# Patient Record
Sex: FEMALE | Race: WHITE | NOT HISPANIC OR LATINO | Employment: OTHER | URBAN - METROPOLITAN AREA
[De-identification: names, ages, dates, MRNs, and addresses within clinical notes are randomized per-mention and may not be internally consistent; named-entity substitution may affect disease eponyms.]

---

## 2017-01-13 ENCOUNTER — TELEPHONE (OUTPATIENT)
Dept: FAMILY MEDICINE | Facility: CLINIC | Age: 82
End: 2017-01-13

## 2017-01-13 NOTE — TELEPHONE ENCOUNTER
----- Message from Cristina Ambriz sent at 1/13/2017  8:36 AM CST -----  Contact: Trang  Daughter called regarding the patient to schedule an appt for today. Stating sugar is high and she can barely walk. Please contact Trang [phone] (home)

## 2017-01-13 NOTE — TELEPHONE ENCOUNTER
"Dr Baird,   Pt called and stated that pts CBG has running 150-180's the past 3 days.  Also starting yesterday pt has had increased confusion and dark urine.  Denies fever, chest pain, SOB, abdominal pain however pt is unsteady on her feet  "walking like a drunk person" the past 2 days per daughter.  Pt daughter advised to have pt seen at urgent care. Pt daughter verbalized understanding.   "

## 2017-01-18 ENCOUNTER — TELEPHONE (OUTPATIENT)
Dept: FAMILY MEDICINE | Facility: CLINIC | Age: 82
End: 2017-01-18

## 2017-01-18 NOTE — TELEPHONE ENCOUNTER
----- Message from Nargis Dubon sent at 1/18/2017 12:54 PM CST -----  Contact: daughter  Daughter - Parisa Sheth - [phone] is calling to schedule a one week followup appt from [facility] on 01 13 17 for a urinary tract infection/please call daughter to schedule as I do not show any available appts until 02 22 17

## 2017-01-20 ENCOUNTER — OFFICE VISIT (OUTPATIENT)
Dept: FAMILY MEDICINE | Facility: CLINIC | Age: 82
End: 2017-01-20
Payer: MEDICARE

## 2017-01-20 VITALS
SYSTOLIC BLOOD PRESSURE: 144 MMHG | TEMPERATURE: 98 F | BODY MASS INDEX: 32.14 KG/M2 | DIASTOLIC BLOOD PRESSURE: 70 MMHG | HEIGHT: 62 IN | HEART RATE: 50 BPM | WEIGHT: 174.63 LBS | OXYGEN SATURATION: 96 %

## 2017-01-20 DIAGNOSIS — E11.22 CONTROLLED TYPE 2 DIABETES MELLITUS WITH STAGE 4 CHRONIC KIDNEY DISEASE, WITH LONG-TERM CURRENT USE OF INSULIN: ICD-10-CM

## 2017-01-20 DIAGNOSIS — N18.4 CONTROLLED TYPE 2 DIABETES MELLITUS WITH STAGE 4 CHRONIC KIDNEY DISEASE, WITH LONG-TERM CURRENT USE OF INSULIN: ICD-10-CM

## 2017-01-20 DIAGNOSIS — I48.0 AF (PAROXYSMAL ATRIAL FIBRILLATION): ICD-10-CM

## 2017-01-20 DIAGNOSIS — E11.69 TYPE II OR UNSPECIFIED TYPE DIABETES MELLITUS WITH OTHER SPECIFIED MANIFESTATIONS, NOT STATED AS UNCONTROLLED: ICD-10-CM

## 2017-01-20 DIAGNOSIS — N18.4 CKD (CHRONIC KIDNEY DISEASE), STAGE IV: ICD-10-CM

## 2017-01-20 DIAGNOSIS — N30.00 ACUTE CYSTITIS WITHOUT HEMATURIA: Primary | ICD-10-CM

## 2017-01-20 DIAGNOSIS — Z23 IMMUNIZATION DUE: ICD-10-CM

## 2017-01-20 DIAGNOSIS — I10 ESSENTIAL HYPERTENSION: ICD-10-CM

## 2017-01-20 DIAGNOSIS — Z79.4 CONTROLLED TYPE 2 DIABETES MELLITUS WITH STAGE 4 CHRONIC KIDNEY DISEASE, WITH LONG-TERM CURRENT USE OF INSULIN: ICD-10-CM

## 2017-01-20 LAB
CREAT UR-MCNC: 55 MG/DL
MICROALBUMIN UR DL<=1MG/L-MCNC: 565 UG/ML
MICROALBUMIN/CREATININE RATIO: 1027.3 UG/MG

## 2017-01-20 PROCEDURE — 1157F ADVNC CARE PLAN IN RCRD: CPT | Mod: S$GLB,,, | Performed by: NURSE PRACTITIONER

## 2017-01-20 PROCEDURE — 1126F AMNT PAIN NOTED NONE PRSNT: CPT | Mod: S$GLB,,, | Performed by: NURSE PRACTITIONER

## 2017-01-20 PROCEDURE — 1160F RVW MEDS BY RX/DR IN RCRD: CPT | Mod: S$GLB,,, | Performed by: NURSE PRACTITIONER

## 2017-01-20 PROCEDURE — 3077F SYST BP >= 140 MM HG: CPT | Mod: S$GLB,,, | Performed by: NURSE PRACTITIONER

## 2017-01-20 PROCEDURE — 82570 ASSAY OF URINE CREATININE: CPT

## 2017-01-20 PROCEDURE — 3078F DIAST BP <80 MM HG: CPT | Mod: S$GLB,,, | Performed by: NURSE PRACTITIONER

## 2017-01-20 PROCEDURE — 1159F MED LIST DOCD IN RCRD: CPT | Mod: S$GLB,,, | Performed by: NURSE PRACTITIONER

## 2017-01-20 PROCEDURE — 90662 IIV NO PRSV INCREASED AG IM: CPT | Mod: S$GLB,,, | Performed by: NURSE PRACTITIONER

## 2017-01-20 PROCEDURE — 99999 PR PBB SHADOW E&M-EST. PATIENT-LVL V: CPT | Mod: PBBFAC,,, | Performed by: NURSE PRACTITIONER

## 2017-01-20 PROCEDURE — 99499 UNLISTED E&M SERVICE: CPT | Mod: S$PBB,,, | Performed by: NURSE PRACTITIONER

## 2017-01-20 PROCEDURE — G0008 ADMIN INFLUENZA VIRUS VAC: HCPCS | Mod: S$GLB,,, | Performed by: NURSE PRACTITIONER

## 2017-01-20 PROCEDURE — 99214 OFFICE O/P EST MOD 30 MIN: CPT | Mod: 25,S$GLB,, | Performed by: NURSE PRACTITIONER

## 2017-01-20 NOTE — MR AVS SNAPSHOT
[facility]  [address]  [place] [address]  Phone: [phone]  Fax: [phone]                  Denice Sheth   2017 3:00 PM   Office Visit    Description:  Female : 1934   Provider:  Mae Ramírez NP   Department:  [facility]           Reason for Visit     Hospital Follow Up           Diagnoses this Visit        Comments    Acute cystitis without hematuria    -  Primary     Type II or unspecified type diabetes mellitus with other specified manifestations, not stated as uncontrolled         Controlled type 2 diabetes mellitus with stage 4 chronic kidney disease, with long-term current use of insulin         CKD (chronic kidney disease), stage IV         Essential hypertension                To Do List           Future Appointments        Provider Department Dept Phone    2017 12:00 PM LAB, [place] [facility] [phone]    2017 1:00 PM Amadou Serrano NP [facility] Hematology [phone]      Goals (5 Years of Data)     None      [facility]     [facility] Care [facility] -  Assistance  Registered nurses in the [facility] provide clinical advisement, health education, appointment booking, and other advisory services.  Call for this free service at [phone].             Medications           Message regarding Medications     Verify the changes and/or additions to your medication regime listed below are the same as discussed with your clinician today.  If any of these changes or additions are incorrect, please notify your healthcare provider.             Verify that the below list of medications is an accurate representation of the medications you are currently taking.  If none reported, the list may be blank. If incorrect, please contact your healthcare provider. Carry this list with you in case of emergency.           Current Medications     ascorbic acid (VITAMIN C) 500  "MG tablet Take 500 mg by mouth once daily.    aspirin 81 mg Tab Take by mouth once daily.     b complex vitamins capsule Take 1 capsule by mouth once daily.    BD ULTRA-FINE PHILIPP PEN NEEDLES 32 gauge x 5/32" Ndle Inject 1 application as directed 3 (three) times daily.    blood-glucose meter kit by Other route. Use as instructed    calcium-vitamin D3 500 mg(1,250mg) -200 unit per tablet Take 1 tablet by mouth 2 (two) times daily with meals.    carvedilol (COREG) 25 MG tablet Take 1 tablet (25 mg total) by mouth 2 (two) times daily with meals.    cefUROXime (CEFTIN) 500 MG tablet Take 1 tablet (500 mg total) by mouth 2 (two) times daily.    clopidogrel (PLAVIX) 75 mg tablet TAKE ONE TABLET BY MOUTH ONCE DAILY    digoxin (LANOXIN) 125 mcg tablet TAKE ONE TABLET BY MOUTH ONCE DAILY    escitalopram oxalate (LEXAPRO) 10 MG tablet TAKE ONE TABLET BY MOUTH ONCE DAILY IN THE EVENING    ferrous sulfate 325 (65 FE) MG EC tablet Take 325 mg by mouth 3 (three) times daily with meals.    fish oil-omega-3 fatty acids 300-1,000 mg capsule Take 2 g by mouth once daily.    insulin aspart (NOVOLOG) 100 unit/mL injection Inject 6 Units into the skin 3 (three) times daily before meals.    INSULIN GLARGINE,HUM.REC.ANLOG (LANTUS SOLOSTAR SUBQ) Inject 35 Units into the skin every evening.     insulin syringe-needle U-100 (INSULIN SYRINGE) 1 mL 30 gauge x 5/16 Syrg As directed.    levothyroxine (SYNTHROID) 75 MCG tablet TAKE ONE TABLET BY MOUTH ONCE DAILY BEFORE BREAKFAST    linagliptin (TRADJENTA) 5 mg Tab tablet Take 1 tablet (5 mg total) by mouth once daily.    lovastatin (MEVACOR) 20 MG tablet Take 1 tablet (20 mg total) by mouth every evening.    multivitamin (ONE DAILY MULTIVITAMIN) per tablet Take 1 tablet by mouth once daily.    nifedipine (NIFEDICAL XL) 60 MG (OSM) 24 hr tablet TAKE 1 TABLET(60 MG) BY MOUTH EVERY DAY    nitroGLYCERIN (NITROSTAT) 0.4 MG SL tablet Place 1 tablet (0.4 mg total) under the tongue every 5 (five) minutes " "as needed for Chest pain.    POLYETHYLENE GLYCOL 3350 (DULCOLAX BALANCE ORAL) Take 1 packet by mouth daily as needed (constipation).     rivastigmine (EXELON) 4.6 mg/24 hr PT24 APPLY ONE PATCH TOPICALLY ONCE DAILY    spironolactone (ALDACTONE) 25 MG tablet Take 1 tablet (25 mg total) by mouth once daily.    valsartan (DIOVAN) 160 MG tablet Take 1 tablet (160 mg total) by mouth once daily.           Clinical Reference Information           Vital Signs - Last Recorded  Most recent update: 1/20/2017  3:25 PM by Kandy Rich LPN    BP Pulse Temp Ht Wt LMP    (!) 144/80 (BP Location: Left arm, Patient Position: Sitting, BP Method: Manual) (!) 50 97.5 °F (36.4 °C) (Oral) 5' 2" (1.575 m) 79.2 kg (174 lb 9.7 oz) (LMP Unknown)    SpO2 BMI             96% 31.94 kg/m2         Blood Pressure          Most Recent Value    BP  (!)  144/80      Allergies as of 1/20/2017     No Known Allergies      Immunizations Administered on Date of Encounter - 1/20/2017     None      [facility] Sign-Up     Activating your [facility] account is as easy as 1-2-3!     1) Visit [email or web address], select Sign Up Now, enter this activation code and your date of birth, then select Next.  Activation code not generated  Current Patient Portal Status: Account disabled      2) Create a username and password to use when you visit [facility] in the future and select a security question in case you lose your password and select Next.    3) Enter your e-mail address and click Sign Up!    Additional Information  If you have questions, please e-mail [email or web address] or call [phone] to talk to our [facility] staff. Remember, [facility] is NOT to be used for urgent needs. For medical emergencies, dial 911.         "

## 2017-01-20 NOTE — PROGRESS NOTES
Subjective:       Patient ID: Denice Sheth is a 82 y.o. female.    Chief Complaint: Hospital Follow Up    HPI     Patient presents to clinic, along with her daughter, for ER follow-up. She was evaluated ST ER on 01/13/2017 for hyperglycemia, confusion and weakness. She was diagnosed with a UTI and discharged home on oral ceftin.   Reports improvement in sx currently. She has been monitoring her bs daily, has noted it to range 90s-110s since being on the abx. She is maintained on lantus 35 units nightly and novolog 6 units with meals. Denies any episodes of hypoglycemia. Last eye exam 12/2016.     HTN - b/p is elevated in clinic with occasional elevations at home. Reports that Dr. Haji has been managing her antihypertensive regimen.     Review of Systems   Constitutional: Negative for chills and fever.   Respiratory: Negative for cough, shortness of breath and wheezing.    Cardiovascular: Negative for chest pain and palpitations.   Gastrointestinal: Negative for diarrhea, nausea and vomiting.   Genitourinary: Negative for difficulty urinating, dysuria, hematuria and urgency.   Neurological: Negative for dizziness, weakness, light-headedness and headaches.   Psychiatric/Behavioral: Negative for confusion.       Objective:      Physical Exam   Constitutional: She is oriented to person, place, and time. She appears well-developed and well-nourished.   HENT:   Head: Normocephalic and atraumatic.   Cardiovascular: Normal rate, regular rhythm, normal heart sounds and intact distal pulses.    No murmur heard.  Pulmonary/Chest: Effort normal and breath sounds normal. No respiratory distress. She has no wheezes. She has no rales.   Musculoskeletal: Normal range of motion. She exhibits no edema, tenderness or deformity.   Neurological: She is alert and oriented to person, place, and time. No cranial nerve deficit.   Skin: Skin is warm and dry.   Psychiatric: She has a normal mood and affect. Her behavior is normal.    Nursing note and vitals reviewed.      Assessment:       1. Acute cystitis without hematuria    2. Type II or unspecified type diabetes mellitus with other specified manifestations, not stated as uncontrolled    3. Controlled type 2 diabetes mellitus with stage 4 chronic kidney disease, with long-term current use of insulin    4. CKD (chronic kidney disease), stage IV    5. Essential hypertension    6. AF (paroxysmal atrial fibrillation)    7. Immunization due        Plan:   Denice was seen today for hospital follow up.    Diagnoses and all orders for this visit:    Acute cystitis without hematuria  Resolving. Continue current and return to clinic if sx worsen.      Type II or unspecified type diabetes mellitus with other specified manifestations, not stated as uncontrolled  Controlled continue current regimen.     Controlled type 2 diabetes mellitus with stage 4 chronic kidney disease, with long-term current use of insulin  Controlled continue current regimen.     CKD (chronic kidney disease), stage IV  Stable. Continue current regimen.     Essential hypertension  Not controlled. Patient will f/u with cardiology regarding current regimen.  Home b/p monitoring; RTC in 1 month for re-evaluation.     AF (paroxysmal atrial fibrillation)  Stable. Continue current regimen. Routine cardiology f/u.    Immunization due  -     Influenza - High Dose (65+) (PF) (IM)

## 2017-01-23 RX ORDER — LOVASTATIN 20 MG/1
TABLET ORAL
Qty: 90 TABLET | Refills: 0 | Status: SHIPPED | OUTPATIENT
Start: 2017-01-23 | End: 2017-01-27 | Stop reason: SDUPTHER

## 2017-01-24 ENCOUNTER — TELEPHONE (OUTPATIENT)
Dept: FAMILY MEDICINE | Facility: CLINIC | Age: 82
End: 2017-01-24

## 2017-01-24 DIAGNOSIS — N18.4 CKD (CHRONIC KIDNEY DISEASE), STAGE IV: Primary | ICD-10-CM

## 2017-01-27 ENCOUNTER — OFFICE VISIT (OUTPATIENT)
Dept: NEPHROLOGY | Facility: CLINIC | Age: 82
End: 2017-01-27
Payer: MEDICARE

## 2017-01-27 VITALS
DIASTOLIC BLOOD PRESSURE: 58 MMHG | BODY MASS INDEX: 31.17 KG/M2 | TEMPERATURE: 98 F | SYSTOLIC BLOOD PRESSURE: 118 MMHG | WEIGHT: 170.44 LBS | HEART RATE: 42 BPM | OXYGEN SATURATION: 98 %

## 2017-01-27 DIAGNOSIS — E11.29 PROTEINURIA DUE TO TYPE 2 DIABETES MELLITUS: ICD-10-CM

## 2017-01-27 DIAGNOSIS — N18.30 CKD (CHRONIC KIDNEY DISEASE) STAGE 3, GFR 30-59 ML/MIN: Primary | ICD-10-CM

## 2017-01-27 DIAGNOSIS — E11.21 DIABETIC NEPHROPATHY ASSOCIATED WITH TYPE 2 DIABETES MELLITUS: ICD-10-CM

## 2017-01-27 DIAGNOSIS — I12.9 BENIGN HYPERTENSIVE RENAL DISEASE WITHOUT RENAL FAILURE: ICD-10-CM

## 2017-01-27 DIAGNOSIS — R80.9 PROTEINURIA DUE TO TYPE 2 DIABETES MELLITUS: ICD-10-CM

## 2017-01-27 PROCEDURE — 3074F SYST BP LT 130 MM HG: CPT | Mod: S$GLB,,, | Performed by: INTERNAL MEDICINE

## 2017-01-27 PROCEDURE — 99999 PR PBB SHADOW E&M-EST. PATIENT-LVL II: CPT | Mod: PBBFAC,,, | Performed by: INTERNAL MEDICINE

## 2017-01-27 PROCEDURE — 1157F ADVNC CARE PLAN IN RCRD: CPT | Mod: S$GLB,,, | Performed by: INTERNAL MEDICINE

## 2017-01-27 PROCEDURE — 1159F MED LIST DOCD IN RCRD: CPT | Mod: S$GLB,,, | Performed by: INTERNAL MEDICINE

## 2017-01-27 PROCEDURE — 3078F DIAST BP <80 MM HG: CPT | Mod: S$GLB,,, | Performed by: INTERNAL MEDICINE

## 2017-01-27 PROCEDURE — 1126F AMNT PAIN NOTED NONE PRSNT: CPT | Mod: S$GLB,,, | Performed by: INTERNAL MEDICINE

## 2017-01-27 PROCEDURE — 99499 UNLISTED E&M SERVICE: CPT | Mod: S$PBB,,, | Performed by: INTERNAL MEDICINE

## 2017-01-27 PROCEDURE — 1160F RVW MEDS BY RX/DR IN RCRD: CPT | Mod: S$GLB,,, | Performed by: INTERNAL MEDICINE

## 2017-01-27 PROCEDURE — 99204 OFFICE O/P NEW MOD 45 MIN: CPT | Mod: S$GLB,,, | Performed by: INTERNAL MEDICINE

## 2017-01-27 NOTE — LETTER
January 27, 2017      Mae Ramírez, NP  [address]  [place] [address]           [facility] [address] [facility] [address] [place] [address]  Phone: [phone]          Patient: Denice Sheth   MR Number: [id number]   YOB: 1934   Date of Visit: 1/27/2017       Dear Mae Ramírez:    Thank you for referring Denice Sheth to me for evaluation. Attached you will find relevant portions of my assessment and plan of care.    If you have questions, please do not hesitate to call me. I look forward to following Denice Sheth along with you.    Sincerely,    Jonathan Pace MD    Enclosure  CC:  No Recipients    If you would like to receive this communication electronically, please contact [email or web address] or [phone] to request more information on [employer] Link access.    For providers and/or their staff who would like to refer a patient to [facility], please contact us through our one-stop-shop provider referral line, [facility], at [phone].    If you feel you have received this communication in error or would no longer like to receive these types of communications, please e-mail [email or web address]

## 2017-01-27 NOTE — PROGRESS NOTES
Subjective:       Patient ID: Denice Sheth is a 82 y.o. White female who presents for new patient evaluation for chronic renal failure.    Denice Sheth is referred by Meredith Baird MD to be evaluated for chronic renal failure.  She was found to have an elevated creatinine and was thus referred.  She had a UTI 2 weeks ago with one episode of nausea and weakness which as since resolved.  She reports that she is feeling well and really has no chronic symptoms.  She denies taking NSAIDs and has not been placed on any new medications lately.      Review of Systems   Constitutional: Negative for appetite change, chills and fever.   Eyes: Negative for visual disturbance.   Respiratory: Negative for cough and shortness of breath.    Cardiovascular: Negative for chest pain and leg swelling.   Gastrointestinal: Negative for diarrhea, nausea and vomiting.   Genitourinary: Negative for difficulty urinating, dysuria and hematuria.   Skin: Negative for rash.   Neurological: Negative for headaches.         Past Medical History   Diagnosis Date    A-fib     Anticoagulant long-term use     CHF (congestive heart failure)     Diabetes mellitus     Encounter for blood transfusion     Hypertension     Insomnia      Past Surgical History   Procedure Laterality Date    Hysterectomy       partial, benign causes by reported hx     Social History     Social History    Marital status:      Spouse name: N/A    Number of children: N/A    Years of education: N/A     Occupational History    Not on file.     Social History Main Topics    Smoking status: Never Smoker    Smokeless tobacco: Never Used    Alcohol use No    Drug use: No    Sexual activity: No     Other Topics Concern    Not on file     Social History Narrative     The patient has a family history of  Current Outpatient Prescriptions   Medication Sig    ascorbic acid (VITAMIN C) 500 MG tablet Take 500 mg by mouth once daily.    aspirin 81 mg Tab Take  "by mouth once daily.     b complex vitamins capsule Take 1 capsule by mouth once daily.    BD ULTRA-FINE PHILIPP PEN NEEDLES 32 gauge x 5/32" Ndle Inject 1 application as directed 3 (three) times daily.    blood-glucose meter kit by Other route. Use as instructed    calcium-vitamin D3 500 mg(1,250mg) -200 unit per tablet Take 1 tablet by mouth 2 (two) times daily with meals.    carvedilol (COREG) 25 MG tablet Take 1 tablet (25 mg total) by mouth 2 (two) times daily with meals.    clopidogrel (PLAVIX) 75 mg tablet TAKE ONE TABLET BY MOUTH ONCE DAILY    digoxin (LANOXIN) 125 mcg tablet TAKE ONE TABLET BY MOUTH ONCE DAILY    escitalopram oxalate (LEXAPRO) 10 MG tablet TAKE ONE TABLET BY MOUTH ONCE DAILY IN THE EVENING    ferrous sulfate 325 (65 FE) MG EC tablet Take 325 mg by mouth 3 (three) times daily with meals.    fish oil-omega-3 fatty acids 300-1,000 mg capsule Take 2 g by mouth once daily.    insulin aspart (NOVOLOG) 100 unit/mL injection Inject 6 Units into the skin 3 (three) times daily before meals.    INSULIN GLARGINE,HUM.REC.ANLOG (LANTUS SOLOSTAR SUBQ) Inject 35 Units into the skin every evening.     insulin syringe-needle U-100 (INSULIN SYRINGE) 1 mL 30 gauge x 5/16 Syrg As directed.    levothyroxine (SYNTHROID) 75 MCG tablet TAKE ONE TABLET BY MOUTH ONCE DAILY BEFORE BREAKFAST    linagliptin (TRADJENTA) 5 mg Tab tablet Take 1 tablet (5 mg total) by mouth once daily.    lovastatin (MEVACOR) 20 MG tablet Take 1 tablet (20 mg total) by mouth every evening.    lovastatin (MEVACOR) 20 MG tablet TAKE ONE TABLET BY MOUTH ONCE DAILY IN THE EVENING    multivitamin (ONE DAILY MULTIVITAMIN) per tablet Take 1 tablet by mouth once daily.    nifedipine (NIFEDICAL XL) 60 MG (OSM) 24 hr tablet TAKE 1 TABLET(60 MG) BY MOUTH EVERY DAY    nitroGLYCERIN (NITROSTAT) 0.4 MG SL tablet Place 1 tablet (0.4 mg total) under the tongue every 5 (five) minutes as needed for Chest pain.    POLYETHYLENE GLYCOL 3350 " (DULCOLAX BALANCE ORAL) Take 1 packet by mouth daily as needed (constipation).     rivastigmine (EXELON) 4.6 mg/24 hr PT24 APPLY ONE PATCH TOPICALLY ONCE DAILY    spironolactone (ALDACTONE) 25 MG tablet Take 1 tablet (25 mg total) by mouth once daily.    valsartan (DIOVAN) 160 MG tablet Take 1 tablet (160 mg total) by mouth once daily.     No current facility-administered medications for this visit.        Visit Vitals    BP (!) 118/58    Pulse (!) 42    Temp 97.5 °F (36.4 °C) (Oral)    Wt 77.3 kg (170 lb 6.7 oz)    LMP  (LMP Unknown)    SpO2 98%    BMI 31.17 kg/m2       Objective:      Physical Exam   Constitutional: She is oriented to person, place, and time. She appears well-developed and well-nourished. No distress.   HENT:   Head: Normocephalic and atraumatic.   Eyes: Conjunctivae are normal.   Neck: Neck supple. No JVD present.   Cardiovascular: Normal rate and regular rhythm.  Exam reveals no gallop and no friction rub.    Murmur (2/6 nabila) heard.  Pulmonary/Chest: Effort normal and breath sounds normal. No respiratory distress. She has no wheezes. She has no rales.   Abdominal: Soft. Bowel sounds are normal. She exhibits no distension. There is no tenderness.   Musculoskeletal: She exhibits no edema.   Neurological: She is alert and oriented to person, place, and time.   Skin: Skin is warm and dry. No rash noted.   Psychiatric: She has a normal mood and affect.   Vitals reviewed.      Assessment:       1. CKD (chronic kidney disease) stage 3, GFR 30-59 ml/min    2. 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 Diabetic nephropathy associated with type 2 diabetes mellitus    4. Proteinuria due to type 2 diabetes mellitus        Plan:   Return to clinic in 4 months.  Labs for next visit include rp, pth, ua, upc.  Baseline creatinine is 1.4-1.6 since 2015.    I will send a note to Dr. Davis regarding her pulse.

## 2017-01-30 RX ORDER — RIVASTIGMINE 4.6 MG/24H
PATCH, EXTENDED RELEASE TRANSDERMAL
Qty: 30 PATCH | Refills: 5 | Status: SHIPPED | OUTPATIENT
Start: 2017-01-30 | End: 2017-07-31 | Stop reason: SDUPTHER

## 2017-02-10 ENCOUNTER — TELEPHONE (OUTPATIENT)
Dept: FAMILY MEDICINE | Facility: CLINIC | Age: 82
End: 2017-02-10

## 2017-02-10 RX ORDER — INSULIN GLARGINE 100 [IU]/ML
35 INJECTION, SOLUTION SUBCUTANEOUS NIGHTLY
Qty: 10.5 ML | Refills: 5 | Status: SHIPPED | OUTPATIENT
Start: 2017-02-10 | End: 2017-06-08 | Stop reason: SDUPTHER

## 2017-02-10 NOTE — TELEPHONE ENCOUNTER
----- Message from Cristina Faustin sent at 2/10/2017  8:55 AM CST -----  INSULIN GLARGINE,HUM.REC.ANLOG (LANTUS SOLOSTAR SUBQ) [place] [address] - [facility] - [address]  [address]  [place] [address]  Phone: [phone] Fax: [phone]

## 2017-02-10 NOTE — TELEPHONE ENCOUNTER
Pt is requesting a refill on her lantus. She is taking 35 units each evening.  It was originally prescribed by Dr Reynaga.  States she uses the vial and not the pen. Could not pend order as it is historical med.  Please advise.

## 2017-02-17 RX ORDER — LOVASTATIN 20 MG/1
TABLET ORAL
Qty: 90 TABLET | Refills: 0 | Status: SHIPPED | OUTPATIENT
Start: 2017-02-17 | End: 2017-03-30

## 2017-02-21 RX ORDER — DIGOXIN 125 MCG
TABLET ORAL
Qty: 90 TABLET | Refills: 0 | Status: SHIPPED | OUTPATIENT
Start: 2017-02-21 | End: 2017-05-11 | Stop reason: ALTCHOICE

## 2017-02-21 RX ORDER — SPIRONOLACTONE 25 MG/1
TABLET ORAL
Qty: 90 TABLET | Refills: 3 | Status: SHIPPED | OUTPATIENT
Start: 2017-02-21 | End: 2017-05-11 | Stop reason: ALTCHOICE

## 2017-03-01 RX ORDER — LEVOTHYROXINE SODIUM 75 UG/1
TABLET ORAL
Qty: 90 TABLET | Refills: 0 | Status: SHIPPED | OUTPATIENT
Start: 2017-03-01 | End: 2017-06-04 | Stop reason: SDUPTHER

## 2017-03-01 RX ORDER — ESCITALOPRAM OXALATE 10 MG/1
TABLET ORAL
Qty: 90 TABLET | Refills: 0 | Status: SHIPPED | OUTPATIENT
Start: 2017-03-01 | End: 2017-06-04 | Stop reason: SDUPTHER

## 2017-03-22 ENCOUNTER — PATIENT OUTREACH (OUTPATIENT)
Dept: ADMINISTRATIVE | Facility: HOSPITAL | Age: 82
End: 2017-03-22

## 2017-03-22 NOTE — LETTER
March 22, 2017    Denice ABARHAN Meryl  [address] [place] [address]             [facility]  [address]  [facility] [address]  Phone: [phone] Dear Mrs. Sheth:    [facility] is committed to your overall health.  To help you get the most out of each of your visits, we will review your information to make sure you are up to date on all of your recommended tests and/or procedures.      Dr. Meredith Baird has found that you may be due for your annual diabetic foot exam and your fasting cholesterol labs.     If you have had any of the above done at another facility, please bring the records or information with you so that your record at [facility] will be complete.    If you are currently taking medication, please bring it with you to your appointment for review.    Also, if you have any type of Advanced Directives, please bring them with you to your office visit so we may scan them into your chart.    If you have any questions or concerns, please don't hesitate to call.    Thank you for letting us care for you,  Lashell Gallegos LPN Clinical Care Coordinator  [facility]  [phone]

## 2017-03-30 ENCOUNTER — OFFICE VISIT (OUTPATIENT)
Dept: FAMILY MEDICINE | Facility: CLINIC | Age: 82
End: 2017-03-30
Payer: MEDICARE

## 2017-03-30 VITALS
SYSTOLIC BLOOD PRESSURE: 144 MMHG | DIASTOLIC BLOOD PRESSURE: 54 MMHG | HEIGHT: 62 IN | WEIGHT: 167.69 LBS | BODY MASS INDEX: 30.86 KG/M2 | RESPIRATION RATE: 16 BRPM | TEMPERATURE: 98 F | HEART RATE: 44 BPM

## 2017-03-30 DIAGNOSIS — R29.6 FREQUENT FALLS: Primary | ICD-10-CM

## 2017-03-30 DIAGNOSIS — E11.9 TYPE 2 DIABETES MELLITUS WITHOUT COMPLICATION, WITH LONG-TERM CURRENT USE OF INSULIN: ICD-10-CM

## 2017-03-30 DIAGNOSIS — Z79.4 TYPE 2 DIABETES MELLITUS WITHOUT COMPLICATION, WITH LONG-TERM CURRENT USE OF INSULIN: ICD-10-CM

## 2017-03-30 LAB — GLUCOSE SERPL-MCNC: 198 MG/DL (ref 70–110)

## 2017-03-30 PROCEDURE — 1160F RVW MEDS BY RX/DR IN RCRD: CPT | Mod: S$GLB,,, | Performed by: FAMILY MEDICINE

## 2017-03-30 PROCEDURE — 1159F MED LIST DOCD IN RCRD: CPT | Mod: S$GLB,,, | Performed by: FAMILY MEDICINE

## 2017-03-30 PROCEDURE — 1126F AMNT PAIN NOTED NONE PRSNT: CPT | Mod: S$GLB,,, | Performed by: FAMILY MEDICINE

## 2017-03-30 PROCEDURE — 3078F DIAST BP <80 MM HG: CPT | Mod: S$GLB,,, | Performed by: FAMILY MEDICINE

## 2017-03-30 PROCEDURE — 3077F SYST BP >= 140 MM HG: CPT | Mod: S$GLB,,, | Performed by: FAMILY MEDICINE

## 2017-03-30 PROCEDURE — 99999 PR PBB SHADOW E&M-EST. PATIENT-LVL III: CPT | Mod: PBBFAC,,, | Performed by: FAMILY MEDICINE

## 2017-03-30 PROCEDURE — 99499 UNLISTED E&M SERVICE: CPT | Mod: S$PBB,,, | Performed by: FAMILY MEDICINE

## 2017-03-30 PROCEDURE — 82948 REAGENT STRIP/BLOOD GLUCOSE: CPT | Mod: S$GLB,,, | Performed by: FAMILY MEDICINE

## 2017-03-30 PROCEDURE — 99214 OFFICE O/P EST MOD 30 MIN: CPT | Mod: S$GLB,,, | Performed by: FAMILY MEDICINE

## 2017-03-30 PROCEDURE — 1157F ADVNC CARE PLAN IN RCRD: CPT | Mod: S$GLB,,, | Performed by: FAMILY MEDICINE

## 2017-03-30 NOTE — PATIENT INSTRUCTIONS
Trial off lovastatin for two months and see if you get a little stronger.   Measure some blood sugars before meals.

## 2017-03-30 NOTE — MR AVS SNAPSHOT
[facility]  [address]  [place] [address]  Phone: [phone]  Fax: [phone]                  Denice Sheth   3/30/2017 2:30 PM   Office Visit    Description:  Female : 1934   Provider:  Blayne Mendez MD   Department:  [facility]           Reason for Visit     Fall           Diagnoses this Visit        Comments    Frequent falls    -  Primary     Type 2 diabetes mellitus without complication, with long-term current use of insulin                To Do List           Future Appointments        Provider Department Dept Phone    2017 10:00 AM Meredith Baird MD [facility] [phone]    2017 10:00 AM LAB, [place] [facility] [phone]    2017 10:30 AM URINE [facility] [phone]    2017 10:20 AM Jonathan Pace MD [facility]ph[facility] [phone]    2017 12:00 PM LAB, [place] [facility] [phone]      Goals (5 Years of Data)     None      [facility]     [facility] Nurse Care Line -  Assistance  Unless otherwise directed by your provider, please contact [facility], our nurse care line that is available for  assistance.     Registered nurses in the [facility] provide: appointment scheduling, clinical advisement, health education, and other advisory services.  Call: [phone] (toll free)               Medications           Message regarding Medications     Verify the changes and/or additions to your medication regime listed below are the same as discussed with your clinician today.  If any of these changes or additions are incorrect, please notify your healthcare provider.             Verify that the below list of medications is an accurate representation of the medications you are currently taking.  If none reported, the list may be blank. If incorrect, please contact your healthcare  "provider. Carry this list with you in case of emergency.           Current Medications     ascorbic acid (VITAMIN C) 500 MG tablet Take 500 mg by mouth once daily.    aspirin 81 mg Tab Take by mouth once daily.     b complex vitamins capsule Take 1 capsule by mouth once daily.    BD ULTRA-FINE PHILIPP PEN NEEDLES 32 gauge x 5/32" Ndle Inject 1 application as directed 3 (three) times daily.    blood-glucose meter kit by Other route. Use as instructed    calcium-vitamin D3 500 mg(1,250mg) -200 unit per tablet Take 1 tablet by mouth 2 (two) times daily with meals.    carvedilol (COREG) 25 MG tablet Take 1 tablet (25 mg total) by mouth 2 (two) times daily with meals.    clopidogrel (PLAVIX) 75 mg tablet TAKE ONE TABLET BY MOUTH ONCE DAILY    digoxin (LANOXIN) 125 mcg tablet TAKE ONE TABLET BY MOUTH ONCE DAILY    escitalopram oxalate (LEXAPRO) 10 MG tablet TAKE ONE TABLET BY MOUTH ONCE DAILY IN THE EVENING    ferrous sulfate 325 (65 FE) MG EC tablet Take 325 mg by mouth 3 (three) times daily with meals.    fish oil-omega-3 fatty acids 300-1,000 mg capsule Take 2 g by mouth once daily.    insulin aspart (NOVOLOG) 100 unit/mL injection Inject 6 Units into the skin 3 (three) times daily before meals.    insulin glargine (LANTUS) 100 unit/mL injection Inject 35 Units into the skin every evening.    insulin syringe-needle U-100 (INSULIN SYRINGE) 1 mL 30 gauge x 5/16 Syrg As directed.    levothyroxine (SYNTHROID) 75 MCG tablet TAKE ONE TABLET BY MOUTH ONCE DAILY BEFORE BREAKFAST    linagliptin (TRADJENTA) 5 mg Tab tablet Take 1 tablet (5 mg total) by mouth once daily.    multivitamin (ONE DAILY MULTIVITAMIN) per tablet Take 1 tablet by mouth once daily.    nifedipine (NIFEDICAL XL) 60 MG (OSM) 24 hr tablet TAKE 1 TABLET(60 MG) BY MOUTH EVERY DAY    nitroGLYCERIN (NITROSTAT) 0.4 MG SL tablet Place 1 tablet (0.4 mg total) under the tongue every 5 (five) minutes as needed for Chest pain.    POLYETHYLENE GLYCOL 3350 (DULCOLAX BALANCE " "ORAL) Take 1 packet by mouth daily as needed (constipation).     rivastigmine (EXELON) 4.6 mg/24 hr PT24 APPLY ONE PATCH TOPICALLY ONCE DAILY    spironolactone (ALDACTONE) 25 MG tablet TAKE ONE TABLET BY MOUTH ONCE DAILY    valsartan (DIOVAN) 160 MG tablet Take 1 tablet (160 mg total) by mouth once daily.    lovastatin (MEVACOR) 20 MG tablet Take 1 tablet (20 mg total) by mouth every evening.           Clinical Reference Information           Your Vitals Were     BP Pulse Temp Resp Height Weight    144/54 (BP Location: Left arm, Patient Position: Sitting) 44 98 °F (36.7 °C) (Oral) 16 5' 2" (1.575 m) 76.1 kg (167 lb 10.6 oz)    Last Period BMI             (LMP Unknown) 30.67 kg/m2         Blood Pressure          Most Recent Value    BP  (!)  144/54      Allergies as of 3/30/2017     No Known Allergies      Immunizations Administered on Date of Encounter - 3/30/2017     None      Orders Placed During Today's Visit      Normal Orders This Visit    POCT glucose          3/30/2017  3:16 PM - Pratima Maier      Component Results     Component Value Flag Ref Range Units Status    POC Glucose 198 (A) 70 - 110 mg/dL Final    Comment:    Pt NPO since 10am            [facility] Sign-Up     Activating your [facility] account is as easy as 1-2-3!     1) Visit [email or web address], select Sign Up Now, enter this activation code and your date of birth, then select Next.  [email or web address]  Expires: 5/14/2017  2:18 PM      2) Create a username and password to use when you visit [facility] in the future and select a security question in case you lose your password and select Next.    3) Enter your e-mail address and click Sign Up!    Additional Information  If you have questions, please e-mail [email or web address] or call [phone] to talk to our [facility] staff. Remember, [facility] is NOT to be used for urgent needs. For medical emergencies, dial 911.         Instructions    Trial off lovastatin for two months and see if you get a " little stronger.   Measure some blood sugars before meals.        Language Assistance Services     ATTENTION: Language assistance services are available, free of charge. Please call [phone].      ATENCIÓN: Si habarnoldo leung, tiene a calloway disposición servicios gratuitos de asistencia lingüística. Llame al [phone].     CHÚ Ý: N?u b?n nói Ti?ng Vi?t, có các d?ch v? h? tr? ngôn ng? mi?n phí dành cho b?n. G?i s? [phone].         [facility] complies with applicable Federal civil rights laws and does not discriminate on the basis of race, color, national origin, age, disability, or sex.

## 2017-03-30 NOTE — PROGRESS NOTES
"Subjective:       Patient ID: Denice Sheth is a 82 y.o. female.    Chief Complaint: Fall (pt states had fall and could not get up, pt landed on "bottom" )    HPI Comments: She is here with her daughter.  She had an episode this morning where she went to sit down in the chair and missed and ended up on the floor.  She is unable to get off the floor unassisted and EMTs were called.  She felt a little less cheerful this morning.  She is an insulin-requiring diabetic and takes Lantus and mealtime insulin.  She does not think she is had any low blood sugar episodes.  Her blood sugar 2 hours prior to this episode was 180.  That was tested about 30 minutes after breakfast.  She is on several medications for blood pressure and she takes Lanoxin for atrial fibrillation.  She chronically has a bradycardia.  No chest pain or palpitations.  No confusion.  She has been weaker over the past several months.  She is had several episodes where she ended up on the ground or floor and needed EMT to come pick her up.  She has been on lovastatin for many years.  She does not complain of muscle pain.    Fall   Pertinent negatives include no fever.     Review of Systems   Constitutional: Negative for fever and unexpected weight change.   Respiratory: Negative for cough and shortness of breath.    Cardiovascular: Negative for chest pain and palpitations.   Neurological: Positive for weakness.       Objective:     Blood pressure (!) 144/54, pulse (!) 44, temperature 98 °F (36.7 °C), temperature source Oral, resp. rate 16, height 5' 2" (1.575 m), weight 76.1 kg (167 lb 10.6 oz).      Physical Exam   Constitutional:   She is overweight, alert and in no distress.   Cardiovascular: Normal rate, regular rhythm and normal heart sounds.    Her blood pressure by EMT was 159/52 at the time of her incident.  I checked sitting and standing blood pressures here.  160/50 sitting and 144/60 standing.   Pulmonary/Chest: Effort normal and breath sounds " normal. No respiratory distress.   Abdominal: She exhibits no distension. There is no tenderness.   Musculoskeletal: She exhibits no edema.   Neurological: She is alert.       Assessment:       1. Frequent falls    2. Type 2 diabetes mellitus without complication, with long-term current use of insulin        Plan:       Random blood sugar was 198.  She did not have anything to eat since 9:30 this morning.  I have asked her to record some preprandial glucoses to look for hypoglycemia.  We discussed whether or not lovastatin might contribute to muscle weakness.  Trial off of it for 2 months.  She has a follow-up with Dr. Baird next week.

## 2017-04-05 ENCOUNTER — TELEPHONE (OUTPATIENT)
Dept: FAMILY MEDICINE | Facility: CLINIC | Age: 82
End: 2017-04-05

## 2017-04-05 ENCOUNTER — OFFICE VISIT (OUTPATIENT)
Dept: FAMILY MEDICINE | Facility: CLINIC | Age: 82
End: 2017-04-05
Payer: MEDICARE

## 2017-04-05 VITALS
WEIGHT: 168.56 LBS | HEIGHT: 62 IN | DIASTOLIC BLOOD PRESSURE: 66 MMHG | BODY MASS INDEX: 31.02 KG/M2 | SYSTOLIC BLOOD PRESSURE: 120 MMHG

## 2017-04-05 DIAGNOSIS — R26.81 GAIT INSTABILITY: Primary | ICD-10-CM

## 2017-04-05 DIAGNOSIS — R29.6 FALLS FREQUENTLY: Primary | ICD-10-CM

## 2017-04-05 DIAGNOSIS — E11.9 TYPE 2 DIABETES MELLITUS WITHOUT COMPLICATION, WITH LONG-TERM CURRENT USE OF INSULIN: ICD-10-CM

## 2017-04-05 DIAGNOSIS — Z79.4 TYPE 2 DIABETES MELLITUS WITHOUT COMPLICATION, WITH LONG-TERM CURRENT USE OF INSULIN: ICD-10-CM

## 2017-04-05 DIAGNOSIS — R26.9 GAIT DISTURBANCE: ICD-10-CM

## 2017-04-05 PROCEDURE — 1160F RVW MEDS BY RX/DR IN RCRD: CPT | Mod: S$GLB,,, | Performed by: FAMILY MEDICINE

## 2017-04-05 PROCEDURE — 3074F SYST BP LT 130 MM HG: CPT | Mod: S$GLB,,, | Performed by: FAMILY MEDICINE

## 2017-04-05 PROCEDURE — 1159F MED LIST DOCD IN RCRD: CPT | Mod: S$GLB,,, | Performed by: FAMILY MEDICINE

## 2017-04-05 PROCEDURE — 99499 UNLISTED E&M SERVICE: CPT | Mod: S$PBB,,, | Performed by: FAMILY MEDICINE

## 2017-04-05 PROCEDURE — 1126F AMNT PAIN NOTED NONE PRSNT: CPT | Mod: S$GLB,,, | Performed by: FAMILY MEDICINE

## 2017-04-05 PROCEDURE — 99214 OFFICE O/P EST MOD 30 MIN: CPT | Mod: S$GLB,,, | Performed by: FAMILY MEDICINE

## 2017-04-05 PROCEDURE — 99999 PR PBB SHADOW E&M-EST. PATIENT-LVL III: CPT | Mod: PBBFAC,,, | Performed by: FAMILY MEDICINE

## 2017-04-05 PROCEDURE — 1157F ADVNC CARE PLAN IN RCRD: CPT | Mod: S$GLB,,, | Performed by: FAMILY MEDICINE

## 2017-04-05 PROCEDURE — 3078F DIAST BP <80 MM HG: CPT | Mod: S$GLB,,, | Performed by: FAMILY MEDICINE

## 2017-04-05 RX ORDER — INSULIN ASPART 100 [IU]/ML
6 INJECTION, SOLUTION INTRAVENOUS; SUBCUTANEOUS
Qty: 10 ML | Refills: 11 | Status: SHIPPED | OUTPATIENT
Start: 2017-04-05 | End: 2019-07-30

## 2017-04-05 RX ORDER — SYRINGE-NEEDLE,INSULIN,0.5 ML 30 GX5/16"
SYRINGE, EMPTY DISPOSABLE MISCELLANEOUS
Qty: 100 EACH | Refills: 3 | Status: SHIPPED | OUTPATIENT
Start: 2017-04-05 | End: 2019-02-19 | Stop reason: SDUPTHER

## 2017-04-05 NOTE — TELEPHONE ENCOUNTER
----- Message from Irma Devine sent at 4/5/2017  2:28 PM CDT -----  Contact: Patient's daughter Parisa  Patient's daughter Parisa called to advise that [facility] physical therapy services doesn't accept peoples health insurance. Need another physical therapy center that does accept [employer] sand[employer] insurance. Please call back to advise at [phone]. Thanks,

## 2017-04-05 NOTE — TELEPHONE ENCOUNTER
Spoke to pt daughter and advised her to contact N and find a PT in network.  She will contact us with name and address for referral.

## 2017-04-05 NOTE — MR AVS SNAPSHOT
[facility]  [address]  [place] [address]  Phone: [phone]  Fax: [phone]                  Denice Sheth   2017 10:00 AM   Office Visit    Description:  Female : 1934   Provider:  Meredith Baird MD   Department:  [facility]           Reason for Visit     Follow-up           Diagnoses this Visit        Comments    Falls frequently    -  Primary     Type 2 diabetes mellitus without complication, with long-term current use of insulin         Gait disturbance                To Do List           Future Appointments        Provider Department Dept Phone    2017 10:00 AM LAB, [place] [facility] [phone]    2017 10:30 AM URINE [facility] [phone]    2017 10:20 AM Jonathan Pace MD [facility] Nephrology [phone]    2017 9:40 AM Meredith Baird MD [facility] [phone]    2017 12:00 PM LAB, [place] [facility] [phone]      Goals (5 Years of Data)     None       These Medications        Disp Refills Start End    insulin aspart (NOVOLOG) 100 unit/mL injection 10 mL 11 2017    Inject 6 Units into the skin 3 (three) times daily before meals. - Subcutaneous    Pharmacy: [facility] [place], [place] - [address] Ph #: [phone]       insulin syringe-needle U-100 (INSULIN SYRINGE) 1 mL 30 gauge x 5/16 Syrg 100 each 3 2017     As directed.    Pharmacy: [facility] [address]  [place] - [address] Ph #: [phone]         [facility]     [facility] Care [facility] -  Assistance  Unless otherwise directed by your provider, please contact [facility], our nurse care line that is available for  assistance.     Registered nurses in the [facility] provide: appointment scheduling, clinical advisement,  "health education, and other advisory services.  Call: [phone] (toll free)               Medications           Message regarding Medications     Verify the changes and/or additions to your medication regime listed below are the same as discussed with your clinician today.  If any of these changes or additions are incorrect, please notify your healthcare provider.             Verify that the below list of medications is an accurate representation of the medications you are currently taking.  If none reported, the list may be blank. If incorrect, please contact your healthcare provider. Carry this list with you in case of emergency.           Current Medications     ascorbic acid (VITAMIN C) 500 MG tablet Take 500 mg by mouth once daily.    aspirin 81 mg Tab Take by mouth once daily.     b complex vitamins capsule Take 1 capsule by mouth once daily.    BD ULTRA-FINE PHILIPP PEN NEEDLES 32 gauge x 5/32" Ndle Inject 1 application as directed 3 (three) times daily.    blood-glucose meter kit by Other route. Use as instructed    calcium-vitamin D3 500 mg(1,250mg) -200 unit per tablet Take 1 tablet by mouth 2 (two) times daily with meals.    carvedilol (COREG) 25 MG tablet Take 1 tablet (25 mg total) by mouth 2 (two) times daily with meals.    clopidogrel (PLAVIX) 75 mg tablet TAKE ONE TABLET BY MOUTH ONCE DAILY    digoxin (LANOXIN) 125 mcg tablet TAKE ONE TABLET BY MOUTH ONCE DAILY    escitalopram oxalate (LEXAPRO) 10 MG tablet TAKE ONE TABLET BY MOUTH ONCE DAILY IN THE EVENING    ferrous sulfate 325 (65 FE) MG EC tablet Take 325 mg by mouth 3 (three) times daily with meals.    fish oil-omega-3 fatty acids 300-1,000 mg capsule Take 2 g by mouth once daily.    insulin aspart (NOVOLOG) 100 unit/mL injection Inject 6 Units into the skin 3 (three) times daily before meals.    insulin glargine (LANTUS) 100 unit/mL injection Inject 35 Units into the skin every evening.    insulin syringe-needle U-100 (INSULIN SYRINGE) 1 mL " "30 gauge x 5/16 Syrg As directed.    levothyroxine (SYNTHROID) 75 MCG tablet TAKE ONE TABLET BY MOUTH ONCE DAILY BEFORE BREAKFAST    linagliptin (TRADJENTA) 5 mg Tab tablet Take 1 tablet (5 mg total) by mouth once daily.    multivitamin (ONE DAILY MULTIVITAMIN) per tablet Take 1 tablet by mouth once daily.    nifedipine (NIFEDICAL XL) 60 MG (OSM) 24 hr tablet TAKE 1 TABLET(60 MG) BY MOUTH EVERY DAY    rivastigmine (EXELON) 4.6 mg/24 hr PT24 APPLY ONE PATCH TOPICALLY ONCE DAILY    spironolactone (ALDACTONE) 25 MG tablet TAKE ONE TABLET BY MOUTH ONCE DAILY    valsartan (DIOVAN) 160 MG tablet Take 1 tablet (160 mg total) by mouth once daily.    lovastatin (MEVACOR) 20 MG tablet Take 1 tablet (20 mg total) by mouth every evening.    nitroGLYCERIN (NITROSTAT) 0.4 MG SL tablet Place 1 tablet (0.4 mg total) under the tongue every 5 (five) minutes as needed for Chest pain.    POLYETHYLENE GLYCOL 3350 (DULCOLAX BALANCE ORAL) Take 1 packet by mouth daily as needed (constipation).            Clinical Reference Information           Your Vitals Were     BP Height Weight Last Period BMI    120/66 5' 2" (1.575 m) 76.5 kg (168 lb 8.7 oz) (LMP Unknown) 30.83 kg/m2      Blood Pressure          Most Recent Value    BP  120/66      Allergies as of 4/5/2017     No Known Allergies      Immunizations Administered on Date of Encounter - 4/5/2017     None      Orders Placed During Today's Visit      Normal Orders This Visit    Ambulatory Referral to Physical/Occupational Therapy       [facility] Sign-Up     Activating your [facility] account is as easy as 1-2-3!     1) Visit [email or web address], select Sign Up Now, enter this activation code and your date of birth, then select Next.  [email or web address]  Expires: 5/14/2017  2:18 PM      2) Create a username and password to use when you visit [facility] in the future and select a security question in case you lose your password and select Next.    3) Enter your e-mail address and click Sign " Up!    Additional Information  If you have questions, please e-mail [email or web address] or call [phone] to talk to our [facility] staff. Remember, [facility] is NOT to be used for urgent needs. For medical emergencies, dial 911.         Language Assistance Services     ATTENTION: Language assistance services are available, free of charge. Please call [phone].      ATENCIÓN: Si habla español, tiene a calloway disposición servicios gratuitos de asistencia lingüística. Llame al [phone].     [facility] Ý: N?u b?n nói Ti?ng Vi?t, có các d?ch v? h? tr? ngôn ng? mi?n phí dành cho b?n. G?i s? [phone].         [facility] complies with applicable Federal civil rights laws and does not discriminate on the basis of race, color, national origin, age, disability, or sex.

## 2017-04-05 NOTE — PROGRESS NOTES
Subjective:       Patient ID: Denice Sheth is a 82 y.o. female.    Chief Complaint: Follow-up (2 month follow-up. Pt states she is doing mostly well, today is a good day. )    HPI   The patient is a 82 year old female who presents for a 2 month follow up, accompanied by daughter with whom she lives and is primary caregiver.   The patient has a past medical history significant for atrial fibrillation, type 2 diabetes, hypertension, congestive heart failure, stage 3 chronic kidney disease, and is anticoagulated.    The patient reports some recent fatigue that has led to falls. She visited family medicine last week who took her off of her statin. Since then the patient has been feeling better and there has not been any more falls. The patient's daughter has indicated that the patient may have been dehydrated prior and is doing better keeping herself hydrated now. Today the patient reports a poor memory and constipation though she states this is her baseline. The patient also reports feeling weak with prolonged standing which leads to her falls.     Review of Systems   Constitutional: Positive for fatigue. Negative for chills and fever.   HENT: Negative for postnasal drip, rhinorrhea, sinus pressure, sneezing and sore throat.    Respiratory: Negative for cough, shortness of breath, wheezing and stridor.    Cardiovascular: Negative for chest pain, palpitations and leg swelling.   Gastrointestinal: Positive for constipation (Takes miralax). Negative for abdominal pain, diarrhea, nausea and vomiting.   Musculoskeletal: Positive for gait problem. Negative for arthralgias and myalgias.   Neurological: Positive for weakness. Negative for dizziness, syncope, light-headedness and headaches.   Psychiatric/Behavioral: Negative for dysphoric mood and sleep disturbance. The patient is not nervous/anxious.        Objective:      Physical Exam   Constitutional: She is oriented to person, place, and time. She appears well-developed  and well-nourished.   HENT:   Head: Normocephalic and atraumatic.   Right Ear: External ear normal.   Left Ear: External ear normal.   Nose: Nose normal.   Mouth/Throat: Oropharynx is clear and moist.   Eyes: Conjunctivae and EOM are normal. Pupils are equal, round, and reactive to light.   Neck: Normal range of motion. Neck supple.   Cardiovascular: Regular rhythm, normal heart sounds and intact distal pulses.  Bradycardia present.    Pulmonary/Chest: Effort normal and breath sounds normal.   Abdominal: Soft. Bowel sounds are normal.   Neurological: She is alert and oriented to person, place, and time. She has normal reflexes.   Skin: Skin is warm and dry. Bruising noted.        Psychiatric: She has a normal mood and affect. Her behavior is normal. Judgment and thought content normal.         Falls frequently  -     Ambulatory Referral to Physical/Occupational Therapy  Leg and core strengthening.  Type 2 diabetes mellitus without complication, with long-term current use of insulin  -     insulin syringe-needle U-100 (INSULIN SYRINGE) 1 mL 30 gauge x 5/16 Syrg; As directed.  Dispense: 100 each; Refill: 3  -     insulin aspart (NOVOLOG) 100 unit/mL injection; Inject 6 Units into the skin 3 (three) times daily before meals.  Dispense: 10 mL; Refill: 11  - Continue linagliptin 5 mg tab once daily and daily fasting blood sugar tests.  Gait disturbance  -     Ambulatory Referral to Physical/Occupational Therapy  Hypertension  - Well controlled on current regimen, cont.  Hyperlipidemia  - The patient is showing improvement following discontinuation of atorvastatin. Follow up in 6 months.

## 2017-04-06 NOTE — TELEPHONE ENCOUNTER
----- Message from Nargis Dill sent at 4/6/2017 11:38 AM CDT -----  Contact: Parisa  Patient's daughter calling to state has found therapist for patient: [facility], [phone]. Please call [phone]. Thanks!

## 2017-04-06 NOTE — TELEPHONE ENCOUNTER
Previous PT not covered under pt insurance. Yokasta review pended orderr for [facility] and advise. Thanks.

## 2017-04-25 ENCOUNTER — TELEPHONE (OUTPATIENT)
Dept: CARDIOLOGY | Facility: CLINIC | Age: 82
End: 2017-04-25

## 2017-04-25 ENCOUNTER — CLINICAL SUPPORT (OUTPATIENT)
Dept: FAMILY MEDICINE | Facility: CLINIC | Age: 82
End: 2017-04-25
Payer: MEDICARE

## 2017-04-25 VITALS
HEIGHT: 62 IN | WEIGHT: 167.56 LBS | TEMPERATURE: 98 F | BODY MASS INDEX: 30.83 KG/M2 | SYSTOLIC BLOOD PRESSURE: 109 MMHG | HEART RATE: 42 BPM | DIASTOLIC BLOOD PRESSURE: 44 MMHG | RESPIRATION RATE: 18 BRPM

## 2017-04-25 PROCEDURE — 99999 PR PBB SHADOW E&M-EST. PATIENT-LVL V: ICD-10-PCS | Mod: PBBFAC,,,

## 2017-04-25 PROCEDURE — 99999 PR PBB SHADOW E&M-EST. PATIENT-LVL V: CPT | Mod: PBBFAC,,,

## 2017-04-25 NOTE — TELEPHONE ENCOUNTER
----- Message from Geetha ARELY Mckenzie sent at 4/25/2017  1:19 PM CDT -----  Contact: Patient's Daughter  Patient's daughter called advising that she is currently with her mother.  She advised her mother's blood pressure is elevating.  I am attempting to transfer the daughter to [facility] right now.  Patient's daughter is concerned and would like her to have an appointment with Dr. Davis sooner than available of 6/21/17 due to symptoms.  Please call her back at [phone].  Thank you!

## 2017-04-25 NOTE — TELEPHONE ENCOUNTER
S/W pt's daughter, Parisa: states BP on Sun AM was 166/45 P: 40; did not take BP on Mon; BP by [facility] Nurse was 109/44 P: 42 today; daughter just checked BP & states it is 150/40 P: 50 by pt's home monitor; advised daughter to 1st make sure monitor is accurate (can take pt to [facility] or [facility] & get BP checked & bring her monitor w/ them & check BP w/ her monitor right after & see if gets same or very similar reading; if doesn't, her machine is likely off; if does, then machine is likely accurate); if machine is likely accurate, cont to monitor BP twice daily for 1-2 weeks & if tending to be elevated, sched an appt w/ Cardiology to F/U on BP; daughter aware Dr Davis will be out; daughter agrees & verbalizes understanding.

## 2017-05-01 ENCOUNTER — TELEPHONE (OUTPATIENT)
Dept: CARDIOLOGY | Facility: CLINIC | Age: 82
End: 2017-05-01

## 2017-05-01 NOTE — TELEPHONE ENCOUNTER
----- Message from Christin Smith sent at 5/1/2017  8:08 AM CDT -----  Contact: Parisa Meryl (Daughter)  Requesting ER F/U Blood Pressure Appt in two days,pt seen in ER on April 30  Call daughter back on # [phone]  thanks

## 2017-05-02 ENCOUNTER — OFFICE VISIT (OUTPATIENT)
Dept: CARDIOLOGY | Facility: CLINIC | Age: 82
End: 2017-05-02
Payer: MEDICARE

## 2017-05-02 VITALS
HEIGHT: 62 IN | DIASTOLIC BLOOD PRESSURE: 58 MMHG | WEIGHT: 164 LBS | HEART RATE: 68 BPM | BODY MASS INDEX: 30.18 KG/M2 | SYSTOLIC BLOOD PRESSURE: 107 MMHG

## 2017-05-02 DIAGNOSIS — I10 ESSENTIAL HYPERTENSION: ICD-10-CM

## 2017-05-02 DIAGNOSIS — I48.20 CHRONIC ATRIAL FIBRILLATION: ICD-10-CM

## 2017-05-02 DIAGNOSIS — I73.9 PVD (PERIPHERAL VASCULAR DISEASE): ICD-10-CM

## 2017-05-02 DIAGNOSIS — I48.0 AF (PAROXYSMAL ATRIAL FIBRILLATION): Primary | ICD-10-CM

## 2017-05-02 PROCEDURE — 99214 OFFICE O/P EST MOD 30 MIN: CPT | Mod: S$GLB,,, | Performed by: INTERNAL MEDICINE

## 2017-05-02 PROCEDURE — 1159F MED LIST DOCD IN RCRD: CPT | Mod: S$GLB,,, | Performed by: INTERNAL MEDICINE

## 2017-05-02 PROCEDURE — 1126F AMNT PAIN NOTED NONE PRSNT: CPT | Mod: S$GLB,,, | Performed by: INTERNAL MEDICINE

## 2017-05-02 PROCEDURE — 3074F SYST BP LT 130 MM HG: CPT | Mod: S$GLB,,, | Performed by: INTERNAL MEDICINE

## 2017-05-02 PROCEDURE — 1160F RVW MEDS BY RX/DR IN RCRD: CPT | Mod: S$GLB,,, | Performed by: INTERNAL MEDICINE

## 2017-05-02 PROCEDURE — 99999 PR PBB SHADOW E&M-EST. PATIENT-LVL IV: CPT | Mod: PBBFAC,,, | Performed by: INTERNAL MEDICINE

## 2017-05-02 PROCEDURE — 99499 UNLISTED E&M SERVICE: CPT | Mod: S$PBB,,, | Performed by: INTERNAL MEDICINE

## 2017-05-02 PROCEDURE — 3078F DIAST BP <80 MM HG: CPT | Mod: S$GLB,,, | Performed by: INTERNAL MEDICINE

## 2017-05-02 NOTE — LETTER
May 2, 2017      Pardeep Davis MD  [address] [facility] [address] [place] [address]           [facility]  [address] [facility] [address] [place] [address]  Phone: [phone]          Patient: Denice Sheth   MR Number: [id number]   YOB: 1934   Date of Visit: 5/2/2017       Dear Dr. Pardeep Davis:    Thank you for referring Denice Sheth to me for evaluation. Attached you will find relevant portions of my assessment and plan of care.    If you have questions, please do not hesitate to call me. I look forward to following Denice Sheth along with you.    Sincerely,    Hardeep Zambrano Jr., MD    Enclosure  CC:  No Recipients    If you would like to receive this communication electronically, please contact [email or web address] or [phone] to request more information on [facility] access.    For providers and/or their staff who would like to refer a patient to [facility], please contact us through our one-stop-shop provider referral line, [facility], at [phone].    If you feel you have received this communication in error or would no longer like to receive these types of communications, please e-mail [email or web address]

## 2017-05-02 NOTE — PROGRESS NOTES
Subjective:    Patient ID:  Denice Sheth is a 82 y.o. female who presents for evaluation of Hospital Follow Up (hospital follow up); Hypertension; and Loss of Consciousness      HPI82 yo WF with normal coronaries by cath, normal EF on Echo, moderate carotid disease and chronic atrial fib. Not on anticoagulation but taking ASA and plavix. Went to ER for N&V but discharged with no acute cardiac issue. Today feels fine.Daughter who is care giver upset because she states nobody can find anything wrong and she gets multiple opinions.    CONCLUSIONS   There is 50 - 59% right Internal Carotid stenosis.  There is 50 - 59% left Internal Carotid stenosis.          This document has been electronically    SIGNED BY: Kuldeep Dillon MD On: 12/21/2016 16:03    Review of Systems   Constitution: Negative for decreased appetite, fever, weakness, malaise/fatigue, weight gain and weight loss.   HENT: Negative for headaches, hearing loss and nosebleeds.    Eyes: Negative for visual disturbance.   Cardiovascular: Positive for dyspnea on exertion and irregular heartbeat. Negative for chest pain, claudication, cyanosis, leg swelling, near-syncope, orthopnea, palpitations, paroxysmal nocturnal dyspnea and syncope.   Respiratory: Positive for shortness of breath. Negative for cough, hemoptysis, sleep disturbances due to breathing, snoring and wheezing.    Endocrine: Negative for cold intolerance, heat intolerance, polydipsia and polyuria.   Hematologic/Lymphatic: Negative for adenopathy and bleeding problem. Does not bruise/bleed easily.   Skin: Negative for color change, itching, poor wound healing, rash and suspicious lesions.   Musculoskeletal: Positive for arthritis. Negative for back pain, falls, joint pain, joint swelling, muscle cramps, muscle weakness and myalgias.   Gastrointestinal: Negative for bloating, abdominal pain, change in bowel habit, constipation, flatus, heartburn, hematemesis, hematochezia, hemorrhoids, jaundice,  "melena, nausea and vomiting.   Genitourinary: Negative for bladder incontinence, decreased libido, frequency, hematuria, hesitancy and urgency.   Neurological: Negative for brief paralysis, difficulty with concentration, excessive daytime sleepiness, dizziness, focal weakness, light-headedness, loss of balance, numbness and vertigo.   Psychiatric/Behavioral: Negative for altered mental status, depression and memory loss. The patient does not have insomnia and is not nervous/anxious.    Allergic/Immunologic: Negative for environmental allergies, hives and persistent infections.        Objective:    Physical Exam   Constitutional: She is oriented to person, place, and time. She appears well-developed and well-nourished.   BP (!) 107/58  Pulse 68  Ht 5' 2" (1.575 m)  Wt 74.4 kg (164 lb)  LMP  (LMP Unknown)  BMI 30 kg/m2     HENT:   Head: Normocephalic and atraumatic.   Right Ear: External ear normal.   Left Ear: External ear normal.   Nose: Nose normal.   Mouth/Throat: Oropharynx is clear and moist.   Eyes: Conjunctivae, EOM and lids are normal. Pupils are equal, round, and reactive to light. Right eye exhibits no discharge. Left eye exhibits no discharge. Right conjunctiva has no hemorrhage. No scleral icterus.   Neck: Normal range of motion. Neck supple. No JVD present. No tracheal deviation present. No thyromegaly present.   Cardiovascular: Normal rate, normal heart sounds and intact distal pulses.  An irregularly irregular rhythm present. Exam reveals no gallop and no friction rub.    No murmur heard.  Pulmonary/Chest: Effort normal and breath sounds normal. No respiratory distress. She has no wheezes. She has no rales. She exhibits no tenderness. Breasts are symmetrical.   Abdominal: Soft. Bowel sounds are normal. She exhibits no distension and no mass. There is no hepatosplenomegaly or hepatomegaly. There is no tenderness. There is no rebound and no guarding.   Musculoskeletal: Normal range of motion. She " exhibits no edema or tenderness.   Lymphadenopathy:     She has no cervical adenopathy.   Neurological: She is alert and oriented to person, place, and time. She displays normal reflexes. No cranial nerve deficit. Coordination normal.   Skin: Skin is warm and dry. No rash noted. No erythema. No pallor.   Psychiatric: She has a normal mood and affect. Her behavior is normal. Judgment and thought content normal.   Nursing note and vitals reviewed.        Assessment:       1. AF (paroxysmal atrial fibrillation)    2. Chronic atrial fibrillation    3. Essential hypertension    4. PVD (peripheral vascular disease)         Plan:     Cannot find any acute problems and have not made medicine changes     Daughter states they will make follow up if needed  No orders of the defined types were placed in this encounter.    Return if symptoms worsen or fail to improve.

## 2017-05-03 ENCOUNTER — OFFICE VISIT (OUTPATIENT)
Dept: FAMILY MEDICINE | Facility: CLINIC | Age: 82
End: 2017-05-03
Payer: MEDICARE

## 2017-05-03 VITALS
SYSTOLIC BLOOD PRESSURE: 120 MMHG | HEART RATE: 85 BPM | WEIGHT: 169 LBS | HEIGHT: 68 IN | DIASTOLIC BLOOD PRESSURE: 50 MMHG | OXYGEN SATURATION: 97 % | TEMPERATURE: 98 F | BODY MASS INDEX: 25.61 KG/M2

## 2017-05-03 DIAGNOSIS — I50.30 DIASTOLIC CONGESTIVE HEART FAILURE, NYHA CLASS 1: ICD-10-CM

## 2017-05-03 DIAGNOSIS — I10 ESSENTIAL HYPERTENSION: ICD-10-CM

## 2017-05-03 DIAGNOSIS — I48.20 CHRONIC ATRIAL FIBRILLATION: ICD-10-CM

## 2017-05-03 DIAGNOSIS — R11.2 MODERATE NAUSEA AND VOMITING: Primary | ICD-10-CM

## 2017-05-03 PROCEDURE — 99214 OFFICE O/P EST MOD 30 MIN: CPT | Mod: S$GLB,,, | Performed by: NURSE PRACTITIONER

## 2017-05-03 PROCEDURE — 3078F DIAST BP <80 MM HG: CPT | Mod: S$GLB,,, | Performed by: NURSE PRACTITIONER

## 2017-05-03 PROCEDURE — 1159F MED LIST DOCD IN RCRD: CPT | Mod: S$GLB,,, | Performed by: NURSE PRACTITIONER

## 2017-05-03 PROCEDURE — 1160F RVW MEDS BY RX/DR IN RCRD: CPT | Mod: S$GLB,,, | Performed by: NURSE PRACTITIONER

## 2017-05-03 PROCEDURE — 99499 UNLISTED E&M SERVICE: CPT | Mod: S$PBB,,, | Performed by: NURSE PRACTITIONER

## 2017-05-03 PROCEDURE — 1126F AMNT PAIN NOTED NONE PRSNT: CPT | Mod: S$GLB,,, | Performed by: NURSE PRACTITIONER

## 2017-05-03 PROCEDURE — 3074F SYST BP LT 130 MM HG: CPT | Mod: S$GLB,,, | Performed by: NURSE PRACTITIONER

## 2017-05-03 PROCEDURE — 99999 PR PBB SHADOW E&M-EST. PATIENT-LVL V: CPT | Mod: PBBFAC,,, | Performed by: NURSE PRACTITIONER

## 2017-05-03 NOTE — MR AVS SNAPSHOT
[facility]  [address]  [place] [address]  Phone: [phone]  Fax: [phone]                  Denice Sheth   5/3/2017 2:00 PM   Office Visit    Description:  Female : 1934   Provider:  Mae Ramírez NP   Department:  [facility]           Reason for Visit     Follow-up           Diagnoses this Visit        Comments    Diastolic congestive heart failure, NYHA class 1    -  Primary     Essential hypertension                To Do List           Future Appointments        Provider Department Dept Phone    2017 10:00 AM LAB, [place] [facility] [phone]    2017 10:30 AM URINE [facility] [phone]    2017 10:20 AM Jonathan Pace MD [place] - Nephrology [phone]    2017 9:40 AM Meredith Baird MD [facility] [phone]    2017 12:00 PM LAB, [place] [facility] [phone]      Goals (5 Years of Data)     None      [facility] On [facility]     [facility] Nurse Care Line -  Assistance  Unless otherwise directed by your provider, please contact [facility], our nurse care line that is available for  assistance.     Registered nurses in the [facility] provide: appointment scheduling, clinical advisement, health education, and other advisory services.  Call: [phone] (toll free)               Medications           Message regarding Medications     Verify the changes and/or additions to your medication regime listed below are the same as discussed with your clinician today.  If any of these changes or additions are incorrect, please notify your healthcare provider.        STOP taking these medications     lovastatin (MEVACOR) 20 MG tablet Take 1 tablet (20 mg total) by mouth every evening.           Verify that the below list of medications is an accurate representation of the medications you are currently  "taking.  If none reported, the list may be blank. If incorrect, please contact your healthcare provider. Carry this list with you in case of emergency.           Current Medications     ascorbic acid (VITAMIN C) 500 MG tablet Take 500 mg by mouth once daily.    aspirin 81 mg Tab Take by mouth once daily.     b complex vitamins capsule Take 1 capsule by mouth once daily.    BD ULTRA-FINE PHILIPP PEN NEEDLES 32 gauge x 5/32" Ndle Inject 1 application as directed 3 (three) times daily.    blood-glucose meter kit by Other route. Use as instructed    calcium-vitamin D3 500 mg(1,250mg) -200 unit per tablet Take 1 tablet by mouth 2 (two) times daily with meals.    carvedilol (COREG) 25 MG tablet Take 1 tablet (25 mg total) by mouth 2 (two) times daily with meals.    clopidogrel (PLAVIX) 75 mg tablet TAKE ONE TABLET BY MOUTH ONCE DAILY    digoxin (LANOXIN) 125 mcg tablet TAKE ONE TABLET BY MOUTH ONCE DAILY    escitalopram oxalate (LEXAPRO) 10 MG tablet TAKE ONE TABLET BY MOUTH ONCE DAILY IN THE EVENING    ferrous sulfate 325 (65 FE) MG EC tablet Take 325 mg by mouth 3 (three) times daily with meals.    fish oil-omega-3 fatty acids 300-1,000 mg capsule Take 2 g by mouth once daily.    insulin aspart (NOVOLOG) 100 unit/mL injection Inject 6 Units into the skin 3 (three) times daily before meals.    insulin glargine (LANTUS) 100 unit/mL injection Inject 35 Units into the skin every evening.    insulin syringe-needle U-100 (INSULIN SYRINGE) 1 mL 30 gauge x 5/16 Syrg As directed.    levothyroxine (SYNTHROID) 75 MCG tablet TAKE ONE TABLET BY MOUTH ONCE DAILY BEFORE BREAKFAST    linagliptin (TRADJENTA) 5 mg Tab tablet Take 1 tablet (5 mg total) by mouth once daily.    multivitamin (ONE DAILY MULTIVITAMIN) per tablet Take 1 tablet by mouth once daily.    nifedipine (NIFEDICAL XL) 60 MG (OSM) 24 hr tablet TAKE 1 TABLET(60 MG) BY MOUTH EVERY DAY    nitroGLYCERIN (NITROSTAT) 0.4 MG SL tablet Place 1 tablet (0.4 mg total) under the tongue " "every 5 (five) minutes as needed for Chest pain.    ondansetron (ZOFRAN) 4 MG tablet Take 1 tablet (4 mg total) by mouth every 6 (six) hours.    POLYETHYLENE GLYCOL 3350 (DULCOLAX BALANCE ORAL) Take 1 packet by mouth daily as needed (constipation).     rivastigmine (EXELON) 4.6 mg/24 hr PT24 APPLY ONE PATCH TOPICALLY ONCE DAILY    spironolactone (ALDACTONE) 25 MG tablet TAKE ONE TABLET BY MOUTH ONCE DAILY    valsartan (DIOVAN) 160 MG tablet Take 1 tablet (160 mg total) by mouth once daily.           Clinical Reference Information           Your Vitals Were     BP Pulse Temp Height Weight Last Period    120/50 (BP Location: Left arm, Patient Position: Standing, BP Method: Manual) 85 97.6 °F (36.4 °C) (Oral) 5' 8" (1.727 m) 76.6 kg (168 lb 15.7 oz) (LMP Unknown)    SpO2 BMI             97% 25.69 kg/m2         Blood Pressure          Most Recent Value    BP  (!)  120/50      Allergies as of 5/3/2017     No Known Allergies      Immunizations Administered on Date of Encounter - 5/3/2017     None      [facility] Sign-Up     Activating your [facility] account is as easy as 1-2-3!     1) Visit [email or web address], select Sign Up Now, enter this activation code and your date of birth, then select Next.  [email or web address]  Expires: 5/14/2017  2:18 PM      2) Create a username and password to use when you visit [facility] in the future and select a security question in case you lose your password and select Next.    3) Enter your e-mail address and click Sign Up!    Additional Information  If you have questions, please e-mail [email or web address] or call [phone] to talk to our [facility] staff. Remember, [facility] is NOT to be used for urgent needs. For medical emergencies, dial 911.         Language Assistance Services     ATTENTION: Language assistance services are available, free of charge. Please call [phone].      ATENCIÓN: Si habla español, tiene a calloway disposición servicios gratuitos de asistencia lingüística. Llame " al [phone].     LILIA Ý: N?u b?n nói Ti?ng Vi?t, có các d?ch v? h? tr? ngôn ng? mi?n phí dành cho b?n. G?i s? [phone].         [facility] complies with applicable Federal civil rights laws and does not discriminate on the basis of race, color, national origin, age, disability, or sex.

## 2017-05-03 NOTE — PROGRESS NOTES
Subjective:       Patient ID: Denice Sheth is a 82 y.o. female.    Chief Complaint: Follow-up (ED- [facility])    HPI     Patient presents to clinic, along with her daughter, for ER follow-up.   She reports that she feels as though her sx have improved today. She was able to participate in PT at [facility] today.   Was evaluated at [facility] emergency department on 04/30/2017 with complaints of sudden onset of nausea, x2 episodes of vomiting, hypotension, generalized weakness and dizziness PTA. She was given IVF - sx improved, and she discharged home to /u with cardiology. She has since seen Dr. Zambrano - no medication changes were made. Patient's daughter would like referral for second opinion.     Review of Systems   Constitutional: Negative for chills and fever.   Respiratory: Negative for cough, shortness of breath and wheezing.    Cardiovascular: Negative for chest pain and palpitations.   Gastrointestinal: Negative for diarrhea, nausea and vomiting.   Neurological: Negative for dizziness, weakness, light-headedness and headaches.   Psychiatric/Behavioral: Negative for confusion.       Objective:      Physical Exam   Constitutional: She is oriented to person, place, and time. She appears well-developed and well-nourished.   HENT:   Head: Normocephalic and atraumatic.   Cardiovascular: Normal rate, regular rhythm and normal heart sounds.    No murmur heard.  Pulmonary/Chest: Effort normal and breath sounds normal. No respiratory distress. She has no wheezes. She has no rales.   Musculoskeletal: Normal range of motion. She exhibits no edema, tenderness or deformity.   Neurological: She is alert and oriented to person, place, and time. No cranial nerve deficit.   Skin: Skin is warm and dry.   Psychiatric: She has a normal mood and affect. Her behavior is normal.   Nursing note and vitals reviewed.      Assessment:       1. Moderate nausea and vomiting    2. Diastolic congestive heart failure, NYHA class 1    3.  Essential hypertension    4. Chronic atrial fibrillation        Plan:   Denice was seen today for follow-up.    Diagnoses and all orders for this visit:    Moderate nausea and vomiting  Resolved. Patient tolerating oral foods and fluids.   VS WNL. Patient able to participate in PT today without complaints.   RTC if sx worsen or fail to improve.     Diastolic congestive heart failure, NYHA class 1  Chronic atrial fibrillation  -     Ambulatory Referral to Cardiology  Referral to Dr. Hanson - patient's daughter to schedule appt.   Continue current regimen.     Essential HTN  -     Ambulatory Referral to Cardiology  Controlled. Continue current regimen.

## 2017-05-11 ENCOUNTER — LAB VISIT (OUTPATIENT)
Dept: LAB | Facility: HOSPITAL | Age: 82
End: 2017-05-11
Attending: NURSE PRACTITIONER
Payer: MEDICARE

## 2017-05-11 ENCOUNTER — OFFICE VISIT (OUTPATIENT)
Dept: FAMILY MEDICINE | Facility: CLINIC | Age: 82
End: 2017-05-11
Payer: MEDICARE

## 2017-05-11 ENCOUNTER — TELEPHONE (OUTPATIENT)
Dept: FAMILY MEDICINE | Facility: CLINIC | Age: 82
End: 2017-05-11

## 2017-05-11 VITALS
WEIGHT: 166.69 LBS | DIASTOLIC BLOOD PRESSURE: 50 MMHG | SYSTOLIC BLOOD PRESSURE: 112 MMHG | BODY MASS INDEX: 25.26 KG/M2 | HEART RATE: 47 BPM | HEIGHT: 68 IN | TEMPERATURE: 98 F

## 2017-05-11 DIAGNOSIS — Z79.4 TYPE 2 DIABETES MELLITUS WITHOUT COMPLICATION, WITH LONG-TERM CURRENT USE OF INSULIN: ICD-10-CM

## 2017-05-11 DIAGNOSIS — Z98.890 HISTORY OF LOOP RECORDER: ICD-10-CM

## 2017-05-11 DIAGNOSIS — I65.23 CAROTID STENOSIS, BILATERAL: ICD-10-CM

## 2017-05-11 DIAGNOSIS — I73.9 PVD (PERIPHERAL VASCULAR DISEASE): ICD-10-CM

## 2017-05-11 DIAGNOSIS — E11.59 TYPE 2 DIABETES MELLITUS WITH OTHER CIRCULATORY COMPLICATION: ICD-10-CM

## 2017-05-11 DIAGNOSIS — E11.9 TYPE 2 DIABETES MELLITUS WITHOUT COMPLICATION, WITH LONG-TERM CURRENT USE OF INSULIN: ICD-10-CM

## 2017-05-11 DIAGNOSIS — E87.1 HYPONATREMIA: ICD-10-CM

## 2017-05-11 DIAGNOSIS — R41.0 CONFUSION: ICD-10-CM

## 2017-05-11 DIAGNOSIS — I10 ESSENTIAL HYPERTENSION: ICD-10-CM

## 2017-05-11 DIAGNOSIS — N18.30 CKD (CHRONIC KIDNEY DISEASE) STAGE 3, GFR 30-59 ML/MIN: ICD-10-CM

## 2017-05-11 DIAGNOSIS — R26.9 GAIT DISORDER: ICD-10-CM

## 2017-05-11 DIAGNOSIS — G45.9 TRANSIENT CEREBRAL ISCHEMIA, UNSPECIFIED TYPE: Primary | ICD-10-CM

## 2017-05-11 DIAGNOSIS — R29.6 FREQUENT FALLS: ICD-10-CM

## 2017-05-11 DIAGNOSIS — I48.20 CHRONIC ATRIAL FIBRILLATION: ICD-10-CM

## 2017-05-11 DIAGNOSIS — R00.1 BRADYCARDIA: ICD-10-CM

## 2017-05-11 LAB
ALBUMIN SERPL BCP-MCNC: 3.6 G/DL
ALP SERPL-CCNC: 66 U/L
ALT SERPL W/O P-5'-P-CCNC: 17 U/L
ANION GAP SERPL CALC-SCNC: 10 MMOL/L
AST SERPL-CCNC: 19 U/L
BACTERIA #/AREA URNS HPF: NORMAL /HPF
BASOPHILS # BLD AUTO: 0.02 K/UL
BASOPHILS NFR BLD: 0.2 %
BILIRUB SERPL-MCNC: 0.2 MG/DL
BILIRUB UR QL STRIP: NEGATIVE
BUN SERPL-MCNC: 35 MG/DL
CALCIUM SERPL-MCNC: 10.8 MG/DL
CHLORIDE SERPL-SCNC: 99 MMOL/L
CLARITY UR: CLEAR
CO2 SERPL-SCNC: 28 MMOL/L
COLOR UR: YELLOW
CREAT SERPL-MCNC: 1.7 MG/DL
DIFFERENTIAL METHOD: ABNORMAL
EOSINOPHIL # BLD AUTO: 0.1 K/UL
EOSINOPHIL NFR BLD: 1.3 %
ERYTHROCYTE [DISTWIDTH] IN BLOOD BY AUTOMATED COUNT: 12.1 %
EST. GFR  (AFRICAN AMERICAN): 31.9 ML/MIN/1.73 M^2
EST. GFR  (NON AFRICAN AMERICAN): 27.7 ML/MIN/1.73 M^2
GLUCOSE SERPL-MCNC: 192 MG/DL
GLUCOSE UR QL STRIP: NEGATIVE
HCT VFR BLD AUTO: 36.9 %
HGB BLD-MCNC: 12 G/DL
HGB UR QL STRIP: NEGATIVE
HYALINE CASTS #/AREA URNS LPF: 0 /LPF
KETONES UR QL STRIP: NEGATIVE
LEUKOCYTE ESTERASE UR QL STRIP: NEGATIVE
LYMPHOCYTES # BLD AUTO: 3 K/UL
LYMPHOCYTES NFR BLD: 27.4 %
MCH RBC QN AUTO: 30.2 PG
MCHC RBC AUTO-ENTMCNC: 32.5 %
MCV RBC AUTO: 93 FL
MICROSCOPIC COMMENT: NORMAL
MONOCYTES # BLD AUTO: 1 K/UL
MONOCYTES NFR BLD: 8.8 %
NEUTROPHILS # BLD AUTO: 6.7 K/UL
NEUTROPHILS NFR BLD: 62 %
NITRITE UR QL STRIP: NEGATIVE
PH UR STRIP: 6 [PH] (ref 5–8)
PLATELET # BLD AUTO: 281 K/UL
PMV BLD AUTO: 11.2 FL
POTASSIUM SERPL-SCNC: 4.8 MMOL/L
PROT SERPL-MCNC: 7 G/DL
PROT UR QL STRIP: ABNORMAL
RBC # BLD AUTO: 3.97 M/UL
RBC #/AREA URNS HPF: 1 /HPF (ref 0–4)
SODIUM SERPL-SCNC: 137 MMOL/L
SP GR UR STRIP: 1.02 (ref 1–1.03)
SQUAMOUS #/AREA URNS HPF: 1 /HPF
TSH SERPL DL<=0.005 MIU/L-ACNC: 3.16 UIU/ML
URN SPEC COLLECT METH UR: ABNORMAL
WBC # BLD AUTO: 10.76 K/UL
WBC #/AREA URNS HPF: 2 /HPF (ref 0–5)

## 2017-05-11 PROCEDURE — 99499 UNLISTED E&M SERVICE: CPT | Mod: S$PBB,,, | Performed by: NURSE PRACTITIONER

## 2017-05-11 PROCEDURE — 3078F DIAST BP <80 MM HG: CPT | Mod: S$GLB,,, | Performed by: NURSE PRACTITIONER

## 2017-05-11 PROCEDURE — 80053 COMPREHEN METABOLIC PANEL: CPT

## 2017-05-11 PROCEDURE — 84443 ASSAY THYROID STIM HORMONE: CPT

## 2017-05-11 PROCEDURE — 1159F MED LIST DOCD IN RCRD: CPT | Mod: S$GLB,,, | Performed by: NURSE PRACTITIONER

## 2017-05-11 PROCEDURE — 1126F AMNT PAIN NOTED NONE PRSNT: CPT | Mod: S$GLB,,, | Performed by: NURSE PRACTITIONER

## 2017-05-11 PROCEDURE — 3074F SYST BP LT 130 MM HG: CPT | Mod: S$GLB,,, | Performed by: NURSE PRACTITIONER

## 2017-05-11 PROCEDURE — 99999 PR PBB SHADOW E&M-EST. PATIENT-LVL V: CPT | Mod: PBBFAC,,, | Performed by: NURSE PRACTITIONER

## 2017-05-11 PROCEDURE — 85025 COMPLETE CBC W/AUTO DIFF WBC: CPT

## 2017-05-11 PROCEDURE — 99214 OFFICE O/P EST MOD 30 MIN: CPT | Mod: S$GLB,,, | Performed by: NURSE PRACTITIONER

## 2017-05-11 PROCEDURE — 1160F RVW MEDS BY RX/DR IN RCRD: CPT | Mod: S$GLB,,, | Performed by: NURSE PRACTITIONER

## 2017-05-11 PROCEDURE — 36415 COLL VENOUS BLD VENIPUNCTURE: CPT | Mod: PO

## 2017-05-11 RX ORDER — RIVAROXABAN 15 MG/1
TABLET, FILM COATED ORAL
COMMUNITY
Start: 2017-05-10 | End: 2017-05-11 | Stop reason: ALTCHOICE

## 2017-05-11 RX ORDER — AMLODIPINE BESYLATE 10 MG/1
10 TABLET ORAL DAILY
COMMUNITY
End: 2017-06-01 | Stop reason: SDUPTHER

## 2017-05-11 RX ORDER — SIMVASTATIN 10 MG/1
10 TABLET, FILM COATED ORAL NIGHTLY
COMMUNITY

## 2017-05-11 RX ORDER — MEMANTINE HYDROCHLORIDE 10 MG/1
5 TABLET ORAL NIGHTLY
COMMUNITY
End: 2017-06-01 | Stop reason: SDUPTHER

## 2017-05-11 NOTE — MR AVS SNAPSHOT
[facility]  [address] [facility] [address]  [facility] [address]  Phone: [phone]  Fax: [phone]                  Denice Sheth   2017 2:40 PM   Office Visit    Description:  Female : 1934   Provider:  Eli Shah NP   Department:  [facility]           Reason for Visit     sluggish behavior           Diagnoses this Visit        Comments    Confusion    -  Primary     Hyponatremia         Type 2 diabetes mellitus with other circulatory complication         CKD (chronic kidney disease) stage 3, GFR 30-59 ml/min         Chronic atrial fibrillation         Essential hypertension         Gait disorder                To Do List           Future Appointments        Provider Department Dept Phone    2017 10:00 AM LAB, [place] [facility] [phone]    2017 10:30 AM URINE [facility] [phone]    2017 2:00 PM Meredith Baird MD [facility] [phone]    2017 10:20 AM Jonathan Pace MD [facility] [phone]    2017 9:40 AM Meredith Baird MD [facility] [phone]      Goals (5 Years of Data)     None      [facility]     [facility] Care [facility] -  Assistance  Unless otherwise directed by your provider, please contact [facility], our nurse care line that is available for  assistance.     Registered nurses in the [facility] provide: appointment scheduling, clinical advisement, health education, and other advisory services.  Call: [phone] (toll free)               Medications           Message regarding Medications     Verify the changes and/or additions to your medication regime listed below are the same as discussed with your clinician today.  If any of these changes or additions are incorrect, please notify your healthcare provider.        STOP taking these medications     nifedipine (NIFEDICAL XL)  "60 MG (OSM) 24 hr tablet TAKE 1 TABLET(60 MG) BY MOUTH EVERY DAY    spironolactone (ALDACTONE) 25 MG tablet TAKE ONE TABLET BY MOUTH ONCE DAILY    carvedilol (COREG) 25 MG tablet Take 1 tablet (25 mg total) by mouth 2 (two) times daily with meals.    digoxin (LANOXIN) 125 mcg tablet TAKE ONE TABLET BY MOUTH ONCE DAILY           Verify that the below list of medications is an accurate representation of the medications you are currently taking.  If none reported, the list may be blank. If incorrect, please contact your healthcare provider. Carry this list with you in case of emergency.           Current Medications     amlodipine (NORVASC) 10 MG tablet Take 10 mg by mouth once daily.    ascorbic acid (VITAMIN C) 500 MG tablet Take 500 mg by mouth once daily.    aspirin 81 mg Tab Take by mouth once daily.     b complex vitamins capsule Take 1 capsule by mouth once daily.    BD ULTRA-FINE PHILIPP PEN NEEDLES 32 gauge x 5/32" Ndle Inject 1 application as directed 3 (three) times daily.    blood-glucose meter kit by Other route. Use as instructed    calcium-vitamin D3 500 mg(1,250mg) -200 unit per tablet Take 1 tablet by mouth 2 (two) times daily with meals.    clopidogrel (PLAVIX) 75 mg tablet TAKE ONE TABLET BY MOUTH ONCE DAILY    escitalopram oxalate (LEXAPRO) 10 MG tablet TAKE ONE TABLET BY MOUTH ONCE DAILY IN THE EVENING    ferrous sulfate 325 (65 FE) MG EC tablet Take 325 mg by mouth 3 (three) times daily with meals.    fish oil-omega-3 fatty acids 300-1,000 mg capsule Take 2 g by mouth once daily.    insulin aspart (NOVOLOG) 100 unit/mL injection Inject 6 Units into the skin 3 (three) times daily before meals.    insulin glargine (LANTUS) 100 unit/mL injection Inject 35 Units into the skin every evening.    insulin syringe-needle U-100 (INSULIN SYRINGE) 1 mL 30 gauge x 5/16 Syrg As directed.    levothyroxine (SYNTHROID) 75 MCG tablet TAKE ONE TABLET BY MOUTH ONCE DAILY BEFORE BREAKFAST    linagliptin (TRADJENTA) 5 mg " "Tab tablet Take 1 tablet (5 mg total) by mouth once daily.    memantine (NAMENDA) 10 MG Tab Take 5 mg by mouth every evening.    multivitamin (ONE DAILY MULTIVITAMIN) per tablet Take 1 tablet by mouth once daily.    ondansetron (ZOFRAN) 4 MG tablet Take 1 tablet (4 mg total) by mouth every 6 (six) hours.    POLYETHYLENE GLYCOL 3350 (DULCOLAX BALANCE ORAL) Take 1 packet by mouth daily as needed (constipation).     rivaroxaban (XARELTO) 15 mg Tab Take 15 mg by mouth daily with dinner or evening meal.    rivastigmine (EXELON) 4.6 mg/24 hr PT24 APPLY ONE PATCH TOPICALLY ONCE DAILY    simvastatin (ZOCOR) 10 MG tablet Take 10 mg by mouth every evening.    valsartan (DIOVAN) 160 MG tablet Take 1 tablet (160 mg total) by mouth once daily.    nitroGLYCERIN (NITROSTAT) 0.4 MG SL tablet Place 1 tablet (0.4 mg total) under the tongue every 5 (five) minutes as needed for Chest pain.           Clinical Reference Information           Your Vitals Were     BP Pulse Temp Height Weight Last Period    112/50 47 98.3 °F (36.8 °C) (Oral) 5' 8" (1.727 m) 75.6 kg (166 lb 10.7 oz) (LMP Unknown)    BMI                25.34 kg/m2          Blood Pressure          Most Recent Value    BP  (!)  112/50      Allergies as of 5/11/2017     No Known Allergies      Immunizations Administered on Date of Encounter - 5/11/2017     None      Orders Placed During Today's Visit      Normal Orders This Visit    Urinalysis Microscopic     Urinalysis     Urine culture     Future Labs/Procedures Expected by Expires    CBC auto differential  5/11/2017 7/10/2018    Comprehensive metabolic panel  5/11/2017 7/10/2018    TSH  5/11/2017 7/10/2018      [facility] Sign-Up     Activating your [facility] account is as easy as 1-2-3!     1) Visit [email or web address], select Sign Up Now, enter this activation code and your date of birth, then select Next.  [email or web address]  Expires: 5/14/2017  2:18 PM      2) Create a username and password to use when you visit [facility] in the " future and select a security question in case you lose your password and select Next.    3) Enter your e-mail address and click Sign Up!    Additional Information  If you have questions, please e-mail [email or web address] or call [phone] to talk to our [facility] staff. Remember, [facility] is NOT to be used for urgent needs. For medical emergencies, dial 911.         Language Assistance Services     ATTENTION: Language assistance services are available, free of charge. Please call [phone].      ATENCIÓN: Si habla español, tiene a calloway disposición servicios gratuitos de asistencia lingüística. Llame al [phone].     CHÚ Ý: N?u b?n nói Ti?ng Vi?t, có các d?ch v? h? tr? ngôn ng? mi?n phí dành cho b?n. G?i s? [phone].         [facility] complies with applicable Federal civil rights laws and does not discriminate on the basis of race, color, national origin, age, disability, or sex.

## 2017-05-11 NOTE — PROGRESS NOTES
Subjective:       Patient ID: Denice Sheth is a 82 y.o. female.    Chief Complaint: sluggish behavior (this AM she fell off,went to [place](last friday) and found out she had a mini stoke.Has a heart monitor in.)    HPI Comments: On Friday 5-5-17 -  Called EMT due to slurred speech - BP was low when they got there - and they took her to [facility]   Told she had UTI - kept for Observation for low pulse   US carotid  And echo - saw Dr Anglin -- Did Ct scan of brain and carotids   Dr Anglin said that she had a stroke 2 days prior -    They were told heart rate too slow and too low - Put loop recorder in her Monday  Patient felt good enough to go home Tuesday and family member said that the nurse gave her something in her IV before she left   Slept all day Wednesday and didn't want to get up. - couldn't get up   Early this am - had incontinence this am when she got up to go to the bathroom and she was confused with her daughter -   Gave her some water   This am - didn't want to get out of the bed - didn't want to get up and tried to get up=- collapsed on floor at home today - called EMT -   They told her to be taken to the doctor office to be checked out.      Having trouble remembering short term -thinks that she ate for breakfast -  No swallowing difficulties     She went to [facility] and was admited 5-5-17 through 5-9-17   Throughout her stay - she was evaluated by cardiology and neurology - Dr Anglin, Dr Quiroga   She was sent home to follow up with PT at [facility]   She had a loop recorder placed by Dr Segovia - that she is following up with their office next week for evaluation and stitch removal   She has still had confusion, increased falls, incontinence.      Vitals:    05/11/17 1445   BP: (!) 112/50   Pulse: (!) 47   Temp: 98.3 °F (36.8 °C)     Review of Systems   Constitutional: Positive for activity change, appetite change and fatigue. Negative for diaphoresis and fever.   HENT: Negative.    Eyes:  Negative.    Respiratory: Positive for shortness of breath. Negative for cough and wheezing.    Cardiovascular: Negative.  Negative for chest pain.   Gastrointestinal: Negative.  Negative for abdominal pain, diarrhea and nausea.   Endocrine: Negative.    Genitourinary: Positive for difficulty urinating and enuresis. Negative for dysuria and hematuria.   Musculoskeletal: Positive for gait problem and myalgias.   Skin: Negative.  Negative for color change and rash.   Allergic/Immunologic: Negative.    Neurological: Positive for weakness. Negative for dizziness, tremors, syncope, facial asymmetry, speech difficulty, numbness and headaches.   Hematological: Negative.    Psychiatric/Behavioral: Positive for confusion and sleep disturbance.        Lethargic       Past Medical History:   Diagnosis Date    A-fib     Anticoagulant long-term use     CHF (congestive heart failure)     Diabetes mellitus     Encounter for blood transfusion     Hypertension     Insomnia      Objective:      Physical Exam   Constitutional: She appears well-developed and well-nourished. She appears lethargic.  Non-toxic appearance. She does not have a sickly appearance. She does not appear ill. No distress.   HENT:   Head: Normocephalic and atraumatic.   Right Ear: Hearing, tympanic membrane and ear canal normal.   Left Ear: Hearing, tympanic membrane and ear canal normal.   Nose: Nose normal.   Mouth/Throat: Uvula is midline and oropharynx is clear and moist.   Eyes: Conjunctivae and EOM are normal. Pupils are equal, round, and reactive to light.   Neck: Neck supple.   Cardiovascular: Regular rhythm, normal heart sounds and intact distal pulses.  Bradycardia present.  Exam reveals no friction rub.    No murmur heard.  Pulses:       Radial pulses are 2+ on the right side, and 2+ on the left side.        Dorsalis pedis pulses are 2+ on the right side, and 2+ on the left side.        Posterior tibial pulses are 2+ on the right side, and 2+ on  the left side.   Pulmonary/Chest: Effort normal and breath sounds normal. No apnea. No respiratory distress. She has no decreased breath sounds. She has no wheezes. She has no rhonchi. She has no rales.   Abdominal: Soft. Bowel sounds are normal.   Musculoskeletal:   Weakness to left side 3/5 upper extremities. 3/5 lower extremities on left side   5/5 right   Considerable weakness with walking   Lymphadenopathy:        Head (right side): No submental, no submandibular, no tonsillar, no preauricular and no posterior auricular adenopathy present.        Head (left side): No submental, no submandibular, no tonsillar, no preauricular and no posterior auricular adenopathy present.   Neurological: She appears lethargic. She is disoriented. No cranial nerve deficit or sensory deficit. Gait abnormal.   Skin: Skin is warm, dry and intact.        Psychiatric: She has a normal mood and affect. Her behavior is normal. Judgment and thought content normal. Her affect is not inappropriate. Her speech is not delayed and not slurred. Cognition and memory are impaired. She exhibits abnormal recent memory.   Nursing note and vitals reviewed.      Assessment:       1. Transient cerebral ischemia, unspecified type    2. Confusion    3. Hyponatremia    4. Type 2 diabetes mellitus with other circulatory complication    5. CKD (chronic kidney disease) stage 3, GFR 30-59 ml/min    6. Chronic atrial fibrillation    7. Essential hypertension    8. Gait disorder    9. Carotid stenosis, bilateral    10. Frequent falls    11. PVD (peripheral vascular disease)    12. Type 2 diabetes mellitus without complication, with long-term current use of insulin    13. Bradycardia    14. History of loop recorder        Plan:       Transient cerebral ischemia, unspecified type  Reviewed all notes, labs, CTA head and CTA carotids-   Seems to be a TIA type event related to Afib - now back on Xarelto, plavix and ASA   Needs stroke therapy follow up   Has seen   [facility] in the past - may need to FU with Neuro - will let PCP determine     Confusion  -     Urine culture  -     Urinalysis  -     Urinalysis Microscopic  -     CBC auto differential; Future; Expected date: 5/11/17  -     Comprehensive metabolic panel; Future; Expected date: 5/11/17  -     TSH; Future; Expected date: 5/11/17    Hyponatremia  -     CBC auto differential; Future; Expected date: 5/11/17  -     Comprehensive metabolic panel; Future; Expected date: 5/11/17  -     TSH; Future; Expected date: 5/11/17    Type 2 diabetes mellitus with other circulatory complication  -     CBC auto differential; Future; Expected date: 5/11/17  -     Comprehensive metabolic panel; Future; Expected date: 5/11/17  -     TSH; Future; Expected date: 5/11/17    CKD (chronic kidney disease) stage 3, GFR 30-59 ml/min  -     CBC auto differential; Future; Expected date: 5/11/17  -     Comprehensive metabolic panel; Future; Expected date: 5/11/17  -     TSH; Future; Expected date: 5/11/17    Chronic atrial fibrillation  -     CBC auto differential; Future; Expected date: 5/11/17  -     Comprehensive metabolic panel; Future; Expected date: 5/11/17  -     TSH; Future; Expected date: 5/11/17    Essential hypertension  -     CBC auto differential; Future; Expected date: 5/11/17  -     Comprehensive metabolic panel; Future; Expected date: 5/11/17  -     TSH; Future; Expected date: 5/11/17    Gait disorder  -     CBC auto differential; Future; Expected date: 5/11/17  -     Comprehensive metabolic panel; Future; Expected date: 5/11/17  -     TSH; Future; Expected date: 5/11/17    Carotid stenosis, bilateral  Being followed by cardiology     Frequent falls  Needs PT and OT at home     PVD (peripheral vascular disease)  On statin, xarelto, asa, plavix     Type 2 diabetes mellitus without complication, with long-term current use of insulin  Monitoring at home     Bradycardia  Working with cardiology - following up with Dr Hanson and Dr Segovia      History of loop recorder      -     Ambulatory referral to Home Health- patient's family wants to go with [facility] - as they have had them in the past             Reviewed case with Dr Lazar - patient in subacute stroke phase - Needs HHC for PT/OT/ST - discussed with patient family that she is on all meds that she needs to be on preventative   She will FU with Cardiology for loop recorder and bradycardia   Will get HHC out to assess status   Needs to Follow up with PCP within 1-2 weeks - Dr Baird

## 2017-05-11 NOTE — TELEPHONE ENCOUNTER
Seen in clinic today by NP Pablo, hospital follow up s/p stroke.  Per Dr Lazar's request, please schedule 1 week follow up with Dr Baird.  Attempted to schedule, next available is 6/2, has existing appt on 6/1, per Dr Lazar, pt needs to be seen sooner.

## 2017-05-12 LAB — BACTERIA UR CULT: NORMAL

## 2017-05-12 NOTE — TELEPHONE ENCOUNTER
----- Message from Helen Dill sent at 5/12/2017  9:06 AM CDT -----  [facility] - Priscila, states that the referral must be sent to the insurance company and PHN they can't admit until they hear from [facility].     Please call Priscila back at [phone].     Thank you

## 2017-05-18 ENCOUNTER — PATIENT OUTREACH (OUTPATIENT)
Dept: ADMINISTRATIVE | Facility: HOSPITAL | Age: 82
End: 2017-05-18

## 2017-05-18 DIAGNOSIS — I10 ESSENTIAL HYPERTENSION: ICD-10-CM

## 2017-05-18 DIAGNOSIS — E11.00 TYPE 2 DIABETES MELLITUS WITH HYPEROSMOLARITY WITHOUT COMA, UNSPECIFIED LONG TERM INSULIN USE STATUS: Primary | ICD-10-CM

## 2017-05-18 NOTE — LETTER
May 18, 2017    Denice Sheth  [address] [place] [address]             [facility]  [address]  [facility] [address]  Phone: [phone] Dear Mrs. Sheth:    [facility] is committed to your overall health.  To help you get the most out of each of your visits, we will review your information to make sure you are up to date on all of your recommended tests and/or procedures.      Dr. Meredith Baird has found that you may be due for your fasting cholesterol and diabetes labs.  These can be drawn at your upcoming lab appointment on May 26, 2017.  Please remember to fast 10 hours prior to the blood draw.  You may have all the water you want that day and may go to the lab anytime after 7:30 am.  You may also be due for your annual diabetic foot exam.     If you have had any of the above done at another facility, please bring the records or information with you so that your record at [facility] will be complete.  If you would like to schedule any of these, please contact me.    If you are currently taking medication, please bring it with you to your appointment for review.    Also, if you have any type of Advanced Directives, please bring them with you to your office visit so we may scan them into your chart.    If you have any questions or concerns, please don't hesitate to call.    Thank you for letting us care for you,  Lashell Gallegos LPN Clinical Care Coordinator  [facility] [place]  [phone]

## 2017-05-26 ENCOUNTER — LAB VISIT (OUTPATIENT)
Dept: LAB | Facility: HOSPITAL | Age: 82
End: 2017-05-26
Attending: INTERNAL MEDICINE
Payer: MEDICARE

## 2017-05-26 ENCOUNTER — TELEPHONE (OUTPATIENT)
Dept: NEPHROLOGY | Facility: CLINIC | Age: 82
End: 2017-05-26

## 2017-05-26 DIAGNOSIS — N18.30 CKD (CHRONIC KIDNEY DISEASE) STAGE 3, GFR 30-59 ML/MIN: ICD-10-CM

## 2017-05-26 DIAGNOSIS — E11.00 TYPE 2 DIABETES MELLITUS WITH HYPEROSMOLARITY WITHOUT COMA, UNSPECIFIED LONG TERM INSULIN USE STATUS: ICD-10-CM

## 2017-05-26 DIAGNOSIS — I10 ESSENTIAL HYPERTENSION: ICD-10-CM

## 2017-05-26 LAB
ALBUMIN SERPL BCP-MCNC: 3.7 G/DL
ANION GAP SERPL CALC-SCNC: 8 MMOL/L
BUN SERPL-MCNC: 35 MG/DL
CALCIUM SERPL-MCNC: 9.6 MG/DL
CHLORIDE SERPL-SCNC: 105 MMOL/L
CHOLEST/HDLC SERPL: 3.2 {RATIO}
CO2 SERPL-SCNC: 27 MMOL/L
CREAT SERPL-MCNC: 1.5 MG/DL
EST. GFR  (AFRICAN AMERICAN): 37.1 ML/MIN/1.73 M^2
EST. GFR  (NON AFRICAN AMERICAN): 32.2 ML/MIN/1.73 M^2
GLUCOSE SERPL-MCNC: 63 MG/DL
HDL/CHOLESTEROL RATIO: 31.3 %
HDLC SERPL-MCNC: 160 MG/DL
HDLC SERPL-MCNC: 50 MG/DL
LDLC SERPL CALC-MCNC: 92.6 MG/DL
NONHDLC SERPL-MCNC: 110 MG/DL
PHOSPHATE SERPL-MCNC: 3.8 MG/DL
POTASSIUM SERPL-SCNC: 5.4 MMOL/L
PTH-INTACT SERPL-MCNC: 55 PG/ML
SODIUM SERPL-SCNC: 140 MMOL/L
TRIGL SERPL-MCNC: 87 MG/DL

## 2017-05-26 PROCEDURE — 83036 HEMOGLOBIN GLYCOSYLATED A1C: CPT

## 2017-05-26 PROCEDURE — 80069 RENAL FUNCTION PANEL: CPT

## 2017-05-26 PROCEDURE — 80061 LIPID PANEL: CPT

## 2017-05-26 PROCEDURE — 36415 COLL VENOUS BLD VENIPUNCTURE: CPT | Mod: PO

## 2017-05-26 PROCEDURE — 83970 ASSAY OF PARATHORMONE: CPT

## 2017-05-27 LAB
ESTIMATED AVG GLUCOSE: 151 MG/DL
HBA1C MFR BLD HPLC: 6.9 %

## 2017-06-01 ENCOUNTER — OFFICE VISIT (OUTPATIENT)
Dept: FAMILY MEDICINE | Facility: CLINIC | Age: 82
End: 2017-06-01
Payer: MEDICARE

## 2017-06-01 VITALS
HEIGHT: 68 IN | TEMPERATURE: 98 F | SYSTOLIC BLOOD PRESSURE: 104 MMHG | BODY MASS INDEX: 25.26 KG/M2 | WEIGHT: 166.69 LBS | HEART RATE: 52 BPM | DIASTOLIC BLOOD PRESSURE: 50 MMHG

## 2017-06-01 DIAGNOSIS — I69.354 HEMIPARESIS AFFECTING LEFT SIDE AS LATE EFFECT OF CEREBROVASCULAR ACCIDENT (CVA): ICD-10-CM

## 2017-06-01 DIAGNOSIS — I48.20 CHRONIC ATRIAL FIBRILLATION: ICD-10-CM

## 2017-06-01 DIAGNOSIS — E11.9 TYPE 2 DIABETES MELLITUS WITHOUT COMPLICATION, WITH LONG-TERM CURRENT USE OF INSULIN: Primary | ICD-10-CM

## 2017-06-01 DIAGNOSIS — I10 ESSENTIAL HYPERTENSION: ICD-10-CM

## 2017-06-01 DIAGNOSIS — Z79.4 TYPE 2 DIABETES MELLITUS WITHOUT COMPLICATION, WITH LONG-TERM CURRENT USE OF INSULIN: Primary | ICD-10-CM

## 2017-06-01 PROCEDURE — 99999 PR PBB SHADOW E&M-EST. PATIENT-LVL III: CPT | Mod: PBBFAC,,, | Performed by: FAMILY MEDICINE

## 2017-06-01 PROCEDURE — 1159F MED LIST DOCD IN RCRD: CPT | Mod: S$GLB,,, | Performed by: FAMILY MEDICINE

## 2017-06-01 PROCEDURE — 99214 OFFICE O/P EST MOD 30 MIN: CPT | Mod: S$GLB,,, | Performed by: FAMILY MEDICINE

## 2017-06-01 PROCEDURE — 99499 UNLISTED E&M SERVICE: CPT | Mod: S$PBB,,, | Performed by: FAMILY MEDICINE

## 2017-06-01 PROCEDURE — 1126F AMNT PAIN NOTED NONE PRSNT: CPT | Mod: S$GLB,,, | Performed by: FAMILY MEDICINE

## 2017-06-01 RX ORDER — MEMANTINE HYDROCHLORIDE 10 MG/1
10 TABLET ORAL NIGHTLY
Qty: 90 TABLET | Refills: 1 | Status: SHIPPED | OUTPATIENT
Start: 2017-06-01 | End: 2017-12-03 | Stop reason: SDUPTHER

## 2017-06-01 RX ORDER — AMLODIPINE BESYLATE 10 MG/1
10 TABLET ORAL DAILY
Qty: 90 TABLET | Refills: 1 | Status: SHIPPED | OUTPATIENT
Start: 2017-06-01 | End: 2017-09-05

## 2017-06-01 RX ORDER — NITROGLYCERIN 0.4 MG/1
0.4 TABLET SUBLINGUAL EVERY 5 MIN PRN
Qty: 30 TABLET | Refills: 0 | Status: SHIPPED | OUTPATIENT
Start: 2017-06-01 | End: 2020-02-13

## 2017-06-01 NOTE — PROGRESS NOTES
"Subjective:       Patient ID: Denice Sheth is a 82 y.o. female.    Chief Complaint: Follow-up    HPI     Mrs. Sheth is an 82 y.o. F with a PMH significant for Afib ( on Plavix and Xarelto) and diabetes (A1C 6.9), who presents for a 1 month f/u for an admission on (5/5/17 -5/9/17). During her admission she was diagnosed with a "mini-stroke" and over her admission her speech and left sided symptoms resolved. She is currently followed by cardiology and is S/P loop recorder. Since her d/c she has been keeping a blood pressure log which ranges from low( 100s - 180s / 50-90s)  and has been taking medication as prescribed. She also had an abnormal UA, but reports no symptoms. Aside from some residual left sided weakness that doesn't interfere with daily living, she complains of fatigue. We had a discussion about improving her sleep hygiene (3-5hrs/night). She is having difficulty getting to sleep and sleeps with the TV on. At this point we agreed that we should try adjusting habits before adding melatonin or benadryl, due to her complex history.   She was also counseled on increasing her water intake since previous admission to hospital all required 1/2-1L NS to improve her low blood pressure.      Review of Systems   Constitutional: Positive for fatigue (worse after physical therapy). Negative for activity change, appetite change, fever and unexpected weight change.   HENT: Negative for congestion, ear pain, sinus pressure, sore throat and voice change.    Eyes: Negative for photophobia and visual disturbance.   Respiratory: Negative for cough, chest tightness, shortness of breath and wheezing.    Cardiovascular: Negative for chest pain, palpitations and leg swelling.   Gastrointestinal: Positive for constipation (takes miralax every other day to remain regular). Negative for abdominal distention, blood in stool, diarrhea and vomiting.   Genitourinary: Negative for dysuria, flank pain, hematuria, pelvic pain and " urgency.   Musculoskeletal: Negative for arthralgias, gait problem and myalgias.   Skin: Positive for color change (Old hematoma from previous blood pressure cuff). Negative for rash and wound.   Neurological: Positive for weakness (very mild left sided weakness not affecting ADLs). Negative for dizziness, seizures, syncope, facial asymmetry, numbness and headaches.   Hematological: Negative for adenopathy. Bruises/bleeds easily.   Psychiatric/Behavioral: Positive for sleep disturbance (poor sleep hygiene). Negative for behavioral problems, confusion, dysphoric mood and suicidal ideas. The patient is not nervous/anxious.        Objective:      Physical Exam   Constitutional: She is oriented to person, place, and time. She appears well-developed and well-nourished. No distress.   HENT:   Head: Normocephalic and atraumatic.   Right Ear: External ear normal.   Mouth/Throat: No oropharyngeal exudate.   Eyes: Conjunctivae are normal. Pupils are equal, round, and reactive to light. No scleral icterus.   Neck: Neck supple. No thyromegaly present.   Cardiovascular: Normal rate, normal heart sounds and intact distal pulses.  An irregularly irregular rhythm present. Exam reveals no friction rub.    No murmur heard.  Pulses:       Dorsalis pedis pulses are 1+ on the right side, and 1+ on the left side.        Posterior tibial pulses are 1+ on the right side, and 1+ on the left side.   Pulmonary/Chest: Effort normal and breath sounds normal. She has no wheezes.   Abdominal: Soft. Bowel sounds are normal. She exhibits no distension and no mass.   Musculoskeletal:        Right foot: There is normal range of motion and no deformity.        Left foot: There is normal range of motion and no deformity.   Feet:   Right Foot:   Protective Sensation: 5 sites tested. 5 sites sensed.   Skin Integrity: Positive for dry skin. Negative for ulcer, blister, skin breakdown, erythema, warmth or callus.   Left Foot:   Protective Sensation: 5  sites tested. 5 sites sensed.   Skin Integrity: Positive for dry skin. Negative for ulcer, blister, skin breakdown, erythema, warmth or callus.   Lymphadenopathy:     She has no cervical adenopathy.   Neurological: She is alert and oriented to person, place, and time. She has normal reflexes. No cranial nerve deficit. Coordination normal.   Mild L hemiparesis   Skin: Skin is warm and dry.   Psychiatric: She has a normal mood and affect. Her behavior is normal. Judgment and thought content normal.   Nursing note and vitals reviewed.        Type 2 diabetes mellitus without complication, with long-term current use of insulin  Careful BS monitoring to avoid hypoglycemia. A1c is currently well-controlled.  Chronic atrial fibrillation  On xarelto.   Essential hypertension  Controlled, monitored by cards.  Hemiparesis affecting left side as late effect of cerebrovascular accident (CVA)  Stable, hemiparesis without sig effect to ADLs.  Other orders  -     nitroGLYCERIN (NITROSTAT) 0.4 MG SL tablet; Place 1 tablet (0.4 mg total) under the tongue every 5 (five) minutes as needed for Chest pain.  Dispense: 30 tablet; Refill: 0  -     memantine (NAMENDA) 10 MG Tab; Take 1 tablet (10 mg total) by mouth every evening.  Dispense: 90 tablet; Refill: 1  -     amlodipine (NORVASC) 10 MG tablet; Take 1 tablet (10 mg total) by mouth once daily.  Dispense: 90 tablet; Refill: 1

## 2017-06-02 ENCOUNTER — OFFICE VISIT (OUTPATIENT)
Dept: NEPHROLOGY | Facility: CLINIC | Age: 82
End: 2017-06-02
Payer: MEDICARE

## 2017-06-02 VITALS
HEART RATE: 58 BPM | BODY MASS INDEX: 30.55 KG/M2 | HEIGHT: 62 IN | WEIGHT: 166 LBS | SYSTOLIC BLOOD PRESSURE: 138 MMHG | DIASTOLIC BLOOD PRESSURE: 30 MMHG

## 2017-06-02 DIAGNOSIS — E11.21 DIABETIC NEPHROPATHY ASSOCIATED WITH TYPE 2 DIABETES MELLITUS: ICD-10-CM

## 2017-06-02 DIAGNOSIS — R80.9 PROTEINURIA DUE TO TYPE 2 DIABETES MELLITUS: ICD-10-CM

## 2017-06-02 DIAGNOSIS — I12.9 BENIGN HYPERTENSIVE RENAL DISEASE WITHOUT RENAL FAILURE: ICD-10-CM

## 2017-06-02 DIAGNOSIS — E11.29 PROTEINURIA DUE TO TYPE 2 DIABETES MELLITUS: ICD-10-CM

## 2017-06-02 DIAGNOSIS — N18.30 CKD (CHRONIC KIDNEY DISEASE) STAGE 3, GFR 30-59 ML/MIN: Primary | ICD-10-CM

## 2017-06-02 PROCEDURE — 99999 PR PBB SHADOW E&M-EST. PATIENT-LVL II: CPT | Mod: PBBFAC,,, | Performed by: INTERNAL MEDICINE

## 2017-06-02 PROCEDURE — 99214 OFFICE O/P EST MOD 30 MIN: CPT | Mod: S$GLB,,, | Performed by: INTERNAL MEDICINE

## 2017-06-02 PROCEDURE — 1126F AMNT PAIN NOTED NONE PRSNT: CPT | Mod: S$GLB,,, | Performed by: INTERNAL MEDICINE

## 2017-06-02 PROCEDURE — 99499 UNLISTED E&M SERVICE: CPT | Mod: S$PBB,,, | Performed by: INTERNAL MEDICINE

## 2017-06-02 PROCEDURE — 1159F MED LIST DOCD IN RCRD: CPT | Mod: S$GLB,,, | Performed by: INTERNAL MEDICINE

## 2017-06-02 NOTE — PROGRESS NOTES
"Subjective:       Patient ID: Denice Sheth is a 82 y.o. White female who presents for new patient evaluation for chronic renal failure.    She has a recent hospitalization for a TIA.  She has had some left-sided weakness since then.  She is now wearing a loop recorder for Dr. Hanson.  She is currently doing home PT/OT.      Review of Systems   Constitutional: Negative for appetite change, chills and fever.   Eyes: Negative for visual disturbance.   Respiratory: Negative for cough and shortness of breath.    Cardiovascular: Negative for chest pain and leg swelling.   Gastrointestinal: Negative for diarrhea, nausea and vomiting.   Genitourinary: Negative for difficulty urinating, dysuria and hematuria.   Musculoskeletal: Positive for gait problem.   Skin: Negative for rash.   Neurological: Positive for weakness. Negative for headaches.     BP (!) 138/30 (BP Location: Left arm, Patient Position: Sitting, BP Method: Manual)   Pulse (!) 58   Ht 5' 2" (1.575 m)   Wt 75.3 kg (166 lb)   LMP  (LMP Unknown)   BMI 30.36 kg/m²     Objective:      Physical Exam   Constitutional: She is oriented to person, place, and time. She appears well-developed and well-nourished. No distress.   HENT:   Head: Normocephalic and atraumatic.   Eyes: Conjunctivae are normal.   Neck: Neck supple. No JVD present.   Cardiovascular: Normal rate and regular rhythm.  Exam reveals no gallop and no friction rub.    Murmur (2/6 nabila) heard.  Pulmonary/Chest: Effort normal and breath sounds normal. No respiratory distress. She has no wheezes. She has no rales.   Abdominal: Soft. Bowel sounds are normal. She exhibits no distension. There is no tenderness.   Musculoskeletal: She exhibits no edema.   Neurological: She is alert and oriented to person, place, and time.   Skin: Skin is warm and dry. No rash noted.   Psychiatric: She has a normal mood and affect.   Vitals reviewed.      Assessment:       1. CKD (chronic kidney disease) stage 3, GFR 30-59 " ml/min    2. 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 Diabetic nephropathy associated with type 2 diabetes mellitus    4. Proteinuria due to type 2 diabetes mellitus        Plan:   Return to clinic in 4 months.  Labs for next visit include rp, pth, ua, upc.  Baseline creatinine is 1.4-1.6 since 2015.  PTH is 55 with a calcium of 9.6.  UPC is 0.75.  We discussed her potassium as well.

## 2017-06-05 PROBLEM — I69.354 HEMIPARESIS AFFECTING LEFT SIDE AS LATE EFFECT OF CEREBROVASCULAR ACCIDENT (CVA): Status: ACTIVE | Noted: 2017-06-05

## 2017-06-05 RX ORDER — LEVOTHYROXINE SODIUM 75 UG/1
TABLET ORAL
Qty: 90 TABLET | Refills: 0 | Status: SHIPPED | OUTPATIENT
Start: 2017-06-05 | End: 2017-09-04 | Stop reason: SDUPTHER

## 2017-06-05 RX ORDER — ESCITALOPRAM OXALATE 10 MG/1
TABLET ORAL
Qty: 90 TABLET | Refills: 0 | Status: SHIPPED | OUTPATIENT
Start: 2017-06-05 | End: 2017-09-04 | Stop reason: SDUPTHER

## 2017-06-08 ENCOUNTER — TELEPHONE (OUTPATIENT)
Dept: FAMILY MEDICINE | Facility: CLINIC | Age: 82
End: 2017-06-08

## 2017-06-08 RX ORDER — INSULIN GLARGINE 100 [IU]/ML
30 INJECTION, SOLUTION SUBCUTANEOUS NIGHTLY
Qty: 10.5 ML | Refills: 5 | Status: SHIPPED | OUTPATIENT
Start: 2017-06-08 | End: 2018-01-08

## 2017-06-08 NOTE — TELEPHONE ENCOUNTER
Called patient's daughter to review BS. Hypoglycemia is only noted in the mornings.  Current dose of Lantus is 35 units nightly, will decrease to 30 units nightly.   Patient's daughter to f/u with clinic early next week regarding BS.    Patient's daughter is going to f/u with patient's cardiologist regarding bradycardia.

## 2017-06-08 NOTE — TELEPHONE ENCOUNTER
Spoke with Pa, he states caregiver called this morning and pt woke up in the middle of the night after sliding out of bed, BS was 60. Every morning BS is between 50-70 past few weeks. HR has been been  mid-low 50s, asymptomatic bradycardia, cardiologist is aware.   He believes lantus needs to be adjusted, please advise. Call pt with recommendations.

## 2017-06-08 NOTE — TELEPHONE ENCOUNTER
----- Message from Phyllis More sent at 6/8/2017 10:11 AM CDT -----  Contact: [facility]  Pa with [facility] called to report the patient's blood levels have been running low for the last week  Any where from 54-75  She has been clammy and slipped out of bed last night    Please all  to advise.      Thanks

## 2017-06-14 ENCOUNTER — TELEPHONE (OUTPATIENT)
Dept: ADMINISTRATIVE | Facility: HOSPITAL | Age: 82
End: 2017-06-14

## 2017-07-06 NOTE — TELEPHONE ENCOUNTER
----- Message from Pratima Ng sent at 7/6/2017  8:46 AM CDT -----  Contact: Pt  Pt is requesting to have a refill on Tradjenta 5mg. Pt uses.    [facility]  [facility] [address]  [address]  [place] [address]  Phone: [phone] Fax: [phone]    Pt can be reached at [phone].

## 2017-07-11 NOTE — TELEPHONE ENCOUNTER
----- Message from Alejandra Smith sent at 7/10/2017  4:45 PM CDT -----  Contact: Lou with phn ph#[phone]   Lou with ph[facility] ph#[phone] requesting a call from nurse in regards to getting assistance with the trajenta, print out prescription and sent it to her via fax   Fax#[phone]

## 2017-07-14 ENCOUNTER — TELEPHONE (OUTPATIENT)
Dept: FAMILY MEDICINE | Facility: CLINIC | Age: 82
End: 2017-07-14

## 2017-07-14 NOTE — TELEPHONE ENCOUNTER
----- Message from Cesia Bass sent at 7/14/2017  9:23 AM CDT -----  Request was made for medicine assistance / but will need the prescription sent / [employer] / [place]   [phone] fax [phone]

## 2017-07-18 ENCOUNTER — TELEPHONE (OUTPATIENT)
Dept: ADMINISTRATIVE | Facility: HOSPITAL | Age: 82
End: 2017-07-18

## 2017-07-18 NOTE — TELEPHONE ENCOUNTER
Lou with [employer] is helping patient get med assistance through [employer].    Patient needs rx for Tradjenta printed and faxed to [phone].  Needs to have cover sheet with patient name, , and address on it.

## 2017-08-01 RX ORDER — RIVASTIGMINE 4.6 MG/24H
PATCH, EXTENDED RELEASE TRANSDERMAL
Qty: 30 PATCH | Refills: 3 | Status: SHIPPED | OUTPATIENT
Start: 2017-08-01 | End: 2017-12-01 | Stop reason: SDUPTHER

## 2017-08-30 LAB — A1C: 6.3

## 2017-09-05 ENCOUNTER — LAB VISIT (OUTPATIENT)
Dept: LAB | Facility: HOSPITAL | Age: 82
End: 2017-09-05
Attending: INTERNAL MEDICINE
Payer: MEDICARE

## 2017-09-05 ENCOUNTER — OFFICE VISIT (OUTPATIENT)
Dept: HEMATOLOGY/ONCOLOGY | Facility: CLINIC | Age: 82
End: 2017-09-05
Payer: MEDICARE

## 2017-09-05 VITALS
TEMPERATURE: 98 F | BODY MASS INDEX: 32.54 KG/M2 | DIASTOLIC BLOOD PRESSURE: 58 MMHG | WEIGHT: 176.81 LBS | RESPIRATION RATE: 18 BRPM | HEIGHT: 62 IN | HEART RATE: 60 BPM | SYSTOLIC BLOOD PRESSURE: 130 MMHG

## 2017-09-05 DIAGNOSIS — Z79.4 TYPE 2 DIABETES MELLITUS WITHOUT COMPLICATION, WITH LONG-TERM CURRENT USE OF INSULIN: ICD-10-CM

## 2017-09-05 DIAGNOSIS — D64.9 ANEMIA, UNSPECIFIED TYPE: ICD-10-CM

## 2017-09-05 DIAGNOSIS — E87.1 HYPONATREMIA: Primary | ICD-10-CM

## 2017-09-05 DIAGNOSIS — N18.30 CKD (CHRONIC KIDNEY DISEASE) STAGE 3, GFR 30-59 ML/MIN: ICD-10-CM

## 2017-09-05 DIAGNOSIS — D64.9 ANEMIA, UNSPECIFIED TYPE: Primary | ICD-10-CM

## 2017-09-05 DIAGNOSIS — E11.9 TYPE 2 DIABETES MELLITUS WITHOUT COMPLICATION, WITH LONG-TERM CURRENT USE OF INSULIN: ICD-10-CM

## 2017-09-05 LAB
ANION GAP SERPL CALC-SCNC: 11 MMOL/L
BASOPHILS # BLD AUTO: 0.02 K/UL
BASOPHILS NFR BLD: 0.2 %
BUN SERPL-MCNC: 35 MG/DL
CALCIUM SERPL-MCNC: 9.4 MG/DL
CHLORIDE SERPL-SCNC: 89 MMOL/L
CO2 SERPL-SCNC: 28 MMOL/L
CREAT SERPL-MCNC: 1.6 MG/DL
DIFFERENTIAL METHOD: ABNORMAL
EOSINOPHIL # BLD AUTO: 0.1 K/UL
EOSINOPHIL NFR BLD: 1.2 %
ERYTHROCYTE [DISTWIDTH] IN BLOOD BY AUTOMATED COUNT: 11.7 %
EST. GFR  (AFRICAN AMERICAN): 34 ML/MIN/1.73 M^2
EST. GFR  (NON AFRICAN AMERICAN): 30 ML/MIN/1.73 M^2
GLUCOSE SERPL-MCNC: 149 MG/DL
HCT VFR BLD AUTO: 33.6 %
HGB BLD-MCNC: 11.4 G/DL
LYMPHOCYTES # BLD AUTO: 2.7 K/UL
LYMPHOCYTES NFR BLD: 26.5 %
MCH RBC QN AUTO: 29.4 PG
MCHC RBC AUTO-ENTMCNC: 33.9 G/DL
MCV RBC AUTO: 87 FL
MONOCYTES # BLD AUTO: 0.9 K/UL
MONOCYTES NFR BLD: 8.7 %
NEUTROPHILS # BLD AUTO: 6.4 K/UL
NEUTROPHILS NFR BLD: 63.4 %
PLATELET # BLD AUTO: 352 K/UL
PMV BLD AUTO: 10.6 FL
POTASSIUM SERPL-SCNC: 5.6 MMOL/L
RBC # BLD AUTO: 3.88 M/UL
SODIUM SERPL-SCNC: 128 MMOL/L
WBC # BLD AUTO: 10.06 K/UL

## 2017-09-05 PROCEDURE — 99499 UNLISTED E&M SERVICE: CPT | Mod: S$PBB,,, | Performed by: NURSE PRACTITIONER

## 2017-09-05 PROCEDURE — 85025 COMPLETE CBC W/AUTO DIFF WBC: CPT | Mod: PO

## 2017-09-05 PROCEDURE — 99213 OFFICE O/P EST LOW 20 MIN: CPT | Mod: S$GLB,,, | Performed by: NURSE PRACTITIONER

## 2017-09-05 PROCEDURE — 99999 PR PBB SHADOW E&M-EST. PATIENT-LVL V: CPT | Mod: PBBFAC,,, | Performed by: NURSE PRACTITIONER

## 2017-09-05 PROCEDURE — 1126F AMNT PAIN NOTED NONE PRSNT: CPT | Mod: S$GLB,,, | Performed by: NURSE PRACTITIONER

## 2017-09-05 PROCEDURE — 80048 BASIC METABOLIC PNL TOTAL CA: CPT | Mod: PO

## 2017-09-05 PROCEDURE — 1159F MED LIST DOCD IN RCRD: CPT | Mod: S$GLB,,, | Performed by: NURSE PRACTITIONER

## 2017-09-05 PROCEDURE — 36415 COLL VENOUS BLD VENIPUNCTURE: CPT | Mod: PO

## 2017-09-05 PROCEDURE — 3008F BODY MASS INDEX DOCD: CPT | Mod: S$GLB,,, | Performed by: NURSE PRACTITIONER

## 2017-09-05 PROCEDURE — 3075F SYST BP GE 130 - 139MM HG: CPT | Mod: S$GLB,,, | Performed by: NURSE PRACTITIONER

## 2017-09-05 PROCEDURE — 3078F DIAST BP <80 MM HG: CPT | Mod: S$GLB,,, | Performed by: NURSE PRACTITIONER

## 2017-09-05 RX ORDER — ESCITALOPRAM OXALATE 10 MG/1
TABLET ORAL
Qty: 90 TABLET | Refills: 0 | Status: SHIPPED | OUTPATIENT
Start: 2017-09-05 | End: 2017-12-03 | Stop reason: SDUPTHER

## 2017-09-05 RX ORDER — LEVOTHYROXINE SODIUM 75 UG/1
TABLET ORAL
Qty: 90 TABLET | Refills: 0 | Status: SHIPPED | OUTPATIENT
Start: 2017-09-05 | End: 2017-12-03 | Stop reason: SDUPTHER

## 2017-09-05 RX ORDER — VALSARTAN AND HYDROCHLOROTHIAZIDE 320; 25 MG/1; MG/1
1 TABLET, FILM COATED ORAL DAILY
COMMUNITY
End: 2017-10-06

## 2017-09-05 NOTE — PROGRESS NOTES
HISTORY OF PRESENT ILLNESS:  The patient is an 82 year old white female known to   Dr. Chin for anemia in the setting of CKD, which has been observed.  She returns for interval   evaluation with her daughter with no complaints.  She denies any abnormal mental status, convulsions,   fatigue, headache,irritability, loss of appetite, muscle spasms or weakness, cramps, nausea, malaise,  palpitations, numbness/tingling, irregular heartbeats, etc.  No new complaint or pertinent   finding on a 10-point review of systems.    PHYSICAL EXAMINATION:  GENERAL:  Well-developed, well-nourished, white female confined to a wheelchair.  Alert & oriented x 3  VITAL SIGNS:  Weight 176.8 pounds (gain of 6 1/2 pounds in 1 year) Documented in EMR and reviewed.               HEENT:  Normocephalic, atraumatic.  Oral mucosa pink and moist.  Lips without   lesions.  Tongue midline.  Oropharynx clear.  Nonicteric sclerae.   NECK:  Supple.  No adenopathy.                                               HEART:  Regular rate and rhythm with murmur, no gallop or rub.  Pacemaker to LCW.           LUNGS:  Clear to auscultation bilaterally.                                   ABDOMEN:  Soft, nontender and nondistended with positive normoactive bowel   sounds.  No hepatosplenomegaly.                                              EXTREMITIES:  No cyanosis, clubbing or edema.  Distal pulses are intact.     LABORATORY:    Lab Results   Component Value Date    WBC 10.06 09/05/2017    HGB 11.4 (L) 09/05/2017    HCT 33.6 (L) 09/05/2017    MCV 87 09/05/2017     (H) 09/05/2017     Unremarkable differential    CMP  Sodium   Date Value Ref Range Status   09/05/2017 128 (L) 136 - 145 mmol/L Final     Potassium   Date Value Ref Range Status   09/05/2017 5.6 (H) 3.5 - 5.1 mmol/L Final     Chloride   Date Value Ref Range Status   09/05/2017 89 (L) 95 - 110 mmol/L Final     CO2   Date Value Ref Range Status   09/05/2017 28 23 - 29 mmol/L Final     Glucose    Date Value Ref Range Status   09/05/2017 149 (H) 70 - 110 mg/dL Final     BUN, Bld   Date Value Ref Range Status   09/05/2017 35 (H) 8 - 23 mg/dL Final     Creatinine   Date Value Ref Range Status   09/05/2017 1.6 (H) 0.5 - 1.4 mg/dL Final     Calcium   Date Value Ref Range Status   09/05/2017 9.4 8.7 - 10.5 mg/dL Final     Total Protein   Date Value Ref Range Status   05/11/2017 7.0 6.0 - 8.4 g/dL Final     Albumin   Date Value Ref Range Status   05/26/2017 3.7 3.5 - 5.2 g/dL Final     Total Bilirubin   Date Value Ref Range Status   05/11/2017 0.2 0.1 - 1.0 mg/dL Final     Comment:     For infants and newborns, interpretation of results should be based  on gestational age, weight and in agreement with clinical  observations.  Premature Infant recommended reference ranges:  Up to 24 hours.............<8.0 mg/dL  Up to 48 hours............<12.0 mg/dL  3-5 days..................<15.0 mg/dL  6-29 days.................<15.0 mg/dL       Alkaline Phosphatase   Date Value Ref Range Status   05/11/2017 66 55 - 135 U/L Final     AST ([place])   Date Value Ref Range Status   01/19/2016 31 14 - 36 U/L Final     AST   Date Value Ref Range Status   05/11/2017 19 10 - 40 U/L Final     ALT   Date Value Ref Range Status   05/11/2017 17 10 - 44 U/L Final     Anion Gap   Date Value Ref Range Status   09/05/2017 11 8 - 16 mmol/L Final     eGFR if    Date Value Ref Range Status   09/05/2017 34 (A) >60 mL/min/1.73 m^2 Final     eGFR if non    Date Value Ref Range Status   09/05/2017 30 (A) >60 mL/min/1.73 m^2 Final     Comment:     Calculation used to obtain the estimated glomerular filtration  rate (eGFR) is the CKD-EPI equation. Since race is unknown   in our information system, the eGFR values for   -American and Non--American patients are given   for each creatinine result.       IMPRESSION:    1.  Anemia in the setting of stage III chronic kidney disease - clinically  stable.  2.  Hyponatremia.  3.  Hyperkalemia  4.  CKD III    PLAN:    1.  Reevaluate in one year with interval CBC, BMP.  2.  After discussion with Dr. Chin in regards to #2-3, we will contact Dr. Pace office for treatment.    Assessment/plan reviewed and approved by Dr. Chin.

## 2017-09-05 NOTE — PROGRESS NOTES
I spoke to [facility] nurse by phone    She is reported to be feeling ok.    She started the HCTZ in late May by Dr. Hanson.    I have asked her to change back to valsartan with no HCTZ and we will need to recheck a renal panel on Thursday.    We will need to send her information regarding low potassium diet as well.

## 2017-09-06 ENCOUNTER — TELEPHONE (OUTPATIENT)
Dept: NEPHROLOGY | Facility: CLINIC | Age: 82
End: 2017-09-06

## 2017-09-06 NOTE — TELEPHONE ENCOUNTER
----- Message from Amalia Hahn sent at 9/6/2017  9:12 AM CDT -----  Daughter/Parisa is calling concerning office giving her a call back on changes in patient's medication and lab work. Please call back at [phone].

## 2017-09-06 NOTE — TELEPHONE ENCOUNTER
Spoke with daughter.     Has 160mg valsartan on hand.  Should take two PO once daily per Pace for a total of 320mg.      Will leave diet information downstairs them to  when they get labs tomorrow.

## 2017-09-07 ENCOUNTER — PATIENT MESSAGE (OUTPATIENT)
Dept: NEPHROLOGY | Facility: CLINIC | Age: 82
End: 2017-09-07

## 2017-09-07 ENCOUNTER — LAB VISIT (OUTPATIENT)
Dept: LAB | Facility: HOSPITAL | Age: 82
End: 2017-09-07
Attending: INTERNAL MEDICINE
Payer: MEDICARE

## 2017-09-07 DIAGNOSIS — E87.1 HYPONATREMIA: ICD-10-CM

## 2017-09-07 LAB
ALBUMIN SERPL BCP-MCNC: 3.4 G/DL
ANION GAP SERPL CALC-SCNC: 13 MMOL/L
BUN SERPL-MCNC: 36 MG/DL
CALCIUM SERPL-MCNC: 9.5 MG/DL
CHLORIDE SERPL-SCNC: 91 MMOL/L
CO2 SERPL-SCNC: 25 MMOL/L
CREAT SERPL-MCNC: 1.4 MG/DL
EST. GFR  (AFRICAN AMERICAN): 40.4 ML/MIN/1.73 M^2
EST. GFR  (NON AFRICAN AMERICAN): 35 ML/MIN/1.73 M^2
GLUCOSE SERPL-MCNC: 113 MG/DL
PHOSPHATE SERPL-MCNC: 3.4 MG/DL
POTASSIUM SERPL-SCNC: 4.9 MMOL/L
SODIUM SERPL-SCNC: 129 MMOL/L

## 2017-09-07 PROCEDURE — 80069 RENAL FUNCTION PANEL: CPT

## 2017-09-07 PROCEDURE — 36415 COLL VENOUS BLD VENIPUNCTURE: CPT | Mod: PO

## 2017-09-21 ENCOUNTER — LAB VISIT (OUTPATIENT)
Dept: LAB | Facility: HOSPITAL | Age: 82
End: 2017-09-21
Attending: INTERNAL MEDICINE
Payer: MEDICARE

## 2017-09-21 DIAGNOSIS — E11.21 DIABETIC NEPHROPATHY ASSOCIATED WITH TYPE 2 DIABETES MELLITUS: ICD-10-CM

## 2017-09-21 DIAGNOSIS — I12.9 BENIGN HYPERTENSIVE RENAL DISEASE WITHOUT RENAL FAILURE: ICD-10-CM

## 2017-09-21 DIAGNOSIS — R80.9 PROTEINURIA DUE TO TYPE 2 DIABETES MELLITUS: ICD-10-CM

## 2017-09-21 DIAGNOSIS — N18.30 CKD (CHRONIC KIDNEY DISEASE) STAGE 3, GFR 30-59 ML/MIN: ICD-10-CM

## 2017-09-21 DIAGNOSIS — E11.29 PROTEINURIA DUE TO TYPE 2 DIABETES MELLITUS: ICD-10-CM

## 2017-09-21 LAB
ALBUMIN SERPL BCP-MCNC: 3.2 G/DL
ANION GAP SERPL CALC-SCNC: 8 MMOL/L
BUN SERPL-MCNC: 28 MG/DL
CALCIUM SERPL-MCNC: 9 MG/DL
CHLORIDE SERPL-SCNC: 101 MMOL/L
CO2 SERPL-SCNC: 29 MMOL/L
CREAT SERPL-MCNC: 1.4 MG/DL
EST. GFR  (AFRICAN AMERICAN): 40.4 ML/MIN/1.73 M^2
EST. GFR  (NON AFRICAN AMERICAN): 35 ML/MIN/1.73 M^2
GLUCOSE SERPL-MCNC: 120 MG/DL
PHOSPHATE SERPL-MCNC: 3.4 MG/DL
POTASSIUM SERPL-SCNC: 5.3 MMOL/L
PTH-INTACT SERPL-MCNC: 44 PG/ML
SODIUM SERPL-SCNC: 138 MMOL/L

## 2017-09-21 PROCEDURE — 83970 ASSAY OF PARATHORMONE: CPT

## 2017-09-21 PROCEDURE — 80069 RENAL FUNCTION PANEL: CPT

## 2017-09-21 PROCEDURE — 36415 COLL VENOUS BLD VENIPUNCTURE: CPT | Mod: PO

## 2017-09-22 ENCOUNTER — TELEPHONE (OUTPATIENT)
Dept: NEPHROLOGY | Facility: CLINIC | Age: 82
End: 2017-09-22

## 2017-09-22 NOTE — TELEPHONE ENCOUNTER
----- Message from Jonathan Pace MD sent at 9/11/2017  4:29 PM CDT -----  Labs look better    ----- Message -----  From: Katya Davidson LPN  Sent: 9/8/2017  11:14 AM  To: Jonathan Pace MD    Please let me know if I can help further.     Katya Davidson LPN  Nephrology  [facility]   [phone] x [phone]  PH   [phone]  [phone]

## 2017-10-05 ENCOUNTER — TELEPHONE (OUTPATIENT)
Dept: FAMILY MEDICINE | Facility: CLINIC | Age: 82
End: 2017-10-05

## 2017-10-05 NOTE — TELEPHONE ENCOUNTER
ED follow-up scheduled. Pt was seen at [facility] for fall, fractured right foot.

## 2017-10-05 NOTE — TELEPHONE ENCOUNTER
----- Message from Pedro Tucker sent at 10/5/2017 10:41 AM CDT -----  Contact: Daughter, Yojana Dial want to speak with a nurse regarding scheduling a ER follow up this week please call back at [phone]

## 2017-10-06 ENCOUNTER — OFFICE VISIT (OUTPATIENT)
Dept: FAMILY MEDICINE | Facility: CLINIC | Age: 82
End: 2017-10-06
Payer: MEDICARE

## 2017-10-06 VITALS
WEIGHT: 155.69 LBS | BODY MASS INDEX: 28.65 KG/M2 | TEMPERATURE: 98 F | OXYGEN SATURATION: 96 % | SYSTOLIC BLOOD PRESSURE: 130 MMHG | HEART RATE: 62 BPM | DIASTOLIC BLOOD PRESSURE: 80 MMHG | HEIGHT: 62 IN

## 2017-10-06 DIAGNOSIS — I10 ESSENTIAL HYPERTENSION: ICD-10-CM

## 2017-10-06 DIAGNOSIS — S92.351A CLOSED DISPLACED FRACTURE OF FIFTH METATARSAL BONE OF RIGHT FOOT, INITIAL ENCOUNTER: ICD-10-CM

## 2017-10-06 DIAGNOSIS — M17.0 PRIMARY OSTEOARTHRITIS OF BOTH KNEES: ICD-10-CM

## 2017-10-06 DIAGNOSIS — N18.30 TYPE 2 DIABETES MELLITUS WITH STAGE 3 CHRONIC KIDNEY DISEASE, WITH LONG-TERM CURRENT USE OF INSULIN: ICD-10-CM

## 2017-10-06 DIAGNOSIS — R29.6 RECURRENT FALLS: ICD-10-CM

## 2017-10-06 DIAGNOSIS — E66.3 OVERWEIGHT (BMI 25.0-29.9): ICD-10-CM

## 2017-10-06 DIAGNOSIS — Z79.4 TYPE 2 DIABETES MELLITUS WITH STAGE 3 CHRONIC KIDNEY DISEASE, WITH LONG-TERM CURRENT USE OF INSULIN: ICD-10-CM

## 2017-10-06 DIAGNOSIS — I48.20 CHRONIC ATRIAL FIBRILLATION: ICD-10-CM

## 2017-10-06 DIAGNOSIS — E11.22 TYPE 2 DIABETES MELLITUS WITH STAGE 3 CHRONIC KIDNEY DISEASE, WITH LONG-TERM CURRENT USE OF INSULIN: ICD-10-CM

## 2017-10-06 DIAGNOSIS — R55 DROP ATTACK: Primary | ICD-10-CM

## 2017-10-06 PROCEDURE — 99215 OFFICE O/P EST HI 40 MIN: CPT | Mod: S$GLB,,, | Performed by: INTERNAL MEDICINE

## 2017-10-06 PROCEDURE — 99499 UNLISTED E&M SERVICE: CPT | Mod: S$PBB,,, | Performed by: INTERNAL MEDICINE

## 2017-10-06 PROCEDURE — 99999 PR PBB SHADOW E&M-EST. PATIENT-LVL V: CPT | Mod: PBBFAC,,, | Performed by: INTERNAL MEDICINE

## 2017-10-06 RX ORDER — VALSARTAN 320 MG/1
320 TABLET ORAL DAILY
COMMUNITY
Start: 2017-09-08 | End: 2018-04-05

## 2017-10-06 NOTE — PATIENT INSTRUCTIONS
Drop attack; using wheelchair; to have PPM check w cardiology.    Closed displaced fracture of fifth metatarsal bone of right foot, initial encounter; to see Dr Barba ortho next week; cont 1/2 foot/ankle splint; Nonweight bearing to RLE.     Overweight (BMI 25.0-29.9). Caloric restriction w regular exercise and weight reduction.    Chronic atrial fibrillation; being tx by cardiology Dr Hanson.    Essential hypertension; same meeds but advance her diovan a few hrs during the day from dinner time.      See Dr Baird in 4 weeks for follow up. Desires [facility] as well. Orders placed.  Non-weight bearing R foot; continue 1/2 foot/ankle splint. Elevate RLE at home.

## 2017-10-06 NOTE — PROGRESS NOTES
"Subjective:       Patient ID: Denice Sheth is a 82 y.o. female.    Chief Complaint: Follow up from [facility] ER due to fall    HPI  Seeing pt today for her PCP, Dr Baird. 82 yrs young who recently fell w legs giving way while standing at the bed. Had eaten during the day; drinking during the day. No head trauma or LOC. No dizziness or light headedness prior. No change in speech or vision. No warning signs at all. Pt uses walker at times; has noted R knee to give way before. This time both knees gave way. Has apt w ortho Dr Wiggins this Tuesday. Xrays showed displaced fx 5th MT of foot; closed. Tries to keep well hydrated CKD4. HTN: avr 120's-130's/50's in the morning w P 60's; has PPM. Checks regularly w cardiology; has apt w them this Wednesday to check. Hx AF on xarelto/plavix/ASA. BP: she's on amlodipine 5 mg ea am; diovan 320 mg before dinner. Move diovan to 3pm to help her dinner readings. Before diovan at 5-6 pm given, her -201/ w p 59-66. No pain c/o's. [facility] ER records reviewed at length; DM2 on insulin: avr CAG's . GFR has improved from 30 1 mo ago to 46 most recent. CT head and Csp w no acute abnormalities. Cxr same. Lekocytosis tx w doxycycline for bronchitis. Has improved; seen ER on early Thursday morning; records reviewed.. Pt's daughter would also like [facility] orders placed to help her w her Mom"s care.  Time: 5062-9046;. >50% time spent on discussion, and counseling, as well as review.    Review of Systems   Constitutional: Negative for appetite change, fever and unexpected weight change.   HENT: Negative for congestion, postnasal drip, rhinorrhea, sinus pressure and sore throat.         Eyes history of seasonal ALLERGIES or perennial ALLERGIES   Eyes: Negative for discharge and itching.   Respiratory: Negative for cough, chest tightness, shortness of breath and wheezing.    Cardiovascular: Negative for chest pain, palpitations and leg swelling.   Gastrointestinal: Negative for " "abdominal distention, abdominal pain, blood in stool, constipation, diarrhea, nausea and vomiting.        Denies melena as well   Endocrine: Negative for polydipsia, polyphagia and polyuria.   Genitourinary: Negative for dysuria and hematuria.   Musculoskeletal: Negative for arthralgias and myalgias.   Skin: Negative for rash.   Allergic/Immunologic: Negative for environmental allergies and food allergies.        Denies seasonal or perennial ALLERGIES.   Neurological: Negative for tremors, seizures and syncope.   Hematological: Negative for adenopathy. Bruises/bleeds easily.   Psychiatric/Behavioral:        Denies anxiety or depression       Objective:      Vitals:    10/06/17 1041   BP: 130/80   BP Location: Left arm   Patient Position: Sitting   BP Method: Medium (Manual)   Pulse: 62   Temp: 97.8 °F (36.6 °C)   TempSrc: Oral   SpO2: 96%   Weight: 70.6 kg (155 lb 11 oz)   Height: 5' 2" (1.575 m)     Body mass index is 28.48 kg/m².    Physical Exam   Constitutional: She is oriented to person, place, and time. She appears well-developed and well-nourished.   HENT:   Head: Normocephalic and atraumatic.   Throat pink; moist.   Eyes: EOM are normal.   Neck: Normal range of motion. Neck supple. No thyromegaly present.   Cardiovascular: Normal rate, regular rhythm and normal heart sounds.  Exam reveals no gallop.    No murmur heard.  Pulmonary/Chest: Effort normal and breath sounds normal. No respiratory distress. She has no wheezes. She has no rales.   Abdominal: Soft. Bowel sounds are normal. She exhibits no distension. There is no tenderness. There is no rebound and no guarding.   Musculoskeletal: Normal range of motion. She exhibits edema.   Tr RLE edema; no palp calf tenderness. RLE foot/ankle in 1/2 splint w ace wrap. Toes warm ; good capillary refill. R>L knees w bony changes and edema; fairly good ROM; crepitance and joan blaze. R knee w increased warmth.   Lymphadenopathy:     She has no cervical adenopathy. "   Neurological: She is alert and oriented to person, place, and time.   Moves all 4 extremities fine. CN's II-XII grossly intact; smell not checked;  MS 5/5; DTR's 2+ UE's; patellar not checked;    Skin: No rash noted.   Psychiatric: She has a normal mood and affect. Her behavior is normal. Thought content normal.   Vitals reviewed.      Assessment:       1. Drop attack    2. Recurrent falls    3. Closed displaced fracture of fifth metatarsal bone of right foot, initial encounter    4. Primary osteoarthritis of both knees    5. Overweight (BMI 25.0-29.9)    6. Essential hypertension    7. Type 2 diabetes mellitus with stage 3 chronic kidney disease, with long-term current use of insulin    8. Chronic atrial fibrillation        Plan:       Drop attack; using wheelchair; to have PPM check w cardiology.    Hx of recurrent falls. Severe OA knees suspected clinically in overweight pt.    Closed displaced fracture of fifth metatarsal bone of right foot, initial encounter; to see Dr Freda groves next week; cont 1/2 foot/ankle splint; Nonweight bearing to RLE.      Tramadol or tylenol for pain. Elevate her RLE at home.    Overweight (BMI 25.0-29.9). Caloric restriction w regular exercise and weight reduction.    Chronic atrial fibrillation; being tx by cardiology Dr Hanson. To see him next week for PPM check    Essential hypertension; same meeds but advance her diovan a few hrs during the day from dinner time to help her dinner time readings.    DM2. Maintain a low carb diet; monitor CBG's at home; keep a log for review. Therapeutic on present meds. CKD3; tries to stay well hydrated.

## 2017-10-10 ENCOUNTER — TELEPHONE (OUTPATIENT)
Dept: FAMILY MEDICINE | Facility: CLINIC | Age: 82
End: 2017-10-10

## 2017-10-10 NOTE — TELEPHONE ENCOUNTER
Written order for home health must be sent to [facility], they will assign home health agency to pt.     Order signed and faxed to N

## 2017-10-10 NOTE — TELEPHONE ENCOUNTER
----- Message from Amalia Hahn sent at 10/10/2017  9:36 AM CDT -----  [facility]/Priscila [phone] is calling concerning patient. Her insurance is [employer]. Office needs to send the referral for home health care to patient's insurance company first. Please call Priscila back to verify that office is sending paperwork to insurance.

## 2017-10-10 NOTE — TELEPHONE ENCOUNTER
----- Message from Idalmis Traylor sent at 10/10/2017  3:36 PM CDT -----  Contact: [facility] is calling Bertha about the request she sent in for Home Health for attached patient. Please call [phone] ext [phone].

## 2017-10-12 ENCOUNTER — OFFICE VISIT (OUTPATIENT)
Dept: NEPHROLOGY | Facility: CLINIC | Age: 82
End: 2017-10-12
Payer: MEDICARE

## 2017-10-12 VITALS — HEART RATE: 60 BPM | SYSTOLIC BLOOD PRESSURE: 118 MMHG | OXYGEN SATURATION: 99 % | DIASTOLIC BLOOD PRESSURE: 44 MMHG

## 2017-10-12 DIAGNOSIS — E87.1 HYPONATREMIA: ICD-10-CM

## 2017-10-12 DIAGNOSIS — R80.9 PROTEINURIA DUE TO TYPE 2 DIABETES MELLITUS: ICD-10-CM

## 2017-10-12 DIAGNOSIS — I12.9 BENIGN HYPERTENSIVE RENAL DISEASE WITHOUT RENAL FAILURE: ICD-10-CM

## 2017-10-12 DIAGNOSIS — E11.21 DIABETIC NEPHROPATHY ASSOCIATED WITH TYPE 2 DIABETES MELLITUS: ICD-10-CM

## 2017-10-12 DIAGNOSIS — N18.30 CKD (CHRONIC KIDNEY DISEASE) STAGE 3, GFR 30-59 ML/MIN: Primary | ICD-10-CM

## 2017-10-12 DIAGNOSIS — E11.29 PROTEINURIA DUE TO TYPE 2 DIABETES MELLITUS: ICD-10-CM

## 2017-10-12 PROCEDURE — 99214 OFFICE O/P EST MOD 30 MIN: CPT | Mod: S$GLB,,, | Performed by: INTERNAL MEDICINE

## 2017-10-12 PROCEDURE — 99999 PR PBB SHADOW E&M-EST. PATIENT-LVL III: CPT | Mod: PBBFAC,,, | Performed by: INTERNAL MEDICINE

## 2017-10-12 PROCEDURE — 99499 UNLISTED E&M SERVICE: CPT | Mod: S$PBB,,, | Performed by: INTERNAL MEDICINE

## 2017-10-12 NOTE — PROGRESS NOTES
Subjective:       Patient ID: Denice Sheth is a 82 y.o. White female who presents for return patient evaluation for chronic renal failure.    She has no uremic or urinary symptoms and is in her usual state of health.  She did suffer an injury to her right foot since last visit and is in a walking boot.    Review of Systems   Constitutional: Negative for appetite change, chills and fever.   Eyes: Negative for visual disturbance.   Respiratory: Negative for cough and shortness of breath.    Cardiovascular: Negative for chest pain and leg swelling.   Gastrointestinal: Negative for abdominal pain, diarrhea, nausea and vomiting.   Genitourinary: Negative for difficulty urinating, dysuria and hematuria.   Musculoskeletal: Positive for gait problem. Negative for myalgias.   Skin: Negative for rash.   Neurological: Positive for weakness. Negative for headaches.   Psychiatric/Behavioral: Negative for sleep disturbance.       The past medical, family and social histories were reviewed for this encounter.     BP (!) 118/44   Pulse 60   LMP  (LMP Unknown) Comment: 50 years ago  SpO2 99%     Objective:      Physical Exam   Constitutional: She is oriented to person, place, and time. She appears well-developed and well-nourished. No distress.   HENT:   Head: Normocephalic and atraumatic.   Eyes: Conjunctivae are normal.   Neck: Neck supple. No JVD present.   Cardiovascular: Normal rate and regular rhythm.  Exam reveals no gallop and no friction rub.    Murmur (2/6 nabila) heard.  Pulmonary/Chest: Effort normal and breath sounds normal. No respiratory distress. She has no wheezes. She has no rales.   Abdominal: Soft. Bowel sounds are normal. She exhibits no distension. There is no tenderness.   Musculoskeletal: She exhibits no edema.   Neurological: She is alert and oriented to person, place, and time.   Skin: Skin is warm and dry. No rash noted.   Psychiatric: She has a normal mood and affect.   Vitals reviewed.       Assessment:       1. CKD (chronic kidney disease) stage 3, GFR 30-59 ml/min    2. Hyponatremia    3. 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 Diabetic nephropathy associated with type 2 diabetes mellitus    5. Proteinuria due to type 2 diabetes mellitus        Plan:   Return to clinic in 6 months.  Labs for next visit include rp, pth, upc.  Baseline creatinine is 1.4-1.6 since 2015.  PTH is 44 with a calcium of 9.0.  UPC is 0.75.  She has fully recovered from her ARF and is now actually better than what her baseline has been recently.  Blood pressure is controlled on the current regimen.  Continue current medications as prescribed and reviewed.

## 2017-10-13 ENCOUNTER — TELEPHONE (OUTPATIENT)
Dept: FAMILY MEDICINE | Facility: CLINIC | Age: 82
End: 2017-10-13

## 2017-10-13 NOTE — TELEPHONE ENCOUNTER
----- Message from China Emery sent at 10/13/2017  4:20 PM CDT -----  Contact: joseoccupational therapist  pt has had frequent falls, would like bedside commode ordered..[phone]

## 2017-10-13 NOTE — TELEPHONE ENCOUNTER
Pt is needing a bedside commode due to pt's daughter is having to wake up due multiple falls at night.     Once order is placed then we can fax to her insurance ([employer]).    (f) [phone].

## 2017-10-17 NOTE — TELEPHONE ENCOUNTER
Prescription written for the above bedside commode to be faxed to insurance company referred back to medical assistant Bertha Meier for her assistance

## 2017-10-30 RX ORDER — SYRINGE,SAFETY WITH NEEDLE,1ML 25GX1"
SYRINGE (EA) MISCELLANEOUS
Qty: 100 EACH | Refills: 2 | Status: SHIPPED | OUTPATIENT
Start: 2017-10-30 | End: 2018-03-14 | Stop reason: SDUPTHER

## 2017-11-29 NOTE — TELEPHONE ENCOUNTER
Spoke w/ dtr and was advised that the lancets used were ultilet lancets classic 28 g tri-beveled tip.

## 2017-11-29 NOTE — TELEPHONE ENCOUNTER
----- Message from Shameka Martínez sent at 11/29/2017  8:47 AM CST -----  Contact: Parisa Sheth - daughter  Contact patient daughter to advise if a request for refill was received form [employer], for [facility] contact at [phone].    Thank you

## 2017-11-29 NOTE — TELEPHONE ENCOUNTER
----- Message from Donnell Wood sent at 11/29/2017 12:39 PM CST -----  Contact: Dtr/Parisa Mccauley called in and stated she was returning call from earlier today from office regarding the attached patient (mom).  Parisa's call back number is [phone]

## 2017-11-30 RX ORDER — LANCETS
1 EACH MISCELLANEOUS 3 TIMES DAILY
Qty: 200 EACH | Refills: 0 | Status: SHIPPED | OUTPATIENT
Start: 2017-11-30 | End: 2018-09-06

## 2017-12-01 RX ORDER — RIVASTIGMINE 4.6 MG/24H
PATCH, EXTENDED RELEASE TRANSDERMAL
Qty: 30 PATCH | Refills: 3 | Status: SHIPPED | OUTPATIENT
Start: 2017-12-01 | End: 2018-04-03 | Stop reason: SDUPTHER

## 2017-12-04 RX ORDER — MEMANTINE HYDROCHLORIDE 10 MG/1
TABLET ORAL
Qty: 90 TABLET | Refills: 1 | Status: SHIPPED | OUTPATIENT
Start: 2017-12-04 | End: 2018-06-10 | Stop reason: SDUPTHER

## 2017-12-05 RX ORDER — ESCITALOPRAM OXALATE 10 MG/1
TABLET ORAL
Qty: 90 TABLET | Refills: 1 | Status: SHIPPED | OUTPATIENT
Start: 2017-12-05 | End: 2018-07-01 | Stop reason: SDUPTHER

## 2017-12-05 RX ORDER — LEVOTHYROXINE SODIUM 75 UG/1
TABLET ORAL
Qty: 90 TABLET | Refills: 1 | Status: SHIPPED | OUTPATIENT
Start: 2017-12-05 | End: 2018-06-10 | Stop reason: SDUPTHER

## 2017-12-05 NOTE — TELEPHONE ENCOUNTER
Spoke to daughter Parisa, we have not recd fax from  re: Coret[facility].  I advised daughter I will contact Lou VELEZ who has been assisting her with this matter and see how I can help. Sent IM to Lou. She is sending me number to contact [employer] ( company who provides lantus to pt ).

## 2017-12-05 NOTE — TELEPHONE ENCOUNTER
Spoke to rep at [employer], form was being faxed to incorrect fax number. verified correct number and should be rec shortly. Please review attached refill request for lexapro and synthroid and advise.

## 2017-12-15 ENCOUNTER — PATIENT OUTREACH (OUTPATIENT)
Dept: ADMINISTRATIVE | Facility: HOSPITAL | Age: 82
End: 2017-12-15

## 2017-12-29 ENCOUNTER — OFFICE VISIT (OUTPATIENT)
Dept: FAMILY MEDICINE | Facility: CLINIC | Age: 82
End: 2017-12-29
Payer: MEDICARE

## 2017-12-29 ENCOUNTER — HOSPITAL ENCOUNTER (OUTPATIENT)
Dept: RADIOLOGY | Facility: HOSPITAL | Age: 82
Discharge: HOME | End: 2017-12-29
Attending: FAMILY MEDICINE
Payer: MEDICARE

## 2017-12-29 ENCOUNTER — TELEPHONE (OUTPATIENT)
Dept: FAMILY MEDICINE | Facility: CLINIC | Age: 82
End: 2017-12-29

## 2017-12-29 VITALS
SYSTOLIC BLOOD PRESSURE: 136 MMHG | RESPIRATION RATE: 16 BRPM | OXYGEN SATURATION: 98 % | DIASTOLIC BLOOD PRESSURE: 62 MMHG | TEMPERATURE: 98 F | HEART RATE: 61 BPM

## 2017-12-29 DIAGNOSIS — Z23 IMMUNIZATION DUE: ICD-10-CM
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
## 2017-12-29 DIAGNOSIS — R29.6 FALLS FREQUENTLY: Primary | ICD-10-CM

## 2017-12-29 DIAGNOSIS — H81.90 VESTIBULAR DIZZINESS: ICD-10-CM
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
## 2017-12-29 DIAGNOSIS — D64.9 NORMOCYTIC ANEMIA: ICD-10-CM

## 2017-12-29 DIAGNOSIS — D75.839 THROMBOCYTOSIS: ICD-10-CM

## 2017-12-29 PROCEDURE — 99999 PR PBB SHADOW E&M-EST. PATIENT-LVL III: CPT | Mod: PBBFAC,,, | Performed by: FAMILY MEDICINE

## 2017-12-29 PROCEDURE — 99214 OFFICE O/P EST MOD 30 MIN: CPT | Mod: S$GLB,,, | Performed by: FAMILY MEDICINE

## 2017-12-29 PROCEDURE — 70450 CT HEAD/BRAIN W/O DYE: CPT | Mod: 26,,, | Performed by: RADIOLOGY

## 2017-12-29 PROCEDURE — 70450 CT HEAD/BRAIN W/O DYE: CPT | Mod: TC,PO

## 2017-12-29 NOTE — TELEPHONE ENCOUNTER
----- Message from Sara Santiago sent at 12/29/2017  8:09 AM CST -----  Contact: Patient's daughter, Parisa  Patient's daughter is calling to try to get an appointment for patient today because she thinks that she needs to be seen because patient had a fall this morning while she was in the bathroom and patient's daughter would like the doctor to look at her.  Patient's sugars have been high they ranged from 203 to 122.  Call Back#[phone]  Thanks

## 2017-12-29 NOTE — PROGRESS NOTES
"Subjective:       Patient ID: Denice Sheth is a 83 y.o. female.    Chief Complaint: Fall    HPI   Patient here with c/o fall. Was standing up, leaned forward into the shower, landed on her bottom facing the other way. No head trauma or LOC.   Daughter reports that she was "out of it" following. BS was reportedly high and might have made her drunk. BS this morning 122. High on Monday was 203.    Patient states other than her back being sore, she feels ok. Normal BMs, possible increase in urinary frequency starting yesterday. No odor, normal color.   Back does hurt with (prolonged) standing. But does not limit activity.   Seeing ortho/[facility] for R foot fx. 6 more weeks in the boot. Will get bone stimulator.     Review of Systems   Constitutional: Negative for fatigue and unexpected weight change.   HENT: Negative for congestion, postnasal drip and rhinorrhea.    Respiratory: Negative for cough and shortness of breath.    Gastrointestinal: Negative for constipation, diarrhea and nausea.   Genitourinary: Negative for difficulty urinating and dysuria.   Musculoskeletal: Positive for gait problem. Negative for arthralgias and back pain.   Neurological: Positive for dizziness. Negative for headaches.       Objective:      Physical Exam   Constitutional: She is oriented to person, place, and time. She appears well-developed and well-nourished. No distress.   HENT:   Head: Normocephalic and atraumatic.   Eyes: Conjunctivae are normal. Right eye exhibits no discharge. Left eye exhibits no discharge. No scleral icterus.   Neck: Normal range of motion. Neck supple.   Cardiovascular: Normal rate and regular rhythm.    Pulmonary/Chest: Effort normal and breath sounds normal. No respiratory distress.   Abdominal: Soft. She exhibits no distension.   Musculoskeletal: Normal range of motion. She exhibits no edema.        Lumbar back: She exhibits tenderness (R lumbosacral). She exhibits no bony tenderness.   Neurological: She " is alert and oriented to person, place, and time. No cranial nerve deficit.   Skin: Skin is warm and dry. No rash noted.   Psychiatric: She has a normal mood and affect. Her behavior is normal.   Nursing note and vitals reviewed.        Falls frequently  -     Ambulatory referral to ENT  Fall precautions.  Vestibular dizziness  -     Ambulatory referral to ENT   Needs review.  Thrombocytosis  Improved from previous. Update lab.  Normocytic anemia  -     CBC auto differential; Future; Expected date: 12/29/2017  -     Ferritin; Future; Expected date: 12/29/2017  -     Iron and TIBC; Future; Expected date: 12/29/2017  -     Reticulocytes; Future; Expected date: 12/29/2017  -     Lactate dehydrogenase; Future; Expected date: 12/29/2017  -     Occult blood x 1, stool; Future; Expected date: 12/29/2017  -     Urinalysis; Future  For review.  Immunization due  -     (In Office Administered) Pneumococcal Polysaccharide Vaccine (23 Valent) (SQ/IM)

## 2017-12-29 NOTE — TELEPHONE ENCOUNTER
"Spoke to pt daughter, pt had fall this morning trying to use the restroom.  Pt denies being injured but states she is just " not feeling good." Pt daughter also states mother sugar have been spiking from 122-203.  Same day appt made.   "

## 2018-01-02 ENCOUNTER — CLINICAL SUPPORT (OUTPATIENT)
Dept: FAMILY MEDICINE | Facility: CLINIC | Age: 83
End: 2018-01-02
Payer: MEDICARE

## 2018-01-02 ENCOUNTER — LAB VISIT (OUTPATIENT)
Dept: LAB | Facility: HOSPITAL | Age: 83
End: 2018-01-02
Attending: FAMILY MEDICINE
Payer: MEDICARE

## 2018-01-02 DIAGNOSIS — Z91.89 STREPTOCOCCUS PNEUMONIAE VACCINATION INDICATED: Primary | ICD-10-CM

## 2018-01-02 DIAGNOSIS — D64.9 NORMOCYTIC ANEMIA: ICD-10-CM

## 2018-01-02 LAB
BACTERIA #/AREA URNS AUTO: ABNORMAL /HPF
BILIRUB UR QL STRIP: NEGATIVE
CLARITY UR REFRACT.AUTO: ABNORMAL
COLOR UR AUTO: ABNORMAL
GLUCOSE UR QL STRIP: NEGATIVE
HGB UR QL STRIP: NEGATIVE
HYALINE CASTS UR QL AUTO: 7 /LPF
KETONES UR QL STRIP: NEGATIVE
LEUKOCYTE ESTERASE UR QL STRIP: ABNORMAL
MICROSCOPIC COMMENT: ABNORMAL
NITRITE UR QL STRIP: NEGATIVE
PH UR STRIP: 5 [PH] (ref 5–8)
PROT UR QL STRIP: ABNORMAL
RBC #/AREA URNS AUTO: 2 /HPF (ref 0–4)
SP GR UR STRIP: 1.01 (ref 1–1.03)
SQUAMOUS #/AREA URNS AUTO: 3 /HPF
URN SPEC COLLECT METH UR: ABNORMAL
UROBILINOGEN UR STRIP-ACNC: NEGATIVE EU/DL
WBC #/AREA URNS AUTO: 31 /HPF (ref 0–5)

## 2018-01-02 PROCEDURE — 81001 URINALYSIS AUTO W/SCOPE: CPT

## 2018-01-02 PROCEDURE — G0009 ADMIN PNEUMOCOCCAL VACCINE: HCPCS | Mod: S$GLB,,, | Performed by: FAMILY MEDICINE

## 2018-01-02 PROCEDURE — 90732 PPSV23 VACC 2 YRS+ SUBQ/IM: CPT | Mod: S$GLB,,, | Performed by: FAMILY MEDICINE

## 2018-01-02 NOTE — PROGRESS NOTES
Two Pt identifiers used. VIS given. Administered pneumonia 23 to left deltoid. Pt tolerated well.

## 2018-01-03 ENCOUNTER — TELEPHONE (OUTPATIENT)
Dept: FAMILY MEDICINE | Facility: CLINIC | Age: 83
End: 2018-01-03

## 2018-01-03 DIAGNOSIS — D50.9 IRON DEFICIENCY ANEMIA, UNSPECIFIED IRON DEFICIENCY ANEMIA TYPE: Primary | ICD-10-CM

## 2018-01-03 DIAGNOSIS — D75.839 THROMBOCYTOSIS: ICD-10-CM

## 2018-01-03 NOTE — TELEPHONE ENCOUNTER
----- Message from Cristina Faustin sent at 1/3/2018  2:46 PM CST -----  Please call patients daughter, Parisa, in regards to a missed call regarding results, [phone]

## 2018-01-03 NOTE — TELEPHONE ENCOUNTER
----- Message from Idalmis Traylor sent at 1/3/2018  9:13 AM CST -----  Contact: Daughter Parisa  Requesting the results of the labs for her mother, please call [phone].  Thank you!

## 2018-01-03 NOTE — TELEPHONE ENCOUNTER
Advised daughter of results that have been reviewed by pcp and advised her that as soon as the other results are ready we will give her a call. Daughter was looking into the lantus situation, will consult w/ dr. Wang nurse to see what was going on w/ it.

## 2018-01-04 ENCOUNTER — TELEPHONE (OUTPATIENT)
Dept: HEMATOLOGY/ONCOLOGY | Facility: CLINIC | Age: 83
End: 2018-01-04

## 2018-01-04 NOTE — TELEPHONE ENCOUNTER
Called and spoke with patient's daughter appointment scheduled patient being referred back for abnormal labs per dr. Baird

## 2018-01-04 NOTE — TELEPHONE ENCOUNTER
----- Message from Gary White sent at 1/4/2018  2:48 PM CST -----  Contact: Daughter- Parisa Sheth [phone]  Patient's daughter asking to schedule an appointment for the patient. Thanks!

## 2018-01-04 NOTE — TELEPHONE ENCOUNTER
Iron deficiency anemia confirmed. I would like for them to see hematology to review lab and correction/monitoring.

## 2018-01-04 NOTE — TELEPHONE ENCOUNTER
Spoke w/ daughter. Informed daughter about results and the need to f/u w/ hematology. daughter verbalized understanding.

## 2018-01-05 ENCOUNTER — TELEPHONE (OUTPATIENT)
Dept: FAMILY MEDICINE | Facility: CLINIC | Age: 83
End: 2018-01-05

## 2018-01-05 ENCOUNTER — CLINICAL SUPPORT (OUTPATIENT)
Dept: FAMILY MEDICINE | Facility: CLINIC | Age: 83
End: 2018-01-05
Payer: MEDICARE

## 2018-01-05 DIAGNOSIS — Z23 IMMUNIZATION DUE: Primary | ICD-10-CM

## 2018-01-05 PROCEDURE — 90662 IIV NO PRSV INCREASED AG IM: CPT | Mod: S$GLB,,, | Performed by: FAMILY MEDICINE

## 2018-01-05 PROCEDURE — G0008 ADMIN INFLUENZA VIRUS VAC: HCPCS | Mod: S$GLB,,, | Performed by: FAMILY MEDICINE

## 2018-01-05 NOTE — TELEPHONE ENCOUNTER
Spoke to Lou Del Rio who is assisting pt with Lantus through [employer].  Per Lou this will be the last refill through [employer] as the program ended on 12/31/2017.  Lantus Is not on the pts [employer] formulary this year. Please advise if pt can switch to levemir, basaglar, or tresiba.

## 2018-01-08 RX ORDER — INSULIN GLARGINE 100 [IU]/ML
30 INJECTION, SOLUTION SUBCUTANEOUS NIGHTLY
Qty: 15 ML | Refills: 3 | Status: ON HOLD | OUTPATIENT
Start: 2018-01-08 | End: 2018-12-18 | Stop reason: SDUPTHER

## 2018-01-08 NOTE — TELEPHONE ENCOUNTER
----- Message from Zane Soriano sent at 1/8/2018  3:16 PM CST -----  Contact: pt's daughter Helen Cervantes is returning call to nurse Kandy, call placed to pod no answer   Call Back#[phone]  Thanks

## 2018-01-08 NOTE — TELEPHONE ENCOUNTER
basaglar sent to pharmacy. Same directions as lantus. She indicated that she was doing 22 units at bedtime. rx has 30 units listed to leave room for adjustments.

## 2018-01-12 ENCOUNTER — OFFICE VISIT (OUTPATIENT)
Dept: HEMATOLOGY/ONCOLOGY | Facility: CLINIC | Age: 83
End: 2018-01-12
Payer: MEDICARE

## 2018-01-12 VITALS
TEMPERATURE: 98 F | RESPIRATION RATE: 18 BRPM | WEIGHT: 191.38 LBS | HEIGHT: 62 IN | BODY MASS INDEX: 35.22 KG/M2 | HEART RATE: 63 BPM | SYSTOLIC BLOOD PRESSURE: 187 MMHG | DIASTOLIC BLOOD PRESSURE: 76 MMHG

## 2018-01-12 DIAGNOSIS — N18.30 CKD (CHRONIC KIDNEY DISEASE) STAGE 3, GFR 30-59 ML/MIN: Primary | ICD-10-CM

## 2018-01-12 DIAGNOSIS — D50.8 OTHER IRON DEFICIENCY ANEMIA: ICD-10-CM

## 2018-01-12 PROBLEM — D50.9 IDA (IRON DEFICIENCY ANEMIA): Status: ACTIVE | Noted: 2018-01-12

## 2018-01-12 PROCEDURE — 99214 OFFICE O/P EST MOD 30 MIN: CPT | Mod: S$GLB,,, | Performed by: INTERNAL MEDICINE

## 2018-01-12 PROCEDURE — 99999 PR PBB SHADOW E&M-EST. PATIENT-LVL III: CPT | Mod: PBBFAC,,, | Performed by: INTERNAL MEDICINE

## 2018-01-12 RX ORDER — DIPHENHYDRAMINE HYDROCHLORIDE 50 MG/ML
25 INJECTION INTRAMUSCULAR; INTRAVENOUS ONCE
Status: CANCELLED
Start: 2018-01-29

## 2018-01-12 RX ORDER — SODIUM CHLORIDE 0.9 % (FLUSH) 0.9 %
10 SYRINGE (ML) INJECTION
Status: CANCELLED | OUTPATIENT
Start: 2018-01-29

## 2018-01-12 RX ORDER — HEPARIN 100 UNIT/ML
500 SYRINGE INTRAVENOUS
Status: CANCELLED | OUTPATIENT
Start: 2018-01-29

## 2018-01-12 RX ORDER — FAMOTIDINE 10 MG/ML
20 INJECTION INTRAVENOUS DAILY
Status: CANCELLED
Start: 2018-01-29

## 2018-01-13 NOTE — PROGRESS NOTES
Date of Service: 01/12/2018  HISTORY OF PRESENT ILLNESS:  The patient is an 83-year-old white female well   known to me for anemia in the setting of CKD, who never developed hemoglobin   persistently below 10 and never received DSA.  She was last seen and evaluated   in our clinic on 09/05/2017.  The patient has recently sustained a right   metatarsal fracture and is immobilized in a boot.  She has developed worsening   hemoglobin and hematocrit and has had interval iron studies obtained, which are   depressed.  She denies any signs or symptoms of GI blood loss.  No other   pertinent findings on a 14-point review of systems.    PHYSICAL EXAMINATION:  GENERAL:  Well-developed, well-nourished elderly white female in no acute   distress, who is confined to a wheelchair.  VITAL SIGNS:  She has a weight of 191-1/2 pounds.  Documented in EMR and   reviewed.  HEENT:  Normocephalic, atraumatic.  Oral mucosa pink and moist.  Lips without   lesions.  Tongue midline.  Oropharynx clear.  Nonicteric sclerae.   NECK:  Supple, no adenopathy.  No carotid bruits, thyromegaly or thyroid nodule.  HEART:  Regular rate and rhythm without murmur, gallop or rub.   LUNGS:  Clear to auscultation bilaterally.  Normal respiratory effort.   ABDOMEN:  Soft, nontender, nondistended with positive normoactive bowel sounds,   no hepatosplenomegaly.  EXTREMITIES:  No cyanosis, clubbing or edema.  Distal pulses are intact.  The   patient's right foot is immobilized in a walking boot.  AXILLAE AND GROIN:  No palpable pathologic lymphadenopathy is appreciated.   SKIN:  Intact/turgor normal.  NEUROLOGIC:  Cranial nerves II-XII grossly intact.  Motor:  Good muscle bulk and   tone.  Strength/sensory 5/5 throughout.    LABORATORY:  Most recent CBC is from 12/29/2017 and includes a white count of   13, H and H 10.5 and 32.7, MCV of 87, platelets 509.  Iron studies obtained the   same day revealed a serum iron 25, TIBC 306, saturated iron 8%, ferritin 162  and   a reticulocyte count of 1.5.  Most recent chemistry is from 10/05/2017, reveals   sodium 139, potassium 4.6, chloride 103, CO2 26, BUN 27, creatinine 1.1,   glucose 102, calcium 9.1.  Liver function tests are within normal limits.  GFR   was 46.    IMPRESSION:  1.  Iron deficiency anemia.  2.  Stage III CKD.  3.  Recent right fifth metatarsal fracture.    PLAN:  1.  Intravenous iron replacement with Feraheme 510 mg IV day one and day four,   with typical Benadryl/Pepcid/dexamethasone premeds.  2.  Reevaluate in three weeks following intravenous iron replacement with repeat   CBC.  3.  Obtain stool cards in order to rule out occult GI blood loss.      MICHAEL/HN  dd: 01/12/2018 10:51:05 (CST)  td: 01/13/2018 00:18:46 (CST)  Doc ID   #[id number]  Job ID #[id number]    CC:

## 2018-01-17 ENCOUNTER — TELEPHONE (OUTPATIENT)
Dept: FAMILY MEDICINE | Facility: CLINIC | Age: 83
End: 2018-01-17

## 2018-01-17 DIAGNOSIS — M85.80 OSTEOPENIA, UNSPECIFIED LOCATION: Primary | ICD-10-CM

## 2018-01-17 NOTE — TELEPHONE ENCOUNTER
----- Message from Idalmis Traylor sent at 1/16/2018  3:59 PM CST -----  Contact: [facility]  Requesting orders for the patient to have a bone density test. If any questions call [place] [phone] or fax over the orders to [phone].  Thank you!

## 2018-01-17 NOTE — TELEPHONE ENCOUNTER
"Pt's last BMD was 11/5/14. Results indicate:    "dexa scan ordered by Dr. Helen Reynaga performed at [facility] sent for scanning.    Impression:  While there is no current DEXA evidence of  generalized boy demineralization, there is  Focal osteopenia in  both femoral necks as well as some associated increased risk for  fracture over the next 10 years as detailed above."    Please advise if you wish to order BMD at this time, order pended. Will fill out [facility] order and fax to them to schedule.  "

## 2018-01-22 ENCOUNTER — TELEPHONE (OUTPATIENT)
Dept: FAMILY MEDICINE | Facility: CLINIC | Age: 83
End: 2018-01-22

## 2018-01-22 NOTE — TELEPHONE ENCOUNTER
Spoke w/ pt daughter. Daughter was concerned about what medications can pt take that is OTC since she has a heart condition along with Dm. Pt is having a runny nose and daughter feels that it is related to allergies.  pt  Daughter was informed that provider is out of the office, and this message will not be addressed until 1-. daughter verbalized understanding.

## 2018-01-22 NOTE — TELEPHONE ENCOUNTER
----- Message from Pedro Tucker sent at 1/22/2018  8:28 AM CST -----  Contact: daughter, Parisa Dial want to speak with a nurse regarding patient nose running want to know if she can take something over the counter or do she need to come in please call back at [phone]

## 2018-01-22 NOTE — TELEPHONE ENCOUNTER
Spoke w/ pt daughter. Informed daughter about medications and recommendations from provider. daughter verbalized understanding.

## 2018-01-22 NOTE — TELEPHONE ENCOUNTER
Tried to reach pt. No answer, left msg to call back.    Contacting to inquire about other symptoms is any and to see if any OTC medications are being taken.

## 2018-01-22 NOTE — TELEPHONE ENCOUNTER
claritin 10mg daily.  flonase nasal spray.  Both otc.  Needs to be seen if febrile, worsening breathing, deep cough.

## 2018-01-23 NOTE — TELEPHONE ENCOUNTER
Spoke w/ pt's dtr, Parisa. BMD sched at [facility] for 1/30/18, 1040am, per her request.

## 2018-01-25 ENCOUNTER — LAB VISIT (OUTPATIENT)
Dept: LAB | Facility: HOSPITAL | Age: 83
End: 2018-01-25
Attending: FAMILY MEDICINE
Payer: MEDICARE

## 2018-01-25 DIAGNOSIS — N18.30 CHRONIC KIDNEY DISEASE, STAGE III (MODERATE): ICD-10-CM

## 2018-01-25 DIAGNOSIS — D50.8 OTHER IRON DEFICIENCY ANEMIAS: Primary | ICD-10-CM

## 2018-01-25 LAB
OB PNL STL: NEGATIVE

## 2018-01-25 PROCEDURE — 82272 OCCULT BLD FECES 1-3 TESTS: CPT

## 2018-01-29 ENCOUNTER — INFUSION (OUTPATIENT)
Dept: INFUSION THERAPY | Facility: HOSPITAL | Age: 83
End: 2018-01-29
Attending: INTERNAL MEDICINE
Payer: MEDICARE

## 2018-01-29 VITALS
WEIGHT: 191.38 LBS | DIASTOLIC BLOOD PRESSURE: 63 MMHG | HEART RATE: 62 BPM | RESPIRATION RATE: 16 BRPM | HEIGHT: 62 IN | BODY MASS INDEX: 35.22 KG/M2 | OXYGEN SATURATION: 99 % | SYSTOLIC BLOOD PRESSURE: 153 MMHG | TEMPERATURE: 98 F

## 2018-01-29 DIAGNOSIS — D50.8 OTHER IRON DEFICIENCY ANEMIA: Primary | ICD-10-CM

## 2018-01-29 PROCEDURE — 63600175 PHARM REV CODE 636 W HCPCS: Mod: PN | Performed by: INTERNAL MEDICINE

## 2018-01-29 PROCEDURE — 96365 THER/PROPH/DIAG IV INF INIT: CPT | Mod: PN

## 2018-01-29 PROCEDURE — 96375 TX/PRO/DX INJ NEW DRUG ADDON: CPT | Mod: PN

## 2018-01-29 PROCEDURE — A4216 STERILE WATER/SALINE, 10 ML: HCPCS | Mod: PN | Performed by: INTERNAL MEDICINE

## 2018-01-29 PROCEDURE — S0028 INJECTION, FAMOTIDINE, 20 MG: HCPCS | Mod: PN | Performed by: INTERNAL MEDICINE

## 2018-01-29 PROCEDURE — 25000003 PHARM REV CODE 250: Mod: PN | Performed by: INTERNAL MEDICINE

## 2018-01-29 RX ORDER — FAMOTIDINE 10 MG/ML
20 INJECTION INTRAVENOUS DAILY
Status: DISCONTINUED | OUTPATIENT
Start: 2018-01-29 | End: 2018-01-29 | Stop reason: HOSPADM

## 2018-01-29 RX ORDER — DIPHENHYDRAMINE HYDROCHLORIDE 50 MG/ML
25 INJECTION INTRAMUSCULAR; INTRAVENOUS ONCE
Status: CANCELLED
Start: 2018-01-29

## 2018-01-29 RX ORDER — DIPHENHYDRAMINE HYDROCHLORIDE 50 MG/ML
25 INJECTION INTRAMUSCULAR; INTRAVENOUS ONCE
Status: COMPLETED | OUTPATIENT
Start: 2018-01-29 | End: 2018-01-29

## 2018-01-29 RX ORDER — HEPARIN 100 UNIT/ML
500 SYRINGE INTRAVENOUS
Status: CANCELLED | OUTPATIENT
Start: 2018-01-29

## 2018-01-29 RX ORDER — DEXAMETHASONE SODIUM PHOSPHATE 4 MG/ML
4 INJECTION, SOLUTION INTRA-ARTICULAR; INTRALESIONAL; INTRAMUSCULAR; INTRAVENOUS; SOFT TISSUE
Status: CANCELLED
Start: 2018-01-29 | End: 2018-01-29

## 2018-01-29 RX ORDER — FUROSEMIDE 10 MG/ML
20 INJECTION INTRAMUSCULAR; INTRAVENOUS
Status: COMPLETED | OUTPATIENT
Start: 2018-01-29 | End: 2018-01-29

## 2018-01-29 RX ORDER — DEXAMETHASONE SODIUM PHOSPHATE 4 MG/ML
4 INJECTION, SOLUTION INTRA-ARTICULAR; INTRALESIONAL; INTRAMUSCULAR; INTRAVENOUS; SOFT TISSUE
Status: COMPLETED | OUTPATIENT
Start: 2018-01-29 | End: 2018-01-29

## 2018-01-29 RX ORDER — SODIUM CHLORIDE 0.9 % (FLUSH) 0.9 %
10 SYRINGE (ML) INJECTION
Status: CANCELLED | OUTPATIENT
Start: 2018-01-29

## 2018-01-29 RX ORDER — SODIUM CHLORIDE 0.9 % (FLUSH) 0.9 %
10 SYRINGE (ML) INJECTION
Status: DISCONTINUED | OUTPATIENT
Start: 2018-01-29 | End: 2018-01-29 | Stop reason: HOSPADM

## 2018-01-29 RX ORDER — FAMOTIDINE 10 MG/ML
20 INJECTION INTRAVENOUS DAILY
Status: CANCELLED
Start: 2018-01-29

## 2018-01-29 RX ADMIN — SODIUM CHLORIDE: 0.9 INJECTION, SOLUTION INTRAVENOUS at 02:01

## 2018-01-29 RX ADMIN — FUROSEMIDE 20 MG: 10 INJECTION, SOLUTION INTRAVENOUS at 04:01

## 2018-01-29 RX ADMIN — DIPHENHYDRAMINE HYDROCHLORIDE 25 MG: 50 INJECTION INTRAMUSCULAR; INTRAVENOUS at 02:01

## 2018-01-29 RX ADMIN — SODIUM CHLORIDE, PRESERVATIVE FREE 10 ML: 5 INJECTION INTRAVENOUS at 02:01

## 2018-01-29 RX ADMIN — FAMOTIDINE 20 MG: 10 INJECTION, SOLUTION INTRAVENOUS at 02:01

## 2018-01-29 RX ADMIN — DEXAMETHASONE SODIUM PHOSPHATE 4 MG: 4 INJECTION, SOLUTION INTRA-ARTICULAR; INTRALESIONAL; INTRAMUSCULAR; INTRAVENOUS; SOFT TISSUE at 02:01

## 2018-01-29 RX ADMIN — FERUMOXYTOL 510 MG: 510 INJECTION INTRAVENOUS at 02:01

## 2018-01-29 NOTE — PLAN OF CARE
Problem: Patient Care Overview  Goal: Individualization & Mutuality  Outcome: Ongoing (interventions implemented as appropriate)   01/29/18 0623   Individualization   Patient Specific Preferences lift chair; close to bathroom   Patient Specific Goals increased energy   Patient Specific Interventions admin meds as ordered

## 2018-01-29 NOTE — PLAN OF CARE
Problem: Patient Care Overview  Goal: Plan of Care Review  Outcome: Ongoing (interventions implemented as appropriate)  Tolerated Feraheme infusion without any difficulty; B/P back within norm parameters; RTC Thursday for next tx; d/c'd from unit via w/c escorted by daughter

## 2018-01-30 ENCOUNTER — HOSPITAL ENCOUNTER (OUTPATIENT)
Dept: RADIOLOGY | Facility: HOSPITAL | Age: 83
Discharge: HOME | End: 2018-01-30
Attending: FAMILY MEDICINE
Payer: MEDICARE

## 2018-01-30 DIAGNOSIS — M85.80 OSTEOPENIA, UNSPECIFIED LOCATION: ICD-10-CM

## 2018-01-30 PROCEDURE — 77080 DXA BONE DENSITY AXIAL: CPT | Mod: 26,,, | Performed by: RADIOLOGY

## 2018-01-30 PROCEDURE — 77080 DXA BONE DENSITY AXIAL: CPT | Mod: TC,PO

## 2018-02-01 ENCOUNTER — INFUSION (OUTPATIENT)
Dept: INFUSION THERAPY | Facility: HOSPITAL | Age: 83
End: 2018-02-01
Attending: INTERNAL MEDICINE
Payer: MEDICARE

## 2018-02-01 VITALS
RESPIRATION RATE: 16 BRPM | SYSTOLIC BLOOD PRESSURE: 146 MMHG | TEMPERATURE: 98 F | DIASTOLIC BLOOD PRESSURE: 58 MMHG | OXYGEN SATURATION: 99 % | HEART RATE: 62 BPM

## 2018-02-01 DIAGNOSIS — D50.8 OTHER IRON DEFICIENCY ANEMIA: Primary | ICD-10-CM

## 2018-02-01 PROCEDURE — 96375 TX/PRO/DX INJ NEW DRUG ADDON: CPT | Mod: PN

## 2018-02-01 PROCEDURE — S0028 INJECTION, FAMOTIDINE, 20 MG: HCPCS | Mod: PN | Performed by: INTERNAL MEDICINE

## 2018-02-01 PROCEDURE — 96365 THER/PROPH/DIAG IV INF INIT: CPT | Mod: PN

## 2018-02-01 PROCEDURE — A4216 STERILE WATER/SALINE, 10 ML: HCPCS | Mod: PN | Performed by: INTERNAL MEDICINE

## 2018-02-01 PROCEDURE — 25000003 PHARM REV CODE 250: Mod: PN | Performed by: INTERNAL MEDICINE

## 2018-02-01 PROCEDURE — 63600175 PHARM REV CODE 636 W HCPCS: Mod: PN | Performed by: INTERNAL MEDICINE

## 2018-02-01 RX ORDER — DIPHENHYDRAMINE HYDROCHLORIDE 50 MG/ML
25 INJECTION INTRAMUSCULAR; INTRAVENOUS ONCE
Status: COMPLETED | OUTPATIENT
Start: 2018-02-01 | End: 2018-02-01

## 2018-02-01 RX ORDER — DEXAMETHASONE SODIUM PHOSPHATE 4 MG/ML
4 INJECTION, SOLUTION INTRA-ARTICULAR; INTRALESIONAL; INTRAMUSCULAR; INTRAVENOUS; SOFT TISSUE
Status: COMPLETED | OUTPATIENT
Start: 2018-02-01 | End: 2018-02-01

## 2018-02-01 RX ORDER — SODIUM CHLORIDE 0.9 % (FLUSH) 0.9 %
10 SYRINGE (ML) INJECTION
Status: DISCONTINUED | OUTPATIENT
Start: 2018-02-01 | End: 2018-02-01 | Stop reason: HOSPADM

## 2018-02-01 RX ORDER — FAMOTIDINE 10 MG/ML
20 INJECTION INTRAVENOUS DAILY
Status: CANCELLED
Start: 2018-02-01

## 2018-02-01 RX ORDER — FAMOTIDINE 10 MG/ML
20 INJECTION INTRAVENOUS DAILY
Status: DISCONTINUED | OUTPATIENT
Start: 2018-02-01 | End: 2018-02-01 | Stop reason: HOSPADM

## 2018-02-01 RX ORDER — DIPHENHYDRAMINE HYDROCHLORIDE 50 MG/ML
25 INJECTION INTRAMUSCULAR; INTRAVENOUS ONCE
Status: CANCELLED
Start: 2018-02-01

## 2018-02-01 RX ORDER — DEXAMETHASONE SODIUM PHOSPHATE 4 MG/ML
4 INJECTION, SOLUTION INTRA-ARTICULAR; INTRALESIONAL; INTRAMUSCULAR; INTRAVENOUS; SOFT TISSUE
Status: CANCELLED
Start: 2018-02-01 | End: 2018-02-01

## 2018-02-01 RX ORDER — HEPARIN 100 UNIT/ML
500 SYRINGE INTRAVENOUS
Status: CANCELLED | OUTPATIENT
Start: 2018-02-01

## 2018-02-01 RX ORDER — SODIUM CHLORIDE 0.9 % (FLUSH) 0.9 %
10 SYRINGE (ML) INJECTION
Status: CANCELLED | OUTPATIENT
Start: 2018-02-01

## 2018-02-01 RX ADMIN — DIPHENHYDRAMINE HYDROCHLORIDE 25 MG: 50 INJECTION INTRAMUSCULAR; INTRAVENOUS at 02:02

## 2018-02-01 RX ADMIN — DEXAMETHASONE SODIUM PHOSPHATE 4 MG: 4 INJECTION, SOLUTION INTRA-ARTICULAR; INTRALESIONAL; INTRAMUSCULAR; INTRAVENOUS; SOFT TISSUE at 02:02

## 2018-02-01 RX ADMIN — SODIUM CHLORIDE, PRESERVATIVE FREE 10 ML: 5 INJECTION INTRAVENOUS at 01:02

## 2018-02-01 RX ADMIN — SODIUM CHLORIDE: 900 INJECTION, SOLUTION INTRAVENOUS at 01:02

## 2018-02-01 RX ADMIN — FAMOTIDINE 20 MG: 10 INJECTION, SOLUTION INTRAVENOUS at 02:02

## 2018-02-01 RX ADMIN — FERUMOXYTOL 510 MG: 510 INJECTION INTRAVENOUS at 02:02

## 2018-02-01 NOTE — PLAN OF CARE
Problem: Patient Care Overview  Goal: Plan of Care Review  Outcome: Ongoing (interventions implemented as appropriate)  Pt tolerated Feraheme infusion without any difficulty. Instructed pt to call for questions or concerns and to return to clinic as directed for next treatment. Pt verbalized understanding. AVS printed and given to pt with schedule

## 2018-02-23 ENCOUNTER — OFFICE VISIT (OUTPATIENT)
Dept: HEMATOLOGY/ONCOLOGY | Facility: CLINIC | Age: 83
End: 2018-02-23
Payer: MEDICARE

## 2018-02-23 VITALS
RESPIRATION RATE: 20 BRPM | HEIGHT: 62 IN | WEIGHT: 191.38 LBS | TEMPERATURE: 98 F | SYSTOLIC BLOOD PRESSURE: 173 MMHG | DIASTOLIC BLOOD PRESSURE: 74 MMHG | HEART RATE: 105 BPM | BODY MASS INDEX: 35.22 KG/M2

## 2018-02-23 DIAGNOSIS — D50.8 OTHER IRON DEFICIENCY ANEMIA: Primary | ICD-10-CM

## 2018-02-23 DIAGNOSIS — N18.30 CKD (CHRONIC KIDNEY DISEASE) STAGE 3, GFR 30-59 ML/MIN: ICD-10-CM

## 2018-02-23 PROCEDURE — 99213 OFFICE O/P EST LOW 20 MIN: CPT | Mod: S$GLB,,, | Performed by: INTERNAL MEDICINE

## 2018-02-23 PROCEDURE — 99999 PR PBB SHADOW E&M-EST. PATIENT-LVL IV: CPT | Mod: PBBFAC,,, | Performed by: INTERNAL MEDICINE

## 2018-02-23 PROCEDURE — 3008F BODY MASS INDEX DOCD: CPT | Mod: S$GLB,,, | Performed by: INTERNAL MEDICINE

## 2018-02-23 PROCEDURE — 1159F MED LIST DOCD IN RCRD: CPT | Mod: S$GLB,,, | Performed by: INTERNAL MEDICINE

## 2018-02-23 PROCEDURE — 1126F AMNT PAIN NOTED NONE PRSNT: CPT | Mod: S$GLB,,, | Performed by: INTERNAL MEDICINE

## 2018-02-24 NOTE — PROGRESS NOTES
Date of Service: 02/23/2018  The patient is an 83-year-old white female well known to me for iron deficiency   anemia in the setting of stage III CKD.  She has received intravenous iron   replacement and returns to review interval CBC to document response.  No other   complaints or pertinent findings on a 10-point review of systems.    PHYSICAL EXAMINATION:  GENERAL:  The patient is a well-developed, well-nourished elderly white female   confined to a wheelchair.  VITAL SIGNS:  Weight of 191-1/2 pounds.  HEENT:  Normocephalic, atraumatic.  Oral mucosa pink and moist.  Lips without   lesions.  Tongue midline.  Oropharynx clear.  Nonicteric sclerae.   NECK:  Supple.  No adenopathy.                                               HEART:  Regular rate and rhythm without murmur, gallop or rub.               LUNGS:  Clear to auscultation bilaterally.                                   ABDOMEN:  Soft, nontender and nondistended with positive normoactive bowel   sounds.  No hepatosplenomegaly.                                              EXTREMITIES:  No cyanosis, clubbing or edema.  Distal pulses are intact.       LABORATORY:  H and H has improved from 10.5 and 32.7 to 11.7 and 37.6, white   cell count is normal at 9.3, platelet count is normalized from 509 to 269.    Chemistry:  Sodium 135, potassium 4.8, chloride 97, CO2 26, BUN 29, creatinine   1.2, glucose 267, calcium 9.2.  GFR 41.    IMPRESSION:  1.  Iron deficiency anemia, improved following intravenous replacement.  2.  Stage III CKD.  3.  Recent fifth right metatarsal fracture.    PLAN:  Observation with the patient to return to clinic in three months with   interval CBC and BMP.      MICHAEL/TYRELL  dd: 02/23/2018 14:43:03 (CST)  td: 02/24/2018 04:47:50 (CST)  Doc ID   #[id number]  Job ID #[id number]    CC:

## 2018-03-14 RX ORDER — SYRINGE,SAFETY WITH NEEDLE,1ML 25GX1"
SYRINGE (EA) MISCELLANEOUS
Qty: 100 EACH | Refills: 2 | Status: SHIPPED | OUTPATIENT
Start: 2018-03-14 | End: 2018-12-24 | Stop reason: SDUPTHER

## 2018-03-27 ENCOUNTER — TELEPHONE (OUTPATIENT)
Dept: FAMILY MEDICINE | Facility: CLINIC | Age: 83
End: 2018-03-27

## 2018-03-27 NOTE — TELEPHONE ENCOUNTER
Spoke w/ Uzma. Pt was in hospital for afib and needs f/u appt. She does have f/u appt w/ Dr Hanson on 4/3/18. She is also complaining of RLE edema. Pt's dtr said that pt had US when in hospital and no DVT. Uzma reviewed US report and is neg. Uzma stated that pt asked that we [facility] this for pt. Tried to reach Parisa, no answer. Left msg. Will offer appt w/ first available when she calls back.

## 2018-03-27 NOTE — TELEPHONE ENCOUNTER
----- Message from Genie Epps sent at 3/27/2018 10:03 AM CDT -----  Contact: Uzma with [facility] at Ext 37493   Uzma with [facility] at Ext 29528 , call daughter with the appointment Parisa [phone]. Calling for a Hospital follow up appointment. [facility] and was discharged on  3/23/18, diagnosis AFIB w/ RBR, right leg swelling. Next available is in May 2018. Please advise. Thanks.

## 2018-03-27 NOTE — TELEPHONE ENCOUNTER
----- Message from Esdras Redman sent at 3/27/2018 12:51 PM CDT -----  Contact: Pt's daughter Parisa Mccauley is calling in regards to getting pt seen by . Pt was in hospital last week and needs appointment access. Parisa can be reached at [phone]

## 2018-04-03 RX ORDER — RIVASTIGMINE 4.6 MG/24H
PATCH, EXTENDED RELEASE TRANSDERMAL
Qty: 30 PATCH | Refills: 3 | Status: SHIPPED | OUTPATIENT
Start: 2018-04-03 | End: 2018-08-05 | Stop reason: SDUPTHER

## 2018-04-05 ENCOUNTER — LAB VISIT (OUTPATIENT)
Dept: LAB | Facility: HOSPITAL | Age: 83
End: 2018-04-05
Attending: INTERNAL MEDICINE
Payer: MEDICARE

## 2018-04-05 ENCOUNTER — OFFICE VISIT (OUTPATIENT)
Dept: FAMILY MEDICINE | Facility: CLINIC | Age: 83
End: 2018-04-05
Payer: MEDICARE

## 2018-04-05 VITALS
TEMPERATURE: 98 F | SYSTOLIC BLOOD PRESSURE: 122 MMHG | DIASTOLIC BLOOD PRESSURE: 62 MMHG | RESPIRATION RATE: 18 BRPM | HEART RATE: 61 BPM | BODY MASS INDEX: 35.06 KG/M2 | WEIGHT: 190.5 LBS | OXYGEN SATURATION: 98 % | HEIGHT: 62 IN

## 2018-04-05 DIAGNOSIS — I48.20 CHRONIC ATRIAL FIBRILLATION: Primary | ICD-10-CM

## 2018-04-05 DIAGNOSIS — N18.30 CKD (CHRONIC KIDNEY DISEASE) STAGE 3, GFR 30-59 ML/MIN: ICD-10-CM

## 2018-04-05 DIAGNOSIS — R29.6 FREQUENT FALLS: ICD-10-CM

## 2018-04-05 DIAGNOSIS — I50.32 CHRONIC DIASTOLIC CONGESTIVE HEART FAILURE, NYHA CLASS 1: ICD-10-CM

## 2018-04-05 DIAGNOSIS — I10 ESSENTIAL HYPERTENSION: ICD-10-CM

## 2018-04-05 LAB
ALBUMIN SERPL BCP-MCNC: 3.4 G/DL
ANION GAP SERPL CALC-SCNC: 9 MMOL/L
BUN SERPL-MCNC: 25 MG/DL
CALCIUM SERPL-MCNC: 9.2 MG/DL
CHLORIDE SERPL-SCNC: 101 MMOL/L
CO2 SERPL-SCNC: 26 MMOL/L
CREAT SERPL-MCNC: 1.5 MG/DL
EST. GFR  (AFRICAN AMERICAN): 36.9 ML/MIN/1.73 M^2
EST. GFR  (NON AFRICAN AMERICAN): 32 ML/MIN/1.73 M^2
GLUCOSE SERPL-MCNC: 154 MG/DL
PHOSPHATE SERPL-MCNC: 3.6 MG/DL
POTASSIUM SERPL-SCNC: 5.3 MMOL/L
PTH-INTACT SERPL-MCNC: 33 PG/ML
SODIUM SERPL-SCNC: 136 MMOL/L

## 2018-04-05 PROCEDURE — 99214 OFFICE O/P EST MOD 30 MIN: CPT | Mod: S$GLB,,, | Performed by: INTERNAL MEDICINE

## 2018-04-05 PROCEDURE — 80069 RENAL FUNCTION PANEL: CPT

## 2018-04-05 PROCEDURE — 83970 ASSAY OF PARATHORMONE: CPT

## 2018-04-05 PROCEDURE — 99499 UNLISTED E&M SERVICE: CPT | Mod: S$GLB,,, | Performed by: INTERNAL MEDICINE

## 2018-04-05 PROCEDURE — 36415 COLL VENOUS BLD VENIPUNCTURE: CPT | Mod: PO

## 2018-04-05 PROCEDURE — 99999 PR PBB SHADOW E&M-EST. PATIENT-LVL III: CPT | Mod: PBBFAC,,, | Performed by: INTERNAL MEDICINE

## 2018-04-05 PROCEDURE — 3074F SYST BP LT 130 MM HG: CPT | Mod: CPTII,S$GLB,, | Performed by: INTERNAL MEDICINE

## 2018-04-05 PROCEDURE — 3078F DIAST BP <80 MM HG: CPT | Mod: CPTII,S$GLB,, | Performed by: INTERNAL MEDICINE

## 2018-04-05 RX ORDER — AMIODARONE HYDROCHLORIDE 200 MG/1
200 TABLET ORAL DAILY
COMMUNITY
Start: 2018-02-28

## 2018-04-05 RX ORDER — METOPROLOL TARTRATE 25 MG/1
25 TABLET, FILM COATED ORAL 2 TIMES DAILY
Status: ON HOLD | COMMUNITY
Start: 2018-03-23 | End: 2022-01-07 | Stop reason: HOSPADM

## 2018-04-05 RX ORDER — AMLODIPINE BESYLATE 5 MG/1
5 TABLET ORAL DAILY
COMMUNITY
Start: 2018-03-23 | End: 2018-04-12 | Stop reason: ALTCHOICE

## 2018-04-05 NOTE — PROGRESS NOTES
Assessment and Plan:    1. Chronic atrial fibrillation  Symptoms improved significantly now that she is no longer in RVR since the addition of BB and amiodarone. Rate controlled today and at home. Has seen cardiologist since discharge from the hospital. Rhythm is normal today, suspect that ventricular rhythm is entirely paced at this point. Continue same medications.     2. Essential hypertension  Controlled today. BP meds decreased during hospitalization due to low BP after addition of amiodarone and metoprolol. No longer on ARB, which is not ideal from renal perspective, will defer to nephrology decision of whether this should be restarted at low dose in the future (not clear how much benefit this would be).     3. Chronic diastolic congestive heart failure, NYHA class 1  Some edema present on exam today in LEs, but no pulmonary edema and no elevated JVD. Patient has had difficulty in the past with diuretics causing hyponatremia, not on diuretic therapy. Discussed use of compression stockings and keeping LEs elevated when possible to decrease edema.    4. CKD (chronic kidney disease) stage 3, GFR 30-59 ml/min  Has follow up scheduled with nephrology and had recent labs which are not yet resulted.    5. Frequent falls  Currently working with home PT and OT since hospitalization. Patient had refused SNF.       ______________________________________________________________________  Subjective:    Chief Complaint:  Hospital follow up    HPI:  Denice is a 83 y.o. year old woman here for hospital follow up.     She had presented to the [place] ED for evaluation of weakness and fatigue after a fall requiring EMS lift assist. She had 3 mechanical falls in the previous weeks. In the ED she was noted to be in A-fib with RVR and pacemaker interrogation showed that she had been in A-fob for the last month. She was started on amiodarone (IV -> PO during hospitalization) in the hospital as well as metoprolol tartrate BID for  rate control but remained in A-fib throughout hospitalization. Norvasc was decreased and valsartan was discontinued during the hospitalization. She is anticoagulated on Xarelto from prior to this hospitalization.     Notably, PT during that admission had recommended skilled nursing but the patient refused and was discharged to home instead.     Since getting out of the hospital, she notes that she is overall feeling better, note feeling as weak or fatigued. She has PT and OT at home since getting out of the hospital, being seen twice a week and finds this helpful. HR at home has been 59-62. She has been seen by Dr. Hanson since discharge and notes that no changes were made to medications at that time. She has had some edema of bilateral LEs, right >left since being in the hospital, DVT US was obtained since this was noticed and it was negative, she has continued to take the xarelto.     Medications:  Current Outpatient Prescriptions on File Prior to Visit   Medication Sig Dispense Refill    ascorbic acid (VITAMIN C) 500 MG tablet Take 500 mg by mouth once daily.      b complex vitamins capsule Take 1 capsule by mouth once daily.      blood-glucose meter kit by Other route. Use as instructed      calcium-vitamin D3 500 mg(1,250mg) -200 unit per tablet Take 1 tablet by mouth 2 (two) times daily with meals.      clopidogrel (PLAVIX) 75 mg tablet TAKE ONE TABLET BY MOUTH ONCE DAILY 30 tablet 11    escitalopram oxalate (LEXAPRO) 10 MG tablet TAKE ONE TABLET BY MOUTH ONCE DAILY IN THE EVENING 90 tablet 1    fish oil-omega-3 fatty acids 300-1,000 mg capsule Take 2 g by mouth once daily.      insulin aspart (NOVOLOG) 100 unit/mL injection Inject 6 Units into the skin 3 (three) times daily before meals. (Patient taking differently: Inject 6 Units into the skin 3 (three) times daily before meals. Has been using only at lunch d/t lower BS) 10 mL 11    insulin glargine (BASAGLAR KWIKPEN) 100 unit/mL (3 mL) InPn pen  Inject 30 Units into the skin every evening. 15 mL 3    insulin syringe-needle U-100 (INSULIN SYRINGE) 1 mL 30 gauge x 5/16 Syrg As directed. 100 each 3    insulin syringe-needle U-100 1 mL 31 gauge x 5/16 Syrg USE AS DIRECTED 100 each 2    lancets (ACCU-CHEK SOFTCLIX LANCETS) Misc 1 lancet by Misc.(Non-Drug; Combo Route) route 3 (three) times daily. Utiliet, 28g 200 each 0    levothyroxine (SYNTHROID) 75 MCG tablet TAKE ONE TABLET BY MOUTH ONCE DAILY IN THE MORNING BEFORE BREAKFAST 90 tablet 1    linagliptin (TRADJENTA) 5 mg Tab tablet Take 1 tablet (5 mg total) by mouth once daily. 90 tablet 3    memantine (NAMENDA) 10 MG Tab TAKE ONE TABLET BY MOUTH ONCE DAILY IN THE EVENING 90 tablet 1    multivitamin (ONE DAILY MULTIVITAMIN) per tablet Take 1 tablet by mouth once daily.      nitroGLYCERIN (NITROSTAT) 0.4 MG SL tablet Place 1 tablet (0.4 mg total) under the tongue every 5 (five) minutes as needed for Chest pain. 30 tablet 0    ondansetron (ZOFRAN) 4 MG tablet Take 1 tablet (4 mg total) by mouth every 6 (six) hours. 12 tablet 0    POLYETHYLENE GLYCOL 3350 (DULCOLAX BALANCE ORAL) Take 1 packet by mouth daily as needed (constipation).       rivaroxaban (XARELTO) 15 mg Tab Take 15 mg by mouth daily with dinner or evening meal.      rivastigmine (EXELON) 4.6 mg/24 hr PT24 APPLY ONE PATCH TOPICALLY ONCE DAILY 30 patch 3    simvastatin (ZOCOR) 10 MG tablet Take 10 mg by mouth every evening.      [DISCONTINUED] valsartan (DIOVAN) 320 MG tablet Take 320 mg by mouth once daily.       aspirin 81 mg Tab Take by mouth once daily.        No current facility-administered medications on file prior to visit.        Review of Systems:  Review of Systems   Constitutional: Negative for chills, fatigue and fever.   HENT: Positive for postnasal drip and sore throat.    Respiratory: Negative for cough, shortness of breath and wheezing.    Cardiovascular: Positive for leg swelling. Negative for chest pain and  "palpitations.   Allergic/Immunologic: Positive for environmental allergies.   Neurological: Negative for dizziness, weakness and light-headedness.       Past Medical History:  Past Medical History:   Diagnosis Date    A-fib     Anticoagulant long-term use     CHF (congestive heart failure)     Diabetes mellitus     Disorder of kidney and ureter     Followed by Dr. Jonathan Pace    Encounter for blood transfusion     Hypertension     Insomnia        Objective:    Vitals:  Vitals:    04/05/18 1358   BP: 122/62   Pulse: 61   Resp: 18   Temp: 97.6 °F (36.4 °C)   TempSrc: Oral   SpO2: 98%   Weight: 86.4 kg (190 lb 7.6 oz)   Height: 5' 2" (1.575 m)   PainSc: 0-No pain       Physical Exam   Constitutional: She is oriented to person, place, and time. She appears well-developed and well-nourished. No distress.   HENT:   Mouth/Throat: Oropharynx is clear and moist.   Eyes: No scleral icterus.   Neck: No JVD present.   Cardiovascular: Normal rate and regular rhythm.    no irregular heart rhythm   Pulmonary/Chest: Effort normal and breath sounds normal. No respiratory distress. She has no wheezes. She has no rales.   Musculoskeletal: She exhibits edema (1+ RLE to mid shin, some posterior thigh edema also present, slightly less edema on left including posterior calf).   Neurological: She is alert and oriented to person, place, and time.   Skin: Skin is warm and dry.   Psychiatric: She has a normal mood and affect. Her behavior is normal.   Vitals reviewed.      Data:    Echo from 3/19/18  EF 55%, no regional WMAs  Atria severely dilated    Head CT: no acute intracranial process    DVT US negative    Scarlet Carcamo MD  Internal Medicine  "

## 2018-04-12 ENCOUNTER — OFFICE VISIT (OUTPATIENT)
Dept: NEPHROLOGY | Facility: CLINIC | Age: 83
End: 2018-04-12
Payer: MEDICARE

## 2018-04-12 VITALS
HEART RATE: 60 BPM | HEIGHT: 62 IN | DIASTOLIC BLOOD PRESSURE: 50 MMHG | SYSTOLIC BLOOD PRESSURE: 108 MMHG | BODY MASS INDEX: 34.2 KG/M2 | WEIGHT: 185.88 LBS | OXYGEN SATURATION: 98 %

## 2018-04-12 DIAGNOSIS — E11.29 PROTEINURIA DUE TO TYPE 2 DIABETES MELLITUS: ICD-10-CM

## 2018-04-12 DIAGNOSIS — I12.9 BENIGN HYPERTENSIVE RENAL DISEASE WITHOUT RENAL FAILURE: ICD-10-CM

## 2018-04-12 DIAGNOSIS — R80.9 PROTEINURIA DUE TO TYPE 2 DIABETES MELLITUS: ICD-10-CM

## 2018-04-12 DIAGNOSIS — E11.21 DIABETIC NEPHROPATHY ASSOCIATED WITH TYPE 2 DIABETES MELLITUS: ICD-10-CM

## 2018-04-12 DIAGNOSIS — E87.1 HYPONATREMIA: ICD-10-CM

## 2018-04-12 DIAGNOSIS — N18.30 CKD (CHRONIC KIDNEY DISEASE) STAGE 3, GFR 30-59 ML/MIN: Primary | ICD-10-CM

## 2018-04-12 PROCEDURE — 3078F DIAST BP <80 MM HG: CPT | Mod: CPTII,S$GLB,, | Performed by: INTERNAL MEDICINE

## 2018-04-12 PROCEDURE — 99214 OFFICE O/P EST MOD 30 MIN: CPT | Mod: S$GLB,,, | Performed by: INTERNAL MEDICINE

## 2018-04-12 PROCEDURE — 99499 UNLISTED E&M SERVICE: CPT | Mod: S$GLB,,, | Performed by: INTERNAL MEDICINE

## 2018-04-12 PROCEDURE — 99999 PR PBB SHADOW E&M-EST. PATIENT-LVL III: CPT | Mod: PBBFAC,,, | Performed by: INTERNAL MEDICINE

## 2018-04-12 PROCEDURE — 3074F SYST BP LT 130 MM HG: CPT | Mod: CPTII,S$GLB,, | Performed by: INTERNAL MEDICINE

## 2018-04-12 NOTE — PROGRESS NOTES
"Subjective:       Patient ID: Denice Sheth is a 83 y.o. White female who presents for return patient evaluation for chronic renal failure.    She had a hospitalization recently at [place] with atrial fibrillation with RVR.  She now is on metoprolol and amiodarone.  She has no uremic or urinary symptoms and is in her usual state of health.  She did have some increased right leg edema as well.  Her blood pressure has been low since the albertina medication changes.      Review of Systems   Constitutional: Negative for appetite change, chills and fever.   Eyes: Negative for visual disturbance.   Respiratory: Negative for cough and shortness of breath.    Cardiovascular: Negative for chest pain and leg swelling.   Gastrointestinal: Negative for abdominal pain, diarrhea, nausea and vomiting.   Genitourinary: Negative for difficulty urinating, dysuria and hematuria.   Musculoskeletal: Positive for gait problem. Negative for myalgias.   Skin: Negative for rash.   Neurological: Positive for weakness. Negative for headaches.   Psychiatric/Behavioral: Negative for sleep disturbance.       The past medical, family and social histories were reviewed for this encounter.     BP (!) 108/50   Pulse 60   Ht 5' 2" (1.575 m)   Wt 84.3 kg (185 lb 13.6 oz)   LMP  (LMP Unknown) Comment: 50 years ago  SpO2 98%   BMI 33.99 kg/m²     Objective:      Physical Exam   Constitutional: She is oriented to person, place, and time. She appears well-developed and well-nourished. No distress.   HENT:   Head: Normocephalic and atraumatic.   Eyes: Conjunctivae are normal.   Neck: Neck supple. No JVD present.   Cardiovascular: Normal rate and regular rhythm.  Exam reveals no gallop and no friction rub.    Murmur (2/6 nabila) heard.  Pulmonary/Chest: Effort normal and breath sounds normal. No respiratory distress. She has no wheezes. She has no rales.   Abdominal: Soft. Bowel sounds are normal. She exhibits no distension. There is no tenderness. "   Musculoskeletal: She exhibits no edema (1+ BLE).   Neurological: She is alert and oriented to person, place, and time.   Skin: Skin is warm and dry. No rash noted.   Psychiatric: She has a normal mood and affect.   Vitals reviewed.      Assessment:       1. CKD (chronic kidney disease) stage 3, GFR 30-59 ml/min    2. 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 Hyponatremia    4. Diabetic nephropathy associated with type 2 diabetes mellitus    5. Proteinuria due to type 2 diabetes mellitus        Plan:   Return to clinic in 6 months.  Labs for next visit include rp, pth, upc.  Baseline creatinine is 1.4-1.6 since 2015.  PTH is 33 with a calcium of 9.2.  UPC is 0.75.  She has fully recovered from her ARF and is now actually better than what her baseline has been recently.  Blood pressure is low on the current regimen thus stop the amlodipine.  Continue current medications as prescribed and reviewed.

## 2018-04-20 ENCOUNTER — PATIENT MESSAGE (OUTPATIENT)
Dept: NEPHROLOGY | Facility: CLINIC | Age: 83
End: 2018-04-20

## 2018-05-01 ENCOUNTER — TELEPHONE (OUTPATIENT)
Dept: FAMILY MEDICINE | Facility: CLINIC | Age: 83
End: 2018-05-01

## 2018-05-10 NOTE — TELEPHONE ENCOUNTER
----- Message from Ayah Altamirano sent at 5/10/2018  1:57 PM CDT -----  Contact: Pt Daughter Parisa   Pt daughter Parisa is calling to request a refill for this PT on linagliptin (TRADJENTA) 5 mg Tab tablet the last script . Please call back and advise.    Call back# [phone]  Thanks   [address] [place] - [address]  [address] [address]  Phone: [phone] Fax: [phone]      
Refill request  lov  4/5/18  
Normal rate, regular rhythm, normal S1, S2 heart sounds heard.

## 2018-05-15 ENCOUNTER — TELEPHONE (OUTPATIENT)
Dept: FAMILY MEDICINE | Facility: CLINIC | Age: 83
End: 2018-05-15

## 2018-05-16 ENCOUNTER — TELEPHONE (OUTPATIENT)
Dept: FAMILY MEDICINE | Facility: CLINIC | Age: 83
End: 2018-05-16

## 2018-05-16 DIAGNOSIS — H91.90 HEARING LOSS, UNSPECIFIED HEARING LOSS TYPE, UNSPECIFIED LATERALITY: Primary | ICD-10-CM

## 2018-05-16 NOTE — TELEPHONE ENCOUNTER
1.Pt daughter states pt has has been having a hard time hearing and would like to be referred to ENT  2. Pt is out of tradjenta and has applied for assistance program but is not sure when that will start  3. Will contact N rep to check status

## 2018-05-16 NOTE — TELEPHONE ENCOUNTER
----- Message from Yessy Lieberman sent at 5/16/2018  8:44 AM CDT -----  Contact: Parisa Sheth (Daughter)  Parisa Sheth (Daughter) calling states pt having problems with ears and wants to know if pt can get free hearing test. Also wants to discuss a medication the pt is on......[phone]

## 2018-05-17 NOTE — TELEPHONE ENCOUNTER
Per Uzma with [facility]:  tradjenta approved, shipped out 5/15, she should receive in 5-7 business days, per [employer]. Basaglar application was not submitted yet because she has not spent the OOP  amount ($1100)    Pt daughter informed  ENT appt scheduled.

## 2018-05-31 ENCOUNTER — OFFICE VISIT (OUTPATIENT)
Dept: HEMATOLOGY/ONCOLOGY | Facility: CLINIC | Age: 83
End: 2018-05-31
Payer: MEDICARE

## 2018-05-31 ENCOUNTER — LAB VISIT (OUTPATIENT)
Dept: LAB | Facility: HOSPITAL | Age: 83
End: 2018-05-31
Attending: INTERNAL MEDICINE
Payer: MEDICARE

## 2018-05-31 VITALS
RESPIRATION RATE: 20 BRPM | TEMPERATURE: 97 F | SYSTOLIC BLOOD PRESSURE: 150 MMHG | HEART RATE: 60 BPM | WEIGHT: 189.81 LBS | HEIGHT: 62 IN | DIASTOLIC BLOOD PRESSURE: 67 MMHG | BODY MASS INDEX: 34.93 KG/M2

## 2018-05-31 DIAGNOSIS — N18.30 CKD (CHRONIC KIDNEY DISEASE) STAGE 3, GFR 30-59 ML/MIN: ICD-10-CM

## 2018-05-31 DIAGNOSIS — D50.8 OTHER IRON DEFICIENCY ANEMIA: ICD-10-CM

## 2018-05-31 DIAGNOSIS — D50.9 IRON DEFICIENCY ANEMIA, UNSPECIFIED IRON DEFICIENCY ANEMIA TYPE: Primary | ICD-10-CM

## 2018-05-31 LAB
ANION GAP SERPL CALC-SCNC: 9 MMOL/L
BASOPHILS # BLD AUTO: 0.02 K/UL
BASOPHILS NFR BLD: 0.2 %
BUN SERPL-MCNC: 32 MG/DL
CALCIUM SERPL-MCNC: 9.5 MG/DL
CHLORIDE SERPL-SCNC: 99 MMOL/L
CO2 SERPL-SCNC: 29 MMOL/L
CREAT SERPL-MCNC: 1.57 MG/DL
DIFFERENTIAL METHOD: NORMAL
EOSINOPHIL # BLD AUTO: 0.1 K/UL
EOSINOPHIL NFR BLD: 1.2 %
ERYTHROCYTE [DISTWIDTH] IN BLOOD BY AUTOMATED COUNT: 13.2 %
EST. GFR  (AFRICAN AMERICAN): 35 ML/MIN/1.73 M^2
EST. GFR  (NON AFRICAN AMERICAN): 30 ML/MIN/1.73 M^2
GLUCOSE SERPL-MCNC: 212 MG/DL
HCT VFR BLD AUTO: 40.9 %
HGB BLD-MCNC: 13.1 G/DL
LYMPHOCYTES # BLD AUTO: 2 K/UL
LYMPHOCYTES NFR BLD: 24.5 %
MCH RBC QN AUTO: 29.4 PG
MCHC RBC AUTO-ENTMCNC: 32 G/DL
MCV RBC AUTO: 92 FL
MONOCYTES # BLD AUTO: 0.7 K/UL
MONOCYTES NFR BLD: 8.3 %
NEUTROPHILS # BLD AUTO: 5.5 K/UL
NEUTROPHILS NFR BLD: 65.8 %
NRBC BLD-RTO: 0 /100 WBC
PLATELET # BLD AUTO: 289 K/UL
PMV BLD AUTO: 11 FL
POTASSIUM SERPL-SCNC: 4.8 MMOL/L
RBC # BLD AUTO: 4.45 M/UL
SODIUM SERPL-SCNC: 137 MMOL/L
WBC # BLD AUTO: 8.32 K/UL

## 2018-05-31 PROCEDURE — 80048 BASIC METABOLIC PNL TOTAL CA: CPT | Mod: PN

## 2018-05-31 PROCEDURE — 85025 COMPLETE CBC W/AUTO DIFF WBC: CPT | Mod: PN

## 2018-05-31 PROCEDURE — 85025 COMPLETE CBC W/AUTO DIFF WBC: CPT

## 2018-05-31 PROCEDURE — 99213 OFFICE O/P EST LOW 20 MIN: CPT | Mod: S$GLB,,, | Performed by: NURSE PRACTITIONER

## 2018-05-31 PROCEDURE — 3078F DIAST BP <80 MM HG: CPT | Mod: CPTII,S$GLB,, | Performed by: NURSE PRACTITIONER

## 2018-05-31 PROCEDURE — 3077F SYST BP >= 140 MM HG: CPT | Mod: CPTII,S$GLB,, | Performed by: NURSE PRACTITIONER

## 2018-05-31 PROCEDURE — 80048 BASIC METABOLIC PNL TOTAL CA: CPT

## 2018-05-31 PROCEDURE — 99999 PR PBB SHADOW E&M-EST. PATIENT-LVL V: CPT | Mod: PBBFAC,,, | Performed by: NURSE PRACTITIONER

## 2018-05-31 PROCEDURE — 36415 COLL VENOUS BLD VENIPUNCTURE: CPT | Mod: PN

## 2018-05-31 NOTE — PROGRESS NOTES
HISTORY OF PRESENT ILLNESS:  The patient is an 83 year old white female known to   Dr. Chin for anemia in the setting of CKD.  She returns to the clinic for her 3 months post   Feraheme evaluation.  She denies any difficulties with fatigue, headache,irritability, loss of   appetite, muscle spasms or weakness, cramps, nausea, malaise, palpitations, numbness/tingling,   irregular heartbeats, etc.  No new complaint or pertinent finding on a 10-point review of systems.    PHYSICAL EXAMINATION:  GENERAL:  Well-developed, well-nourished, white female confined to a wheelchair.  Alert & oriented x 3  VITAL SIGNS:    Wt Readings from Last 3 Encounters:   05/31/18 86.1 kg (189 lb 13.1 oz)   04/12/18 84.3 kg (185 lb 13.6 oz)   04/05/18 86.4 kg (190 lb 7.6 oz)     Temp Readings from Last 3 Encounters:   05/31/18 97.4 °F (36.3 °C)   04/05/18 97.6 °F (36.4 °C) (Oral)   02/23/18 97.6 °F (36.4 °C)     BP Readings from Last 3 Encounters:   05/31/18 (!) 150/67   04/12/18 (!) 108/50   04/05/18 122/62     Pulse Readings from Last 3 Encounters:   05/31/18 60   04/12/18 60   04/05/18 61     HEENT:  Normocephalic, atraumatic.  Oral mucosa pink and moist.  Lips without   lesions.  Tongue midline.  Oropharynx clear.  Nonicteric sclerae.   NECK:  Supple.  No adenopathy.                                               HEART:  Regular rate and rhythm with murmur, no gallop or rub.  Pacemaker to W.           LUNGS:  Clear to auscultation bilaterally.                                   ABDOMEN:  Soft, nontender and nondistended with positive normoactive bowel   sounds.  No hepatosplenomegaly.                                              EXTREMITIES:  No cyanosis, clubbing or edema.  Distal pulses are intact.     LABORATORY:    Lab Results   Component Value Date    WBC 8.32 05/31/2018    HGB 13.1 05/31/2018    HCT 40.9 05/31/2018    MCV 92 05/31/2018     05/31/2018     Unremarkable differential    CMP  Sodium   Date Value Ref Range  Status   05/31/2018 137 136 - 145 mmol/L Final     Potassium   Date Value Ref Range Status   05/31/2018 4.8 3.5 - 5.1 mmol/L Final     Chloride   Date Value Ref Range Status   05/31/2018 99 95 - 110 mmol/L Final     CO2   Date Value Ref Range Status   05/31/2018 29 22 - 31 mmol/L Final     Glucose   Date Value Ref Range Status   05/31/2018 212 (H) 70 - 110 mg/dL Final     Comment:     The ADA recommends the following guidelines for fasting glucose:  Normal:       less than 100 mg/dL  Prediabetes:  100 mg/dL to 125 mg/dL  Diabetes:     126 mg/dL or higher       BUN, Bld   Date Value Ref Range Status   05/31/2018 32 (H) 7 - 18 mg/dL Final     Creatinine   Date Value Ref Range Status   05/31/2018 1.57 (H) 0.50 - 1.40 mg/dL Final     Calcium   Date Value Ref Range Status   05/31/2018 9.5 8.4 - 10.2 mg/dL Final     Total Protein   Date Value Ref Range Status   10/05/2017 6.9 6.0 - 8.4 g/dL Final     Albumin   Date Value Ref Range Status   04/05/2018 3.4 (L) 3.5 - 5.2 g/dL Final     Total Bilirubin   Date Value Ref Range Status   10/05/2017 0.4 0.2 - 1.3 mg/dL Final     Alkaline Phosphatase   Date Value Ref Range Status   10/05/2017 86 38 - 145 U/L Final     AST ([place])   Date Value Ref Range Status   01/19/2016 31 14 - 36 U/L Final     AST   Date Value Ref Range Status   10/05/2017 33 14 - 36 U/L Final     ALT   Date Value Ref Range Status   10/05/2017 34 10 - 44 U/L Final     Anion Gap   Date Value Ref Range Status   05/31/2018 9 8 - 16 mmol/L Final     eGFR if    Date Value Ref Range Status   05/31/2018 35 (A) >60 mL/min/1.73 m^2 Final     eGFR if non    Date Value Ref Range Status   05/31/2018 30 (A) >60 mL/min/1.73 m^2 Final     Comment:     Calculation used to obtain the estimated glomerular filtration  rate (eGFR) is the CKD-EPI equation.        IMPRESSION:    1.  Anemia in the setting of stage III chronic kidney disease - clinically stable.  2.  Hyponatremia - stable  3.   Hyperkalemia - stable  4.  CKD III    PLAN:    1.  Reevaluate in 6 months with interval CBC, BMP.      Assessment/plan reviewed and approved by Dr. Chin.

## 2018-06-11 RX ORDER — LEVOTHYROXINE SODIUM 75 UG/1
TABLET ORAL
Qty: 90 TABLET | Refills: 1 | Status: SHIPPED | OUTPATIENT
Start: 2018-06-11 | End: 2018-12-02 | Stop reason: SDUPTHER

## 2018-06-11 RX ORDER — MEMANTINE HYDROCHLORIDE 10 MG/1
TABLET ORAL
Qty: 90 TABLET | Refills: 1 | Status: SHIPPED | OUTPATIENT
Start: 2018-06-11 | End: 2018-12-02 | Stop reason: SDUPTHER

## 2018-06-12 ENCOUNTER — CLINICAL SUPPORT (OUTPATIENT)
Dept: AUDIOLOGY | Facility: CLINIC | Age: 83
End: 2018-06-12
Payer: MEDICARE

## 2018-06-12 ENCOUNTER — OFFICE VISIT (OUTPATIENT)
Dept: OTOLARYNGOLOGY | Facility: CLINIC | Age: 83
End: 2018-06-12
Payer: MEDICARE

## 2018-06-12 VITALS
HEIGHT: 62 IN | DIASTOLIC BLOOD PRESSURE: 66 MMHG | BODY MASS INDEX: 34.93 KG/M2 | HEART RATE: 60 BPM | SYSTOLIC BLOOD PRESSURE: 130 MMHG | WEIGHT: 189.81 LBS

## 2018-06-12 DIAGNOSIS — H90.3 BILATERAL SENSORINEURAL HEARING LOSS: ICD-10-CM

## 2018-06-12 DIAGNOSIS — H93.293 IMPAIRED AUDITORY DISCRIMINATION, BILATERAL: ICD-10-CM

## 2018-06-12 DIAGNOSIS — H61.23 BILATERAL IMPACTED CERUMEN: Primary | ICD-10-CM

## 2018-06-12 DIAGNOSIS — H90.3 BILATERAL SENSORINEURAL HEARING LOSS: Primary | ICD-10-CM

## 2018-06-12 PROCEDURE — 3078F DIAST BP <80 MM HG: CPT | Mod: CPTII,S$GLB,, | Performed by: NURSE PRACTITIONER

## 2018-06-12 PROCEDURE — 92557 COMPREHENSIVE HEARING TEST: CPT | Mod: S$GLB,,, | Performed by: AUDIOLOGIST-HEARING AID FITTER

## 2018-06-12 PROCEDURE — 3075F SYST BP GE 130 - 139MM HG: CPT | Mod: CPTII,S$GLB,, | Performed by: NURSE PRACTITIONER

## 2018-06-12 PROCEDURE — 69210 REMOVE IMPACTED EAR WAX UNI: CPT | Mod: S$GLB,,, | Performed by: NURSE PRACTITIONER

## 2018-06-12 PROCEDURE — 92567 TYMPANOMETRY: CPT | Mod: S$GLB,,, | Performed by: AUDIOLOGIST-HEARING AID FITTER

## 2018-06-12 PROCEDURE — 99999 PR PBB SHADOW E&M-EST. PATIENT-LVL I: CPT | Mod: PBBFAC,,,

## 2018-06-12 PROCEDURE — 99999 PR PBB SHADOW E&M-EST. PATIENT-LVL IV: CPT | Mod: PBBFAC,,, | Performed by: NURSE PRACTITIONER

## 2018-06-12 PROCEDURE — 99203 OFFICE O/P NEW LOW 30 MIN: CPT | Mod: 25,S$GLB,, | Performed by: NURSE PRACTITIONER

## 2018-06-12 NOTE — LETTER
June 12, 2018      Meredith Baird MD  [address]  [place] [address]           [facility]  [address] [facility] [address] [place] [address]  Phone: [phone]  Fax: [phone]          Patient: Denice Sheth   MR Number: [id number]   YOB: 1934   Date of Visit: 6/12/2018       Dear Dr. Meredith Baird:    Thank you for referring Denice Sheth to me for evaluation. Attached you will find relevant portions of my assessment and plan of care.    If you have questions, please do not hesitate to call me. I look forward to following Denice Sheth along with you.    Sincerely,    Annie Odonnell, PRUDENCIO    Enclosure  CC:  No Recipients    If you would like to receive this communication electronically, please contact [email or web address] or [phone] to request more information on [employer] Link access.    For providers and/or their staff who would like to refer a patient to [facility], please contact us through our one-stop-shop provider referral line, [facility] , at [phone].    If you feel you have received this communication in error or would no longer like to receive these types of communications, please e-mail [email or web address]

## 2018-06-12 NOTE — PROGRESS NOTES
Denice Sheth was seen 06/12/2018 for an audiological evaluation. Patient complains of hearing loss. 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 She feels she has more hearing loss on her left ear. She denies family Hx of hearing loss and tinnitus. Pt has a Hx of diabetes Type 2.     Results reveal a bilateral normal-to-profound sensorineural hearing loss    Speech Reception Thresholds were  35 dBHL for the right ear and 30 dBHL for the left ear.    Word recognition scores were good for the right ear and fair for the left ear.   Tympanograms were Type As for the right ear and Type A for the left ear.    Audiogram results were reviewed in detail with patient and all questions were answered. Results will be reviewed by ENT at the completion of this note. Recommend binaural amplification pending medical clearance, annual hearing tests to monitor hearing and hearing protection in loud noise. Gave pt hearing aid assistance list and Care Credit info. She will call the clinic if she wishes to set up a hearing aid consult.

## 2018-06-12 NOTE — PROGRESS NOTES
Subjective:       Patient ID: Denice Sheth is a 83 y.o. female.    Chief Complaint: Hearing Loss    HPI   Patient reports gradual worsening hearing loss over several years. She was , wore headset in her left ear. She suspects left hearing is worse. No family hx of HL. No other noise exposure. No other aural symptoms. No other ENT complaints.     Review of Systems   Constitutional: Negative.    HENT: Positive for hearing loss.    Eyes: Negative.    Respiratory: Negative.    Cardiovascular: Negative.    Gastrointestinal: Negative.    Musculoskeletal: Negative.    Skin: Negative.    Neurological: Negative.    Hematological: Negative.    Psychiatric/Behavioral: Negative.        Objective:      Physical Exam   Constitutional: She is oriented to person, place, and time. Vital signs are normal. She appears well-developed and well-nourished. She is cooperative. She does not appear ill. No distress.   HENT:   Head: Normocephalic and atraumatic.   Right Ear: Hearing, tympanic membrane, external ear and ear canal normal. Tympanic membrane is not erythematous. No middle ear effusion.   Left Ear: Hearing, tympanic membrane, external ear and ear canal normal. Tympanic membrane is not erythematous.  No middle ear effusion.   Nose: Nose normal.   Mouth/Throat: Oropharynx is clear and moist and mucous membranes are normal.     SEPARATE PROCEDURE IN OFFICE:   Procedure: Removal of impacted cerumen, bilateral   Pre Procedure Diagnosis: Cerumen Impaction   Post Procedure Diagnosis: Cerumen Impaction   Verbal informed consent in regards to risk of trauma to ear canal, ear drum or hearing, discomfort during procedure and/or inability to remove cerumen impaction in one session or unforeseen events or complications.   No anesthesia.     Procedure in detail:   Ear canal visualized bilateral with appropriate size ear speculum utilizing Operating Head Binocular Otomicroscope   Utilizing the following:  Ring curet,  alligator forceps, and/or suction cannula was used. The impacted cerumen of the ear canals was removed atraumatically. The TM and EAC were then inspected and found to be clear of wax. See description of TMs/EACs in PE above.   Complications: No   Condition: Improved/Good    Exostosis left inferior EAC close to meatus.     Eyes: EOM and lids are normal. Right eye exhibits no discharge. Left eye exhibits no discharge. No scleral icterus.   Neck: Trachea normal and normal range of motion. Neck supple. No tracheal deviation present.   Cardiovascular: Normal rate.    Pulmonary/Chest: Effort normal. No stridor. No respiratory distress. She has no wheezes.   Musculoskeletal: Normal range of motion.   Did not attempt to transfer pt from her wheelchair for duration of office visit   Neurological: She is alert and oriented to person, place, and time. She has normal strength. Coordination and gait normal.   Skin: Skin is warm, dry and intact. She is not diaphoretic. No cyanosis. No pallor.   Psychiatric: She has a normal mood and affect. Her speech is normal and behavior is normal. Judgment and thought content normal. Cognition and memory are normal.   Nursing note and vitals reviewed.      Assessment:     Symmetric normal sloping to profound SNHL    Impaired auditory discrimination AU  Cerumen removed AU  Plan:     PATIENT IS MEDICALLY CLEARED FOR HEARING AIDS.   Today's audiogram reveals the patient is a candidate for amplification. Audiogram is reviewed in detail with the patient. The audiologist's recommendation that the patient have amplification/hearing aids is discussed and questions answered. Patient has been given information by the audiologist on how to schedule a hearing aid consultation. Patient is encouraged to wear ear protection in loud noise and return annually for hearing test. Return to clinic as needed for further ENT concerns.

## 2018-07-02 RX ORDER — ESCITALOPRAM OXALATE 10 MG/1
TABLET ORAL
Qty: 90 TABLET | Refills: 1 | Status: SHIPPED | OUTPATIENT
Start: 2018-07-02 | End: 2019-01-06 | Stop reason: SDUPTHER

## 2018-08-06 ENCOUNTER — TELEPHONE (OUTPATIENT)
Dept: NEPHROLOGY | Facility: CLINIC | Age: 83
End: 2018-08-06

## 2018-08-06 ENCOUNTER — PATIENT MESSAGE (OUTPATIENT)
Dept: NEPHROLOGY | Facility: CLINIC | Age: 83
End: 2018-08-06

## 2018-08-06 DIAGNOSIS — R30.0 DYSURIA: Primary | ICD-10-CM

## 2018-08-06 RX ORDER — RIVASTIGMINE 4.6 MG/24H
PATCH, EXTENDED RELEASE TRANSDERMAL
Qty: 30 PATCH | Refills: 0 | Status: SHIPPED | OUTPATIENT
Start: 2018-08-06 | End: 2018-09-03 | Stop reason: SDUPTHER

## 2018-08-06 NOTE — TELEPHONE ENCOUNTER
----- Message from Echo Rosas RT sent at 8/6/2018  1:21 PM CDT -----  Contact: Parisa,daughter, [phone]  Parisa,daughter, [phone], requesting to inform [facility] [place], [place], but she in not injured, please call if you have any questions, thanks.

## 2018-08-06 NOTE — TELEPHONE ENCOUNTER
Called pt to get more information. Daughter states, the pt right knee buckled and gave out. She thinks the pt had some sort of weakness issue at that moment. Daughter just wanted doctor to know.

## 2018-08-06 NOTE — TELEPHONE ENCOUNTER
"Per daughter re: patient:    Saturday she "had to go real bad, and couldn't until later in the day and then it just flowed."   Yesterday "was a bad day" and she was just very tired.    She seems to be feeling somewhat better, but not back to herself.  Patient is urinating regularly.  Doesn't appear to be concentrated per daughter.    Daughter states urine" is yellow, but is clear."   She is drinking.  She is coherent.  Denies fever or any other s/s.      Her sugar was low this morning (59) so she got a good breakfast.  She's at  [facility] today.       She had a fall yesterday getting into the car.  Daughter said sometimes her R knee mariella and it could have been that, but she is concerned about UTI.        Pharmacy is [facility].        "

## 2018-08-07 ENCOUNTER — LAB VISIT (OUTPATIENT)
Dept: LAB | Facility: HOSPITAL | Age: 83
End: 2018-08-07
Attending: INTERNAL MEDICINE
Payer: MEDICARE

## 2018-08-07 DIAGNOSIS — R30.0 DYSURIA: ICD-10-CM

## 2018-08-08 LAB
BACTERIA #/AREA URNS AUTO: ABNORMAL /HPF
BILIRUB UR QL STRIP: NEGATIVE
CLARITY UR REFRACT.AUTO: ABNORMAL
COLOR UR AUTO: YELLOW
GLUCOSE UR QL STRIP: NEGATIVE
HGB UR QL STRIP: ABNORMAL
KETONES UR QL STRIP: NEGATIVE
LEUKOCYTE ESTERASE UR QL STRIP: ABNORMAL
MICROSCOPIC COMMENT: ABNORMAL
NITRITE UR QL STRIP: NEGATIVE
PH UR STRIP: 6 [PH] (ref 5–8)
PROT UR QL STRIP: NEGATIVE
RBC #/AREA URNS AUTO: 25 /HPF (ref 0–4)
SP GR UR STRIP: 1.01 (ref 1–1.03)
SQUAMOUS #/AREA URNS AUTO: 0 /HPF
URN SPEC COLLECT METH UR: ABNORMAL
UROBILINOGEN UR STRIP-ACNC: NEGATIVE EU/DL
WBC #/AREA URNS AUTO: 51 /HPF (ref 0–5)
WBC CLUMPS UR QL AUTO: ABNORMAL

## 2018-08-08 PROCEDURE — 87086 URINE CULTURE/COLONY COUNT: CPT

## 2018-08-08 PROCEDURE — 87186 SC STD MICRODIL/AGAR DIL: CPT | Mod: 59

## 2018-08-08 PROCEDURE — 81001 URINALYSIS AUTO W/SCOPE: CPT

## 2018-08-08 PROCEDURE — 87077 CULTURE AEROBIC IDENTIFY: CPT

## 2018-08-08 PROCEDURE — 87088 URINE BACTERIA CULTURE: CPT

## 2018-08-08 NOTE — TELEPHONE ENCOUNTER
Gabbie Day states patient's fall probably related to weakness/confusion caused by UTI.   Urinalysis order by Dr. Pace and pending results.   Pt scheduled to see Dr. aBird 9/6/18. Would you like sooner appt or OK for patient to see another provider or NP?

## 2018-08-08 NOTE — TELEPHONE ENCOUNTER
Left detailed message for daughter Parisa to keep scheduled appt and await UA results per Dr. Baird.

## 2018-08-09 ENCOUNTER — TELEPHONE (OUTPATIENT)
Dept: NEPHROLOGY | Facility: CLINIC | Age: 83
End: 2018-08-09

## 2018-08-09 ENCOUNTER — PATIENT MESSAGE (OUTPATIENT)
Dept: NEPHROLOGY | Facility: CLINIC | Age: 83
End: 2018-08-09

## 2018-08-09 NOTE — TELEPHONE ENCOUNTER
"Daughter reports patient is feeling some better today. " Sassier " than she was before.  Advised some little blood and some white cells in urine and culture is pending.  Will send to Dr. Pace for review.     [facility].   "

## 2018-08-09 NOTE — TELEPHONE ENCOUNTER
----- Message from Radha Patel sent at 8/9/2018  3:13 PM CDT -----  Test results.  Please call patients katherine/Parisa at [phone]

## 2018-08-10 RX ORDER — CEFUROXIME AXETIL 250 MG/1
250 TABLET ORAL 2 TIMES DAILY
Qty: 20 TABLET | Refills: 0 | Status: SHIPPED | OUTPATIENT
Start: 2018-08-10 | End: 2018-09-06

## 2018-08-11 ENCOUNTER — PATIENT MESSAGE (OUTPATIENT)
Dept: NEPHROLOGY | Facility: CLINIC | Age: 83
End: 2018-08-11

## 2018-08-11 LAB — BACTERIA UR CULT: NORMAL

## 2018-08-13 ENCOUNTER — PATIENT MESSAGE (OUTPATIENT)
Dept: NEPHROLOGY | Facility: CLINIC | Age: 83
End: 2018-08-13

## 2018-08-23 ENCOUNTER — PATIENT OUTREACH (OUTPATIENT)
Dept: ADMINISTRATIVE | Facility: HOSPITAL | Age: 83
End: 2018-08-23

## 2018-08-24 ENCOUNTER — TELEPHONE (OUTPATIENT)
Dept: FAMILY MEDICINE | Facility: CLINIC | Age: 83
End: 2018-08-24

## 2018-08-24 ENCOUNTER — PATIENT MESSAGE (OUTPATIENT)
Dept: NEPHROLOGY | Facility: CLINIC | Age: 83
End: 2018-08-24

## 2018-08-24 DIAGNOSIS — R30.0 DYSURIA: Primary | ICD-10-CM

## 2018-08-24 NOTE — TELEPHONE ENCOUNTER
----- Message from Esdras Redman sent at 8/24/2018  2:43 PM CDT -----  Contact: Parisa Sheth (Daughter)  .Type:  Patient Returning Call    Who Called:  Genie  Who Left Message for Patient:  Parisa  Does the patient know what this is regarding?: patient's high blood pressure  Best Call Back Number: [phone]  Additional Information:

## 2018-08-25 ENCOUNTER — LAB VISIT (OUTPATIENT)
Dept: LAB | Facility: HOSPITAL | Age: 83
End: 2018-08-25
Attending: INTERNAL MEDICINE
Payer: MEDICARE

## 2018-08-25 DIAGNOSIS — R30.0 DYSURIA: ICD-10-CM

## 2018-08-25 PROCEDURE — 87086 URINE CULTURE/COLONY COUNT: CPT

## 2018-08-26 LAB
BACTERIA UR CULT: NORMAL
BACTERIA UR CULT: NORMAL

## 2018-09-04 RX ORDER — RIVASTIGMINE 4.6 MG/24H
PATCH, EXTENDED RELEASE TRANSDERMAL
Qty: 30 PATCH | Refills: 0 | Status: SHIPPED | OUTPATIENT
Start: 2018-09-04 | End: 2018-10-02 | Stop reason: SDUPTHER

## 2018-09-06 ENCOUNTER — OFFICE VISIT (OUTPATIENT)
Dept: FAMILY MEDICINE | Facility: CLINIC | Age: 83
End: 2018-09-06
Payer: MEDICARE

## 2018-09-06 ENCOUNTER — HOSPITAL ENCOUNTER (OUTPATIENT)
Dept: RADIOLOGY | Facility: HOSPITAL | Age: 83
Discharge: HOME | End: 2018-09-06
Attending: FAMILY MEDICINE
Payer: MEDICARE

## 2018-09-06 VITALS
TEMPERATURE: 98 F | BODY MASS INDEX: 35.68 KG/M2 | WEIGHT: 193.88 LBS | OXYGEN SATURATION: 96 % | HEART RATE: 60 BPM | DIASTOLIC BLOOD PRESSURE: 72 MMHG | HEIGHT: 62 IN | SYSTOLIC BLOOD PRESSURE: 160 MMHG

## 2018-09-06 DIAGNOSIS — N18.4 CKD (CHRONIC KIDNEY DISEASE), STAGE IV: ICD-10-CM

## 2018-09-06 DIAGNOSIS — M25.569 KNEE PAIN, UNSPECIFIED CHRONICITY, UNSPECIFIED LATERALITY: ICD-10-CM

## 2018-09-06 DIAGNOSIS — Z79.4 TYPE 2 DIABETES MELLITUS WITHOUT COMPLICATION, WITH LONG-TERM CURRENT USE OF INSULIN: ICD-10-CM

## 2018-09-06 DIAGNOSIS — M25.461 EFFUSION OF RIGHT KNEE JOINT: ICD-10-CM

## 2018-09-06 DIAGNOSIS — Z00.00 ROUTINE GENERAL MEDICAL EXAMINATION AT A HEALTH CARE FACILITY: Primary | ICD-10-CM

## 2018-09-06 DIAGNOSIS — D48.7 NEOPLASM OF UNCERTAIN BEHAVIOR OF FACE: ICD-10-CM

## 2018-09-06 DIAGNOSIS — E11.9 TYPE 2 DIABETES MELLITUS WITHOUT COMPLICATION, WITH LONG-TERM CURRENT USE OF INSULIN: ICD-10-CM

## 2018-09-06 DIAGNOSIS — I48.20 CHRONIC ATRIAL FIBRILLATION: ICD-10-CM

## 2018-09-06 DIAGNOSIS — I69.354 HEMIPARESIS AFFECTING LEFT SIDE AS LATE EFFECT OF CEREBROVASCULAR ACCIDENT (CVA): ICD-10-CM

## 2018-09-06 DIAGNOSIS — D75.839 THROMBOCYTOSIS: ICD-10-CM

## 2018-09-06 PROCEDURE — 3077F SYST BP >= 140 MM HG: CPT | Mod: CPTII,,, | Performed by: FAMILY MEDICINE

## 2018-09-06 PROCEDURE — 3078F DIAST BP <80 MM HG: CPT | Mod: CPTII,,, | Performed by: FAMILY MEDICINE

## 2018-09-06 PROCEDURE — 99214 OFFICE O/P EST MOD 30 MIN: CPT | Mod: PBBFAC,25,PN | Performed by: FAMILY MEDICINE

## 2018-09-06 PROCEDURE — 73564 X-RAY EXAM KNEE 4 OR MORE: CPT | Mod: 26,RT,, | Performed by: RADIOLOGY

## 2018-09-06 PROCEDURE — 99999 PR PBB SHADOW E&M-EST. PATIENT-LVL IV: CPT | Mod: PBBFAC,,, | Performed by: FAMILY MEDICINE

## 2018-09-06 PROCEDURE — 90662 IIV NO PRSV INCREASED AG IM: CPT | Mod: PBBFAC,PN

## 2018-09-06 PROCEDURE — 73564 X-RAY EXAM KNEE 4 OR MORE: CPT | Mod: TC,PN,RT

## 2018-09-06 PROCEDURE — 99499 UNLISTED E&M SERVICE: CPT | Mod: S$PBB,,, | Performed by: FAMILY MEDICINE

## 2018-09-06 PROCEDURE — 73562 X-RAY EXAM OF KNEE 3: CPT | Mod: 26,XS,LT, | Performed by: RADIOLOGY

## 2018-09-06 PROCEDURE — 99397 PER PM REEVAL EST PAT 65+ YR: CPT | Mod: S$PBB,,, | Performed by: FAMILY MEDICINE

## 2018-09-06 NOTE — PROGRESS NOTES
Patient, Denice Sheth (MRN #[id number]), presented with a recorded BMI of 35.46 kg/m^2 and a documented comorbidity(s):  - Diabetes Mellitus Type 2  - Atrial Fibrillation  to which the severe obesity is a contributing factor. This is consistent with the definition of severe obesity (BMI 35.0-35.9) with comorbidity (ICD-10 E66.01, Z68.35). The patient's severe obesity was monitored, evaluated, addressed and/or treated. This addendum to the medical record is made on 09/06/2018.

## 2018-09-06 NOTE — PROGRESS NOTES
Subjective:       Patient ID: Denice Sheth is a 83 y.o. female.    Chief Complaint: Annual Exam      Denice Sheth is in the office for annual exam.    HPI  No updates to medical hx. Accompanied by daughter who assists in her care.  Past Medical History:   Diagnosis Date    A-fib     Anticoagulant long-term use     CHF (congestive heart failure)     Diabetes mellitus     Disorder of kidney and ureter     Followed by Dr. Jonathan Pace    Encounter for blood transfusion     Hypertension     Insomnia      Yesterday, had a fall onto her bottom when walking with walker and R knee gave out.    Recalls that the R foot was in a boot per ortho/[facility] last year for several months.  Otherwise doing well. Notes occ dry cough at night. Not noted during other parts of the day, not productive. No dyspnea.    Current Outpatient Medications:     amiodarone (PACERONE) 200 MG Tab, once daily. , Disp: , Rfl:     ascorbic acid (VITAMIN C) 500 MG tablet, Take 500 mg by mouth once daily., Disp: , Rfl:     b complex vitamins capsule, Take 1 capsule by mouth once daily., Disp: , Rfl:     blood-glucose meter kit, by Other route. Use as instructed, Disp: , Rfl:     calcium-vitamin D3 500 mg(1,250mg) -200 unit per tablet, Take 1 tablet by mouth 2 (two) times daily with meals., Disp: , Rfl:     clopidogrel (PLAVIX) 75 mg tablet, TAKE ONE TABLET BY MOUTH ONCE DAILY, Disp: 30 tablet, Rfl: 11    escitalopram oxalate (LEXAPRO) 10 MG tablet, TAKE ONE TABLET BY MOUTH ONCE DAILY IN THE EVENING, Disp: 90 tablet, Rfl: 1    fish oil-omega-3 fatty acids 300-1,000 mg capsule, Take 2 g by mouth once daily., Disp: , Rfl:     insulin aspart (NOVOLOG) 100 unit/mL injection, Inject 6 Units into the skin 3 (three) times daily before meals. (Patient taking differently: Inject 6 Units into the skin 3 (three) times daily before meals. Has been using only at lunch d/t lower BS), Disp: 10 mL, Rfl: 11    insulin glargine (BASAGLAR KWIKPEN)  100 unit/mL (3 mL) InPn pen, Inject 30 Units into the skin every evening., Disp: 15 mL, Rfl: 3    insulin syringe-needle U-100 (INSULIN SYRINGE) 1 mL 30 gauge x 5/16 Syrg, As directed., Disp: 100 each, Rfl: 3    insulin syringe-needle U-100 1 mL 31 gauge x 5/16 Syrg, USE AS DIRECTED, Disp: 100 each, Rfl: 2    levothyroxine (SYNTHROID) 75 MCG tablet, TAKE ONE TABLET BY MOUTH ONCE DAILY IN THE MORNING BEFORE BREAKFAST, Disp: 90 tablet, Rfl: 1    linagliptin (TRADJENTA) 5 mg Tab tablet, Take 1 tablet (5 mg total) by mouth once daily., Disp: 90 tablet, Rfl: 3    memantine (NAMENDA) 10 MG Tab, TAKE ONE TABLET BY MOUTH ONCE DAILY IN THE EVENING, Disp: 90 tablet, Rfl: 1    metoprolol tartrate (LOPRESSOR) 25 MG tablet, 25 mg 2 (two) times daily. , Disp: , Rfl:     multivitamin (ONE DAILY MULTIVITAMIN) per tablet, Take 1 tablet by mouth once daily., Disp: , Rfl:     rivaroxaban (XARELTO) 15 mg Tab, Take 15 mg by mouth daily with dinner or evening meal., Disp: , Rfl:     rivastigmine (EXELON) 4.6 mg/24 hr PT24, APPLY 1 PATCH TOPICALLY ONCE DAILY, Disp: 30 patch, Rfl: 0    simvastatin (ZOCOR) 10 MG tablet, Take 10 mg by mouth every evening., Disp: , Rfl:     nitroGLYCERIN (NITROSTAT) 0.4 MG SL tablet, Place 1 tablet (0.4 mg total) under the tongue every 5 (five) minutes as needed for Chest pain., Disp: 30 tablet, Rfl: 0    ondansetron (ZOFRAN) 4 MG tablet, Take 1 tablet (4 mg total) by mouth every 6 (six) hours., Disp: 12 tablet, Rfl: 0    POLYETHYLENE GLYCOL 3350 (DULCOLAX BALANCE ORAL), Take 1 packet by mouth daily as needed (constipation). , Disp: , Rfl:      Lab Results   Component Value Date    HGBA1C 6.9 (H) 05/26/2017    HGBA1C 7.6 (H) 11/04/2016    HGBA1C 6.5 (H) 05/16/2016     Lab Results   Component Value Date    LDLCALC 92.6 05/26/2017    CREATININE 1.57 (H) 05/31/2018   labs 2017 rev.    Review of Systems   Constitutional: Negative for fatigue, fever and unexpected weight change.   HENT: Negative for  congestion, postnasal drip and rhinorrhea.    Respiratory: Negative for cough (nighttime, not current) and shortness of breath.    Cardiovascular: Negative for chest pain, palpitations and leg swelling.   Gastrointestinal: Negative for constipation, diarrhea and nausea.   Genitourinary: Negative for difficulty urinating and dysuria.   Musculoskeletal: Positive for gait problem. Negative for arthralgias, back pain and joint swelling.   Skin: Negative for color change and rash.   Neurological: Negative for dizziness, weakness (mild L side weakness from prior cva), numbness and headaches.   Psychiatric/Behavioral: Positive for sleep disturbance (doesn't sleep well, chronic but stable). Negative for dysphoric mood.       Objective:      Physical Exam   Constitutional: She is oriented to person, place, and time. She appears well-developed and well-nourished. No distress.   HENT:   Head: Normocephalic and atraumatic.   Mouth/Throat: Oropharynx is clear and moist.   Eyes: Conjunctivae and EOM are normal. Pupils are equal, round, and reactive to light. Right eye exhibits no discharge. Left eye exhibits no discharge. No scleral icterus.   Neck: Normal range of motion. Neck supple. No thyromegaly present.   Cardiovascular: Normal rate and regular rhythm.   Pulmonary/Chest: Effort normal and breath sounds normal. No respiratory distress.   Abdominal: Bowel sounds are normal. There is no guarding.   Musculoskeletal: Normal range of motion. She exhibits no edema.   LUE/LLE strength generally 4/5, RUE/RLE 4+5/5   Lymphadenopathy:     She has no cervical adenopathy.   Neurological: She is alert and oriented to person, place, and time. No cranial nerve deficit.   Skin: Skin is warm and dry. No rash noted.        Psychiatric: She has a normal mood and affect. Her behavior is normal.   Nursing note and vitals reviewed.      Screening recommendations appropriate to age and health status were reviewed.    Knee pain, unspecified  chronicity, unspecified laterality  -     Ambulatory Referral to Physical/Occupational Therapy  -     X-ray Knee Ortho Right with Flexion; Future; Expected date: 09/06/2018  Update imaging, start PT. Pt open to ortho consult as well.  Type 2 diabetes mellitus without complication, with long-term current use of insulin  -     Hemoglobin A1c; Future; Expected date: 09/06/2018  -     Lipid panel; Future; Expected date: 09/06/2018  Prev controlled. Update lab - overdue. Home BS stable without hypoglycemia.  Neoplasm of uncertain behavior of face  -     Ambulatory referral to Dermatology  For review.  Hemiparesis affecting left side as late effect of cerebrovascular accident (CVA)  Stable.  Chronic atrial fibrillation  HR controlled, otherwise asymp.  CKD (chronic kidney disease), stage IV  Maintains nephrology f/u.  Thrombocytosis  Maintains hematology f/u.  Other orders  -     Influenza - High Dose (65+) (PF) (IM)  Routine health  Anticipatory guidance reviewed.

## 2018-09-07 ENCOUNTER — TELEPHONE (OUTPATIENT)
Dept: FAMILY MEDICINE | Facility: CLINIC | Age: 83
End: 2018-09-07

## 2018-09-20 ENCOUNTER — OFFICE VISIT (OUTPATIENT)
Dept: ORTHOPEDICS | Facility: CLINIC | Age: 83
End: 2018-09-20
Payer: MEDICARE

## 2018-09-20 VITALS — HEIGHT: 62 IN | WEIGHT: 193 LBS | BODY MASS INDEX: 35.51 KG/M2

## 2018-09-20 DIAGNOSIS — M17.11 OSTEOARTHROSIS, LOCALIZED, PRIMARY, KNEE, RIGHT: Primary | ICD-10-CM

## 2018-09-20 PROCEDURE — 1101F PT FALLS ASSESS-DOCD LE1/YR: CPT | Mod: CPTII,,, | Performed by: ORTHOPAEDIC SURGERY

## 2018-09-20 PROCEDURE — 99212 OFFICE O/P EST SF 10 MIN: CPT | Mod: PBBFAC,PN | Performed by: ORTHOPAEDIC SURGERY

## 2018-09-20 PROCEDURE — 99999 PR PBB SHADOW E&M-EST. PATIENT-LVL II: CPT | Mod: PBBFAC,,, | Performed by: ORTHOPAEDIC SURGERY

## 2018-09-20 PROCEDURE — 99203 OFFICE O/P NEW LOW 30 MIN: CPT | Mod: S$PBB,,, | Performed by: ORTHOPAEDIC SURGERY

## 2018-09-20 NOTE — PROGRESS NOTES
DATE: 9/20/2018  PATIENT: Denice Sheth  REFERRING MD:  CHIEF COMPLAINT:   Chief Complaint   Patient presents with    Right Knee - Pain       HISTORY:  Denice Sheth is a 83 y.o. female  who presents for initial evaluation of right knee pain.  Notes a 1 year history of discomfort without trauma.  Her biggest problem frequent falls.  She states no pain at rest.  She notes pain with weight-bearing and for long distances.  She takes no medication.  She has had no injections to her knee. She now presents for evaluation. She uses a walker for ambulation      PAST MEDICAL/SURGICAL HISTORY:  Past Medical History:   Diagnosis Date    A-fib     Anticoagulant long-term use     CHF (congestive heart failure)     Diabetes mellitus     Disorder of kidney and ureter     Followed by Dr. Jonathan Pace    Encounter for blood transfusion     Hypertension     Insomnia      Past Surgical History:   Procedure Laterality Date    HEART CATH-LEFT Left 1/22/2016    Performed by [facility]deep [facility] at [facility]ATH    HYSTERECTOMY      partial, benign causes by reported hx       Current Medications:   Current Outpatient Medications:     amiodarone (PACERONE) 200 MG Tab, once daily. , Disp: , Rfl:     ascorbic acid (VITAMIN C) 500 MG tablet, Take 500 mg by mouth once daily., Disp: , Rfl:     b complex vitamins capsule, Take 1 capsule by mouth once daily., Disp: , Rfl:     blood-glucose meter kit, by Other route. Use as instructed, Disp: , Rfl:     calcium-vitamin D3 500 mg(1,250mg) -200 unit per tablet, Take 1 tablet by mouth 2 (two) times daily with meals., Disp: , Rfl:     clopidogrel (PLAVIX) 75 mg tablet, TAKE ONE TABLET BY MOUTH ONCE DAILY, Disp: 30 tablet, Rfl: 11    escitalopram oxalate (LEXAPRO) 10 MG tablet, TAKE ONE TABLET BY MOUTH ONCE DAILY IN THE EVENING, Disp: 90 tablet, Rfl: 1    fish oil-omega-3 fatty acids 300-1,000 mg capsule, Take 2 g by mouth once daily., Disp: , Rfl:     insulin glargine (BASAGLAR  KWIKPEN) 100 unit/mL (3 mL) InPn pen, Inject 30 Units into the skin every evening., Disp: 15 mL, Rfl: 3    insulin syringe-needle U-100 (INSULIN SYRINGE) 1 mL 30 gauge x 5/16 Syrg, As directed., Disp: 100 each, Rfl: 3    insulin syringe-needle U-100 1 mL 31 gauge x 5/16 Syrg, USE AS DIRECTED, Disp: 100 each, Rfl: 2    levothyroxine (SYNTHROID) 75 MCG tablet, TAKE ONE TABLET BY MOUTH ONCE DAILY IN THE MORNING BEFORE BREAKFAST, Disp: 90 tablet, Rfl: 1    linagliptin (TRADJENTA) 5 mg Tab tablet, Take 1 tablet (5 mg total) by mouth once daily., Disp: 90 tablet, Rfl: 3    memantine (NAMENDA) 10 MG Tab, TAKE ONE TABLET BY MOUTH ONCE DAILY IN THE EVENING, Disp: 90 tablet, Rfl: 1    metoprolol tartrate (LOPRESSOR) 25 MG tablet, 25 mg 2 (two) times daily. , Disp: , Rfl:     multivitamin (ONE DAILY MULTIVITAMIN) per tablet, Take 1 tablet by mouth once daily., Disp: , Rfl:     ondansetron (ZOFRAN) 4 MG tablet, Take 1 tablet (4 mg total) by mouth every 6 (six) hours., Disp: 12 tablet, Rfl: 0    POLYETHYLENE GLYCOL 3350 (DULCOLAX BALANCE ORAL), Take 1 packet by mouth daily as needed (constipation). , Disp: , Rfl:     rivaroxaban (XARELTO) 15 mg Tab, Take 15 mg by mouth daily with dinner or evening meal., Disp: , Rfl:     rivastigmine (EXELON) 4.6 mg/24 hr PT24, APPLY 1 PATCH TOPICALLY ONCE DAILY, Disp: 30 patch, Rfl: 0    simvastatin (ZOCOR) 10 MG tablet, Take 10 mg by mouth every evening., Disp: , Rfl:     insulin aspart (NOVOLOG) 100 unit/mL injection, Inject 6 Units into the skin 3 (three) times daily before meals. (Patient taking differently: Inject 6 Units into the skin 3 (three) times daily before meals. Has been using only at lunch d/t lower BS), Disp: 10 mL, Rfl: 11    nitroGLYCERIN (NITROSTAT) 0.4 MG SL tablet, Place 1 tablet (0.4 mg total) under the tongue every 5 (five) minutes as needed for Chest pain., Disp: 30 tablet, Rfl: 0    Family History: family history was reviewed and is noncontributory  Social  "History:   Social History     Socioeconomic History    Marital status:      Spouse name: Not on file    Number of children: Not on file    Years of education: Not on file    Highest education level: Not on file   Social Needs    Financial resource strain: Not on file    Food insecurity - worry: Not on file    Food insecurity - inability: Not on file    Transportation needs - medical: Not on file    Transportation needs - non-medical: Not on file   Occupational History    Not on file   Tobacco Use    Smoking status: Never Smoker    Smokeless tobacco: Never Used   Substance and Sexual Activity    Alcohol use: No    Drug use: No    Sexual activity: No     Birth control/protection: Post-menopausal   Other Topics Concern    Not on file   Social History Narrative    Not on file       ROS:  Constitution: Negative for chills, fever, and sweats. Negative for unexplained weight loss.  HENT: Negative for headaches and blurry vision.   Cardiovascular: Negative for chest pain, irregular heartbeat, leg swelling and palpitations.   Respiratory: Negative for cough and shortness of breath.   Gastrointestinal: Negative for abdominal pain, heartburn, nausea and vomiting.   Genitourinary: Negative for bladder incontinence and dysuria.   Musculoskeletal: Negative for systemic arthritis, muscle weakness and myalgias.   Neurological: Negative for numbness.   Psychiatric/Behavioral: Negative for depression.  Endocrine: Negative for polyuria.   Hematologic/Lymphatic: Negative for bleeding disorders.   Skin: Negative for poor wound healing.        PHYSICAL EXAM:  Ht 5' 2" (1.575 m)   Wt 87.5 kg (193 lb)   LMP  (LMP Unknown) Comment: 50 years ago  BMI 35.30 kg/m²   Denice Sheth is a well developed, well nourished elderly female in no acute distress. Physical examination of the right knee evaluated the following:    Gait and Alignment  Inspection for ecchymosis, swelling, atrophy, or deformity  Inspection for " intra-articular and/or bursal effusions  Tenderness to palpation over the bony and soft tissue structures around the knee  Range of Motion and presence of extensor lag/contractures  Sensation and motor strength  Varus/valgus or anterior/posterior/rotatory instability  Flexion pinch and Diann's Tests  Patellar alignment/tracking/pain to palpation  Vascular exam to include skin temperature/color/capillary refill    Remarkable findings included:  Normal alignment. No effusion of the knee. Mild tenderness laterally. No tenderness medially. No instability to varus or valgus stress.  Lachman's and anterior drawer are negative. Flexion pinch equivocal.  Diann's test negative        IMAGING:   X-rays of the right knee are personally reviewed.  No acute fractures or dislocations are seen.  Moderate lateral compartment joint space narrowing and patellofemoral arthrosis identified.      ASSESSMENT:   Osteoarthrosis right knee    PLAN:  The nature of the diagnosis, using models and diagrams when appropriate, was explained to the patient and her daughter in detail.  Have explained that I believe her frequent falls are secondary to pain from the osteoarthrosis.  However, she states that she does not have much pain rest.  I have therefore recommended a hinged knee brace to stabilize the knee when ambulating.  She will continue use the walker.  Should she continue to remain symptomatic, she will return for consideration of cortisone injection.  Follow-up if not improving or worse      This note was dictated using voice recognition software. Please excuse any grammatical or typographical errors.

## 2018-09-20 NOTE — LETTER
September 20, 2018      Meredith Baird MD  [address]  [place] [address]           [facility]  [address] [facility] [address] [place] [address]  Phone: [phone]          Patient: Denice Sheth   MR Number: [id number]   YOB: 1934   Date of Visit: 9/20/2018       Dear Dr. Meredith Baird:    Thank you for referring Denice Sheth to me for evaluation. Attached you will find relevant portions of my assessment and plan of care.    If you have questions, please do not hesitate to call me. I look forward to following Denice Sheth along with you.    Sincerely,    Warren Manzano MD    Enclosure  CC:  No Recipients    If you would like to receive this communication electronically, please contact [email or web address] or [phone] to request more information on [facility] access.    For providers and/or their staff who would like to refer a patient to [facility], please contact us through our one-stop-shop provider referral line, [facility], at [phone].    If you feel you have received this communication in error or would no longer like to receive these types of communications, please e-mail [email or web address]

## 2018-09-25 ENCOUNTER — CLINICAL SUPPORT (OUTPATIENT)
Dept: REHABILITATION | Facility: HOSPITAL | Age: 83
End: 2018-09-25
Attending: FAMILY MEDICINE
Payer: MEDICARE

## 2018-09-25 DIAGNOSIS — M62.81 MUSCLE WEAKNESS: Primary | ICD-10-CM

## 2018-09-25 DIAGNOSIS — R26.2 DIFFICULTY WALKING: ICD-10-CM

## 2018-09-25 DIAGNOSIS — M25.662 STIFFNESS OF BOTH KNEES: ICD-10-CM

## 2018-09-25 DIAGNOSIS — M25.661 STIFFNESS OF BOTH KNEES: ICD-10-CM

## 2018-09-25 PROCEDURE — 97110 THERAPEUTIC EXERCISES: CPT | Mod: PN | Performed by: PHYSICAL THERAPIST

## 2018-09-25 PROCEDURE — G8978 MOBILITY CURRENT STATUS: HCPCS | Mod: CL,PN | Performed by: PHYSICAL THERAPIST

## 2018-09-25 PROCEDURE — G8979 MOBILITY GOAL STATUS: HCPCS | Mod: CJ,PN | Performed by: PHYSICAL THERAPIST

## 2018-09-25 PROCEDURE — 97162 PT EVAL MOD COMPLEX 30 MIN: CPT | Mod: PN | Performed by: PHYSICAL THERAPIST

## 2018-09-25 NOTE — PLAN OF CARE
"Name: Denice Sheth  Physician: Meredith Baird MD  Date of Eval: 9/25/2018  Orders: Physical therapy eval and treat  Clinic: [id number]  Diagnosis: Muscle weakness, stiffness in both knees, difficulty walking    Precautions: fall risk  Evaluation date: 9/25/2018  Visit # authorized: 1/12  Authorization period: 11-18-18  Plan of care expiration: 11-  MD follow up appt: none scheduled    SUBJECTIVE  Chief complaint: Pt reports R knee "doesn't do what she wants it to do" and often feels weak and mariella when she stands or walks.  Onset of pain: No pain; weakness and participation limitations began February 2018.  Mechanism of onset: Pt noticed R knee problems 5 months after breaking her R 5th metatarsal in October 2017. Pt wore a boot for 5 months and did home health PT.  Aggravating factors: standing with a walker, walking with a walker  Easing factors: rest, using a wheelchair  Sleep is not disturbed. Sleeping position: on either side  Previous functional limitations includes: none, independent, very active  Current functional limitations: difficulty walking and standing and performing ADLs that involve walking or standing    Patients's structured exercise routine: Sit and Fit exercise class at the [facility] 3x/week  Exercise routine prior to onset: recumbent biking    Occupation: Pt is retired.    Allergies:  Review of patient's allergies indicates  Allergen  Reactions  · Adhesive  Itching    Medical history:  Past medical history:  Diagnosis    Date  · A-fib  · Anticoagulant long term use  · CHF  · Diabetes mellitus  · Disorder of kidney and ureter  · Followed by Dr. Jonathan Pace  · Encounter for blood transfusion  · Hypertension  · Insomnia   · Patient reported R 5th metatarsal fx and was treated with CAM boot, no surgery    Medication:          Current Outpatient Medications on File Prior to Visit   Medication Sig Dispense Refill    amiodarone (PACERONE) 200 MG Tab once daily.         ascorbic acid " (VITAMIN C) 500 MG tablet Take 500 mg by mouth once daily.        b complex vitamins capsule Take 1 capsule by mouth once daily.        blood-glucose meter kit by Other route. Use as instructed        calcium-vitamin D3 500 mg(1,250mg) -200 unit per tablet Take 1 tablet by mouth 2 (two) times daily with meals.        clopidogrel (PLAVIX) 75 mg tablet TAKE ONE TABLET BY MOUTH ONCE DAILY 30 tablet 11    escitalopram oxalate (LEXAPRO) 10 MG tablet TAKE ONE TABLET BY MOUTH ONCE DAILY IN THE EVENING 90 tablet 1    fish oil-omega-3 fatty acids 300-1,000 mg capsule Take 2 g by mouth once daily.        insulin aspart (NOVOLOG) 100 unit/mL injection Inject 6 Units into the skin 3 (three) times daily before meals. (Patient taking differently: Inject 6 Units into the skin 3 (three) times daily before meals. Has been using only at lunch d/t lower BS) 10 mL 11    insulin glargine (BASAGLAR KWIKPEN) 100 unit/mL (3 mL) InPn pen Inject 30 Units into the skin every evening. 15 mL 3    insulin syringe-needle U-100 (INSULIN SYRINGE) 1 mL 30 gauge x 5/16 Syrg As directed. 100 each 3    insulin syringe-needle U-100 1 mL 31 gauge x 5/16 Syrg USE AS DIRECTED 100 each 2    levothyroxine (SYNTHROID) 75 MCG tablet TAKE ONE TABLET BY MOUTH ONCE DAILY IN THE MORNING BEFORE BREAKFAST 90 tablet 1    linagliptin (TRADJENTA) 5 mg Tab tablet Take 1 tablet (5 mg total) by mouth once daily. 90 tablet 3    memantine (NAMENDA) 10 MG Tab TAKE ONE TABLET BY MOUTH ONCE DAILY IN THE EVENING 90 tablet 1    metoprolol tartrate (LOPRESSOR) 25 MG tablet 25 mg 2 (two) times daily.         multivitamin (ONE DAILY MULTIVITAMIN) per tablet Take 1 tablet by         nitroGLYCERIN (NITROSTAT) 0.4 MG SL tablet Place 1 tablet (0.4 mg total) under the tongue every 5 (five) minutes as needed for Chest pain. 30 tablet 0    ondansetron (ZOFRAN) 4 MG tablet Take 1 tablet (4 mg total) by mouth every 6 (six) hours. 12 tablet 0    POLYETHYLENE GLYCOL 3350  (DULCOLAX BALANCE ORAL) Take 1 packet by mouth daily as needed (constipation).         rivaroxaban (XARELTO) 15 mg Tab Take 15 mg by mouth daily with dinner or evening meal.        rivastigmine (EXELON) 4.6 mg/24 hr PT24 APPLY 1 PATCH TOPICALLY ONCE DAILY 30 patch 0    simvastatin (ZOCOR) 10 MG tablet Take 10 mg by mouth every evening.       Xray: Findings--The bones are osteopenic.  There is tricompartmental osteophyte formation in both knees.  There is mild-moderate left and mild right lateral tibiofemoral joint space narrowing with AP and PA flexion positioning of the knees.  There is mild right knee medial tibiofemoral joint space narrowing with PA flexion positioning of the knees.  No erosions.  No chondrocalcinosis.  There is a loose body present within the posterior aspect of the right knee joint.  There is a moderate-large right knee joint effusion.  No patellofemoral joint space narrowing, patellar tilt, or patellar subluxation.  No radiographically evident acute fracture, dislocation, or osseous destructive process.      Pain level with 0 being the lowest and 10 being the highest presently: 0/10.  Pain level with 0 being the lowest and 10 being the highest at worst: 0/10.  Pain level with 0 being the lowest and 10 being the highest at best: 0/10.    Patient goals: to be able to stand and walk without knee buckling.    OBJECTIVE  Postural examination in standing: leaning forward heavily on walker, B knee in flexion  -forward head  -forward shoulders    Postural examination in sitting:  -forward shoulders    Functional assessment:  -walking/gait: leaning forward heavily on walker, B crouched knee gait, B decreased foot clearance  -sit to stand: reaches for and leans on walker, uses arms to pull herself up  -sit to supine: Pt leans backwards into long sit and swivels legs onto tx table before lowering her head, slowly and with effort  -supine to sit: Pt performs log roll technique slowly and with effort,  has difficulty extending arms  -supine to prone: did not perform    Knee Girth:  R knee was grossly swollen with moderate swelling of whole RLE.    Knee ROM: (measured in degrees)  Knee   R L  Flexion   120 121  Extension  -9 -15    Muscle Strength:  MMT   R L  Hip flexion  3+/5 4-/5  Hip abduction  3+/5 3/5 Tested in supine  Hip extension  NT/5 NT/5  Glut max  NT/5 NT/5  Bridge-patient performed 5% bridge  Hip adduction  4/5 4/5  Tested in supine  Knee extension 4-/5 4/5  Knee flexion  4+/5 4+/5  Ankle dorsiflexion 4/5 4/5  Ankle plantar flexion 4-/5 4-/5  Ankle inversion NT/5 NT/5  Ankle eversion  NT/5 NT/5    Endurance is poor.    Palpation: no TTP    Joint mobility: moderate decreased R patella mobility with lateral and inferior glides    Sensation: intact    Outcome measures:  Impairment function: mobility  FOTO knee disability index score: 10  PT reviewed FOTO scores for Denice Sheth on 9/25/2018.  FOTO scores were entered into Epic - see media section.     G code current: CL at least 60% but less than 80%   G code goal: CJ at least 20 but less than 40%   Severity modifier selections based on the FOTO scoring, objective findings, and co-morbidity assessment.    TREATMENT:  Therapeutic exercise: Pt received therapeutic exercises to develop strength, stabilization and endurance; flexibility and range of motion for 30 minutes including: see HEP sheet  Quad set 3 sec hold 10x each side  SLR 1 sec hold 10x each side  B Glut set supine 3 sec hold 10x  Hip abduction slides supine 1 sec hold 10x each side (cue for toes pointing to ceiling)  Heel slides supine 3 sec hold 10x each side   Knee extension stretch propped    Pt. Education: Instructed pt regarding proper technique with all exercises. Pt to demonstrate good understanding of the education provided. Denice demonstrated good return demonstration of activities. Pt received postural education regarding standing up straight and not leaning on walker for patient  safety. Pt received transfer training regarding using hands to push off of walker and tx table before reaching out for walker for patient safety. Pt received gait training regarding standing up straight, not leaning on walker, placing walker closer to body, and slowing gait for patient safety. No cultural, environmental, or spiritual barriers identified to treatment or learning.    ASSESSMENT  This is an 82 yo female referred to outpatient PT and presents with medical diagnosis of knee pain and PT diagnosis/findings of muscle weakness, loss of knee ROM, and difficulty walking demonstrating limitations as described in the problem list. Patient was in agreement with set goals and plan of care. Pt was given an HEP consisting of posture reeducation, instruction on body mechanics, activity modification/avoidance, and core/LE strengthening regimen. Pt verbally understood instructions and demonstrated proper form/technique. Pt was advised to perform these exercises free of pain, and discontinue use if symptoms persist/ worsen. Pt will benefit from PT services in order to maximize pain free functional independence. Rehab potential is good.    History   Comorbidities: A-fib, CHF, previous R 5th metatarsal fx   Personal factors: advanced age  Examination   Body Regions: LE, trunk   Body Systems: ROM, strength, gait, balance   Participation Restrictions: unable to stand for short durations with walker and walk short distances with walker   Activity limitations: unable to participate in standing exercise classes and recumbent bike   Learning and applying knowledge: no limitation   General tasks and commands: no limitation   Communication: no limitation   Mobility: walking   Self care: no limitation   Domestic life: no limitation; daughter provides care   Interactions/relationships: daughter lives in home   Life areas: no limitation   Community and social life: no limitation    Medical necessity is demonstrated by the following  IMPAIRMENTS/PROBLEM LIST  Decreased range of motion  Decreased strength  Gait impairments  ADL and household activities are limited by weakness  Functional impairment rating of CL for mobility  Limitations with prolonged standing    GOALS  Short term goals (4 weeks)  Pt will increase B knee extension to = or < 5 degrees for standing and gait.  Pt will increase B quadriceps and B hip abductors strength by 1/2 muscle grade for standing and gait.  Pt will be able to perform HEP with minimal cueing and supervision required for increased independence.  Pt will be able to ambulate while demonstrating safe use of walker with CGA and moderate verbal cues.  Pt will be able perform sit to stand transfer while demonstrating safe use of walker with CGA and moderate verbal cues.  Pt will improve functional impairment of mobility to CK at least 40% but less than 60%    Long term goals (8 weeks)  Pt will increase B knee extension to 0 degrees.  Pt will increase B quadriceps and B hip abductors strength by 1 muscle grade.  Pt will ambulate with walker without crouched gait.  Pt will be able to ambulate while demonstrating safe use of walker with SBA.  Pt will be able perform sit to stand transfer while demonstrating safe use of walker with SBA.  Pt will be able to stand with walker without knee buckling in the home and community.   Pt will improve functional impairment of mobility to CJ at least 20% but less than 40%.    PLAN  Pt will be treated by PT 1-3 times a week for 8 weeks to include: Therapeutic exercises to increase ROM, strength and stabilization; joint and soft tissue mobilization with manual therapy techniques to decreased muscle tightness, pain and improve joint mobility; neuromuscular re-education to improve balance, coordination, kinesthetic sense and proprioception, therapeutic activities to improve coordination, strength and function, therapeutic taping to decrease pain, provide support, and improve function of the  LE(s); modalities such as moist heat, ice, ultrasound and electrical stimulation to increase circulation, decrease pain and inflammation; temporary orthotics will be considered and utilized as needed to further decrease pain in WB. Pt may be seen by PTA to carry out plan of care as part of the rehab team.    I certify the need for these services furnished under this plan of treatment and while under my care.    ____________________________________ Physician/Referring Practitioner                          Date of Signature

## 2018-09-25 NOTE — PROGRESS NOTES
PHYSICAL THERAPY INITIAL EVALUATION    Name:Denice Sheth  Physician:Meredith Baird MD  Date of eval:9/25/2018  Orders:  Physical Therapy evaluate and treat***  Clinic: [id number]  Diagnosis:No diagnosis found.    Precautions: ***  Evaluation date: 9/25/2018  Visit # authorized: 1/12  Authorization period: 11-18-18  Plan of care expiration: ***  MD Follow up appt: none scheduled     Subjective     Chief complaint: knee buckling, tripping over feet, knee doesn't do what she wants to do  Onset of pain : No pain 0/10  Mechanism of onset :  Pt noticed knee problems February 2018. Pt has broken R 5th metatarsal last october, was in a boot for 5 months Broke foot last oct, 5 months boot, did therapy home health  Aggravating factors: ***  Easing factors: ***  Sleep is not disturbed. Sleeping position: on one of her sides  Previous functional limitations includes:***  Current functional limitations: ***    Patients structured exercise routine: sit and fit  Exercise routine prior to onset: ***    Occupation: Pt works as a *** and job related duties include ***.    Allergies:    Review of patient's allergies indicates:   Allergen Reactions    Adhesive Itching       Medical history:   Past Medical History:   Diagnosis Date    A-fib     Anticoagulant long-term use     CHF (congestive heart failure)     Diabetes mellitus     Disorder of kidney and ureter     Followed by Dr. Jonathan Pace    Encounter for blood transfusion     Hypertension     Insomnia        Medication:   Current Outpatient Medications on File Prior to Visit   Medication Sig Dispense Refill    amiodarone (PACERONE) 200 MG Tab once daily.       ascorbic acid (VITAMIN C) 500 MG tablet Take 500 mg by mouth once daily.      b complex vitamins capsule Take 1 capsule by mouth once daily.      blood-glucose meter kit by Other route. Use as instructed      calcium-vitamin D3 500 mg(1,250mg) -200 unit per tablet Take 1 tablet by mouth 2 (two) times daily  with meals.      clopidogrel (PLAVIX) 75 mg tablet TAKE ONE TABLET BY MOUTH ONCE DAILY 30 tablet 11    escitalopram oxalate (LEXAPRO) 10 MG tablet TAKE ONE TABLET BY MOUTH ONCE DAILY IN THE EVENING 90 tablet 1    fish oil-omega-3 fatty acids 300-1,000 mg capsule Take 2 g by mouth once daily.      insulin aspart (NOVOLOG) 100 unit/mL injection Inject 6 Units into the skin 3 (three) times daily before meals. (Patient taking differently: Inject 6 Units into the skin 3 (three) times daily before meals. Has been using only at lunch d/t lower BS) 10 mL 11    insulin glargine (BASAGLAR KWIKPEN) 100 unit/mL (3 mL) InPn pen Inject 30 Units into the skin every evening. 15 mL 3    insulin syringe-needle U-100 (INSULIN SYRINGE) 1 mL 30 gauge x 5/16 Syrg As directed. 100 each 3    insulin syringe-needle U-100 1 mL 31 gauge x 5/16 Syrg USE AS DIRECTED 100 each 2    levothyroxine (SYNTHROID) 75 MCG tablet TAKE ONE TABLET BY MOUTH ONCE DAILY IN THE MORNING BEFORE BREAKFAST 90 tablet 1    linagliptin (TRADJENTA) 5 mg Tab tablet Take 1 tablet (5 mg total) by mouth once daily. 90 tablet 3    memantine (NAMENDA) 10 MG Tab TAKE ONE TABLET BY MOUTH ONCE DAILY IN THE EVENING 90 tablet 1    metoprolol tartrate (LOPRESSOR) 25 MG tablet 25 mg 2 (two) times daily.       multivitamin (ONE DAILY MULTIVITAMIN) per tablet Take 1 tablet by mouth once daily.      nitroGLYCERIN (NITROSTAT) 0.4 MG SL tablet Place 1 tablet (0.4 mg total) under the tongue every 5 (five) minutes as needed for Chest pain. 30 tablet 0    ondansetron (ZOFRAN) 4 MG tablet Take 1 tablet (4 mg total) by mouth every 6 (six) hours. 12 tablet 0    POLYETHYLENE GLYCOL 3350 (DULCOLAX BALANCE ORAL) Take 1 packet by mouth daily as needed (constipation).       rivaroxaban (XARELTO) 15 mg Tab Take 15 mg by mouth daily with dinner or evening meal.      rivastigmine (EXELON) 4.6 mg/24 hr PT24 APPLY 1 PATCH TOPICALLY ONCE DAILY 30 patch 0    simvastatin (ZOCOR) 10 MG  "tablet Take 10 mg by mouth every evening.       No current facility-administered medications on file prior to visit.        Diagnostic tests: ***  MRI: ***           Xray: ***    Pain level with 0 being the lowest and 10 being the highest presently: ***  Pain level with 0 being the lowest and 10 being the highest at worst: ***  Pain level with 0 being the lowest and 10 being the highest at best: ***     Patient Goals: "***"    Objective     Postural examination in standing:  - forward head  - forward shoulders  - *** hip high  -*** shoulder high  - genu valgus/varus***    Postural examination in sitting:   - forward head  - forward shoulders  - knee positioned ***      Functional assessment:   - walking/gait:***  - sit to stand: ***  - sit to supine: ***       - supine to sit: ***  - supine to prone: ***     Knee Girth: (measured in centimeters)   Knee RLE LLE   Mid joint *** ***   Suprapatella *** ***   Infrapatella *** ***   Mid thigh *** ***   Mid calf *** ***       Knee  ROM: (measured in degrees)   Knee (R) (L)   Flexion 120 121   Extension -9 -15     Ankle ROM: (measured in degrees)   Ankle RLE LLE   Dorsiflexion with knees straight *** ***   Dorsiflexion with knees bent *** ***   Plantarflexion *** ***   Inversion *** ***   Eversion *** ***   Great toe flexion *** ***   Great toe extension *** ***     Flexibility testing:  - hamstrings:     90/90 test R *** L ***           - gastrocnemius:   DF ankle R *** degrees L *** degrees  - piriformis:                  - quadriceps:                 - hip flexors:  - hip adductors:  - IT Bands:       Muscle Strength  MMT R L   Hip flexion ***/5 ***/5   Hip abduction ***/5 ***/5   Hip extension ***/5 ***/5   Glut max ***/5 ***/5   Hip adduction ***/5 ***/5   Knee extension ***/5 ***/5   Knee flexion ***/5 ***/5   Ankle dorsiflexion ***/5 ***/5   Ankle plantar flexion ***/5 ***/5   Ankle inversion ***/5 ***/5   Ankle eversion ***/5 ***/5     Endurance is ***excellent / " good / fair / poor.    Special tests: ***    Palpation: ***    Joint mobility: ***    Sensation: {INTACT:45704}  Reflexes: ***  Other: ***    Outcome measures:    Impairment function:  Mobility  FOTO knee disability index score: ***  PT reviewed FOTO scores for Denice Sheth on 09/25/2018.   FOTO scores were entered into "[employer]" - see media section.    · G-code current: ***  · G-code goal: ***  Severity modifier selections based on the FOTO scoring, objective findings, and co-morbidity assessment    TREATMENT:  Therapeutic exercise: Denice received therapeutic exercises to develop strength, stabilization and endurance; flexibility and range of motion for *** minutes including:see HEP sheet. ***    Manual therapy: Denice  received the following manual therapy techniques to improve joint and soft tissue mobility, decrease muscle tightness and pain for *** min. to include soft tissue and joint mobilization were applied to the: *** to include: ***     Neuromuscular re-education: Patient participated in neuromuscular re-education activities to improve: {AMB PT PROGRESS NEURO RE-ED:27266} for *** minutes. The following activities were included: ***    Modalities: Pt. received cold pack x 10 min. to *** following treatment.    Pt. Education: Instructed pt. regarding:proper technique with all exercises. Pt. to demonstrate good understanding of the education provided. Denice demonstrated good return demonstration of activities. No cultural, environmental, or spiritual barriers identified to treatment or learning.  Assessment   This is a 83 y.o. female referred to outpatient physical therapy and presents with a medical diagnosis of *** and PT diagnosis/findings of *** demonstrating limitations as described in the problem list. Patient was in agreement with set goals and plan of care. Pt was given a HEP consisting of posture reeducation, instruction on body mechanics, activity modification/avoidance, and core/LE  strengthening regimen.*** Pt. verbally understood instructions and demonstrated proper form/technique. Pt was advised to perform these exercises free of pain, and discontinue use if symptoms persist/worsen. Pt will benefit from physical therapy services in order to maximize pain free functional independence. Rehab potential is good    History  Co-morbidities and personal factors that may impact the plan of care Examination  Body Structures and Functions, activity limitations and participation restrictions that may impact the plan of care    Clinical Presentation   Co-morbidities:   {Co-morbidities:86105}        Personal Factors:   {Personal Factors:36724} Body Regions:   {Body Regions:07652}    Body Systems:    {Body Systems:86523}            Participation Restrictions:   ***     Activity limitations:   Learning and applying knowledge  {Learning and applying knowledge:91364}    General Tasks and Commands  {Gen tasks and commands:38394}    Communication  {Communication:77855}    Mobility  {Mobility:56580}    Self care  {Self Care:16236}    Domestic Life  {Domestic Life:91500}    Interactions/Relationships  {Interactions/Relationships:05757}    Life Areas  {Life Areas:18559}    Community and Social Life  {Community/Social Life:80787}         {Clinical Presentation :68703}                      {Desc; low/moderate/high:594172}   {Desc; low/moderate/high:877003}  {Desc; low/moderate/high:455054} Decision Making/ Complexity Score:  {Desc; low/moderate/high:092824}     Medical necessity is demonstrated by the following IMPAIRMENTS/PROBLEM LIST:  Decreased range of motion  Decreased strength  Increased pain with walking  Antalgic gait  Increased pain with prolonged standing  Increased pain with sit to stand transfer  Increased pain and limited with climbing stairs  Disturbed sleep  ADL and household activities lead to increased pain and are limited  Functional impairment rating of: ***    GOALS:   Short Term Goals:  ***  weeks  Increase range of motion 25%  Increase strength 1/2 muscle grade  Be able to perform HEP with minimal cueing required  Improve functional impairment of mobility to ***    Long Term Goals: *** weeks  Increase range of motion to 75% to 100% full   Improve muscle strength 1 muscle grade  Improve muscle strength with MMT to 4+/5 to 5/5  Restore ability to ambulate with normal pain free gait  Walking for ADL and exercise will be restored without increased pain  Restore ability to stand for ADL without increased pain  Restore ability to perform sit to stand transfer without increased pain  Restore ability to climb stairs in a reciprocal manner with min to 0 increased pain and/or difficulty  Restore normal sleep habits without disturbances due to pain  Restore ability to perform ADL's and household activities independently and without increased pain  Improve functional impairment of mobility to ***    Plan     Pt will be treated by physical therapy 1-3 times a week for *** weeks to include: Therapeutic exercises to increase ROM, strength and stabilization; joint and soft tissue mobilization with manual therapy techniques to decrease muscle tightness, pain and improve joint mobility; neuromuscular re-education to improve balance, coordination, kinesthetic sense and proprioception, therapeutic activities to improve coordination, strength and function, therapeutic taping to decrease pain, provide support and improve function of the LE(s); modalities such as moist heat, ice, ultrasound and electrical stimulation to increase circulation, decrease pain and inflammation; dry needling with manual therapy techniques to decrease pain, inflammation and swelling, increase circulation and promote healing process will be considered and utilized as needed; temporary orthotics will be considered and utilized as needed to further decrease pain in WB. Pt may be seen by PTA to carry out plan of care as part of the Rehab team.    I  certify the need for these services furnished under this plan of treatment and while under my care.    ____________________________________ Physician/Referring Practitioner                                Date of Signature

## 2018-09-25 NOTE — PROGRESS NOTES
"Name: Denice Sheth  Physician: Meredith Baird MD  Date of Eval: 9/25/2018  Orders: Physical therapy eval and treat  Clinic: [id number]  Diagnosis: Muscle weakness, stiffness in both knees, difficulty walking    Precautions: fall risk  Evaluation date: 9/25/2018  Visit # authorized: 1/12  Authorization period: 11-18-18  Plan of care expiration: 11-  MD follow up appt: none scheduled    SUBJECTIVE  Chief complaint: Pt reports R knee "doesn't do what she wants it to do" and often feels weak and mariella when she stands or walks.  Onset of pain: No pain; weakness and participation limitations began February 2018.  Mechanism of onset: Pt noticed R knee problems 5 months after breaking her R 5th metatarsal in October 2017. Pt wore a boot for 5 months and did home health PT.  Aggravating factors: standing with a walker, walking with a walker  Easing factors: rest, using a wheelchair  Sleep is not disturbed. Sleeping position: on either side  Previous functional limitations includes: none, independent, very active  Current functional limitations: difficulty walking and standing and performing ADLs that involve walking or standing    Patients's structured exercise routine: Sit and Fit exercise class at the [facility] 3x/week  Exercise routine prior to onset: recumbent biking    Occupation: Pt is retired.    Allergies:  Review of patient's allergies indicates  Allergen  Reactions  · Adhesive  Itching    Medical history:  Past medical history:  Diagnosis    Date  · A-fib  · Anticoagulant long term use  · CHF  · Diabetes mellitus  · Disorder of kidney and ureter  · Followed by Dr. Jonathan Pace  · Encounter for blood transfusion  · Hypertension  · Insomnia   · Patient reported R 5th metatarsal fx and was treated with CAM boot, no surgery    Medication:          Current Outpatient Medications on File Prior to Visit   Medication Sig Dispense Refill    amiodarone (PACERONE) 200 MG Tab once daily.         ascorbic acid " (VITAMIN C) 500 MG tablet Take 500 mg by mouth once daily.        b complex vitamins capsule Take 1 capsule by mouth once daily.        blood-glucose meter kit by Other route. Use as instructed        calcium-vitamin D3 500 mg(1,250mg) -200 unit per tablet Take 1 tablet by mouth 2 (two) times daily with meals.        clopidogrel (PLAVIX) 75 mg tablet TAKE ONE TABLET BY MOUTH ONCE DAILY 30 tablet 11    escitalopram oxalate (LEXAPRO) 10 MG tablet TAKE ONE TABLET BY MOUTH ONCE DAILY IN THE EVENING 90 tablet 1    fish oil-omega-3 fatty acids 300-1,000 mg capsule Take 2 g by mouth once daily.        insulin aspart (NOVOLOG) 100 unit/mL injection Inject 6 Units into the skin 3 (three) times daily before meals. (Patient taking differently: Inject 6 Units into the skin 3 (three) times daily before meals. Has been using only at lunch d/t lower BS) 10 mL 11    insulin glargine (BASAGLAR KWIKPEN) 100 unit/mL (3 mL) InPn pen Inject 30 Units into the skin every evening. 15 mL 3    insulin syringe-needle U-100 (INSULIN SYRINGE) 1 mL 30 gauge x 5/16 Syrg As directed. 100 each 3    insulin syringe-needle U-100 1 mL 31 gauge x 5/16 Syrg USE AS DIRECTED 100 each 2    levothyroxine (SYNTHROID) 75 MCG tablet TAKE ONE TABLET BY MOUTH ONCE DAILY IN THE MORNING BEFORE BREAKFAST 90 tablet 1    linagliptin (TRADJENTA) 5 mg Tab tablet Take 1 tablet (5 mg total) by mouth once daily. 90 tablet 3    memantine (NAMENDA) 10 MG Tab TAKE ONE TABLET BY MOUTH ONCE DAILY IN THE EVENING 90 tablet 1    metoprolol tartrate (LOPRESSOR) 25 MG tablet 25 mg 2 (two) times daily.         multivitamin (ONE DAILY MULTIVITAMIN) per tablet Take 1 tablet by         nitroGLYCERIN (NITROSTAT) 0.4 MG SL tablet Place 1 tablet (0.4 mg total) under the tongue every 5 (five) minutes as needed for Chest pain. 30 tablet 0    ondansetron (ZOFRAN) 4 MG tablet Take 1 tablet (4 mg total) by mouth every 6 (six) hours. 12 tablet 0    POLYETHYLENE GLYCOL 3350  (DULCOLAX BALANCE ORAL) Take 1 packet by mouth daily as needed (constipation).         rivaroxaban (XARELTO) 15 mg Tab Take 15 mg by mouth daily with dinner or evening meal.        rivastigmine (EXELON) 4.6 mg/24 hr PT24 APPLY 1 PATCH TOPICALLY ONCE DAILY 30 patch 0    simvastatin (ZOCOR) 10 MG tablet Take 10 mg by mouth every evening.       Xray: Findings--The bones are osteopenic.  There is tricompartmental osteophyte formation in both knees.  There is mild-moderate left and mild right lateral tibiofemoral joint space narrowing with AP and PA flexion positioning of the knees.  There is mild right knee medial tibiofemoral joint space narrowing with PA flexion positioning of the knees.  No erosions.  No chondrocalcinosis.  There is a loose body present within the posterior aspect of the right knee joint.  There is a moderate-large right knee joint effusion.  No patellofemoral joint space narrowing, patellar tilt, or patellar subluxation.  No radiographically evident acute fracture, dislocation, or osseous destructive process.      Pain level with 0 being the lowest and 10 being the highest presently: 0/10.  Pain level with 0 being the lowest and 10 being the highest at worst: 0/10.  Pain level with 0 being the lowest and 10 being the highest at best: 0/10.    Patient goals: to be able to stand and walk without knee buckling.    OBJECTIVE  Postural examination in standing: leaning forward heavily on walker, B knee in flexion  -forward head  -forward shoulders    Postural examination in sitting:  -forward shoulders    Functional assessment:  -walking/gait: leaning forward heavily on walker, B crouched knee gait, B decreased foot clearance  -sit to stand: reaches for and leans on walker, uses arms to pull herself up  -sit to supine: Pt leans backwards into long sit and swivels legs onto tx table before lowering her head, slowly and with effort  -supine to sit: Pt performs log roll technique slowly and with effort,  has difficulty extending arms  -supine to prone: did not perform    Knee Girth:  R knee was grossly swollen with moderate swelling of whole RLE.    Knee ROM: (measured in degrees)  Knee   R L  Flexion   120 121  Extension  -9 -15    Muscle Strength:  MMT   R L  Hip flexion  3+/5 4-/5  Hip abduction  3+/5 3/5 Tested in supine  Hip extension  NT/5 NT/5  Glut max  NT/5 NT/5  Bridge-patient performed 5% bridge  Hip adduction  4/5 4/5  Tested in supine  Knee extension 4-/5 4/5  Knee flexion  4+/5 4+/5  Ankle dorsiflexion 4/5 4/5  Ankle plantar flexion 4-/5 4-/5  Ankle inversion NT/5 NT/5  Ankle eversion  NT/5 NT/5    Endurance is poor.    Palpation: no TTP    Joint mobility: moderate decreased R patella mobility with lateral and inferior glides    Sensation: intact    Outcome measures:  Impairment function: mobility  FOTO knee disability index score: 10  PT reviewed FOTO scores for Denice Sheth on 9/25/2018.  FOTO scores were entered into Epic - see media section.     G code current: CL at least 60% but less than 80%   G code goal: CJ at least 20 but less than 40%   Severity modifier selections based on the FOTO scoring, objective findings, and co-morbidity assessment.    TREATMENT:  Therapeutic exercise: Pt received therapeutic exercises to develop strength, stabilization and endurance; flexibility and range of motion for 20 minutes including: see HEP sheet  Quad set 3 sec hold 10x each side  SLR 1 sec hold 10x each side  B Glut set supine 3 sec hold 10x  Hip abduction slides supine 1 sec hold 10x each side (cue for toes pointing to ceiling)  Heel slides supine 3 sec hold 10x each side   Knee extension stretch propped    Pt. Education: Instructed pt regarding proper technique with all exercises. Pt to demonstrate good understanding of the education provided. Denice demonstrated good return demonstration of activities. Pt received postural education regarding standing up straight and not leaning on walker for patient  safety. Pt received transfer training regarding using hands to push off of walker and tx table before reaching out for walker for patient safety. Pt received gait training regarding standing up straight, not leaning on walker, placing walker closer to body, and slowing gait for patient safety. No cultural, environmental, or spiritual barriers identified to treatment or learning.    ASSESSMENT  This is an 82 yo female referred to outpatient PT and presents with medical diagnosis of knee pain and PT diagnosis/findings of muscle weakness, loss of knee ROM, and difficulty walking demonstrating limitations as described in the problem list. Patient was in agreement with set goals and plan of care. Pt was given an HEP consisting of posture reeducation, instruction on body mechanics, activity modification/avoidance, and core/LE strengthening regimen. Pt verbally understood instructions and demonstrated proper form/technique. Pt was advised to perform these exercises free of pain, and discontinue use if symptoms persist/ worsen. Pt will benefit from PT services in order to maximize pain free functional independence. Rehab potential is good.    History   Comorbidities: A-fib, CHF, previous R 5th metatarsal fx   Personal factors: advanced age  Examination   Body Regions: LE, trunk   Body Systems: ROM, strength, gait, balance   Participation Restrictions: unable to stand for short durations with walker and walk short distances with walker   Activity limitations: unable to participate in standing exercise classes and recumbent bike   Learning and applying knowledge: no limitation   General tasks and commands: no limitation   Communication: no limitation   Mobility: walking   Self care: no limitation   Domestic life: no limitation; daughter provides care   Interactions/relationships: daughter lives in home   Life areas: no limitation   Community and social life: no limitation  Clinical presentation   Evolving clinical  presentation with changing clinical characteristics  Decision making/complexity score: moderate    Medical necessity is demonstrated by the following IMPAIRMENTS/PROBLEM LIST  Decreased range of motion  Decreased strength  Gait impairments  ADL and household activities are limited by weakness  Functional impairment rating of CL for mobility  Limitations with prolonged standing    GOALS  Short term goals (4 weeks)  Pt will increase B knee extension to = or < 5 degrees for standing and gait.  Pt will increase B quadriceps and B hip abductors strength by 1/2 muscle grade for standing and gait.  Pt will be able to perform HEP with minimal cueing and supervision required for increased independence.  Pt will be able to ambulate while demonstrating safe use of walker with CGA and moderate verbal cues.  Pt will be able perform sit to stand transfer while demonstrating safe use of walker with CGA and moderate verbal cues.  Pt will improve functional impairment of mobility to CK at least 40% but less than 60%    Long term goals (8 weeks)  Pt will increase B knee extension to 0 degrees.  Pt will increase B quadriceps and B hip abductors strength by 1 muscle grade.  Pt will ambulate with walker without crouched gait.  Pt will be able to ambulate while demonstrating safe use of walker with SBA.  Pt will be able perform sit to stand transfer while demonstrating safe use of walker with SBA.  Pt will be able to stand with walker without knee buckling in the home and community.   Pt will improve functional impairment of mobility to CJ at least 20% but less than 40%.    PLAN  Pt will be treated by PT 1-3 times a week for 8 weeks to include: Therapeutic exercises to increase ROM, strength and stabilization; joint and soft tissue mobilization with manual therapy techniques to decreased muscle tightness, pain and improve joint mobility; neuromuscular re-education to improve balance, coordination, kinesthetic sense and proprioception,  therapeutic activities to improve coordination, strength and function, therapeutic taping to decrease pain, provide support, and improve function of the LE(s); modalities such as moist heat, ice, ultrasound and electrical stimulation to increase circulation, decrease pain and inflammation; temporary orthotics will be considered and utilized as needed to further decrease pain in WB. Pt may be seen by PTA to carry out plan of care as part of the rehab team.    I certify the need for these services furnished under this plan of treatment and while under my care.    Meredith Baird MD   Physician/Referring Practitioner       9/28/18   Date of Signature

## 2018-09-27 ENCOUNTER — CLINICAL SUPPORT (OUTPATIENT)
Dept: REHABILITATION | Facility: HOSPITAL | Age: 83
End: 2018-09-27
Attending: FAMILY MEDICINE
Payer: MEDICARE

## 2018-09-27 DIAGNOSIS — M25.661 STIFFNESS OF BOTH KNEES: ICD-10-CM

## 2018-09-27 DIAGNOSIS — M25.662 STIFFNESS OF BOTH KNEES: ICD-10-CM

## 2018-09-27 DIAGNOSIS — R26.2 DIFFICULTY WALKING: ICD-10-CM

## 2018-09-27 DIAGNOSIS — M62.81 MUSCLE WEAKNESS: Primary | ICD-10-CM

## 2018-09-27 PROCEDURE — 97110 THERAPEUTIC EXERCISES: CPT | Mod: PN | Performed by: PHYSICAL THERAPIST

## 2018-09-27 NOTE — PROGRESS NOTES
"  Physical Therapy Daily Note     Name: Denice Sheth  Clinic Number: [id number]  Diagnosis: Muscle weakness, stiffness in both knees, difficulty walking   Physician: Meredith Baird MD  Treatment Orders: PT Eval and Treat  Past Medical History:   Diagnosis Date    A-fib     Anticoagulant long-term use     CHF (congestive heart failure)     Diabetes mellitus     Disorder of kidney and ureter     Followed by Dr. Jonathan Pace    Encounter for blood transfusion     Hypertension     Insomnia        Precautions: fall risk  Visit # authorized: 2/12 on 9/27/2018  Authorization period: 11-18-18  Plan of care expiration: 11-20-18  MD Follow up appt: none scheduled    Subjective     Pt reports: Denice is HEP compliant and performed her exercises in her bed. Pt reports she did not have any adverse effects to her HEP. Daughter Parisa did not attend the tx session.    Pain Scale: before treatment: 0/10 currently; after treatment: 0/10    Objective   R knee edema    TREATMENT  Therapeutic exercise: Denice received therapeutic exercises to develop strength, endurance, ROM, flexibility, posture and core stabilization for 25 direct minutes including:     Quad set 3 sec hold 10x each side  SLR 1 sec hold 10x each side (cue to tighten quad, then lift)  B Glut set supine 3 sec hold 10x   Bridges 10x  Hip abduction slides supine 1 sec hold 10x each side (cue for toes pointing to ceiling)  Heel slides supine 3 sec hold 10x each side   Knee extension stretch propped 2x30" each side   Supine clams 10x no band, 5x YTB   SAQ with large foam roll 10x each side     At next visit: standing marches with walker    Written Home Exercises Provided: none today. Pt will continue previous HEP.     At next visit--assign bridges and supine clams  Pt demo good understanding of the education provided. Denice demonstrated good return demonstration of activities.    Pt. education:  · Posture reeducation, body mechanics, HEP  · Pt received gait " education regarding increased foot clearance, upright posture, decreased speed, and keeping the walker close to the body.  · No spiritual or educational barriers to learning provided  · Pt has no cultural, educational or language barriers to learning provided.    Assessment   Pt tolerated tx session well and had 0/10 pain at end of session. Pt was able to progress and begin new LE strength challenges successfully. Pt continues to need vc and tc during gait training. She is able to understand the cues but falls back into old habits within a few steps. Pt will continue to benefit from skilled outpatient physical therapy to address the remaining functional deficits, provide pt/family education, and to maximize pt's level of independence in the home and community environment. .     Goals:   Short term goals (4 weeks)  Pt will increase B knee extension to = or < 5 degrees for standing and gait.  Pt will increase B quadriceps and B hip abductors strength by 1/2 muscle grade for standing and gait.  Pt will be able to perform HEP with minimal cueing and supervision required for increased independence.  Pt will be able to ambulate while demonstrating safe use of walker with CGA and moderate verbal cues.  Pt will be able perform sit to stand transfer while demonstrating safe use of walker with CGA and moderate verbal cues.  Pt will improve functional impairment of mobility to CK at least 40% but less than 60%    Long term goals (8 weeks)  Pt will increase B knee extension to 0 degrees.  Pt will increase B quadriceps and B hip abductors strength by 1 muscle grade.  Pt will ambulate with walker without crouched gait.  Pt will be able to ambulate while demonstrating safe use of walker with SBA.  Pt will be able perform sit to stand transfer while demonstrating safe use of walker with SBA.  Pt will be able to stand with walker without knee buckling in the home and community.   Pt will improve functional impairment of mobility to  CJ at least 20% but less than 40%.    Pt's spiritual, cultural and educational needs considered and pt agreeable to plan of care and goals.     Plan   Continue with established Plan of Care towards PT goals.

## 2018-10-02 ENCOUNTER — OFFICE VISIT (OUTPATIENT)
Dept: NEPHROLOGY | Facility: CLINIC | Age: 83
End: 2018-10-02
Payer: MEDICARE

## 2018-10-02 VITALS
WEIGHT: 194 LBS | DIASTOLIC BLOOD PRESSURE: 50 MMHG | SYSTOLIC BLOOD PRESSURE: 124 MMHG | HEART RATE: 72 BPM | HEIGHT: 62 IN | OXYGEN SATURATION: 96 % | BODY MASS INDEX: 35.7 KG/M2

## 2018-10-02 DIAGNOSIS — E11.29 PROTEINURIA DUE TO TYPE 2 DIABETES MELLITUS: ICD-10-CM

## 2018-10-02 DIAGNOSIS — E87.1 HYPONATREMIA: ICD-10-CM

## 2018-10-02 DIAGNOSIS — I12.9 BENIGN HYPERTENSIVE RENAL DISEASE WITHOUT RENAL FAILURE: ICD-10-CM

## 2018-10-02 DIAGNOSIS — R80.9 PROTEINURIA DUE TO TYPE 2 DIABETES MELLITUS: ICD-10-CM

## 2018-10-02 DIAGNOSIS — N18.30 CKD (CHRONIC KIDNEY DISEASE) STAGE 3, GFR 30-59 ML/MIN: Primary | ICD-10-CM

## 2018-10-02 DIAGNOSIS — E11.21 DIABETIC NEPHROPATHY ASSOCIATED WITH TYPE 2 DIABETES MELLITUS: ICD-10-CM

## 2018-10-02 PROCEDURE — 99213 OFFICE O/P EST LOW 20 MIN: CPT | Mod: PBBFAC,PO | Performed by: INTERNAL MEDICINE

## 2018-10-02 PROCEDURE — 99999 PR PBB SHADOW E&M-EST. PATIENT-LVL III: CPT | Mod: PBBFAC,,, | Performed by: INTERNAL MEDICINE

## 2018-10-02 PROCEDURE — 3078F DIAST BP <80 MM HG: CPT | Mod: CPTII,,, | Performed by: INTERNAL MEDICINE

## 2018-10-02 PROCEDURE — 1101F PT FALLS ASSESS-DOCD LE1/YR: CPT | Mod: CPTII,,, | Performed by: INTERNAL MEDICINE

## 2018-10-02 PROCEDURE — 3074F SYST BP LT 130 MM HG: CPT | Mod: CPTII,,, | Performed by: INTERNAL MEDICINE

## 2018-10-02 PROCEDURE — 99214 OFFICE O/P EST MOD 30 MIN: CPT | Mod: S$PBB,,, | Performed by: INTERNAL MEDICINE

## 2018-10-02 RX ORDER — RIVASTIGMINE 4.6 MG/24H
PATCH, EXTENDED RELEASE TRANSDERMAL
Qty: 30 PATCH | Refills: 3 | Status: SHIPPED | OUTPATIENT
Start: 2018-10-02 | End: 2019-02-08 | Stop reason: SDUPTHER

## 2018-10-02 NOTE — PROGRESS NOTES
"Subjective:       Patient ID: Denice Sheth is a 83 y.o. White female who presents for return patient evaluation for chronic renal failure.    She has no uremic or urinary symptoms and is in her usual state of health.  She has chronic right knee pain.      Review of Systems   Constitutional: Negative for appetite change, chills and fever.   Eyes: Negative for visual disturbance.   Respiratory: Negative for cough and shortness of breath.    Cardiovascular: Negative for chest pain and leg swelling.   Gastrointestinal: Negative for abdominal pain, diarrhea, nausea and vomiting.   Genitourinary: Negative for difficulty urinating, dysuria and hematuria.   Musculoskeletal: Positive for arthralgias (right knee) and gait problem. Negative for myalgias.   Skin: Negative for rash.   Neurological: Positive for weakness. Negative for headaches.   Psychiatric/Behavioral: Negative for sleep disturbance.       The past medical, family and social histories were reviewed for this encounter.     BP (!) 124/50 (BP Location: Right arm, Patient Position: Sitting)   Pulse 72   Ht 5' 2" (1.575 m)   Wt 88 kg (194 lb)   LMP  (LMP Unknown) Comment: 50 years ago  SpO2 96%   BMI 35.48 kg/m²     Objective:      Physical Exam   Constitutional: She is oriented to person, place, and time. She appears well-developed and well-nourished. No distress.   HENT:   Head: Normocephalic and atraumatic.   Eyes: Conjunctivae are normal.   Neck: Neck supple. No JVD present.   Cardiovascular: Normal rate and regular rhythm. Exam reveals no gallop and no friction rub.   Murmur (2/6 nabila) heard.  Pulmonary/Chest: Effort normal and breath sounds normal. No respiratory distress. She has no wheezes. She has no rales.   Abdominal: Soft. Bowel sounds are normal. She exhibits no distension. There is no tenderness.   Musculoskeletal: She exhibits no edema (1+ BLE).   Neurological: She is alert and oriented to person, place, and time.   Skin: Skin is warm and dry. " No rash noted.   Psychiatric: She has a normal mood and affect.   Vitals reviewed.      Assessment:       1. CKD (chronic kidney disease) stage 3, GFR 30-59 ml/min    2. 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 Diabetic nephropathy associated with type 2 diabetes mellitus    4. 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 Hyponatremia        Plan:   Return to clinic in 6 months.  Labs for next visit include rp, pth, upc.  Baseline creatinine is 1.4-1.6 since 2015.  PTH is 34 with a calcium of 10.2.  UPC is 0.75.  Blood pressure is low on the current regimen thus stop the amlodipine.  Continue current medications as prescribed and reviewed.

## 2018-10-04 ENCOUNTER — CLINICAL SUPPORT (OUTPATIENT)
Dept: REHABILITATION | Facility: HOSPITAL | Age: 83
End: 2018-10-04
Attending: FAMILY MEDICINE
Payer: MEDICARE

## 2018-10-04 DIAGNOSIS — R26.2 DIFFICULTY WALKING: ICD-10-CM

## 2018-10-04 DIAGNOSIS — M25.662 STIFFNESS OF BOTH KNEES: ICD-10-CM

## 2018-10-04 DIAGNOSIS — M25.661 STIFFNESS OF BOTH KNEES: ICD-10-CM

## 2018-10-04 DIAGNOSIS — M62.81 MUSCLE WEAKNESS: Primary | ICD-10-CM

## 2018-10-04 PROCEDURE — 97110 THERAPEUTIC EXERCISES: CPT | Mod: PN

## 2018-10-04 NOTE — PROGRESS NOTES
"  Physical Therapy Daily Note     Name: Denice Sheth  Clinic Number: [id number]  Diagnosis: Muscle weakness, stiffness in both knees, difficulty walking   Physician: Meredith Baird MD  Treatment Orders: PT Eval and Treat  Past Medical History:   Diagnosis Date    A-fib     Anticoagulant long-term use     CHF (congestive heart failure)     Diabetes mellitus     Disorder of kidney and ureter     Followed by Dr. Jonathan Pace    Encounter for blood transfusion     Hypertension     Insomnia        Precautions: fall risk  Visit # authorized: 3/12 on 10/4/2018  Authorization period: 11-18-18  Plan of care expiration: 11-20-18  MD Follow up appt: none scheduled    Subjective     Pt reports: Denice had a near fall outside of the clinic today but daughter Parisa and a bystander helped her stand upright. She said she felt her knees give out. Pt states she wish she had started PT earlier.  Pain Scale: before treatment: 0/10 currently; after treatment: 0/10    Objective   R knee edema    TREATMENT  Therapeutic exercise: Denice received therapeutic exercises to develop strength, endurance, ROM, flexibility, posture and core stabilization for 50 minutes including:     Quad set 3 sec hold 10x each side  SLR 1 sec hold 10x each side (cue to tighten quad, then lift)  SAQ with large foam roll 10x each side  B Glut set supine 3 sec hold 10x  Bridges 10x  Hip abduction slides supine 1 sec hold 10x each side with slideboard (cue for toes pointing to ceiling)  Heel slides supine 15x each side with slide board  Knee extension stretch propped 2x30" each side  Supine clams 10x YTB   Adduction sparkly ball squeeze [id number] hold   Seated marches 20x alternating   Sit to stands using hands 2x5   Standing weight shifts in walker     Written Home Exercises Provided: none today. Pt will continue previous HEP.     At next visit--assign bridges and supine clams  Pt demo good understanding of the education provided. Denice demonstrated " good return demonstration of activities.    Pt. education:  · Posture reeducation, body mechanics, HEP  · Pt received gait education (10min) regarding increased foot clearance, upright posture, decreased speed, and keeping the walker close to the body.  · No spiritual or educational barriers to learning provided  · Pt has no cultural, educational or language barriers to learning provided.    Assessment   Pt tolerated tx session well and had 0/10 pain at end of session. Pt has made B LE strength gains and is more conscious of her walker position, but still requires moderate vc and tc during exercise and gait training. Pt will continue to benefit from skilled outpatient physical therapy to address the remaining functional deficits, provide pt/family education, and to maximize pt's level of independence in the home and community environment. .     Goals:   Short term goals (4 weeks)  Pt will increase B knee extension to = or < 5 degrees for standing and gait.  Pt will increase B quadriceps and B hip abductors strength by 1/2 muscle grade for standing and gait.  Pt will be able to perform HEP with minimal cueing and supervision required for increased independence.  Pt will be able to ambulate while demonstrating safe use of walker with CGA and moderate verbal cues.  Pt will be able perform sit to stand transfer while demonstrating safe use of walker with CGA and moderate verbal cues.  Pt will improve functional impairment of mobility to CK at least 40% but less than 60%    Long term goals (8 weeks)  Pt will increase B knee extension to 0 degrees.  Pt will increase B quadriceps and B hip abductors strength by 1 muscle grade.  Pt will ambulate with walker without crouched gait.  Pt will be able to ambulate while demonstrating safe use of walker with SBA.  Pt will be able perform sit to stand transfer while demonstrating safe use of walker with SBA.  Pt will be able to stand with walker without knee buckling in the home  and community.   Pt will improve functional impairment of mobility to CJ at least 20% but less than 40%.    Pt's spiritual, cultural and educational needs considered and pt agreeable to plan of care and goals.     Plan   Continue with established Plan of Care towards PT goals.

## 2018-10-09 ENCOUNTER — CLINICAL SUPPORT (OUTPATIENT)
Dept: REHABILITATION | Facility: HOSPITAL | Age: 83
End: 2018-10-09
Attending: FAMILY MEDICINE
Payer: MEDICARE

## 2018-10-09 DIAGNOSIS — M25.661 STIFFNESS OF BOTH KNEES: ICD-10-CM

## 2018-10-09 DIAGNOSIS — M62.81 MUSCLE WEAKNESS: Primary | ICD-10-CM

## 2018-10-09 DIAGNOSIS — R26.2 DIFFICULTY WALKING: ICD-10-CM

## 2018-10-09 DIAGNOSIS — M25.662 STIFFNESS OF BOTH KNEES: ICD-10-CM

## 2018-10-09 PROCEDURE — 97110 THERAPEUTIC EXERCISES: CPT | Mod: PN

## 2018-10-09 NOTE — PROGRESS NOTES
"  Physical Therapy Daily Note     Name: Denice Sheth  Clinic Number: [id number]  Diagnosis: Muscle weakness, stiffness in both knees, difficulty walking   Physician: Meredith Baird MD  Treatment Orders: PT Eval and Treat  Past Medical History:   Diagnosis Date    A-fib     Anticoagulant long-term use     CHF (congestive heart failure)     Diabetes mellitus     Disorder of kidney and ureter     Followed by Dr. Jonathan Pace    Encounter for blood transfusion     Hypertension     Insomnia        Precautions: fall risk  Visit # authorized: 4/12 on 10/9/2018  Authorization period: 11-18-18  Plan of care expiration: 11-20-18  MD Follow up appt: none scheduled  Time in: 10:00  Time out: 11:10  Treatment time: 60 min  1-1: 30 min  Subjective     Pt reports: she has not had any falls or knee buckling since last visit. Pt is fearful her R knee might buckle today. Pt has been performing HEP before she goes to sleep.  Pain Scale: before treatment: 0/10 currently; after treatment: 0/10    Objective     TREATMENT  Therapeutic exercise: Denice received therapeutic exercises to develop strength, endurance, ROM, flexibility, posture and core stabilization for 55 minutes including:     Quad set 3 sec hold 10x each side  SLR 1 sec hold 2x10 each side  SAQ with large foam roll 2x10 each side  B Glut set supine 3 sec hold 15x  Bridges 2x10  Hip abduction slides supine 1 sec hold 10x each side with slideboard (cue for toes pointing to ceiling)  Supine clams 2x10 YTB  Heel slides supine 15x each side with slide board  Adduction sparkly ball squeeze [id number] hold  Seated marches 20x alternating  Knee extension stretch propped 2x30" each side    Sit to stands using hands 2x5 (completed 1x5 today due to fatigue)  NP Standing weight shifts in walker (held due to fatigue)    Instructed pt and daughter Parisa to continue performing HEP every night, but to perform supine clams, SLRs, and bridges in the morning to "wake up" pt's B " LE  Written Home Exercises Provided: Heel slides, SLR, supine clams, bridges assigned. Pt and daughter demonstrated verbal understanding. Denice demonstrated good return demonstration of activities.    Pt. education:  · Posture reeducation, body mechanics, HEP  · Pt received gait training and sit to stand body mechanics instruction (5 min) regarding increased upright posture during standing, scooting to EOM, and foot placement  · No spiritual or educational barriers to learning provided  · Pt has no cultural, educational or language barriers to learning provided.    Assessment   Pt tolerated tx session well and had 0/10 pain at end of session. Pt is appearing progressively more alert and present during PT sessions.  Pt remembered to keep her walker close to her body during gait training today. Pt has made B LE strength gains and tolerated new endurance challenges fairly well. Pt became fatigued towards end of session. At next visit, pt will continue to work on endurance exercises to increase patient's confidence while standing and walking. Pt will continue to benefit from skilled outpatient physical therapy to address the remaining functional deficits, provide pt/family education, and to maximize pt's level of independence in the home and community environment. .     Goals:   Short term goals (4 weeks)  Pt will increase B knee extension to = or < 5 degrees for standing and gait.  Pt will increase B quadriceps and B hip abductors strength by 1/2 muscle grade for standing and gait.  Pt will be able to perform HEP with minimal cueing and supervision required for increased independence.  Pt will be able to ambulate while demonstrating safe use of walker with CGA and moderate verbal cues.  Pt will be able perform sit to stand transfer while demonstrating safe use of walker with CGA and moderate verbal cues.  Pt will improve functional impairment of mobility to CK at least 40% but less than 60%    Long term goals (8  weeks)  Pt will increase B knee extension to 0 degrees.  Pt will increase B quadriceps and B hip abductors strength by 1 muscle grade.  Pt will ambulate with walker without crouched gait.  Pt will be able to ambulate while demonstrating safe use of walker with SBA.  Pt will be able perform sit to stand transfer while demonstrating safe use of walker with SBA.  Pt will be able to stand with walker without knee buckling in the home and community.   Pt will improve functional impairment of mobility to CJ at least 20% but less than 40%.    Pt's spiritual, cultural and educational needs considered and pt agreeable to plan of care and goals.     Plan   Continue with established Plan of Care towards PT goals.

## 2018-10-09 NOTE — PROGRESS NOTES
"  Physical Therapy Daily Note     Name: Denice Sheth  Clinic Number: [id number]  Diagnosis: Muscle weakness, stiffness in both knees, difficulty walking   Physician: Meredith Baird MD  Treatment Orders: PT Eval and Treat  Past Medical History:   Diagnosis Date    A-fib     Anticoagulant long-term use     CHF (congestive heart failure)     Diabetes mellitus     Disorder of kidney and ureter     Followed by Dr. Jonathan Pace    Encounter for blood transfusion     Hypertension     Insomnia        Precautions: fall risk  Visit # authorized: 4/12 on 10/9/2018  Authorization period: 11-18-18  Plan of care expiration: 11-20-18  MD Follow up appt: none scheduled  Time in: 10:00  Time out: 11:10  Treatment time: 60 min  1-1: 30 min  Subjective     Pt reports: she has not had any falls or knee buckling since last visit. Pt is fearful her R knee might buckle today. Pt has been performing HEP before she goes to sleep.  Pain Scale: before treatment: 0/10 currently; after treatment: 0/10    Objective     TREATMENT  Therapeutic exercise: Denice received therapeutic exercises to develop strength, endurance, ROM, flexibility, posture and core stabilization for 55 minutes including:     Quad set 3 sec hold 10x each side  SLR 1 sec hold 2x10 each side  SAQ with large foam roll 2x10 each side  B Glut set supine 3 sec hold 15x  Bridges 2x10  Hip abduction slides supine 1 sec hold 10x each side with slideboard (cue for toes pointing to ceiling)  Supine clams 2x10 YTB  Heel slides supine 15x each side with slide board  Adduction sparkly ball squeeze [id number] hold  Seated marches 20x alternating  Knee extension stretch propped 2x30" each side    Sit to stands using hands 2x5 (completed 1x5 today due to fatigue)  NP Standing weight shifts in walker (held due to fatigue)    Instructed pt and daughter Parisa to continue performing HEP every night, but to perform supine clams, SLRs, and bridges in the morning to "wake up" pt's B " LE  Written Home Exercises Provided: Heel slides, SLR, supine clams, bridges assigned. Pt and daughter demonstrated verbal understanding. Denice demonstrated good return demonstration of activities.    Pt. education:  · Posture reeducation, body mechanics, HEP  · Pt received gait training and sit to stand body mechanics instruction (5 min) regarding increased upright posture during standing, scooting to EOM, and foot placement  · No spiritual or educational barriers to learning provided  · Pt has no cultural, educational or language barriers to learning provided.    Assessment   Pt tolerated tx session well and had 0/10 pain at end of session. Pt is appearing progressively more alert and present during PT sessions.  Pt remembered to keep her walker close to her body during gait training today. Pt has made B LE strength gains and tolerated new endurance challenges fairly well. Pt became fatigued towards end of session. At next visit, pt will continue to work on endurance exercises to increase patient's confidence while standing and walking. Pt will continue to benefit from skilled outpatient physical therapy to address the remaining functional deficits, provide pt/family education, and to maximize pt's level of independence in the home and community environment. .     Goals:   Short term goals (4 weeks)  Pt will increase B knee extension to = or < 5 degrees for standing and gait.  Pt will increase B quadriceps and B hip abductors strength by 1/2 muscle grade for standing and gait.  Pt will be able to perform HEP with minimal cueing and supervision required for increased independence.  Pt will be able to ambulate while demonstrating safe use of walker with CGA and moderate verbal cues.  Pt will be able perform sit to stand transfer while demonstrating safe use of walker with CGA and moderate verbal cues.  Pt will improve functional impairment of mobility to CK at least 40% but less than 60%    Long term goals (8  weeks)  Pt will increase B knee extension to 0 degrees.  Pt will increase B quadriceps and B hip abductors strength by 1 muscle grade.  Pt will ambulate with walker without crouched gait.  Pt will be able to ambulate while demonstrating safe use of walker with SBA.  Pt will be able perform sit to stand transfer while demonstrating safe use of walker with SBA.  Pt will be able to stand with walker without knee buckling in the home and community.   Pt will improve functional impairment of mobility to CJ at least 20% but less than 40%.    Pt's spiritual, cultural and educational needs considered and pt agreeable to plan of care and goals.     Plan   Continue with established Plan of Care towards PT goals.

## 2018-10-11 ENCOUNTER — CLINICAL SUPPORT (OUTPATIENT)
Dept: REHABILITATION | Facility: HOSPITAL | Age: 83
End: 2018-10-11
Attending: FAMILY MEDICINE
Payer: MEDICARE

## 2018-10-11 DIAGNOSIS — R26.2 DIFFICULTY WALKING: ICD-10-CM

## 2018-10-11 DIAGNOSIS — M25.662 STIFFNESS OF BOTH KNEES: ICD-10-CM

## 2018-10-11 DIAGNOSIS — M25.661 STIFFNESS OF BOTH KNEES: ICD-10-CM

## 2018-10-11 DIAGNOSIS — M62.81 MUSCLE WEAKNESS: Primary | ICD-10-CM

## 2018-10-11 PROCEDURE — 97110 THERAPEUTIC EXERCISES: CPT | Mod: PN

## 2018-10-11 PROCEDURE — G8978 MOBILITY CURRENT STATUS: HCPCS | Mod: CL,PN

## 2018-10-11 PROCEDURE — G8980 MOBILITY D/C STATUS: HCPCS | Mod: CK,PN

## 2018-10-11 NOTE — PROGRESS NOTES
Physical Therapy Daily Note     Name: Denice Sheth  Clinic Number: [id number]  Diagnosis: Muscle weakness, stiffness in both knees, difficulty walking   Physician: Meredith Baird MD  Treatment Orders: PT Eval and Treat  Past Medical History:   Diagnosis Date    A-fib     Anticoagulant long-term use     CHF (congestive heart failure)     Diabetes mellitus     Disorder of kidney and ureter     Followed by Dr. Jonathan Pace    Encounter for blood transfusion     Hypertension     Insomnia        Precautions: fall risk  Visit # authorized: 4/12 on 10/9/2018  Authorization period: 11-18-18  Plan of care expiration: 11-20-18  MD Follow up appt: none scheduled  Time in: 10:00  Time out: 11:10  Treatment time: 60 min  1-1: 30 min  Subjective     Pt reports: her legs feel tired since last visit and incorporating morning exercises and increased reps. She denies falls and pain.   Pain Scale: before treatment: 0/10 currently; after treatment: 0/10    Objective     TREATMENT  Therapeutic exercise: Denice received therapeutic exercises to develop strength, endurance, ROM, flexibility, posture and core stabilization for 55 minutes including:     Gait training with rolling walker x 1 lap on runway (approx 60 feet total)  Seated marches 20x alternating  Seated hip adduction with small yellow ball x 20   Seated forward reach for PTs hand x 5 to middle B  Seated forward reach for PTs hand x 5 laterally B  LAQ x 10 B  Seated hip abd with YTB x 10  Quad set 3 sec hold 10x each side  SLR 1 sec hold 2x10 each side  Bridges 2x10  B Glut set supine with LE extended 3 sec hold 15x  TA contraction with hip fall outs x 10 B  Supine clams 2x10 YTB  Heel slides supine 15x each side with slide board  Sit to stands using hands 1x5 (completed 1x5 today due to fatigue)    Written Home Exercises Provided: Continue with current exercises, split up morning and afternoon if too fatigued  Pt and daughter demonstrated verbal understanding.  Denice demonstrated good return demonstration of activities.    Pt. education:  · Posture reeducation, body mechanics, HEP  · Pt received gait training and sit to stand body mechanics instruction (5 min) regarding increased upright posture during standing, scooting to EOM, and foot placement  · No spiritual or educational barriers to learning provided  · Pt has no cultural, educational or language barriers to learning provided.    CMS Impairment/Limitation/Restriction for FOTO Knee Survey  Status Limitation G-Code CMS Severity Modifier  Intake 10% 90%  Predicted 36% 64% Goal Status+ CL - At least 60 percent but less than 80 percent  10/11/2018 38% 62% Current Status CL - At least 60 percent but less than 80 percent    Assessment   Performed sit to stand and gait training at start of treatment to prevent fatigue at end of treatment with activity. Pt required several verbal cues for upright posture during ambulation and to remain in walker and not let the walker get too far out in front of her. She required used of resting back of legs on bed for stability while performing sit to stand on last two repetitions. She required cues to prevent retro leaning with seated marching and performed forward reaching tasks for core activation. Pt will continue to benefit from skilled outpatient physical therapy to address the remaining functional deficits, provide pt/family education, and to maximize pt's level of independence in the home and community environment. .     Goals:   Short term goals (4 weeks)  Pt will increase B knee extension to = or < 5 degrees for standing and gait.  Pt will increase B quadriceps and B hip abductors strength by 1/2 muscle grade for standing and gait.  Pt will be able to perform HEP with minimal cueing and supervision required for increased independence.  Pt will be able to ambulate while demonstrating safe use of walker with CGA and moderate verbal cues.  Pt will be able perform sit to stand  transfer while demonstrating safe use of walker with CGA and moderate verbal cues.  Pt will improve functional impairment of mobility to CK at least 40% but less than 60%    Long term goals (8 weeks)  Pt will increase B knee extension to 0 degrees.  Pt will increase B quadriceps and B hip abductors strength by 1 muscle grade.  Pt will ambulate with walker without crouched gait.  Pt will be able to ambulate while demonstrating safe use of walker with SBA.  Pt will be able perform sit to stand transfer while demonstrating safe use of walker with SBA.  Pt will be able to stand with walker without knee buckling in the home and community.   Pt will improve functional impairment of mobility to CJ at least 20% but less than 40%.    Pt's spiritual, cultural and educational needs considered and pt agreeable to plan of care and goals.     Plan   Continue with established Plan of Care towards PT goals.

## 2018-10-16 ENCOUNTER — CLINICAL SUPPORT (OUTPATIENT)
Dept: REHABILITATION | Facility: HOSPITAL | Age: 83
End: 2018-10-16
Attending: FAMILY MEDICINE
Payer: MEDICARE

## 2018-10-16 DIAGNOSIS — M62.81 MUSCLE WEAKNESS: Primary | ICD-10-CM

## 2018-10-16 DIAGNOSIS — R26.2 DIFFICULTY WALKING: ICD-10-CM

## 2018-10-16 DIAGNOSIS — M25.661 STIFFNESS OF BOTH KNEES: ICD-10-CM

## 2018-10-16 DIAGNOSIS — M25.662 STIFFNESS OF BOTH KNEES: ICD-10-CM

## 2018-10-16 PROCEDURE — 97110 THERAPEUTIC EXERCISES: CPT | Mod: PN | Performed by: PHYSICAL THERAPIST

## 2018-10-16 NOTE — PROGRESS NOTES
"  Physical Therapy Daily Note     Name: Denice Sheth  Clinic Number: [id number]  Diagnosis: Muscle weakness, stiffness in both knees, difficulty walking   Physician: Meredith Baird MD  Treatment Orders: PT Eval and Treat  Past Medical History:   Diagnosis Date    A-fib     Anticoagulant long-term use     CHF (congestive heart failure)     Diabetes mellitus     Disorder of kidney and ureter     Followed by Dr. Jonathan Pace    Encounter for blood transfusion     Hypertension     Insomnia        Precautions: fall risk  Visit # authorized: 4/12 on 10/9/2018  Authorization period: 11-18-18  Plan of care expiration: 11-20-18  MD Follow up appt: none scheduled  Time in: 9:05  Time out: 10:15  Treatment time: 60  min  1-1: 15 min with supervising PT  Subjective     Pt reports:  She has not fallen and she still does not have pain. Pt has noticed her R leg "feels like it wants to give out less now because she thinks about it less" when she's walking. Pt feels that "waking up her legs" with part of her HEP is allowing her to trust her legs more.   Pain Scale:  before treatment: 0/10 currently; after treatment: 0/10    Objective   Pt appears tired and displays decreased proprioception. Her B LE roll to the R when hooklying unless verbally cued to bring them back to middle.    TREATMENT  Therapeutic exercise: Denice received therapeutic exercises to develop strength, endurance, ROM, flexibility, posture and core stabilization for 60 minutes including:     Gait training with rolling walker x 1 lap on runway (approx 60 feet total)  Seated marches 20x alternating  Seated hip adduction with small yellow ball x 20   Seated forward reach for PTs hand x 5 to middle B  Seated forward reach for PTs hand x 5 laterally B  SAQ with large foam roll 10x each side  LAQ x 10 B  Seated hip abd with YTB x 10    NP Knee extension stretch propped 2x30" each side  NP Quad set 3 sec hold 10x each side  NP SLR 1 sec hold 2x10 each " "side    Bridges 2x10  B Glut set supine with LE extended 3 sec hold 15x  TA set 10x  TA contraction with hip fall outs x 10 B  Supine clams 2x10 RTB  Heel slides supine 15x each side with slide board  Sit to stands using hands 8x  NP Standing weight shifts in walker  Seated upright posture 10x 5"(cue for decreased UT recruitment, shoulder retraction, and anterior pelvic tilt; pt required rests due to LB soreness)    Written Home Exercises Provided: Continue with current exercises, split up morning and afternoon if too fatigued  Pt and daughter demonstrated verbal understanding. Denice demonstrated good return demonstration of activities.    Pt. education:  · Posture reeducation, body mechanics, HEP  · Pt received gait training, posture education, and sit to stand body mechanics instruction regarding increased upright posture during seated and standing, scooting to EOM, and foot placement.  · No spiritual or educational barriers to learning provided  · Pt has no cultural, educational or language barriers to learning provided.    CMS Impairment/Limitation/Restriction for FOTO Knee Survey  Status Limitation G-Code CMS Severity Modifier  Intake 10% 90%  Predicted 36% 64% Goal Status+ CL - At least 60 percent but less than 80 percent  10/11/2018 38% 62% Current Status CL - At least 60 percent but less than 80 percent    Assessment   Pt tolerated tx well with 0/10 pain by end of session. Pt experienced some soreness in the B lower back when receiving instruction for upright posture while seated, which dissipated with rest. Pt requires moderate vc and tc for upright posture during sitting to prevent backward leaning, and during standing to prevent forward leaning. Pt demonstrated endurance gains during sit to stand exercise and required only minimal vc for scooting, leaning forward, and foot position during sit to stand. Pt requires moderate vc for leaning on and grabbing walker. Pt will continue to benefit from skilled " outpatient physical therapy to address the remaining functional deficits, provide pt/family education, and to maximize pt's level of independence in the home and community environment. .     Goals:   Short term goals (4 weeks)  Pt will increase B knee extension to = or < 5 degrees for standing and gait.  Pt will increase B quadriceps and B hip abductors strength by 1/2 muscle grade for standing and gait.  Pt will be able to perform HEP with minimal cueing and supervision required for increased independence.  Pt will be able to ambulate while demonstrating safe use of walker with CGA and moderate verbal cues.  Pt will be able perform sit to stand transfer while demonstrating safe use of walker with CGA and moderate verbal cues.  Pt will improve functional impairment of mobility to CK at least 40% but less than 60%    Long term goals (8 weeks)  Pt will increase B knee extension to 0 degrees.  Pt will increase B quadriceps and B hip abductors strength by 1 muscle grade.  Pt will ambulate with walker without crouched gait.  Pt will be able to ambulate while demonstrating safe use of walker with SBA.  Pt will be able perform sit to stand transfer while demonstrating safe use of walker with SBA.  Pt will be able to stand with walker without knee buckling in the home and community.   Pt will improve functional impairment of mobility to CJ at least 20% but less than 40%.    Pt's spiritual, cultural and educational needs considered and pt agreeable to plan of care and goals.     Plan   Continue with established Plan of Care towards PT goals.

## 2018-10-18 ENCOUNTER — CLINICAL SUPPORT (OUTPATIENT)
Dept: REHABILITATION | Facility: HOSPITAL | Age: 83
End: 2018-10-18
Attending: FAMILY MEDICINE
Payer: MEDICARE

## 2018-10-18 DIAGNOSIS — M25.661 STIFFNESS OF BOTH KNEES: ICD-10-CM

## 2018-10-18 DIAGNOSIS — R26.2 DIFFICULTY WALKING: ICD-10-CM

## 2018-10-18 DIAGNOSIS — M25.662 STIFFNESS OF BOTH KNEES: ICD-10-CM

## 2018-10-18 DIAGNOSIS — M62.81 MUSCLE WEAKNESS: Primary | ICD-10-CM

## 2018-10-18 PROCEDURE — 97112 NEUROMUSCULAR REEDUCATION: CPT | Mod: PN | Performed by: PHYSICAL THERAPIST

## 2018-10-18 PROCEDURE — 97110 THERAPEUTIC EXERCISES: CPT | Mod: PN | Performed by: PHYSICAL THERAPIST

## 2018-10-18 NOTE — PROGRESS NOTES
"  Physical Therapy Daily Note     Name: Denice Sheth  Clinic Number: [id number]  Diagnosis: Muscle weakness, stiffness in both knees, difficulty walking   Physician: Meredith Baird MD  Treatment Orders: PT Eval and Treat  Past Medical History:   Diagnosis Date    A-fib     Anticoagulant long-term use     CHF (congestive heart failure)     Diabetes mellitus     Disorder of kidney and ureter     Followed by Dr. Jonathan Pace    Encounter for blood transfusion     Hypertension     Insomnia        Precautions: fall risk  Visit # authorized:5/12 on 10/9/2018  Authorization period: 11-18-18  Plan of care expiration: 11-20-18  MD Follow up appt: none scheduled  Time in: 9:00  Time out: 10:00  Treatment time: 60  min  1-1: 60 min with supervising PT  Subjective     Pt reports:  Doing glute set and TA set this morning. Pt is happy there is nice weather today, "but it reminds her that she can't walk for long, not anymore."  Pain Scale:  before treatment: 0/10 currently; after treatment: 0/10    Objective     TREATMENT  Therapeutic exercise: Denice received therapeutic exercises to develop strength, endurance, ROM, flexibility, posture and core stabilization for 30 minutes including:   Cue pt for upright posture with all seated and standing activities.    Seated marches 20x alternating  Seated hip adduction with small yellow ball x 20   LAQ x 10 B  Seated hip abd with YTB x 10  Bridges 2x10  Supine clams 2x10 RTB  TA set 10x  Sit to stands using hands 8x    Pt rec'd neuromuscular re-edu for 30 min including:  Seated forward reach for PTs hand x 5 to middle B  Seated forward reach for PTs hand x 5 laterally B  Standing w/ walker 2x2min with 2 min seated rest in bewteen   Standing weight shifts w/ walker 10x  Seated upright posture 10x 5"(cue for decreased UT recruitment, shoulder retraction, and anterior pelvic tilt; pt required rests due to LB soreness)  Gait training with rolling walker x 1 lap on runway (approx 60 " "feet total)    NP  Knee extension stretch propped 2x30" each side  Quad set 3 sec hold 10x each side  SLR 1 sec hold 2x10 each side  B Glut set supine with LE extended 3 sec hold 15x  TA contraction with hip fall outs x 10 B  Heel slides supine 15x each side with slide board    Written Home Exercises Provided: Pt instructed to continue previous HEP.  Pt and daughter demonstrated verbal understanding. Denice demonstrated good return demonstration of activities.    Pt. education:  · Posture reeducation, body mechanics, HEP  · Pt received gait training, posture education, and sit to stand body mechanics instruction regarding increased upright posture during seated and standing, scooting to EOM, and foot placement.  · No spiritual or educational barriers to learning provided  · Pt has no cultural, educational or language barriers to learning provided.    CMS Impairment/Limitation/Restriction for FOTO Knee Survey  Status Limitation G-Code CMS Severity Modifier  Intake 10% 90%  Predicted 36% 64% Goal Status+ CL - At least 60 percent but less than 80 percent  10/11/2018 38% 62% Current Status CL - At least 60 percent but less than 80 percent    Assessment   Pt tolerated tx well with 0/10 pain by end of session. Pt was able to stand and walk for 3 bouts of 2  min with moderate vc and tc for upright posture and keeping her walker close to her body. Pt is physically able to stand for 2min with frequent rest, but is fearful of falling. Pt responded well to frequent conversation and encouragement during standing activities. Pt appeared not to be fatigued after today's session, but still preferred to take a rest in the waiting room before leaving clinic.  Pt will continue to benefit from skilled outpatient physical therapy to address the remaining functional deficits, provide pt/family education, and to maximize pt's level of independence in the home and community environment. .     Goals:   Short term goals (4 weeks)  Pt will " increase B knee extension to = or < 5 degrees for standing and gait.  Pt will increase B quadriceps and B hip abductors strength by 1/2 muscle grade for standing and gait.  Pt will be able to perform HEP with minimal cueing and supervision required for increased independence.  Pt will be able to ambulate while demonstrating safe use of walker with CGA and moderate verbal cues.  Pt will be able perform sit to stand transfer while demonstrating safe use of walker with CGA and moderate verbal cues.  Pt will improve functional impairment of mobility to CK at least 40% but less than 60%    Long term goals (8 weeks)  Pt will increase B knee extension to 0 degrees.  Pt will increase B quadriceps and B hip abductors strength by 1 muscle grade.  Pt will ambulate with walker without crouched gait.  Pt will be able to ambulate while demonstrating safe use of walker with SBA.  Pt will be able perform sit to stand transfer while demonstrating safe use of walker with SBA.  Pt will be able to stand with walker without knee buckling in the home and community.   Pt will improve functional impairment of mobility to CJ at least 20% but less than 40%.    Pt's spiritual, cultural and educational needs considered and pt agreeable to plan of care and goals.     Plan   Continue with established Plan of Care towards PT goals.

## 2018-10-23 ENCOUNTER — CLINICAL SUPPORT (OUTPATIENT)
Dept: REHABILITATION | Facility: HOSPITAL | Age: 83
End: 2018-10-23
Attending: FAMILY MEDICINE
Payer: MEDICARE

## 2018-10-23 DIAGNOSIS — R26.2 DIFFICULTY WALKING: ICD-10-CM

## 2018-10-23 DIAGNOSIS — M25.661 STIFFNESS OF BOTH KNEES: ICD-10-CM

## 2018-10-23 DIAGNOSIS — M25.662 STIFFNESS OF BOTH KNEES: ICD-10-CM

## 2018-10-23 DIAGNOSIS — M62.81 MUSCLE WEAKNESS: Primary | ICD-10-CM

## 2018-10-23 PROCEDURE — 97110 THERAPEUTIC EXERCISES: CPT | Mod: PN | Performed by: PHYSICAL THERAPIST

## 2018-10-23 PROCEDURE — 97112 NEUROMUSCULAR REEDUCATION: CPT | Mod: PN | Performed by: PHYSICAL THERAPIST

## 2018-10-23 NOTE — PROGRESS NOTES
Physical Therapy Daily Note     Name: Denice Sheth  Clinic Number: [id number]  Diagnosis: Muscle weakness, stiffness in both knees, difficulty walking   Physician: Meredith Baird MD  Treatment Orders: PT Eval and Treat  Past Medical History:   Diagnosis Date    A-fib     Anticoagulant long-term use     CHF (congestive heart failure)     Diabetes mellitus     Disorder of kidney and ureter     Followed by Dr. Jonathan Pace    Encounter for blood transfusion     Hypertension     Insomnia        Precautions: fall risk  Visit # authorized:8/12 on 10/9/2018  Authorization period: 11-18-18  Plan of care expiration: 11-20-18  MD Follow up appt: none scheduled    Subjective     Pt reports:  States knees don't want to work Makes noise when move knees  States no pain  Pain Scale:  before treatment: 0/10 currently; after treatment: 0/10    Objective     TREATMENT  Therapeutic exercise: Denice received therapeutic exercises to develop strength, endurance, ROM, flexibility, posture and core stabilization for 30 minutes including:   Cue pt for upright posture with all seated and standing activities.    Seated marches 20x alternating  LAQ x 10 B  Sit to stands using hands 10 x   Shoulder flexion sitting x 10   AR push/pull with UE in sitting x 10   AR trunk flex/ext in sitting x 10  Bridges 2x10   SLR x 10  Supine clams 2x10 RTB  TA set 10x      Pt rec'd neuromuscular re-edu for 25 min including:     March in place x 10    Side steps length of // bars x 2   Heel raises x 10   Backward walk then march length of // bars x 2, knee buckled slightly 1 time with backward walk    Foam walk 1 min with contact guard and allowing pt to hold onto // bar rail       Ambulation with rolling walker x 2 about 75'  verbal and tactile cueing for erect posture and walking into walker.  Instructed daughter further on working with pt with walking with walker and cues to assist pt during walks to help decrease back pain and increase  endurance      Written Home Exercises Provided: Pt instructed to continue previous HEP.  Pt and daughter demonstrated verbal understanding. Denice demonstrated good return demonstration of activities.    Pt. education:  · Posture reeducation, body mechanics, HEP  · Pt received gait training, posture education, and sit to stand body mechanics instruction regarding increased upright posture during seated and standing, scooting to EOM, and foot placement.  · No spiritual or educational barriers to learning provided  · Pt has no cultural, educational or language barriers to learning provided.    CMS Impairment/Limitation/Restriction for FOTO Knee Survey  Status Limitation G-Code CMS Severity Modifier  Intake 10% 90%  Predicted 36% 64% Goal Status+ CL - At least 60 percent but less than 80 percent  10/11/2018 38% 62% Current Status CL - At least 60 percent but less than 80 percent    Assessment   Pt walking into walker and less FB< but still needs further improvement.  Pt continues to require rest between activities, but worked to keep short breaks Pt with no pain with standing erect so severe slouched posture should not due to problems such as stenosis, I feel if we can get further strength she will be able to improve  Pt will continue to benefit from skilled outpatient physical therapy to address the remaining functional deficits, provide pt/family education, and to maximize pt's level of independence in the home and community environment. .     Goals:   Short term goals (4 weeks)  Pt will increase B knee extension to = or < 5 degrees for standing and gait.  Pt will increase B quadriceps and B hip abductors strength by 1/2 muscle grade for standing and gait.  Pt will be able to perform HEP with minimal cueing and supervision required for increased independence.  Pt will be able to ambulate while demonstrating safe use of walker with CGA and moderate verbal cues.  Pt will be able perform sit to stand transfer while  demonstrating safe use of walker with CGA and moderate verbal cues.  Pt will improve functional impairment of mobility to CK at least 40% but less than 60%    Long term goals (8 weeks)  Pt will increase B knee extension to 0 degrees.  Pt will increase B quadriceps and B hip abductors strength by 1 muscle grade.  Pt will ambulate with walker without crouched gait.  Pt will be able to ambulate while demonstrating safe use of walker with SBA.  Pt will be able perform sit to stand transfer while demonstrating safe use of walker with SBA.  Pt will be able to stand with walker without knee buckling in the home and community.   Pt will improve functional impairment of mobility to CJ at least 20% but less than 40%.    Pt's spiritual, cultural and educational needs considered and pt agreeable to plan of care and goals.     Plan   Continue with established Plan of Care towards PT goals.

## 2018-10-30 ENCOUNTER — CLINICAL SUPPORT (OUTPATIENT)
Dept: REHABILITATION | Facility: HOSPITAL | Age: 83
End: 2018-10-30
Attending: FAMILY MEDICINE
Payer: MEDICARE

## 2018-10-30 DIAGNOSIS — M25.662 STIFFNESS OF BOTH KNEES: ICD-10-CM

## 2018-10-30 DIAGNOSIS — R26.2 DIFFICULTY WALKING: ICD-10-CM

## 2018-10-30 DIAGNOSIS — M25.661 STIFFNESS OF BOTH KNEES: ICD-10-CM

## 2018-10-30 DIAGNOSIS — M62.81 MUSCLE WEAKNESS: Primary | ICD-10-CM

## 2018-10-30 PROCEDURE — 97112 NEUROMUSCULAR REEDUCATION: CPT | Mod: PN | Performed by: PHYSICAL THERAPIST

## 2018-10-30 PROCEDURE — 97110 THERAPEUTIC EXERCISES: CPT | Mod: PN | Performed by: PHYSICAL THERAPIST

## 2018-10-30 NOTE — PROGRESS NOTES
Physical Therapy Daily Note     Name: Denice Sheth  Clinic Number: [id number]  Diagnosis: Muscle weakness, stiffness in both knees, difficulty walking   Physician: Meredith Baird MD  Treatment Orders: PT Eval and Treat  Past Medical History:   Diagnosis Date    A-fib     Anticoagulant long-term use     CHF (congestive heart failure)     Diabetes mellitus     Disorder of kidney and ureter     Followed by Dr. Jonathan Pace    Encounter for blood transfusion     Hypertension     Insomnia        Precautions: fall risk  Visit # authorized:9/12 on 10/9/2018  Authorization period: 11-18-18  Plan of care expiration: 11-20-18  MD Follow up appt: none scheduled    Subjective     Pt reports:  Presently no pain, but last couple of days L knee started to bother her.  Pt states no falls recently  Pain Scale:  before treatment: 0/10 currently; after treatment: 0/10    Objective     TREATMENT  G codes next session    Therapeutic exercise: Denice received therapeutic exercises to develop strength, endurance, ROM, flexibility, posture and core stabilization for 15 minutes including:   Cue pt for upright posture with all seated and standing activities.    Seated marches 20x alternating  LAQ x 20 B with 1#  Sit to stands using hands 10 x  Shoulder flexion sitting x 10  AR push/pull with UE in sitting x 10  AR trunk flex/ext in sitting x 10    Bridges 2x10  SLR x 10  Supine clams 2x10 RTB  TA set 10x   Trunk rotation supine x 20       Pt rec'd neuromuscular re-edu for 15 min including:    March in place x 10   Side steps length of // bars x 2  Heel raises x 10  Backward walk then march length of // bars x 2, no knee buckling today at last visit knee buckled slightly 1 time with backward walk   Foam walk 1 min with contact guard and allowing pt to hold onto // bar rail       Ambulation with rolling walker x 2 about 75'  verbal and tactile cueing for erect posture and walking into walker.  Instructed daughter further on  working with pt with walking with walker and cues to assist pt during walks to help decrease back pain and increase endurance      Written Home Exercises Provided: Pt instructed to continue previous HEP.  Pt and daughter demonstrated verbal understanding. Denice demonstrated good return demonstration of activities.    Pt. education:  · Posture reeducation, body mechanics, HEP  · Pt received gait training, posture education, and sit to stand body mechanics instruction regarding increased upright posture during seated and standing, scooting to EOM, and foot placement.  · No spiritual or educational barriers to learning provided  · Pt has no cultural, educational or language barriers to learning provided.    CMS Impairment/Limitation/Restriction for FOTO Knee Survey  Status Limitation G-Code CMS Severity Modifier  Intake 10% 90%  Predicted 36% 64% Goal Status+ CL - At least 60 percent but less than 80 percent  10/11/2018 38% 62% Current Status CL - At least 60 percent but less than 80 percent    Assessment   Pt with some L knee c/o but no problems during treatment. Pt with slightly improved endurance still needs many verbal and tactile cues for good posture in standing and with gait.  Pt will continue to benefit from skilled outpatient physical therapy to address the remaining functional deficits, provide pt/family education, and to maximize pt's level of independence in the home and community environment. .     Goals:   Short term goals (4 weeks)  Pt will increase B knee extension to = or < 5 degrees for standing and gait.  Pt will increase B quadriceps and B hip abductors strength by 1/2 muscle grade for standing and gait.  Pt will be able to perform HEP with minimal cueing and supervision required for increased independence.  Pt will be able to ambulate while demonstrating safe use of walker with CGA and moderate verbal cues.  Pt will be able perform sit to stand transfer while demonstrating safe use of walker with  CGA and moderate verbal cues.  Pt will improve functional impairment of mobility to CK at least 40% but less than 60%    Long term goals (8 weeks)  Pt will increase B knee extension to 0 degrees.  Pt will increase B quadriceps and B hip abductors strength by 1 muscle grade.  Pt will ambulate with walker without crouched gait.  Pt will be able to ambulate while demonstrating safe use of walker with SBA.  Pt will be able perform sit to stand transfer while demonstrating safe use of walker with SBA.  Pt will be able to stand with walker without knee buckling in the home and community.   Pt will improve functional impairment of mobility to CJ at least 20% but less than 40%.    Pt's spiritual, cultural and educational needs considered and pt agreeable to plan of care and goals.     Plan   Continue with established Plan of Care towards PT goals.

## 2018-11-01 ENCOUNTER — CLINICAL SUPPORT (OUTPATIENT)
Dept: REHABILITATION | Facility: HOSPITAL | Age: 83
End: 2018-11-01
Attending: FAMILY MEDICINE
Payer: MEDICARE

## 2018-11-01 DIAGNOSIS — M25.661 STIFFNESS OF BOTH KNEES: ICD-10-CM

## 2018-11-01 DIAGNOSIS — R26.2 DIFFICULTY WALKING: ICD-10-CM

## 2018-11-01 DIAGNOSIS — M25.662 STIFFNESS OF BOTH KNEES: ICD-10-CM

## 2018-11-01 DIAGNOSIS — M62.81 MUSCLE WEAKNESS: Primary | ICD-10-CM

## 2018-11-01 PROCEDURE — G8978 MOBILITY CURRENT STATUS: HCPCS | Mod: CK,PN | Performed by: PHYSICAL THERAPIST

## 2018-11-01 PROCEDURE — G8979 MOBILITY GOAL STATUS: HCPCS | Mod: CJ,PN | Performed by: PHYSICAL THERAPIST

## 2018-11-01 PROCEDURE — 97110 THERAPEUTIC EXERCISES: CPT | Mod: PN | Performed by: PHYSICAL THERAPIST

## 2018-11-01 NOTE — PROGRESS NOTES
Physical Therapy Progress Note     Name: Denice Sheth  Clinic Number: [id number]  Diagnosis: Muscle weakness, stiffness in both knees, difficulty walking   Physician: Meredith Baird MD  Treatment Orders: PT Eval and Treat  Past Medical History:   Diagnosis Date    A-fib     Anticoagulant long-term use     CHF (congestive heart failure)     Diabetes mellitus     Disorder of kidney and ureter     Followed by Dr. Jonathan Pace    Encounter for blood transfusion     Hypertension     Insomnia        Precautions: fall risk  Visit # authorized:10/12 on 10/9/2018  Date of initial eval:  9-25-18  Authorization period: 11-18-18  Plan of care expiration: 11-20-18  MD Follow up appt: none scheduled    Subjective     Pt reports:  Her daughter reported low blood pressure 100/50 and high sugar 159 this AM Pt states her R  Knee gave out and buckled when she went to stand up to get into car Pt states she tried to stand on R to get in car instead of sitting first and then putting legs in car  Pt states she feels she is walking better since staring PT.  Pt states she has less episodes of buckling since starting PT    Pt states little pain in knee today, rainy weather, not constant pain and no pain presently    Pain Scale:  before treatment: 0/10 currently; after treatment: 0/10    Objective     TREATMENT  BP at start of session 132/61 pulse 62  BP after exercise 145/69 pulse 60    Muscle Strength 11-1-18  MMT R L   Hip flexion 4-/5 4/5   Hip abduction 4-/5 3+/5   Hip extension     Able to perform 25% bridge     Hip adduction  4/5 4/5   Knee extension 4/5 4+/5   Knee flexion 5/5 5/5   Ankle dorsiflexion 4+/5 4+/5   Ankle plantar flexion 4/5 4/5   Ankle inversion 4/5 4/5   Ankle eversion 4/5 4/5           Muscle Strength: initial eval  MMT                            R          L  Hip flexion                   3+/5     4-/5  Hip abduction              3+/5     3/5 Tested in supine  Hip extension              NT/5     NT/5  Glut max                      NT/5    NT/5  Bridge-patient performed 5% bridge  Hip adduction              4/5       4/5  Tested in supine  Knee extension           4-/5      4/5  Knee flexion                4+/5     4+/5  Ankle dorsiflexion        4/5       4/5  Ankle plantar flexion   4-/5      4-/5  Ankle inversion           NT/5    NT/5  Ankle eversion                        NT/5    NT/5    Functional assessment:  -walking/gait: still tends to lean forward, but not heavily on walker, but allows walker to get ahead of her encouraging further FB posturing of trunk better able to clear foot and improved extension at knee, but requires much verbal and tactile cueing:  At IE  leaning forward heavily on walker, B crouched knee gait, B decreased foot clearance  -sit to stand: still wants to use walker, but better at using chair to push herself up still needs significant arm use and min assistance at times at IE reaches for and leans on walker, uses arms to pull herself up  -sit to supine:same as IE Pt leans backwards into long sit and swivels legs onto tx table before lowering her head, slowly and with effort  -supine to sit: same as IE Pt performs log roll technique slowly and with effort, has difficulty extending arms  -supine to prone: did not perform     Knee Girth:  R knee was grossly swollen with moderate swelling of whole RLE.    Knee  ROM: (measured in degrees) 11-1-18  Knee (R) (L)   Flexion 120 122   Extension 5 -5          Knee ROM: (measured in degrees) initial eval  Knee                            R          L  Flexion                         120      121  Extension                    -9         -15        Outcome measures:  Impairment function: mobility  FOTO knee disability index score:43 at IE  10  PT reviewed FOTO scores for Denice Sheth   FOTO scores were entered into [employer] - see media section.                 G code current: CK at least 40% but < 60% impaired, limited or restricted               G code goal: CJ at least 20 but less than 40%              Severity modifier selections based on the FOTO scoring, objective findings, and co-morbidity assessment.        Therapeutic exercise: Denice received therapeutic exercises to develop strength, endurance, ROM, flexibility, posture and core stabilization for 55 minutes including:   Instructed pt in mechanics needed for that car transfer and she needs to sit and then put both legs in car    Cue pt for upright posture with all seated and standing activities.    Seated marches 20x alternating  LAQ x 20 B with 1#  Sit to stands using hands 10 x  Shoulder flexion sitting x 10  AR push/pull with UE in sitting x 10  AR trunk flex/ext in sitting x 10    Bridges 2x10  SLR x 10  Supine clams 2x10 RTB  TA set 10x  Trunk rotation supine x 20    Hip abd/add supine 2# with slider    Ambulation with rolling walker  about 75'  verbal and tactile cueing for erect posture and walking into walker Pt continues to be very fatigued at end of walk      (NP)Pt rec'd neuromuscular re-edu for 0 min including:    March in place x 10   Side steps length of // bars x 2  Heel raises x 10  Backward walk then march length of // bars x 2, no knee buckling today at last visit knee buckled slightly 1 time with backward walk   Foam walk 1 min with contact guard and allowing pt to hold onto // bar rail       r.  Instructed daughter further on working with pt with walking with walker and cues to assist pt during walks to help decrease back pain and increase endurance      Written Home Exercises Provided: Pt instructed to continue previous HEP.  Pt and daughter demonstrated verbal understanding. Denice demonstrated good return demonstration of activities.    Pt. education:  · Posture reeducation, body mechanics, HEP  · Pt received gait training, posture education, and sit to stand body mechanics instruction regarding increased upright posture during seated and standing, scooting to EOM, and  foot placement.  · No spiritual or educational barriers to learning provided  · Pt has no cultural, educational or language barriers to learning provided.        Assessment   Patient demonstrates improvement in range of motion, strength, stabilization and function.    Pt with improved FOTO score, but continues with functional deficits.    Pt will continue to benefit from skilled outpatient physical therapy to address the remaining functional deficits, provide pt/family education, and to maximize pt's level of independence in the home and community environment. .     Goals:   Short term goals (4 weeks) MET STG's  Pt will increase B knee extension to = or < 5 degrees for standing and gait.  Pt will increase B quadriceps and B hip abductors strength by 1/2 muscle grade for standing and gait.  Pt will be able to perform HEP with minimal cueing and supervision required for increased independence.  Pt will be able to ambulate while demonstrating safe use of walker with CGA and moderate verbal cues.  Pt will be able perform sit to stand transfer while demonstrating safe use of walker with CGA and moderate verbal cues.  Pt will improve functional impairment of mobility to CK at least 40% but less than 60%    Long term goals (8 weeks)  Pt will increase B knee extension to 0 degrees. 75% improved  Pt will increase B quadriceps and B hip abductors strength by 1 muscle grade.50% improved  Pt will ambulate with walker without crouched gait. 20% improved   Pt will be able to ambulate while demonstrating safe use of walker with SBA. 40% improved  Pt will be able perform sit to stand transfer while demonstrating safe use of walker with SBA. 40% improved   Pt will be able to stand with walker without knee buckling in the home and community. 20% improved   Pt will improve functional impairment of mobility to CJ at least 20% but less than 40%.50% improved    Pt's spiritual, cultural and educational needs considered and pt agreeable to  plan of care and goals.     Plan   Continue with established Plan of Care towards PT goals.

## 2018-11-02 PROBLEM — R07.2 PRECORDIAL PAIN: Status: ACTIVE | Noted: 2018-11-02

## 2018-11-02 PROBLEM — R07.9 CHEST PAIN: Status: ACTIVE | Noted: 2018-11-02

## 2018-11-02 PROBLEM — R07.2 PRECORDIAL PAIN: Status: RESOLVED | Noted: 2018-11-02 | Resolved: 2018-11-02

## 2018-11-03 PROBLEM — R07.89 ATYPICAL CHEST PAIN: Status: ACTIVE | Noted: 2018-11-02

## 2018-11-06 ENCOUNTER — CLINICAL SUPPORT (OUTPATIENT)
Dept: REHABILITATION | Facility: HOSPITAL | Age: 83
End: 2018-11-06
Attending: FAMILY MEDICINE
Payer: MEDICARE

## 2018-11-06 DIAGNOSIS — M25.661 STIFFNESS OF BOTH KNEES: ICD-10-CM

## 2018-11-06 DIAGNOSIS — M62.81 MUSCLE WEAKNESS: Primary | ICD-10-CM

## 2018-11-06 DIAGNOSIS — M25.662 STIFFNESS OF BOTH KNEES: ICD-10-CM

## 2018-11-06 DIAGNOSIS — R26.2 DIFFICULTY WALKING: ICD-10-CM

## 2018-11-06 PROCEDURE — 97110 THERAPEUTIC EXERCISES: CPT | Mod: PN

## 2018-11-06 NOTE — PROGRESS NOTES
"  Physical Therapy Progress Note     Name: Denice Sheth  Clinic Number: [id number]  Diagnosis: Muscle weakness, stiffness in both knees, difficulty walking   Physician: Meredith Baird MD  Treatment Orders: PT Eval and Treat  Past Medical History:   Diagnosis Date    A-fib     Anticoagulant long-term use     CHF (congestive heart failure)     Diabetes mellitus     Disorder of kidney and ureter     Followed by Dr. Jonathan Pace    Encounter for blood transfusion     Hypertension     Insomnia        Precautions: fall risk  Visit # authorized:10/12 on 10/9/2018  Date of initial eval:  9-25-18  Authorization period: 11-18-18  Plan of care expiration: 11-20-18  MD Follow up appt: none scheduled    Start Time: 1:00  End Time: 2:03  Total Billable Time: 60    Subjective     Pt reports: her knees are "kind of screaming at her" with some discomfort, but is not any worse than usual. Denies recent buckling of the knees but states she did to go the ER last week with chest pains. Was not admitted though. Feeling fine today.    Pain Scale:  before treatment: mild/10 currently; after treatment: 0/10    Objective     TREATMENT  BP at start of session 160/72 pulse 62  BP after exercise 150/68 pulse 63    Therapeutic exercise: Denice received therapeutic exercises to develop strength, endurance, ROM, flexibility, posture and core stabilization for 60 minutes including:   Instructed pt in mechanics needed for that car transfer and she needs to sit and then put both legs in car    Cue pt for upright posture with all seated and standing activities.    Seated marches 20x alternating  LAQ x 20 B with 1#  Sit to stands using hands 10 x  Shoulder flexion sitting x 10  AR push/pull with UE in sitting x 15  AR trunk flex/ext in sitting x 15    Bridges 2x10  SLR x 10  Supine clams 2x10 RTB  TA set 10x  Trunk rotation supine x 20   Hip abd/add supine 2# with slider x10    Ambulation with rolling walker about 75'  verbal and tactile " cueing for erect posture and walking into walker Pt continues to be very fatigued at end of walk      (NP) Pt rec'd neuromuscular re-edu for 0 min including:    March in place x 10   Side steps length of // bars x 2  Heel raises x 10  Backward walk then march length of // bars x 2, no knee buckling today at last visit knee buckled slightly 1 time with backward walk   Foam walk 1 min with contact guard and allowing pt to hold onto // bar rail       (NP) Instructed daughter further on working with pt with walking with walker and cues to assist pt during walks to help decrease back pain and increase endurance      Written Home Exercises Provided: Pt instructed to continue previous HEP.  Pt and daughter demonstrated verbal understanding. Denice demonstrated good return demonstration of activities.    Pt. education:  · Posture reeducation, body mechanics, HEP  · Pt received gait training, posture education, and sit to stand body mechanics instruction regarding increased upright posture during seated and standing, scooting to EOM, and foot placement.  · No spiritual or educational barriers to learning provided  · Pt has no cultural, educational or language barriers to learning provided.        Assessment   Denice tolerated treatment well overall this date without onset of pain or soreness, only B LE fatigue noted. Moderate fatigue reported following ambulation 75' with occasional cues throughout for upright posture and maintaining walker closer to reduce forward bend. Pt with modified independence transferring supine <-> sitting. Only 1-2 step commands given for exercises. Strength and endurance are slowly improving.   Pt with improved FOTO score, but continues with functional deficits.    Pt will continue to benefit from skilled outpatient physical therapy to address the remaining functional deficits, provide pt/family education, and to maximize pt's level of independence in the home and community environment. .      Goals:   Short term goals (4 weeks) MET STG's  Pt will increase B knee extension to = or < 5 degrees for standing and gait.  Pt will increase B quadriceps and B hip abductors strength by 1/2 muscle grade for standing and gait.  Pt will be able to perform HEP with minimal cueing and supervision required for increased independence.  Pt will be able to ambulate while demonstrating safe use of walker with CGA and moderate verbal cues.  Pt will be able perform sit to stand transfer while demonstrating safe use of walker with CGA and moderate verbal cues.  Pt will improve functional impairment of mobility to CK at least 40% but less than 60%    Long term goals (8 weeks)  Pt will increase B knee extension to 0 degrees. 75% improved  Pt will increase B quadriceps and B hip abductors strength by 1 muscle grade.50% improved  Pt will ambulate with walker without crouched gait. 20% improved   Pt will be able to ambulate while demonstrating safe use of walker with SBA. 40% improved  Pt will be able perform sit to stand transfer while demonstrating safe use of walker with SBA. 40% improved   Pt will be able to stand with walker without knee buckling in the home and community. 20% improved   Pt will improve functional impairment of mobility to CJ at least 20% but less than 40%.50% improved    Pt's spiritual, cultural and educational needs considered and pt agreeable to plan of care and goals.     Plan   Continue with established Plan of Care towards PT goals.

## 2018-11-08 ENCOUNTER — CLINICAL SUPPORT (OUTPATIENT)
Dept: REHABILITATION | Facility: HOSPITAL | Age: 83
End: 2018-11-08
Attending: FAMILY MEDICINE
Payer: MEDICARE

## 2018-11-08 DIAGNOSIS — M25.662 STIFFNESS OF BOTH KNEES: ICD-10-CM

## 2018-11-08 DIAGNOSIS — R26.2 DIFFICULTY WALKING: ICD-10-CM

## 2018-11-08 DIAGNOSIS — M62.81 MUSCLE WEAKNESS: Primary | ICD-10-CM

## 2018-11-08 DIAGNOSIS — M25.661 STIFFNESS OF BOTH KNEES: ICD-10-CM

## 2018-11-08 PROCEDURE — 97110 THERAPEUTIC EXERCISES: CPT | Mod: PN

## 2018-11-08 NOTE — PROGRESS NOTES
Physical Therapy Progress Note     Name: Denice Sheth  Clinic Number: [id number]  Diagnosis: Muscle weakness, stiffness in both knees, difficulty walking   Physician: Meredith Baird MD  Treatment Orders: PT Eval and Treat  Past Medical History:   Diagnosis Date    A-fib     Anticoagulant long-term use     CHF (congestive heart failure)     Diabetes mellitus     Disorder of kidney and ureter     Followed by Dr. Jonathna Pace    Encounter for blood transfusion     Hypertension     Insomnia        Precautions: fall risk  Visit # authorized: 11/12 on 10/9/2018  Date of initial eval:  9-25-18  Authorization period: 11-18-18  Plan of care expiration: 11-20-18  MD Follow up appt: none scheduled    Start Time: 8:04  End Time: 9:02  Total Billable Time: 55    Subjective     Pt reports: having soreness and pain into the R knee.  Per her daughter, Denice's right knee isn't working well this morning.     Pain Scale:  before treatment:5-6/10 currently on visual pain scale; after treatment: 6-7/10    Objective     TREATMENT  BP at start of session 148/66 pulse 61  BP after exercise 161/72 pulse 63    Therapeutic exercise: Denice received therapeutic exercises to develop strength, endurance, ROM, flexibility, posture and core stabilization for 55 minutes including:   Instructed pt in mechanics needed for that car transfer and she needs to sit and then put both legs in car  All exercises performed with Armando Cadena, Student PTA  Cue pt for upright posture with all seated and standing activities.    Seated marches 20x alternating   Standing march x10 alternating  LAQ x 20 B with 1#  Sit to stands using hands 3 x (held after 3 reps due to R knee pain)  Shoulder flexion sitting x 10  AR push/pull with UE in sitting x 20  AR trunk flex/ext in sitting x 20    Bridges 2x10  SLR x 10  Supine clams 2x10 RTB  TA set 10x  Trunk rotation supine x 20   Hip abd/add supine 2# with slider x10    Ambulation with rolling walker about  75' upon arrival and 65' post tx with verbal and tactile cueing for erect posture and walking into walker Pt continues to be very fatigued at end of walk. (performed with RACH Garcia PTA)      (NP) Pt rec'd neuromuscular re-edu for 0 min including:    March in place x 10   Side steps length of // bars x 2  Heel raises x 10  Backward walk then march length of // bars x 2, no knee buckling today at last visit knee buckled slightly 1 time with backward walk   Foam walk 1 min with contact guard and allowing pt to hold onto // bar rail       (NP) Instructed daughter further on working with pt with walking with walker and cues to assist pt during walks to help decrease back pain and increase endurance      Written Home Exercises Provided: Pt instructed to continue previous HEP.  Pt and daughter demonstrated verbal understanding. Denice demonstrated good return demonstration of activities.    Pt. education:  · Posture reeducation, body mechanics, HEP  · Pt received gait training, posture education, and sit to stand body mechanics instruction regarding increased upright posture during seated and standing, scooting to EOM, and foot placement.  · No spiritual or educational barriers to learning provided  · Pt has no cultural, educational or language barriers to learning provided.        Assessment   Denice tolerated treatment well overall this date without onset of pain or soreness, only B LE fatigue noted. Moderate fatigue reported following ambulation with occasional cues throughout for upright posture and maintaining walker closer to reduce forward bend. Pt stated fatigue in LLE with the feeling of knee on the verge of buckling during standing marches and sit<->stand. Pt with modified independence transferring supine <-> sitting. Only 1-2 step commands given for exercises. Strength and endurance are slowly improving. Pt required 1-2 min break in between switching exercises. Blood pressure remains within normal ranges. Pt  will continue to benefit from skilled outpatient physical therapy to address the remaining functional deficits, provide pt/family education, and to maximize pt's level of independence in the home and community environment. .     Goals:   Short term goals (4 weeks) MET STG's  Pt will increase B knee extension to = or < 5 degrees for standing and gait.  Pt will increase B quadriceps and B hip abductors strength by 1/2 muscle grade for standing and gait.  Pt will be able to perform HEP with minimal cueing and supervision required for increased independence.  Pt will be able to ambulate while demonstrating safe use of walker with CGA and moderate verbal cues.  Pt will be able perform sit to stand transfer while demonstrating safe use of walker with CGA and moderate verbal cues.  Pt will improve functional impairment of mobility to CK at least 40% but less than 60%    Long term goals (8 weeks)  Pt will increase B knee extension to 0 degrees. 75% improved  Pt will increase B quadriceps and B hip abductors strength by 1 muscle grade.50% improved  Pt will ambulate with walker without crouched gait. 20% improved   Pt will be able to ambulate while demonstrating safe use of walker with SBA. 40% improved  Pt will be able perform sit to stand transfer while demonstrating safe use of walker with SBA. 40% improved   Pt will be able to stand with walker without knee buckling in the home and community. 20% improved   Pt will improve functional impairment of mobility to CJ at least 20% but less than 40%.50% improved    Pt's spiritual, cultural and educational needs considered and pt agreeable to plan of care and goals.     Plan   Continue with established Plan of Care towards PT goals.    Signed Armando Cadena Student PTA    I certify that I was present in the room directing the student in service delivery and guiding them using my skilled judgment. As the co-signing therapist I have reviewed the students documentation and am  responsible for the treatment, assessment, and plan. Katina Garcia, ELO

## 2018-11-20 ENCOUNTER — TELEPHONE (OUTPATIENT)
Dept: FAMILY MEDICINE | Facility: CLINIC | Age: 83
End: 2018-11-20

## 2018-11-20 DIAGNOSIS — G89.29 CHRONIC KNEE PAIN, UNSPECIFIED LATERALITY: Primary | ICD-10-CM

## 2018-11-20 DIAGNOSIS — M25.569 CHRONIC KNEE PAIN, UNSPECIFIED LATERALITY: Primary | ICD-10-CM

## 2018-12-03 RX ORDER — LEVOTHYROXINE SODIUM 75 UG/1
TABLET ORAL
Qty: 90 TABLET | Refills: 3 | Status: SHIPPED | OUTPATIENT
Start: 2018-12-03 | End: 2019-12-15 | Stop reason: SDUPTHER

## 2018-12-03 RX ORDER — MEMANTINE HYDROCHLORIDE 10 MG/1
TABLET ORAL
Qty: 90 TABLET | Refills: 3 | Status: SHIPPED | OUTPATIENT
Start: 2018-12-03 | End: 2019-12-09 | Stop reason: SDUPTHER

## 2018-12-06 ENCOUNTER — CLINICAL SUPPORT (OUTPATIENT)
Dept: REHABILITATION | Facility: HOSPITAL | Age: 83
End: 2018-12-06
Attending: FAMILY MEDICINE
Payer: MEDICARE

## 2018-12-06 DIAGNOSIS — R26.2 DIFFICULTY WALKING: ICD-10-CM

## 2018-12-06 DIAGNOSIS — M62.81 MUSCLE WEAKNESS: Primary | ICD-10-CM

## 2018-12-06 DIAGNOSIS — M25.662 STIFFNESS OF BOTH KNEES: ICD-10-CM

## 2018-12-06 DIAGNOSIS — M25.661 STIFFNESS OF BOTH KNEES: ICD-10-CM

## 2018-12-06 PROCEDURE — G8978 MOBILITY CURRENT STATUS: HCPCS | Mod: CK,PN | Performed by: PHYSICAL THERAPIST

## 2018-12-06 PROCEDURE — G8979 MOBILITY GOAL STATUS: HCPCS | Mod: CJ,PN | Performed by: PHYSICAL THERAPIST

## 2018-12-06 PROCEDURE — 97110 THERAPEUTIC EXERCISES: CPT | Mod: PN | Performed by: PHYSICAL THERAPIST

## 2018-12-06 NOTE — PROGRESS NOTES
Physical Therapy Reassessment/Plan of Care     Name: Denice Sheth  Clinic Number: [id number]  Diagnosis: Muscle weakness, stiffness in both knees, difficulty walking   Physician: Meredith Baird MD  Treatment Orders: PT Eval and Treat  Past Medical History:   Diagnosis Date    A-fib     Anticoagulant long-term use     CHF (congestive heart failure)     Diabetes mellitus     Disorder of kidney and ureter     Followed by Dr. Jonathan Pace    Encounter for blood transfusion     Hypertension     Insomnia        Precautions: fall risk  Visit # authorized: 13/24 on 10/9/2018  Date of initial eval:  9-25-18  Authorization period: 11-18-18  Plan of care expiration: 1-3-19  MD Follow up appt: none scheduled    Start Time: 2:00  End Time: 3:02  Total Billable Time: 55    Subjective     Pt reports: have not been able to attend due to waiting for insurance authorization.  Pt states she has been working on her exercises some, maybe once every other day.  Pt states she fell this past Sunday while at a restaurant and is not sure how she fell.  Pt reports no falls other than this instance since last visit.  No knee pain today       Pain Scale:  before treatment:0/10 currently on visual pain scale; after treatment: 0/10    Objective       On 12-6-18, pt wearing long pants and unable to get pants above knee to visually observe knees today difficult to thoroughly assess swelling, but does not appear to be significantly swollen.     Knee Girth: initial eval  R knee was grossly swollen with moderate swelling of whole RLE.    Knee  ROM: (measured in degrees) 12-6-18  Knee (R) (L)   Flexion 120 120   Extension -10 -5        Knee ROM: (measured in degrees) initial eval  Knee                            R          L  Flexion                         120      121  Extension                    -9         -15      Muscle Strength 12-6-18  MMT R L   Hip flexion 4-/5 4/5   Hip abduction tested supine 4-/5 4-/5   Hip extension  Pt able  to bridge 25%           Hip adduction tested supine 4/5 4/5   Knee extension 4+/5 4+/5   Knee flexion 5-/5 5-/5   Ankle dorsiflexion 4+/5 4+/5   Ankle plantar flexion 4-/5 4-/5   Ankle inversion 4/5 4/5   Ankle eversion 4+/5 4+/5     Muscle Strength 11-1-18  MMT R L   Hip flexion 4-/5 4/5   Hip abduction 4-/5 3+/5   Hip extension       Able to perform 25% bridge       Hip adduction  4/5 4/5   Knee extension 4/5 4+/5   Knee flexion 5-/5 5-/5   Ankle dorsiflexion 4+/5 4+/5   Ankle plantar flexion 4-/5 4-/5   Ankle inversion 4/5 4/5   Ankle eversion 4+/5 4+/5             Muscle Strength: initial eval  MMT                            R          L  Hip flexion                   3+/5     4-/5  Hip abduction              3+/5     3/5 Tested in supine  Hip extension              NT/5    NT/5  Glut max                      NT/5    NT/5  Bridge-patient performed 5% bridge  Hip adduction              4/5       4/5  Tested in supine  Knee extension           4-/5      4/5  Knee flexion                4+/5     4+/5  Ankle dorsiflexion        4/5       4/5  Ankle plantar flexion   4-/5      4-/5  Ankle inversion           NT/5    NT/5  Ankle eversion            NT/5    NT/5         -walking/gait: leaning moderately on walker, pushes walker too far ahead of her, less crouched knee as compared to IE , at IE leaning forward heavily on walker, B crouched knee gait, B decreased foot clearance  -sit to stand: uses some legs and pushes from chair, at IE reaches for and leans on walker, uses arms to pull herself up  -sit to supine: moderate difficulty, at IEPt leans backwards into long sit and swivels legs onto tx table before lowering her head, slowly and with effort  -supine to sit: moderate-severe difficulty provided slight assistance, at IE Pt performs log roll technique slowly and with effort, has difficulty extending arms  -supine to prone: did not perform      Outcome measures:  Impairment function: mobility  FOTO knee disability  index score:43 at IE  10  PT reviewed FOTO scores for Denice Sheth   FOTO scores were entered into Epic - see media section.                 G code current: CK at least 40% but < 60% impaired, limited or restricted              G code goal: CJ at least 20 but less than 40%              Severity modifier selections based on the FOTO scoring, objective findings, and co-morbidity assessment.    TREATMENT  BP at start of session 132/63 pulse 60    Therapeutic exercise: Denice received therapeutic exercises to develop strength, endurance, ROM, flexibility, posture and core stabilization for 55 minutes including:   Instructed pt in mechanics needed for that car transfer and she needs to sit and then put both legs in car    Cue pt for upright posture with all seated and standing activities.    Bridges 10  SLR x 10   Heel slides  Hip abd/add supine 0# with pillow casex10  LAQ x 20 B with 1#  Seated marches 20x alternating    Ambulated in clinic 50' with rolling walker with cues to stand upright and keep walker close to pt  Reinforced to pt need to increase compliance with HEP in order to further improve and to be able to work on upon discharge to not lose her gains    Did not perform below    TA set 10x  Trunk rotation supine x 20   Supine clams 2x10 RTB  Standing march x10 alternating    Sit to stands using hands 3 x (held after 3 reps due to R knee pain)  Shoulder flexion sitting x 10  AR push/pull with UE in sitting x 20  AR trunk flex/ext in sitting x 20    (NP) Pt rec'd neuromuscular re-edu for 0 min including:    March in place x 10   Side steps length of // bars x 2  Heel raises x 10  Backward walk then march length of // bars x 2, no knee buckling today at last visit knee buckled slightly 1 time with backward walk   Foam walk 1 min with contact guard and allowing pt to hold onto // bar rail       (NP) Instructed daughter further on working with pt with walking with walker and cues to assist pt during walks to  help decrease back pain and increase endurance      Written Home Exercises Provided: Provided copy of exercises performed this date  Pt  demonstrated verbal understanding. Denice demonstrated good return demonstration of activities.     Pt. education:  · Posture reeducation, body mechanics, HEP   · Pt received gait training, posture education, and sit to stand body mechanics instruction regarding increased upright posture during seated and standing, scooting to EOM, and foot placement.  · No spiritual or educational barriers to learning provided  · Pt has no cultural, educational or language barriers to learning provided.        Assessment   Patient demonstrates improvement in range of motion, strength, stabilization and function  Pt did not make significant gain since last note, but did improve since IE.  Pt appears to understand that lack of improvement may be related to the fact that she was not working on HEP as she should have while not attending PT.  She did not appear to lose strength, but still needs improvement in gait and transfers and did have an episode of a fall     Pt will continue to benefit from skilled outpatient physical therapy to address the remaining functional deficits, provide pt/family education, and to maximize pt's level of independence in the home and community environment. .     PT/PTA met face to face to discuss pt's treatment plan and progress towards established goals. Pt will be seen by physical therapy every 6th visit and minimally once per month.         Goals:   Short term goals (4 weeks) MET STG's  Pt will increase B knee extension to = or < 5 degrees for standing and gait.  Pt will increase B quadriceps and B hip abductors strength by 1/2 muscle grade for standing and gait.  Pt will be able to perform HEP with minimal cueing and supervision required for increased independence.  Pt will be able to ambulate while demonstrating safe use of walker with CGA and moderate verbal  cues.  Pt will be able perform sit to stand transfer while demonstrating safe use of walker with CGA and moderate verbal cues.  Pt will improve functional impairment of mobility to CK at least 40% but less than 60%    Long term goals (8 weeks)  Pt will increase B knee extension to 0 degrees. 75% improved  Pt will increase B quadriceps and B hip abductors strength by 1 muscle grade.50% improved  Pt will ambulate with walker without crouched gait. 20% improved   Pt will be able to ambulate while demonstrating safe use of walker with SBA. 40% improved  Pt will be able perform sit to stand transfer while demonstrating safe use of walker with SBA. 40% improved   Pt will be able to stand with walker without knee buckling in the home and community. 20% improved   Pt will improve functional impairment of mobility to CJ at least 20% but less than 40%.50% improved    Pt's spiritual, cultural and educational needs considered and pt agreeable to plan of care and goals.     Plan   If you concur, I recommend patient continue with physical therapy 2 times a week  for 4 weeks.  Please advise us of your  recommendations. Thank you for allowing us to assist in the care of your patient.      Nargis Santana, MS, PT          I certify the need for these services furnished under this plan of treatment and while under my care.    ____________________________________ Physician/Referring Practitioner                                Date of Signature

## 2018-12-06 NOTE — PLAN OF CARE
Physical Therapy Reassessment/Plan of Care     Name: Denice Sheth  Clinic Number: [id number]  Diagnosis: Muscle weakness, stiffness in both knees, difficulty walking   Physician: Meredith Baird MD  Treatment Orders: PT Eval and Treat  Past Medical History:   Diagnosis Date    A-fib     Anticoagulant long-term use     CHF (congestive heart failure)     Diabetes mellitus     Disorder of kidney and ureter     Followed by Dr. Jonathan Pace    Encounter for blood transfusion     Hypertension     Insomnia        Precautions: fall risk  Visit # authorized: 13/24 on 10/9/2018  Date of initial eval:  9-25-18  Authorization period: 11-18-18  Plan of care expiration: 1-3-19  MD Follow up appt: none scheduled    Start Time: 2:00  End Time: 3:02  Total Billable Time: 55    Subjective     Pt reports: have not been able to attend due to waiting for insurance authorization.  Pt states she has been working on her exercises some, maybe once every other day.  Pt states she fell this past Sunday while at a restaurant and is not sure how she fell.  Pt reports no falls other than this instance since last visit.  No knee pain today       Pain Scale:  before treatment:0/10 currently on visual pain scale; after treatment: 0/10    Objective       On 12-6-18, pt wearing long pants and unable to get pants above knee to visually observe knees today difficult to thoroughly assess swelling, but does not appear to be significantly swollen.     Knee Girth: initial eval  R knee was grossly swollen with moderate swelling of whole RLE.    Knee  ROM: (measured in degrees) 12-6-18  Knee (R) (L)   Flexion 120 120   Extension -10 -5        Knee ROM: (measured in degrees) initial eval  Knee                            R          L  Flexion                         120      121  Extension                    -9         -15      Muscle Strength 12-6-18  MMT R L   Hip flexion 4-/5 4/5   Hip abduction tested supine 4-/5 4-/5   Hip extension  Pt able  to bridge 25%           Hip adduction tested supine 4/5 4/5   Knee extension 4+/5 4+/5   Knee flexion 5-/5 5-/5   Ankle dorsiflexion 4+/5 4+/5   Ankle plantar flexion 4-/5 4-/5   Ankle inversion 4/5 4/5   Ankle eversion 4+/5 4+/5     Muscle Strength 11-1-18  MMT R L   Hip flexion 4-/5 4/5   Hip abduction 4-/5 3+/5   Hip extension       Able to perform 25% bridge       Hip adduction  4/5 4/5   Knee extension 4/5 4+/5   Knee flexion 5-/5 5-/5   Ankle dorsiflexion 4+/5 4+/5   Ankle plantar flexion 4-/5 4-/5   Ankle inversion 4/5 4/5   Ankle eversion 4+/5 4+/5             Muscle Strength: initial eval  MMT                            R          L  Hip flexion                   3+/5     4-/5  Hip abduction              3+/5     3/5 Tested in supine  Hip extension              NT/5    NT/5  Glut max                      NT/5    NT/5  Bridge-patient performed 5% bridge  Hip adduction              4/5       4/5  Tested in supine  Knee extension           4-/5      4/5  Knee flexion                4+/5     4+/5  Ankle dorsiflexion        4/5       4/5  Ankle plantar flexion   4-/5      4-/5  Ankle inversion           NT/5    NT/5  Ankle eversion            NT/5    NT/5         -walking/gait: leaning moderately on walker, pushes walker too far ahead of her, less crouched knee as compared to IE , at IE leaning forward heavily on walker, B crouched knee gait, B decreased foot clearance  -sit to stand: uses some legs and pushes from chair, at IE reaches for and leans on walker, uses arms to pull herself up  -sit to supine: moderate difficulty, at IEPt leans backwards into long sit and swivels legs onto tx table before lowering her head, slowly and with effort  -supine to sit: moderate-severe difficulty provided slight assistance, at IE Pt performs log roll technique slowly and with effort, has difficulty extending arms  -supine to prone: did not perform      Outcome measures:  Impairment function: mobility  FOTO knee disability  index score:43 at IE  10  PT reviewed FOTO scores for Denice Sheth   FOTO scores were entered into Epic - see media section.                 G code current: CK at least 40% but < 60% impaired, limited or restricted              G code goal: CJ at least 20 but less than 40%              Severity modifier selections based on the FOTO scoring, objective findings, and co-morbidity assessment.    TREATMENT  BP at start of session 132/63 pulse 60    Therapeutic exercise: Denice received therapeutic exercises to develop strength, endurance, ROM, flexibility, posture and core stabilization for 55 minutes including:   Instructed pt in mechanics needed for that car transfer and she needs to sit and then put both legs in car    Cue pt for upright posture with all seated and standing activities.    Bridges 10  SLR x 10   Heel slides  Hip abd/add supine 0# with pillow casex10  LAQ x 20 B with 1#  Seated marches 20x alternating    Ambulated in clinic 50' with rolling walker with cues to stand upright and keep walker close to pt  Reinforced to pt need to increase compliance with HEP in order to further improve and to be able to work on upon discharge to not lose her gains    Did not perform below    TA set 10x  Trunk rotation supine x 20   Supine clams 2x10 RTB  Standing march x10 alternating    Sit to stands using hands 3 x (held after 3 reps due to R knee pain)  Shoulder flexion sitting x 10  AR push/pull with UE in sitting x 20  AR trunk flex/ext in sitting x 20    (NP) Pt rec'd neuromuscular re-edu for 0 min including:    March in place x 10   Side steps length of // bars x 2  Heel raises x 10  Backward walk then march length of // bars x 2, no knee buckling today at last visit knee buckled slightly 1 time with backward walk   Foam walk 1 min with contact guard and allowing pt to hold onto // bar rail       (NP) Instructed daughter further on working with pt with walking with walker and cues to assist pt during walks to  help decrease back pain and increase endurance      Written Home Exercises Provided: Provided copy of exercises performed this date  Pt  demonstrated verbal understanding. Denice demonstrated good return demonstration of activities.     Pt. education:  · Posture reeducation, body mechanics, HEP   · Pt received gait training, posture education, and sit to stand body mechanics instruction regarding increased upright posture during seated and standing, scooting to EOM, and foot placement.  · No spiritual or educational barriers to learning provided  · Pt has no cultural, educational or language barriers to learning provided.        Assessment   Patient demonstrates improvement in range of motion, strength, stabilization and function  Pt did not make significant gain since last note, but did improve since IE.  Pt appears to understand that lack of improvement may be related to the fact that she was not working on HEP as she should have while not attending PT.  She did not appear to lose strength, but still needs improvement in gait and transfers and did have an episode of a fall     Pt will continue to benefit from skilled outpatient physical therapy to address the remaining functional deficits, provide pt/family education, and to maximize pt's level of independence in the home and community environment. .     PT/PTA met face to face to discuss pt's treatment plan and progress towards established goals. Pt will be seen by physical therapy every 6th visit and minimally once per month.         Goals:   Short term goals (4 weeks) MET STG's  Pt will increase B knee extension to = or < 5 degrees for standing and gait.  Pt will increase B quadriceps and B hip abductors strength by 1/2 muscle grade for standing and gait.  Pt will be able to perform HEP with minimal cueing and supervision required for increased independence.  Pt will be able to ambulate while demonstrating safe use of walker with CGA and moderate verbal  cues.  Pt will be able perform sit to stand transfer while demonstrating safe use of walker with CGA and moderate verbal cues.  Pt will improve functional impairment of mobility to CK at least 40% but less than 60%    Long term goals (8 weeks)  Pt will increase B knee extension to 0 degrees. 75% improved  Pt will increase B quadriceps and B hip abductors strength by 1 muscle grade.50% improved  Pt will ambulate with walker without crouched gait. 20% improved   Pt will be able to ambulate while demonstrating safe use of walker with SBA. 40% improved  Pt will be able perform sit to stand transfer while demonstrating safe use of walker with SBA. 40% improved   Pt will be able to stand with walker without knee buckling in the home and community. 20% improved   Pt will improve functional impairment of mobility to CJ at least 20% but less than 40%.50% improved    Pt's spiritual, cultural and educational needs considered and pt agreeable to plan of care and goals.     Plan   If you concur, I recommend patient continue with physical therapy 2 times a week  for 4 weeks.  Please advise us of your  recommendations. Thank you for allowing us to assist in the care of your patient.      Nargis Santana, MS, PT          I certify the need for these services furnished under this plan of treatment and while under my care.    ____________________________________ Physician/Referring Practitioner                                Date of Signature

## 2018-12-11 ENCOUNTER — LAB VISIT (OUTPATIENT)
Dept: LAB | Facility: HOSPITAL | Age: 83
End: 2018-12-11
Attending: NURSE PRACTITIONER
Payer: MEDICARE

## 2018-12-11 ENCOUNTER — OFFICE VISIT (OUTPATIENT)
Dept: HEMATOLOGY/ONCOLOGY | Facility: CLINIC | Age: 83
End: 2018-12-11
Payer: MEDICARE

## 2018-12-11 VITALS
TEMPERATURE: 98 F | HEIGHT: 62 IN | DIASTOLIC BLOOD PRESSURE: 48 MMHG | RESPIRATION RATE: 18 BRPM | HEART RATE: 58 BPM | SYSTOLIC BLOOD PRESSURE: 125 MMHG | WEIGHT: 198.44 LBS | BODY MASS INDEX: 36.52 KG/M2

## 2018-12-11 DIAGNOSIS — D50.9 IRON DEFICIENCY ANEMIA, UNSPECIFIED IRON DEFICIENCY ANEMIA TYPE: ICD-10-CM

## 2018-12-11 DIAGNOSIS — N18.4 CKD (CHRONIC KIDNEY DISEASE), STAGE IV: ICD-10-CM

## 2018-12-11 DIAGNOSIS — D50.9 IRON DEFICIENCY ANEMIA, UNSPECIFIED IRON DEFICIENCY ANEMIA TYPE: Primary | ICD-10-CM

## 2018-12-11 DIAGNOSIS — N18.30 CKD (CHRONIC KIDNEY DISEASE) STAGE 3, GFR 30-59 ML/MIN: ICD-10-CM

## 2018-12-11 LAB
ANION GAP SERPL CALC-SCNC: 8 MMOL/L
BASOPHILS # BLD AUTO: 0.02 K/UL
BASOPHILS NFR BLD: 0.3 %
BUN SERPL-MCNC: 39 MG/DL
CALCIUM SERPL-MCNC: 8.9 MG/DL
CHLORIDE SERPL-SCNC: 99 MMOL/L
CO2 SERPL-SCNC: 28 MMOL/L
CREAT SERPL-MCNC: 1.73 MG/DL
DIFFERENTIAL METHOD: ABNORMAL
EOSINOPHIL # BLD AUTO: 0.1 K/UL
EOSINOPHIL NFR BLD: 0.9 %
ERYTHROCYTE [DISTWIDTH] IN BLOOD BY AUTOMATED COUNT: 13.5 %
EST. GFR  (AFRICAN AMERICAN): 31 ML/MIN/1.73 M^2
EST. GFR  (NON AFRICAN AMERICAN): 27 ML/MIN/1.73 M^2
GLUCOSE SERPL-MCNC: 247 MG/DL
HCT VFR BLD AUTO: 37.8 %
HGB BLD-MCNC: 12.2 G/DL
LYMPHOCYTES # BLD AUTO: 1.7 K/UL
LYMPHOCYTES NFR BLD: 22.8 %
MCH RBC QN AUTO: 29.8 PG
MCHC RBC AUTO-ENTMCNC: 32.3 G/DL
MCV RBC AUTO: 92 FL
MONOCYTES # BLD AUTO: 1.1 K/UL
MONOCYTES NFR BLD: 13.7 %
NEUTROPHILS # BLD AUTO: 4.8 K/UL
NEUTROPHILS NFR BLD: 62.3 %
NRBC BLD-RTO: 0 /100 WBC
PLATELET # BLD AUTO: 244 K/UL
PMV BLD AUTO: 11.1 FL
POTASSIUM SERPL-SCNC: 5 MMOL/L
RBC # BLD AUTO: 4.1 M/UL
SODIUM SERPL-SCNC: 135 MMOL/L
WBC # BLD AUTO: 7.64 K/UL

## 2018-12-11 PROCEDURE — 3074F SYST BP LT 130 MM HG: CPT | Mod: CPTII,S$GLB,, | Performed by: NURSE PRACTITIONER

## 2018-12-11 PROCEDURE — 85025 COMPLETE CBC W/AUTO DIFF WBC: CPT

## 2018-12-11 PROCEDURE — 36415 COLL VENOUS BLD VENIPUNCTURE: CPT | Mod: PN

## 2018-12-11 PROCEDURE — 85025 COMPLETE CBC W/AUTO DIFF WBC: CPT | Mod: PN

## 2018-12-11 PROCEDURE — 1101F PT FALLS ASSESS-DOCD LE1/YR: CPT | Mod: CPTII,S$GLB,, | Performed by: NURSE PRACTITIONER

## 2018-12-11 PROCEDURE — 99213 OFFICE O/P EST LOW 20 MIN: CPT | Mod: S$GLB,,, | Performed by: NURSE PRACTITIONER

## 2018-12-11 PROCEDURE — 99999 PR PBB SHADOW E&M-EST. PATIENT-LVL V: CPT | Mod: PBBFAC,,, | Performed by: NURSE PRACTITIONER

## 2018-12-11 PROCEDURE — 80048 BASIC METABOLIC PNL TOTAL CA: CPT | Mod: PN

## 2018-12-11 PROCEDURE — 80048 BASIC METABOLIC PNL TOTAL CA: CPT

## 2018-12-11 PROCEDURE — 3078F DIAST BP <80 MM HG: CPT | Mod: CPTII,S$GLB,, | Performed by: NURSE PRACTITIONER

## 2018-12-11 NOTE — PROGRESS NOTES
HISTORY OF PRESENT ILLNESS:  The patient is an 84 year old white female   known to Dr. Chin for anemia in the setting of CKD.  She was infused with   Feraheme on 01/29/18 & 02/01/18.      She presents to the clinic with her daughter for her 6 months post evaluation.    She reports having a cold with non-productive cough for the last 2 weeks.  She denies any difficulties with fever, chills, fatigue, headache,irritability,   muscle spasms or weakness, cramps, nausea, malaise, palpitations,   numbness/tingling, irregular heartbeats, etc.  No new complaint or pertinent   finding on a 10-point review of systems.    PHYSICAL EXAMINATION:  GENERAL:  Well-developed, well-nourished, white female confined to a wheelchair.  Alert & oriented x 3  VITAL SIGNS:  Weight:  Gain of 8 1/2 pounds in 6 months.  Wt Readings from Last 3 Encounters:   12/11/18 90 kg (198 lb 6.6 oz)   11/03/18 89 kg (196 lb 3.4 oz)   11/02/18 82.6 kg (182 lb)     Temp Readings from Last 3 Encounters:   12/11/18 98.3 °F (36.8 °C)   11/03/18 97.8 °F (36.6 °C)   11/02/18 97.8 °F (36.6 °C) (Oral)     BP Readings from Last 3 Encounters:   12/11/18 (!) 125/48   11/03/18 (!) 147/35   11/02/18 (!) 170/70     Pulse Readings from Last 3 Encounters:   12/11/18 (!) 58   11/03/18 (!) 59   11/02/18 60     HEENT:  Normocephalic, atraumatic.  Oral mucosa pink and moist.  Lips without   lesions.  Tongue midline.  Oropharynx clear.  Nonicteric sclerae.   NECK:  Supple.  No adenopathy.                                               HEART:  Bradycardic with murmur, no gallop or rub.  Pacemaker to LCW.           LUNGS:  Diminished BS to RLL, NL respiratory effort.                                   ABDOMEN:  Soft, nontender and nondistended with positive normoactive bowel   sounds.  No hepatosplenomegaly.                                              EXTREMITIES:  No cyanosis, clubbing or edema.  Distal pulses are intact.     LABORATORY:    Lab Results   Component Value Date     WBC 7.64 12/11/2018    HGB 12.2 12/11/2018    HCT 37.8 12/11/2018    MCV 92 12/11/2018     12/11/2018     Hgb 12.2 (13.1, 11.7, 10.5)  Unremarkable differential    BMP  Lab Results   Component Value Date     (L) 12/11/2018    K 5.0 12/11/2018    CL 99 12/11/2018    CO2 28 12/11/2018    BUN 39 (H) 12/11/2018    CREATININE 1.73 (H) 12/11/2018    CALCIUM 8.9 12/11/2018    ANIONGAP 8 12/11/2018    ESTGFRAFRICA 31 (A) 12/11/2018    EGFRNONAA 27 (A) 12/11/2018     IMPRESSION:    1.  Anemia in the setting of stage IV chronic kidney disease - clinically stable.  2.  Hyponatremia - stable  3.  Hyperkalemia - stable  4.  CKD IV - followed by Dr. Pace.    PLAN:    1.  Reevaluate in 6 months with interval CBC, BMP.  To Note:  Last iron studies done on 12/29/17.      Assessment/plan reviewed and approved by Dr. Chin.

## 2018-12-12 PROBLEM — R09.02 HYPOXIA: Status: ACTIVE | Noted: 2018-12-12

## 2018-12-13 PROBLEM — R82.71 ASYMPTOMATIC BACTERIURIA: Status: ACTIVE | Noted: 2018-12-13

## 2018-12-17 PROBLEM — Z02.9 DISCHARGE PLANNING ISSUES: Status: ACTIVE | Noted: 2018-12-17

## 2018-12-17 PROBLEM — Z75.8 DISCHARGE PLANNING ISSUES: Status: ACTIVE | Noted: 2018-12-17

## 2018-12-26 ENCOUNTER — TELEPHONE (OUTPATIENT)
Dept: FAMILY MEDICINE | Facility: CLINIC | Age: 83
End: 2018-12-26

## 2018-12-26 NOTE — TELEPHONE ENCOUNTER
Home Health nurse Pa states that saw patient today and states patients cough was productive but had no color to the sputum. Wanted to make sure we were aware that she was in the hospital. Advised that Dr Baird is out of clinic and if patient is not improving then she will need to got to , States that she does have a hospital transitional care appt tomorrow.

## 2018-12-26 NOTE — TELEPHONE ENCOUNTER
----- Message from Idalmis Traylor sent at 12/26/2018  9:07 AM CST -----  Contact: Pa Plummertere  Patient has a productive cough with lung congestion, possibly needs antibiotic.   Please call Try back to advise [phone].  Thanks

## 2018-12-27 RX ORDER — SYRINGE,SAFETY WITH NEEDLE,1ML 25GX1"
SYRINGE (EA) MISCELLANEOUS
Qty: 100 EACH | Refills: 2 | Status: SHIPPED | OUTPATIENT
Start: 2018-12-27 | End: 2019-12-02 | Stop reason: SDUPTHER

## 2019-01-03 PROBLEM — J18.9 PNEUMONIA DUE TO INFECTIOUS ORGANISM: Status: ACTIVE | Noted: 2019-01-03

## 2019-01-04 ENCOUNTER — TELEPHONE (OUTPATIENT)
Dept: ADMINISTRATIVE | Facility: CLINIC | Age: 84
End: 2019-01-04

## 2019-01-04 NOTE — TELEPHONE ENCOUNTER
Home Health SOC with [facility]-Dr. Meredith Baird. Pt received SN, PT, and OT services.

## 2019-01-07 RX ORDER — ESCITALOPRAM OXALATE 10 MG/1
TABLET ORAL
Qty: 90 TABLET | Refills: 1 | Status: SHIPPED | OUTPATIENT
Start: 2019-01-07 | End: 2019-03-12 | Stop reason: SDUPTHER

## 2019-02-08 RX ORDER — RIVASTIGMINE 4.6 MG/24H
PATCH, EXTENDED RELEASE TRANSDERMAL
Qty: 30 PATCH | Refills: 3 | Status: SHIPPED | OUTPATIENT
Start: 2019-02-08 | End: 2019-06-09 | Stop reason: SDUPTHER

## 2019-02-18 NOTE — TELEPHONE ENCOUNTER
Pt Of Meredith Baird MD    Last seen on: 9/6/2018    Next appt: 3/12/19    Last refill: 5/14/2018    Allergies:   Review of patient's allergies indicates:   Allergen Reactions    Adhesive Itching       Pharmacy:   [id number]  [place] - [address]  [address]  [place] [address]  Phone: [phone] Fax: [phone]        Please review! Thank you!

## 2019-02-18 NOTE — TELEPHONE ENCOUNTER
----- Message from Irma Devine sent at 2/18/2019  8:19 AM CST -----  Contact: Parisa  Type:  RX Refill Request    Who Called:  Patient's daughter Parisa  Refill or New Rx:  refills  RX Name and Strength:  linagliptin (TRADJENTA) 5 mg Tab tablet and escitalopram oxalate (LEXAPRO) 10 MG tablet  How is the patient currently taking it? (ex. 1XDay):    Is this a 30 day or 90 day RX:    Preferred Pharmacy with phone number:  [facility]  Local or Mail Order:  local  Ordering Provider:  Dr.Elder Pierre Call Back Number:  [phone]  Additional Information:  Requesting a call back when script has been sent   ----- Message from Qian Billingsley sent at 2/22/2017  8:09 AM CST -----  Contact: self - [phone]  tricia - has blood in urine - prostatitis - wants to be seen today - nothing available for me to book  - please call patient at

## 2019-02-19 RX ORDER — ESCITALOPRAM OXALATE 10 MG/1
10 TABLET ORAL NIGHTLY
Qty: 90 TABLET | Refills: 1 | OUTPATIENT
Start: 2019-02-19

## 2019-02-19 NOTE — PROGRESS NOTES
Refill Authorization Note     is requesting a refill authorization.    Brief assessment and rationale for refill: Quick DC (RTS 5/19 linagliptin and 7/19 escitalopram)  Name and strength of medication: Linagliptin 5mg / Escitalopram 10mg                                      Comments:

## 2019-03-12 ENCOUNTER — HOSPITAL ENCOUNTER (OUTPATIENT)
Dept: RADIOLOGY | Facility: HOSPITAL | Age: 84
Discharge: HOME | End: 2019-03-12
Attending: INTERNAL MEDICINE
Payer: MEDICARE

## 2019-03-12 ENCOUNTER — LAB VISIT (OUTPATIENT)
Dept: LAB | Facility: HOSPITAL | Age: 84
End: 2019-03-12
Attending: FAMILY MEDICINE
Payer: MEDICARE

## 2019-03-12 ENCOUNTER — OFFICE VISIT (OUTPATIENT)
Dept: FAMILY MEDICINE | Facility: CLINIC | Age: 84
End: 2019-03-12
Payer: MEDICARE

## 2019-03-12 VITALS
DIASTOLIC BLOOD PRESSURE: 62 MMHG | TEMPERATURE: 98 F | SYSTOLIC BLOOD PRESSURE: 124 MMHG | RESPIRATION RATE: 18 BRPM | BODY MASS INDEX: 36.29 KG/M2 | HEIGHT: 62 IN | OXYGEN SATURATION: 96 % | HEART RATE: 60 BPM | WEIGHT: 197.19 LBS

## 2019-03-12 DIAGNOSIS — N18.4 CKD (CHRONIC KIDNEY DISEASE), STAGE IV: Primary | ICD-10-CM

## 2019-03-12 DIAGNOSIS — D64.9 ANEMIA, UNSPECIFIED TYPE: ICD-10-CM

## 2019-03-12 DIAGNOSIS — R93.89 ABNORMAL X-RAY: ICD-10-CM

## 2019-03-12 DIAGNOSIS — N18.4 CKD (CHRONIC KIDNEY DISEASE), STAGE IV: ICD-10-CM

## 2019-03-12 DIAGNOSIS — I50.32 CHRONIC DIASTOLIC CONGESTIVE HEART FAILURE, NYHA CLASS 1: ICD-10-CM

## 2019-03-12 DIAGNOSIS — E66.01 MORBID (SEVERE) OBESITY DUE TO EXCESS CALORIES: ICD-10-CM

## 2019-03-12 DIAGNOSIS — E11.9 TYPE 2 DIABETES MELLITUS WITHOUT COMPLICATION, WITH LONG-TERM CURRENT USE OF INSULIN: ICD-10-CM

## 2019-03-12 DIAGNOSIS — N18.30 CKD (CHRONIC KIDNEY DISEASE) STAGE 3, GFR 30-59 ML/MIN: ICD-10-CM

## 2019-03-12 DIAGNOSIS — Z12.11 SPECIAL SCREENING FOR MALIGNANT NEOPLASMS, COLON: ICD-10-CM

## 2019-03-12 DIAGNOSIS — Z79.4 TYPE 2 DIABETES MELLITUS WITHOUT COMPLICATION, WITH LONG-TERM CURRENT USE OF INSULIN: ICD-10-CM

## 2019-03-12 DIAGNOSIS — I48.20 CHRONIC ATRIAL FIBRILLATION: ICD-10-CM

## 2019-03-12 DIAGNOSIS — I73.9 PVD (PERIPHERAL VASCULAR DISEASE): ICD-10-CM

## 2019-03-12 DIAGNOSIS — I69.354 HEMIPARESIS AFFECTING LEFT SIDE AS LATE EFFECT OF CEREBROVASCULAR ACCIDENT (CVA): ICD-10-CM

## 2019-03-12 DIAGNOSIS — E11.21 DIABETIC NEPHROPATHY ASSOCIATED WITH TYPE 2 DIABETES MELLITUS: ICD-10-CM

## 2019-03-12 DIAGNOSIS — J15.9 BACTERIAL PNEUMONIA: ICD-10-CM

## 2019-03-12 DIAGNOSIS — R30.0 DYSURIA: ICD-10-CM

## 2019-03-12 PROBLEM — R82.71 ASYMPTOMATIC BACTERIURIA: Status: RESOLVED | Noted: 2018-12-13 | Resolved: 2019-03-12

## 2019-03-12 PROBLEM — J18.9 PNEUMONIA DUE TO INFECTIOUS ORGANISM: Status: RESOLVED | Noted: 2019-01-03 | Resolved: 2019-03-12

## 2019-03-12 PROBLEM — Z75.8 DISCHARGE PLANNING ISSUES: Status: RESOLVED | Noted: 2018-12-17 | Resolved: 2019-03-12

## 2019-03-12 PROBLEM — Z02.9 DISCHARGE PLANNING ISSUES: Status: RESOLVED | Noted: 2018-12-17 | Resolved: 2019-03-12

## 2019-03-12 PROBLEM — R09.02 HYPOXIA: Status: RESOLVED | Noted: 2018-12-12 | Resolved: 2019-03-12

## 2019-03-12 LAB
ALBUMIN SERPL BCP-MCNC: 3.8 G/DL
ANION GAP SERPL CALC-SCNC: 9 MMOL/L
ANION GAP SERPL CALC-SCNC: 9 MMOL/L
BASOPHILS # BLD AUTO: 0.02 K/UL
BASOPHILS NFR BLD: 0.3 %
BILIRUB SERPL-MCNC: NORMAL MG/DL
BLOOD URINE, POC: NORMAL
BUN SERPL-MCNC: 38 MG/DL
BUN SERPL-MCNC: 38 MG/DL
CALCIUM SERPL-MCNC: 10.3 MG/DL
CALCIUM SERPL-MCNC: 10.3 MG/DL
CHLORIDE SERPL-SCNC: 101 MMOL/L
CHLORIDE SERPL-SCNC: 101 MMOL/L
CO2 SERPL-SCNC: 28 MMOL/L
CO2 SERPL-SCNC: 28 MMOL/L
COLOR, POC UA: NORMAL
CREAT SERPL-MCNC: 1.8 MG/DL
CREAT SERPL-MCNC: 1.8 MG/DL
DIFFERENTIAL METHOD: ABNORMAL
EOSINOPHIL # BLD AUTO: 0.1 K/UL
EOSINOPHIL NFR BLD: 1.6 %
ERYTHROCYTE [DISTWIDTH] IN BLOOD BY AUTOMATED COUNT: 13.6 %
EST. GFR  (AFRICAN AMERICAN): 29.4 ML/MIN/1.73 M^2
EST. GFR  (AFRICAN AMERICAN): 29.4 ML/MIN/1.73 M^2
EST. GFR  (NON AFRICAN AMERICAN): 25.5 ML/MIN/1.73 M^2
EST. GFR  (NON AFRICAN AMERICAN): 25.5 ML/MIN/1.73 M^2
GLUCOSE SERPL-MCNC: 135 MG/DL
GLUCOSE SERPL-MCNC: 135 MG/DL
GLUCOSE UR QL STRIP: NORMAL
HCT VFR BLD AUTO: 44.4 %
HGB BLD-MCNC: 13.6 G/DL
IMM GRANULOCYTES # BLD AUTO: 0.03 K/UL
IMM GRANULOCYTES NFR BLD AUTO: 0.4 %
KETONES UR QL STRIP: NORMAL
LEUKOCYTE ESTERASE URINE, POC: NORMAL
LYMPHOCYTES # BLD AUTO: 2.3 K/UL
LYMPHOCYTES NFR BLD: 31.4 %
MCH RBC QN AUTO: 29.1 PG
MCHC RBC AUTO-ENTMCNC: 30.6 G/DL
MCV RBC AUTO: 95 FL
MONOCYTES # BLD AUTO: 0.7 K/UL
MONOCYTES NFR BLD: 10 %
NEUTROPHILS # BLD AUTO: 4.1 K/UL
NEUTROPHILS NFR BLD: 56.3 %
NITRITE, POC UA: NORMAL
NRBC BLD-RTO: 0 /100 WBC
PH, POC UA: 5
PHOSPHATE SERPL-MCNC: 4 MG/DL
PLATELET # BLD AUTO: 265 K/UL
PMV BLD AUTO: 12 FL
POTASSIUM SERPL-SCNC: 4.8 MMOL/L
POTASSIUM SERPL-SCNC: 4.8 MMOL/L
PROTEIN, POC: NORMAL
PTH-INTACT SERPL-MCNC: 39 PG/ML
RBC # BLD AUTO: 4.67 M/UL
SODIUM SERPL-SCNC: 138 MMOL/L
SODIUM SERPL-SCNC: 138 MMOL/L
SPECIFIC GRAVITY, POC UA: 1.01
UROBILINOGEN, POC UA: NORMAL
WBC # BLD AUTO: 7.33 K/UL

## 2019-03-12 PROCEDURE — 71046 XR CHEST PA AND LATERAL: ICD-10-PCS | Mod: 26,,, | Performed by: RADIOLOGY

## 2019-03-12 PROCEDURE — 71046 X-RAY EXAM CHEST 2 VIEWS: CPT | Mod: TC,PN

## 2019-03-12 PROCEDURE — 83970 ASSAY OF PARATHORMONE: CPT

## 2019-03-12 PROCEDURE — 99214 OFFICE O/P EST MOD 30 MIN: CPT | Mod: 25,S$GLB,, | Performed by: FAMILY MEDICINE

## 2019-03-12 PROCEDURE — 1101F PT FALLS ASSESS-DOCD LE1/YR: CPT | Mod: CPTII,S$GLB,, | Performed by: FAMILY MEDICINE

## 2019-03-12 PROCEDURE — 3074F PR MOST RECENT SYSTOLIC BLOOD PRESSURE < 130 MM HG: ICD-10-PCS | Mod: CPTII,S$GLB,, | Performed by: FAMILY MEDICINE

## 2019-03-12 PROCEDURE — 3078F DIAST BP <80 MM HG: CPT | Mod: CPTII,S$GLB,, | Performed by: FAMILY MEDICINE

## 2019-03-12 PROCEDURE — 71046 X-RAY EXAM CHEST 2 VIEWS: CPT | Mod: 26,,, | Performed by: RADIOLOGY

## 2019-03-12 PROCEDURE — 80069 RENAL FUNCTION PANEL: CPT

## 2019-03-12 PROCEDURE — 99214 PR OFFICE/OUTPT VISIT, EST, LEVL IV, 30-39 MIN: ICD-10-PCS | Mod: 25,S$GLB,, | Performed by: FAMILY MEDICINE

## 2019-03-12 PROCEDURE — 87086 URINE CULTURE/COLONY COUNT: CPT

## 2019-03-12 PROCEDURE — 3078F PR MOST RECENT DIASTOLIC BLOOD PRESSURE < 80 MM HG: ICD-10-PCS | Mod: CPTII,S$GLB,, | Performed by: FAMILY MEDICINE

## 2019-03-12 PROCEDURE — 99999 PR PBB SHADOW E&M-EST. PATIENT-LVL IV: ICD-10-PCS | Mod: PBBFAC,,, | Performed by: FAMILY MEDICINE

## 2019-03-12 PROCEDURE — 81002 POCT URINE DIPSTICK WITHOUT MICROSCOPE: ICD-10-PCS | Mod: S$GLB,,, | Performed by: FAMILY MEDICINE

## 2019-03-12 PROCEDURE — 99499 UNLISTED E&M SERVICE: CPT | Mod: S$GLB,,, | Performed by: FAMILY MEDICINE

## 2019-03-12 PROCEDURE — 99999 PR PBB SHADOW E&M-EST. PATIENT-LVL IV: CPT | Mod: PBBFAC,,, | Performed by: FAMILY MEDICINE

## 2019-03-12 PROCEDURE — 85025 COMPLETE CBC W/AUTO DIFF WBC: CPT

## 2019-03-12 PROCEDURE — 36415 COLL VENOUS BLD VENIPUNCTURE: CPT | Mod: PN

## 2019-03-12 PROCEDURE — 81002 URINALYSIS NONAUTO W/O SCOPE: CPT | Mod: S$GLB,,, | Performed by: FAMILY MEDICINE

## 2019-03-12 PROCEDURE — 1101F PR PT FALLS ASSESS DOC 0-1 FALLS W/OUT INJ PAST YR: ICD-10-PCS | Mod: CPTII,S$GLB,, | Performed by: FAMILY MEDICINE

## 2019-03-12 PROCEDURE — 3074F SYST BP LT 130 MM HG: CPT | Mod: CPTII,S$GLB,, | Performed by: FAMILY MEDICINE

## 2019-03-12 PROCEDURE — 99499 RISK ADDL DX/OHS AUDIT: ICD-10-PCS | Mod: S$GLB,,, | Performed by: FAMILY MEDICINE

## 2019-03-12 RX ORDER — IPRATROPIUM BROMIDE AND ALBUTEROL SULFATE 2.5; .5 MG/3ML; MG/3ML
3 SOLUTION RESPIRATORY (INHALATION) EVERY 6 HOURS PRN
Qty: 1 BOX | Refills: 0 | Status: SHIPPED | OUTPATIENT
Start: 2019-03-12 | End: 2022-01-02 | Stop reason: CLARIF

## 2019-03-12 RX ORDER — INSULIN GLARGINE 100 [IU]/ML
25 INJECTION, SOLUTION SUBCUTANEOUS NIGHTLY
Qty: 7.5 ML | Refills: 11
Start: 2019-03-12 | End: 2019-07-30

## 2019-03-12 RX ORDER — ESCITALOPRAM OXALATE 10 MG/1
10 TABLET ORAL NIGHTLY
Qty: 90 TABLET | Refills: 1 | Status: SHIPPED | OUTPATIENT
Start: 2019-03-12 | End: 2020-02-10

## 2019-03-12 NOTE — PROGRESS NOTES
Subjective:       Patient ID: Denice Sheth is a 84 y.o. female.    Chief Complaint: No chief complaint on file.      Denice Sheth is in the office for f/u.    HPI  Hosp admission in Dec for hypoxia. Dx of hypoxic resp failure, uti. 6d admission. Had pulm f/u with [facility] last week. Has CXR to do today (ct was too expensive).   No falls since LOV.  Past Medical History:   Diagnosis Date    A-fib     Anticoagulant long-term use     CHF (congestive heart failure)     Diabetes mellitus     Disorder of kidney and ureter     Followed by Dr. Jonathan Pace    Encounter for blood transfusion     Hypertension     Insomnia     Stage 4 chronic kidney disease     Thyroid disease     hypothyroidism     BS slightly above her normal FBS the last 3 days. 130-160. No new sx or issues, but they do note an episode of incontinence (bladder) this morning. No hypoglycemia.  L hemiparesis stable from prev cva.  Maintains cards f/u with pressor re: afib, cad, carotid stenosis, chf. On xarelto daily.     Current Outpatient Medications:     acetaminophen (TYLENOL) 325 MG tablet, Take 2 tablets (650 mg total) by mouth every 8 (eight) hours as needed., Disp: , Rfl: 0    albuterol (PROVENTIL) 2.5 mg /3 mL (0.083 %) nebulizer solution, Take 3 mLs (2.5 mg total) by nebulization 4 (four) times daily as needed for Wheezing. Rescue, Disp: 30 mL, Rfl: 1    amiodarone (PACERONE) 200 MG Tab, Take 200 mg by mouth once daily. , Disp: , Rfl:     ascorbic acid (VITAMIN C) 500 MG tablet, Take 500 mg by mouth once daily., Disp: , Rfl:     b complex vitamins capsule, Take 1 capsule by mouth once daily., Disp: , Rfl:     bisacodyl (DULCOLAX) 10 mg Supp, Place 1 suppository (10 mg total) rectally daily as needed (Until bowel movement if patient has no bowel movement for 2 days)., Disp: , Rfl: 0    blood-glucose meter kit, by Other route. Use as instructed, Disp: , Rfl:     calcium-vitamin D3 500 mg(1,250mg) -200 unit per tablet, Take 1  tablet by mouth 2 (two) times daily with meals., Disp: , Rfl:     clopidogrel (PLAVIX) 75 mg tablet, TAKE ONE TABLET BY MOUTH ONCE DAILY, Disp: 30 tablet, Rfl: 11    escitalopram oxalate (LEXAPRO) 10 MG tablet, TAKE 1 TABLET BY MOUTH ONCE DAILY IN THE EVENING, Disp: 90 tablet, Rfl: 1    fish oil-omega-3 fatty acids 300-1,000 mg capsule, Take 2 g by mouth once daily., Disp: , Rfl:     insulin (BASAGLAR KWIKPEN U-100 INSULIN) glargine 100 units/mL (3mL) SubQ pen, Inject 25 Units into the skin every evening., Disp: , Rfl:     insulin aspart (NOVOLOG) 100 unit/mL injection, Inject 6 Units into the skin 3 (three) times daily before meals. (Patient taking differently: Inject 6 Units into the skin 3 (three) times daily before meals. Has been using only at lunch d/t lower BS), Disp: 10 mL, Rfl: 11    insulin syringe-needle U-100 1 mL 31 gauge x 5/16 Syrg, USE AS DIRECTED, Disp: 100 each, Rfl: 2    levothyroxine (SYNTHROID) 75 MCG tablet, TAKE 1 TABLET BY MOUTH ONCE DAILY IN THE MORNING BEFORE BREAKFAST, Disp: 90 tablet, Rfl: 3    linagliptin (TRADJENTA) 5 mg Tab tablet, Take 1 tablet (5 mg total) by mouth once daily., Disp: 90 tablet, Rfl: 3    memantine (NAMENDA) 10 MG Tab, TAKE 1 TABLET BY MOUTH ONCE DAILY IN THE EVENING, Disp: 90 tablet, Rfl: 3    metoprolol tartrate (LOPRESSOR) 25 MG tablet, Take 25 mg by mouth 2 (two) times daily. , Disp: , Rfl:     multivitamin (ONE DAILY MULTIVITAMIN) per tablet, Take 1 tablet by mouth once daily., Disp: , Rfl:     POLYETHYLENE GLYCOL 3350 (DULCOLAX BALANCE ORAL), Take 1 packet by mouth daily as needed (constipation). , Disp: , Rfl:     rivaroxaban (XARELTO) 15 mg Tab, Take 15 mg by mouth daily with dinner or evening meal., Disp: , Rfl:     rivastigmine (EXELON) 4.6 mg/24 hr PT24, APPLY 1 PATCH TOPICALLY ONCE DAILY, Disp: 30 patch, Rfl: 3    simvastatin (ZOCOR) 10 MG tablet, Take 10 mg by mouth every evening., Disp: , Rfl:     nitroGLYCERIN (NITROSTAT) 0.4 MG SL  tablet, Place 1 tablet (0.4 mg total) under the tongue every 5 (five) minutes as needed for Chest pain., Disp: 30 tablet, Rfl: 0    The ASCVD Risk score (Dennis RICKEY Jr., et al., 2013) failed to calculate for the following reasons:    The 2013 ASCVD risk score is only valid for ages 40 to 79     Lab Results   Component Value Date    HGBA1C 7.7 (H) 12/14/2018    HGBA1C 7.6 (H) 09/06/2018    HGBA1C 6.9 (H) 05/26/2017     Lab Results   Component Value Date    LDLCALC 69.6 11/03/2018    CREATININE 1.61 (H) 12/18/2018   labs 2018 rev from hosp admission.    Review of Systems   Constitutional: Negative for fatigue, fever and unexpected weight change.   HENT: Negative for congestion, postnasal drip and rhinorrhea.    Respiratory: Negative for cough (nighttime, not current) and shortness of breath.    Cardiovascular: Negative for chest pain, palpitations and leg swelling.   Gastrointestinal: Positive for constipation. Negative for diarrhea and nausea.   Genitourinary: Negative for difficulty urinating and dysuria.   Musculoskeletal: Positive for gait problem (uses a walker or wheelchair). Negative for arthralgias, back pain and joint swelling.        Can stand unsupported for ~5mins. R knee typically gets weak at that point.   Skin: Negative for color change and rash.   Neurological: Negative for dizziness, weakness (mild L side weakness from prior cva), numbness and headaches.   Psychiatric/Behavioral: Positive for sleep disturbance (doesn't sleep well, chronic but stable). Negative for dysphoric mood.           Objective:      Physical Exam   Constitutional: She is oriented to person, place, and time. She appears well-developed and well-nourished. No distress.   HENT:   Head: Normocephalic and atraumatic.   Mouth/Throat: Oropharynx is clear and moist.   Eyes: Conjunctivae are normal. Right eye exhibits no discharge. Left eye exhibits no discharge. No scleral icterus.   Neck: Normal range of motion. Neck supple.    Cardiovascular: Normal rate.   No murmur heard.  Pulses:       Dorsalis pedis pulses are 1+ on the right side, and 1+ on the left side.        Posterior tibial pulses are 1+ on the right side, and 1+ on the left side.   Pulmonary/Chest: Effort normal and breath sounds normal. No respiratory distress.   Abdominal: Soft. There is no tenderness.   Exam limited by habitus.   Musculoskeletal: Normal range of motion. She exhibits no edema (trace, nonpitting, ble).        Right foot: There is normal range of motion and no deformity.        Left foot: There is normal range of motion and no deformity.   Mild L hemiparesis, upper and lower extremities   Feet:   Right Foot:   Protective Sensation: 5 sites tested. 5 sites sensed.   Skin Integrity: Negative for ulcer, blister, skin breakdown, erythema, warmth, callus or dry skin.   Left Foot:   Protective Sensation: 5 sites tested. 5 sites sensed.   Skin Integrity: Negative for ulcer, blister, skin breakdown, erythema, warmth, callus or dry skin.   Neurological: She is alert and oriented to person, place, and time.   Skin: Skin is warm and dry. No rash noted.   Psychiatric: She has a normal mood and affect. Her behavior is normal.   Nursing note and vitals reviewed.      Screening recommendations appropriate to age and health status were reviewed.    Assessment & Plan:    CKD (chronic kidney disease), stage IV  -     Basic metabolic panel; Future; Expected date: 03/12/2019  Update lab for nephrology appt next month. Ensure adequate water intake.  Dysuria  -     POCT urine dipstick without microscope  -     Urine culture  Send urine for cx. Patient had low-count UTI during prev hosp admission. No indication for abx at this time as otherwise asymp.  Special screening for malignant neoplasms, colon  -     Fecal Immunochemical Test (iFOBT); Future; Expected date: 03/12/2019  Anemia, unspecified type  -     CBC auto differential; Future; Expected date: 03/12/2019  Incidental  finding prev. Will update to review. Hx of KRISTEN.  Type 2 diabetes mellitus without complication, with long-term current use of insulin  -     Ambulatory referral to Endocrinology  Suboptimal. Recommend appt with nutrition - to bring log and food diary. Update a1c.  Diabetic nephropathy associated with type 2 diabetes mellitus  As above. No pedal complaints.   Morbid (severe) obesity due to excess calories  Encouraged her to decrease starch intake, ensure smaller portions and regular mealtimes. Discuss with nutrition.  Chronic diastolic congestive heart failure, NYHA class 1  Per cards. Not currently on diuretic. Weight up 4# since LOV 6mos ago.  Chronic atrial fibrillation  Per cards, currently rate controlled.  Hemiparesis affecting left side as late effect of cerebrovascular accident (CVA)  Stable. No recent falls.  PVD (peripheral vascular disease)  Stable, f/u per cards.  Other orders  -     linagliptin (TRADJENTA) 5 mg Tab tablet; Take 1 tablet (5 mg total) by mouth once daily.  Dispense: 90 tablet; Refill: 3  -     insulin (BASAGLAR KWIKPEN U-100 INSULIN) glargine 100 units/mL (3mL) SubQ pen; Inject 25 Units into the skin every evening.  Dispense: 7.5 mL; Refill: 11  -     escitalopram oxalate (LEXAPRO) 10 MG tablet; Take 1 tablet (10 mg total) by mouth every evening.  Dispense: 90 tablet; Refill: 1  -     albuterol-ipratropium (DUO-NEB) 2.5 mg-0.5 mg/3 mL nebulizer solution; Take 3 mLs by nebulization every 6 (six) hours as needed for Wheezing. Rescue  Dispense: 1 Box; Refill: 0

## 2019-03-13 ENCOUNTER — TELEPHONE (OUTPATIENT)
Dept: FAMILY MEDICINE | Facility: CLINIC | Age: 84
End: 2019-03-13

## 2019-03-13 NOTE — TELEPHONE ENCOUNTER
----- Message from Blayne Murcia sent at 3/13/2019  2:53 PM CDT -----  Contact: Parisa - Daughter  Type: Needs Medical Advice    Who Called:  Parisa  Ignacio Call Back Number: [phone]  Additional Information: Caller would like to discuss patient's insulin medication. Please call to advise. Thanks!

## 2019-03-13 NOTE — TELEPHONE ENCOUNTER
Spoke to Clarence at [facility], and gave verbal order for Basaglar. Readback given. Parisa notified

## 2019-03-14 LAB — BACTERIA UR CULT: NORMAL

## 2019-04-02 ENCOUNTER — OFFICE VISIT (OUTPATIENT)
Dept: NEPHROLOGY | Facility: CLINIC | Age: 84
End: 2019-04-02
Payer: MEDICARE

## 2019-04-02 VITALS
SYSTOLIC BLOOD PRESSURE: 110 MMHG | BODY MASS INDEX: 36.31 KG/M2 | HEART RATE: 64 BPM | HEIGHT: 62 IN | DIASTOLIC BLOOD PRESSURE: 48 MMHG | OXYGEN SATURATION: 97 % | WEIGHT: 197.31 LBS

## 2019-04-02 DIAGNOSIS — E11.29 PROTEINURIA DUE TO TYPE 2 DIABETES MELLITUS: ICD-10-CM

## 2019-04-02 DIAGNOSIS — I12.9 BENIGN HYPERTENSIVE RENAL DISEASE WITHOUT RENAL FAILURE: ICD-10-CM

## 2019-04-02 DIAGNOSIS — R80.9 PROTEINURIA DUE TO TYPE 2 DIABETES MELLITUS: ICD-10-CM

## 2019-04-02 DIAGNOSIS — N18.30 CKD (CHRONIC KIDNEY DISEASE) STAGE 3, GFR 30-59 ML/MIN: Primary | ICD-10-CM

## 2019-04-02 DIAGNOSIS — E87.1 HYPONATREMIA: ICD-10-CM

## 2019-04-02 DIAGNOSIS — E11.21 DIABETIC NEPHROPATHY ASSOCIATED WITH TYPE 2 DIABETES MELLITUS: ICD-10-CM

## 2019-04-02 PROCEDURE — 1101F PR PT FALLS ASSESS DOC 0-1 FALLS W/OUT INJ PAST YR: ICD-10-PCS | Mod: CPTII,S$GLB,, | Performed by: INTERNAL MEDICINE

## 2019-04-02 PROCEDURE — 1101F PT FALLS ASSESS-DOCD LE1/YR: CPT | Mod: CPTII,S$GLB,, | Performed by: INTERNAL MEDICINE

## 2019-04-02 PROCEDURE — 3078F DIAST BP <80 MM HG: CPT | Mod: CPTII,S$GLB,, | Performed by: INTERNAL MEDICINE

## 2019-04-02 PROCEDURE — 99214 OFFICE O/P EST MOD 30 MIN: CPT | Mod: S$GLB,,, | Performed by: INTERNAL MEDICINE

## 2019-04-02 PROCEDURE — 99499 UNLISTED E&M SERVICE: CPT | Mod: S$GLB,,, | Performed by: INTERNAL MEDICINE

## 2019-04-02 PROCEDURE — 3074F SYST BP LT 130 MM HG: CPT | Mod: CPTII,S$GLB,, | Performed by: INTERNAL MEDICINE

## 2019-04-02 PROCEDURE — 99499 RISK ADDL DX/OHS AUDIT: ICD-10-PCS | Mod: S$GLB,,, | Performed by: INTERNAL MEDICINE

## 2019-04-02 PROCEDURE — 3074F PR MOST RECENT SYSTOLIC BLOOD PRESSURE < 130 MM HG: ICD-10-PCS | Mod: CPTII,S$GLB,, | Performed by: INTERNAL MEDICINE

## 2019-04-02 PROCEDURE — 99999 PR PBB SHADOW E&M-EST. PATIENT-LVL III: CPT | Mod: PBBFAC,,, | Performed by: INTERNAL MEDICINE

## 2019-04-02 PROCEDURE — 99214 PR OFFICE/OUTPT VISIT, EST, LEVL IV, 30-39 MIN: ICD-10-PCS | Mod: S$GLB,,, | Performed by: INTERNAL MEDICINE

## 2019-04-02 PROCEDURE — 99999 PR PBB SHADOW E&M-EST. PATIENT-LVL III: ICD-10-PCS | Mod: PBBFAC,,, | Performed by: INTERNAL MEDICINE

## 2019-04-02 PROCEDURE — 3078F PR MOST RECENT DIASTOLIC BLOOD PRESSURE < 80 MM HG: ICD-10-PCS | Mod: CPTII,S$GLB,, | Performed by: INTERNAL MEDICINE

## 2019-04-02 NOTE — PROGRESS NOTES
"Subjective:       Patient ID: Denice Sheth is a 84 y.o. White female who presents for return patient evaluation for chronic renal failure.    She has no uremic or urinary symptoms and is in her usual state of health.  There have been no recent illnesses, hospitalizations or procedures.   She has chronic right knee pain.      Review of Systems   Constitutional: Negative for appetite change, chills and fever.   Eyes: Negative for visual disturbance.   Respiratory: Negative for cough and shortness of breath.    Cardiovascular: Negative for chest pain and leg swelling.   Gastrointestinal: Negative for abdominal pain, diarrhea, nausea and vomiting.   Genitourinary: Negative for difficulty urinating, dysuria and hematuria.   Musculoskeletal: Positive for arthralgias (right knee) and gait problem. Negative for myalgias.   Skin: Negative for rash.   Neurological: Positive for weakness. Negative for headaches.   Hematological: Bruises/bleeds easily.   Psychiatric/Behavioral: Negative for sleep disturbance.       The past medical, family and social histories were reviewed for this encounter.     BP (!) 110/48 (BP Location: Left arm, Patient Position: Sitting)   Pulse 64   Ht 5' 2" (1.575 m)   Wt 89.5 kg (197 lb 5 oz)   SpO2 97%   BMI 36.09 kg/m²     Objective:      Physical Exam   Constitutional: She is oriented to person, place, and time. She appears well-developed and well-nourished. No distress.   HENT:   Head: Normocephalic and atraumatic.   Eyes: Conjunctivae are normal.   Neck: Neck supple. No JVD present.   Cardiovascular: Normal rate and regular rhythm. Exam reveals no gallop and no friction rub.   Murmur (2/6 nabila) heard.  Pulmonary/Chest: Effort normal and breath sounds normal. No respiratory distress. She has no wheezes. She has no rales.   Abdominal: Soft. Bowel sounds are normal. She exhibits no distension. There is no tenderness.   Musculoskeletal: She exhibits edema (trace BLE).   Neurological: She is " alert and oriented to person, place, and time.   Skin: Skin is warm and dry. No rash noted.   Psychiatric: She has a normal mood and affect.   Vitals reviewed.      Assessment:       1. CKD (chronic kidney disease) stage 3, GFR 30-59 ml/min    2. 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 Diabetic nephropathy associated with type 2 diabetes mellitus    4. 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 Hyponatremia        Plan:   Return to clinic in 6 months.  Labs for next visit include rp.  RP on 6/11.  Baseline creatinine is 1.4-1.6 since 2015.  PTH is 39 with a calcium of 10.3.  UPC is 0.13.  Blood pressure is controlled on the current regimen.  Continue current medications as prescribed and reviewed.

## 2019-04-03 ENCOUNTER — LAB VISIT (OUTPATIENT)
Dept: LAB | Facility: HOSPITAL | Age: 84
End: 2019-04-03
Attending: FAMILY MEDICINE
Payer: MEDICARE

## 2019-04-03 DIAGNOSIS — Z12.11 SPECIAL SCREENING FOR MALIGNANT NEOPLASMS, COLON: ICD-10-CM

## 2019-04-03 PROCEDURE — 82274 ASSAY TEST FOR BLOOD FECAL: CPT

## 2019-04-04 ENCOUNTER — TELEPHONE (OUTPATIENT)
Dept: FAMILY MEDICINE | Facility: CLINIC | Age: 84
End: 2019-04-04

## 2019-04-04 DIAGNOSIS — R19.5 POSITIVE FIT (FECAL IMMUNOCHEMICAL TEST): Primary | ICD-10-CM

## 2019-04-04 LAB — HEMOCCULT STL QL IA: POSITIVE

## 2019-04-04 NOTE — TELEPHONE ENCOUNTER
Fit kit was positive. She'd need a colonoscopy to review further. Given age, recommend consult in gi first. Referral done.

## 2019-04-05 ENCOUNTER — PATIENT MESSAGE (OUTPATIENT)
Dept: FAMILY MEDICINE | Facility: CLINIC | Age: 84
End: 2019-04-05

## 2019-04-05 NOTE — TELEPHONE ENCOUNTER
----- Message from Shameka Rhoades sent at 4/5/2019  9:10 AM CDT -----  Type: Needs Referral    Who Called:  Daughter (Parisa)  Best Call Back Number: [phone]  Additional Information: Daughter requesting referral for patient for colonoscopy to Dr. Cedeño/please call back to advise.

## 2019-04-05 NOTE — TELEPHONE ENCOUNTER
Daughter notified that fit kit was positive and that pt is recommended to have a colonoscopy.  Voiced understanding.  States that she will check to see if preferred GI provider accepts her insurance and will call back in regards to scheduling.

## 2019-04-10 ENCOUNTER — TELEPHONE (OUTPATIENT)
Dept: FAMILY MEDICINE | Facility: CLINIC | Age: 84
End: 2019-04-10

## 2019-04-10 NOTE — TELEPHONE ENCOUNTER
----- Message from Ariel Bledsoe sent at 4/10/2019  4:16 PM CDT -----  Contact: Lori with DR Davidson Sandoval with DR Cedeño office called, patient states some Fecal test were suppose to be faxed over to office. Please call back at [phone] or call [phone]

## 2019-04-22 ENCOUNTER — TELEPHONE (OUTPATIENT)
Dept: FAMILY MEDICINE | Facility: CLINIC | Age: 84
End: 2019-04-22

## 2019-04-22 NOTE — TELEPHONE ENCOUNTER
Paperwork received via fax from [employer]. Placed in provider in-basket for review.

## 2019-05-08 ENCOUNTER — OFFICE VISIT (OUTPATIENT)
Dept: FAMILY MEDICINE | Facility: CLINIC | Age: 84
End: 2019-05-08
Payer: MEDICARE

## 2019-05-08 ENCOUNTER — LAB VISIT (OUTPATIENT)
Dept: LAB | Facility: HOSPITAL | Age: 84
End: 2019-05-08
Attending: FAMILY MEDICINE
Payer: MEDICARE

## 2019-05-08 ENCOUNTER — TELEPHONE (OUTPATIENT)
Dept: FAMILY MEDICINE | Facility: CLINIC | Age: 84
End: 2019-05-08

## 2019-05-08 VITALS
BODY MASS INDEX: 36.6 KG/M2 | SYSTOLIC BLOOD PRESSURE: 138 MMHG | WEIGHT: 198.88 LBS | OXYGEN SATURATION: 97 % | DIASTOLIC BLOOD PRESSURE: 72 MMHG | TEMPERATURE: 98 F | HEART RATE: 60 BPM | HEIGHT: 62 IN

## 2019-05-08 DIAGNOSIS — E11.9 TYPE 2 DIABETES MELLITUS WITHOUT COMPLICATION, WITH LONG-TERM CURRENT USE OF INSULIN: ICD-10-CM

## 2019-05-08 DIAGNOSIS — Z79.4 TYPE 2 DIABETES MELLITUS WITHOUT COMPLICATION, WITH LONG-TERM CURRENT USE OF INSULIN: ICD-10-CM

## 2019-05-08 DIAGNOSIS — R73.9 HYPERGLYCEMIA: ICD-10-CM

## 2019-05-08 DIAGNOSIS — R30.0 DYSURIA: Primary | ICD-10-CM

## 2019-05-08 LAB
BILIRUB SERPL-MCNC: NORMAL MG/DL
BLOOD URINE, POC: NORMAL
COLOR, POC UA: NORMAL
GLUCOSE SERPL-MCNC: 273 MG/DL (ref 70–110)
GLUCOSE UR QL STRIP: 1000
KETONES UR QL STRIP: NORMAL
LEUKOCYTE ESTERASE URINE, POC: NORMAL
NITRITE, POC UA: NORMAL
PH, POC UA: 5
PROTEIN, POC: NORMAL
SPECIFIC GRAVITY, POC UA: 1.02
UROBILINOGEN, POC UA: NORMAL

## 2019-05-08 PROCEDURE — 83036 HEMOGLOBIN GLYCOSYLATED A1C: CPT

## 2019-05-08 PROCEDURE — 82962 GLUCOSE BLOOD TEST: CPT | Mod: S$GLB,,, | Performed by: FAMILY MEDICINE

## 2019-05-08 PROCEDURE — 1101F PR PT FALLS ASSESS DOC 0-1 FALLS W/OUT INJ PAST YR: ICD-10-PCS | Mod: CPTII,S$GLB,, | Performed by: FAMILY MEDICINE

## 2019-05-08 PROCEDURE — 81002 POCT URINE DIPSTICK WITHOUT MICROSCOPE: ICD-10-PCS | Mod: S$GLB,,, | Performed by: FAMILY MEDICINE

## 2019-05-08 PROCEDURE — 82962 POCT GLUCOSE, HAND-HELD DEVICE: ICD-10-PCS | Mod: S$GLB,,, | Performed by: FAMILY MEDICINE

## 2019-05-08 PROCEDURE — 3078F DIAST BP <80 MM HG: CPT | Mod: CPTII,S$GLB,, | Performed by: FAMILY MEDICINE

## 2019-05-08 PROCEDURE — 99499 RISK ADDL DX/OHS AUDIT: ICD-10-PCS | Mod: S$GLB,,, | Performed by: FAMILY MEDICINE

## 2019-05-08 PROCEDURE — 99999 PR PBB SHADOW E&M-EST. PATIENT-LVL III: CPT | Mod: PBBFAC,,, | Performed by: FAMILY MEDICINE

## 2019-05-08 PROCEDURE — 36415 COLL VENOUS BLD VENIPUNCTURE: CPT | Mod: PN

## 2019-05-08 PROCEDURE — 3075F PR MOST RECENT SYSTOLIC BLOOD PRESS GE 130-139MM HG: ICD-10-PCS | Mod: CPTII,S$GLB,, | Performed by: FAMILY MEDICINE

## 2019-05-08 PROCEDURE — 99214 OFFICE O/P EST MOD 30 MIN: CPT | Mod: 25,S$GLB,, | Performed by: FAMILY MEDICINE

## 2019-05-08 PROCEDURE — 1101F PT FALLS ASSESS-DOCD LE1/YR: CPT | Mod: CPTII,S$GLB,, | Performed by: FAMILY MEDICINE

## 2019-05-08 PROCEDURE — 3075F SYST BP GE 130 - 139MM HG: CPT | Mod: CPTII,S$GLB,, | Performed by: FAMILY MEDICINE

## 2019-05-08 PROCEDURE — 99499 UNLISTED E&M SERVICE: CPT | Mod: S$GLB,,, | Performed by: FAMILY MEDICINE

## 2019-05-08 PROCEDURE — 99214 PR OFFICE/OUTPT VISIT, EST, LEVL IV, 30-39 MIN: ICD-10-PCS | Mod: 25,S$GLB,, | Performed by: FAMILY MEDICINE

## 2019-05-08 PROCEDURE — 3078F PR MOST RECENT DIASTOLIC BLOOD PRESSURE < 80 MM HG: ICD-10-PCS | Mod: CPTII,S$GLB,, | Performed by: FAMILY MEDICINE

## 2019-05-08 PROCEDURE — 81002 URINALYSIS NONAUTO W/O SCOPE: CPT | Mod: S$GLB,,, | Performed by: FAMILY MEDICINE

## 2019-05-08 PROCEDURE — 99999 PR PBB SHADOW E&M-EST. PATIENT-LVL III: ICD-10-PCS | Mod: PBBFAC,,, | Performed by: FAMILY MEDICINE

## 2019-05-08 RX ORDER — POLYETHYLENE GLYCOL 3350 17 G/17G
17 POWDER, FOR SOLUTION ORAL
COMMUNITY

## 2019-05-08 NOTE — PROGRESS NOTES
Assessment and Plan:    1. Polyuria  - due to hyperglycemia, no UTI on dipstick  - POCT urine dipstick without microscope    2. Hyperglycemia  - POCT Glucose, Hand-Held Device    3. Type 2 diabetes mellitus without complication, with long-term current use of insulin  - check a1c  - increase basal to 30 units; continue rapid acting 6 TID and tradjenta  - f/u with Dr Baird in 2 weeks.   - Check sugars at night; hyperglycemia sounds like it occurs at night  - Hemoglobin A1c; Future        ______________________________________________________________________  Subjective:    Chief Complaint:  Chief Complaint   Patient presents with    Follow-up     possible uti. elevated blood sugar levels        HPI:  Denice is a 84 y.o. year old     -- polyuria  Has grown out pan-sensitive e coli in cultures in past 6 months  No drug allergies; has stage 3-4 CKD (gfr 25)  Urine Dipstick is only positive for 1000 glucose  Reports that she doesn't urinate much during day, but a lot at night    -- Hyperglycemia   Dx with diabetes  Rx: basal 25 u QHS + rapid acting 6 u TID + trajenta  Metformin CI due to kidney disease  Been having elevated home glucose  Glucose is only checked in the morning; no higher than 130's / 140's      Medications:  Current Outpatient Medications on File Prior to Visit   Medication Sig Dispense Refill    amiodarone (PACERONE) 200 MG Tab Take 200 mg by mouth once daily.       ascorbic acid (VITAMIN C) 500 MG tablet Take 500 mg by mouth once daily.      b complex vitamins capsule Take 1 capsule by mouth once daily.      blood-glucose meter kit by Other route. Use as instructed      calcium-vitamin D3 500 mg(1,250mg) -200 unit per tablet Take 1 tablet by mouth 2 (two) times daily with meals.      clopidogrel (PLAVIX) 75 mg tablet TAKE ONE TABLET BY MOUTH ONCE DAILY 30 tablet 11    escitalopram oxalate (LEXAPRO) 10 MG tablet Take 1 tablet (10 mg total) by mouth every evening. 90 tablet 1    fish oil-omega-3  fatty acids 300-1,000 mg capsule Take 2 g by mouth once daily.      insulin (BASAGLAR KWIKPEN U-100 INSULIN) glargine 100 units/mL (3mL) SubQ pen Inject 25 Units into the skin every evening. 7.5 mL 11    insulin aspart (NOVOLOG) 100 unit/mL injection Inject 6 Units into the skin 3 (three) times daily before meals. (Patient taking differently: Inject 6 Units into the skin 3 (three) times daily before meals. Has been using only at lunch d/t lower BS) 10 mL 11    insulin syringe-needle U-100 1 mL 31 gauge x 5/16 Syrg USE AS DIRECTED 100 each 2    levothyroxine (SYNTHROID) 75 MCG tablet TAKE 1 TABLET BY MOUTH ONCE DAILY IN THE MORNING BEFORE BREAKFAST 90 tablet 3    linagliptin (TRADJENTA) 5 mg Tab tablet Take 1 tablet (5 mg total) by mouth once daily. 90 tablet 3    memantine (NAMENDA) 10 MG Tab TAKE 1 TABLET BY MOUTH ONCE DAILY IN THE EVENING 90 tablet 3    metoprolol tartrate (LOPRESSOR) 25 MG tablet Take 25 mg by mouth 2 (two) times daily.       multivitamin (ONE DAILY MULTIVITAMIN) per tablet Take 1 tablet by mouth once daily.      polyethylene glycol (GLYCOLAX) 17 gram PwPk Take by mouth.      rivaroxaban (XARELTO) 15 mg Tab Take 15 mg by mouth daily with dinner or evening meal.      rivastigmine (EXELON) 4.6 mg/24 hr PT24 APPLY 1 PATCH TOPICALLY ONCE DAILY 30 patch 3    simvastatin (ZOCOR) 10 MG tablet Take 10 mg by mouth every evening.      albuterol-ipratropium (DUO-NEB) 2.5 mg-0.5 mg/3 mL nebulizer solution Take 3 mLs by nebulization every 6 (six) hours as needed for Wheezing. Rescue 1 Box 0    bisacodyl (DULCOLAX) 10 mg Supp Place 1 suppository (10 mg total) rectally daily as needed (Until bowel movement if patient has no bowel movement for 2 days).  0    nitroGLYCERIN (NITROSTAT) 0.4 MG SL tablet Place 1 tablet (0.4 mg total) under the tongue every 5 (five) minutes as needed for Chest pain. 30 tablet 0    [DISCONTINUED] POLYETHYLENE GLYCOL 3350 (DULCOLAX BALANCE ORAL) Take 1 packet by mouth  "daily as needed (constipation).        No current facility-administered medications on file prior to visit.        Review of Systems:  Review of Systems   Constitutional: Negative for fever.   Respiratory: Negative for cough and shortness of breath.    Cardiovascular: Negative for chest pain.   Gastrointestinal: Negative for abdominal pain, diarrhea, nausea and vomiting.   Endocrine:        Hyperglycemia   Genitourinary: Positive for frequency.   Skin: Negative for rash.   Psychiatric/Behavioral: Negative for dysphoric mood.       Past Medical History:  Past Medical History:   Diagnosis Date    A-fib     Anticoagulant long-term use     CHF (congestive heart failure)     Diabetes mellitus     Disorder of kidney and ureter     Followed by Dr. Jonathan Pace    Encounter for blood transfusion     Hypertension     Insomnia     Stage 4 chronic kidney disease     Thyroid disease     hypothyroidism       Objective:    Vitals:  Vitals:    05/08/19 1505   BP: 138/72   Pulse: 60   Temp: 97.6 °F (36.4 °C)   TempSrc: Oral   SpO2: 97%   Weight: 90.2 kg (198 lb 13.7 oz)   Height: 5' 2" (1.575 m)   PainSc: 0-No pain       Physical Exam   Constitutional: No distress.   HENT:   Head: Normocephalic and atraumatic.   Eyes: Pupils are equal, round, and reactive to light. EOM are normal.   Neck: Neck supple.   Cardiovascular: Normal rate and regular rhythm. Exam reveals no friction rub.   No murmur heard.  Pulmonary/Chest: Effort normal and breath sounds normal.   Abdominal: Soft. Bowel sounds are normal. She exhibits no distension. There is no tenderness.   Skin: Skin is warm and dry. No rash noted.   Psychiatric: She has a normal mood and affect. Her behavior is normal.       Data:  Previous labs reviewed and pertinent for e coli postive urine culture and hyperglycemia.      Kenrick Matos MD  Family Medicine  "

## 2019-05-08 NOTE — TELEPHONE ENCOUNTER
----- Message from Scar Sandoval sent at 5/8/2019  8:03 AM CDT -----  Type: Needs Medical Advice    Who Called:  Parisa Sheth (Daughter)  Symptoms (please be specific):  Blood sugar high-142-156, Possible UTI  How long has patient had these symptoms:  3 days  Pharmacy name and phone #:    [id number] - [place] - [address]  [address]  [place] [address]  Phone: [phone] Fax: [phone]      Best Call Back Number: [phone]  Additional Information: Please call to advise

## 2019-05-08 NOTE — PATIENT INSTRUCTIONS
-- INCREASE NIGHTLY INSULIN TO 30 UNITS  -- CHECK YOUR SUGARS AT NIGHT BEFORE BEDTIME AS WELL AS IN THE MORNING

## 2019-05-08 NOTE — TELEPHONE ENCOUNTER
Spoke w/ pt's dtr. Recommended an appt since she is having sugar elevation and urinary probs. She agreed and requested appt after 2pm. Appt made for 2:40pm w/ Dr Matos.

## 2019-05-09 LAB
ESTIMATED AVG GLUCOSE: 160 MG/DL (ref 68–131)
HBA1C MFR BLD HPLC: 7.2 % (ref 4–5.6)

## 2019-05-13 NOTE — TELEPHONE ENCOUNTER
----- Message from Rita Newsome sent at 5/13/2019  2:57 PM CDT -----  Contact: Daughter Parisa [phone]  She is requesting that you reorder the diabetic supplies.  Test strips and lancets for Trumatrix.  Please send them to [facility] on [facility].  Thank you!

## 2019-05-14 ENCOUNTER — TELEPHONE (OUTPATIENT)
Dept: AUDIOLOGY | Facility: CLINIC | Age: 84
End: 2019-05-14

## 2019-05-14 ENCOUNTER — TELEPHONE (OUTPATIENT)
Dept: FAMILY MEDICINE | Facility: CLINIC | Age: 84
End: 2019-05-14

## 2019-05-14 DIAGNOSIS — Z79.4 TYPE 2 DIABETES MELLITUS WITHOUT COMPLICATION, WITH LONG-TERM CURRENT USE OF INSULIN: Primary | ICD-10-CM

## 2019-05-14 DIAGNOSIS — E11.9 TYPE 2 DIABETES MELLITUS WITHOUT COMPLICATION, WITH LONG-TERM CURRENT USE OF INSULIN: Primary | ICD-10-CM

## 2019-05-14 NOTE — TELEPHONE ENCOUNTER
----- Message from Allison Alberto sent at 5/13/2019  3:53 PM CDT -----  Hi Dr. Baird this is Allison from the primary care pharmacy. We received two prescriptions for Ms. Denice Sheth for testing supplies. These particular prescriptions are covered under Part B, and we are not contracted with medicare part B. In order for her testing supplies to be covered new prescriptions need to be sent to [facility] in [place], there phone number is [phone]. Thanks!

## 2019-05-14 NOTE — TELEPHONE ENCOUNTER
Please resubmit pt prescription to [facility] for Testing strips-True Metrix Glucose Test Strips filled 5/13/19. Was sent to [facility].

## 2019-05-14 NOTE — TELEPHONE ENCOUNTER
Phoned the pharmacy in regards to message, Spoke to Chloe states they need the RX to have the DX code on the prescription. Please review and advise. Thank you. CLC

## 2019-05-14 NOTE — TELEPHONE ENCOUNTER
----- Message from Eli Mercer sent at 5/13/2019  3:03 PM CDT -----  Contact: Daughter  Patient is dizzy. Please schedule.       [phone] - Parisa (Daughter)

## 2019-05-14 NOTE — TELEPHONE ENCOUNTER
Scheduled pt for ENT/Audio appts on 5/23/19. Advised no vestibular meds for 24 hours. Confirmed date, time and location of appts. Understanding voiced.

## 2019-05-14 NOTE — TELEPHONE ENCOUNTER
----- Message from Donnell BRICEÑO Pinonatanael sent at 5/14/2019  2:43 PM CDT -----  Contact: [facility]/Angela Miller called in and stated she needs a new Rx sent over for patients blood sugar diagnostic (TRUE METRIX GLUCOSE TEST STRIP) Strp with a diagnoses code on it.      [address] [place] - [address]  [address]  [place] [address]  Phone: [phone] Fax: [phone]

## 2019-05-23 ENCOUNTER — CLINICAL SUPPORT (OUTPATIENT)
Dept: AUDIOLOGY | Facility: CLINIC | Age: 84
End: 2019-05-23
Payer: MEDICARE

## 2019-05-23 ENCOUNTER — OFFICE VISIT (OUTPATIENT)
Dept: FAMILY MEDICINE | Facility: CLINIC | Age: 84
End: 2019-05-23
Payer: MEDICARE

## 2019-05-23 ENCOUNTER — OFFICE VISIT (OUTPATIENT)
Dept: OTOLARYNGOLOGY | Facility: CLINIC | Age: 84
End: 2019-05-23
Payer: MEDICARE

## 2019-05-23 VITALS
DIASTOLIC BLOOD PRESSURE: 86 MMHG | SYSTOLIC BLOOD PRESSURE: 128 MMHG | BODY MASS INDEX: 36.07 KG/M2 | HEIGHT: 62 IN | OXYGEN SATURATION: 97 % | WEIGHT: 196 LBS | TEMPERATURE: 98 F | HEART RATE: 61 BPM

## 2019-05-23 VITALS — BODY MASS INDEX: 36.6 KG/M2 | HEIGHT: 62 IN | WEIGHT: 198.88 LBS

## 2019-05-23 DIAGNOSIS — H93.293 IMPAIRED AUDITORY DISCRIMINATION, BILATERAL: ICD-10-CM

## 2019-05-23 DIAGNOSIS — M79.89 LEG SWELLING: ICD-10-CM

## 2019-05-23 DIAGNOSIS — R22.42 LOCALIZED SWELLING, MASS AND LUMP, LEFT LOWER LIMB: ICD-10-CM

## 2019-05-23 DIAGNOSIS — N18.4 CKD (CHRONIC KIDNEY DISEASE), STAGE IV: ICD-10-CM

## 2019-05-23 DIAGNOSIS — H90.3 BILATERAL SENSORINEURAL HEARING LOSS: ICD-10-CM

## 2019-05-23 DIAGNOSIS — H81.12 BPPV (BENIGN PAROXYSMAL POSITIONAL VERTIGO), LEFT: Primary | ICD-10-CM

## 2019-05-23 DIAGNOSIS — Z79.4 TYPE 2 DIABETES MELLITUS WITHOUT COMPLICATION, WITH LONG-TERM CURRENT USE OF INSULIN: Primary | ICD-10-CM

## 2019-05-23 DIAGNOSIS — E11.9 TYPE 2 DIABETES MELLITUS WITHOUT COMPLICATION, WITH LONG-TERM CURRENT USE OF INSULIN: Primary | ICD-10-CM

## 2019-05-23 DIAGNOSIS — H61.23 BILATERAL IMPACTED CERUMEN: Primary | ICD-10-CM

## 2019-05-23 DIAGNOSIS — H81.12 BENIGN PAROXYSMAL POSITIONAL VERTIGO, LEFT: ICD-10-CM

## 2019-05-23 DIAGNOSIS — D50.9 IRON DEFICIENCY ANEMIA, UNSPECIFIED IRON DEFICIENCY ANEMIA TYPE: ICD-10-CM

## 2019-05-23 DIAGNOSIS — R42 VERTIGO: Primary | ICD-10-CM

## 2019-05-23 PROBLEM — M25.661 STIFFNESS OF BOTH KNEES: Status: RESOLVED | Noted: 2018-09-25 | Resolved: 2019-05-23

## 2019-05-23 PROBLEM — I25.10 CORONARY ARTERIOSCLEROSIS: Status: ACTIVE | Noted: 2018-11-20

## 2019-05-23 PROBLEM — M25.662 STIFFNESS OF BOTH KNEES: Status: RESOLVED | Noted: 2018-09-25 | Resolved: 2019-05-23

## 2019-05-23 PROCEDURE — 3079F PR MOST RECENT DIASTOLIC BLOOD PRESSURE 80-89 MM HG: ICD-10-PCS | Mod: CPTII,S$GLB,, | Performed by: FAMILY MEDICINE

## 2019-05-23 PROCEDURE — 99214 OFFICE O/P EST MOD 30 MIN: CPT | Mod: S$GLB,,, | Performed by: FAMILY MEDICINE

## 2019-05-23 PROCEDURE — 3079F DIAST BP 80-89 MM HG: CPT | Mod: CPTII,S$GLB,, | Performed by: FAMILY MEDICINE

## 2019-05-23 PROCEDURE — 99213 OFFICE O/P EST LOW 20 MIN: CPT | Mod: S$GLB,,, | Performed by: NURSE PRACTITIONER

## 2019-05-23 PROCEDURE — 99999 PR PBB SHADOW E&M-EST. PATIENT-LVL IV: ICD-10-PCS | Mod: PBBFAC,,, | Performed by: NURSE PRACTITIONER

## 2019-05-23 PROCEDURE — 92541 SPONTANEOUS NYSTAGMUS TEST: CPT | Mod: S$GLB,,, | Performed by: AUDIOLOGIST

## 2019-05-23 PROCEDURE — 1101F PT FALLS ASSESS-DOCD LE1/YR: CPT | Mod: CPTII,S$GLB,, | Performed by: FAMILY MEDICINE

## 2019-05-23 PROCEDURE — 99999 PR PBB SHADOW E&M-EST. PATIENT-LVL IV: CPT | Mod: PBBFAC,,, | Performed by: NURSE PRACTITIONER

## 2019-05-23 PROCEDURE — 92542 PR POSITIONAL NYSTAGMUS TEST: ICD-10-PCS | Mod: 59,S$GLB,, | Performed by: AUDIOLOGIST

## 2019-05-23 PROCEDURE — 3074F SYST BP LT 130 MM HG: CPT | Mod: CPTII,S$GLB,, | Performed by: FAMILY MEDICINE

## 2019-05-23 PROCEDURE — 99213 PR OFFICE/OUTPT VISIT, EST, LEVL III, 20-29 MIN: ICD-10-PCS | Mod: S$GLB,,, | Performed by: NURSE PRACTITIONER

## 2019-05-23 PROCEDURE — 1101F PR PT FALLS ASSESS DOC 0-1 FALLS W/OUT INJ PAST YR: ICD-10-PCS | Mod: CPTII,S$GLB,, | Performed by: FAMILY MEDICINE

## 2019-05-23 PROCEDURE — 3074F PR MOST RECENT SYSTOLIC BLOOD PRESSURE < 130 MM HG: ICD-10-PCS | Mod: CPTII,S$GLB,, | Performed by: FAMILY MEDICINE

## 2019-05-23 PROCEDURE — 1101F PT FALLS ASSESS-DOCD LE1/YR: CPT | Mod: CPTII,S$GLB,, | Performed by: NURSE PRACTITIONER

## 2019-05-23 PROCEDURE — 1101F PR PT FALLS ASSESS DOC 0-1 FALLS W/OUT INJ PAST YR: ICD-10-PCS | Mod: CPTII,S$GLB,, | Performed by: NURSE PRACTITIONER

## 2019-05-23 PROCEDURE — 99214 PR OFFICE/OUTPT VISIT, EST, LEVL IV, 30-39 MIN: ICD-10-PCS | Mod: S$GLB,,, | Performed by: FAMILY MEDICINE

## 2019-05-23 PROCEDURE — 92542 POSITIONAL NYSTAGMUS TEST: CPT | Mod: 59,S$GLB,, | Performed by: AUDIOLOGIST

## 2019-05-23 PROCEDURE — 92541 PR SPONTANEOUS NYSTAGMUS TEST: ICD-10-PCS | Mod: S$GLB,,, | Performed by: AUDIOLOGIST

## 2019-05-23 PROCEDURE — 99999 PR PBB SHADOW E&M-EST. PATIENT-LVL III: ICD-10-PCS | Mod: PBBFAC,,, | Performed by: FAMILY MEDICINE

## 2019-05-23 PROCEDURE — 99999 PR PBB SHADOW E&M-EST. PATIENT-LVL III: CPT | Mod: PBBFAC,,, | Performed by: FAMILY MEDICINE

## 2019-05-23 NOTE — PROGRESS NOTES
Subjective:       Patient ID: Denice Sheth is a 84 y.o. female.    Chief Complaint: Follow-up (blood suger)      Denice Sheth is in the office for f/u and review. Accompanied by daughter/caregiver who assists with hx.    HPI   Since LOV, had fall related to dizziness/vertigo. Updated visit with cards/presser re: afib. Saw ent for above as well - improved. Up to date with nephrology as well.    Saw ENT this morning for vertigo maneuver. Doing ok in that regard. No correlation with BS.   BS log (fbs) over the past month . Rare hypoglycemia. Her basal insulin was increased at lov with Dr Matos due to nighttime urinary frequency and presumed hyperglycemia. She had an episode of am hypoglycemia, so they returned to basal 25 instead of 30 units.    Extended discussion re: diet, BS control in evening.  Past Medical History:   Diagnosis Date    A-fib     Anticoagulant long-term use     CHF (congestive heart failure)     Diabetes mellitus     Disorder of kidney and ureter     Followed by Dr. Jonathan Pace    Encounter for blood transfusion     Hypertension     Insomnia     Stage 4 chronic kidney disease     Thyroid disease     hypothyroidism     Current c/o nighttime urinary frequency. Over the past month, she's had an increase in nighttime urination, up at least 5x, high volume, urgency. She had a few BS checks in the evening >200. Admittedly, some evening meals are higher in carbs (sen[facility], etc). Has to drink water at night bc of thirst/dry mouth.     Bruised her L shin in previous fall. No significant swelling or discomfort associated.    Current Outpatient Medications:     albuterol-ipratropium (DUO-NEB) 2.5 mg-0.5 mg/3 mL nebulizer solution, Take 3 mLs by nebulization every 6 (six) hours as needed for Wheezing. Rescue, Disp: 1 Box, Rfl: 0    amiodarone (PACERONE) 200 MG Tab, Take 200 mg by mouth once daily. , Disp: , Rfl:     ascorbic acid (VITAMIN C) 500 MG tablet, Take 500 mg by mouth once  daily., Disp: , Rfl:     b complex vitamins capsule, Take 1 capsule by mouth once daily., Disp: , Rfl:     bisacodyl (DULCOLAX) 10 mg Supp, Place 1 suppository (10 mg total) rectally daily as needed (Until bowel movement if patient has no bowel movement for 2 days)., Disp: , Rfl: 0    blood sugar diagnostic (TRUE METRIX GLUCOSE TEST STRIP) Strp, 1 each by Misc.(Non-Drug; Combo Route) route 3 (three) times daily., Disp: 100 each, Rfl: 11    blood-glucose meter kit, by Other route. Use as instructed, Disp: , Rfl:     calcium-vitamin D3 500 mg(1,250mg) -200 unit per tablet, Take 1 tablet by mouth 2 (two) times daily with meals., Disp: , Rfl:     clopidogrel (PLAVIX) 75 mg tablet, TAKE ONE TABLET BY MOUTH ONCE DAILY, Disp: 30 tablet, Rfl: 11    escitalopram oxalate (LEXAPRO) 10 MG tablet, Take 1 tablet (10 mg total) by mouth every evening., Disp: 90 tablet, Rfl: 1    fish oil-omega-3 fatty acids 300-1,000 mg capsule, Take 2 g by mouth once daily., Disp: , Rfl:     insulin (BASAGLAR KWIKPEN U-100 INSULIN) glargine 100 units/mL (3mL) SubQ pen, Inject 25 Units into the skin every evening., Disp: 7.5 mL, Rfl: 11    insulin aspart (NOVOLOG) 100 unit/mL injection, Inject 6 Units into the skin 3 (three) times daily before meals. (Patient taking differently: Inject 6 Units into the skin 3 (three) times daily before meals. Has been using only at lunch d/t lower BS), Disp: 10 mL, Rfl: 11    insulin syringe-needle U-100 1 mL 31 gauge x 5/16 Syrg, USE AS DIRECTED, Disp: 100 each, Rfl: 2    lancets 31 gauge Misc, 1 lancet by Misc.(Non-Drug; Combo Route) route 3 (three) times daily as needed., Disp: 100 each, Rfl: 11    levothyroxine (SYNTHROID) 75 MCG tablet, TAKE 1 TABLET BY MOUTH ONCE DAILY IN THE MORNING BEFORE BREAKFAST, Disp: 90 tablet, Rfl: 3    linagliptin (TRADJENTA) 5 mg Tab tablet, Take 1 tablet (5 mg total) by mouth once daily., Disp: 90 tablet, Rfl: 3    memantine (NAMENDA) 10 MG Tab, TAKE 1 TABLET BY  MOUTH ONCE DAILY IN THE EVENING, Disp: 90 tablet, Rfl: 3    metoprolol tartrate (LOPRESSOR) 25 MG tablet, Take 25 mg by mouth 2 (two) times daily. , Disp: , Rfl:     multivitamin (ONE DAILY MULTIVITAMIN) per tablet, Take 1 tablet by mouth once daily., Disp: , Rfl:     nitroGLYCERIN (NITROSTAT) 0.4 MG SL tablet, Place 1 tablet (0.4 mg total) under the tongue every 5 (five) minutes as needed for Chest pain., Disp: 30 tablet, Rfl: 0    polyethylene glycol (GLYCOLAX) 17 gram PwPk, Take by mouth., Disp: , Rfl:     rivaroxaban (XARELTO) 15 mg Tab, Take 15 mg by mouth daily with dinner or evening meal., Disp: , Rfl:     rivastigmine (EXELON) 4.6 mg/24 hr PT24, APPLY 1 PATCH TOPICALLY ONCE DAILY, Disp: 30 patch, Rfl: 3    simvastatin (ZOCOR) 10 MG tablet, Take 10 mg by mouth every evening., Disp: , Rfl:     The ASCVD Risk score ([place] RICKEY Jr., et al., 2013) failed to calculate for the following reasons:    The 2013 ASCVD risk score is only valid for ages 40 to 79     Lab Results   Component Value Date    HGBA1C 7.2 (H) 05/08/2019    HGBA1C 7.7 (H) 12/14/2018    HGBA1C 7.6 (H) 09/06/2018     Lab Results   Component Value Date    LDLCALC 69.6 11/03/2018    CREATININE 1.8 (H) 03/12/2019    CREATININE 1.8 (H) 03/12/2019   labs 2019 rev.    Review of Systems   Constitutional: Positive for fatigue. Negative for activity change, appetite change and fever.   HENT: Negative for congestion and ear pain.    Eyes: Negative for photophobia and visual disturbance.   Respiratory: Negative for cough and shortness of breath.    Cardiovascular: Negative for chest pain.   Gastrointestinal: Negative for abdominal pain, diarrhea and nausea.   Endocrine: Positive for polyuria.        Hyperglycemia   Genitourinary: Positive for frequency and urgency. Negative for difficulty urinating.   Musculoskeletal: Negative for joint swelling and myalgias.   Skin: Positive for color change (bruise L shin). Negative for rash.   Neurological: Positive for  dizziness (improved). Negative for headaches.   Psychiatric/Behavioral: Positive for sleep disturbance (due to urinary frequency). Negative for dysphoric mood.           Objective:      Physical Exam   Constitutional: She is oriented to person, place, and time. She appears well-developed and well-nourished. No distress.   HENT:   Head: Normocephalic and atraumatic.   Nose: Nose normal.   Eyes: Conjunctivae are normal. Right eye exhibits no discharge. Left eye exhibits no discharge. No scleral icterus.   Neck: Normal range of motion. Neck supple.   Cardiovascular: Normal rate. An irregular rhythm present.   Pulmonary/Chest: Effort normal and breath sounds normal. No respiratory distress.   Abdominal: Soft. She exhibits no distension.   Musculoskeletal: Normal range of motion. She exhibits no edema.   Examined in wheelchair.   Neurological: She is alert and oriented to person, place, and time.   Skin: Skin is warm and dry. No rash noted.   Psychiatric: She has a normal mood and affect. Her behavior is normal.   Nursing note and vitals reviewed.          Screening recommendations appropriate to age and health status were reviewed.    Assessment & Plan:    Type 2 diabetes mellitus without complication, with long-term current use of insulin  Suboptimal. Discussed increase in nighttime prandial dose to 8units for blood sugar >200. Careful monitoring to avoid hypoglycemia. Requested they call me next week and update on blood sugars and frequency of urination.   Leg swelling  -     US Lower Extremity Veins Bilateral; Future; Expected date: 05/23/2019  Localized swelling, mass and lump, left lower limb   -     US Lower Extremity Veins Bilateral; Future; Expected date: 05/23/2019  Low likelihood for dvt. Changes appear due to trauma from fall, but family notes persistence over the past 1-2 weeks.  CKD (chronic kidney disease), stage IV  Cr hovering ~1.8. Keep q6mo nephrology f/u to review.  Iron deficiency anemia, unspecified  iron deficiency anemia type  Stable from previous. Keep upcoming f/u appt with [facility] to review.

## 2019-05-23 NOTE — PROGRESS NOTES
Subjective:       Patient ID: Denice Sheth is a 84 y.o. female.    Chief Complaint: Dizziness (x 1 week)    Dizziness:    Associated symptoms: hearing loss.     Patient seen last June for sloping sensorineural hearing loss. She returns today for positional vertigo X 1 week. It started in the middle of the night one week ago while attempting to turn over in bed. Only lasts a few seconds. Always positional. No worse side. No nausea.     Review of Systems   Constitutional: Negative.    HENT: Positive for hearing loss.    Eyes: Negative.    Respiratory: Negative.    Cardiovascular: Negative.    Gastrointestinal: Negative.    Musculoskeletal: Negative.    Skin: Negative.    Neurological: Positive for dizziness.   Hematological: Negative.    Psychiatric/Behavioral: Negative.        Objective:      Physical Exam   Constitutional: She is oriented to person, place, and time. Vital signs are normal. She appears well-developed and well-nourished. She is cooperative. She does not appear ill. No distress.   HENT:   Head: Normocephalic and atraumatic.   Right Ear: Hearing, tympanic membrane, external ear and ear canal normal. Tympanic membrane is not erythematous. No middle ear effusion.   Left Ear: Hearing, tympanic membrane, external ear and ear canal normal. Tympanic membrane is not erythematous.  No middle ear effusion.   Nose: Nose normal.   Mouth/Throat: Oropharynx is clear and moist and mucous membranes are normal.     SEPARATE PROCEDURE IN OFFICE:   Procedure: Removal of impacted cerumen, bilateral   Pre Procedure Diagnosis: Cerumen Impaction   Post Procedure Diagnosis: Cerumen Impaction   Verbal informed consent in regards to risk of trauma to ear canal, ear drum or hearing, discomfort during procedure and/or inability to remove cerumen impaction in one session or unforeseen events or complications.   No anesthesia.     Procedure in detail:   Ear canal visualized bilateral with appropriate size ear speculum utilizing  Operating Head Binocular Otomicroscope   Utilizing the following:  Ring curet was used AU. The impacted cerumen of the ear canals was removed atraumatically. The TM and EAC were then inspected and found to be clear of wax. See description of TMs/EACs in PE above.   Complications: No   Condition: Improved/Good    Exostosis left inferior EAC close to meatus.     Eyes: EOM and lids are normal. Right eye exhibits no discharge. Left eye exhibits no discharge. No scleral icterus.   Neck: Trachea normal and normal range of motion. Neck supple. No tracheal deviation present.   Cardiovascular: Normal rate.   Pulmonary/Chest: Effort normal. No stridor. No respiratory distress. She has no wheezes.   Musculoskeletal: Normal range of motion.   Neurological: She is alert and oriented to person, place, and time. She has normal strength. Coordination and gait normal.   Skin: Skin is warm, dry and intact. She is not diaphoretic. No cyanosis. No pallor.   Psychiatric: She has a normal mood and affect. Her speech is normal and behavior is normal. Judgment and thought content normal. Cognition and memory are normal.   Nursing note and vitals reviewed.   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

## 2019-05-27 ENCOUNTER — CLINICAL SUPPORT (OUTPATIENT)
Dept: AUDIOLOGY | Facility: CLINIC | Age: 84
End: 2019-05-27
Payer: MEDICARE

## 2019-05-27 ENCOUNTER — TELEPHONE (OUTPATIENT)
Dept: FAMILY MEDICINE | Facility: CLINIC | Age: 84
End: 2019-05-27

## 2019-05-27 DIAGNOSIS — H81.12 BPPV (BENIGN PAROXYSMAL POSITIONAL VERTIGO), LEFT: Primary | ICD-10-CM

## 2019-05-27 DIAGNOSIS — H81.12 BENIGN PAROXYSMAL POSITIONAL VERTIGO, LEFT: Primary | ICD-10-CM

## 2019-05-27 PROCEDURE — 92542 PR POSITIONAL NYSTAGMUS TEST: ICD-10-PCS | Mod: S$GLB,,, | Performed by: AUDIOLOGIST

## 2019-05-27 PROCEDURE — 92542 POSITIONAL NYSTAGMUS TEST: CPT | Mod: S$GLB,,, | Performed by: AUDIOLOGIST

## 2019-05-27 NOTE — PROGRESS NOTES
Denice Sheth was seen 5/27/19 for a BPPV recheck appt.     Patient reported that her vertigo has improved some but is still present when rolling to her left side in bed or when looking down towards the floor.       Head Right Positional was negative  Head Left Positional was negative but yielded 4 d/s DB nystagmus with fixation  Side-lying Hallpike Right was negative for BPPV but yielded 15 d/s DB nystagmus with a 6 d/s LB nystagmus slant (pt denied vertigo)  Side-lying Hallpike Left was positive but pt's eyes were closed too much for the computer to detect the degree of torsional nystagmus (nystagmus was observed and pt reported vertigo)    A Semont maneuver was performed for the left ear.  Patient tolerated the maneuver well and was asymptomatic upon discharge.  Post maneuver instructions were given to the patient both verbally and written.  Understanding was voiced.    A follow-up appointment (Justyn) has been scheduled for one week to ensure that BPPV has resolved.    Diagnosis:  BPPV left      Recommend referral to PT for continued canalith repositioning maneuvers to treat left sided BPPV due to pt's mobility limitations.

## 2019-05-27 NOTE — Clinical Note
Please put in a referral for pt to continue canalith repositioning maneuvers with PT due to mobility limitations and need for additional canalith repositioning maneuvers to treat left sided BPPV.

## 2019-05-27 NOTE — TELEPHONE ENCOUNTER
----- Message from Laurie Young sent at 5/27/2019 10:22 AM CDT -----  Contact: daughter, moises   Will cancel paitents ultrasound for pts leg. Seems to be fine. Bruise is fading. Much better.

## 2019-05-30 ENCOUNTER — TELEPHONE (OUTPATIENT)
Dept: FAMILY MEDICINE | Facility: CLINIC | Age: 84
End: 2019-05-30

## 2019-05-30 NOTE — TELEPHONE ENCOUNTER
----- Message from Cesia Bass sent at 5/30/2019  2:02 PM CDT -----  Type: Needs Medical Advice    Who Called:  Daughter / Parisa    Symptoms (please be specific):  Asking for update on application for pt assistance   How long has patient had these symptoms:     Pharmacy name and phone #:     Best Call Back Number: [phone] (home)     Additional Information:

## 2019-05-30 NOTE — TELEPHONE ENCOUNTER
----- Message from Pat Corrales sent at 5/30/2019  3:18 PM CDT -----  Contact: Patient's daughter Parisa  Type: Needs Medical Advice    Who Called: Parisa  Ignacio Call Back Number: [phone]  Additional Information: Parisa is stating that the application for patient assistance program for her mom was not signed and sent back to her.Please call back and advise.

## 2019-05-30 NOTE — TELEPHONE ENCOUNTER
Daughter is checking on the status of the paperwork from [facility]. Per daughter she states mikel told her it was faxed to the office. Please advise on the current status of this.

## 2019-06-10 RX ORDER — RIVASTIGMINE 4.6 MG/24H
PATCH, EXTENDED RELEASE TRANSDERMAL
Qty: 30 PATCH | Refills: 3 | Status: SHIPPED | OUTPATIENT
Start: 2019-06-10 | End: 2019-10-13 | Stop reason: SDUPTHER

## 2019-06-11 ENCOUNTER — OFFICE VISIT (OUTPATIENT)
Dept: HEMATOLOGY/ONCOLOGY | Facility: CLINIC | Age: 84
End: 2019-06-11
Payer: MEDICARE

## 2019-06-11 ENCOUNTER — LAB VISIT (OUTPATIENT)
Dept: LAB | Facility: HOSPITAL | Age: 84
End: 2019-06-11
Attending: NURSE PRACTITIONER
Payer: MEDICARE

## 2019-06-11 VITALS
TEMPERATURE: 97 F | DIASTOLIC BLOOD PRESSURE: 54 MMHG | BODY MASS INDEX: 36.44 KG/M2 | HEART RATE: 61 BPM | WEIGHT: 198 LBS | SYSTOLIC BLOOD PRESSURE: 126 MMHG | RESPIRATION RATE: 20 BRPM | HEIGHT: 62 IN

## 2019-06-11 DIAGNOSIS — N18.30 CKD (CHRONIC KIDNEY DISEASE), STAGE III: ICD-10-CM

## 2019-06-11 DIAGNOSIS — D50.9 IRON DEFICIENCY ANEMIA, UNSPECIFIED IRON DEFICIENCY ANEMIA TYPE: Primary | ICD-10-CM

## 2019-06-11 DIAGNOSIS — D50.9 IRON DEFICIENCY ANEMIA, UNSPECIFIED IRON DEFICIENCY ANEMIA TYPE: ICD-10-CM

## 2019-06-11 DIAGNOSIS — N18.4 CKD (CHRONIC KIDNEY DISEASE), STAGE IV: ICD-10-CM

## 2019-06-11 DIAGNOSIS — N18.30 CKD (CHRONIC KIDNEY DISEASE) STAGE 3, GFR 30-59 ML/MIN: ICD-10-CM

## 2019-06-11 LAB
ALBUMIN SERPL BCP-MCNC: 3.9 G/DL (ref 3.5–5.2)
ANION GAP SERPL CALC-SCNC: 11 MMOL/L (ref 8–16)
ANION GAP SERPL CALC-SCNC: 11 MMOL/L (ref 8–16)
BASOPHILS # BLD AUTO: 0.04 K/UL (ref 0–0.2)
BASOPHILS NFR BLD: 0.4 % (ref 0–1.9)
BUN SERPL-MCNC: 32 MG/DL (ref 7–18)
BUN SERPL-MCNC: 32 MG/DL (ref 7–18)
CALCIUM SERPL-MCNC: 9.7 MG/DL (ref 8.4–10.2)
CALCIUM SERPL-MCNC: 9.7 MG/DL (ref 8.4–10.2)
CHLORIDE SERPL-SCNC: 95 MMOL/L (ref 95–110)
CHLORIDE SERPL-SCNC: 95 MMOL/L (ref 95–110)
CO2 SERPL-SCNC: 29 MMOL/L (ref 22–31)
CO2 SERPL-SCNC: 29 MMOL/L (ref 22–31)
CREAT SERPL-MCNC: 1.52 MG/DL (ref 0.5–1.4)
CREAT SERPL-MCNC: 1.52 MG/DL (ref 0.5–1.4)
DIFFERENTIAL METHOD: ABNORMAL
EOSINOPHIL # BLD AUTO: 0.1 K/UL (ref 0–0.5)
EOSINOPHIL NFR BLD: 1 % (ref 0–8)
ERYTHROCYTE [DISTWIDTH] IN BLOOD BY AUTOMATED COUNT: 13.3 % (ref 11.5–14.5)
EST. GFR  (AFRICAN AMERICAN): 36 ML/MIN/1.73 M^2
EST. GFR  (AFRICAN AMERICAN): 36 ML/MIN/1.73 M^2
EST. GFR  (NON AFRICAN AMERICAN): 31 ML/MIN/1.73 M^2
EST. GFR  (NON AFRICAN AMERICAN): 31 ML/MIN/1.73 M^2
GLUCOSE SERPL-MCNC: 208 MG/DL (ref 70–110)
GLUCOSE SERPL-MCNC: 208 MG/DL (ref 70–110)
HCT VFR BLD AUTO: 39.5 % (ref 37–48.5)
HGB BLD-MCNC: 12.4 G/DL (ref 12–16)
IMM GRANULOCYTES # BLD AUTO: 0.05 K/UL (ref 0–0.04)
IMM GRANULOCYTES NFR BLD AUTO: 0.6 % (ref 0–0.5)
LYMPHOCYTES # BLD AUTO: 2.7 K/UL (ref 1–4.8)
LYMPHOCYTES NFR BLD: 29.6 % (ref 18–48)
MCH RBC QN AUTO: 29.2 PG (ref 27–31)
MCHC RBC AUTO-ENTMCNC: 31.4 G/DL (ref 32–36)
MCV RBC AUTO: 93 FL (ref 82–98)
MONOCYTES # BLD AUTO: 0.8 K/UL (ref 0.3–1)
MONOCYTES NFR BLD: 9 % (ref 4–15)
NEUTROPHILS # BLD AUTO: 5.4 K/UL (ref 1.8–7.7)
NEUTROPHILS NFR BLD: 59.4 % (ref 38–73)
NRBC BLD-RTO: 0 /100 WBC
PHOSPHATE SERPL-MCNC: 4.6 MG/DL (ref 2.7–4.5)
PLATELET # BLD AUTO: 251 K/UL (ref 150–350)
PMV BLD AUTO: 11 FL (ref 9.2–12.9)
POTASSIUM SERPL-SCNC: 5.3 MMOL/L (ref 3.5–5.1)
POTASSIUM SERPL-SCNC: 5.3 MMOL/L (ref 3.5–5.1)
RBC # BLD AUTO: 4.25 M/UL (ref 4–5.4)
SODIUM SERPL-SCNC: 135 MMOL/L (ref 136–145)
SODIUM SERPL-SCNC: 135 MMOL/L (ref 136–145)
WBC # BLD AUTO: 9.08 K/UL (ref 3.9–12.7)

## 2019-06-11 PROCEDURE — 3288F PR FALLS RISK ASSESSMENT DOCUMENTED: ICD-10-PCS | Mod: CPTII,S$GLB,, | Performed by: NURSE PRACTITIONER

## 2019-06-11 PROCEDURE — 1100F PTFALLS ASSESS-DOCD GE2>/YR: CPT | Mod: CPTII,S$GLB,, | Performed by: NURSE PRACTITIONER

## 2019-06-11 PROCEDURE — 80069 RENAL FUNCTION PANEL: CPT

## 2019-06-11 PROCEDURE — 3288F FALL RISK ASSESSMENT DOCD: CPT | Mod: CPTII,S$GLB,, | Performed by: NURSE PRACTITIONER

## 2019-06-11 PROCEDURE — 1100F PR PT FALLS ASSESS DOC 2+ FALLS/FALL W/INJURY/YR: ICD-10-PCS | Mod: CPTII,S$GLB,, | Performed by: NURSE PRACTITIONER

## 2019-06-11 PROCEDURE — 3078F DIAST BP <80 MM HG: CPT | Mod: CPTII,S$GLB,, | Performed by: NURSE PRACTITIONER

## 2019-06-11 PROCEDURE — 80069 RENAL FUNCTION PANEL: CPT | Mod: PN

## 2019-06-11 PROCEDURE — 99213 OFFICE O/P EST LOW 20 MIN: CPT | Mod: S$GLB,,, | Performed by: NURSE PRACTITIONER

## 2019-06-11 PROCEDURE — 85025 COMPLETE CBC W/AUTO DIFF WBC: CPT

## 2019-06-11 PROCEDURE — 99999 PR PBB SHADOW E&M-EST. PATIENT-LVL V: ICD-10-PCS | Mod: PBBFAC,,, | Performed by: NURSE PRACTITIONER

## 2019-06-11 PROCEDURE — 3074F PR MOST RECENT SYSTOLIC BLOOD PRESSURE < 130 MM HG: ICD-10-PCS | Mod: CPTII,S$GLB,, | Performed by: NURSE PRACTITIONER

## 2019-06-11 PROCEDURE — 3078F PR MOST RECENT DIASTOLIC BLOOD PRESSURE < 80 MM HG: ICD-10-PCS | Mod: CPTII,S$GLB,, | Performed by: NURSE PRACTITIONER

## 2019-06-11 PROCEDURE — 99213 PR OFFICE/OUTPT VISIT, EST, LEVL III, 20-29 MIN: ICD-10-PCS | Mod: S$GLB,,, | Performed by: NURSE PRACTITIONER

## 2019-06-11 PROCEDURE — 3074F SYST BP LT 130 MM HG: CPT | Mod: CPTII,S$GLB,, | Performed by: NURSE PRACTITIONER

## 2019-06-11 PROCEDURE — 36415 COLL VENOUS BLD VENIPUNCTURE: CPT | Mod: PN

## 2019-06-11 PROCEDURE — 85025 COMPLETE CBC W/AUTO DIFF WBC: CPT | Mod: PN

## 2019-06-11 PROCEDURE — 99999 PR PBB SHADOW E&M-EST. PATIENT-LVL V: CPT | Mod: PBBFAC,,, | Performed by: NURSE PRACTITIONER

## 2019-06-11 NOTE — PROGRESS NOTES
HISTORY OF PRESENT ILLNESS:  The patient is an 84 year old white female   known to Dr. Chin for anemia in the setting of CKD.  She was infused with   Feraheme on 01/29/18 & 02/01/18.      She presents to the clinic with her daughter for interval evaluation.   She reports having difficulties with vertigo and undergoing training.  She is prepping for a cscope tbd by Dr. Cedeño.  She follows with   Nephrology every 6 months.  She denies any difficulties with fever,   chills, fatigue, headache,irritability, muscle spasms or weakness, cramps,   nausea, malaise, palpitations, numbness/tingling, irregular heartbeats, etc.    No new complaint or pertinent finding on a 10-point review of systems.    PHYSICAL EXAMINATION:  GENERAL:  Well-developed, well-nourished, white female confined to a wheelchair.  Alert & oriented x 3  VITAL SIGNS:    Wt Readings from Last 3 Encounters:   06/11/19 89.8 kg (197 lb 15.6 oz)   05/23/19 88.9 kg (195 lb 15.8 oz)   05/23/19 90.2 kg (198 lb 13.7 oz)     Temp Readings from Last 3 Encounters:   06/11/19 97.4 °F (36.3 °C)   05/23/19 97.7 °F (36.5 °C)   05/08/19 97.6 °F (36.4 °C) (Oral)     BP Readings from Last 3 Encounters:   06/11/19 (!) 126/54   05/23/19 128/86   05/08/19 138/72     Pulse Readings from Last 3 Encounters:   06/11/19 61   05/23/19 61   05/08/19 60     HEENT:  Normocephalic, atraumatic.  Oral mucosa pink and moist.  Lips without   lesions.  Tongue midline.  Oropharynx clear.  Nonicteric sclerae.   NECK:  Supple.  No adenopathy.                                               HEART:  RRR with murmur, no gallop or rub.  Pacemaker to LCW.           LUNGS:  Diminished BS to bases bilateral, NL respiratory effort.                                   ABDOMEN:  Soft, nontender and nondistended with positive normoactive bowel   sounds.  No hepatosplenomegaly.                                              EXTREMITIES:  No cyanosis, clubbing or edema.  Distal pulses are intact.      LABORATORY:    Lab Results   Component Value Date    WBC 9.08 06/11/2019    HGB 12.4 06/11/2019    HCT 39.5 06/11/2019    MCV 93 06/11/2019     06/11/2019     Hgb 12.2 (13.1, 11.7, 10.5)  Unremarkable differential    BMP  Lab Results   Component Value Date     (L) 06/11/2019     (L) 06/11/2019    K 5.3 (H) 06/11/2019    K 5.3 (H) 06/11/2019    CL 95 06/11/2019    CL 95 06/11/2019    CO2 29 06/11/2019    CO2 29 06/11/2019    BUN 32 (H) 06/11/2019    BUN 32 (H) 06/11/2019    CREATININE 1.52 (H) 06/11/2019    CREATININE 1.52 (H) 06/11/2019    CALCIUM 9.7 06/11/2019    CALCIUM 9.7 06/11/2019    ANIONGAP 11 06/11/2019    ANIONGAP 11 06/11/2019    ESTGFRAFRICA 36 (A) 06/11/2019    ESTGFRAFRICA 36 (A) 06/11/2019    EGFRNONAA 31 (A) 06/11/2019    EGFRNONAA 31 (A) 06/11/2019     IMPRESSION:    1.  Anemia in the setting of stage IV chronic kidney disease - clinically stable.  2.  Hyponatremia - stable  3.  Hyperkalemia - stable  4.  CKD IV - followed by Dr. Pace.    PLAN:    As patient follows with Dr. Pace every 6 months and remains stable, follow up prn.      Assessment/plan reviewed and approved by Dr. Chin.

## 2019-06-21 NOTE — PROGRESS NOTES
Denice Sheth was seen 5/23/19 for a BPPV evaluation    Patient reported the onset of positional vertigo one week ago.  She reported brief episodes of vertigo when rolling over in bed that last several seconds.       VAT was negative right and negative left   Gaze nystagmus was absent  Spontaneous nystagmus was absent  Head Right Positional was negative for BPPV but yielded 6 d/s DB nystagmus without fixation  Head Left Positional was negative for BPPV but yielded 5 d/s DB nystagmus without fixation   Side-lying Hallpike Right was negative for BPPV but yielded 7 d/s DB nystagmus without fixation   Side-lying Hallpike Left was positive - 8 d/s RB nystagmus with 20 d/s UB torsional nystagmus with fixation of horizontal component of nystagmus but vertical nystagmus still present without fixation    A Semont maneuver was performed for the left ear.  Patient tolerated the maneuver well and was asymptomatic upon discharge.  Post maneuver instructions were given to the patient both verbally and written.  Understanding was voiced.    A follow-up appointment (Justyn) has been scheduled for one week to ensure that BPPV has resolved.    Diagnosis: PSCC BPPV left with other non-localizing vertical nystagmus

## 2019-06-24 PROBLEM — E11.9 DIABETES MELLITUS: Status: ACTIVE | Noted: 2019-06-24

## 2019-06-24 PROBLEM — I48.91 ATRIAL FIBRILLATION WITH RAPID VENTRICULAR RESPONSE: Status: ACTIVE | Noted: 2019-06-24

## 2019-06-24 PROBLEM — G45.9 TRANSIENT CEREBRAL ISCHEMIA: Status: ACTIVE | Noted: 2019-06-24

## 2019-06-24 PROBLEM — E03.9 HYPOTHYROIDISM: Status: ACTIVE | Noted: 2019-06-24

## 2019-06-24 RX ORDER — DOCUSATE SODIUM 100 MG/1
100 CAPSULE, LIQUID FILLED ORAL DAILY PRN
COMMUNITY
Start: 2017-05-09 | End: 2020-09-29

## 2019-06-24 RX ORDER — ACETAMINOPHEN 500 MG
5000 TABLET ORAL 2 TIMES DAILY
Status: ON HOLD | COMMUNITY
End: 2022-01-19 | Stop reason: SDUPTHER

## 2019-06-25 ENCOUNTER — PATIENT OUTREACH (OUTPATIENT)
Dept: ADMINISTRATIVE | Facility: HOSPITAL | Age: 84
End: 2019-06-25

## 2019-06-27 ENCOUNTER — CLINICAL SUPPORT (OUTPATIENT)
Dept: REHABILITATION | Facility: HOSPITAL | Age: 84
End: 2019-06-27
Payer: MEDICARE

## 2019-06-27 DIAGNOSIS — H81.12 BPPV (BENIGN PAROXYSMAL POSITIONAL VERTIGO), LEFT: ICD-10-CM

## 2019-06-27 PROCEDURE — 97112 NEUROMUSCULAR REEDUCATION: CPT | Mod: PN

## 2019-06-27 PROCEDURE — 97162 PT EVAL MOD COMPLEX 30 MIN: CPT | Mod: PN

## 2019-07-01 PROBLEM — H81.12 BPPV (BENIGN PAROXYSMAL POSITIONAL VERTIGO), LEFT: Status: ACTIVE | Noted: 2019-07-01

## 2019-07-01 NOTE — PLAN OF CARE
"[facility] Outpatient Physical Therapy Initial Evaluation    Name: Denice Sheth  Clinic Number: [id number]    Denice is a 84 y.o. female evaluated on 6/27/2019.     Diagnosis:   Encounter Diagnosis   Name Primary?    BPPV (benign paroxysmal positional vertigo), left      Physician: Annie Odonnell NP  Treatment Orders: PT Eval and Treat    Past Medical History:   Diagnosis Date    A-fib     Anticoagulant long-term use     CHF (congestive heart failure)     Diabetes mellitus     Disorder of kidney and ureter     Followed by Dr. Jonathan Pace    Encounter for blood transfusion     Hypertension     Insomnia     Stage 4 chronic kidney disease     Thyroid disease     hypothyroidism     Review of patient's allergies indicates:   Allergen Reactions    Adhesive Itching     Precautions: Fall, short term memory loss  Occupation: Retired    Envoirnmental concerns: none, pt lived with daughter; daughter is home most of the time. She is at the [facility] during the day   Cultural, Spiritual, Developmental and Educational concerns: none  Abuse/Neglect, Nutritional concerns: none    Subjective  Pt reports recent sudden onset of dizzy symptoms that occur when she turns over in bed and "it all gets all confused." She reports symptoms resolve when she finishes turning and does not feel this any other time during the day.     Diagnostic Tests: audiogram    Pain Scale: pt denies pain due to current condition    Patient Goals: so she won't get dizzy    Objective  Observation: Pt is 84 year old WD, WN female who is alert and oriented x 3. Pt enters clinic in WC. She normally uses the walker.     Posture: FHRS    Type of dizziness: room spins and when she closes her eyes she doesn't see the motion  Motion induced: turning in bed  Duration: seconds  Frequency: occasionally  Provocation factors: turning in bed  Auditory complaints: not any more than last hearing test  Falls: yes, last one was today, "     Red flags:  Decline in hearing: no  Dysarthria: no  Dis coordination: no  Diplopia: no  Decreased mentation and urinary incontinence: no  Decline in strength/weight: no  Decreased consciousness: no  Dysfunction of cranial nerves Head pain: no    Examination:  Spontaneous nystagmus: no  Pursuit: negative  Gave evoked Nystagmus: negative  Saccades: no  Head impulse: negative    Head shaking: negative    BPPV L noted with Hallpike positive today.     Treatment:  Time In: 4:40  Time Out: 4:50    PT Evaluation Completed? Yes  Discussed Plan of Care with patient: Yes    Denice received instruction in and demonstrated neuromuscular re-education for 10 minutes including:  Eply maneuver with assistance from PT    Written Home Exercises Provided: See self treatment for Eply moise Galdamez demo good understanding of the education provided. Patient demo good return demo of skill of exercises.    History  Co-morbidities and personal factors that may impact the plan of care Examination  Body Structures and Functions, activity limitations and participation restrictions that may impact the plan of care    Clinical Presentation   Co-morbidities:   advanced age, CKD stage IV, diabetes and history of CVA        Personal Factors:   age Body Regions:   head    Body Systems:    balance  transitions            Participation Restrictions:   Short term memory loss     Activity limitations:   Learning and applying knowledge  no deficits    General Tasks and Commands  no deficits    Communication  no deficits    Mobility  turning in bed    Self care  no deficits    Domestic Life  no deficits    Interactions/Relationships  no deficits    Life Areas  no deficits    Community and Social Life  community life         evolving clinical presentation with changing clinical characteristics                      moderate   high  high Decision Making/ Complexity Score:  moderate     CMS Impairment/Limitation/Restriction for FOTO Vertigo  Survey  Status Limitation G-Code CMS Severity Modifier  Intake 40% 60% Current Status CL - At least 60 percent but less than 80 percent  Predicted 57% 43% Goal Status+ CK - At least 40 percent but less than 60 percent    Assessment  This is a 84 y.o. female referred to outpatient physical therapy and presents with a medical diagnosis of   Encounter Diagnosis   Name Primary?    BPPV (benign paroxysmal positional vertigo), left     and demonstrates limitations as described in the problem list. Pt will benefit from physical therapy services in order to maximize pain free and/or functional use of cervical spine. The following goals were discussed with the patient and patient is in agreement with them as to be addressed in the treatment plan.     Problem List: decreased vestibular function.    Short Term Goals:  4 weeks  1. Pt will present with performing Eply maneuver x 3 with symptoms noted <2/3 trials.  2. Pt will participate in balance exercises.   3. Pt will be independent in performing Eply maneuver.     Long Term Goals: 8 weeks  1. Pt will present with performing Eply maneuver x 3 without symptoms.   2. Pt will present with improved static balance on floor with fair-good balance to prevent fall in home.   3. Pt will be independent with HEP and self management of symptoms.     Plan  Pt will be treated by physical therapy 1 times a week for 8 weeks for designated treatment time frame to address vestibular rehabilitation including balance in order to achieve established goals. Denice may at times be seen by a PTA as part of the Rehab Team.     Demetrice Ramos, JOSEFT      I certify the need for these services furnished under this plan of treatment and while under my care.         ___________________________________  Physician/Referring Practitioner        _________________  Date of Signature

## 2019-07-09 ENCOUNTER — CLINICAL SUPPORT (OUTPATIENT)
Dept: REHABILITATION | Facility: HOSPITAL | Age: 84
End: 2019-07-09
Payer: MEDICARE

## 2019-07-09 ENCOUNTER — OFFICE VISIT (OUTPATIENT)
Dept: FAMILY MEDICINE | Facility: CLINIC | Age: 84
End: 2019-07-09
Payer: MEDICARE

## 2019-07-09 VITALS
HEART RATE: 62 BPM | BODY MASS INDEX: 36.49 KG/M2 | OXYGEN SATURATION: 97 % | WEIGHT: 198.31 LBS | HEIGHT: 62 IN | SYSTOLIC BLOOD PRESSURE: 114 MMHG | DIASTOLIC BLOOD PRESSURE: 50 MMHG | RESPIRATION RATE: 18 BRPM | TEMPERATURE: 98 F

## 2019-07-09 DIAGNOSIS — K63.89 COLONIC MASS: Primary | ICD-10-CM

## 2019-07-09 DIAGNOSIS — H81.12 BPPV (BENIGN PAROXYSMAL POSITIONAL VERTIGO), LEFT: ICD-10-CM

## 2019-07-09 PROBLEM — M62.81 MUSCLE WEAKNESS: Status: RESOLVED | Noted: 2018-09-25 | Resolved: 2019-07-09

## 2019-07-09 PROCEDURE — 3078F PR MOST RECENT DIASTOLIC BLOOD PRESSURE < 80 MM HG: ICD-10-PCS | Mod: CPTII,S$GLB,, | Performed by: FAMILY MEDICINE

## 2019-07-09 PROCEDURE — 3078F DIAST BP <80 MM HG: CPT | Mod: CPTII,S$GLB,, | Performed by: FAMILY MEDICINE

## 2019-07-09 PROCEDURE — 1101F PR PT FALLS ASSESS DOC 0-1 FALLS W/OUT INJ PAST YR: ICD-10-PCS | Mod: CPTII,S$GLB,, | Performed by: FAMILY MEDICINE

## 2019-07-09 PROCEDURE — 99999 PR PBB SHADOW E&M-EST. PATIENT-LVL IV: ICD-10-PCS | Mod: PBBFAC,,, | Performed by: FAMILY MEDICINE

## 2019-07-09 PROCEDURE — 99999 PR PBB SHADOW E&M-EST. PATIENT-LVL IV: CPT | Mod: PBBFAC,,, | Performed by: FAMILY MEDICINE

## 2019-07-09 PROCEDURE — 3074F PR MOST RECENT SYSTOLIC BLOOD PRESSURE < 130 MM HG: ICD-10-PCS | Mod: CPTII,S$GLB,, | Performed by: FAMILY MEDICINE

## 2019-07-09 PROCEDURE — 99214 PR OFFICE/OUTPT VISIT, EST, LEVL IV, 30-39 MIN: ICD-10-PCS | Mod: S$GLB,,, | Performed by: FAMILY MEDICINE

## 2019-07-09 PROCEDURE — 97112 NEUROMUSCULAR REEDUCATION: CPT | Mod: PN

## 2019-07-09 PROCEDURE — 99214 OFFICE O/P EST MOD 30 MIN: CPT | Mod: S$GLB,,, | Performed by: FAMILY MEDICINE

## 2019-07-09 PROCEDURE — 3074F SYST BP LT 130 MM HG: CPT | Mod: CPTII,S$GLB,, | Performed by: FAMILY MEDICINE

## 2019-07-09 PROCEDURE — 1101F PT FALLS ASSESS-DOCD LE1/YR: CPT | Mod: CPTII,S$GLB,, | Performed by: FAMILY MEDICINE

## 2019-07-09 NOTE — PROGRESS NOTES
Subjective:       Patient ID: Denice Sheth is a 84 y.o. female.    Chief Complaint: Diabetes (follow up)      Denice Sheth is in the office for f/u and review.    HPI  Since LOV, saw GI for constipation, +fit kit.   Required 2 scopes due to incomplete prep. 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 Has upcoming appt with gen surgeon to discuss excision/bx and probable ostomy placement. She has not yet decided if she will be interested in pursuing chemo or radtx if recommended.   Discussed need for preop clearance from renal and cards.  Past Medical History:   Diagnosis Date    A-fib     Anticoagulant long-term use     CHF (congestive heart failure)     Diabetes mellitus     Disorder of kidney and ureter     Followed by Dr. Jonathan Pace    Encounter for blood transfusion     Hypertension     Insomnia     Stage 4 chronic kidney disease     Thyroid disease     hypothyroidism         Current Outpatient Medications:     albuterol-ipratropium (DUO-NEB) 2.5 mg-0.5 mg/3 mL nebulizer solution, Take 3 mLs by nebulization every 6 (six) hours as needed for Wheezing. Rescue, Disp: 1 Box, Rfl: 0    amiodarone (PACERONE) 200 MG Tab, Take 200 mg by mouth once daily. , Disp: , Rfl:     ascorbic acid (VITAMIN C) 500 MG tablet, Take 500 mg by mouth once daily., Disp: , Rfl:     b complex vitamins capsule, Take 1 capsule by mouth once daily., Disp: , Rfl:     bisacodyl (DULCOLAX) 10 mg Supp, Place 1 suppository (10 mg total) rectally daily as needed (Until bowel movement if patient has no bowel movement for 2 days)., Disp: , Rfl: 0    blood sugar diagnostic (TRUE METRIX GLUCOSE TEST STRIP) Strp, 1 each by Misc.(Non-Drug; Combo Route) route 3 (three) times daily., Disp: 100 each, Rfl: 11    blood-glucose meter kit, by Other route. Use as instructed, Disp: , Rfl:     calcium-vitamin D3 500 mg(1,250mg) -200 unit per tablet, Take 1 tablet by mouth 2 (two) times daily with meals., Disp: , Rfl:     cholecalciferol,  vitamin D3, 5,000 unit Tab, GIVE TWO TIMES A DAY, Disp: , Rfl:     clopidogrel (PLAVIX) 75 mg tablet, TAKE ONE TABLET BY MOUTH ONCE DAILY, Disp: 30 tablet, Rfl: 11    docusate sodium (COLACE) 100 MG capsule, 100 mg., Disp: , Rfl:     escitalopram oxalate (LEXAPRO) 10 MG tablet, Take 1 tablet (10 mg total) by mouth every evening., Disp: 90 tablet, Rfl: 1    fish oil-omega-3 fatty acids 300-1,000 mg capsule, Take 2 g by mouth once daily., Disp: , Rfl:     insulin (BASAGLAR KWIKPEN U-100 INSULIN) glargine 100 units/mL (3mL) SubQ pen, Inject 25 Units into the skin every evening., Disp: 7.5 mL, Rfl: 11    insulin aspart (NOVOLOG) 100 unit/mL injection, Inject 6 Units into the skin 3 (three) times daily before meals. (Patient taking differently: Inject 6 Units into the skin 3 (three) times daily before meals. Has been using only at lunch d/t lower BS), Disp: 10 mL, Rfl: 11    insulin syringe-needle U-100 1 mL 31 gauge x 5/16 Syrg, USE AS DIRECTED, Disp: 100 each, Rfl: 2    lancets 31 gauge Misc, 1 lancet by Misc.(Non-Drug; Combo Route) route 3 (three) times daily as needed., Disp: 100 each, Rfl: 11    levothyroxine (SYNTHROID) 75 MCG tablet, TAKE 1 TABLET BY MOUTH ONCE DAILY IN THE MORNING BEFORE BREAKFAST, Disp: 90 tablet, Rfl: 3    linagliptin (TRADJENTA) 5 mg Tab tablet, Take 1 tablet (5 mg total) by mouth once daily., Disp: 90 tablet, Rfl: 3    memantine (NAMENDA) 10 MG Tab, TAKE 1 TABLET BY MOUTH ONCE DAILY IN THE EVENING, Disp: 90 tablet, Rfl: 3    metoprolol tartrate (LOPRESSOR) 25 MG tablet, Take 25 mg by mouth 2 (two) times daily. , Disp: , Rfl:     multivitamin (ONE DAILY MULTIVITAMIN) per tablet, Take 1 tablet by mouth once daily., Disp: , Rfl:     nitroGLYCERIN (NITROSTAT) 0.4 MG SL tablet, Place 1 tablet (0.4 mg total) under the tongue every 5 (five) minutes as needed for Chest pain., Disp: 30 tablet, Rfl: 0    polyethylene glycol (GLYCOLAX) 17 gram PwPk, Take by mouth., Disp: , Rfl:      rivaroxaban (XARELTO) 15 mg Tab, Take 15 mg by mouth daily with dinner or evening meal., Disp: , Rfl:     rivastigmine (EXELON) 4.6 mg/24 hr PT24, APPLY 1 PATCH TOPICALLY ONCE DAILY, Disp: 30 patch, Rfl: 3    simvastatin (ZOCOR) 10 MG tablet, Take 10 mg by mouth every evening., Disp: , Rfl:     The ASCVD Risk score (Dennisbrian LANG Jr., et al., 2013) failed to calculate for the following reasons:    The 2013 ASCVD risk score is only valid for ages 40 to 79     Lab Results   Component Value Date    HGBA1C 7.2 (H) 05/08/2019    HGBA1C 7.7 (H) 12/14/2018    HGBA1C 7.6 (H) 09/06/2018     Lab Results   Component Value Date    LDLCALC 69.6 11/03/2018    CREATININE 1.52 (H) 06/11/2019    CREATININE 1.52 (H) 06/11/2019   labs 2019 rev. Records release for cscope and imaging from [facility].    Review of Systems   Constitutional: Positive for fatigue. Negative for activity change and fever.   HENT: Negative for congestion and ear pain.    Eyes: Negative for photophobia and visual disturbance.   Respiratory: Negative for cough and shortness of breath.    Cardiovascular: Negative for chest pain.   Gastrointestinal: Positive for constipation. Negative for abdominal pain, diarrhea and nausea.   Endocrine: Negative for polyuria.        Hyperglycemia   Genitourinary: Positive for frequency and urgency. Negative for difficulty urinating.   Musculoskeletal: Negative for joint swelling and myalgias.   Skin: Positive for color change (bruise L shin). Negative for rash.   Neurological: Negative for dizziness (improved) and headaches.   Psychiatric/Behavioral: Positive for sleep disturbance (due to urinary frequency). Negative for dysphoric mood.           Objective:      Physical Exam   Constitutional: She is oriented to person, place, and time. She appears well-developed and well-nourished. No distress.   HENT:   Head: Normocephalic and atraumatic.   Nose: Nose normal.   Eyes: Conjunctivae are normal. Right eye exhibits no discharge. Left eye  exhibits no discharge. No scleral icterus.   Neck: Normal range of motion. Neck supple.   Cardiovascular: Normal rate. An irregular rhythm present.   Pulmonary/Chest: Effort normal and breath sounds normal. No respiratory distress.   Abdominal: Soft. She exhibits no distension.   Musculoskeletal: Normal range of motion. She exhibits no edema.   Examined in wheelchair.   Neurological: She is alert and oriented to person, place, and time.   Skin: Skin is warm and dry. No rash noted.   Psychiatric: She has a normal mood and affect. Her behavior is normal.   Nursing note and vitals reviewed.          Screening recommendations appropriate to age and health status were reviewed.    Assessment & Plan:    Colonic mass    Discussion with pt/daughter as above.  Reviewed her options with re: tx vs hospice care if she declines any recs.   Pt handling news relatively well, and plans on deciding once the dx is confirmed with tissue bx.   Managing constipation currently without issue.    Time spent in room on history, exam, and coordination of care with patient >30mins.

## 2019-07-09 NOTE — Clinical Note
Can you recommend options for this pt? She's in the donut hole and the tradjenta, novolog, basaglar are prohibitively expensive. She has a standing referral to see you, and we'll get that scheduled as well. Thanks, roslyn

## 2019-07-09 NOTE — Clinical Note
Jonathan, just letting you know Ms Sheth has a new dx of colon mass. I'm requesting the CT report from [facility]. She is scheduled to discuss excision/ostomy placement with Dr Dodson later this month. She'll need preop clearance for surgery. Please let me know what/if anyting you need ordered. Thanks! roslyn

## 2019-07-09 NOTE — PROGRESS NOTES
[facility] Outpatient Physical Therapy Daily Note    Name: Denice Sheth  Clinic Number: [id number]  Date of Treatment: 7/9/2019   Diagnosis:   Encounter Diagnosis   Name Primary?    BPPV (benign paroxysmal positional vertigo), left      Visit number: 2  Start date: 6/27/19  POC End date: 8/22/19    Time in: 11:15 (pt late; previous MD appt ran over)  Time Out: 11:50 (pt fatigued)  Total Treatment Time: 30    Precautions: Falls    Subjective:    Denice reports she has not had any problems or symptoms with performing the Epley maneuver at home and has not been dizzy after two days of performing the treatment at home. She is performing in her recliner and is sleeping in her bed part of the night. Patient denies pain at this time and dizziness. She reports she uses her three wheeled rollator at time and her walker but does not feel very balanced and difficulty with standing without holding onto anything. Pt reports a busy morning with going to her PCP this morning prior to therapy.     Objective    Patient participated in neuromuscular re-education activities to improve: Balance, Proprioception, vestibular therapy and posture for 30 minutes. The following activities were included:   Epley maneuver x 3 - no increase in symptoms  Static standing 2 x 30 seconds in parallel bars requiring Min A from PT and use of hand rails  Side step in parallel bars 1/2 lap - LE fatigued and L knee buckled - ended treatment    Cues to remain in walker and standing up tall while walking from tx room to gym and out to waiting room.    Written Home Exercises Provided: sleep on 45 degree incline the next two nights. No written exercises  Pt demo good understanding of the education provided. Denice demonstrated good return demonstration of activities.     Assessment:       Pt will continue to benefit from skilled PT intervention. Medical Necessity is demonstrated by:  Fall Risk, Unable to participate fully in daily  activities, Requires skilled supervision to complete and progress HEP and Weakness.    Patient is making good progress towards established goals.    New/Revised goals: none this visit  Short Term Goals:  4 weeks  1. Pt will present with performing Eply maneuver x 3 with symptoms noted <2/3 trials. - MET  2. Pt will participate in balance exercises.   3. Pt will be independent in performing Eply maneuver. - MET     Long Term Goals: 8 weeks  1. Pt will present with performing Eply maneuver x 3 without symptoms. - MET  2. Pt will present with improved static balance on floor with fair-good balance to prevent fall in home.   3. Pt will be independent with HEP and self management of symptoms.     Plan:  Continue with established Plan of Care towards PT goals. Focus on balance to improve safety in home and community to prevent falls.

## 2019-07-19 ENCOUNTER — CLINICAL SUPPORT (OUTPATIENT)
Dept: REHABILITATION | Facility: HOSPITAL | Age: 84
End: 2019-07-19
Payer: MEDICARE

## 2019-07-19 DIAGNOSIS — R26.89 IMBALANCE: ICD-10-CM

## 2019-07-19 DIAGNOSIS — R29.6 FREQUENT FALLS: Primary | ICD-10-CM

## 2019-07-19 DIAGNOSIS — H81.12 BPPV (BENIGN PAROXYSMAL POSITIONAL VERTIGO), LEFT: ICD-10-CM

## 2019-07-19 PROCEDURE — 97110 THERAPEUTIC EXERCISES: CPT | Mod: PN

## 2019-07-19 PROCEDURE — 97112 NEUROMUSCULAR REEDUCATION: CPT | Mod: PN

## 2019-07-19 NOTE — PROGRESS NOTES
[facility] Outpatient Physical Therapy Daily Note    Name: Denice Sheth  Clinic Number: [id number]  Date of Treatment: 7/19/2019   Diagnosis:   Encounter Diagnosis   Name Primary?    BPPV (benign paroxysmal positional vertigo), left      Visit number: 3  Start date: 6/27/19  POC End date: 8/22/19    Time in: 8:03  Time Out: : 9:00  Total Treatment Time: 40 (pt required several rest breaks    Precautions: Falls    Subjective:    Denice continues to deny     Objective  MMT B hips:  Flexion 5/5 B  Ext 4-/5 B  abd 3/5 B    MMT core seated:  Flexion 5/5  Ext 4/5  SB 4/5 B    Pt received therapeutic exercises for strengthening and endurance x 25 minutes  Hip fallout adductor stretch 3 x 30 seconds  Adductor ball squeeze x 10   Supine hip abd with YTB x 10    Pt received neuromuscula re-education for improved balance and proprioception x 15 minutes  Double leg balance on floor with eyes opened: 3 seconds  Unable to perform SLS without immediate LOB and reaching for objects.   Standing hip abd x 5 in parallel bars  Standing hip ext x 5 in parallel bars    Next visit:  Side step in parallel bars  Sit to stand from elevated mat table    Cues to remain in walker and standing up tall while walking from tx room to gym and out to waiting room.    Written Home Exercises Provided: seated glut sets, seated hip abd, seated hip add with towel roll  Pt demo good understanding of the education provided. Denice demonstrated good return demonstration of activities.     Assessment:   Pt presents with significant fatigue and decreased endurance with standing exercises. She demonstrates forward hip flexion with standing hip exercises of 5 repetitions on each side.    Pt will continue to benefit from skilled PT intervention. Medical Necessity is demonstrated by:  Fall Risk, Unable to participate fully in daily activities, Requires skilled supervision to complete and progress HEP and Weakness.    Patient is making good  progress towards established goals.    New/Revised goals: in bold below      Short Term Goals:  4 weeks  1. Pt will present with performing Eply maneuver x 3 with symptoms noted <2/3 trials. - MET  2. Pt will participate in balance exercises.   3. Pt will be independent in performing Eply maneuver. - MET     Long Term Goals: 8 weeks  1. Pt will present with performing Eply maneuver x 3 without symptoms. - MET  2. Pt will present with improved static balance on floor with fair-good balance x 10 seconds to prevent fall in home.   3. Pt will be independent with HEP and self management of symptoms.   4. Pt will present with increased hip strength into abd and ext by one half grade for increased stability with standing and safety of ambulation in home.     Plan:  Continue with established Plan of Care towards PT goals. Message sent to referring provider with information that patient has fallen and requested referral for balance therapy with monitoring BPPV symptoms.

## 2019-07-23 ENCOUNTER — OFFICE VISIT (OUTPATIENT)
Dept: OBSTETRICS AND GYNECOLOGY | Facility: CLINIC | Age: 84
End: 2019-07-23
Payer: MEDICARE

## 2019-07-23 ENCOUNTER — CLINICAL SUPPORT (OUTPATIENT)
Dept: REHABILITATION | Facility: HOSPITAL | Age: 84
End: 2019-07-23
Payer: MEDICARE

## 2019-07-23 VITALS
HEIGHT: 62 IN | BODY MASS INDEX: 36.21 KG/M2 | SYSTOLIC BLOOD PRESSURE: 142 MMHG | DIASTOLIC BLOOD PRESSURE: 86 MMHG | RESPIRATION RATE: 15 BRPM

## 2019-07-23 DIAGNOSIS — N83.209 CYST OF OVARY, UNSPECIFIED LATERALITY: Primary | ICD-10-CM

## 2019-07-23 DIAGNOSIS — H81.12 BPPV (BENIGN PAROXYSMAL POSITIONAL VERTIGO), LEFT: ICD-10-CM

## 2019-07-23 PROCEDURE — 99999 PR PBB SHADOW E&M-EST. PATIENT-LVL V: ICD-10-PCS | Mod: PBBFAC,,, | Performed by: OBSTETRICS & GYNECOLOGY

## 2019-07-23 PROCEDURE — 3077F PR MOST RECENT SYSTOLIC BLOOD PRESSURE >= 140 MM HG: ICD-10-PCS | Mod: CPTII,S$GLB,, | Performed by: OBSTETRICS & GYNECOLOGY

## 2019-07-23 PROCEDURE — 99213 PR OFFICE/OUTPT VISIT, EST, LEVL III, 20-29 MIN: ICD-10-PCS | Mod: 57,S$GLB,, | Performed by: OBSTETRICS & GYNECOLOGY

## 2019-07-23 PROCEDURE — 97112 NEUROMUSCULAR REEDUCATION: CPT | Mod: PN

## 2019-07-23 PROCEDURE — 3079F PR MOST RECENT DIASTOLIC BLOOD PRESSURE 80-89 MM HG: ICD-10-PCS | Mod: CPTII,S$GLB,, | Performed by: OBSTETRICS & GYNECOLOGY

## 2019-07-23 PROCEDURE — 99999 PR PBB SHADOW E&M-EST. PATIENT-LVL V: CPT | Mod: PBBFAC,,, | Performed by: OBSTETRICS & GYNECOLOGY

## 2019-07-23 PROCEDURE — 3077F SYST BP >= 140 MM HG: CPT | Mod: CPTII,S$GLB,, | Performed by: OBSTETRICS & GYNECOLOGY

## 2019-07-23 PROCEDURE — 99213 OFFICE O/P EST LOW 20 MIN: CPT | Mod: 57,S$GLB,, | Performed by: OBSTETRICS & GYNECOLOGY

## 2019-07-23 PROCEDURE — 1101F PT FALLS ASSESS-DOCD LE1/YR: CPT | Mod: CPTII,S$GLB,, | Performed by: OBSTETRICS & GYNECOLOGY

## 2019-07-23 PROCEDURE — 1101F PR PT FALLS ASSESS DOC 0-1 FALLS W/OUT INJ PAST YR: ICD-10-PCS | Mod: CPTII,S$GLB,, | Performed by: OBSTETRICS & GYNECOLOGY

## 2019-07-23 PROCEDURE — 3079F DIAST BP 80-89 MM HG: CPT | Mod: CPTII,S$GLB,, | Performed by: OBSTETRICS & GYNECOLOGY

## 2019-07-23 PROCEDURE — 97110 THERAPEUTIC EXERCISES: CPT | Mod: PN

## 2019-07-23 NOTE — PROGRESS NOTES
ADMISSION H&P:  7/23/2019      Chief complaint: left ovarian cyst , pelvic pains      Indications for Surgery: enlarging left ovarian cyst      History of present illness:  Denice Sheth 84 y.o.  female patient presents today for enlarging simple left ovarian cyst (8cm), initially found incidentially on MRI for left leg pains. Ultrasound ordered. Planned colon surgery with [facility]- plan  bilateral salpingo-oophorectomy at time of surgery, counseled on Risks, Benefits and Alternatives to procedure, low risk of ovarian malignancy, discused with patient in detail, all questions answered and patient agreed to proceed.  , MRI and CT results reviewed, counseled on options.  Considering Laparoscopic  bilateral salpingo-oophorectomy after first of the year.     .uls    Allergies:   Review of patient's allergies indicates:   Allergen Reactions    Adhesive Itching       Past Medical History:   Diagnosis Date    A-fib     Anticoagulant long-term use     CHF (congestive heart failure)     Diabetes mellitus     Disorder of kidney and ureter     Followed by Dr. Jonathan Pace    Encounter for blood transfusion     Hypertension     Insomnia     Stage 4 chronic kidney disease     Thyroid disease     hypothyroidism       Past Surgical History:   Procedure Laterality Date    HEART CATH-LEFT Left 1/22/2016    [facility]    HYSTERECTOMY      partial, benign causes by reported hx       MEDS:   Current Outpatient Medications on File Prior to Visit   Medication Sig Dispense Refill    albuterol-ipratropium (DUO-NEB) 2.5 mg-0.5 mg/3 mL nebulizer solution Take 3 mLs by nebulization every 6 (six) hours as needed for Wheezing. Rescue 1 Box 0    amiodarone (PACERONE) 200 MG Tab Take 200 mg by mouth once daily.       ascorbic acid (VITAMIN C) 500 MG tablet Take 500 mg by mouth once daily.      b complex vitamins capsule Take 1 capsule by mouth once daily.      bisacodyl  (DULCOLAX) 10 mg Supp Place 1 suppository (10 mg total) rectally daily as needed (Until bowel movement if patient has no bowel movement for 2 days).  0    blood sugar diagnostic (TRUE METRIX GLUCOSE TEST STRIP) Strp 1 each by Misc.(Non-Drug; Combo Route) route 3 (three) times daily. 100 each 11    blood-glucose meter kit by Other route. Use as instructed      calcium-vitamin D3 500 mg(1,250mg) -200 unit per tablet Take 1 tablet by mouth 2 (two) times daily with meals.      cholecalciferol, vitamin D3, 5,000 unit Tab GIVE TWO TIMES A DAY      clopidogrel (PLAVIX) 75 mg tablet TAKE ONE TABLET BY MOUTH ONCE DAILY 30 tablet 11    docusate sodium (COLACE) 100 MG capsule 100 mg.      escitalopram oxalate (LEXAPRO) 10 MG tablet Take 1 tablet (10 mg total) by mouth every evening. 90 tablet 1    fish oil-omega-3 fatty acids 300-1,000 mg capsule Take 2 g by mouth once daily.      insulin (BASAGLAR KWIKPEN U-100 INSULIN) glargine 100 units/mL (3mL) SubQ pen Inject 25 Units into the skin every evening. 7.5 mL 11    insulin syringe-needle U-100 1 mL 31 gauge x 5/16 Syrg USE AS DIRECTED 100 each 2    lancets 31 gauge Misc 1 lancet by Misc.(Non-Drug; Combo Route) route 3 (three) times daily as needed. 100 each 11    levothyroxine (SYNTHROID) 75 MCG tablet TAKE 1 TABLET BY MOUTH ONCE DAILY IN THE MORNING BEFORE BREAKFAST 90 tablet 3    linagliptin (TRADJENTA) 5 mg Tab tablet Take 1 tablet (5 mg total) by mouth once daily. 90 tablet 3    memantine (NAMENDA) 10 MG Tab TAKE 1 TABLET BY MOUTH ONCE DAILY IN THE EVENING 90 tablet 3    metoprolol tartrate (LOPRESSOR) 25 MG tablet Take 25 mg by mouth 2 (two) times daily.       multivitamin (ONE DAILY MULTIVITAMIN) per tablet Take 1 tablet by mouth once daily.      polyethylene glycol (GLYCOLAX) 17 gram PwPk Take by mouth.      rivaroxaban (XARELTO) 15 mg Tab Take 15 mg by mouth daily with dinner or evening meal.      rivastigmine (EXELON) 4.6 mg/24 hr PT24 APPLY 1 PATCH  TOPICALLY ONCE DAILY 30 patch 3    simvastatin (ZOCOR) 10 MG tablet Take 10 mg by mouth every evening.      insulin aspart (NOVOLOG) 100 unit/mL injection Inject 6 Units into the skin 3 (three) times daily before meals. (Patient taking differently: Inject 6 Units into the skin 3 (three) times daily before meals. Has been using only at lunch d/t lower BS) 10 mL 11    lactulose (CHRONULAC) 20 gram/30 mL Soln Take 15 mLs (10 g total) by mouth once daily. for 10 days 447 mL 3    nitroGLYCERIN (NITROSTAT) 0.4 MG SL tablet Place 1 tablet (0.4 mg total) under the tongue every 5 (five) minutes as needed for Chest pain. 30 tablet 0     No current facility-administered medications on file prior to visit.        OB History    None         Social History     Socioeconomic History    Marital status:      Spouse name: Not on file    Number of children: Not on file    Years of education: Not on file    Highest education level: Not on file   Occupational History    Not on file   Social Needs    Financial resource strain: Not on file    Food insecurity:     Worry: Not on file     Inability: Not on file    Transportation needs:     Medical: Not on file     Non-medical: Not on file   Tobacco Use    Smoking status: Never Smoker    Smokeless tobacco: Never Used   Substance and Sexual Activity    Alcohol use: No    Drug use: No    Sexual activity: Never     Birth control/protection: Post-menopausal   Lifestyle    Physical activity:     Days per week: Not on file     Minutes per session: Not on file    Stress: Not on file   Relationships    Social connections:     Talks on phone: Not on file     Gets together: Not on file     Attends Mandaen service: Not on file     Active member of club or organization: Not on file     Attends meetings of clubs or organizations: Not on file     Relationship status: Not on file   Other Topics Concern    Not on file   Social History Narrative    Not on file       Family  History   Problem Relation Age of Onset    Diabetes Mother     Heart disease Mother     Cancer Father         type unknown (nonsmoker, no etoh)    Heart disease Brother     Dementia Sister     Cancer Daughter         fallopian ca    Cancer Son         esophageal ca    Dementia Sister     Dementia Sister          Physical Exam:  Vital signs: stable  Abdominal: Soft. She exhibits no distension and no mass. There is no tenderness. There is no rebound and no guarding.   Genitourinary:    External rectal exam shows no thrombosed external hemorrhoids.    Pelvic exam was performed with patient supine.   No labial fusion.   There is no rash, lesion or injury on the right labia.   There is no rash, lesion or injury on the left labia.   No bleeding and no signs of injury around the vaginal introitus, urethra is without lesions and well supported.    No vaginal discharge found.    No significant Cystocele, Enterocele or rectocele, and cuff well supported.   Bimanual exam:   The urethra and vagina are without palpable masses or tenderness.   Uterus and cervix are surgically absents, vaginal cuff is intact and well supported.   Right adnexum displays no mass and no tenderness.   Left adnexum displays no mass and no tenderness.  Musculoskeletal: Normal range of motion.   Lymphadenopathy: No inguinal adenopathy present.   Neurological: She is alert and oriented to person, place, and time. Coordination normal.   Skin: Skin is warm and dry. She is not diaphoretic.   Psychiatric: She has a normal mood and affect.    Assessment:  Left ovarian cyst, pelvic pain  Planned colon resection    PLAN:  Laparoscopic  bilateral salpingo-oophorectomy at time of colon surgery      Blayne Wallace M.D., FACOG

## 2019-07-23 NOTE — PROGRESS NOTES
[facility] Outpatient Physical Therapy Daily Note    Name: Denice Sheth  Clinic Number: [id number]  Date of Treatment: 7/23/2019   Diagnosis:   Encounter Diagnosis   Name Primary?    BPPV (benign paroxysmal positional vertigo), left      Visit number: 4  Start date: 6/27/19  POC End date: 8/22/19    Time in: 8:02  Time Out: : 8:50  Total Treatment Time: 40 (pt required several rest breaks)    Precautions: Falls    Subjective:    Denice continues to deny     Objective    Pt received therapeutic exercises for strengthening and endurance x 25 minutes  Seated tricep press up in WC 2 x 5  Seated shoulder flexion with thoracic extension in WC x 10  Hip fallout adductor stretch 3 x 30 seconds  Adductor ball squeeze x 10 Seated in WC  Supine hip abd with YTB x 10    Pt received neuromuscula re-education for improved balance and proprioception x 15 minutes  Double leg balance on floor with eyes opened no hand rail x 1 minute  Weight shifting x 5 each LE with B hand hold on rail   NP Standing hip abd x 5 in parallel bars  Standing hip ext 2 x 5 in parallel bars with VC for lumbar ext   Side step in parallel bars 1.5 laps - pt fatigued and could not complete 2 laps    Next visit:  Sit to stand from elevated mat table, resume standing hip abd    Written Home Exercises Provided: seated WC tricep press up, seated shoulder flexion with thoracic ext.   Pt demo good understanding of the education provided. Denice demonstrated good return demonstration of activities.     Assessment:   Began treatment with standing balance and strengthening exercises and pt demonstrated increased ability to stand. UE strengthening to assist with transfers sit to stand for improved stability with lifting from chair.     Pt will continue to benefit from skilled PT intervention. Medical Necessity is demonstrated by:  Fall Risk, Unable to participate fully in daily activities, Requires skilled supervision to complete and progress HEP  and Weakness.    Patient is making good progress towards established goals.    New/Revised goals: in bold below      Short Term Goals:  4 weeks  1. Pt will present with performing Eply maneuver x 3 with symptoms noted <2/3 trials. - MET  2. Pt will participate in balance exercises.   3. Pt will be independent in performing Eply maneuver. - MET     Long Term Goals: 8 weeks  1. Pt will present with performing Eply maneuver x 3 without symptoms. - MET  2. Pt will present with improved static balance on floor with fair-good balance x 10 seconds to prevent fall in home.   3. Pt will be independent with HEP and self management of symptoms.   4. Pt will present with increased hip strength into abd and ext by one half grade for increased stability with standing and safety of ambulation in home.     Plan:  Continue with established Plan of Care towards PT goals. Message received from referring provider and referral has been placed for balance therapy. Continue monitoring BPPV symptoms. Pt scheduled for GI surgery later this week for cyst removal.

## 2019-07-23 NOTE — PATIENT INSTRUCTIONS
Sitting Press-Up        Sitting on chair with palms flat on seat, slump forward a bit. Push up so bottom comes off chair.  Repeat __2__ times or for __2__ sets. Do __2__ sessions per day.     https://[email or web address].[email or web address]     Copyright © I. All rights reserved.   Active / Assistive ROM Flexion With Static Hold Above Head        Slowly raise straight arms toward ceiling. Try to keep shoulder blades together and arch upper back slightly Hold __2_ seconds.  Repeat _10__ times. Do __2_ sessions per day.    Copyright © [employer]. All rights reserved.

## 2019-07-24 ENCOUNTER — TELEPHONE (OUTPATIENT)
Dept: FAMILY MEDICINE | Facility: CLINIC | Age: 84
End: 2019-07-24

## 2019-07-24 ENCOUNTER — TELEPHONE (OUTPATIENT)
Dept: NEPHROLOGY | Facility: CLINIC | Age: 84
End: 2019-07-24

## 2019-07-24 PROBLEM — K63.89 MASS OF CECUM: Status: ACTIVE | Noted: 2019-07-24

## 2019-07-24 NOTE — TELEPHONE ENCOUNTER
Jackeline aware that order has been written for hospital bed and faxed to [facility] . They will contact cardiology to sched appt.--lp

## 2019-07-24 NOTE — TELEPHONE ENCOUNTER
Handwritten rx done.  Let them know that I did check with nephrology for surgical clearance, but they need to check in with cardiology if they have not already.

## 2019-07-24 NOTE — TELEPHONE ENCOUNTER
----- Message from Shameka Rhoades sent at 7/24/2019  8:50 AM CDT -----  Type: Needs Medical Advice    Who Called:  Jackeline (Daughter in Law)  Best Call Back Number: [phone]  Additional Information: Patient is scheduled to have extensive surgery in two to three weeks/requesting hospital bed be ordered for patient/please call back to advise.   Reason For Visit  OB & Gyn Reason For Visit:   Patient presents for follow up .   pt here for f/u from ER for abnormal bleeding.   Chaperone: MARGARET SANDRA accepted a chaperone. MARGARET SANDRA is accompanied by no one.     :  services not used.        Quality  Quality:   Smoking Cessation CI tobacco use and provided intervention and counseling in regards to tobacco use.      History of Present Illness  General Gyne HPI: 41 y/o  female presents for ER follow up . Patient was seen in the ER on  secondary to heavy vaginal bleeding. The patient states she has regular menses that usually last for 5 days, of which 2 days are heavy. Her menses in July lasted for 7 days and was heavy for all 7 days.  Hemoglobin in ER was 8.1.  She has a history of iron deficiency anemia.                Active Problems   1. Anemia due to acute blood loss (285.1) (D62)   · Assessed By: Gayla Lucia; Last Assessed: 09 Aug 2017   2. Asthma (493.90) (J45.909)   3. Fibroids (218.9) (D25.9)   · Assessed By: BRANDON WILSON (Obstetrics/Gynecology); Last Assessed: 09 Aug 2017   4. GERD (gastroesophageal reflux disease) (530.81) (K21.9)   5. Hypertension (401.9) (I10)   6. Menorrhalgia (625.3) (N94.6)   · Assessed By: Gayla Lucia; Last Assessed: 09 Aug 2017    Surgical History   1. History of  Section   2. History of Uterine Myomectomy    Social History   · Never smoker    OB History  OB History:   number of  sections: 6 month stillborn.      Current Meds   1. Albuterol Sulfate (2.5 MG/3ML) 0.083% Inhalation Nebulization Solution; USE 1 UNIT   DOSE IN NEBULIZER EVERY 4 HOURS AS NEEDED;   Therapy: 2017 to (Last Rx:[id number])  Requested for: 81Iwr5935 Ordered   2. Azithromycin 250 MG Oral Tablet; TAKE 2 TABLETS ON DAY 1 THEN TAKE 1 TABLET A   DAY FOR 4 DAYS;   Therapy: 2017 to (Last Rx:99Bfx8007)  Requested for: 23Bek5744 Ordered   3. Bisacodyl Laxative 5 MG TBEC; TAKE 2 TABLET AS  DIRECTED Take both tablets after   completion of the first half of the bowel preparation;   Therapy: 45Jxl0638 to (Evaluate:24Bfr3593); Last Rx:[id number] Ordered   4. Cyclobenzaprine HCl - 10 MG Oral Tablet; TAKE 1 TABLET BY MOUTH AT BEDTIME;   Therapy: 22Jan2016 to (Evaluate:17Mbq6326)  Requested for: 75Jbg1912; Last   Rx:[id number] Ordered   5. Dulera 200-5 MCG/ACT Inhalation Aerosol; INHALE 2 PUFFS ONCE A DAY;   Therapy: 22Jan2016 to (Evaluate:37Nqb7914)  Requested for: 92Uqu6675; Last   Rx:[id number] Ordered   6. Ferrous Sulfate 325 (65 Fe) MG Oral Tablet; TAKE 1 TABLET 3 TIMES DAILY;   Therapy: 30Nov2016 to (Evaluate:30Mar2017)  Requested for: 30Nov2016; Last   Rx:30Nov2016 Ordered   7. Fluticasone Propionate 50 MCG/ACT Nasal Suspension; USE 2 SPRAYS IN EACH   NOSTRIL ONCE DAILY;   Therapy: 08Aug2017 to (Last Rx:01Gcj8353)  Requested for: 12Nut6567 Ordered   8. HydroCHLOROthiazide 25 MG Oral Tablet;   Therapy: 09Aug2017 to Recorded   9. Hydrocodone-Acetaminophen 7.5-325 MG Oral Tablet; 1-2 tabs po q 4-6 hours prn pain;   Therapy: 09Jun2015 to (Last Rx:83Mmz7405) Ordered   10. Lidocaine 5 % External Ointment; APPLY TO AFFECTED AREAS AS DIRECTED;    Therapy: 26Jun2015 to (Last Rx:38Nsd0183)  Requested for: 17Upe7644 Ordered   11. Magnesium Oxide 400 MG Oral Capsule; TAKE 1 CAPSULE Every twelve hours;    Therapy: 24Edz9011 to (Evaluate:74Bxv0308)  Requested for: 20Gqj1831; Last    Rx:[id number] Ordered   12. Methocarbamol 500 MG Oral Tablet; TAKE 1 TABLET BY MOUTH AT BEDTIME;    Therapy: 26Apr2016 to (Evaluate:35Jyg1538)  Requested for: 88Rqm9125; Last    Rx:[id number] Ordered   13. Montelukast Sodium 10 MG Oral Tablet; TAKE 1 TABLET BY MOUTH EVERY EVENING;    Therapy: 22Jan2016 to (Evaluate:70Ajt3453)  Requested for: 18Apr2017; Last    Rx:18Apr2017 Ordered   14. Pantoprazole Sodium 20 MG Oral Tablet Delayed Release; TAKE 1 TABLET TWICE    DAILY;    Therapy: 18Kna7111 to (Evaluate:91Gru9430)  Requested for: 60Tup6123;  Last    Rx:19Lni2602 Ordered   15. PEG 3350-KCl-Na Bicarb-NaCl 420 GM Oral Solution Reconstituted; Day before    Procedure drink 1/2 gallon from 5pm to 7pm. Day of procedure drink    2nd 1/2 gallon 5 hours before scheduled procedure;    Therapy: 33Kif9335 to (Last Rx:29Enq9250)  Requested for: 28Xhe5021 Ordered   16. TiZANidine HCl - 4 MG Oral Tablet; TAKE 1 TABLET BY MOUTH EVERY 8 HOURS;    Therapy: 09Jun2015 to (Evaluate:33Qhd7252)  Requested for: 86Syr8041; Last    Rx:[id number] Ordered   17. Ventolin  (90 Base) MCG/ACT Inhalation Aerosol Solution; INHALE ONE TO TWO    PUFFS BY MOUTH EVERY 4 TO 6 HOURS AS NEEDED;    Therapy: 14Jan2016 to (Evaluate:27Oct2017)  Requested for: 01Rln3236; Last    Rx:[id number] Ordered    Allergies   1. Penicillins    Physical Exam    Constitutional Exam: General appearance: No acute distress, well appearing and well nourished. Vital signs reviewed. Other Systems Examined:   Abdomen soft, midline scar   uterine fundus palpable above umbilicus  Bimanaul: uterus is 28 weeks in size, large fibroid to left fundal portion of uterus    ..      Assessment   1. Encounter for preventive health examination (V70.0) (Z00.00)   2. Anemia due to acute blood loss (285.1) (D62)   3. Menorrhalgia (625.3) (N94.6)   4. Fibroids (218.9) (D25.9)    Orders    1. CBC WITHOUT DIFFERENTIAL; Status:Active; Requested for:09Aug2017;    2. MA FFDM SCREEN W CAD ARIELLA; Status:Active - Perform Order; Requested for:09Aug2017;      3. US PELVIS COMPLETE; Status:Active - Perform Order; Requested for:09Aug2017;    4. MedroxyPROGESTERone Acetate 10 MG Oral Tablet; TAKE 1 TABLET DAILY  OB & Gyn Plan:   Patient with symptomatic uterine fibroids:  Pelvic ultrasound  Will follow up for pap smear and EMB  Order for mammogram  Check CBC  Continue iron  Follow up in 3 weeks     Health Maintenance (V70.0) (Z00.00)             Signatures   Electronically signed by : Gayla Lucia, ; Aug  9 2017  9:23AM CST    Electronically  signed by : BRANDON WILSON M.D.; Aug  9 2017 12:46PM CST

## 2019-07-24 NOTE — TELEPHONE ENCOUNTER
----- Message from Kailey Rubio sent at 7/24/2019  3:50 PM CDT -----  Pt in need of a Rik Colectomy.  I faxed a clearance form if more convenient than putting in Epic. Dr Wallace will also be doing a procedure (bilateral salpingo oophorectomy)See note in chart.    If pt need to be seen before clearance given, please let me know.    Thank you.  Kailey Rubio

## 2019-07-30 ENCOUNTER — OFFICE VISIT (OUTPATIENT)
Dept: ENDOCRINOLOGY | Facility: CLINIC | Age: 84
End: 2019-07-30
Payer: MEDICARE

## 2019-07-30 ENCOUNTER — CLINICAL SUPPORT (OUTPATIENT)
Dept: REHABILITATION | Facility: HOSPITAL | Age: 84
End: 2019-07-30
Payer: MEDICARE

## 2019-07-30 VITALS
SYSTOLIC BLOOD PRESSURE: 112 MMHG | WEIGHT: 200.81 LBS | DIASTOLIC BLOOD PRESSURE: 68 MMHG | HEART RATE: 60 BPM | BODY MASS INDEX: 36.95 KG/M2 | HEIGHT: 62 IN

## 2019-07-30 DIAGNOSIS — N18.30 TYPE 2 DIABETES MELLITUS WITH STAGE 3 CHRONIC KIDNEY DISEASE, WITH LONG-TERM CURRENT USE OF INSULIN: Primary | ICD-10-CM

## 2019-07-30 DIAGNOSIS — Z86.73 H/O: CVA (CEREBROVASCULAR ACCIDENT): ICD-10-CM

## 2019-07-30 DIAGNOSIS — Z79.4 TYPE 2 DIABETES MELLITUS WITH STAGE 3 CHRONIC KIDNEY DISEASE, WITH LONG-TERM CURRENT USE OF INSULIN: Primary | ICD-10-CM

## 2019-07-30 DIAGNOSIS — I10 ESSENTIAL HYPERTENSION: ICD-10-CM

## 2019-07-30 DIAGNOSIS — E11.22 TYPE 2 DIABETES MELLITUS WITH STAGE 3 CHRONIC KIDNEY DISEASE, WITH LONG-TERM CURRENT USE OF INSULIN: Primary | ICD-10-CM

## 2019-07-30 DIAGNOSIS — H81.12 BPPV (BENIGN PAROXYSMAL POSITIONAL VERTIGO), LEFT: ICD-10-CM

## 2019-07-30 DIAGNOSIS — K63.89 MASS OF CECUM: ICD-10-CM

## 2019-07-30 DIAGNOSIS — I25.10 CORONARY ARTERIOSCLEROSIS: ICD-10-CM

## 2019-07-30 DIAGNOSIS — E03.9 HYPOTHYROIDISM, UNSPECIFIED TYPE: ICD-10-CM

## 2019-07-30 LAB — GLUCOSE SERPL-MCNC: 279 MG/DL (ref 70–110)

## 2019-07-30 PROCEDURE — 99204 OFFICE O/P NEW MOD 45 MIN: CPT | Mod: S$GLB,,, | Performed by: NURSE PRACTITIONER

## 2019-07-30 PROCEDURE — 1101F PT FALLS ASSESS-DOCD LE1/YR: CPT | Mod: CPTII,S$GLB,, | Performed by: NURSE PRACTITIONER

## 2019-07-30 PROCEDURE — 99999 PR PBB SHADOW E&M-EST. PATIENT-LVL V: ICD-10-PCS | Mod: PBBFAC,,, | Performed by: NURSE PRACTITIONER

## 2019-07-30 PROCEDURE — 99204 PR OFFICE/OUTPT VISIT, NEW, LEVL IV, 45-59 MIN: ICD-10-PCS | Mod: S$GLB,,, | Performed by: NURSE PRACTITIONER

## 2019-07-30 PROCEDURE — 82962 GLUCOSE BLOOD TEST: CPT | Mod: S$GLB,,, | Performed by: NURSE PRACTITIONER

## 2019-07-30 PROCEDURE — 3074F PR MOST RECENT SYSTOLIC BLOOD PRESSURE < 130 MM HG: ICD-10-PCS | Mod: CPTII,S$GLB,, | Performed by: NURSE PRACTITIONER

## 2019-07-30 PROCEDURE — 1101F PR PT FALLS ASSESS DOC 0-1 FALLS W/OUT INJ PAST YR: ICD-10-PCS | Mod: CPTII,S$GLB,, | Performed by: NURSE PRACTITIONER

## 2019-07-30 PROCEDURE — 3074F SYST BP LT 130 MM HG: CPT | Mod: CPTII,S$GLB,, | Performed by: NURSE PRACTITIONER

## 2019-07-30 PROCEDURE — 3078F PR MOST RECENT DIASTOLIC BLOOD PRESSURE < 80 MM HG: ICD-10-PCS | Mod: CPTII,S$GLB,, | Performed by: NURSE PRACTITIONER

## 2019-07-30 PROCEDURE — 99999 PR PBB SHADOW E&M-EST. PATIENT-LVL V: CPT | Mod: PBBFAC,,, | Performed by: NURSE PRACTITIONER

## 2019-07-30 PROCEDURE — 82962 POCT GLUCOSE, HAND-HELD DEVICE: ICD-10-PCS | Mod: S$GLB,,, | Performed by: NURSE PRACTITIONER

## 2019-07-30 PROCEDURE — 3078F DIAST BP <80 MM HG: CPT | Mod: CPTII,S$GLB,, | Performed by: NURSE PRACTITIONER

## 2019-07-30 PROCEDURE — 97110 THERAPEUTIC EXERCISES: CPT | Mod: PN

## 2019-07-30 PROCEDURE — 97112 NEUROMUSCULAR REEDUCATION: CPT | Mod: PN

## 2019-07-30 RX ORDER — INSULIN GLARGINE 100 [IU]/ML
22 INJECTION, SOLUTION SUBCUTANEOUS NIGHTLY
Qty: 7.5 ML | Refills: 11
Start: 2019-07-30 | End: 2020-02-13

## 2019-07-30 RX ORDER — INSULIN ASPART 100 [IU]/ML
7 INJECTION, SOLUTION INTRAVENOUS; SUBCUTANEOUS
Qty: 10 ML | Refills: 11
Start: 2019-07-30 | End: 2020-03-04

## 2019-07-30 NOTE — PATIENT INSTRUCTIONS
Hypoglycemia (Low Blood Sugar)     Fast-acting sugar includes a cup of nonfat milk.     Too little sugar (glucose) in your blood is called hypoglycemia or low blood sugar. Low blood sugar usually means anything lower than 70 mg/dL. Talk with your healthcare provider about your target range and what level is too low for you. Diabetes itself doesnt cause low blood sugar. But some of the treatments for diabetes, such as pills or insulin, may raise your risk for it. Low blood sugar may cause you to pass out or have a seizure. So always treat low blood sugar right away, but don't overeat.  Special note: Always carry a source of fast-acting sugar and a snack in case of hypoglycemia.   What you may notice  If you have low blood sugar, you may have one or more of these symptoms:  · Shakiness or dizziness  · Cold, clammy skin or sweating  · Feelings of hunger  · Headache  · Nervousness  · A hard, fast heartbeat  · Weakness  · Confusion or irritability  · Blurred vision  · Having nightmares or waking up confused or sweating  · Numbness or tingling in the lips or tongue  What you should do  Here are tips to follow if you have hypoglycemia:   · First check your blood sugar. If it is too low (out of your target range), eat or drink 15 to 20 grams of fast-acting sugar. This may be 3 to 4 glucose tablets, 4 ounces (half a cup) of fruit juice or regular (nondiet) soda, 8 ounces (1 cup) of fat-free milk, or 1 tablespoon of honey. Dont take more than this, or your blood sugar may go too high.  · Wait 15 minutes. Then recheck your blood sugar if you can.  · If your blood sugar is still too low, repeat the steps above and check your blood sugar again. If your blood sugar still has not returned to your target range, contact your healthcare provider or seek emergency care.  · Once your blood sugar returns to target range, eat a snack or meal.  Preventing low blood sugar  Things you can do include the following:   · If your condition  needs a strict treatment plan, eat your meals and snacks at the same times each day. Dont skip meals!  · If your treatment plan lets you change when you eat and what you eat, learn how to change the time and dose of your rapid-acting insulin to match this.   · Ask your healthcare provider if it is safe for you to drink alcohol. Never drink on an empty stomach.  · Take your medicine at the prescribed times.  · Always carry a source of fast-acting sugar and a snack when youre away from home.  Other things to do  Additional tips include the following:  · Carry a medical ID card, a compact USB drive, or wear a medical alert bracelet or necklace. It should say that you have diabetes. It should also say what to do if you pass out or have a seizure.  · Make sure your family, friends, and coworkers know the signs of low blood sugar. Tell them what to do if your blood sugar falls very low and you cant treat yourself.  · Keep a glucagon emergency kit handy. Be sure your family, friends, and coworkers know how and when to use it. Check it regularly and replace the glucagon before it expires.  · Talk with your health care team about other things you can do to prevent low blood sugar.     If you have unexplained hypoglycemia or hypoglycemia several times, call your healthcare provider.   Date Last Reviewed: 5/1/2016  © 2603-8176 [employer]. [address] [place] [address], [place] [address]. All rights reserved. This information is not intended as a substitute for professional medical care. Always follow your healthcare professional's instructions.

## 2019-07-30 NOTE — PROGRESS NOTES
[facility] Outpatient Physical Therapy Daily Note    Name: Denice Sheth  Clinic Number: [id number]  Date of Treatment: 7/30/2019   Diagnosis:   Encounter Diagnosis   Name Primary?    BPPV (benign paroxysmal positional vertigo), left      Visit number: 5  Start date: 6/27/19  POC End date: 8/22/19    Time in: 11:04  Time Out: : 11:50  Total Treatment Time: 40 (pt required several rest breaks)    Precautions: Falls    Subjective:    Denice reports she is doing ok today. Her cyst on her R ovary gives her pain and makes it hard to raise her leg. She denies dizziness today.     Objective    Pt received therapeutic exercises for strengthening and endurance x 25 minutes  Seated tricep press up in WC 2 x 5  Seated shoulder flexion with thoracic extension in WC 2 x 5 with 1# wand  Seated shoulder press out with 1# wand 2 x 5  Adductor ball squeeze x 10 Seated in WC  LAQ in WC x 10 B   Hip fallout adductor stretch 3 x 30 seconds  Supine hip abd with YTB x 10  Bridge x 10  Supine iso HS B x 10    Pt received neuromuscula re-education for improved balance and proprioception x 15 minutes  Double leg balance on floor with eyes opened no hand rail x max hold 8 seconds today, R knee giving way  Weight shifting x 5 each LE with B hand hold on rail   Standing hip abd x 5 in parallel bars  Standing hip ext 2 x 5 in parallel bars with VC for lumbar ext   Side step in parallel bars 1.5 laps - pt fatigued and could not complete 2 laps    Next visit:  Sit to stand from elevated mat table    Written Home Exercises Provided: seated WC tricep press up, seated shoulder flexion with thoracic ext.   Pt demo good understanding of the education provided. Denice demonstrated good return demonstration of activities.     Assessment:   Pt demonstrated L knee giving out today with standing exercises, no falls. She is limited in progressing with sit to stand for strengthening due to knee giving out. Discussed with patient that we  will put her on hold until after her surgery.      Pt will continue to benefit from skilled PT intervention. Medical Necessity is demonstrated by:  Fall Risk, Unable to participate fully in daily activities, Requires skilled supervision to complete and progress HEP and Weakness.    Patient is making good progress towards established goals.    New/Revised goals: in bold below      Short Term Goals:  4 weeks  1. Pt will present with performing Eply maneuver x 3 with symptoms noted <2/3 trials. - MET  2. Pt will participate in balance exercises.   3. Pt will be independent in performing Eply maneuver. - MET     Long Term Goals: 8 weeks  1. Pt will present with performing Eply maneuver x 3 without symptoms. - MET  2. Pt will present with improved static balance on floor with fair-good balance x 10 seconds to prevent fall in home.   3. Pt will be independent with HEP and self management of symptoms.   4. Pt will present with increased hip strength into abd and ext by one half grade for increased stability with standing and safety of ambulation in home.     Plan:  Pt is on hold at this time due per patient request due to having surgery to remove cyst next week, 8/8/19

## 2019-07-30 NOTE — LETTER
July 30, 2019      Meredith Baird MD  [address]  [place] [address]           [facility]  [address]  [place] [address]  Phone: [phone]  Fax: [phone]          Patient: Denice Sheth   MR Number: [id number]   YOB: 1934   Date of Visit: 7/30/2019       Dear Dr. Meredith Baird:    Thank you for referring Denice Sheth to me for evaluation. Attached you will find relevant portions of my assessment and plan of care.    If you have questions, please do not hesitate to call me. I look forward to following Denice Sheth along with you.    Sincerely,    LEXIE Weaver,GLADIS-ABRAHAN    Enclosure  CC:  No Recipients    If you would like to receive this communication electronically, please contact [email or web address] or [phone] to request more information on [employer] access.    For providers and/or their staff who would like to refer a patient to [facility], please contact us through our one-stop-shop provider referral line, [facility], at [phone].    If you feel you have received this communication in error or would no longer like to receive these types of communications, please e-mail [email or web address]

## 2019-08-03 ENCOUNTER — PATIENT MESSAGE (OUTPATIENT)
Dept: NEPHROLOGY | Facility: CLINIC | Age: 84
End: 2019-08-03

## 2019-08-03 DIAGNOSIS — R82.90 ABNORMAL URINE ODOR: Primary | ICD-10-CM

## 2019-08-03 DIAGNOSIS — R39.15 URINARY URGENCY: ICD-10-CM

## 2019-08-05 ENCOUNTER — LAB VISIT (OUTPATIENT)
Dept: LAB | Facility: HOSPITAL | Age: 84
End: 2019-08-05
Attending: INTERNAL MEDICINE
Payer: MEDICARE

## 2019-08-05 DIAGNOSIS — R82.90 ABNORMAL URINE ODOR: ICD-10-CM

## 2019-08-05 DIAGNOSIS — R39.15 URINARY URGENCY: ICD-10-CM

## 2019-08-05 LAB
BACTERIA #/AREA URNS AUTO: ABNORMAL /HPF
BILIRUB UR QL STRIP: NEGATIVE
CLARITY UR REFRACT.AUTO: ABNORMAL
COLOR UR AUTO: YELLOW
GLUCOSE UR QL STRIP: ABNORMAL
HGB UR QL STRIP: NEGATIVE
HYALINE CASTS UR QL AUTO: 3 /LPF
KETONES UR QL STRIP: NEGATIVE
LEUKOCYTE ESTERASE UR QL STRIP: ABNORMAL
MICROSCOPIC COMMENT: ABNORMAL
NITRITE UR QL STRIP: NEGATIVE
PH UR STRIP: 5 [PH] (ref 5–8)
PROT UR QL STRIP: NEGATIVE
RBC #/AREA URNS AUTO: 1 /HPF (ref 0–4)
SP GR UR STRIP: 1.02 (ref 1–1.03)
SQUAMOUS #/AREA URNS AUTO: 3 /HPF
URN SPEC COLLECT METH UR: ABNORMAL
WBC #/AREA URNS AUTO: 7 /HPF (ref 0–5)

## 2019-08-05 PROCEDURE — 81001 URINALYSIS AUTO W/SCOPE: CPT

## 2019-08-05 PROCEDURE — 87086 URINE CULTURE/COLONY COUNT: CPT

## 2019-08-05 NOTE — TELEPHONE ENCOUNTER
Foul smelling urine, and fatigue.  Urgency.  Denies fever or other s/s.  Orders in for specimen at [facility] this pm.      Allergies and pharmacy verified.   [facility] in [place].

## 2019-08-07 LAB
BACTERIA UR CULT: NORMAL
BACTERIA UR CULT: NORMAL

## 2019-08-14 ENCOUNTER — TELEPHONE (OUTPATIENT)
Dept: AUDIOLOGY | Facility: CLINIC | Age: 84
End: 2019-08-14

## 2019-08-14 NOTE — TELEPHONE ENCOUNTER
Spoke with patient's daughter.  They received a letter stating patient was due to see Audiology for an annual f/u.  Explained that this was for the patient's annual audiogram.  Pt is having a procedure on Monday so discussed with daughter the recommendation to continue with PT treatment of BPPV once patient has recovered enough to do so.  Daughter will then call later to schedule audiogram once patient is progressing through her recovery well. Understanding voiced.

## 2019-08-19 PROBLEM — D37.4 VILLOUS ADENOMA OF COLON: Status: ACTIVE | Noted: 2019-08-19

## 2019-08-22 PROBLEM — Z02.9 DISCHARGE PLANNING ISSUES: Status: ACTIVE | Noted: 2019-08-22

## 2019-08-22 PROBLEM — Z75.8 DISCHARGE PLANNING ISSUES: Status: ACTIVE | Noted: 2019-08-22

## 2019-08-27 ENCOUNTER — OFFICE VISIT (OUTPATIENT)
Dept: OBSTETRICS AND GYNECOLOGY | Facility: CLINIC | Age: 84
End: 2019-08-27
Payer: MEDICARE

## 2019-08-27 VITALS
SYSTOLIC BLOOD PRESSURE: 124 MMHG | DIASTOLIC BLOOD PRESSURE: 70 MMHG | BODY MASS INDEX: 35.25 KG/M2 | HEIGHT: 62 IN | WEIGHT: 191.56 LBS

## 2019-08-27 DIAGNOSIS — Z09 POSTOP CHECK: Primary | ICD-10-CM

## 2019-08-27 PROCEDURE — 99999 PR PBB SHADOW E&M-EST. PATIENT-LVL III: CPT | Mod: PBBFAC,,, | Performed by: OBSTETRICS & GYNECOLOGY

## 2019-08-27 PROCEDURE — 99024 POSTOP FOLLOW-UP VISIT: CPT | Mod: S$GLB,,, | Performed by: OBSTETRICS & GYNECOLOGY

## 2019-08-27 PROCEDURE — 99999 PR PBB SHADOW E&M-EST. PATIENT-LVL III: ICD-10-PCS | Mod: PBBFAC,,, | Performed by: OBSTETRICS & GYNECOLOGY

## 2019-08-27 PROCEDURE — 99024 PR POST-OP FOLLOW-UP VISIT: ICD-10-PCS | Mod: S$GLB,,, | Performed by: OBSTETRICS & GYNECOLOGY

## 2019-08-27 NOTE — PROGRESS NOTES
History of Present Illness:   Pateint presents today  2 weeks postop, status post scope  bilateral salpingo-oophorectomy , with complaint of none.    Pathology:     08/20/19 JAR/kl    1. LEFT FALLOPIAN TUBE AND OVARY, SALPINGO-OOPHORECTOMY:  - OVARY WITH SEROUS CYSTADENOMA (5.5 CM).  - FALLOPIAN TUBE WITH PARATUBAL CYSTS.    2. RIGHT FALLOPIAN TUBE AND OVARY, SALPINGO-OOPHORECTOMY:  - OVARY WITH SIMPLE CYSTS.   - FALLOPIAN TUBE WITH PARATUBAL CYSTS.     Past medical and surgical history reviewed.   I have reviewed the patient's medical history in detail and updated the computerized patient record.    Physical exam:  Vital Signs: as above, reviewed.  Constitutional: She is oriented to person, place, and time. She appears well-developed and well-nourished. No distress.   HENT:   Head: Normocephalic and atraumatic.   Eyes: Conjunctivae and EOM are normal. No scleral icterus.   Neck: Normal range of motion. Neck supple. No tracheal deviation present.   Cardiovascular: Normal rate.    Pulmonary/Chest: Effort normal. No respiratory distress. She exhibits no tenderness.  Breasts: deferred  Abdominal: Soft. She exhibits no distension and no mass.  The incisions are healing well. There is no rebound and no guarding.   Genitourinary: Deferred  Musculoskeletal: Normal range of motion.   Lymphadenopathy: No inguinal adenopathy present.   Neurological: She is alert and oriented to person, place, and time. Coordination normal.   Skin: Skin is warm and dry. She is not diaphoretic.   Psychiatric: She has a normal mood and affect.    Assessment:  Normal postop exam- doing well    Plan:  Follow up  1 year  Staple removal as scheduled

## 2019-09-05 ENCOUNTER — OFFICE VISIT (OUTPATIENT)
Dept: ORTHOPEDICS | Facility: CLINIC | Age: 84
End: 2019-09-05
Payer: MEDICARE

## 2019-09-05 VITALS
HEART RATE: 67 BPM | DIASTOLIC BLOOD PRESSURE: 47 MMHG | WEIGHT: 195.56 LBS | SYSTOLIC BLOOD PRESSURE: 100 MMHG | HEIGHT: 62 IN | BODY MASS INDEX: 35.99 KG/M2

## 2019-09-05 DIAGNOSIS — M17.11 OSTEOARTHROSIS, LOCALIZED, PRIMARY, KNEE, RIGHT: Primary | ICD-10-CM

## 2019-09-05 PROCEDURE — 99213 PR OFFICE/OUTPT VISIT, EST, LEVL III, 20-29 MIN: ICD-10-PCS | Mod: 25,S$GLB,, | Performed by: NURSE PRACTITIONER

## 2019-09-05 PROCEDURE — 20610 DRAIN/INJ JOINT/BURSA W/O US: CPT | Mod: RT,S$GLB,, | Performed by: NURSE PRACTITIONER

## 2019-09-05 PROCEDURE — 99999 PR PBB SHADOW E&M-EST. PATIENT-LVL III: ICD-10-PCS | Mod: PBBFAC,,, | Performed by: NURSE PRACTITIONER

## 2019-09-05 PROCEDURE — 99213 OFFICE O/P EST LOW 20 MIN: CPT | Mod: 25,S$GLB,, | Performed by: NURSE PRACTITIONER

## 2019-09-05 PROCEDURE — 20610 LARGE JOINT ASPIRATION/INJECTION: R KNEE: ICD-10-PCS | Mod: RT,S$GLB,, | Performed by: NURSE PRACTITIONER

## 2019-09-05 PROCEDURE — 99999 PR PBB SHADOW E&M-EST. PATIENT-LVL III: CPT | Mod: PBBFAC,,, | Performed by: NURSE PRACTITIONER

## 2019-09-05 NOTE — PROGRESS NOTES
DATE: 9/5/2019  PATIENT: Denice Sheth  REFERRING MD:   CHIEF COMPLAINT:   Chief Complaint   Patient presents with    Right Knee - Pain       HISTORY:  Denice Sheth is a 84 y.o. female  who presents for initial evaluation of her right knee pain.  She complains of pain 4/10 that increases with standing and walking. She reports she has been diagnosed with osteoarthritis and has had knee pain for years.  She was hospitalized from August 19-23 for an intestinal surgery.  She reports the pain increased after surgery.  She has been performing physical therapy at home. She has a knee brace at home, it is uncomfortable so she wears a compression sleeve. She is in a wheelchair today.  Patient's daughter is in attendance today and participating in the history portion of the exam.  She has insulin dependent diabetes.      PAST MEDICAL/SURGICAL HISTORY:  Past Medical History:   Diagnosis Date    A-fib     Anticoagulant long-term use     CHF (congestive heart failure)     Diabetes mellitus     Disorder of kidney and ureter     Followed by Dr. Jonathan Pace    Encounter for blood transfusion     Hypertension     Insomnia     Irregular heart rhythm     Stage 4 chronic kidney disease     Thyroid disease     hypothyroidism    TIA (transient ischemic attack) 03/2017     Past Surgical History:   Procedure Laterality Date    HEART CATH-LEFT Left 1/22/2016    [facility] CATH    HYSTERECTOMY      partial, benign causes by reported hx    INSERTION OF PACEMAKER  08/2017    SALPINGO-OOPHORECTOMY, BILATERAL Bilateral 8/19/2019    [facility] by Bl[facility]ne Wallace [facility] at [facility] OR    XI ROBOTIC COLECTOMY Right 8/19/2019    Performed by Kourtney Dodson MD at [facility] OR       Current Medications:   Current Outpatient Medications:     albuterol-ipratropium (DUO-NEB) 2.5 mg-0.5 mg/3 mL nebulizer solution, Take 3 mLs by nebulization every 6 (six) hours as needed for Wheezing. Rescue, Disp: 1 Box, Rfl:  0    amiodarone (PACERONE) 200 MG Tab, Take 200 mg by mouth once daily. , Disp: , Rfl:     ascorbic acid (VITAMIN C) 500 MG tablet, Take 500 mg by mouth once daily., Disp: , Rfl:     b complex vitamins capsule, Take 1 capsule by mouth once daily., Disp: , Rfl:     bisacodyl (DULCOLAX) 10 mg Supp, Place 1 suppository (10 mg total) rectally daily as needed (Until bowel movement if patient has no bowel movement for 2 days)., Disp: , Rfl: 0    blood sugar diagnostic (TRUE METRIX GLUCOSE TEST STRIP) Strp, 1 each by Misc.(Non-Drug; Combo Route) route 3 (three) times daily., Disp: 100 each, Rfl: 11    blood-glucose meter kit, by Other route. Use as instructed, Disp: , Rfl:     calcium-vitamin D3 500 mg(1,250mg) -200 unit per tablet, Take 1 tablet by mouth 2 (two) times daily with meals., Disp: , Rfl:     cholecalciferol, vitamin D3, 5,000 unit Tab, GIVE TWO TIMES A DAY, Disp: , Rfl:     clopidogrel (PLAVIX) 75 mg tablet, TAKE ONE TABLET BY MOUTH ONCE DAILY, Disp: 30 tablet, Rfl: 11    docusate sodium (COLACE) 100 MG capsule, 100 mg., Disp: , Rfl:     escitalopram oxalate (LEXAPRO) 10 MG tablet, Take 1 tablet (10 mg total) by mouth every evening., Disp: 90 tablet, Rfl: 1    fish oil-omega-3 fatty acids 300-1,000 mg capsule, Take 2 g by mouth once daily., Disp: , Rfl:     HYDROcodone-acetaminophen (NORCO) 7.5-325 mg per tablet, Take 1 tablet by mouth every 6 (six) hours as needed., Disp: 15 tablet, Rfl: 0    insulin (BASAGLAR KWIKPEN U-100 INSULIN) glargine 100 units/mL (3mL) SubQ pen, Inject 22 Units into the skin every evening., Disp: 7.5 mL, Rfl: 11    insulin aspart U-100 (NOVOLOG) 100 unit/mL injection, Inject 7 Units into the skin 3 (three) times daily before meals., Disp: 10 mL, Rfl: 11    insulin syringe-needle U-100 1 mL 31 gauge x 5/16 Syrg, USE AS DIRECTED, Disp: 100 each, Rfl: 2    lancets 31 gauge Misc, 1 lancet by Misc.(Non-Drug; Combo Route) route 3 (three) times daily as needed., Disp: 100  each, Rfl: 11    levothyroxine (SYNTHROID) 75 MCG tablet, TAKE 1 TABLET BY MOUTH ONCE DAILY IN THE MORNING BEFORE BREAKFAST, Disp: 90 tablet, Rfl: 3    linagliptin (TRADJENTA) 5 mg Tab tablet, Take 1 tablet (5 mg total) by mouth once daily., Disp: 90 tablet, Rfl: 3    memantine (NAMENDA) 10 MG Tab, TAKE 1 TABLET BY MOUTH ONCE DAILY IN THE EVENING, Disp: 90 tablet, Rfl: 3    metoprolol tartrate (LOPRESSOR) 25 MG tablet, Take 25 mg by mouth 2 (two) times daily. , Disp: , Rfl:     multivitamin (ONE DAILY MULTIVITAMIN) per tablet, Take 1 tablet by mouth once daily., Disp: , Rfl:     polyethylene glycol (GLYCOLAX) 17 gram PwPk, Take by mouth., Disp: , Rfl:     rivaroxaban (XARELTO) 15 mg Tab, Take 15 mg by mouth daily with dinner or evening meal., Disp: , Rfl:     rivastigmine (EXELON) 4.6 mg/24 hr PT24, APPLY 1 PATCH TOPICALLY ONCE DAILY, Disp: 30 patch, Rfl: 3    simvastatin (ZOCOR) 10 MG tablet, Take 10 mg by mouth every evening., Disp: , Rfl:     nitroGLYCERIN (NITROSTAT) 0.4 MG SL tablet, Place 1 tablet (0.4 mg total) under the tongue every 5 (five) minutes as needed for Chest pain., Disp: 30 tablet, Rfl: 0    Family History: family history was reviewed and is noncontributory  Social History:   Social History     Socioeconomic History    Marital status:      Spouse name: Not on file    Number of children: Not on file    Years of education: Not on file    Highest education level: Not on file   Occupational History    Not on file   Social Needs    Financial resource strain: Not on file    Food insecurity:     Worry: Not on file     Inability: Not on file    Transportation needs:     Medical: Not on file     Non-medical: Not on file   Tobacco Use    Smoking status: Never Smoker    Smokeless tobacco: Never Used   Substance and Sexual Activity    Alcohol use: No    Drug use: No    Sexual activity: Never     Birth control/protection: Post-menopausal   Lifestyle    Physical activity:      Days per week: Not on file     Minutes per session: Not on file    Stress: Not on file   Relationships    Social connections:     Talks on phone: Not on file     Gets together: Not on file     Attends Yarsanism service: Not on file     Active member of club or organization: Not on file     Attends meetings of clubs or organizations: Not on file     Relationship status: Not on file   Other Topics Concern    Not on file   Social History Narrative    Not on file       ROS:  Constitution: Negative for chills, fever, and sweats. Negative for unexplained weight loss.  Eyes: no redness, no discharge  Ears: no ear pain or tinnitus  Cardiovascular: Negative for chest pain, irregular heartbeat, leg swelling and palpitations.   Respiratory: Negative for cough and shortness of breath.   Gastrointestinal: Negative for abdominal pain, nausea and vomiting.   Genitourinary: Negative for bladder incontinence and dysuria.   Neurological: Negative for numbness.   Psychiatric/Behavioral: Negative for behavior changes.   Endocrine: Negative for palpitations.   Hematologic/Lymphatic: Negative for bleeding disorders.  Skin: Negative for pruritis or rash.     PHYSICAL EXAM:  right knee evaluated the following:    Gait and Alignment  Inspection for ecchymosis, swelling, atrophy, or deformity  Inspection for intra-articular and/or bursal effusions  Tenderness to palpation over the bony and soft tissue structures around the knee  Range of Motion and presence of extensor lag/contractures  Sensation and motor strength  Varus/valgus or anterior/posterior/rotatory instability  Flexion pinch and Diann's Tests  Patellar alignment/tracking/pain to palpation  Vascular exam to include skin temperature/color/capillary refill    Right Knee Exam     Tenderness   The patient is experiencing tenderness in the lateral joint line and medial joint line.    Range of Motion   The patient has normal right knee ROM.    Other   Erythema: absent  Sensation:  "normal  Pulse: present  Swelling: mild  Effusion: no effusion present    Comments:  Bony arthritic enlargement           Constitutional:  Denice Sheth is a well developed, well nourished female in no acute distress.   Vitals:    09/05/19 0909   BP: (!) 100/47   Pulse: 67   Weight: 88.7 kg (195 lb 8.8 oz)   Height: 5' 2" (1.575 m)   PainSc: 0-No pain   PainLoc: Knee     Psychiatric: pleasant,normal mood and affect, behavior is normal  Neurological:  Sensation intact to light touch, normal reflexes. Coordination normal.   Skin: warm, dry, intact  Cardiovascular: capillary refill less than 3 seconds, pulses 2+      IMAGING:   X-ray obtained and personally reviewed with patient. Radiologist report as follows:  EXAMINATION:  XR KNEE ORTHO RIGHT WITH FLEXION    CLINICAL HISTORY:  Pain in unspecified knee    TECHNIQUE:  AP standing, PA flexion, and sunrise views of both knees as well as a lateral review of the right knee were acquired.    COMPARISON:  None.    FINDINGS:  The bones are osteopenic.  There is tricompartmental osteophyte formation in both knees.  There is mild-moderate left and mild right lateral tibiofemoral joint space narrowing with AP and PA flexion positioning of the knees.  There is mild right knee medial tibiofemoral joint space narrowing with PA flexion positioning of the knees.  No erosions.  No chondrocalcinosis.  There is a loose body present within the posterior aspect of the right knee joint.  There is a moderate-large right knee joint effusion.  No patellofemoral joint space narrowing, patellar tilt, or patellar subluxation.  No radiographically evident acute fracture, dislocation, or osseous destructive process.      Impression       Degenerative change of the knees.    No acute osseous abnormality appreciated.      Electronically signed by: Petros Maki MD  Date: 09/06/2018  Time: 14:11            ASSESSMENT:   1. Osteoarthrosis, localized, primary, knee, right  Medication Pre-Authorization "    Large Joint Aspiration/Injection: R knee         PLAN:  The nature of the diagnosis, using models and diagrams when appropriate, was explained to the patient and her daughter in detail. Treatment option discussed included non-operative measures of custom orthotic, weight loss, rest,  modification of activities, application of ice, elevation of extremity, compression, over the counter pain/antiinflammatory relief, physica/occupational therapy and cortisone injection.   All questions answered and the patient wishes to proceed today with a Zilretta cortisone injection since she is an insulin dependent diabetic.  Right knee Zilretta injection performed today (see procedure documentation).  I have instructed to monitor injection site for signs and symptoms of inflammation/infection.  I have instructed to elevate and apply ice to knees this evening.  Follow up if no improvement or worsening of symptoms.

## 2019-09-05 NOTE — PROCEDURES
Large Joint Aspiration/Injection: R knee  Date/Time: 9/5/2019 12:35 PM  Performed by: LEXIE Morfin  Authorized by: LEXIE Morfin     Consent Done?:  Yes (Verbal)  Indications:  Pain  Timeout: Prior to procedure the correct patient, procedure, and site was verified    Anesthesia    Anesthetic: topical anesthetic    Location:  Knee  Site:  R knee  Prep: Patient was prepped and draped in usual sterile fashion    Needle size:  22 G  Ultrasonic Guidance for needle placement: No  Approach:  Anterolateral  Medications:  32 mg triamcinolone acetonide 32 mg  Patient tolerance:  Patient tolerated the procedure well with no immediate complications

## 2019-09-24 PROBLEM — D37.4 VILLOUS ADENOMA OF COLON: Status: RESOLVED | Noted: 2019-08-19 | Resolved: 2019-09-24

## 2019-10-01 ENCOUNTER — LAB VISIT (OUTPATIENT)
Dept: LAB | Facility: HOSPITAL | Age: 84
End: 2019-10-01
Payer: MEDICARE

## 2019-10-01 DIAGNOSIS — N18.30 CKD (CHRONIC KIDNEY DISEASE) STAGE 3, GFR 30-59 ML/MIN: ICD-10-CM

## 2019-10-01 PROCEDURE — 80069 RENAL FUNCTION PANEL: CPT

## 2019-10-01 PROCEDURE — 36415 COLL VENOUS BLD VENIPUNCTURE: CPT | Mod: PN

## 2019-10-02 LAB
ALBUMIN SERPL BCP-MCNC: 3.8 G/DL (ref 3.5–5.2)
ANION GAP SERPL CALC-SCNC: 12 MMOL/L (ref 8–16)
BUN SERPL-MCNC: 38 MG/DL (ref 8–23)
CALCIUM SERPL-MCNC: 9.2 MG/DL (ref 8.7–10.5)
CHLORIDE SERPL-SCNC: 102 MMOL/L (ref 95–110)
CO2 SERPL-SCNC: 21 MMOL/L (ref 23–29)
CREAT SERPL-MCNC: 1.9 MG/DL (ref 0.5–1.4)
EST. GFR  (AFRICAN AMERICAN): 27.5 ML/MIN/1.73 M^2
EST. GFR  (NON AFRICAN AMERICAN): 23.9 ML/MIN/1.73 M^2
GLUCOSE SERPL-MCNC: 265 MG/DL (ref 70–110)
PHOSPHATE SERPL-MCNC: 4.1 MG/DL (ref 2.7–4.5)
POTASSIUM SERPL-SCNC: 5 MMOL/L (ref 3.5–5.1)
SODIUM SERPL-SCNC: 135 MMOL/L (ref 136–145)

## 2019-10-08 ENCOUNTER — OFFICE VISIT (OUTPATIENT)
Dept: NEPHROLOGY | Facility: CLINIC | Age: 84
End: 2019-10-08
Payer: MEDICARE

## 2019-10-08 VITALS
HEIGHT: 62 IN | BODY MASS INDEX: 33.1 KG/M2 | HEART RATE: 60 BPM | DIASTOLIC BLOOD PRESSURE: 56 MMHG | OXYGEN SATURATION: 97 % | SYSTOLIC BLOOD PRESSURE: 134 MMHG | WEIGHT: 179.88 LBS

## 2019-10-08 DIAGNOSIS — E11.21 DIABETIC NEPHROPATHY ASSOCIATED WITH TYPE 2 DIABETES MELLITUS: ICD-10-CM

## 2019-10-08 DIAGNOSIS — E87.1 HYPONATREMIA: ICD-10-CM

## 2019-10-08 DIAGNOSIS — N18.30 CKD (CHRONIC KIDNEY DISEASE) STAGE 3, GFR 30-59 ML/MIN: Primary | ICD-10-CM

## 2019-10-08 DIAGNOSIS — E11.29 PROTEINURIA DUE TO TYPE 2 DIABETES MELLITUS: ICD-10-CM

## 2019-10-08 DIAGNOSIS — R80.9 PROTEINURIA DUE TO TYPE 2 DIABETES MELLITUS: ICD-10-CM

## 2019-10-08 DIAGNOSIS — I12.9 BENIGN HYPERTENSIVE RENAL DISEASE WITHOUT RENAL FAILURE: ICD-10-CM

## 2019-10-08 PROCEDURE — 99214 PR OFFICE/OUTPT VISIT, EST, LEVL IV, 30-39 MIN: ICD-10-PCS | Mod: S$GLB,,, | Performed by: INTERNAL MEDICINE

## 2019-10-08 PROCEDURE — 1100F PTFALLS ASSESS-DOCD GE2>/YR: CPT | Mod: CPTII,S$GLB,, | Performed by: INTERNAL MEDICINE

## 2019-10-08 PROCEDURE — 3078F DIAST BP <80 MM HG: CPT | Mod: CPTII,S$GLB,, | Performed by: INTERNAL MEDICINE

## 2019-10-08 PROCEDURE — 3075F SYST BP GE 130 - 139MM HG: CPT | Mod: CPTII,S$GLB,, | Performed by: INTERNAL MEDICINE

## 2019-10-08 PROCEDURE — 3075F PR MOST RECENT SYSTOLIC BLOOD PRESS GE 130-139MM HG: ICD-10-PCS | Mod: CPTII,S$GLB,, | Performed by: INTERNAL MEDICINE

## 2019-10-08 PROCEDURE — 1100F PR PT FALLS ASSESS DOC 2+ FALLS/FALL W/INJURY/YR: ICD-10-PCS | Mod: CPTII,S$GLB,, | Performed by: INTERNAL MEDICINE

## 2019-10-08 PROCEDURE — 3078F PR MOST RECENT DIASTOLIC BLOOD PRESSURE < 80 MM HG: ICD-10-PCS | Mod: CPTII,S$GLB,, | Performed by: INTERNAL MEDICINE

## 2019-10-08 PROCEDURE — 3288F FALL RISK ASSESSMENT DOCD: CPT | Mod: CPTII,S$GLB,, | Performed by: INTERNAL MEDICINE

## 2019-10-08 PROCEDURE — 99999 PR PBB SHADOW E&M-EST. PATIENT-LVL III: ICD-10-PCS | Mod: PBBFAC,,, | Performed by: INTERNAL MEDICINE

## 2019-10-08 PROCEDURE — 99214 OFFICE O/P EST MOD 30 MIN: CPT | Mod: S$GLB,,, | Performed by: INTERNAL MEDICINE

## 2019-10-08 PROCEDURE — 99999 PR PBB SHADOW E&M-EST. PATIENT-LVL III: CPT | Mod: PBBFAC,,, | Performed by: INTERNAL MEDICINE

## 2019-10-08 PROCEDURE — 3288F PR FALLS RISK ASSESSMENT DOCUMENTED: ICD-10-PCS | Mod: CPTII,S$GLB,, | Performed by: INTERNAL MEDICINE

## 2019-10-08 RX ORDER — ONDANSETRON 4 MG/1
4 TABLET, FILM COATED ORAL
COMMUNITY
End: 2022-02-14 | Stop reason: SDUPTHER

## 2019-10-08 RX ORDER — ALBUTEROL SULFATE 0.83 MG/ML
SOLUTION RESPIRATORY (INHALATION) DAILY PRN
COMMUNITY
End: 2021-12-23 | Stop reason: SDUPTHER

## 2019-10-08 RX ORDER — LACTULOSE 10 G/15ML
SOLUTION ORAL; RECTAL
Refills: 3 | COMMUNITY
Start: 2019-07-23 | End: 2019-10-08

## 2019-10-08 RX ORDER — MULTIVIT WITH MINERALS/HERBS
TABLET ORAL
COMMUNITY
End: 2019-10-08 | Stop reason: SDUPTHER

## 2019-10-08 RX ORDER — VALSARTAN AND HYDROCHLOROTHIAZIDE 320; 25 MG/1; MG/1
TABLET, FILM COATED ORAL
COMMUNITY
End: 2019-10-08

## 2019-10-08 NOTE — PROGRESS NOTES
"Subjective:       Patient ID: Denice Sheth is a 84 y.o. White female who presents for return patient evaluation for chronic renal failure.    She is s/p colon resection in August and states she is feeling much better now.  She has lost 20 pounds and is intentionally trying to lose weight.        Review of Systems   Constitutional: Negative for appetite change, chills and fever.   Eyes: Negative for visual disturbance.   Respiratory: Positive for shortness of breath (with exertion). Negative for cough.    Cardiovascular: Negative for chest pain and leg swelling.   Gastrointestinal: Negative for abdominal pain, diarrhea, nausea and vomiting.   Genitourinary: Negative for difficulty urinating, dysuria and hematuria.   Musculoskeletal: Positive for arthralgias (right knee) and gait problem. Negative for myalgias.   Skin: Negative for rash.   Neurological: Positive for weakness. Negative for headaches.   Hematological: Bruises/bleeds easily.   Psychiatric/Behavioral: Negative for sleep disturbance.       The past medical, family and social histories were reviewed for this encounter.     BP (!) 134/56 (BP Location: Left arm, Patient Position: Sitting)   Pulse 60   Ht 5' 2" (1.575 m)   Wt 81.6 kg (179 lb 14.3 oz)   SpO2 97%   BMI 32.90 kg/m²     Objective:      Physical Exam   Constitutional: She is oriented to person, place, and time. She appears well-developed and well-nourished. No distress.   HENT:   Head: Normocephalic and atraumatic.   Eyes: Conjunctivae are normal.   Neck: Neck supple. No JVD present.   Cardiovascular: Normal rate and regular rhythm. Exam reveals no gallop and no friction rub.   Murmur (2/6 nabila) heard.  Pulmonary/Chest: Effort normal and breath sounds normal. No respiratory distress. She has no wheezes. She has no rales.   Abdominal: Soft. Bowel sounds are normal. She exhibits no distension. There is no tenderness.   Musculoskeletal: She exhibits edema (trace BLE).   Neurological: She is " alert and oriented to person, place, and time.   Skin: Skin is warm and dry. No rash noted.   Psychiatric: She has a normal mood and affect.   Vitals reviewed.      Assessment:       1. CKD (chronic kidney disease) stage 3, GFR 30-59 ml/min    2. 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 Diabetic nephropathy associated with type 2 diabetes mellitus    4. 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 Hyponatremia        Plan:   Return to clinic in 6 months.  Labs for next visit include rp.  RP on 10/31.  Baseline creatinine is 1.4-1.6 since 2015.  PTH is 39 with a calcium of 10.3.  UPC is 0.13.  Blood pressure is controlled on the current regimen.  Continue current medications as prescribed and reviewed.

## 2019-10-14 RX ORDER — RIVASTIGMINE 4.6 MG/24H
PATCH, EXTENDED RELEASE TRANSDERMAL
Qty: 30 PATCH | Refills: 3 | Status: SHIPPED | OUTPATIENT
Start: 2019-10-14 | End: 2020-02-03

## 2019-10-31 ENCOUNTER — LAB VISIT (OUTPATIENT)
Dept: LAB | Facility: HOSPITAL | Age: 84
End: 2019-10-31
Payer: MEDICARE

## 2019-10-31 DIAGNOSIS — R80.9 PROTEINURIA DUE TO TYPE 2 DIABETES MELLITUS: ICD-10-CM

## 2019-10-31 DIAGNOSIS — E87.1 HYPONATREMIA: ICD-10-CM

## 2019-10-31 DIAGNOSIS — Z79.4 TYPE 2 DIABETES MELLITUS WITH STAGE 3 CHRONIC KIDNEY DISEASE, WITH LONG-TERM CURRENT USE OF INSULIN: ICD-10-CM

## 2019-10-31 DIAGNOSIS — N18.30 TYPE 2 DIABETES MELLITUS WITH STAGE 3 CHRONIC KIDNEY DISEASE, WITH LONG-TERM CURRENT USE OF INSULIN: ICD-10-CM

## 2019-10-31 DIAGNOSIS — E11.29 PROTEINURIA DUE TO TYPE 2 DIABETES MELLITUS: ICD-10-CM

## 2019-10-31 DIAGNOSIS — N18.30 CKD (CHRONIC KIDNEY DISEASE) STAGE 3, GFR 30-59 ML/MIN: ICD-10-CM

## 2019-10-31 DIAGNOSIS — E11.22 TYPE 2 DIABETES MELLITUS WITH STAGE 3 CHRONIC KIDNEY DISEASE, WITH LONG-TERM CURRENT USE OF INSULIN: ICD-10-CM

## 2019-10-31 DIAGNOSIS — E11.21 DIABETIC NEPHROPATHY ASSOCIATED WITH TYPE 2 DIABETES MELLITUS: ICD-10-CM

## 2019-10-31 DIAGNOSIS — I12.9 BENIGN HYPERTENSIVE RENAL DISEASE WITHOUT RENAL FAILURE: ICD-10-CM

## 2019-10-31 LAB
ALBUMIN SERPL BCP-MCNC: 3.5 G/DL (ref 3.5–5.2)
ALP SERPL-CCNC: 89 U/L (ref 55–135)
ALT SERPL W/O P-5'-P-CCNC: 14 U/L (ref 10–44)
ANION GAP SERPL CALC-SCNC: 7 MMOL/L (ref 8–16)
AST SERPL-CCNC: 20 U/L (ref 10–40)
BILIRUB SERPL-MCNC: 0.3 MG/DL (ref 0.1–1)
BUN SERPL-MCNC: 30 MG/DL (ref 8–23)
CALCIUM SERPL-MCNC: 9.4 MG/DL (ref 8.7–10.5)
CHLORIDE SERPL-SCNC: 102 MMOL/L (ref 95–110)
CHOLEST SERPL-MCNC: 175 MG/DL (ref 120–199)
CHOLEST/HDLC SERPL: 2.7 {RATIO} (ref 2–5)
CO2 SERPL-SCNC: 27 MMOL/L (ref 23–29)
CREAT SERPL-MCNC: 1.8 MG/DL (ref 0.5–1.4)
EST. GFR  (AFRICAN AMERICAN): 29.4 ML/MIN/1.73 M^2
EST. GFR  (NON AFRICAN AMERICAN): 25.5 ML/MIN/1.73 M^2
ESTIMATED AVG GLUCOSE: 146 MG/DL (ref 68–131)
GLUCOSE SERPL-MCNC: 75 MG/DL (ref 70–110)
HBA1C MFR BLD HPLC: 6.7 % (ref 4–5.6)
HDLC SERPL-MCNC: 65 MG/DL (ref 40–75)
HDLC SERPL: 37.1 % (ref 20–50)
LDLC SERPL CALC-MCNC: 88.4 MG/DL (ref 63–159)
NONHDLC SERPL-MCNC: 110 MG/DL
PHOSPHATE SERPL-MCNC: 3.5 MG/DL (ref 2.7–4.5)
POTASSIUM SERPL-SCNC: 4.5 MMOL/L (ref 3.5–5.1)
PROT SERPL-MCNC: 6.7 G/DL (ref 6–8.4)
SODIUM SERPL-SCNC: 136 MMOL/L (ref 136–145)
T4 FREE SERPL-MCNC: 1.25 NG/DL (ref 0.71–1.51)
TRIGL SERPL-MCNC: 108 MG/DL (ref 30–150)
TSH SERPL DL<=0.005 MIU/L-ACNC: 4.84 UIU/ML (ref 0.4–4)

## 2019-10-31 PROCEDURE — 84443 ASSAY THYROID STIM HORMONE: CPT

## 2019-10-31 PROCEDURE — 36415 COLL VENOUS BLD VENIPUNCTURE: CPT | Mod: PN

## 2019-10-31 PROCEDURE — 84100 ASSAY OF PHOSPHORUS: CPT

## 2019-10-31 PROCEDURE — 84439 ASSAY OF FREE THYROXINE: CPT

## 2019-10-31 PROCEDURE — 83036 HEMOGLOBIN GLYCOSYLATED A1C: CPT

## 2019-10-31 PROCEDURE — 80053 COMPREHEN METABOLIC PANEL: CPT

## 2019-10-31 PROCEDURE — 80061 LIPID PANEL: CPT

## 2019-11-01 ENCOUNTER — PATIENT MESSAGE (OUTPATIENT)
Dept: ENDOCRINOLOGY | Facility: CLINIC | Age: 84
End: 2019-11-01

## 2019-11-05 ENCOUNTER — OFFICE VISIT (OUTPATIENT)
Dept: ENDOCRINOLOGY | Facility: CLINIC | Age: 84
End: 2019-11-05
Payer: MEDICARE

## 2019-11-05 VITALS
HEIGHT: 62 IN | HEART RATE: 60 BPM | DIASTOLIC BLOOD PRESSURE: 52 MMHG | WEIGHT: 193.25 LBS | SYSTOLIC BLOOD PRESSURE: 102 MMHG | BODY MASS INDEX: 35.56 KG/M2

## 2019-11-05 DIAGNOSIS — N18.30 TYPE 2 DIABETES MELLITUS WITH STAGE 3 CHRONIC KIDNEY DISEASE, WITH LONG-TERM CURRENT USE OF INSULIN: Primary | ICD-10-CM

## 2019-11-05 DIAGNOSIS — Z79.4 TYPE 2 DIABETES MELLITUS WITH STAGE 3 CHRONIC KIDNEY DISEASE, WITH LONG-TERM CURRENT USE OF INSULIN: Primary | ICD-10-CM

## 2019-11-05 DIAGNOSIS — E03.9 HYPOTHYROIDISM, UNSPECIFIED TYPE: ICD-10-CM

## 2019-11-05 DIAGNOSIS — E11.22 TYPE 2 DIABETES MELLITUS WITH STAGE 3 CHRONIC KIDNEY DISEASE, WITH LONG-TERM CURRENT USE OF INSULIN: Primary | ICD-10-CM

## 2019-11-05 DIAGNOSIS — Z86.73 H/O: CVA (CEREBROVASCULAR ACCIDENT): ICD-10-CM

## 2019-11-05 DIAGNOSIS — I10 ESSENTIAL HYPERTENSION: ICD-10-CM

## 2019-11-05 DIAGNOSIS — I25.10 CORONARY ARTERIOSCLEROSIS: ICD-10-CM

## 2019-11-05 PROCEDURE — 99999 PR PBB SHADOW E&M-EST. PATIENT-LVL III: CPT | Mod: PBBFAC,,, | Performed by: NURSE PRACTITIONER

## 2019-11-05 PROCEDURE — 99214 OFFICE O/P EST MOD 30 MIN: CPT | Mod: S$GLB,,, | Performed by: NURSE PRACTITIONER

## 2019-11-05 PROCEDURE — 99999 PR PBB SHADOW E&M-EST. PATIENT-LVL III: ICD-10-PCS | Mod: PBBFAC,,, | Performed by: NURSE PRACTITIONER

## 2019-11-05 PROCEDURE — 3078F PR MOST RECENT DIASTOLIC BLOOD PRESSURE < 80 MM HG: ICD-10-PCS | Mod: CPTII,S$GLB,, | Performed by: NURSE PRACTITIONER

## 2019-11-05 PROCEDURE — 1101F PT FALLS ASSESS-DOCD LE1/YR: CPT | Mod: CPTII,S$GLB,, | Performed by: NURSE PRACTITIONER

## 2019-11-05 PROCEDURE — 1101F PR PT FALLS ASSESS DOC 0-1 FALLS W/OUT INJ PAST YR: ICD-10-PCS | Mod: CPTII,S$GLB,, | Performed by: NURSE PRACTITIONER

## 2019-11-05 PROCEDURE — 99214 PR OFFICE/OUTPT VISIT, EST, LEVL IV, 30-39 MIN: ICD-10-PCS | Mod: S$GLB,,, | Performed by: NURSE PRACTITIONER

## 2019-11-05 PROCEDURE — 99499 UNLISTED E&M SERVICE: CPT | Mod: S$GLB,,, | Performed by: NURSE PRACTITIONER

## 2019-11-05 PROCEDURE — 3074F PR MOST RECENT SYSTOLIC BLOOD PRESSURE < 130 MM HG: ICD-10-PCS | Mod: CPTII,S$GLB,, | Performed by: NURSE PRACTITIONER

## 2019-11-05 PROCEDURE — 3078F DIAST BP <80 MM HG: CPT | Mod: CPTII,S$GLB,, | Performed by: NURSE PRACTITIONER

## 2019-11-05 PROCEDURE — 3074F SYST BP LT 130 MM HG: CPT | Mod: CPTII,S$GLB,, | Performed by: NURSE PRACTITIONER

## 2019-11-05 PROCEDURE — 99499 RISK ADDL DX/OHS AUDIT: ICD-10-PCS | Mod: S$GLB,,, | Performed by: NURSE PRACTITIONER

## 2019-11-05 NOTE — Clinical Note
Good morning. I have discharged patient at this time. In the future, if she and her daughter are open to insulin dose recommendations, they can follow back up with Endo. They are opting for her glucoses to remain in a tight window, which I feel puts her at higher risk of hypoglycemia. Daughter yelled at me during appt when I recommended glucoses in 100-150 range. I just do not see the need for tight control in an 85 y/o with CKD. They are preferring to follow with you for now. A1c is 6.7%. I just wanted to give you a heads up. Thank you

## 2019-11-05 NOTE — PROGRESS NOTES
CC: Ms. Denice Sheth arrives today for management of Type 2 DM and review of chronic medical conditions, as listed in the Visit Diagnosis section of this encounter.       HPI: Ms. Denice Sheth was diagnosed with Type 2 DM in her 30s. She was diagnosed based on lab work. Initial treatment consisted of orals. Insulin was added in 1992. + FH of DM in mother, and 3 sisters. She was hospitalized due to hyperglycemia in 1992 but not since. DM is complicated by CKD, CAD and she has h/o CVA.    She is accompanied by her daughter, who is very upset with me today regarding what her recommended glucose targets are. Patient and daughter say that they want her glucoses to remain .    Patient was initially seen by me in July. At this time, Basaglar dose was decreased due to fasting hypoglycemia. However, patient's daughter increased this back to its original dose, due to FBG in 130 range.     She is using one last bottle of Tradjenta from her Patient Assistance supply. Pt's daughter states that Patient Assistance is on hold until after the election next week?    BG readings are checked 1x/day, fasting only.           Hypoglycemia: Yes - may occur upon waking but pt states that this is rare.   Hypoglycemic Symptoms: confusion  Hypoglycemia Treatment: candy    Missing Insulin/PO medication doses: Yes - daughter doesn't give breakfast Novolog if AM glucose is < 100.  Timing prandial insulin 5-15 minutes before meals: yes    Exercise: No    Dietary Habits: Eats 3 meals/day. Avoids snacking.     Last DM education appointment: not recently    Patient admits to taking levothryoxine with her breakfast and other pills.       CURRENT DIABETIC MEDS: Tradjenta 5 mg daily, Basaglar 25 units QHS, Novolog 7 units AC  Vial or pen: Basaglar pen, Novolog vial  Glucometer type: True Metrix    Previous DM treatments:    Last Eye Exam: 7/18/2019. Dr. Dexter, every 6 months. Possible DR? Has appt on 11/18  Last Podiatry Exam:  "n/a    REVIEW OF SYSTEMS  Constitutional: no c/o fatigue, weakness, or weight loss.   Eyes: denies visual disturbances.  Cardiac: no palpitations or chest pain.  Respiratory: no cough or dyspnea.   GI: no c/o abdominal pain or nausea. Denies h/o pancreatitis.   Skin: no lesions or rashes.  Musculoskeletal: + difficulty mobilizing (uses wheelchair or walker).  Neuro: no numbness, tingling, or parasthesias.  Endocrine: denies polyphagia, polydipsia, polyuria      Personally reviewed Past Medical, Surgical, Social History.    Vital Signs  BP (!) 102/52   Pulse 60   Ht 5' 2" (1.575 m)   Wt 88.2 kg (194 lb 7.1 oz)   BMI 35.56 kg/m²     Personally reviewed the below labs:    Hemoglobin A1C   Date Value Ref Range Status   10/31/2019 6.7 (H) 4.0 - 5.6 % Final     Comment:     ADA Screening Guidelines:  5.7-6.4%  Consistent with prediabetes  >or=6.5%  Consistent with diabetes  High levels of fetal hemoglobin interfere with the HbA1C  assay. Heterozygous hemoglobin variants (HbS, HgC, etc)do  not significantly interfere with this assay.   However, presence of multiple variants may affect accuracy.     08/01/2019 7.0 (H) 0.0 - 5.6 % Final     Comment:     Reference Interval:  5.0 - 5.6 Normal   5.7 - 6.4 High Risk   > 6.5 Diabetic    Hgb A1c results are standardized based on the (NGSP) National   Glycohemoglobin Standardization Program.    Hemoglobin A1C levels are related to mean serum/plasma glucose   during the preceding 2-3 months.        05/08/2019 7.2 (H) 4.0 - 5.6 % Final     Comment:     ADA Screening Guidelines:  5.7-6.4%  Consistent with prediabetes  >or=6.5%  Consistent with diabetes  High levels of fetal hemoglobin interfere with the HbA1C  assay. Heterozygous hemoglobin variants (HbS, HgC, etc)do  not significantly interfere with this assay.   However, presence of multiple variants may affect accuracy.         Chemistry        Component Value Date/Time     10/31/2019 1846    K 4.5 10/31/2019 1846    CL " 102 10/31/2019 1846    CO2 27 10/31/2019 1846    BUN 30 (H) 10/31/2019 1846    CREATININE 1.8 (H) 10/31/2019 1846    GLU 75 10/31/2019 1846        Component Value Date/Time    CALCIUM 9.4 10/31/2019 1846    ALKPHOS 89 10/31/2019 1846    AST 20 10/31/2019 1846    AST 31 01/19/2016 1250    ALT 14 10/31/2019 1846    BILITOT 0.3 10/31/2019 1846    ESTGFRAFRICA 29.4 (A) 10/31/2019 1846    EGFRNONAA 25.5 (A) 10/31/2019 1846          Lab Results   Component Value Date    CHOL 175 10/31/2019    CHOL 146 11/03/2018    CHOL 160 05/26/2017     Lab Results   Component Value Date    HDL 65 10/31/2019    HDL 55 11/03/2018    HDL 50 05/26/2017     Lab Results   Component Value Date    LDLCALC 88.4 10/31/2019    LDLCALC 69.6 11/03/2018    LDLCALC 92.6 05/26/2017     Lab Results   Component Value Date    TRIG 108 10/31/2019    TRIG 107 11/03/2018    TRIG 87 05/26/2017     Lab Results   Component Value Date    CHOLHDL 37.1 10/31/2019    CHOLHDL 37.7 11/03/2018    CHOLHDL 31.3 05/26/2017       Lab Results   Component Value Date    MICALBCREAT 1027.3 (H) 01/20/2017     Lab Results   Component Value Date    TSH 4.845 (H) 10/31/2019       Estimated Creatinine Clearance: 24 mL/min (A) (based on SCr of 1.8 mg/dL (H)).    Vit D, 25-Hydroxy   Date Value Ref Range Status   06/09/2010 30 25 - 80 ng/mL Final     Comment:     Vitamin D, 25-Hydroxy:   -- EXPECTED VALUES --   25-HYDROXY D TOTAL (D2+D3)   Optimum levels in the normal   population are 25-80            PHYSICAL EXAMINATION  Constitutional: Appears well, no distress  Neck: Supple, trachea midline; no thyromegaly or nodules.   Respiratory: CTA, even and unlabored.  Cardiovascular: RRR, no murmurs, no carotid bruits. DP pulses  1+ bilaterally; no edema.    GI: bowel sounds active, no hernia noted  Skin: warm and dry; no lipohypertrophy, or acanthosis nigracans observed.  Musculoskeletal: in wheelchair, no clubbing, full ROM to all extremities  Neuro: oriented to person, place, time. CN  II-XII grossly intact.  Feet: appropriate footwear.    A1c target < 7.5%, due to CKD, CAD, age    Assessment/Plan  1. Type 2 diabetes mellitus with stage 3 chronic kidney disease, with long-term current use of insulin  -- A1c stable. However, do not need tight control. Several fasting glucoses are lower than I feel are necessary. Borderline hypoglycemia noted. However, patient and daughter are adamant about her glucoses remaining between . I explained increased risk of hypoglycemia, given patient's age and renal function. She is unwilling to check glucose more than once day so I am unable to determine Novolog dose efficiency.   -- would recommend decreasing Basaglar to 22 units QHS but patient's daughter says they are not interested in changing doses.  -- continue Novolog 7 units AC. I explained this importance of checking pre-meal glucoses. Will avoid correction scale.  -- continue Tradjenta for now  -- check BG 4x/day    -- Reviewed hypoglycemia management: treat with 1/2 glass of juice, 1/2 can regular coke, or 4 glucose tablets. Monitor and repeat treatment every 15 minutes until BG is >70 Then have a snack, which includes a complex carbohydrate and protein.   Advised patient to check BG before activities, such as driving or exercise.   2. Coronary arteriosclerosis  -- avoid hypoglycemia   3. H/O CVA -- avoid hypoglycemia   4. Essential hypertension  -- diastolic lower than usual today. Patient is asymptomatic.  -- will defer med management to nephrology   5. Hypothyroidism, unspecified type  -- uncontrolled  -- recommended taking levothyroxine 30 min - 1 hour before eating breakfast and taking other pills. I explained the purpose behind this.  -- However, I do not believe patient's behavior will change, as pt and her daughter repeatedly laughed when I advised them on appropriate timing of this medication.        Total Time and Counselin minutes, >50% time spent counseling as noted above in #1 A/P.        FOLLOW UP   Follow up if symptoms worsen or fail to improve. Patient will return to PCP. In the future, if she is more willing to adhere to recommendations, she is welcome to follow up at any time.   Patient instructed to bring BG logs to each follow up   Patient encouraged to call for any BG/medication issues, concerns, or questions.

## 2019-11-18 LAB
LEFT EYE DM RETINOPATHY: POSITIVE
RIGHT EYE DM RETINOPATHY: POSITIVE

## 2019-11-26 ENCOUNTER — PATIENT OUTREACH (OUTPATIENT)
Dept: ADMINISTRATIVE | Facility: HOSPITAL | Age: 84
End: 2019-11-26

## 2019-12-02 RX ORDER — SYRINGE,SAFETY WITH NEEDLE,1ML 25GX1"
SYRINGE (EA) MISCELLANEOUS
Qty: 100 EACH | Refills: 2 | Status: SHIPPED | OUTPATIENT
Start: 2019-12-02 | End: 2020-09-29

## 2019-12-09 RX ORDER — MEMANTINE HYDROCHLORIDE 10 MG/1
TABLET ORAL
Qty: 90 TABLET | Refills: 3 | Status: SHIPPED | OUTPATIENT
Start: 2019-12-09 | End: 2020-12-07

## 2019-12-16 RX ORDER — LEVOTHYROXINE SODIUM 75 UG/1
TABLET ORAL
Qty: 90 TABLET | Refills: 0 | Status: SHIPPED | OUTPATIENT
Start: 2019-12-16 | End: 2020-03-23

## 2019-12-27 ENCOUNTER — TELEPHONE (OUTPATIENT)
Dept: FAMILY MEDICINE | Facility: CLINIC | Age: 84
End: 2019-12-27

## 2019-12-27 NOTE — TELEPHONE ENCOUNTER
----- Message from Pat Corrales sent at 12/27/2019  8:40 AM CST -----  Contact: Patient's daughter Parisa  Type: Needs Medical Advice    Who Called:  Parisa  Ignacio Call Back Number: [phone]  Additional Information: Patient's daughter Parisa is stating that the insurance company is will not be covering her [employer] next week anymore they suggest she goes back to [facility].Please call back and advise.

## 2019-12-30 RX ORDER — INSULIN GLARGINE 100 [IU]/ML
22 INJECTION, SOLUTION SUBCUTANEOUS NIGHTLY
Qty: 6.6 ML | Refills: 2 | Status: SHIPPED | OUTPATIENT
Start: 2019-12-30 | End: 2020-03-23

## 2019-12-30 NOTE — TELEPHONE ENCOUNTER
Will not cover the basaglar this coming year. States that she is administering 22 units. Insurance states that lantus will be covered and what they recommend. States that she is using the pens currently.

## 2020-02-03 RX ORDER — RIVASTIGMINE 4.6 MG/24H
PATCH, EXTENDED RELEASE TRANSDERMAL
Qty: 30 PATCH | Refills: 2 | Status: SHIPPED | OUTPATIENT
Start: 2020-02-03 | End: 2020-02-13

## 2020-02-03 NOTE — TELEPHONE ENCOUNTER
"Called and spoke with pt. Pt was notified that her prescription was refilled for her. Pt stated "My insurance would not cover this prescription. I also have others I am having issues with. I am coming in to see her on 2/13/2020." I voiced understanding.   "

## 2020-02-04 ENCOUNTER — TELEPHONE (OUTPATIENT)
Dept: FAMILY MEDICINE | Facility: CLINIC | Age: 85
End: 2020-02-04

## 2020-02-04 NOTE — TELEPHONE ENCOUNTER
spoke with noel, states she will be coming to appt on 02/13 due to cost of pts patch, had questions regarding other medications,

## 2020-02-04 NOTE — TELEPHONE ENCOUNTER
Denice Sheth  Blacnas: [id number]  PA Case ID: [id number]  Rx #: [id number]   Need help? Call us at [phone]       Status    Sent to Plan Today  Approved through 12/31/2020    Drug  Rivastigmine 4.6MG/24HR 24 hr patches

## 2020-02-04 NOTE — TELEPHONE ENCOUNTER
----- Message from Pedro Tucker sent at 2/4/2020 11:28 AM CST -----  Contact: Mom, Helen Cervantes want to speak with a nurse regarding some medical questions please call back at [phone] (home)     Case number [id number]

## 2020-02-10 RX ORDER — ESCITALOPRAM OXALATE 10 MG/1
TABLET ORAL
Qty: 90 TABLET | Refills: 1 | Status: SHIPPED | OUTPATIENT
Start: 2020-02-10 | End: 2020-08-12

## 2020-02-13 ENCOUNTER — OFFICE VISIT (OUTPATIENT)
Dept: FAMILY MEDICINE | Facility: CLINIC | Age: 85
End: 2020-02-13
Payer: MEDICARE

## 2020-02-13 ENCOUNTER — PATIENT MESSAGE (OUTPATIENT)
Dept: FAMILY MEDICINE | Facility: CLINIC | Age: 85
End: 2020-02-13

## 2020-02-13 VITALS
RESPIRATION RATE: 18 BRPM | HEIGHT: 62 IN | DIASTOLIC BLOOD PRESSURE: 72 MMHG | HEART RATE: 61 BPM | BODY MASS INDEX: 35.92 KG/M2 | TEMPERATURE: 98 F | OXYGEN SATURATION: 97 % | WEIGHT: 195.19 LBS | SYSTOLIC BLOOD PRESSURE: 122 MMHG

## 2020-02-13 DIAGNOSIS — R19.8 IRREGULAR BOWEL HABITS: ICD-10-CM

## 2020-02-13 DIAGNOSIS — I73.9 PVD (PERIPHERAL VASCULAR DISEASE): ICD-10-CM

## 2020-02-13 DIAGNOSIS — E03.9 HYPOTHYROIDISM, UNSPECIFIED TYPE: ICD-10-CM

## 2020-02-13 DIAGNOSIS — G31.84 MCI (MILD COGNITIVE IMPAIRMENT) WITH MEMORY LOSS: ICD-10-CM

## 2020-02-13 DIAGNOSIS — I50.32 CHRONIC DIASTOLIC CONGESTIVE HEART FAILURE: ICD-10-CM

## 2020-02-13 DIAGNOSIS — I48.91 ATRIAL FIBRILLATION WITH RAPID VENTRICULAR RESPONSE: ICD-10-CM

## 2020-02-13 DIAGNOSIS — N18.4 CHRONIC KIDNEY DISEASE, STAGE 4 (SEVERE): ICD-10-CM

## 2020-02-13 DIAGNOSIS — E66.01 MORBID (SEVERE) OBESITY DUE TO EXCESS CALORIES: ICD-10-CM

## 2020-02-13 DIAGNOSIS — N18.4 TYPE 2 DIABETES MELLITUS WITH STAGE 4 CHRONIC KIDNEY DISEASE, WITH LONG-TERM CURRENT USE OF INSULIN: Primary | ICD-10-CM

## 2020-02-13 DIAGNOSIS — I69.354 HEMIPARESIS AFFECTING LEFT SIDE AS LATE EFFECT OF CEREBROVASCULAR ACCIDENT (CVA): ICD-10-CM

## 2020-02-13 DIAGNOSIS — E55.9 VITAMIN D DEFICIENCY: ICD-10-CM

## 2020-02-13 DIAGNOSIS — Z79.4 TYPE 2 DIABETES MELLITUS WITH STAGE 4 CHRONIC KIDNEY DISEASE, WITH LONG-TERM CURRENT USE OF INSULIN: Primary | ICD-10-CM

## 2020-02-13 DIAGNOSIS — E11.22 TYPE 2 DIABETES MELLITUS WITH STAGE 4 CHRONIC KIDNEY DISEASE, WITH LONG-TERM CURRENT USE OF INSULIN: Primary | ICD-10-CM

## 2020-02-13 PROCEDURE — 1101F PR PT FALLS ASSESS DOC 0-1 FALLS W/OUT INJ PAST YR: ICD-10-PCS | Mod: CPTII,S$GLB,, | Performed by: FAMILY MEDICINE

## 2020-02-13 PROCEDURE — 1159F PR MEDICATION LIST DOCUMENTED IN MEDICAL RECORD: ICD-10-PCS | Mod: S$GLB,,, | Performed by: FAMILY MEDICINE

## 2020-02-13 PROCEDURE — 1126F PR PAIN SEVERITY QUANTIFIED, NO PAIN PRESENT: ICD-10-PCS | Mod: S$GLB,,, | Performed by: FAMILY MEDICINE

## 2020-02-13 PROCEDURE — 99215 OFFICE O/P EST HI 40 MIN: CPT | Mod: S$GLB,,, | Performed by: FAMILY MEDICINE

## 2020-02-13 PROCEDURE — 99215 PR OFFICE/OUTPT VISIT, EST, LEVL V, 40-54 MIN: ICD-10-PCS | Mod: S$GLB,,, | Performed by: FAMILY MEDICINE

## 2020-02-13 PROCEDURE — 1101F PT FALLS ASSESS-DOCD LE1/YR: CPT | Mod: CPTII,S$GLB,, | Performed by: FAMILY MEDICINE

## 2020-02-13 PROCEDURE — 1159F MED LIST DOCD IN RCRD: CPT | Mod: S$GLB,,, | Performed by: FAMILY MEDICINE

## 2020-02-13 PROCEDURE — 99499 UNLISTED E&M SERVICE: CPT | Mod: S$GLB,,, | Performed by: FAMILY MEDICINE

## 2020-02-13 PROCEDURE — 99499 RISK ADDL DX/OHS AUDIT: ICD-10-PCS | Mod: S$GLB,,, | Performed by: FAMILY MEDICINE

## 2020-02-13 PROCEDURE — 99999 PR PBB SHADOW E&M-EST. PATIENT-LVL III: CPT | Mod: PBBFAC,,, | Performed by: FAMILY MEDICINE

## 2020-02-13 PROCEDURE — 3078F DIAST BP <80 MM HG: CPT | Mod: CPTII,S$GLB,, | Performed by: FAMILY MEDICINE

## 2020-02-13 PROCEDURE — 1126F AMNT PAIN NOTED NONE PRSNT: CPT | Mod: S$GLB,,, | Performed by: FAMILY MEDICINE

## 2020-02-13 PROCEDURE — 3078F PR MOST RECENT DIASTOLIC BLOOD PRESSURE < 80 MM HG: ICD-10-PCS | Mod: CPTII,S$GLB,, | Performed by: FAMILY MEDICINE

## 2020-02-13 PROCEDURE — 99999 PR PBB SHADOW E&M-EST. PATIENT-LVL III: ICD-10-PCS | Mod: PBBFAC,,, | Performed by: FAMILY MEDICINE

## 2020-02-13 PROCEDURE — 3074F SYST BP LT 130 MM HG: CPT | Mod: CPTII,S$GLB,, | Performed by: FAMILY MEDICINE

## 2020-02-13 PROCEDURE — 3074F PR MOST RECENT SYSTOLIC BLOOD PRESSURE < 130 MM HG: ICD-10-PCS | Mod: CPTII,S$GLB,, | Performed by: FAMILY MEDICINE

## 2020-02-13 RX ORDER — LACTULOSE 10 G/15ML
SOLUTION ORAL; RECTAL
COMMUNITY
Start: 2019-11-25 | End: 2020-09-29

## 2020-02-13 RX ORDER — RIVASTIGMINE TARTRATE 1.5 MG/1
1.5 CAPSULE ORAL 2 TIMES DAILY
Qty: 60 CAPSULE | Refills: 0 | Status: SHIPPED | OUTPATIENT
Start: 2020-02-13 | End: 2020-03-09

## 2020-02-13 NOTE — Clinical Note
Just a f/u on the PM blood sugar correction factor I sent them. Also, a reminder that her exelon tablet dose is lower than patch, and may need an additional dose mid-day.

## 2020-02-14 PROBLEM — G45.9 TRANSIENT CEREBRAL ISCHEMIA: Status: RESOLVED | Noted: 2019-06-24 | Resolved: 2020-02-14

## 2020-02-14 PROBLEM — Z75.8 DISCHARGE PLANNING ISSUES: Status: RESOLVED | Noted: 2019-08-22 | Resolved: 2020-02-14

## 2020-02-14 PROBLEM — G31.84 MCI (MILD COGNITIVE IMPAIRMENT) WITH MEMORY LOSS: Status: ACTIVE | Noted: 2020-02-14

## 2020-02-14 PROBLEM — K63.89 MASS OF CECUM: Status: RESOLVED | Noted: 2019-07-24 | Resolved: 2020-02-14

## 2020-02-14 PROBLEM — Z02.9 DISCHARGE PLANNING ISSUES: Status: RESOLVED | Noted: 2019-08-22 | Resolved: 2020-02-14

## 2020-02-14 NOTE — PROGRESS NOTES
Subjective:       Patient ID: Denice Sheth is a 85 y.o. female.    Chief Complaint: Diabetes (follow up)      Denice Sheth is in the office for f/u, accompanied by daughter who assists in care.    HPI  Since LOV, she had colon mass removed (R colectomy with BSO).   Has had cards f/u for monitoring as well as consult to endocrine.   They apparently had concerns about allowing slight BS elevations in order to avoid hypoglycemia, and thus asked that I resume her BS care. She has BS log for review:      While FBS is typically <150, her evening sugars are often elevated. Patient skips her lunch dose of novolog bc it is difficult to do her own injection, and goes to lunch at the [facility] which can have a higher carb count than would typically be allowed. They try to modify for that at home with dinner. Discussed pm novolog with correction factor.     Also, has upcoming review with nephr/jacinto re: CKD4.  They request to change her exelon patch to capsules as the patches are too expensive. Records indicate pt was placed on med in +/-2018 for memory loss/dementia.     Past Medical History:   Diagnosis Date    A-fib     Anticoagulant long-term use     CHF (congestive heart failure)     Diabetes mellitus     Disorder of kidney and ureter     Followed by Dr. Jonathan Pace    Encounter for blood transfusion     Hypertension     Insomnia     Irregular heart rhythm     Stage 4 chronic kidney disease     Thyroid disease     hypothyroidism    TIA (transient ischemic attack) 03/2017         Current Outpatient Medications:     albuterol (PROVENTIL) 2.5 mg /3 mL (0.083 %) nebulizer solution, daily as needed. , Disp: , Rfl:     albuterol-ipratropium (DUO-NEB) 2.5 mg-0.5 mg/3 mL nebulizer solution, Take 3 mLs by nebulization every 6 (six) hours as needed for Wheezing. Rescue, Disp: 1 Box, Rfl: 0    amiodarone (PACERONE) 200 MG Tab, Take 200 mg by mouth once daily. , Disp: , Rfl:     ascorbic acid (VITAMIN C)  500 MG tablet, Take 500 mg by mouth once daily., Disp: , Rfl:     b complex vitamins capsule, Take 1 capsule by mouth once daily., Disp: , Rfl:     bisacodyl (DULCOLAX) 10 mg Supp, Place 1 suppository (10 mg total) rectally daily as needed (Until bowel movement if patient has no bowel movement for 2 days)., Disp: , Rfl: 0    blood sugar diagnostic (TRUE METRIX GLUCOSE TEST STRIP) Strp, 1 each by Misc.(Non-Drug; Combo Route) route 3 (three) times daily., Disp: 100 each, Rfl: 11    blood-glucose meter kit, by Other route. Use as instructed, Disp: , Rfl:     calcium-vitamin D3 500 mg(1,250mg) -200 unit per tablet, Take 1 tablet by mouth 2 (two) times daily with meals., Disp: , Rfl:     cholecalciferol, vitamin D3, 5,000 unit Tab, GIVE TWO TIMES A DAY, Disp: , Rfl:     clopidogrel (PLAVIX) 75 mg tablet, TAKE ONE TABLET BY MOUTH ONCE DAILY, Disp: 30 tablet, Rfl: 11    docusate sodium (COLACE) 100 MG capsule, 100 mg daily as needed. , Disp: , Rfl:     escitalopram oxalate (LEXAPRO) 10 MG tablet, TAKE 1 TABLET BY MOUTH ONCE DAILY IN THE EVENING, Disp: 90 tablet, Rfl: 1    fish oil-omega-3 fatty acids 300-1,000 mg capsule, Take 2 g by mouth once daily., Disp: , Rfl:     insulin (LANTUS SOLOSTAR U-100 INSULIN) glargine 100 units/mL (3mL) SubQ pen, Inject 22 Units into the skin every evening., Disp: 6.6 mL, Rfl: 2    insulin aspart U-100 (NOVOLOG) 100 unit/mL injection, Inject 7 Units into the skin 3 (three) times daily before meals., Disp: 10 mL, Rfl: 11    insulin syringe-needle U-100 1 mL 31 gauge x 5/16 Syrg, USE AS DIRECTED, Disp: 100 each, Rfl: 2    lactulose (CHRONULAC) 10 gram/15 mL solution, , Disp: , Rfl:     lancets 31 gauge Misc, 1 lancet by Misc.(Non-Drug; Combo Route) route 3 (three) times daily as needed., Disp: 100 each, Rfl: 11    levothyroxine (SYNTHROID) 75 MCG tablet, TAKE 1 TABLET BY MOUTH ONCE DAILY IN THE MORNING BEFORE BREAKFAST, Disp: 90 tablet, Rfl: 0    memantine (NAMENDA) 10 MG  Tab, TAKE 1 TABLET BY MOUTH ONCE DAILY IN THE EVENING, Disp: 90 tablet, Rfl: 3    metoprolol tartrate (LOPRESSOR) 25 MG tablet, Take 25 mg by mouth 2 (two) times daily. , Disp: , Rfl:     multivitamin (ONE DAILY MULTIVITAMIN) per tablet, Take 1 tablet by mouth once daily., Disp: , Rfl:     nitroGLYCERIN (NITROSTAT) 0.4 MG SL tablet, Place 1 tablet (0.4 mg total) under the tongue every 5 (five) minutes as needed for Chest pain., Disp: 30 tablet, Rfl: 0    ondansetron (ZOFRAN) 4 MG tablet, ondansetron HCl 4 mg tablet  prn, Disp: , Rfl:     polyethylene glycol (GLYCOLAX) 17 gram PwPk, Take by mouth daily as needed. , Disp: , Rfl:     rivaroxaban (XARELTO) 15 mg Tab, Take 15 mg by mouth daily with dinner or evening meal., Disp: , Rfl:     simvastatin (ZOCOR) 10 MG tablet, Take 10 mg by mouth every evening., Disp: , Rfl:     rivastigmine tartrate (EXELON) 1.5 MG capsule, Take 1 capsule (1.5 mg total) by mouth 2 (two) times daily., Disp: 60 capsule, Rfl: 0    The ASCVD Risk score (Salem RICKEY Jr., et al., 2013) failed to calculate for the following reasons:    The 2013 ASCVD risk score is only valid for ages 40 to 79     Lab Results   Component Value Date    HGBA1C 6.7 (H) 10/31/2019    HGBA1C 7.0 (H) 08/01/2019    HGBA1C 7.2 (H) 05/08/2019     Lab Results   Component Value Date    LDLCALC 88.4 10/31/2019    CREATININE 1.8 (H) 10/31/2019   labs 2019 rev.    Review of Systems   Constitutional: Negative for activity change, fatigue and fever.   HENT: Negative for congestion and ear pain.    Eyes: Negative for photophobia and visual disturbance.   Respiratory: Negative for cough and shortness of breath.    Cardiovascular: Negative for chest pain and leg swelling.   Gastrointestinal: Negative for abdominal pain, constipation, diarrhea and nausea.   Endocrine: Negative for polyuria.        Hyperglycemia   Genitourinary: Positive for frequency and urgency. Negative for difficulty urinating.   Musculoskeletal: Negative for  joint swelling and myalgias.   Skin: Positive for color change (bruise L shin). Negative for rash.   Neurological: Negative for dizziness (improved) and headaches.   Psychiatric/Behavioral: Positive for sleep disturbance (due to urinary frequency). Negative for dysphoric mood.           Objective:      Physical Exam   Constitutional: She is oriented to person, place, and time. She appears well-developed and well-nourished. No distress.   HENT:   Head: Normocephalic and atraumatic.   Nose: Nose normal.   Eyes: Conjunctivae are normal. Right eye exhibits no discharge. Left eye exhibits no discharge. No scleral icterus.   Neck: Neck supple.   Cardiovascular: Normal rate. An irregular rhythm present.   Pulmonary/Chest: Effort normal and breath sounds normal. No respiratory distress.   Abdominal: Soft. She exhibits no distension.   nontender on exam   Musculoskeletal: She exhibits no edema or deformity.   Examined in wheelchair.   Lymphadenopathy:     She has no cervical adenopathy.   Neurological: She is alert and oriented to person, place, and time.   Skin: Skin is warm and dry. No rash noted.   Psychiatric: She has a normal mood and affect. Her behavior is normal.   Nursing note and vitals reviewed.          Screening recommendations appropriate to age and health status were reviewed.    Assessment & Plan:    Type 2 diabetes mellitus with stage 4 chronic kidney disease, with long-term current use of insulin  -     Hemoglobin A1c; Future; Expected date: 02/13/2020  -     Comprehensive metabolic panel; Future; Expected date: 02/13/2020  BS controlled, but log indicates she has relatively normal FBS, but significant elevations later in the day. As she skips her lunch dose of insulin with what is typically a carb-heavy meal, we discussed use of a pm correction factor in order to improve pm glucose. Reviewed need for regular BS checks to avoid hypoglycemia and importance thereof.   Hypothyroidism, unspecified type  -      TSH; Future; Expected date: 02/13/2020  Update lab for review.  Vitamin D deficiency  -     Vitamin D; Future; Expected date: 02/13/2020  Update for review.  Morbid (severe) obesity due to excess calories  Relatively stable post-operatively. Patient would like to be more active, but is limited by weakness in trunk/legs. They are trying to increase home activities with a PT bike.   Chronic diastolic congestive heart failure  Appears euvolemic today.   Hemiparesis affecting left side as late effect of cerebrovascular accident (CVA)  Pt is primarily chair/wheelchair bound although she can assist in transfers. maxed out previous PT benefit.  PVD (peripheral vascular disease)  No fluid retention noted today.  Chronic kidney disease, stage 4 (severe)  Maintaining f/u with nephro/casey. Discussed importance of maintaining hydration, glycemic control..  Atrial fibrillation with rapid ventricular response  Stable.  MCI (mild cognitive impairment) with memory loss  -     rivastigmine tartrate (EXELON) 1.5 MG capsule; Take 1 capsule (1.5 mg total) by mouth 2 (two) times daily.  Dispense: 60 capsule; Refill: 0  Switch to capsules in lieu of patch due to cost. Reviewed with pt/daughter that dose titration to 3 capsules daily may be needed (1 capsule TID) given prior dose of patch. They expressed understanding and will sx monitor.   Irregular bowel habits  Discussed dietary fiber, use of miralax, stool softeners, hydration.    Time spent in room on history, exam, and coordination of care with patient >40mins.

## 2020-02-20 ENCOUNTER — TELEPHONE (OUTPATIENT)
Dept: FAMILY MEDICINE | Facility: CLINIC | Age: 85
End: 2020-02-20

## 2020-02-20 NOTE — TELEPHONE ENCOUNTER
----- Message from Meredith Baird MD sent at 2/14/2020 12:52 PM CST -----  Just a f/u on the PM blood sugar correction factor I sent them. Also, a reminder that her exelon tablet dose is lower than patch, and may need an additional dose mid-day.

## 2020-02-20 NOTE — TELEPHONE ENCOUNTER
Spoke with pts daughter, states pts blood sugars are better however, pt is eating lots of sugary snacks, advised if she notices any changes with switching to tablets to let the office know so a mid day dose can be prescribed, voiced understanding

## 2020-03-02 ENCOUNTER — PATIENT MESSAGE (OUTPATIENT)
Dept: FAMILY MEDICINE | Facility: CLINIC | Age: 85
End: 2020-03-02

## 2020-03-03 ENCOUNTER — PATIENT MESSAGE (OUTPATIENT)
Dept: FAMILY MEDICINE | Facility: CLINIC | Age: 85
End: 2020-03-03

## 2020-03-05 RX ORDER — PEN NEEDLE, DIABETIC 29 G X1/2"
1 NEEDLE, DISPOSABLE MISCELLANEOUS 3 TIMES DAILY
Qty: 300 EACH | Refills: 11 | Status: SHIPPED | OUTPATIENT
Start: 2020-03-05

## 2020-03-05 RX ORDER — INSULIN LISPRO 100 [IU]/ML
7 INJECTION, SOLUTION INTRAVENOUS; SUBCUTANEOUS
Qty: 18.9 ML | Refills: 3 | Status: SHIPPED | OUTPATIENT
Start: 2020-03-05 | End: 2021-02-05 | Stop reason: SDUPTHER

## 2020-03-09 DIAGNOSIS — G31.84 MCI (MILD COGNITIVE IMPAIRMENT) WITH MEMORY LOSS: ICD-10-CM

## 2020-03-09 RX ORDER — RIVASTIGMINE TARTRATE 1.5 MG/1
CAPSULE ORAL
Qty: 60 CAPSULE | Refills: 5 | Status: SHIPPED | OUTPATIENT
Start: 2020-03-09 | End: 2020-09-08

## 2020-03-11 LAB
LEFT EYE DM RETINOPATHY: POSITIVE
RIGHT EYE DM RETINOPATHY: POSITIVE

## 2020-03-23 RX ORDER — INSULIN GLARGINE 100 [IU]/ML
INJECTION, SOLUTION SUBCUTANEOUS
Qty: 6 ML | Refills: 3 | Status: SHIPPED | OUTPATIENT
Start: 2020-03-23 | End: 2020-12-22

## 2020-03-23 RX ORDER — LEVOTHYROXINE SODIUM 75 UG/1
TABLET ORAL
Qty: 90 TABLET | Refills: 0 | Status: SHIPPED | OUTPATIENT
Start: 2020-03-23 | End: 2020-06-17

## 2020-03-25 ENCOUNTER — TELEPHONE (OUTPATIENT)
Dept: NEPHROLOGY | Facility: CLINIC | Age: 85
End: 2020-03-25

## 2020-03-25 ENCOUNTER — TELEPHONE (OUTPATIENT)
Dept: FAMILY MEDICINE | Facility: CLINIC | Age: 85
End: 2020-03-25

## 2020-03-25 DIAGNOSIS — Z79.4 TYPE 2 DIABETES MELLITUS WITH STAGE 4 CHRONIC KIDNEY DISEASE, WITH LONG-TERM CURRENT USE OF INSULIN: Primary | ICD-10-CM

## 2020-03-25 DIAGNOSIS — E11.22 TYPE 2 DIABETES MELLITUS WITH STAGE 4 CHRONIC KIDNEY DISEASE, WITH LONG-TERM CURRENT USE OF INSULIN: Primary | ICD-10-CM

## 2020-03-25 DIAGNOSIS — N18.4 TYPE 2 DIABETES MELLITUS WITH STAGE 4 CHRONIC KIDNEY DISEASE, WITH LONG-TERM CURRENT USE OF INSULIN: Primary | ICD-10-CM

## 2020-03-25 NOTE — TELEPHONE ENCOUNTER
Confirmed with pharmacy that Rx was not received, gave verbal  Pt was in ER for uti this morning, she had labs and would like Dr. Baird to review them and see if she still needs labs next week

## 2020-03-25 NOTE — TELEPHONE ENCOUNTER
----- Message from Jacob Braswell sent at 3/25/2020  2:29 PM CDT -----  Contact: pt's daughter Parisa  Type: Needs Medical Advice    Who Called:  Parisa    Ignacio Call Back Number: [phone]  Additional Information: States that the pharmacy did not receive the prescription for levothyroxine (SYNTHROID) 75 MCG tablet. Please resend to the pharmacy below.     [address] [place]AV[facility]  [address]  [address] [place] [address]  Phone: [phone] Fax: [phone]

## 2020-03-31 ENCOUNTER — LAB VISIT (OUTPATIENT)
Dept: LAB | Facility: HOSPITAL | Age: 85
End: 2020-03-31
Attending: INTERNAL MEDICINE
Payer: MEDICARE

## 2020-03-31 ENCOUNTER — TELEPHONE (OUTPATIENT)
Dept: UROLOGY | Facility: CLINIC | Age: 85
End: 2020-03-31

## 2020-03-31 DIAGNOSIS — E11.22 TYPE 2 DIABETES MELLITUS WITH STAGE 4 CHRONIC KIDNEY DISEASE, WITH LONG-TERM CURRENT USE OF INSULIN: ICD-10-CM

## 2020-03-31 DIAGNOSIS — Z79.4 TYPE 2 DIABETES MELLITUS WITH STAGE 4 CHRONIC KIDNEY DISEASE, WITH LONG-TERM CURRENT USE OF INSULIN: ICD-10-CM

## 2020-03-31 DIAGNOSIS — N18.30 CKD (CHRONIC KIDNEY DISEASE) STAGE 3, GFR 30-59 ML/MIN: ICD-10-CM

## 2020-03-31 DIAGNOSIS — N18.4 TYPE 2 DIABETES MELLITUS WITH STAGE 4 CHRONIC KIDNEY DISEASE, WITH LONG-TERM CURRENT USE OF INSULIN: ICD-10-CM

## 2020-03-31 LAB
ALBUMIN SERPL BCP-MCNC: 3.1 G/DL (ref 3.5–5.2)
ANION GAP SERPL CALC-SCNC: 11 MMOL/L (ref 8–16)
BASOPHILS # BLD AUTO: 0.03 K/UL (ref 0–0.2)
BASOPHILS NFR BLD: 0.4 % (ref 0–1.9)
BUN SERPL-MCNC: 34 MG/DL (ref 8–23)
CALCIUM SERPL-MCNC: 8.9 MG/DL (ref 8.7–10.5)
CHLORIDE SERPL-SCNC: 100 MMOL/L (ref 95–110)
CO2 SERPL-SCNC: 26 MMOL/L (ref 23–29)
CREAT SERPL-MCNC: 2 MG/DL (ref 0.5–1.4)
DIFFERENTIAL METHOD: ABNORMAL
EOSINOPHIL # BLD AUTO: 0.1 K/UL (ref 0–0.5)
EOSINOPHIL NFR BLD: 1.7 % (ref 0–8)
ERYTHROCYTE [DISTWIDTH] IN BLOOD BY AUTOMATED COUNT: 13.8 % (ref 11.5–14.5)
EST. GFR  (AFRICAN AMERICAN): 25.7 ML/MIN/1.73 M^2
EST. GFR  (NON AFRICAN AMERICAN): 22.3 ML/MIN/1.73 M^2
GLUCOSE SERPL-MCNC: 309 MG/DL (ref 70–110)
HCT VFR BLD AUTO: 39.1 % (ref 37–48.5)
HGB BLD-MCNC: 11.8 G/DL (ref 12–16)
IMM GRANULOCYTES # BLD AUTO: 0.04 K/UL (ref 0–0.04)
IMM GRANULOCYTES NFR BLD AUTO: 0.6 % (ref 0–0.5)
LYMPHOCYTES # BLD AUTO: 2.1 K/UL (ref 1–4.8)
LYMPHOCYTES NFR BLD: 29.3 % (ref 18–48)
MCH RBC QN AUTO: 29.2 PG (ref 27–31)
MCHC RBC AUTO-ENTMCNC: 30.2 G/DL (ref 32–36)
MCV RBC AUTO: 97 FL (ref 82–98)
MONOCYTES # BLD AUTO: 0.6 K/UL (ref 0.3–1)
MONOCYTES NFR BLD: 9 % (ref 4–15)
NEUTROPHILS # BLD AUTO: 4.2 K/UL (ref 1.8–7.7)
NEUTROPHILS NFR BLD: 59 % (ref 38–73)
NRBC BLD-RTO: 0 /100 WBC
PHOSPHATE SERPL-MCNC: 4 MG/DL (ref 2.7–4.5)
PLATELET # BLD AUTO: 314 K/UL (ref 150–350)
PMV BLD AUTO: 11.4 FL (ref 9.2–12.9)
POTASSIUM SERPL-SCNC: 4.9 MMOL/L (ref 3.5–5.1)
RBC # BLD AUTO: 4.04 M/UL (ref 4–5.4)
SODIUM SERPL-SCNC: 137 MMOL/L (ref 136–145)
WBC # BLD AUTO: 7.14 K/UL (ref 3.9–12.7)

## 2020-03-31 PROCEDURE — 36415 COLL VENOUS BLD VENIPUNCTURE: CPT | Mod: PN

## 2020-03-31 PROCEDURE — 80069 RENAL FUNCTION PANEL: CPT

## 2020-03-31 PROCEDURE — 85025 COMPLETE CBC W/AUTO DIFF WBC: CPT

## 2020-03-31 NOTE — TELEPHONE ENCOUNTER
----- Message from Molina Pantoja sent at 3/31/2020  9:15 AM CDT -----  Contact: katherine Mccauley [phone]  Type:  Patient Returning Call    Who Called:  katherine Mccauley [phone]    Additional Information:  Advised on the way for lab appmnt this morning, ptnt needs a wheelchair. Parisa will come to door to notify offc when arrived.

## 2020-04-02 ENCOUNTER — OFFICE VISIT (OUTPATIENT)
Dept: NEPHROLOGY | Facility: CLINIC | Age: 85
End: 2020-04-02
Payer: MEDICARE

## 2020-04-02 VITALS — DIASTOLIC BLOOD PRESSURE: 52 MMHG | SYSTOLIC BLOOD PRESSURE: 99 MMHG | HEART RATE: 60 BPM

## 2020-04-02 DIAGNOSIS — R80.9 PROTEINURIA DUE TO TYPE 2 DIABETES MELLITUS: ICD-10-CM

## 2020-04-02 DIAGNOSIS — E11.29 PROTEINURIA DUE TO TYPE 2 DIABETES MELLITUS: ICD-10-CM

## 2020-04-02 DIAGNOSIS — E87.1 HYPONATREMIA: ICD-10-CM

## 2020-04-02 DIAGNOSIS — I12.9 BENIGN HYPERTENSIVE RENAL DISEASE WITHOUT RENAL FAILURE: ICD-10-CM

## 2020-04-02 DIAGNOSIS — N18.30 CKD (CHRONIC KIDNEY DISEASE) STAGE 3, GFR 30-59 ML/MIN: Primary | ICD-10-CM

## 2020-04-02 DIAGNOSIS — E11.21 DIABETIC NEPHROPATHY ASSOCIATED WITH TYPE 2 DIABETES MELLITUS: ICD-10-CM

## 2020-04-02 PROCEDURE — 99499 RISK ADDL DX/OHS AUDIT: ICD-10-PCS | Mod: 95,,, | Performed by: INTERNAL MEDICINE

## 2020-04-02 PROCEDURE — 1101F PT FALLS ASSESS-DOCD LE1/YR: CPT | Mod: CPTII,,, | Performed by: INTERNAL MEDICINE

## 2020-04-02 PROCEDURE — 1159F PR MEDICATION LIST DOCUMENTED IN MEDICAL RECORD: ICD-10-PCS | Mod: ,,, | Performed by: INTERNAL MEDICINE

## 2020-04-02 PROCEDURE — 99214 PR OFFICE/OUTPT VISIT, EST, LEVL IV, 30-39 MIN: ICD-10-PCS | Mod: 95,,, | Performed by: INTERNAL MEDICINE

## 2020-04-02 PROCEDURE — 3078F DIAST BP <80 MM HG: CPT | Mod: CPTII,,, | Performed by: INTERNAL MEDICINE

## 2020-04-02 PROCEDURE — 1101F PR PT FALLS ASSESS DOC 0-1 FALLS W/OUT INJ PAST YR: ICD-10-PCS | Mod: CPTII,,, | Performed by: INTERNAL MEDICINE

## 2020-04-02 PROCEDURE — 1159F MED LIST DOCD IN RCRD: CPT | Mod: ,,, | Performed by: INTERNAL MEDICINE

## 2020-04-02 PROCEDURE — 99499 UNLISTED E&M SERVICE: CPT | Mod: 95,,, | Performed by: INTERNAL MEDICINE

## 2020-04-02 PROCEDURE — 3074F SYST BP LT 130 MM HG: CPT | Mod: CPTII,,, | Performed by: INTERNAL MEDICINE

## 2020-04-02 PROCEDURE — 3074F PR MOST RECENT SYSTOLIC BLOOD PRESSURE < 130 MM HG: ICD-10-PCS | Mod: CPTII,,, | Performed by: INTERNAL MEDICINE

## 2020-04-02 PROCEDURE — 3078F PR MOST RECENT DIASTOLIC BLOOD PRESSURE < 80 MM HG: ICD-10-PCS | Mod: CPTII,,, | Performed by: INTERNAL MEDICINE

## 2020-04-02 PROCEDURE — 99214 OFFICE O/P EST MOD 30 MIN: CPT | Mod: 95,,, | Performed by: INTERNAL MEDICINE

## 2020-04-02 NOTE — PROGRESS NOTES
Subjective:       Patient ID: Denice Sheth is a 85 y.o. White female who presents for return patient evaluation for chronic renal failure.    The patient location is:  Patient Home   The chief complaint leading to consultation is: CKD  Visit type: Virtual visit with synchronous audio and video  Total time spent with patient: 13 minutes  Each patient to whom he or she provides medical services by telemedicine is:  (1) informed of the relationship between the physician and patient and the respective role of any other health care provider with respect to management of the patient; and (2) notified that he or she may decline to receive medical services by telemedicine and may withdraw from such care at any time.     She had a recent ER visit for a UTI last week.  She feels well with no complaints now.      Review of Systems   Constitutional: Negative for appetite change, chills and fever.   Eyes: Negative for visual disturbance.   Respiratory: Positive for shortness of breath (with exertion). Negative for cough.    Cardiovascular: Negative for chest pain and leg swelling.   Gastrointestinal: Negative for abdominal pain, diarrhea, nausea and vomiting.   Genitourinary: Negative for difficulty urinating, dysuria and hematuria.   Musculoskeletal: Positive for arthralgias (right knee) and gait problem. Negative for myalgias.   Skin: Negative for rash.   Neurological: Positive for weakness. Negative for headaches.   Hematological: Bruises/bleeds easily.   Psychiatric/Behavioral: Negative for sleep disturbance.       The past medical, family and social histories were reviewed for this encounter.     BP (!) 99/52   Pulse 60     Objective:      Physical Exam   Constitutional: She is oriented to person, place, and time. She appears well-developed and well-nourished. No distress.   HENT:   Head: Normocephalic and atraumatic.   Pulmonary/Chest: Effort normal.   Musculoskeletal: She exhibits no edema.   Neurological: She is  alert and oriented to person, place, and time.   Psychiatric: She has a normal mood and affect. Her behavior is normal.   Vitals reviewed.     Assessment:       1. CKD (chronic kidney disease) stage 3, GFR 30-59 ml/min    2. 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 Diabetic nephropathy associated with type 2 diabetes mellitus    4. 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 Hyponatremia        Plan:   Return to clinic in 6 months.  Labs for next visit include rp, pth.  Rp in 3 months.    Baseline creatinine is 1.4-1.6 since 2015.  PTH is 39 with a calcium of 10.3.  UPC is 0.13.  Blood pressure is controlled on the current regimen.  Continue current medications as prescribed and reviewed.

## 2020-04-17 ENCOUNTER — TELEPHONE (OUTPATIENT)
Dept: PAIN MEDICINE | Facility: CLINIC | Age: 85
End: 2020-04-17

## 2020-04-17 NOTE — TELEPHONE ENCOUNTER
----- Message from Selina Weinstein sent at 4/17/2020  4:01 PM CDT -----  Contact: patient  Type: Needs Medical Advice  Who Called:  Patient  Best Call Back Number: [phone] (home)   Additional Information: the patient needs an appt please call when the patient can come in to the office for an injection

## 2020-04-20 NOTE — TELEPHONE ENCOUNTER
Called Parisa back. Advised that due to COVID-19 restrictions, we are only seeing urgent/emergent concerns in clinic at this time. Asked Parisa if pt felt that she needed an urgent appt? States that no, it is not urgent. Scheduled for 5/7/2020 with Dr. Tucker. Advised Parisa to let us know if pain gets worse before her appt in 2 weeks, we can work her in if needed. Thanks, Donna

## 2020-04-20 NOTE — TELEPHONE ENCOUNTER
Spoke to Parisa.  Pt requesting appointment for right knee injection.  Pt saw Tere Li in past for knee injection. Parisa reports pt has chronic knee pain that has started acting up again for the last 3 days.

## 2020-05-06 ENCOUNTER — PATIENT MESSAGE (OUTPATIENT)
Dept: ADMINISTRATIVE | Facility: HOSPITAL | Age: 85
End: 2020-05-06

## 2020-05-18 ENCOUNTER — TELEPHONE (OUTPATIENT)
Dept: ORTHOPEDICS | Facility: CLINIC | Age: 85
End: 2020-05-18

## 2020-05-18 NOTE — TELEPHONE ENCOUNTER
----- Message from Summer Espinoza sent at 5/18/2020  8:59 AM CDT -----  Contact: Parisa  Type: Needs Medical Advice    Who Called:  Parisa, daughter  Symptoms (please be specific):  Left ankle. Slipped out the bed.  How long has patient had these symptoms:  Couple days ago-   Not bruised, daughter put boot on her  Pharmacy name and phone #:    [facility] Call Back Number: [phone]  Additional Information: requesting a call back

## 2020-05-18 NOTE — TELEPHONE ENCOUNTER
Called daughter back. States that pts left ankle has been hurting for a couple of days now. No bruising to the ankle.  States that pt had a CAM boot from a previous injury. Daughter states that she has applied the boot to pt. Pt tells daughter the pain is better with the boot on. Daughter states that there is some swelling when not in the boot. Advised daughter to have pt elevate the ankle. Advised can use ice as well, putting a barrier between the skin and ice pack. Advised to have pt not walk on ankle a lot if possible. Scheduled appt with Dr. Ramirez for Monday. Advised daughter to let the office know if symptoms worsen prior to Monday. Advised that we can get pt in with another provider for initial evaluation. Thanks, Donna

## 2020-05-19 DIAGNOSIS — M25.572 ACUTE LEFT ANKLE PAIN: ICD-10-CM

## 2020-05-19 DIAGNOSIS — M17.11 OSTEOARTHROSIS, LOCALIZED, PRIMARY, KNEE, RIGHT: Primary | ICD-10-CM

## 2020-05-20 ENCOUNTER — PATIENT OUTREACH (OUTPATIENT)
Dept: ADMINISTRATIVE | Facility: HOSPITAL | Age: 85
End: 2020-05-20

## 2020-05-22 ENCOUNTER — PATIENT OUTREACH (OUTPATIENT)
Dept: ADMINISTRATIVE | Facility: OTHER | Age: 85
End: 2020-05-22

## 2020-05-23 NOTE — PROGRESS NOTES
Chart reviewed.   Immunizations: Triggered Imm Registry     Orders placed: n/a  Upcoming appts to satisfy [facility] topics: n/a

## 2020-05-25 ENCOUNTER — OFFICE VISIT (OUTPATIENT)
Dept: ORTHOPEDICS | Facility: CLINIC | Age: 85
End: 2020-05-25
Payer: MEDICARE

## 2020-05-25 ENCOUNTER — HOSPITAL ENCOUNTER (OUTPATIENT)
Dept: RADIOLOGY | Facility: HOSPITAL | Age: 85
Discharge: HOME | End: 2020-05-25
Attending: ORTHOPAEDIC SURGERY
Payer: MEDICARE

## 2020-05-25 VITALS
SYSTOLIC BLOOD PRESSURE: 135 MMHG | TEMPERATURE: 98 F | WEIGHT: 201.25 LBS | BODY MASS INDEX: 37.04 KG/M2 | HEIGHT: 62 IN | HEART RATE: 60 BPM | DIASTOLIC BLOOD PRESSURE: 51 MMHG

## 2020-05-25 DIAGNOSIS — M25.572 ACUTE LEFT ANKLE PAIN: ICD-10-CM

## 2020-05-25 PROCEDURE — 99202 PR OFFICE/OUTPT VISIT, NEW, LEVL II, 15-29 MIN: ICD-10-PCS | Mod: S$GLB,,, | Performed by: ORTHOPAEDIC SURGERY

## 2020-05-25 PROCEDURE — 99202 OFFICE O/P NEW SF 15 MIN: CPT | Mod: S$GLB,,, | Performed by: ORTHOPAEDIC SURGERY

## 2020-05-25 PROCEDURE — 73610 X-RAY EXAM OF ANKLE: CPT | Mod: TC,PO,LT

## 2020-05-25 PROCEDURE — 1159F MED LIST DOCD IN RCRD: CPT | Mod: S$GLB,,, | Performed by: ORTHOPAEDIC SURGERY

## 2020-05-25 PROCEDURE — 3078F DIAST BP <80 MM HG: CPT | Mod: CPTII,S$GLB,, | Performed by: ORTHOPAEDIC SURGERY

## 2020-05-25 PROCEDURE — 73610 X-RAY EXAM OF ANKLE: CPT | Mod: 26,LT,, | Performed by: RADIOLOGY

## 2020-05-25 PROCEDURE — 73610 XR ANKLE COMPLETE 3 VIEW LEFT: ICD-10-PCS | Mod: 26,LT,, | Performed by: RADIOLOGY

## 2020-05-25 PROCEDURE — 3075F SYST BP GE 130 - 139MM HG: CPT | Mod: CPTII,S$GLB,, | Performed by: ORTHOPAEDIC SURGERY

## 2020-05-25 PROCEDURE — 3075F PR MOST RECENT SYSTOLIC BLOOD PRESS GE 130-139MM HG: ICD-10-PCS | Mod: CPTII,S$GLB,, | Performed by: ORTHOPAEDIC SURGERY

## 2020-05-25 PROCEDURE — 1126F PR PAIN SEVERITY QUANTIFIED, NO PAIN PRESENT: ICD-10-PCS | Mod: S$GLB,,, | Performed by: ORTHOPAEDIC SURGERY

## 2020-05-25 PROCEDURE — 99999 PR PBB SHADOW E&M-EST. PATIENT-LVL III: CPT | Mod: PBBFAC,,, | Performed by: ORTHOPAEDIC SURGERY

## 2020-05-25 PROCEDURE — 1126F AMNT PAIN NOTED NONE PRSNT: CPT | Mod: S$GLB,,, | Performed by: ORTHOPAEDIC SURGERY

## 2020-05-25 PROCEDURE — 1101F PR PT FALLS ASSESS DOC 0-1 FALLS W/OUT INJ PAST YR: ICD-10-PCS | Mod: CPTII,S$GLB,, | Performed by: ORTHOPAEDIC SURGERY

## 2020-05-25 PROCEDURE — 3078F PR MOST RECENT DIASTOLIC BLOOD PRESSURE < 80 MM HG: ICD-10-PCS | Mod: CPTII,S$GLB,, | Performed by: ORTHOPAEDIC SURGERY

## 2020-05-25 PROCEDURE — 99999 PR PBB SHADOW E&M-EST. PATIENT-LVL III: ICD-10-PCS | Mod: PBBFAC,,, | Performed by: ORTHOPAEDIC SURGERY

## 2020-05-25 PROCEDURE — 1101F PT FALLS ASSESS-DOCD LE1/YR: CPT | Mod: CPTII,S$GLB,, | Performed by: ORTHOPAEDIC SURGERY

## 2020-05-25 PROCEDURE — 1159F PR MEDICATION LIST DOCUMENTED IN MEDICAL RECORD: ICD-10-PCS | Mod: S$GLB,,, | Performed by: ORTHOPAEDIC SURGERY

## 2020-05-25 NOTE — PROGRESS NOTES
"HPI: Denice Sheth  is a 85 y.o.  female who was seen in consultation today for Left ankle pain for the past 1 weeks.  The pain began when she sustained and of inversion injury to the Left ankle. Immediate symptoms: immediate pain, immediate swelling, no deformity was noted by the patient. Symptoms have been same since that time. No prior problems with this area in the past. No previous treatment.    She rates his pain as 0/10 today at rest. The pain is worse with walking. She is shere with her daughter today.      PAST MEDICAL/SURGICAL/FAMILY/SOCIAL/ HISTORY: REVIEWED    ALLERGIES/MEDICATIONS: REVIEWED       Review of Systems:     Constitution: Negative.   HEENT: Negative.   Eyes: Negative.   Cardiovascular: Negative.   Respiratory: Negative.   Endocrine: Negative.   Hematologic/Lymphatic: Negative.   Skin: Negative.   Musculoskeletal: Positive for Left ankle pain   Gastrointestinal: Negative.   Genitourinary: Negative.   Neurological: Negative.   Psychiatric/Behavioral: Negative.   Allergic/Immunologic: Negative.       PHYSICAL EXAM:  Vitals:    05/25/20 1330   BP: (!) 135/51   Pulse: 60   Temp: 98.3 °F (36.8 °C)     Ht Readings from Last 1 Encounters:   05/25/20 5' 2" (1.575 m)     Wt Readings from Last 1 Encounters:   05/25/20 91.3 kg (201 lb 4.5 oz)       GENERAL: Well developed, well nourished, no acute distress.  SKIN: Skin is intact. No atrophy, abrasions or lesions are noted.   Neurological: Normal mental status. Appropriate and conversant. Alert and oriented x 3.  GAIT: In a wheelchair    Left lower extremity compared with RLE:  2+ dorsalis pedis pulse.  Capillary refill < 3 seconds.  Decreased range of motion tibiotalar and subtalar joints but similar to the right side.   Normal alignment of the forefoot and the hindfoot.  5/5 strength EHL, FHL, tibialis anterior, gastrocsoleus, tibialis posterior and peroneals. Sensation to light touch intact sural, saphenous, superficial peroneal and deep peroneal " nerves. There is no  ecchymosis or deformity.  There is mild swelling and tenderness at the  ATFL and anterolateral ankle  No tenderness over the medial aspect of the ankle. The fifth metatarsal is not tender. The ankle joint is intact without excessive opening on stressing. Pos pain with full passive dorsiflexion and plantar flexion, pain with motion against resistance. Negative anterior drawer test.     XRAYS:   3 views of Left ankle reviewed today reveal No evidence of fractures or dislocations, normal study.  degenerative changes of the foot and ankle.       ASSESSMENT:  Left ankle sprain       PLAN: I spent 25 minutes with the patient and her daughter today.  More than half the time was spent counseling the patient on their condition. I gave the patient a handout on ankle sprains with foot and ankle exercises. Apply a compressive ACE bandage. Rest and elevate the affected painful area.  Apply cold compresses intermittently as needed.  As pain recedes, begin normal activities slowly as tolerated.  Ankle sleeve given. Home health PT 2 times per week for 6 weeks. Return to clinic if symptoms persist.

## 2020-05-26 ENCOUNTER — TELEPHONE (OUTPATIENT)
Dept: ORTHOPEDICS | Facility: CLINIC | Age: 85
End: 2020-05-26

## 2020-05-26 NOTE — TELEPHONE ENCOUNTER
----- Message from Sim Fernandes sent at 5/26/2020  2:03 PM CDT -----  Contact: Priscila with [facility]   - [phone]  Orders needs to go through N, not directly to HH

## 2020-05-27 ENCOUNTER — TELEPHONE (OUTPATIENT)
Dept: ORTHOPEDICS | Facility: CLINIC | Age: 85
End: 2020-05-27

## 2020-05-27 NOTE — TELEPHONE ENCOUNTER
----- Message from Estefania Costello MA sent at 5/27/2020 11:43 AM CDT -----  Contact: Gretta GARIBAY    Received order for    Call back    Needs recent clinicals for request   Fax

## 2020-06-02 ENCOUNTER — TELEPHONE (OUTPATIENT)
Dept: ORTHOPEDICS | Facility: CLINIC | Age: 85
End: 2020-06-02

## 2020-06-02 ENCOUNTER — PATIENT OUTREACH (OUTPATIENT)
Dept: ADMINISTRATIVE | Facility: OTHER | Age: 85
End: 2020-06-02

## 2020-06-02 NOTE — TELEPHONE ENCOUNTER
----- Message from Sim Fernandes sent at 6/2/2020 11:19 AM CDT -----  Contact: [facility] with [facility]  -- [phone]   request

## 2020-06-02 NOTE — TELEPHONE ENCOUNTER
Spoke to Gretta with [facility] advised that request for home health is only for physical therapy. Gretta stated understanding.

## 2020-06-03 PROCEDURE — G0180 PR HOME HEALTH MD CERTIFICATION: ICD-10-PCS | Mod: ,,, | Performed by: ORTHOPAEDIC SURGERY

## 2020-06-03 PROCEDURE — G0180 MD CERTIFICATION HHA PATIENT: HCPCS | Mod: ,,, | Performed by: ORTHOPAEDIC SURGERY

## 2020-06-04 ENCOUNTER — OFFICE VISIT (OUTPATIENT)
Dept: DERMATOLOGY | Facility: CLINIC | Age: 85
End: 2020-06-04
Payer: MEDICARE

## 2020-06-04 VITALS — WEIGHT: 201 LBS | HEIGHT: 62 IN | BODY MASS INDEX: 36.99 KG/M2

## 2020-06-04 DIAGNOSIS — D48.5 NEOPLASM OF UNCERTAIN BEHAVIOR OF SKIN: ICD-10-CM

## 2020-06-04 DIAGNOSIS — L82.1 SEBORRHEIC KERATOSES: Primary | ICD-10-CM

## 2020-06-04 DIAGNOSIS — D22.9 BENIGN NEVUS: ICD-10-CM

## 2020-06-04 PROCEDURE — 99202 OFFICE O/P NEW SF 15 MIN: CPT | Mod: 25,S$GLB,, | Performed by: DERMATOLOGY

## 2020-06-04 PROCEDURE — 88305 TISSUE EXAM BY PATHOLOGIST: CPT | Mod: 26,,, | Performed by: PATHOLOGY

## 2020-06-04 PROCEDURE — 11102 TANGNTL BX SKIN SINGLE LES: CPT | Mod: S$GLB,,, | Performed by: DERMATOLOGY

## 2020-06-04 PROCEDURE — 88305 TISSUE EXAM BY PATHOLOGIST: ICD-10-PCS | Mod: 26,,, | Performed by: PATHOLOGY

## 2020-06-04 PROCEDURE — 11102 PR TANGENTIAL BIOPSY, SKIN, SINGLE LESION: ICD-10-PCS | Mod: S$GLB,,, | Performed by: DERMATOLOGY

## 2020-06-04 PROCEDURE — 1101F PR PT FALLS ASSESS DOC 0-1 FALLS W/OUT INJ PAST YR: ICD-10-PCS | Mod: CPTII,S$GLB,, | Performed by: DERMATOLOGY

## 2020-06-04 PROCEDURE — 99999 PR PBB SHADOW E&M-EST. PATIENT-LVL IV: ICD-10-PCS | Mod: PBBFAC,,, | Performed by: DERMATOLOGY

## 2020-06-04 PROCEDURE — 1101F PT FALLS ASSESS-DOCD LE1/YR: CPT | Mod: CPTII,S$GLB,, | Performed by: DERMATOLOGY

## 2020-06-04 PROCEDURE — 99202 PR OFFICE/OUTPT VISIT, NEW, LEVL II, 15-29 MIN: ICD-10-PCS | Mod: 25,S$GLB,, | Performed by: DERMATOLOGY

## 2020-06-04 PROCEDURE — 1159F PR MEDICATION LIST DOCUMENTED IN MEDICAL RECORD: ICD-10-PCS | Mod: S$GLB,,, | Performed by: DERMATOLOGY

## 2020-06-04 PROCEDURE — 88305 TISSUE EXAM BY PATHOLOGIST: CPT | Performed by: PATHOLOGY

## 2020-06-04 PROCEDURE — 1159F MED LIST DOCD IN RCRD: CPT | Mod: S$GLB,,, | Performed by: DERMATOLOGY

## 2020-06-04 PROCEDURE — 99999 PR PBB SHADOW E&M-EST. PATIENT-LVL IV: CPT | Mod: PBBFAC,,, | Performed by: DERMATOLOGY

## 2020-06-04 NOTE — PROGRESS NOTES
Subjective:       Patient ID:  Denice Sheth is a 85 y.o. female who presents for   Chief Complaint   Patient presents with    Skin Tags     face     86 y/o F presents for initial visit for skin tags on both side of face x 2 years, itchy, increased in size, denies bleeding or changing in color. No prior tx    Denies history of NMSC  Denies family history of melanoma      Past Medical History:   Diagnosis Date    A-fib     Anticoagulant long-term use     Cecum mass     CHF (congestive heart failure)     Diabetes mellitus     Disorder of kidney and ureter     Followed by Dr. Jonathan Pace    Encounter for blood transfusion     Hypertension     Insomnia     Irregular heart rhythm     Stage 4 chronic kidney disease     Thyroid disease     hypothyroidism    TIA (transient ischemic attack) 03/2017       Review of Systems   Constitutional: Negative for fever, chills and fatigue.   Skin: Negative for daily sunscreen use, activity-related sunscreen use and wears hat.        Objective:    Physical Exam   Constitutional: She appears well-developed and well-nourished.   Eyes: Lids are normal.    Neurological: She is alert and oriented to person, place, and time.   Psychiatric: She has a normal mood and affect.   Skin:   Areas Examined (abnormalities noted in diagram):   Head / Face Inspection Performed  Neck Inspection Performed  Chest / Axilla Inspection Performed                       Diagram Legend     Erythematous scaling macule/papule c/w actinic keratosis       Vascular papule c/w angioma      Pigmented verrucoid papule/plaque c/w seborrheic keratosis      Yellow umbilicated papule c/w sebaceous hyperplasia      Irregularly shaped tan macule c/w lentigo     1-2 mm smooth white papules consistent with Milia      Movable subcutaneous cyst with punctum c/w epidermal inclusion cyst      Subcutaneous movable cyst c/w pilar cyst      Firm pink to brown papule c/w dermatofibroma      Pedunculated fleshy  papule(s) c/w skin tag(s)      Evenly pigmented macule c/w junctional nevus     Mildly variegated pigmented, slightly irregular-bordered macule c/w mildly atypical nevus      Flesh colored to evenly pigmented papule c/w intradermal nevus       Pink pearly papule/plaque c/w basal cell carcinoma      Erythematous hyperkeratotic cursted plaque c/w SCC      Surgical scar with no sign of skin cancer recurrence      Open and closed comedones      Inflammatory papules and pustules      Verrucoid papule consistent consistent with wart     Erythematous eczematous patches and plaques     Dystrophic onycholytic nail with subungual debris c/w onychomycosis     Umbilicated papule    Erythematous-base heme-crusted tan verrucoid plaque consistent with inflamed seborrheic keratosis     Erythematous Silvery Scaling Plaque c/w Psoriasis     See annotation      Assessment / Plan:      Pathology Orders:     Normal Orders This Visit    Specimen to Pathology, Dermatology     Comments:    Number of Specimens:->1  ------------------------->-------------------------  Spec 1 Procedure:->Biopsy  Spec 1 Clinical Impression:->ISK vs SCC vs other  Spec 1 Source:->R cheek    Questions:    Procedure Type:  Dermatology and skin neoplasms    Number of Specimens:  1    ------------------------:  -------------------------    Spec 1 Procedure:  Biopsy    Spec 1 Clinical Impression:  ISK vs SCC vs other    Spec 1 Source:  R cheek        Seborrheic keratoses  These are benign inherited growths without a malignant potential. Reassurance given to patient. No treatment is necessary.     Neoplasm of uncertain behavior of skin  -     Specimen to Pathology, Dermatology  Shave biopsy procedure note:    Shave biopsy performed after verbal consent including risk of infection, scar, recurrence, need for additional treatment of site. Area prepped with alcohol, anesthetized with approximately 1.0cc of 1% lidocaine with epinephrine. Lesional tissue shaved with razor  blade. Hemostasis achieved with application of aluminum chloride followed by hyfrecation. No complications. Dressing applied. Wound care explained.    Benign nevus  Stable and present for many years per patient and family member           Follow up for pending Pathology.

## 2020-06-08 LAB
FINAL PATHOLOGIC DIAGNOSIS: NORMAL
GROSS: NORMAL
MICROSCOPIC EXAM: NORMAL

## 2020-06-12 ENCOUNTER — EXTERNAL HOME HEALTH (OUTPATIENT)
Dept: HOME HEALTH SERVICES | Facility: HOSPITAL | Age: 85
End: 2020-06-12
Payer: MEDICARE

## 2020-06-12 ENCOUNTER — TELEPHONE (OUTPATIENT)
Dept: DERMATOLOGY | Facility: CLINIC | Age: 85
End: 2020-06-12

## 2020-06-12 NOTE — TELEPHONE ENCOUNTER
----- Message from Kristin Anthony MD sent at 6/12/2020  4:27 PM CDT -----  Skin, right cheek, shave biopsy:   -VERRUCOUS KERATOSIS, INFLAMED, see comment   Benign keratosis  No further action necessary  Thanks  DAISY

## 2020-06-12 NOTE — TELEPHONE ENCOUNTER
Spoke w/ pt dgt. Informed pt about results and recommendations per provider. pt verbalized understanding.

## 2020-08-12 RX ORDER — ESCITALOPRAM OXALATE 10 MG/1
TABLET ORAL
Qty: 90 TABLET | Refills: 1 | Status: SHIPPED | OUTPATIENT
Start: 2020-08-12 | End: 2021-02-05

## 2020-09-24 ENCOUNTER — TELEPHONE (OUTPATIENT)
Dept: NEPHROLOGY | Facility: CLINIC | Age: 85
End: 2020-09-24

## 2020-09-24 NOTE — TELEPHONE ENCOUNTER
Spoke with daughter.  Patient is having hematuria. Declines any other s/s.  Scheduled with urology in first available October 7th    Per Dr Pace, patient to see PCP in the interim to r/o UTI.     Declined same day appt.  Scheduled with Dr Matos tomorrow at 11am per their convenience.

## 2020-09-24 NOTE — TELEPHONE ENCOUNTER
----- Message from Mali Arriaga MA sent at 9/24/2020  8:58 AM CDT -----  Regarding: call back  Per daughter, Pt had blood in urine this Morning , Bright red.   Call Back # [phone]

## 2020-09-25 ENCOUNTER — OFFICE VISIT (OUTPATIENT)
Dept: FAMILY MEDICINE | Facility: CLINIC | Age: 85
End: 2020-09-25
Payer: MEDICARE

## 2020-09-25 ENCOUNTER — HOSPITAL ENCOUNTER (OUTPATIENT)
Dept: RADIOLOGY | Facility: HOSPITAL | Age: 85
Discharge: HOME | End: 2020-09-25
Attending: FAMILY MEDICINE
Payer: MEDICARE

## 2020-09-25 VITALS
BODY MASS INDEX: 36.1 KG/M2 | SYSTOLIC BLOOD PRESSURE: 120 MMHG | HEART RATE: 64 BPM | HEIGHT: 62 IN | TEMPERATURE: 98 F | WEIGHT: 196.19 LBS | DIASTOLIC BLOOD PRESSURE: 70 MMHG

## 2020-09-25 DIAGNOSIS — R31.9 HEMATURIA, UNSPECIFIED TYPE: ICD-10-CM

## 2020-09-25 DIAGNOSIS — R31.9 HEMATURIA, UNSPECIFIED TYPE: Primary | ICD-10-CM

## 2020-09-25 PROCEDURE — G0008 ADMIN INFLUENZA VIRUS VAC: HCPCS | Mod: S$GLB,,, | Performed by: FAMILY MEDICINE

## 2020-09-25 PROCEDURE — 76770 US EXAM ABDO BACK WALL COMP: CPT | Mod: 26,,, | Performed by: RADIOLOGY

## 2020-09-25 PROCEDURE — 1159F PR MEDICATION LIST DOCUMENTED IN MEDICAL RECORD: ICD-10-PCS | Mod: S$GLB,,, | Performed by: FAMILY MEDICINE

## 2020-09-25 PROCEDURE — G0008 PR ADMIN INFLUENZA VIRUS VAC: ICD-10-PCS | Mod: S$GLB,,, | Performed by: FAMILY MEDICINE

## 2020-09-25 PROCEDURE — 1126F AMNT PAIN NOTED NONE PRSNT: CPT | Mod: S$GLB,,, | Performed by: FAMILY MEDICINE

## 2020-09-25 PROCEDURE — 99999 PR PBB SHADOW E&M-EST. PATIENT-LVL III: CPT | Mod: PBBFAC,,, | Performed by: FAMILY MEDICINE

## 2020-09-25 PROCEDURE — 3078F DIAST BP <80 MM HG: CPT | Mod: CPTII,S$GLB,, | Performed by: FAMILY MEDICINE

## 2020-09-25 PROCEDURE — 76770 US EXAM ABDO BACK WALL COMP: CPT | Mod: TC,PO

## 2020-09-25 PROCEDURE — 1101F PR PT FALLS ASSESS DOC 0-1 FALLS W/OUT INJ PAST YR: ICD-10-PCS | Mod: CPTII,S$GLB,, | Performed by: FAMILY MEDICINE

## 2020-09-25 PROCEDURE — 76770 US RETROPERITONEAL COMPLETE: ICD-10-PCS | Mod: 26,,, | Performed by: RADIOLOGY

## 2020-09-25 PROCEDURE — 99214 PR OFFICE/OUTPT VISIT, EST, LEVL IV, 30-39 MIN: ICD-10-PCS | Mod: 25,S$GLB,, | Performed by: FAMILY MEDICINE

## 2020-09-25 PROCEDURE — 90694 VACC AIIV4 NO PRSRV 0.5ML IM: CPT | Mod: S$GLB,,, | Performed by: FAMILY MEDICINE

## 2020-09-25 PROCEDURE — 99999 PR PBB SHADOW E&M-EST. PATIENT-LVL III: ICD-10-PCS | Mod: PBBFAC,,, | Performed by: FAMILY MEDICINE

## 2020-09-25 PROCEDURE — 3078F PR MOST RECENT DIASTOLIC BLOOD PRESSURE < 80 MM HG: ICD-10-PCS | Mod: CPTII,S$GLB,, | Performed by: FAMILY MEDICINE

## 2020-09-25 PROCEDURE — 99214 OFFICE O/P EST MOD 30 MIN: CPT | Mod: 25,S$GLB,, | Performed by: FAMILY MEDICINE

## 2020-09-25 PROCEDURE — 1159F MED LIST DOCD IN RCRD: CPT | Mod: S$GLB,,, | Performed by: FAMILY MEDICINE

## 2020-09-25 PROCEDURE — 1101F PT FALLS ASSESS-DOCD LE1/YR: CPT | Mod: CPTII,S$GLB,, | Performed by: FAMILY MEDICINE

## 2020-09-25 PROCEDURE — 3074F SYST BP LT 130 MM HG: CPT | Mod: CPTII,S$GLB,, | Performed by: FAMILY MEDICINE

## 2020-09-25 PROCEDURE — 3074F PR MOST RECENT SYSTOLIC BLOOD PRESSURE < 130 MM HG: ICD-10-PCS | Mod: CPTII,S$GLB,, | Performed by: FAMILY MEDICINE

## 2020-09-25 PROCEDURE — 90694 FLU VACCINE - QUADRIVALENT - ADJUVANTED: ICD-10-PCS | Mod: S$GLB,,, | Performed by: FAMILY MEDICINE

## 2020-09-25 PROCEDURE — 1126F PR PAIN SEVERITY QUANTIFIED, NO PAIN PRESENT: ICD-10-PCS | Mod: S$GLB,,, | Performed by: FAMILY MEDICINE

## 2020-09-25 RX ORDER — CEPHALEXIN 500 MG/1
500 CAPSULE ORAL EVERY 12 HOURS
Qty: 14 CAPSULE | Refills: 0 | Status: SHIPPED | OUTPATIENT
Start: 2020-09-25 | End: 2020-09-29

## 2020-09-25 NOTE — PROGRESS NOTES
THIS DOCUMENT WAS MADE IN PART WITH VOICE RECOGNITION SOFTWARE.  OCCASIONALLY THIS SOFTWARE WILL MISINTERPRET WORDS OR PHRASES.    Assessment and Plan:    1. Hematuria, unspecified type  Treat with Keflex for possible infection, ultrasound kidneys and liver, check blood count  Emergency room precautions given  Will schedule urology appointment in the near future in the event that her symptoms do not resolve  She will follow with me on Monday or Tuesday  - POCT urine dipstick without microscope  - cephALEXin (KEFLEX) 500 MG capsule; Take 1 capsule (500 mg total) by mouth every 12 (twelve) hours.  Dispense: 14 capsule; Refill: 0  - US Retroperitoneal Complete; Future  - CBC auto differential; Future  - Comprehensive metabolic panel; Future  - Ambulatory referral/consult to Urology; Future      ______________________________________________________________________  Subjective:    Chief Complaint:  Chief Complaint   Patient presents with    Hematuria     first noticed blood in her urine on Wednesday. Urine itself is bright red, external catheter is used at night. Denies pain with urination.         HPI:  Denice is a 85 y.o. year old   85-year-old female with past medical history of atrial fibrillation on anticoagulation, diabetes, congestive heart failure, kidney disease, hypothyroidism, history of TIA, insomnia, hypertension    Complaint-hematuria  Currently taking Xarelto  Complains of gross hematuria yesterday morning  Denies any other lower urinary tract symptoms, systemic symptoms  Denies any shortness of breath, fatigue, dizziness  No prior episodes  Typically has gross hematuria with every urination since Wednesday.    Past Medical History:  Past Medical History:   Diagnosis Date    A-fib     Anticoagulant long-term use     Cecum mass     CHF (congestive heart failure)     Diabetes mellitus     Disorder of kidney and ureter     Followed by Dr. Jonathan Pace    Encounter for blood transfusion      Hypertension     Insomnia     Irregular heart rhythm     Stage 4 chronic kidney disease     Thyroid disease     hypothyroidism    TIA (transient ischemic attack) 03/2017       Past Surgical History:  Past Surgical History:   Procedure Laterality Date    BILATERAL SALPINGO-OOPHORECTOMY (BSO) Bilateral 8/19/2019    Procedure: SALPINGO-OOPHORECTOMY, BILATERAL;  Surgeon: Blayne Wallace MD;  Location: [facility];  Service: OB/GYN;  Laterality: Bilateral;    HYSTERECTOMY      partial, benign causes by reported hx    INSERTION OF PACEMAKER  08/2017    ROBOT-ASSISTED LAPAROSCOPIC COLECTOMY USING DA MICHELE XI Right 8/19/2019    Procedure: XI ROBOTIC COLECTOMY;  Surgeon: Kourtney Dodson MD;  Location: [facility];  Service: General;  Laterality: Right;       Family History:  Family History   Problem Relation Age of Onset    Diabetes Mother     Heart disease Mother     Cancer Father         type unknown (nonsmoker, no etoh)    Heart disease Brother     Dementia Sister     Cancer Daughter         fallopian ca    Cancer Son         esophageal ca    Dementia Sister     Dementia Sister        Social History:  Social History     Socioeconomic History    Marital status:      Spouse name: Not on file    Number of children: Not on file    Years of education: Not on file    Highest education level: Not on file   Occupational History    Not on file   Social Needs    Financial resource strain: Not on file    Food insecurity     Worry: Not on file     Inability: Not on file    Transportation needs     Medical: Not on file     Non-medical: Not on file   Tobacco Use    Smoking status: Never Smoker    Smokeless tobacco: Never Used   Substance and Sexual Activity    Alcohol use: No    Drug use: No    Sexual activity: Never     Birth control/protection: Post-menopausal   Lifestyle    Physical activity     Days per week: Not on file     Minutes per session: Not on file    Stress: Not on file    Relationships    Social connections     Talks on phone: Not on file     Gets together: Not on file     Attends Orthodoxy service: Not on file     Active member of club or organization: Not on file     Attends meetings of clubs or organizations: Not on file     Relationship status: Not on file   Other Topics Concern    Not on file   Social History Narrative    Not on file       Medications:  Current Outpatient Medications on File Prior to Visit   Medication Sig Dispense Refill    albuterol (PROVENTIL) 2.5 mg /3 mL (0.083 %) nebulizer solution daily as needed.       albuterol-ipratropium (DUO-NEB) 2.5 mg-0.5 mg/3 mL nebulizer solution Take 3 mLs by nebulization every 6 (six) hours as needed for Wheezing. Rescue 1 Box 0    amiodarone (PACERONE) 200 MG Tab Take 200 mg by mouth once daily.       ascorbic acid (VITAMIN C) 500 MG tablet Take 500 mg by mouth once daily.      b complex vitamins capsule Take 1 capsule by mouth once daily.      bisacodyl (DULCOLAX) 10 mg Supp Place 1 suppository (10 mg total) rectally daily as needed (Until bowel movement if patient has no bowel movement for 2 days).  0    blood sugar diagnostic (TRUE METRIX GLUCOSE TEST STRIP) Strp 1 each by Misc.(Non-Drug; Combo Route) route 3 (three) times daily. 100 each 11    blood-glucose meter kit by Other route. Use as instructed      calcium-vitamin D3 500 mg(1,250mg) -200 unit per tablet Take 1 tablet by mouth 2 (two) times daily with meals.      cholecalciferol, vitamin D3, 5,000 unit Tab GIVE TWO TIMES A DAY      clopidogrel (PLAVIX) 75 mg tablet TAKE ONE TABLET BY MOUTH ONCE DAILY 30 tablet 11    docusate sodium (COLACE) 100 MG capsule 100 mg daily as needed.       escitalopram oxalate (LEXAPRO) 10 MG tablet TAKE 1 TABLET BY MOUTH ONCE DAILY IN THE EVENING 90 tablet 1    fish oil-omega-3 fatty acids 300-1,000 mg capsule Take 2 g by mouth once daily.      insulin lispro (HUMALOG KWIKPEN INSULIN) 100 unit/mL pen Inject 7 Units  "into the skin 3 (three) times daily with meals. 18.9 mL 3    insulin syringe-needle U-100 1 mL 31 gauge x 5/16 Syrg USE AS DIRECTED 100 each 2    lactulose (CHRONULAC) 10 gram/15 mL solution       lancets 31 gauge Misc 1 lancet by Misc.(Non-Drug; Combo Route) route 3 (three) times daily as needed. 100 each 11    LANTUS SOLOSTAR U-100 INSULIN glargine 100 units/mL (3mL) SubQ pen INJECT 22 UNITS SUBCUTANEOUSLY IN THE EVENING 6 mL 3    levothyroxine (SYNTHROID) 75 MCG tablet TAKE 1 TABLET BY MOUTH ONCE DAILY IN THE MORNING 90 tablet 0    memantine (NAMENDA) 10 MG Tab TAKE 1 TABLET BY MOUTH ONCE DAILY IN THE EVENING 90 tablet 3    metoprolol tartrate (LOPRESSOR) 25 MG tablet Take 25 mg by mouth 2 (two) times daily.       multivitamin (ONE DAILY MULTIVITAMIN) per tablet Take 1 tablet by mouth once daily.      nitroGLYCERIN (NITROSTAT) 0.4 MG SL tablet Place 1 tablet (0.4 mg total) under the tongue every 5 (five) minutes as needed for Chest pain. 30 tablet 0    ondansetron (ZOFRAN) 4 MG tablet ondansetron HCl 4 mg tablet   prn      pen needle, diabetic (COMFORT EZ PEN NEEDLES) 29 gauge x 1/2" Ndle 1 Units by Misc.(Non-Drug; Combo Route) route 3 (three) times daily. 300 each 11    polyethylene glycol (GLYCOLAX) 17 gram PwPk Take by mouth daily as needed.       rivaroxaban (XARELTO) 15 mg Tab Take 15 mg by mouth daily with dinner or evening meal.      rivastigmine tartrate (EXELON) 1.5 MG capsule Take 1 capsule by mouth twice daily 60 capsule 0    simvastatin (ZOCOR) 10 MG tablet Take 10 mg by mouth every evening.       No current facility-administered medications on file prior to visit.        Allergies:  Adhesive and Latex, natural rubber    Immunizations:  Immunization History   Administered Date(s) Administered    Influenza 11/09/2010, 09/08/2011    Influenza (FLUAD) - Trivalent - Adjuvanted - PF (65+) 09/24/2019    Influenza - High Dose - PF (65 years and older) 09/28/2013, 09/21/2014, 01/20/2017, " "01/05/2018, 09/06/2018    Influenza - Trivalent (ADULT) 11/09/2010, 09/08/2011    Pneumococcal Conjugate - 13 Valent 05/24/2016    Pneumococcal Polysaccharide - 23 Valent 01/02/2018    Tdap 04/07/2016    Zoster 05/14/2009       Review of Systems:  Review of Systems   Genitourinary: Positive for hematuria.   All other systems reviewed and are negative.      Objective:    Vitals:  Vitals:    09/25/20 1110   BP: 120/70   Pulse: 64   Temp: 97.9 °F (36.6 °C)   Weight: 89 kg (196 lb 3.4 oz)   Height: 5' 2" (1.575 m)   PainSc: 0-No pain       Physical Exam  Vitals signs reviewed.   Constitutional:       General: She is not in acute distress.     Appearance: She is well-developed.   HENT:      Head: Normocephalic and atraumatic.   Neck:      Musculoskeletal: Normal range of motion.   Pulmonary:      Effort: Pulmonary effort is normal. No respiratory distress.   Psychiatric:         Behavior: Behavior normal.         Thought Content: Thought content normal.         Judgment: Judgment normal.         Data:  No previous labs, imaging, or notes available.        Kenrick Matos MD  Family Medicine      "

## 2020-09-29 ENCOUNTER — LAB VISIT (OUTPATIENT)
Dept: LAB | Facility: HOSPITAL | Age: 85
End: 2020-09-29
Attending: INTERNAL MEDICINE
Payer: MEDICARE

## 2020-09-29 ENCOUNTER — OFFICE VISIT (OUTPATIENT)
Dept: FAMILY MEDICINE | Facility: CLINIC | Age: 85
End: 2020-09-29
Payer: MEDICARE

## 2020-09-29 VITALS
SYSTOLIC BLOOD PRESSURE: 110 MMHG | DIASTOLIC BLOOD PRESSURE: 60 MMHG | HEART RATE: 61 BPM | OXYGEN SATURATION: 95 % | WEIGHT: 197 LBS | BODY MASS INDEX: 36.03 KG/M2 | TEMPERATURE: 97 F

## 2020-09-29 DIAGNOSIS — R31.9 HEMATURIA, UNSPECIFIED TYPE: Primary | ICD-10-CM

## 2020-09-29 DIAGNOSIS — N18.30 CKD (CHRONIC KIDNEY DISEASE) STAGE 3, GFR 30-59 ML/MIN: ICD-10-CM

## 2020-09-29 LAB
ALBUMIN SERPL BCP-MCNC: 3.4 G/DL (ref 3.5–5.2)
ANION GAP SERPL CALC-SCNC: 14 MMOL/L (ref 8–16)
BILIRUB SERPL-MCNC: NEGATIVE MG/DL
BLOOD URINE, POC: 250
BUN SERPL-MCNC: 27 MG/DL (ref 8–23)
CALCIUM SERPL-MCNC: 9 MG/DL (ref 8.7–10.5)
CHLORIDE SERPL-SCNC: 103 MMOL/L (ref 95–110)
CLARITY, POC UA: ABNORMAL
CO2 SERPL-SCNC: 22 MMOL/L (ref 23–29)
COLOR, POC UA: ABNORMAL
CREAT SERPL-MCNC: 1.6 MG/DL (ref 0.5–1.4)
EST. GFR  (AFRICAN AMERICAN): 33.6 ML/MIN/1.73 M^2
EST. GFR  (NON AFRICAN AMERICAN): 29.2 ML/MIN/1.73 M^2
GLUCOSE SERPL-MCNC: 143 MG/DL (ref 70–110)
GLUCOSE UR QL STRIP: NORMAL
KETONES UR QL STRIP: NEGATIVE
LEUKOCYTE ESTERASE URINE, POC: NEGATIVE
NITRITE, POC UA: NEGATIVE
PH, POC UA: 5
PHOSPHATE SERPL-MCNC: 3.6 MG/DL (ref 2.7–4.5)
POTASSIUM SERPL-SCNC: 4.5 MMOL/L (ref 3.5–5.1)
PROTEIN, POC: ABNORMAL
PTH-INTACT SERPL-MCNC: 61 PG/ML (ref 9–77)
SODIUM SERPL-SCNC: 139 MMOL/L (ref 136–145)
SPECIFIC GRAVITY, POC UA: 1.01
UROBILINOGEN, POC UA: NORMAL

## 2020-09-29 PROCEDURE — 3078F PR MOST RECENT DIASTOLIC BLOOD PRESSURE < 80 MM HG: ICD-10-PCS | Mod: CPTII,S$GLB,, | Performed by: FAMILY MEDICINE

## 2020-09-29 PROCEDURE — 3074F SYST BP LT 130 MM HG: CPT | Mod: CPTII,S$GLB,, | Performed by: FAMILY MEDICINE

## 2020-09-29 PROCEDURE — 99999 PR PBB SHADOW E&M-EST. PATIENT-LVL III: CPT | Mod: PBBFAC,,, | Performed by: FAMILY MEDICINE

## 2020-09-29 PROCEDURE — 1126F AMNT PAIN NOTED NONE PRSNT: CPT | Mod: S$GLB,,, | Performed by: FAMILY MEDICINE

## 2020-09-29 PROCEDURE — 3078F DIAST BP <80 MM HG: CPT | Mod: CPTII,S$GLB,, | Performed by: FAMILY MEDICINE

## 2020-09-29 PROCEDURE — 3074F PR MOST RECENT SYSTOLIC BLOOD PRESSURE < 130 MM HG: ICD-10-PCS | Mod: CPTII,S$GLB,, | Performed by: FAMILY MEDICINE

## 2020-09-29 PROCEDURE — 99999 PR PBB SHADOW E&M-EST. PATIENT-LVL III: ICD-10-PCS | Mod: PBBFAC,,, | Performed by: FAMILY MEDICINE

## 2020-09-29 PROCEDURE — 1159F MED LIST DOCD IN RCRD: CPT | Mod: S$GLB,,, | Performed by: FAMILY MEDICINE

## 2020-09-29 PROCEDURE — 1100F PR PT FALLS ASSESS DOC 2+ FALLS/FALL W/INJURY/YR: ICD-10-PCS | Mod: CPTII,S$GLB,, | Performed by: FAMILY MEDICINE

## 2020-09-29 PROCEDURE — 81002 URINALYSIS NONAUTO W/O SCOPE: CPT | Mod: S$GLB,,, | Performed by: FAMILY MEDICINE

## 2020-09-29 PROCEDURE — 1126F PR PAIN SEVERITY QUANTIFIED, NO PAIN PRESENT: ICD-10-PCS | Mod: S$GLB,,, | Performed by: FAMILY MEDICINE

## 2020-09-29 PROCEDURE — 3288F FALL RISK ASSESSMENT DOCD: CPT | Mod: CPTII,S$GLB,, | Performed by: FAMILY MEDICINE

## 2020-09-29 PROCEDURE — 1159F PR MEDICATION LIST DOCUMENTED IN MEDICAL RECORD: ICD-10-PCS | Mod: S$GLB,,, | Performed by: FAMILY MEDICINE

## 2020-09-29 PROCEDURE — 3288F PR FALLS RISK ASSESSMENT DOCUMENTED: ICD-10-PCS | Mod: CPTII,S$GLB,, | Performed by: FAMILY MEDICINE

## 2020-09-29 PROCEDURE — 80069 RENAL FUNCTION PANEL: CPT

## 2020-09-29 PROCEDURE — 36415 COLL VENOUS BLD VENIPUNCTURE: CPT | Mod: PN

## 2020-09-29 PROCEDURE — 81002 POCT URINE DIPSTICK WITHOUT MICROSCOPE: ICD-10-PCS | Mod: S$GLB,,, | Performed by: FAMILY MEDICINE

## 2020-09-29 PROCEDURE — 99213 OFFICE O/P EST LOW 20 MIN: CPT | Mod: 25,S$GLB,, | Performed by: FAMILY MEDICINE

## 2020-09-29 PROCEDURE — 99213 PR OFFICE/OUTPT VISIT, EST, LEVL III, 20-29 MIN: ICD-10-PCS | Mod: 25,S$GLB,, | Performed by: FAMILY MEDICINE

## 2020-09-29 PROCEDURE — 1100F PTFALLS ASSESS-DOCD GE2>/YR: CPT | Mod: CPTII,S$GLB,, | Performed by: FAMILY MEDICINE

## 2020-09-29 PROCEDURE — 83970 ASSAY OF PARATHORMONE: CPT

## 2020-09-29 RX ORDER — CEPHALEXIN 500 MG/1
500 CAPSULE ORAL EVERY 12 HOURS
COMMUNITY
End: 2021-02-05

## 2020-09-29 NOTE — PROGRESS NOTES
" THIS DOCUMENT WAS MADE IN PART WITH VOICE RECOGNITION SOFTWARE.  OCCASIONALLY THIS SOFTWARE WILL MISINTERPRET WORDS OR PHRASES.    Assessment and Plan:    1. Hematuria, unspecified type  Symptoms improving, resolving  Continue antibiotic, follow-up dipstick urine/urinalysis at next visit to rule out microscopic hematuria  - POCT urine dipstick without microscope        ______________________________________________________________________  Subjective:    Chief Complaint:  Chief Complaint   Patient presents with    Follow-up     follow up from uti last visit, pt states "I feel much better now"         HPI:  Denice is a 85 y.o. year old     Follow-up gross hematuria  Currently on anticoagulation for AFib diagnosis  Urinalysis indicated likely infection  Ultrasound kidney and bladder unremarkable  Lab work stable  Presents today for follow-up  Reports asymptomatic, urine clearing up  Dipstick still shows positive for blood  Still has a few days left of antibiotic    Past Medical History:  Past Medical History:   Diagnosis Date    A-fib     Anticoagulant long-term use     Cecum mass     CHF (congestive heart failure)     Diabetes mellitus     Disorder of kidney and ureter     Followed by Dr. Jonathan Pace    Encounter for blood transfusion     Hypertension     Insomnia     Irregular heart rhythm     Stage 4 chronic kidney disease     Thyroid disease     hypothyroidism    TIA (transient ischemic attack) 03/2017       Past Surgical History:  Past Surgical History:   Procedure Laterality Date    BILATERAL SALPINGO-OOPHORECTOMY (BSO) Bilateral 8/19/2019    Procedure: SALPINGO-OOPHORECTOMY, BILATERAL;  Surgeon: Blayne Wallace MD;  Location: [facility];  Service: OB/GYN;  Laterality: Bilateral;    HYSTERECTOMY      partial, benign causes by reported hx    INSERTION OF PACEMAKER  08/2017    ROBOT-ASSISTED LAPAROSCOPIC COLECTOMY USING DA MICHELE XI Right 8/19/2019    Procedure: XI ROBOTIC COLECTOMY;  Surgeon: " Kourtney Dodson MD;  Location: [facility];  Service: General;  Laterality: Right;       Family History:  Family History   Problem Relation Age of Onset    Diabetes Mother     Heart disease Mother     Cancer Father         type unknown (nonsmoker, no etoh)    Heart disease Brother     Dementia Sister     Cancer Daughter         fallopian ca    Cancer Son         esophageal ca    Dementia Sister     Dementia Sister        Social History:  Social History     Socioeconomic History    Marital status:      Spouse name: Not on file    Number of children: Not on file    Years of education: Not on file    Highest education level: Not on file   Occupational History    Not on file   Social Needs    Financial resource strain: Not on file    Food insecurity     Worry: Not on file     Inability: Not on file    Transportation needs     Medical: Not on file     Non-medical: Not on file   Tobacco Use    Smoking status: Never Smoker    Smokeless tobacco: Never Used   Substance and Sexual Activity    Alcohol use: No    Drug use: No    Sexual activity: Never     Birth control/protection: Post-menopausal   Lifestyle    Physical activity     Days per week: Not on file     Minutes per session: Not on file    Stress: Not on file   Relationships    Social connections     Talks on phone: Not on file     Gets together: Not on file     Attends Spiritism service: Not on file     Active member of club or organization: Not on file     Attends meetings of clubs or organizations: Not on file     Relationship status: Not on file   Other Topics Concern    Not on file   Social History Narrative    Not on file       Medications:  Current Outpatient Medications on File Prior to Visit   Medication Sig Dispense Refill    albuterol (PROVENTIL) 2.5 mg /3 mL (0.083 %) nebulizer solution daily as needed.       amiodarone (PACERONE) 200 MG Tab Take 200 mg by mouth once daily.       ascorbic acid (VITAMIN C) 500 MG tablet  "Take 500 mg by mouth once daily.      b complex vitamins capsule Take 1 capsule by mouth once daily.      bisacodyl (DULCOLAX) 10 mg Supp Place 1 suppository (10 mg total) rectally daily as needed (Until bowel movement if patient has no bowel movement for 2 days).  0    blood sugar diagnostic (TRUE METRIX GLUCOSE TEST STRIP) Strp 1 each by Misc.(Non-Drug; Combo Route) route 3 (three) times daily. 100 each 11    blood-glucose meter kit by Other route. Use as instructed      calcium-vitamin D3 500 mg(1,250mg) -200 unit per tablet Take 1 tablet by mouth 2 (two) times daily with meals.      cephALEXin (KEFLEX) 500 MG capsule Take 500 mg by mouth every 12 (twelve) hours.      cholecalciferol, vitamin D3, 5,000 unit Tab GIVE TWO TIMES A DAY      clopidogrel (PLAVIX) 75 mg tablet TAKE ONE TABLET BY MOUTH ONCE DAILY 30 tablet 11    escitalopram oxalate (LEXAPRO) 10 MG tablet TAKE 1 TABLET BY MOUTH ONCE DAILY IN THE EVENING 90 tablet 1    fish oil-omega-3 fatty acids 300-1,000 mg capsule Take 2 g by mouth once daily.      insulin lispro (HUMALOG KWIKPEN INSULIN) 100 unit/mL pen Inject 7 Units into the skin 3 (three) times daily with meals. 18.9 mL 3    lancets 31 gauge Misc 1 lancet by Misc.(Non-Drug; Combo Route) route 3 (three) times daily as needed. 100 each 11    LANTUS SOLOSTAR U-100 INSULIN glargine 100 units/mL (3mL) SubQ pen INJECT 22 UNITS SUBCUTANEOUSLY IN THE EVENING 6 mL 3    levothyroxine (SYNTHROID) 75 MCG tablet TAKE 1 TABLET BY MOUTH ONCE DAILY IN THE MORNING 90 tablet 0    memantine (NAMENDA) 10 MG Tab TAKE 1 TABLET BY MOUTH ONCE DAILY IN THE EVENING 90 tablet 3    metoprolol tartrate (LOPRESSOR) 25 MG tablet Take 25 mg by mouth 2 (two) times daily.       multivitamin (ONE DAILY MULTIVITAMIN) per tablet Take 1 tablet by mouth once daily.      ondansetron (ZOFRAN) 4 MG tablet ondansetron HCl 4 mg tablet   prn      pen needle, diabetic (COMFORT EZ PEN NEEDLES) 29 gauge x 1/2" Ndle 1 Units by " Misc.(Non-Drug; Combo Route) route 3 (three) times daily. 300 each 11    polyethylene glycol (GLYCOLAX) 17 gram PwPk Take by mouth daily as needed.       rivaroxaban (XARELTO) 15 mg Tab Take 15 mg by mouth daily with dinner or evening meal.      rivastigmine tartrate (EXELON) 1.5 MG capsule Take 1 capsule by mouth twice daily 60 capsule 0    simvastatin (ZOCOR) 10 MG tablet Take 10 mg by mouth every evening.      albuterol-ipratropium (DUO-NEB) 2.5 mg-0.5 mg/3 mL nebulizer solution Take 3 mLs by nebulization every 6 (six) hours as needed for Wheezing. Rescue 1 Box 0    nitroGLYCERIN (NITROSTAT) 0.4 MG SL tablet Place 1 tablet (0.4 mg total) under the tongue every 5 (five) minutes as needed for Chest pain. 30 tablet 0    [DISCONTINUED] cephALEXin (KEFLEX) 500 MG capsule Take 1 capsule (500 mg total) by mouth every 12 (twelve) hours. 14 capsule 0    [DISCONTINUED] docusate sodium (COLACE) 100 MG capsule 100 mg daily as needed.       [DISCONTINUED] insulin syringe-needle U-100 1 mL 31 gauge x 5/16 Syrg USE AS DIRECTED 100 each 2    [DISCONTINUED] lactulose (CHRONULAC) 10 gram/15 mL solution        No current facility-administered medications on file prior to visit.        Allergies:  Adhesive and Latex, natural rubber    Immunizations:  Immunization History   Administered Date(s) Administered    Influenza 11/09/2010, 09/08/2011    Influenza (FLUAD) - Quadrivalent - Adjuvanted - PF *Preferred* (65+) 09/25/2020    Influenza (FLUAD) - Trivalent - Adjuvanted - PF (65+) 09/24/2019    Influenza - High Dose - PF (65 years and older) 09/28/2013, 09/21/2014, 01/20/2017, 01/05/2018, 09/06/2018    Influenza - Trivalent (ADULT) 11/09/2010, 09/08/2011    Pneumococcal Conjugate - 13 Valent 05/24/2016    Pneumococcal Polysaccharide - 23 Valent 01/02/2018    Tdap 04/07/2016    Zoster 05/14/2009       Review of Systems:  Review of Systems   All other systems reviewed and are  negative.      Objective:    Vitals:  Vitals:    09/29/20 0835   BP: 110/60   Pulse: 61   Temp: 97.3 °F (36.3 °C)   TempSrc: Temporal   SpO2: 95%   Weight: 89.4 kg (196 lb 15.7 oz)   PainSc: 0-No pain       Physical Exam  Vitals signs reviewed.   Constitutional:       General: She is not in acute distress.     Appearance: She is well-developed.   HENT:      Head: Normocephalic and atraumatic.   Neck:      Musculoskeletal: Normal range of motion.   Pulmonary:      Effort: Pulmonary effort is normal. No respiratory distress.   Psychiatric:         Behavior: Behavior normal.         Thought Content: Thought content normal.         Judgment: Judgment normal.         Data:  No previous labs, imaging, or notes available.        Kenrick Matos MD  Family Medicine

## 2020-10-01 ENCOUNTER — OFFICE VISIT (OUTPATIENT)
Dept: NEPHROLOGY | Facility: CLINIC | Age: 85
End: 2020-10-01
Payer: MEDICARE

## 2020-10-01 VITALS
WEIGHT: 196 LBS | HEIGHT: 62 IN | OXYGEN SATURATION: 98 % | SYSTOLIC BLOOD PRESSURE: 100 MMHG | BODY MASS INDEX: 36.07 KG/M2 | HEART RATE: 60 BPM | DIASTOLIC BLOOD PRESSURE: 52 MMHG

## 2020-10-01 DIAGNOSIS — N18.30 STAGE 3 CHRONIC KIDNEY DISEASE, UNSPECIFIED WHETHER STAGE 3A OR 3B CKD: Primary | ICD-10-CM

## 2020-10-01 DIAGNOSIS — R80.9 PROTEINURIA DUE TO TYPE 2 DIABETES MELLITUS: ICD-10-CM

## 2020-10-01 DIAGNOSIS — E11.21 DIABETIC NEPHROPATHY ASSOCIATED WITH TYPE 2 DIABETES MELLITUS: ICD-10-CM

## 2020-10-01 DIAGNOSIS — E87.1 HYPONATREMIA: ICD-10-CM

## 2020-10-01 DIAGNOSIS — E11.29 PROTEINURIA DUE TO TYPE 2 DIABETES MELLITUS: ICD-10-CM

## 2020-10-01 DIAGNOSIS — I12.9 BENIGN HYPERTENSIVE RENAL DISEASE WITHOUT RENAL FAILURE: ICD-10-CM

## 2020-10-01 PROCEDURE — 3288F PR FALLS RISK ASSESSMENT DOCUMENTED: ICD-10-PCS | Mod: CPTII,S$GLB,, | Performed by: INTERNAL MEDICINE

## 2020-10-01 PROCEDURE — 3074F PR MOST RECENT SYSTOLIC BLOOD PRESSURE < 130 MM HG: ICD-10-PCS | Mod: CPTII,S$GLB,, | Performed by: INTERNAL MEDICINE

## 2020-10-01 PROCEDURE — 3078F PR MOST RECENT DIASTOLIC BLOOD PRESSURE < 80 MM HG: ICD-10-PCS | Mod: CPTII,S$GLB,, | Performed by: INTERNAL MEDICINE

## 2020-10-01 PROCEDURE — 99214 PR OFFICE/OUTPT VISIT, EST, LEVL IV, 30-39 MIN: ICD-10-PCS | Mod: S$GLB,,, | Performed by: INTERNAL MEDICINE

## 2020-10-01 PROCEDURE — 99999 PR PBB SHADOW E&M-EST. PATIENT-LVL III: ICD-10-PCS | Mod: PBBFAC,,, | Performed by: INTERNAL MEDICINE

## 2020-10-01 PROCEDURE — 99214 OFFICE O/P EST MOD 30 MIN: CPT | Mod: S$GLB,,, | Performed by: INTERNAL MEDICINE

## 2020-10-01 PROCEDURE — 3078F DIAST BP <80 MM HG: CPT | Mod: CPTII,S$GLB,, | Performed by: INTERNAL MEDICINE

## 2020-10-01 PROCEDURE — 1159F PR MEDICATION LIST DOCUMENTED IN MEDICAL RECORD: ICD-10-PCS | Mod: S$GLB,,, | Performed by: INTERNAL MEDICINE

## 2020-10-01 PROCEDURE — 1159F MED LIST DOCD IN RCRD: CPT | Mod: S$GLB,,, | Performed by: INTERNAL MEDICINE

## 2020-10-01 PROCEDURE — 99499 UNLISTED E&M SERVICE: CPT | Mod: S$GLB,,, | Performed by: INTERNAL MEDICINE

## 2020-10-01 PROCEDURE — 3288F FALL RISK ASSESSMENT DOCD: CPT | Mod: CPTII,S$GLB,, | Performed by: INTERNAL MEDICINE

## 2020-10-01 PROCEDURE — 99999 PR PBB SHADOW E&M-EST. PATIENT-LVL III: CPT | Mod: PBBFAC,,, | Performed by: INTERNAL MEDICINE

## 2020-10-01 PROCEDURE — 99499 RISK ADDL DX/OHS AUDIT: ICD-10-PCS | Mod: S$GLB,,, | Performed by: INTERNAL MEDICINE

## 2020-10-01 PROCEDURE — 1100F PTFALLS ASSESS-DOCD GE2>/YR: CPT | Mod: CPTII,S$GLB,, | Performed by: INTERNAL MEDICINE

## 2020-10-01 PROCEDURE — 3074F SYST BP LT 130 MM HG: CPT | Mod: CPTII,S$GLB,, | Performed by: INTERNAL MEDICINE

## 2020-10-01 PROCEDURE — 1100F PR PT FALLS ASSESS DOC 2+ FALLS/FALL W/INJURY/YR: ICD-10-PCS | Mod: CPTII,S$GLB,, | Performed by: INTERNAL MEDICINE

## 2020-10-01 NOTE — PROGRESS NOTES
"Subjective:       Patient ID: Denice Sheth is a 85 y.o. White female who presents for return patient evaluation for chronic renal failure.    She is being treated for a UTI which she is taking kelfex and has recovered.  There have been no recent hospitalizations or procedures.  She had transient hematuria with her UTI which has resolved.      Review of Systems   Constitutional: Negative for appetite change, chills and fever.   Eyes: Negative for visual disturbance.   Respiratory: Positive for shortness of breath (with exertion). Negative for cough.    Cardiovascular: Negative for chest pain and leg swelling.   Gastrointestinal: Negative for abdominal pain, diarrhea, nausea and vomiting.   Genitourinary: Negative for difficulty urinating, dysuria and hematuria.   Musculoskeletal: Positive for arthralgias (right knee) and gait problem. Negative for myalgias.   Skin: Negative for rash.   Neurological: Positive for weakness. Negative for headaches.   Hematological: Bruises/bleeds easily.   Psychiatric/Behavioral: Negative for sleep disturbance.       The past medical, family and social histories were reviewed for this encounter.     BP (!) 100/52 (BP Location: Left arm, Patient Position: Sitting)   Pulse 60   Ht 5' 2" (1.575 m)   Wt 88.9 kg (196 lb)   SpO2 98%   BMI 35.85 kg/m²     Objective:      Physical Exam  Vitals signs reviewed.   Constitutional:       General: She is not in acute distress.     Appearance: She is well-developed.   HENT:      Head: Normocephalic and atraumatic.   Eyes:      Conjunctiva/sclera: Conjunctivae normal.   Neck:      Musculoskeletal: Neck supple.      Vascular: No JVD.   Cardiovascular:      Rate and Rhythm: Normal rate and regular rhythm.      Heart sounds: Murmur (2/6 nabila) present. No friction rub. No gallop.    Pulmonary:      Effort: Pulmonary effort is normal. No respiratory distress.      Breath sounds: Normal breath sounds. No wheezing or rales.   Abdominal:      General: " Bowel sounds are normal. There is no distension.      Palpations: Abdomen is soft.      Tenderness: There is no abdominal tenderness.   Musculoskeletal:      Right lower leg: Edema (1+) present.      Left lower leg: Edema (1+) present.   Skin:     General: Skin is warm and dry.      Findings: No rash.   Neurological:      Mental Status: She is alert and oriented to person, place, and time.   Psychiatric:         Behavior: Behavior normal.        Assessment:       1. Stage 3 chronic kidney disease, unspecified whether stage 3a or 3b CKD    2. 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 Diabetic nephropathy associated with type 2 diabetes mellitus    4. 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 Hyponatremia        Plan:   Return to clinic in 5 months.  Labs for next visit include rp, pth, upc.  Baseline creatinine is 1.4-1.6 since 2015.  PTH is 39 with a calcium of 10.3.  UPC is 0.13.  Renal US shows R 9.7 cm L 10.9 cm.  Blood pressure is controlled on the current regimen.  Continue current medications as prescribed and reviewed.

## 2020-10-02 ENCOUNTER — TELEPHONE (OUTPATIENT)
Dept: FAMILY MEDICINE | Facility: CLINIC | Age: 85
End: 2020-10-02

## 2020-10-02 NOTE — TELEPHONE ENCOUNTER
----- Message from Yessy Lieberman sent at 10/2/2020  9:26 AM CDT -----  Contact: Parisa Sheth (Daughter)  Parisa Sheth (Daughter) calling states needs an order from the Dr to get the pt test strips and lancets sent to [facility] so they can send to the equipment place,please..[phone]

## 2020-10-11 DIAGNOSIS — G31.84 MCI (MILD COGNITIVE IMPAIRMENT) WITH MEMORY LOSS: ICD-10-CM

## 2020-10-12 RX ORDER — RIVASTIGMINE TARTRATE 1.5 MG/1
CAPSULE ORAL
Qty: 60 CAPSULE | Refills: 1 | Status: SHIPPED | OUTPATIENT
Start: 2020-10-12 | End: 2020-12-07

## 2021-01-04 DIAGNOSIS — G31.84 MCI (MILD COGNITIVE IMPAIRMENT) WITH MEMORY LOSS: ICD-10-CM

## 2021-01-04 RX ORDER — RIVASTIGMINE TARTRATE 1.5 MG/1
CAPSULE ORAL
Qty: 60 CAPSULE | Refills: 3 | Status: SHIPPED | OUTPATIENT
Start: 2021-01-04 | End: 2021-05-10

## 2021-01-12 ENCOUNTER — IMMUNIZATION (OUTPATIENT)
Dept: FAMILY MEDICINE | Facility: CLINIC | Age: 86
End: 2021-01-12
Payer: MEDICARE

## 2021-01-12 ENCOUNTER — PATIENT OUTREACH (OUTPATIENT)
Dept: ADMINISTRATIVE | Facility: OTHER | Age: 86
End: 2021-01-12

## 2021-01-12 DIAGNOSIS — Z23 NEED FOR VACCINATION: ICD-10-CM

## 2021-01-12 PROCEDURE — 91300 COVID-19, MRNA, LNP-S, PF, 30 MCG/0.3 ML DOSE VACCINE: CPT | Mod: PBBFAC | Performed by: FAMILY MEDICINE

## 2021-01-14 ENCOUNTER — OFFICE VISIT (OUTPATIENT)
Dept: ORTHOPEDICS | Facility: CLINIC | Age: 86
End: 2021-01-14
Payer: MEDICARE

## 2021-01-14 ENCOUNTER — HOSPITAL ENCOUNTER (OUTPATIENT)
Dept: RADIOLOGY | Facility: HOSPITAL | Age: 86
Discharge: HOME | End: 2021-01-14
Attending: ORTHOPAEDIC SURGERY
Payer: MEDICARE

## 2021-01-14 VITALS
HEIGHT: 62 IN | DIASTOLIC BLOOD PRESSURE: 49 MMHG | HEART RATE: 59 BPM | SYSTOLIC BLOOD PRESSURE: 112 MMHG | BODY MASS INDEX: 36.44 KG/M2 | WEIGHT: 198 LBS

## 2021-01-14 DIAGNOSIS — M79.671 RIGHT FOOT PAIN: ICD-10-CM

## 2021-01-14 DIAGNOSIS — M25.571 RIGHT ANKLE PAIN, UNSPECIFIED CHRONICITY: ICD-10-CM

## 2021-01-14 DIAGNOSIS — M79.671 RIGHT FOOT PAIN: Primary | ICD-10-CM

## 2021-01-14 DIAGNOSIS — S92.514A NONDISPLACED FRACTURE OF PROXIMAL PHALANX OF RIGHT LESSER TOE(S), INITIAL ENCOUNTER FOR CLOSED FRACTURE: Primary | ICD-10-CM

## 2021-01-14 PROCEDURE — 99999 PR PBB SHADOW E&M-EST. PATIENT-LVL III: ICD-10-PCS | Mod: PBBFAC,,, | Performed by: ORTHOPAEDIC SURGERY

## 2021-01-14 PROCEDURE — 73610 XR ANKLE COMPLETE 3 VIEW RIGHT: ICD-10-PCS | Mod: 26,RT,, | Performed by: RADIOLOGY

## 2021-01-14 PROCEDURE — 99214 PR OFFICE/OUTPT VISIT, EST, LEVL IV, 30-39 MIN: ICD-10-PCS | Mod: S$GLB,,, | Performed by: ORTHOPAEDIC SURGERY

## 2021-01-14 PROCEDURE — 99214 OFFICE O/P EST MOD 30 MIN: CPT | Mod: S$GLB,,, | Performed by: ORTHOPAEDIC SURGERY

## 2021-01-14 PROCEDURE — 99999 PR PBB SHADOW E&M-EST. PATIENT-LVL III: CPT | Mod: PBBFAC,,, | Performed by: ORTHOPAEDIC SURGERY

## 2021-01-14 PROCEDURE — 3288F FALL RISK ASSESSMENT DOCD: CPT | Mod: CPTII,S$GLB,, | Performed by: ORTHOPAEDIC SURGERY

## 2021-01-14 PROCEDURE — 73610 X-RAY EXAM OF ANKLE: CPT | Mod: TC,PO,RT

## 2021-01-14 PROCEDURE — 1159F MED LIST DOCD IN RCRD: CPT | Mod: S$GLB,,, | Performed by: ORTHOPAEDIC SURGERY

## 2021-01-14 PROCEDURE — 73630 XR FOOT COMPLETE 3 VIEW RIGHT: ICD-10-PCS | Mod: 26,RT,, | Performed by: RADIOLOGY

## 2021-01-14 PROCEDURE — 73610 X-RAY EXAM OF ANKLE: CPT | Mod: 26,RT,, | Performed by: RADIOLOGY

## 2021-01-14 PROCEDURE — 1100F PR PT FALLS ASSESS DOC 2+ FALLS/FALL W/INJURY/YR: ICD-10-PCS | Mod: CPTII,S$GLB,, | Performed by: ORTHOPAEDIC SURGERY

## 2021-01-14 PROCEDURE — 1159F PR MEDICATION LIST DOCUMENTED IN MEDICAL RECORD: ICD-10-PCS | Mod: S$GLB,,, | Performed by: ORTHOPAEDIC SURGERY

## 2021-01-14 PROCEDURE — 1126F AMNT PAIN NOTED NONE PRSNT: CPT | Mod: S$GLB,,, | Performed by: ORTHOPAEDIC SURGERY

## 2021-01-14 PROCEDURE — 73630 X-RAY EXAM OF FOOT: CPT | Mod: TC,PO,RT

## 2021-01-14 PROCEDURE — 3288F PR FALLS RISK ASSESSMENT DOCUMENTED: ICD-10-PCS | Mod: CPTII,S$GLB,, | Performed by: ORTHOPAEDIC SURGERY

## 2021-01-14 PROCEDURE — 1100F PTFALLS ASSESS-DOCD GE2>/YR: CPT | Mod: CPTII,S$GLB,, | Performed by: ORTHOPAEDIC SURGERY

## 2021-01-14 PROCEDURE — 73630 X-RAY EXAM OF FOOT: CPT | Mod: 26,RT,, | Performed by: RADIOLOGY

## 2021-01-14 PROCEDURE — 1126F PR PAIN SEVERITY QUANTIFIED, NO PAIN PRESENT: ICD-10-PCS | Mod: S$GLB,,, | Performed by: ORTHOPAEDIC SURGERY

## 2021-01-29 ENCOUNTER — PATIENT MESSAGE (OUTPATIENT)
Dept: ORTHOPEDICS | Facility: CLINIC | Age: 86
End: 2021-01-29

## 2021-02-02 ENCOUNTER — IMMUNIZATION (OUTPATIENT)
Dept: FAMILY MEDICINE | Facility: CLINIC | Age: 86
End: 2021-02-02
Payer: MEDICARE

## 2021-02-02 DIAGNOSIS — Z23 NEED FOR VACCINATION: Primary | ICD-10-CM

## 2021-02-02 PROCEDURE — 91300 COVID-19, MRNA, LNP-S, PF, 30 MCG/0.3 ML DOSE VACCINE: CPT | Mod: PBBFAC | Performed by: FAMILY MEDICINE

## 2021-02-02 PROCEDURE — 0002A COVID-19, MRNA, LNP-S, PF, 30 MCG/0.3 ML DOSE VACCINE: CPT | Mod: PBBFAC | Performed by: FAMILY MEDICINE

## 2021-02-05 ENCOUNTER — OFFICE VISIT (OUTPATIENT)
Dept: FAMILY MEDICINE | Facility: CLINIC | Age: 86
End: 2021-02-05
Payer: MEDICARE

## 2021-02-05 VITALS
HEART RATE: 64 BPM | BODY MASS INDEX: 35.99 KG/M2 | SYSTOLIC BLOOD PRESSURE: 138 MMHG | WEIGHT: 195.56 LBS | DIASTOLIC BLOOD PRESSURE: 56 MMHG | HEIGHT: 62 IN | RESPIRATION RATE: 14 BRPM | TEMPERATURE: 98 F

## 2021-02-05 DIAGNOSIS — I50.32 CHRONIC DIASTOLIC CONGESTIVE HEART FAILURE: ICD-10-CM

## 2021-02-05 DIAGNOSIS — E66.01 MORBID (SEVERE) OBESITY DUE TO EXCESS CALORIES: ICD-10-CM

## 2021-02-05 DIAGNOSIS — N18.4 CHRONIC KIDNEY DISEASE, STAGE 4 (SEVERE): ICD-10-CM

## 2021-02-05 DIAGNOSIS — G31.84 MCI (MILD COGNITIVE IMPAIRMENT) WITH MEMORY LOSS: ICD-10-CM

## 2021-02-05 DIAGNOSIS — E11.21 DIABETIC NEPHROPATHY ASSOCIATED WITH TYPE 2 DIABETES MELLITUS: Primary | ICD-10-CM

## 2021-02-05 DIAGNOSIS — I69.354 HEMIPARESIS AFFECTING LEFT SIDE AS LATE EFFECT OF CEREBROVASCULAR ACCIDENT (CVA): ICD-10-CM

## 2021-02-05 PROCEDURE — 3288F PR FALLS RISK ASSESSMENT DOCUMENTED: ICD-10-PCS | Mod: CPTII,S$GLB,, | Performed by: FAMILY MEDICINE

## 2021-02-05 PROCEDURE — 1126F AMNT PAIN NOTED NONE PRSNT: CPT | Mod: S$GLB,,, | Performed by: FAMILY MEDICINE

## 2021-02-05 PROCEDURE — 1100F PR PT FALLS ASSESS DOC 2+ FALLS/FALL W/INJURY/YR: ICD-10-PCS | Mod: CPTII,S$GLB,, | Performed by: FAMILY MEDICINE

## 2021-02-05 PROCEDURE — 1159F PR MEDICATION LIST DOCUMENTED IN MEDICAL RECORD: ICD-10-PCS | Mod: S$GLB,,, | Performed by: FAMILY MEDICINE

## 2021-02-05 PROCEDURE — 99214 PR OFFICE/OUTPT VISIT, EST, LEVL IV, 30-39 MIN: ICD-10-PCS | Mod: S$GLB,,, | Performed by: FAMILY MEDICINE

## 2021-02-05 PROCEDURE — 99999 PR PBB SHADOW E&M-EST. PATIENT-LVL V: ICD-10-PCS | Mod: PBBFAC,,, | Performed by: FAMILY MEDICINE

## 2021-02-05 PROCEDURE — 3288F FALL RISK ASSESSMENT DOCD: CPT | Mod: CPTII,S$GLB,, | Performed by: FAMILY MEDICINE

## 2021-02-05 PROCEDURE — 99214 OFFICE O/P EST MOD 30 MIN: CPT | Mod: S$GLB,,, | Performed by: FAMILY MEDICINE

## 2021-02-05 PROCEDURE — 1126F PR PAIN SEVERITY QUANTIFIED, NO PAIN PRESENT: ICD-10-PCS | Mod: S$GLB,,, | Performed by: FAMILY MEDICINE

## 2021-02-05 PROCEDURE — 99999 PR PBB SHADOW E&M-EST. PATIENT-LVL V: CPT | Mod: PBBFAC,,, | Performed by: FAMILY MEDICINE

## 2021-02-05 PROCEDURE — 99499 UNLISTED E&M SERVICE: CPT | Mod: S$GLB,,, | Performed by: FAMILY MEDICINE

## 2021-02-05 PROCEDURE — 99499 RISK ADDL DX/OHS AUDIT: ICD-10-PCS | Mod: S$GLB,,, | Performed by: FAMILY MEDICINE

## 2021-02-05 PROCEDURE — 1159F MED LIST DOCD IN RCRD: CPT | Mod: S$GLB,,, | Performed by: FAMILY MEDICINE

## 2021-02-05 PROCEDURE — 1100F PTFALLS ASSESS-DOCD GE2>/YR: CPT | Mod: CPTII,S$GLB,, | Performed by: FAMILY MEDICINE

## 2021-02-05 RX ORDER — INSULIN LISPRO 100 [IU]/ML
7 INJECTION, SOLUTION INTRAVENOUS; SUBCUTANEOUS
Qty: 18.9 ML | Refills: 3 | Status: ON HOLD | OUTPATIENT
Start: 2021-02-05 | End: 2022-01-19 | Stop reason: SDUPTHER

## 2021-02-24 ENCOUNTER — PATIENT MESSAGE (OUTPATIENT)
Dept: NEPHROLOGY | Facility: CLINIC | Age: 86
End: 2021-02-24

## 2021-03-01 ENCOUNTER — PATIENT MESSAGE (OUTPATIENT)
Dept: NEPHROLOGY | Facility: CLINIC | Age: 86
End: 2021-03-01

## 2021-03-04 ENCOUNTER — LAB VISIT (OUTPATIENT)
Dept: LAB | Facility: HOSPITAL | Age: 86
End: 2021-03-04
Attending: INTERNAL MEDICINE
Payer: MEDICARE

## 2021-03-04 DIAGNOSIS — N18.4 TYPE 2 DIABETES MELLITUS WITH STAGE 4 CHRONIC KIDNEY DISEASE, WITH LONG-TERM CURRENT USE OF INSULIN: ICD-10-CM

## 2021-03-04 DIAGNOSIS — E11.21 DIABETIC NEPHROPATHY ASSOCIATED WITH TYPE 2 DIABETES MELLITUS: ICD-10-CM

## 2021-03-04 DIAGNOSIS — E03.9 HYPOTHYROIDISM, UNSPECIFIED TYPE: ICD-10-CM

## 2021-03-04 DIAGNOSIS — N18.30 STAGE 3 CHRONIC KIDNEY DISEASE, UNSPECIFIED WHETHER STAGE 3A OR 3B CKD: ICD-10-CM

## 2021-03-04 DIAGNOSIS — Z79.4 TYPE 2 DIABETES MELLITUS WITH STAGE 4 CHRONIC KIDNEY DISEASE, WITH LONG-TERM CURRENT USE OF INSULIN: ICD-10-CM

## 2021-03-04 DIAGNOSIS — E11.22 TYPE 2 DIABETES MELLITUS WITH STAGE 4 CHRONIC KIDNEY DISEASE, WITH LONG-TERM CURRENT USE OF INSULIN: ICD-10-CM

## 2021-03-04 DIAGNOSIS — N18.30 CKD (CHRONIC KIDNEY DISEASE) STAGE 3, GFR 30-59 ML/MIN: ICD-10-CM

## 2021-03-04 DIAGNOSIS — E55.9 VITAMIN D DEFICIENCY: ICD-10-CM

## 2021-03-04 LAB
ERYTHROCYTE [DISTWIDTH] IN BLOOD BY AUTOMATED COUNT: 13.2 % (ref 11.5–14.5)
HCT VFR BLD AUTO: 42.4 % (ref 37–48.5)
HGB BLD-MCNC: 13.2 G/DL (ref 12–16)
MCH RBC QN AUTO: 30 PG (ref 27–31)
MCHC RBC AUTO-ENTMCNC: 31.1 G/DL (ref 32–36)
MCV RBC AUTO: 96 FL (ref 82–98)
PLATELET # BLD AUTO: 288 K/UL (ref 150–350)
PMV BLD AUTO: 11.7 FL (ref 9.2–12.9)
RBC # BLD AUTO: 4.4 M/UL (ref 4–5.4)
WBC # BLD AUTO: 9.14 K/UL (ref 3.9–12.7)

## 2021-03-04 PROCEDURE — 84439 ASSAY OF FREE THYROXINE: CPT | Performed by: FAMILY MEDICINE

## 2021-03-04 PROCEDURE — 36415 COLL VENOUS BLD VENIPUNCTURE: CPT | Mod: PN | Performed by: FAMILY MEDICINE

## 2021-03-04 PROCEDURE — 83970 ASSAY OF PARATHORMONE: CPT | Performed by: INTERNAL MEDICINE

## 2021-03-04 PROCEDURE — 80069 RENAL FUNCTION PANEL: CPT | Performed by: INTERNAL MEDICINE

## 2021-03-04 PROCEDURE — 80061 LIPID PANEL: CPT | Performed by: FAMILY MEDICINE

## 2021-03-04 PROCEDURE — 84443 ASSAY THYROID STIM HORMONE: CPT | Performed by: FAMILY MEDICINE

## 2021-03-04 PROCEDURE — 80053 COMPREHEN METABOLIC PANEL: CPT | Performed by: FAMILY MEDICINE

## 2021-03-04 PROCEDURE — 85027 COMPLETE CBC AUTOMATED: CPT | Performed by: FAMILY MEDICINE

## 2021-03-04 PROCEDURE — 83036 HEMOGLOBIN GLYCOSYLATED A1C: CPT | Performed by: FAMILY MEDICINE

## 2021-03-04 PROCEDURE — 82306 VITAMIN D 25 HYDROXY: CPT | Performed by: FAMILY MEDICINE

## 2021-03-05 DIAGNOSIS — E03.9 HYPOTHYROIDISM, UNSPECIFIED TYPE: Primary | ICD-10-CM

## 2021-03-05 LAB
25(OH)D3+25(OH)D2 SERPL-MCNC: 48 NG/ML (ref 30–96)
ALBUMIN SERPL BCP-MCNC: 3.4 G/DL (ref 3.5–5.2)
ALP SERPL-CCNC: 90 U/L (ref 55–135)
ALT SERPL W/O P-5'-P-CCNC: 19 U/L (ref 10–44)
ANION GAP SERPL CALC-SCNC: 12 MMOL/L (ref 8–16)
AST SERPL-CCNC: 25 U/L (ref 10–40)
BILIRUB SERPL-MCNC: 0.3 MG/DL (ref 0.1–1)
BUN SERPL-MCNC: 27 MG/DL (ref 8–23)
CALCIUM SERPL-MCNC: 9.6 MG/DL (ref 8.7–10.5)
CHLORIDE SERPL-SCNC: 102 MMOL/L (ref 95–110)
CHOLEST SERPL-MCNC: 153 MG/DL (ref 120–199)
CHOLEST/HDLC SERPL: 2.8 {RATIO} (ref 2–5)
CO2 SERPL-SCNC: 25 MMOL/L (ref 23–29)
CREAT SERPL-MCNC: 1.9 MG/DL (ref 0.5–1.4)
EST. GFR  (AFRICAN AMERICAN): 27.1 ML/MIN/1.73 M^2
EST. GFR  (NON AFRICAN AMERICAN): 23.5 ML/MIN/1.73 M^2
ESTIMATED AVG GLUCOSE: 160 MG/DL (ref 68–131)
GLUCOSE SERPL-MCNC: 112 MG/DL (ref 70–110)
HBA1C MFR BLD: 7.2 % (ref 4–5.6)
HDLC SERPL-MCNC: 54 MG/DL (ref 40–75)
HDLC SERPL: 35.3 % (ref 20–50)
LDLC SERPL CALC-MCNC: 66.8 MG/DL (ref 63–159)
NONHDLC SERPL-MCNC: 99 MG/DL
PHOSPHATE SERPL-MCNC: 4.2 MG/DL (ref 2.7–4.5)
PHOSPHATE SERPL-MCNC: 4.2 MG/DL (ref 2.7–4.5)
POTASSIUM SERPL-SCNC: 4.7 MMOL/L (ref 3.5–5.1)
PROT SERPL-MCNC: 7 G/DL (ref 6–8.4)
PTH-INTACT SERPL-MCNC: 52 PG/ML (ref 9–77)
SODIUM SERPL-SCNC: 139 MMOL/L (ref 136–145)
T4 FREE SERPL-MCNC: 1.29 NG/DL (ref 0.71–1.51)
TRIGL SERPL-MCNC: 161 MG/DL (ref 30–150)
TSH SERPL DL<=0.005 MIU/L-ACNC: 6.85 UIU/ML (ref 0.4–4)

## 2021-03-23 ENCOUNTER — OFFICE VISIT (OUTPATIENT)
Dept: NEPHROLOGY | Facility: CLINIC | Age: 86
End: 2021-03-23
Payer: MEDICARE

## 2021-03-23 VITALS — DIASTOLIC BLOOD PRESSURE: 65 MMHG | SYSTOLIC BLOOD PRESSURE: 110 MMHG

## 2021-03-23 DIAGNOSIS — R80.9 PROTEINURIA DUE TO TYPE 2 DIABETES MELLITUS: ICD-10-CM

## 2021-03-23 DIAGNOSIS — N18.30 STAGE 3 CHRONIC KIDNEY DISEASE, UNSPECIFIED WHETHER STAGE 3A OR 3B CKD: Primary | ICD-10-CM

## 2021-03-23 DIAGNOSIS — E87.1 HYPONATREMIA: ICD-10-CM

## 2021-03-23 DIAGNOSIS — E11.21 DIABETIC NEPHROPATHY ASSOCIATED WITH TYPE 2 DIABETES MELLITUS: ICD-10-CM

## 2021-03-23 DIAGNOSIS — I12.9 BENIGN HYPERTENSIVE RENAL DISEASE WITHOUT RENAL FAILURE: ICD-10-CM

## 2021-03-23 DIAGNOSIS — E11.29 PROTEINURIA DUE TO TYPE 2 DIABETES MELLITUS: ICD-10-CM

## 2021-03-23 PROCEDURE — 99214 OFFICE O/P EST MOD 30 MIN: CPT | Mod: 95,,, | Performed by: INTERNAL MEDICINE

## 2021-03-23 PROCEDURE — 1159F PR MEDICATION LIST DOCUMENTED IN MEDICAL RECORD: ICD-10-PCS | Mod: 95,,, | Performed by: INTERNAL MEDICINE

## 2021-03-23 PROCEDURE — 3051F HG A1C>EQUAL 7.0%<8.0%: CPT | Mod: CPTII,95,, | Performed by: INTERNAL MEDICINE

## 2021-03-23 PROCEDURE — 1159F MED LIST DOCD IN RCRD: CPT | Mod: 95,,, | Performed by: INTERNAL MEDICINE

## 2021-03-23 PROCEDURE — 99214 PR OFFICE/OUTPT VISIT, EST, LEVL IV, 30-39 MIN: ICD-10-PCS | Mod: 95,,, | Performed by: INTERNAL MEDICINE

## 2021-03-23 PROCEDURE — 3051F PR MOST RECENT HEMOGLOBIN A1C LEVEL 7.0 - < 8.0%: ICD-10-PCS | Mod: CPTII,95,, | Performed by: INTERNAL MEDICINE

## 2021-03-24 LAB
LEFT EYE DM RETINOPATHY: POSITIVE
RIGHT EYE DM RETINOPATHY: POSITIVE

## 2021-04-19 ENCOUNTER — PATIENT OUTREACH (OUTPATIENT)
Dept: ADMINISTRATIVE | Facility: HOSPITAL | Age: 86
End: 2021-04-19

## 2021-05-09 DIAGNOSIS — G31.84 MCI (MILD COGNITIVE IMPAIRMENT) WITH MEMORY LOSS: ICD-10-CM

## 2021-05-10 RX ORDER — RIVASTIGMINE TARTRATE 1.5 MG/1
CAPSULE ORAL
Qty: 60 CAPSULE | Refills: 3 | Status: SHIPPED | OUTPATIENT
Start: 2021-05-10 | End: 2021-09-08

## 2021-05-13 ENCOUNTER — LAB VISIT (OUTPATIENT)
Dept: LAB | Facility: HOSPITAL | Age: 86
End: 2021-05-13
Attending: FAMILY MEDICINE
Payer: MEDICARE

## 2021-05-13 DIAGNOSIS — E03.9 HYPOTHYROIDISM, UNSPECIFIED TYPE: ICD-10-CM

## 2021-05-13 PROCEDURE — 84439 ASSAY OF FREE THYROXINE: CPT | Performed by: FAMILY MEDICINE

## 2021-05-13 PROCEDURE — 84443 ASSAY THYROID STIM HORMONE: CPT | Performed by: FAMILY MEDICINE

## 2021-05-13 PROCEDURE — 36415 COLL VENOUS BLD VENIPUNCTURE: CPT | Mod: PN | Performed by: FAMILY MEDICINE

## 2021-05-13 RX ORDER — INSULIN GLARGINE 100 [IU]/ML
INJECTION, SOLUTION SUBCUTANEOUS
Qty: 15 ML | Refills: 3 | Status: SHIPPED | OUTPATIENT
Start: 2021-05-13 | End: 2022-01-02 | Stop reason: CLARIF

## 2021-05-14 LAB
T4 FREE SERPL-MCNC: 1.29 NG/DL (ref 0.71–1.51)
TSH SERPL DL<=0.005 MIU/L-ACNC: 5 UIU/ML (ref 0.4–4)

## 2021-05-17 ENCOUNTER — PATIENT MESSAGE (OUTPATIENT)
Dept: FAMILY MEDICINE | Facility: CLINIC | Age: 86
End: 2021-05-17

## 2021-05-18 ENCOUNTER — TELEPHONE (OUTPATIENT)
Dept: FAMILY MEDICINE | Facility: CLINIC | Age: 86
End: 2021-05-18

## 2021-05-24 RX ORDER — LEVOTHYROXINE SODIUM 75 UG/1
75 TABLET ORAL
Qty: 126 TABLET | Refills: 0 | Status: SHIPPED | OUTPATIENT
Start: 2021-05-24 | End: 2021-08-23

## 2021-05-26 DIAGNOSIS — M17.11 OSTEOARTHROSIS, LOCALIZED, PRIMARY, KNEE, RIGHT: Primary | ICD-10-CM

## 2021-05-28 ENCOUNTER — OFFICE VISIT (OUTPATIENT)
Dept: ORTHOPEDICS | Facility: CLINIC | Age: 86
End: 2021-05-28
Payer: MEDICARE

## 2021-05-28 ENCOUNTER — HOSPITAL ENCOUNTER (OUTPATIENT)
Dept: RADIOLOGY | Facility: HOSPITAL | Age: 86
Discharge: HOME | End: 2021-05-28
Attending: ORTHOPAEDIC SURGERY
Payer: MEDICARE

## 2021-05-28 VITALS — BODY MASS INDEX: 35.88 KG/M2 | WEIGHT: 195 LBS | HEIGHT: 62 IN

## 2021-05-28 DIAGNOSIS — M17.11 OSTEOARTHROSIS, LOCALIZED, PRIMARY, KNEE, RIGHT: ICD-10-CM

## 2021-05-28 DIAGNOSIS — M17.11 OSTEOARTHROSIS, LOCALIZED, PRIMARY, KNEE, RIGHT: Primary | ICD-10-CM

## 2021-05-28 DIAGNOSIS — N18.30 TYPE 2 DIABETES MELLITUS WITH STAGE 3 CHRONIC KIDNEY DISEASE, WITH LONG-TERM CURRENT USE OF INSULIN, UNSPECIFIED WHETHER STAGE 3A OR 3B CKD: ICD-10-CM

## 2021-05-28 DIAGNOSIS — E66.01 MORBID (SEVERE) OBESITY DUE TO EXCESS CALORIES: ICD-10-CM

## 2021-05-28 DIAGNOSIS — E11.22 TYPE 2 DIABETES MELLITUS WITH STAGE 3 CHRONIC KIDNEY DISEASE, WITH LONG-TERM CURRENT USE OF INSULIN, UNSPECIFIED WHETHER STAGE 3A OR 3B CKD: ICD-10-CM

## 2021-05-28 DIAGNOSIS — Z79.4 TYPE 2 DIABETES MELLITUS WITH STAGE 3 CHRONIC KIDNEY DISEASE, WITH LONG-TERM CURRENT USE OF INSULIN, UNSPECIFIED WHETHER STAGE 3A OR 3B CKD: ICD-10-CM

## 2021-05-28 PROCEDURE — 99999 PR PBB SHADOW E&M-EST. PATIENT-LVL III: ICD-10-PCS | Mod: PBBFAC,,, | Performed by: ORTHOPAEDIC SURGERY

## 2021-05-28 PROCEDURE — 73560 X-RAY EXAM OF KNEE 1 OR 2: CPT | Mod: TC,PO,LT

## 2021-05-28 PROCEDURE — 99999 PR PBB SHADOW E&M-EST. PATIENT-LVL III: CPT | Mod: PBBFAC,,, | Performed by: ORTHOPAEDIC SURGERY

## 2021-05-28 PROCEDURE — 73562 XR KNEE ORTHO RIGHT: ICD-10-PCS | Mod: 26,RT,, | Performed by: RADIOLOGY

## 2021-05-28 PROCEDURE — 3288F FALL RISK ASSESSMENT DOCD: CPT | Mod: CPTII,S$GLB,, | Performed by: ORTHOPAEDIC SURGERY

## 2021-05-28 PROCEDURE — 73562 X-RAY EXAM OF KNEE 3: CPT | Mod: 26,RT,, | Performed by: RADIOLOGY

## 2021-05-28 PROCEDURE — 1101F PT FALLS ASSESS-DOCD LE1/YR: CPT | Mod: CPTII,S$GLB,, | Performed by: ORTHOPAEDIC SURGERY

## 2021-05-28 PROCEDURE — 99499 RISK ADDL DX/OHS AUDIT: ICD-10-PCS | Mod: S$GLB,,, | Performed by: ORTHOPAEDIC SURGERY

## 2021-05-28 PROCEDURE — 1159F MED LIST DOCD IN RCRD: CPT | Mod: S$GLB,,, | Performed by: ORTHOPAEDIC SURGERY

## 2021-05-28 PROCEDURE — 99214 OFFICE O/P EST MOD 30 MIN: CPT | Mod: 25,S$GLB,, | Performed by: ORTHOPAEDIC SURGERY

## 2021-05-28 PROCEDURE — 1126F PR PAIN SEVERITY QUANTIFIED, NO PAIN PRESENT: ICD-10-PCS | Mod: S$GLB,,, | Performed by: ORTHOPAEDIC SURGERY

## 2021-05-28 PROCEDURE — 1126F AMNT PAIN NOTED NONE PRSNT: CPT | Mod: S$GLB,,, | Performed by: ORTHOPAEDIC SURGERY

## 2021-05-28 PROCEDURE — 3051F HG A1C>EQUAL 7.0%<8.0%: CPT | Mod: CPTII,S$GLB,, | Performed by: ORTHOPAEDIC SURGERY

## 2021-05-28 PROCEDURE — 3051F PR MOST RECENT HEMOGLOBIN A1C LEVEL 7.0 - < 8.0%: ICD-10-PCS | Mod: CPTII,S$GLB,, | Performed by: ORTHOPAEDIC SURGERY

## 2021-05-28 PROCEDURE — 99499 UNLISTED E&M SERVICE: CPT | Mod: S$GLB,,, | Performed by: ORTHOPAEDIC SURGERY

## 2021-05-28 PROCEDURE — 73560 XR KNEE ORTHO RIGHT: ICD-10-PCS | Mod: 26,LT,, | Performed by: RADIOLOGY

## 2021-05-28 PROCEDURE — 1101F PR PT FALLS ASSESS DOC 0-1 FALLS W/OUT INJ PAST YR: ICD-10-PCS | Mod: CPTII,S$GLB,, | Performed by: ORTHOPAEDIC SURGERY

## 2021-05-28 PROCEDURE — 20610 DRAIN/INJ JOINT/BURSA W/O US: CPT | Mod: RT,S$GLB,, | Performed by: ORTHOPAEDIC SURGERY

## 2021-05-28 PROCEDURE — 99214 PR OFFICE/OUTPT VISIT, EST, LEVL IV, 30-39 MIN: ICD-10-PCS | Mod: 25,S$GLB,, | Performed by: ORTHOPAEDIC SURGERY

## 2021-05-28 PROCEDURE — 20610 LARGE JOINT ASPIRATION/INJECTION: R KNEE: ICD-10-PCS | Mod: RT,S$GLB,, | Performed by: ORTHOPAEDIC SURGERY

## 2021-05-28 PROCEDURE — 3288F PR FALLS RISK ASSESSMENT DOCUMENTED: ICD-10-PCS | Mod: CPTII,S$GLB,, | Performed by: ORTHOPAEDIC SURGERY

## 2021-05-28 PROCEDURE — 1159F PR MEDICATION LIST DOCUMENTED IN MEDICAL RECORD: ICD-10-PCS | Mod: S$GLB,,, | Performed by: ORTHOPAEDIC SURGERY

## 2021-05-28 PROCEDURE — 73560 X-RAY EXAM OF KNEE 1 OR 2: CPT | Mod: 26,LT,, | Performed by: RADIOLOGY

## 2021-05-28 RX ORDER — MAGNESIUM 200 MG
200 TABLET ORAL DAILY
COMMUNITY

## 2021-05-28 RX ORDER — TRIAMCINOLONE ACETONIDE 40 MG/ML
40 INJECTION, SUSPENSION INTRA-ARTICULAR; INTRAMUSCULAR
Status: DISCONTINUED | OUTPATIENT
Start: 2021-05-28 | End: 2021-05-28 | Stop reason: HOSPADM

## 2021-05-28 RX ADMIN — TRIAMCINOLONE ACETONIDE 40 MG: 40 INJECTION, SUSPENSION INTRA-ARTICULAR; INTRAMUSCULAR at 01:05

## 2021-06-21 RX ORDER — INSULIN PUMP SYRINGE, 3 ML
EACH MISCELLANEOUS
Qty: 1 EACH | Refills: 0 | Status: SHIPPED | OUTPATIENT
Start: 2021-06-21 | End: 2022-10-27

## 2021-06-21 RX ORDER — LANCETS
EACH MISCELLANEOUS
Qty: 200 EACH | Refills: 3 | Status: SHIPPED | OUTPATIENT
Start: 2021-06-21 | End: 2022-02-15 | Stop reason: SDUPTHER

## 2021-06-25 ENCOUNTER — TELEPHONE (OUTPATIENT)
Dept: FAMILY MEDICINE | Facility: CLINIC | Age: 86
End: 2021-06-25

## 2021-07-09 ENCOUNTER — TELEPHONE (OUTPATIENT)
Dept: FAMILY MEDICINE | Facility: CLINIC | Age: 86
End: 2021-07-09

## 2021-07-12 ENCOUNTER — PATIENT MESSAGE (OUTPATIENT)
Dept: FAMILY MEDICINE | Facility: CLINIC | Age: 86
End: 2021-07-12

## 2021-07-16 ENCOUNTER — PATIENT MESSAGE (OUTPATIENT)
Dept: FAMILY MEDICINE | Facility: CLINIC | Age: 86
End: 2021-07-16

## 2021-07-22 ENCOUNTER — LAB VISIT (OUTPATIENT)
Dept: LAB | Facility: HOSPITAL | Age: 86
End: 2021-07-22
Payer: MEDICARE

## 2021-07-22 DIAGNOSIS — E11.21 DIABETIC NEPHROPATHY ASSOCIATED WITH TYPE 2 DIABETES MELLITUS: ICD-10-CM

## 2021-07-22 LAB
ALBUMIN SERPL BCP-MCNC: 3.5 G/DL (ref 3.5–5.2)
ALP SERPL-CCNC: 86 U/L (ref 55–135)
ALT SERPL W/O P-5'-P-CCNC: 21 U/L (ref 10–44)
ANION GAP SERPL CALC-SCNC: 10 MMOL/L (ref 8–16)
AST SERPL-CCNC: 26 U/L (ref 10–40)
BILIRUB SERPL-MCNC: 0.4 MG/DL (ref 0.1–1)
BUN SERPL-MCNC: 31 MG/DL (ref 8–23)
CALCIUM SERPL-MCNC: 9.4 MG/DL (ref 8.7–10.5)
CHLORIDE SERPL-SCNC: 103 MMOL/L (ref 95–110)
CO2 SERPL-SCNC: 27 MMOL/L (ref 23–29)
CREAT SERPL-MCNC: 1.8 MG/DL (ref 0.5–1.4)
EST. GFR  (AFRICAN AMERICAN): 29 ML/MIN/1.73 M^2
EST. GFR  (NON AFRICAN AMERICAN): 25.1 ML/MIN/1.73 M^2
ESTIMATED AVG GLUCOSE: 157 MG/DL (ref 68–131)
GLUCOSE SERPL-MCNC: 128 MG/DL (ref 70–110)
HBA1C MFR BLD: 7.1 % (ref 4–5.6)
POTASSIUM SERPL-SCNC: 4.8 MMOL/L (ref 3.5–5.1)
PROT SERPL-MCNC: 6.5 G/DL (ref 6–8.4)
SODIUM SERPL-SCNC: 140 MMOL/L (ref 136–145)
TSH SERPL DL<=0.005 MIU/L-ACNC: 2.61 UIU/ML (ref 0.4–4)

## 2021-07-22 PROCEDURE — 84443 ASSAY THYROID STIM HORMONE: CPT | Performed by: FAMILY MEDICINE

## 2021-07-22 PROCEDURE — 83036 HEMOGLOBIN GLYCOSYLATED A1C: CPT | Performed by: FAMILY MEDICINE

## 2021-07-22 PROCEDURE — 36415 COLL VENOUS BLD VENIPUNCTURE: CPT | Mod: PN | Performed by: FAMILY MEDICINE

## 2021-07-22 PROCEDURE — 80053 COMPREHEN METABOLIC PANEL: CPT | Performed by: FAMILY MEDICINE

## 2021-07-23 ENCOUNTER — PATIENT MESSAGE (OUTPATIENT)
Dept: FAMILY MEDICINE | Facility: CLINIC | Age: 86
End: 2021-07-23

## 2021-08-06 ENCOUNTER — PATIENT MESSAGE (OUTPATIENT)
Dept: FAMILY MEDICINE | Facility: CLINIC | Age: 86
End: 2021-08-06

## 2021-08-06 DIAGNOSIS — E11.21 DIABETIC NEPHROPATHY ASSOCIATED WITH TYPE 2 DIABETES MELLITUS: Primary | ICD-10-CM

## 2021-08-11 ENCOUNTER — PATIENT MESSAGE (OUTPATIENT)
Dept: FAMILY MEDICINE | Facility: CLINIC | Age: 86
End: 2021-08-11

## 2021-08-17 ENCOUNTER — PATIENT OUTREACH (OUTPATIENT)
Dept: ADMINISTRATIVE | Facility: OTHER | Age: 86
End: 2021-08-17

## 2021-08-18 DIAGNOSIS — M25.562 LEFT KNEE PAIN, UNSPECIFIED CHRONICITY: Primary | ICD-10-CM

## 2021-08-20 ENCOUNTER — OFFICE VISIT (OUTPATIENT)
Dept: ORTHOPEDICS | Facility: CLINIC | Age: 86
End: 2021-08-20
Payer: MEDICARE

## 2021-08-20 ENCOUNTER — HOSPITAL ENCOUNTER (OUTPATIENT)
Dept: RADIOLOGY | Facility: HOSPITAL | Age: 86
Discharge: HOME | End: 2021-08-20
Attending: ORTHOPAEDIC SURGERY
Payer: MEDICARE

## 2021-08-20 VITALS — BODY MASS INDEX: 35.88 KG/M2 | WEIGHT: 195 LBS | HEIGHT: 62 IN

## 2021-08-20 DIAGNOSIS — N18.30 TYPE 2 DIABETES MELLITUS WITH STAGE 3 CHRONIC KIDNEY DISEASE, WITH LONG-TERM CURRENT USE OF INSULIN, UNSPECIFIED WHETHER STAGE 3A OR 3B CKD: ICD-10-CM

## 2021-08-20 DIAGNOSIS — M25.562 LEFT KNEE PAIN, UNSPECIFIED CHRONICITY: Primary | ICD-10-CM

## 2021-08-20 DIAGNOSIS — M25.562 LEFT KNEE PAIN, UNSPECIFIED CHRONICITY: ICD-10-CM

## 2021-08-20 DIAGNOSIS — E11.22 TYPE 2 DIABETES MELLITUS WITH STAGE 3 CHRONIC KIDNEY DISEASE, WITH LONG-TERM CURRENT USE OF INSULIN, UNSPECIFIED WHETHER STAGE 3A OR 3B CKD: ICD-10-CM

## 2021-08-20 DIAGNOSIS — Z79.4 TYPE 2 DIABETES MELLITUS WITH STAGE 3 CHRONIC KIDNEY DISEASE, WITH LONG-TERM CURRENT USE OF INSULIN, UNSPECIFIED WHETHER STAGE 3A OR 3B CKD: ICD-10-CM

## 2021-08-20 PROCEDURE — 3288F FALL RISK ASSESSMENT DOCD: CPT | Mod: CPTII,S$GLB,, | Performed by: ORTHOPAEDIC SURGERY

## 2021-08-20 PROCEDURE — 1160F PR REVIEW ALL MEDS BY PRESCRIBER/CLIN PHARMACIST DOCUMENTED: ICD-10-PCS | Mod: CPTII,S$GLB,, | Performed by: ORTHOPAEDIC SURGERY

## 2021-08-20 PROCEDURE — 73562 XR KNEE ORTHO LEFT: ICD-10-PCS | Mod: 26,LT,, | Performed by: RADIOLOGY

## 2021-08-20 PROCEDURE — 99213 PR OFFICE/OUTPT VISIT, EST, LEVL III, 20-29 MIN: ICD-10-PCS | Mod: S$GLB,,, | Performed by: ORTHOPAEDIC SURGERY

## 2021-08-20 PROCEDURE — 99999 PR PBB SHADOW E&M-EST. PATIENT-LVL IV: CPT | Mod: PBBFAC,,, | Performed by: ORTHOPAEDIC SURGERY

## 2021-08-20 PROCEDURE — 99213 OFFICE O/P EST LOW 20 MIN: CPT | Mod: S$GLB,,, | Performed by: ORTHOPAEDIC SURGERY

## 2021-08-20 PROCEDURE — 1159F PR MEDICATION LIST DOCUMENTED IN MEDICAL RECORD: ICD-10-PCS | Mod: CPTII,S$GLB,, | Performed by: ORTHOPAEDIC SURGERY

## 2021-08-20 PROCEDURE — 1125F AMNT PAIN NOTED PAIN PRSNT: CPT | Mod: CPTII,S$GLB,, | Performed by: ORTHOPAEDIC SURGERY

## 2021-08-20 PROCEDURE — 1125F PR PAIN SEVERITY QUANTIFIED, PAIN PRESENT: ICD-10-PCS | Mod: CPTII,S$GLB,, | Performed by: ORTHOPAEDIC SURGERY

## 2021-08-20 PROCEDURE — 1160F RVW MEDS BY RX/DR IN RCRD: CPT | Mod: CPTII,S$GLB,, | Performed by: ORTHOPAEDIC SURGERY

## 2021-08-20 PROCEDURE — 73560 X-RAY EXAM OF KNEE 1 OR 2: CPT | Mod: TC,PO,RT

## 2021-08-20 PROCEDURE — 3051F HG A1C>EQUAL 7.0%<8.0%: CPT | Mod: CPTII,S$GLB,, | Performed by: ORTHOPAEDIC SURGERY

## 2021-08-20 PROCEDURE — 73560 X-RAY EXAM OF KNEE 1 OR 2: CPT | Mod: 26,RT,, | Performed by: RADIOLOGY

## 2021-08-20 PROCEDURE — 1101F PT FALLS ASSESS-DOCD LE1/YR: CPT | Mod: CPTII,S$GLB,, | Performed by: ORTHOPAEDIC SURGERY

## 2021-08-20 PROCEDURE — 3051F PR MOST RECENT HEMOGLOBIN A1C LEVEL 7.0 - < 8.0%: ICD-10-PCS | Mod: CPTII,S$GLB,, | Performed by: ORTHOPAEDIC SURGERY

## 2021-08-20 PROCEDURE — 1159F MED LIST DOCD IN RCRD: CPT | Mod: CPTII,S$GLB,, | Performed by: ORTHOPAEDIC SURGERY

## 2021-08-20 PROCEDURE — 1101F PR PT FALLS ASSESS DOC 0-1 FALLS W/OUT INJ PAST YR: ICD-10-PCS | Mod: CPTII,S$GLB,, | Performed by: ORTHOPAEDIC SURGERY

## 2021-08-20 PROCEDURE — 73560 XR KNEE ORTHO LEFT: ICD-10-PCS | Mod: 26,RT,, | Performed by: RADIOLOGY

## 2021-08-20 PROCEDURE — 99999 PR PBB SHADOW E&M-EST. PATIENT-LVL IV: ICD-10-PCS | Mod: PBBFAC,,, | Performed by: ORTHOPAEDIC SURGERY

## 2021-08-20 PROCEDURE — 73562 X-RAY EXAM OF KNEE 3: CPT | Mod: 26,LT,, | Performed by: RADIOLOGY

## 2021-08-20 PROCEDURE — 3288F PR FALLS RISK ASSESSMENT DOCUMENTED: ICD-10-PCS | Mod: CPTII,S$GLB,, | Performed by: ORTHOPAEDIC SURGERY

## 2021-08-23 ENCOUNTER — PATIENT MESSAGE (OUTPATIENT)
Dept: FAMILY MEDICINE | Facility: CLINIC | Age: 86
End: 2021-08-23

## 2021-08-23 RX ORDER — LEVOTHYROXINE SODIUM 75 UG/1
TABLET ORAL
Qty: 126 TABLET | Refills: 1 | Status: ON HOLD | OUTPATIENT
Start: 2021-08-23 | End: 2022-01-07 | Stop reason: SDUPTHER

## 2021-08-26 ENCOUNTER — PATIENT MESSAGE (OUTPATIENT)
Dept: FAMILY MEDICINE | Facility: CLINIC | Age: 86
End: 2021-08-26

## 2021-09-05 ENCOUNTER — PATIENT MESSAGE (OUTPATIENT)
Dept: ORTHOPEDICS | Facility: CLINIC | Age: 86
End: 2021-09-05

## 2021-09-08 DIAGNOSIS — G31.84 MCI (MILD COGNITIVE IMPAIRMENT) WITH MEMORY LOSS: ICD-10-CM

## 2021-09-08 RX ORDER — MEMANTINE HYDROCHLORIDE 10 MG/1
TABLET ORAL
Qty: 90 TABLET | Refills: 3 | Status: SHIPPED | OUTPATIENT
Start: 2021-09-08 | End: 2022-10-03

## 2021-09-08 RX ORDER — RIVASTIGMINE TARTRATE 1.5 MG/1
CAPSULE ORAL
Qty: 60 CAPSULE | Refills: 3 | Status: SHIPPED | OUTPATIENT
Start: 2021-09-08 | End: 2022-01-31

## 2021-09-16 ENCOUNTER — LAB VISIT (OUTPATIENT)
Dept: LAB | Facility: HOSPITAL | Age: 86
End: 2021-09-16
Attending: INTERNAL MEDICINE
Payer: MEDICARE

## 2021-09-16 DIAGNOSIS — N18.30 STAGE 3 CHRONIC KIDNEY DISEASE, UNSPECIFIED WHETHER STAGE 3A OR 3B CKD: ICD-10-CM

## 2021-09-16 LAB
ALBUMIN SERPL BCP-MCNC: 3.5 G/DL (ref 3.5–5.2)
ANION GAP SERPL CALC-SCNC: 13 MMOL/L (ref 8–16)
BUN SERPL-MCNC: 29 MG/DL (ref 8–23)
CALCIUM SERPL-MCNC: 9.3 MG/DL (ref 8.7–10.5)
CHLORIDE SERPL-SCNC: 103 MMOL/L (ref 95–110)
CO2 SERPL-SCNC: 23 MMOL/L (ref 23–29)
CREAT SERPL-MCNC: 1.8 MG/DL (ref 0.5–1.4)
EST. GFR  (AFRICAN AMERICAN): 29 ML/MIN/1.73 M^2
EST. GFR  (NON AFRICAN AMERICAN): 25.1 ML/MIN/1.73 M^2
GLUCOSE SERPL-MCNC: 123 MG/DL (ref 70–110)
PHOSPHATE SERPL-MCNC: 3.3 MG/DL (ref 2.7–4.5)
POTASSIUM SERPL-SCNC: 4.5 MMOL/L (ref 3.5–5.1)
PTH-INTACT SERPL-MCNC: 49.1 PG/ML (ref 9–77)
SODIUM SERPL-SCNC: 139 MMOL/L (ref 136–145)

## 2021-09-16 PROCEDURE — 83970 ASSAY OF PARATHORMONE: CPT | Performed by: INTERNAL MEDICINE

## 2021-09-16 PROCEDURE — 80069 RENAL FUNCTION PANEL: CPT | Performed by: INTERNAL MEDICINE

## 2021-09-16 PROCEDURE — 36415 COLL VENOUS BLD VENIPUNCTURE: CPT | Mod: PN | Performed by: INTERNAL MEDICINE

## 2021-09-20 ENCOUNTER — OFFICE VISIT (OUTPATIENT)
Dept: ORTHOPEDICS | Facility: CLINIC | Age: 86
End: 2021-09-20
Payer: MEDICARE

## 2021-09-20 VITALS — WEIGHT: 195 LBS | BODY MASS INDEX: 35.88 KG/M2 | HEIGHT: 62 IN

## 2021-09-20 DIAGNOSIS — Z79.4 TYPE 2 DIABETES MELLITUS WITH STAGE 3 CHRONIC KIDNEY DISEASE, WITH LONG-TERM CURRENT USE OF INSULIN, UNSPECIFIED WHETHER STAGE 3A OR 3B CKD: ICD-10-CM

## 2021-09-20 DIAGNOSIS — E11.22 TYPE 2 DIABETES MELLITUS WITH STAGE 3 CHRONIC KIDNEY DISEASE, WITH LONG-TERM CURRENT USE OF INSULIN, UNSPECIFIED WHETHER STAGE 3A OR 3B CKD: ICD-10-CM

## 2021-09-20 DIAGNOSIS — M25.562 LEFT KNEE PAIN, UNSPECIFIED CHRONICITY: Primary | ICD-10-CM

## 2021-09-20 DIAGNOSIS — N18.30 TYPE 2 DIABETES MELLITUS WITH STAGE 3 CHRONIC KIDNEY DISEASE, WITH LONG-TERM CURRENT USE OF INSULIN, UNSPECIFIED WHETHER STAGE 3A OR 3B CKD: ICD-10-CM

## 2021-09-20 DIAGNOSIS — M17.12 PRIMARY OSTEOARTHRITIS OF LEFT KNEE: ICD-10-CM

## 2021-09-20 PROCEDURE — 99214 PR OFFICE/OUTPT VISIT, EST, LEVL IV, 30-39 MIN: ICD-10-PCS | Mod: 25,S$GLB,, | Performed by: ORTHOPAEDIC SURGERY

## 2021-09-20 PROCEDURE — 3051F HG A1C>EQUAL 7.0%<8.0%: CPT | Mod: CPTII,S$GLB,, | Performed by: ORTHOPAEDIC SURGERY

## 2021-09-20 PROCEDURE — 3288F FALL RISK ASSESSMENT DOCD: CPT | Mod: CPTII,S$GLB,, | Performed by: ORTHOPAEDIC SURGERY

## 2021-09-20 PROCEDURE — 1126F PR PAIN SEVERITY QUANTIFIED, NO PAIN PRESENT: ICD-10-PCS | Mod: CPTII,S$GLB,, | Performed by: ORTHOPAEDIC SURGERY

## 2021-09-20 PROCEDURE — 99214 OFFICE O/P EST MOD 30 MIN: CPT | Mod: 25,S$GLB,, | Performed by: ORTHOPAEDIC SURGERY

## 2021-09-20 PROCEDURE — 1101F PT FALLS ASSESS-DOCD LE1/YR: CPT | Mod: CPTII,S$GLB,, | Performed by: ORTHOPAEDIC SURGERY

## 2021-09-20 PROCEDURE — 20610 LARGE JOINT ASPIRATION/INJECTION: L KNEE: ICD-10-PCS | Mod: LT,S$GLB,, | Performed by: ORTHOPAEDIC SURGERY

## 2021-09-20 PROCEDURE — 99999 PR PBB SHADOW E&M-EST. PATIENT-LVL IV: ICD-10-PCS | Mod: PBBFAC,,, | Performed by: ORTHOPAEDIC SURGERY

## 2021-09-20 PROCEDURE — 1126F AMNT PAIN NOTED NONE PRSNT: CPT | Mod: CPTII,S$GLB,, | Performed by: ORTHOPAEDIC SURGERY

## 2021-09-20 PROCEDURE — 1101F PR PT FALLS ASSESS DOC 0-1 FALLS W/OUT INJ PAST YR: ICD-10-PCS | Mod: CPTII,S$GLB,, | Performed by: ORTHOPAEDIC SURGERY

## 2021-09-20 PROCEDURE — 1160F PR REVIEW ALL MEDS BY PRESCRIBER/CLIN PHARMACIST DOCUMENTED: ICD-10-PCS | Mod: CPTII,S$GLB,, | Performed by: ORTHOPAEDIC SURGERY

## 2021-09-20 PROCEDURE — 20610 DRAIN/INJ JOINT/BURSA W/O US: CPT | Mod: LT,S$GLB,, | Performed by: ORTHOPAEDIC SURGERY

## 2021-09-20 PROCEDURE — 3051F PR MOST RECENT HEMOGLOBIN A1C LEVEL 7.0 - < 8.0%: ICD-10-PCS | Mod: CPTII,S$GLB,, | Performed by: ORTHOPAEDIC SURGERY

## 2021-09-20 PROCEDURE — 3288F PR FALLS RISK ASSESSMENT DOCUMENTED: ICD-10-PCS | Mod: CPTII,S$GLB,, | Performed by: ORTHOPAEDIC SURGERY

## 2021-09-20 PROCEDURE — 99999 PR PBB SHADOW E&M-EST. PATIENT-LVL IV: CPT | Mod: PBBFAC,,, | Performed by: ORTHOPAEDIC SURGERY

## 2021-09-20 PROCEDURE — 1159F PR MEDICATION LIST DOCUMENTED IN MEDICAL RECORD: ICD-10-PCS | Mod: CPTII,S$GLB,, | Performed by: ORTHOPAEDIC SURGERY

## 2021-09-20 PROCEDURE — 1159F MED LIST DOCD IN RCRD: CPT | Mod: CPTII,S$GLB,, | Performed by: ORTHOPAEDIC SURGERY

## 2021-09-20 PROCEDURE — 1160F RVW MEDS BY RX/DR IN RCRD: CPT | Mod: CPTII,S$GLB,, | Performed by: ORTHOPAEDIC SURGERY

## 2021-09-20 RX ORDER — TRIAMCINOLONE ACETONIDE 40 MG/ML
40 INJECTION, SUSPENSION INTRA-ARTICULAR; INTRAMUSCULAR
Status: DISCONTINUED | OUTPATIENT
Start: 2021-09-20 | End: 2021-09-20 | Stop reason: HOSPADM

## 2021-09-20 RX ADMIN — TRIAMCINOLONE ACETONIDE 40 MG: 40 INJECTION, SUSPENSION INTRA-ARTICULAR; INTRAMUSCULAR at 09:09

## 2021-09-23 ENCOUNTER — PATIENT OUTREACH (OUTPATIENT)
Dept: ADMINISTRATIVE | Facility: OTHER | Age: 86
End: 2021-09-23

## 2021-09-23 ENCOUNTER — OFFICE VISIT (OUTPATIENT)
Dept: NEPHROLOGY | Facility: CLINIC | Age: 86
End: 2021-09-23
Payer: MEDICARE

## 2021-09-23 VITALS
WEIGHT: 195 LBS | OXYGEN SATURATION: 97 % | HEART RATE: 65 BPM | SYSTOLIC BLOOD PRESSURE: 142 MMHG | BODY MASS INDEX: 35.88 KG/M2 | HEIGHT: 62 IN | DIASTOLIC BLOOD PRESSURE: 78 MMHG

## 2021-09-23 DIAGNOSIS — R80.9 PROTEINURIA DUE TO TYPE 2 DIABETES MELLITUS: ICD-10-CM

## 2021-09-23 DIAGNOSIS — I12.9 BENIGN HYPERTENSIVE RENAL DISEASE WITHOUT RENAL FAILURE: ICD-10-CM

## 2021-09-23 DIAGNOSIS — E11.29 PROTEINURIA DUE TO TYPE 2 DIABETES MELLITUS: ICD-10-CM

## 2021-09-23 DIAGNOSIS — E87.1 HYPONATREMIA: ICD-10-CM

## 2021-09-23 DIAGNOSIS — N18.30 STAGE 3 CHRONIC KIDNEY DISEASE, UNSPECIFIED WHETHER STAGE 3A OR 3B CKD: Primary | ICD-10-CM

## 2021-09-23 DIAGNOSIS — E11.21 DIABETIC NEPHROPATHY ASSOCIATED WITH TYPE 2 DIABETES MELLITUS: ICD-10-CM

## 2021-09-23 PROCEDURE — 99214 PR OFFICE/OUTPT VISIT, EST, LEVL IV, 30-39 MIN: ICD-10-PCS | Mod: S$GLB,,, | Performed by: INTERNAL MEDICINE

## 2021-09-23 PROCEDURE — 1101F PT FALLS ASSESS-DOCD LE1/YR: CPT | Mod: CPTII,S$GLB,, | Performed by: INTERNAL MEDICINE

## 2021-09-23 PROCEDURE — 1160F RVW MEDS BY RX/DR IN RCRD: CPT | Mod: CPTII,S$GLB,, | Performed by: INTERNAL MEDICINE

## 2021-09-23 PROCEDURE — 1159F PR MEDICATION LIST DOCUMENTED IN MEDICAL RECORD: ICD-10-PCS | Mod: CPTII,S$GLB,, | Performed by: INTERNAL MEDICINE

## 2021-09-23 PROCEDURE — 3288F FALL RISK ASSESSMENT DOCD: CPT | Mod: CPTII,S$GLB,, | Performed by: INTERNAL MEDICINE

## 2021-09-23 PROCEDURE — 99999 PR PBB SHADOW E&M-EST. PATIENT-LVL V: ICD-10-PCS | Mod: PBBFAC,,, | Performed by: INTERNAL MEDICINE

## 2021-09-23 PROCEDURE — 3288F PR FALLS RISK ASSESSMENT DOCUMENTED: ICD-10-PCS | Mod: CPTII,S$GLB,, | Performed by: INTERNAL MEDICINE

## 2021-09-23 PROCEDURE — 1160F PR REVIEW ALL MEDS BY PRESCRIBER/CLIN PHARMACIST DOCUMENTED: ICD-10-PCS | Mod: CPTII,S$GLB,, | Performed by: INTERNAL MEDICINE

## 2021-09-23 PROCEDURE — 99999 PR PBB SHADOW E&M-EST. PATIENT-LVL V: CPT | Mod: PBBFAC,,, | Performed by: INTERNAL MEDICINE

## 2021-09-23 PROCEDURE — 1159F MED LIST DOCD IN RCRD: CPT | Mod: CPTII,S$GLB,, | Performed by: INTERNAL MEDICINE

## 2021-09-23 PROCEDURE — 99214 OFFICE O/P EST MOD 30 MIN: CPT | Mod: S$GLB,,, | Performed by: INTERNAL MEDICINE

## 2021-09-23 PROCEDURE — 3051F PR MOST RECENT HEMOGLOBIN A1C LEVEL 7.0 - < 8.0%: ICD-10-PCS | Mod: CPTII,S$GLB,, | Performed by: INTERNAL MEDICINE

## 2021-09-23 PROCEDURE — 1101F PR PT FALLS ASSESS DOC 0-1 FALLS W/OUT INJ PAST YR: ICD-10-PCS | Mod: CPTII,S$GLB,, | Performed by: INTERNAL MEDICINE

## 2021-09-23 PROCEDURE — 3051F HG A1C>EQUAL 7.0%<8.0%: CPT | Mod: CPTII,S$GLB,, | Performed by: INTERNAL MEDICINE

## 2021-10-01 PROBLEM — S93.402A SPRAIN OF UNSPECIFIED LIGAMENT OF LEFT ANKLE, INITIAL ENCOUNTER: Status: ACTIVE | Noted: 2020-05-25

## 2021-10-05 ENCOUNTER — TELEPHONE (OUTPATIENT)
Dept: FAMILY MEDICINE | Facility: CLINIC | Age: 86
End: 2021-10-05

## 2021-10-06 ENCOUNTER — LAB VISIT (OUTPATIENT)
Dept: LAB | Facility: HOSPITAL | Age: 86
End: 2021-10-06
Attending: FAMILY MEDICINE
Payer: MEDICARE

## 2021-10-06 DIAGNOSIS — E11.21 DIABETIC NEPHROPATHY ASSOCIATED WITH TYPE 2 DIABETES MELLITUS: ICD-10-CM

## 2021-10-06 LAB
ALBUMIN SERPL BCP-MCNC: 3.6 G/DL (ref 3.5–5.2)
ALP SERPL-CCNC: 79 U/L (ref 55–135)
ALT SERPL W/O P-5'-P-CCNC: 21 U/L (ref 10–44)
ANION GAP SERPL CALC-SCNC: 10 MMOL/L (ref 8–16)
AST SERPL-CCNC: 24 U/L (ref 10–40)
BILIRUB SERPL-MCNC: 0.5 MG/DL (ref 0.1–1)
BUN SERPL-MCNC: 25 MG/DL (ref 8–23)
CALCIUM SERPL-MCNC: 9.5 MG/DL (ref 8.7–10.5)
CHLORIDE SERPL-SCNC: 102 MMOL/L (ref 95–110)
CO2 SERPL-SCNC: 26 MMOL/L (ref 23–29)
CREAT SERPL-MCNC: 1.5 MG/DL (ref 0.5–1.4)
EST. GFR  (AFRICAN AMERICAN): 36.1 ML/MIN/1.73 M^2
EST. GFR  (NON AFRICAN AMERICAN): 31.3 ML/MIN/1.73 M^2
ESTIMATED AVG GLUCOSE: 151 MG/DL (ref 68–131)
GLUCOSE SERPL-MCNC: 98 MG/DL (ref 70–110)
HBA1C MFR BLD: 6.9 % (ref 4–5.6)
POTASSIUM SERPL-SCNC: 4.2 MMOL/L (ref 3.5–5.1)
PROT SERPL-MCNC: 6.4 G/DL (ref 6–8.4)
SODIUM SERPL-SCNC: 138 MMOL/L (ref 136–145)

## 2021-10-06 PROCEDURE — 83036 HEMOGLOBIN GLYCOSYLATED A1C: CPT | Performed by: FAMILY MEDICINE

## 2021-10-06 PROCEDURE — 80053 COMPREHEN METABOLIC PANEL: CPT | Performed by: FAMILY MEDICINE

## 2021-10-06 PROCEDURE — 36415 COLL VENOUS BLD VENIPUNCTURE: CPT | Mod: PN | Performed by: FAMILY MEDICINE

## 2021-10-07 ENCOUNTER — TELEPHONE (OUTPATIENT)
Dept: FAMILY MEDICINE | Facility: CLINIC | Age: 86
End: 2021-10-07

## 2021-10-07 DIAGNOSIS — R31.9 HEMATURIA, UNSPECIFIED TYPE: Primary | ICD-10-CM

## 2021-10-11 ENCOUNTER — TELEPHONE (OUTPATIENT)
Dept: FAMILY MEDICINE | Facility: CLINIC | Age: 86
End: 2021-10-11

## 2021-10-15 ENCOUNTER — LAB VISIT (OUTPATIENT)
Dept: LAB | Facility: HOSPITAL | Age: 86
End: 2021-10-15
Attending: FAMILY MEDICINE
Payer: MEDICARE

## 2021-10-15 DIAGNOSIS — R31.9 HEMATURIA, UNSPECIFIED TYPE: ICD-10-CM

## 2021-10-15 PROCEDURE — 87088 URINE BACTERIA CULTURE: CPT | Performed by: FAMILY MEDICINE

## 2021-10-15 PROCEDURE — 87186 SC STD MICRODIL/AGAR DIL: CPT | Performed by: FAMILY MEDICINE

## 2021-10-15 PROCEDURE — 87086 URINE CULTURE/COLONY COUNT: CPT | Performed by: FAMILY MEDICINE

## 2021-10-15 PROCEDURE — 87077 CULTURE AEROBIC IDENTIFY: CPT | Performed by: FAMILY MEDICINE

## 2021-10-18 ENCOUNTER — TELEPHONE (OUTPATIENT)
Dept: FAMILY MEDICINE | Facility: CLINIC | Age: 86
End: 2021-10-18
Payer: MEDICARE

## 2021-10-18 ENCOUNTER — TELEPHONE (OUTPATIENT)
Dept: FAMILY MEDICINE | Facility: CLINIC | Age: 86
End: 2021-10-18

## 2021-10-18 DIAGNOSIS — R31.9 HEMATURIA, UNSPECIFIED TYPE: Primary | ICD-10-CM

## 2021-10-18 LAB — BACTERIA UR CULT: ABNORMAL

## 2021-10-18 RX ORDER — CEPHALEXIN 500 MG/1
500 CAPSULE ORAL EVERY 8 HOURS
Qty: 21 CAPSULE | Refills: 0 | OUTPATIENT
Start: 2021-10-18 | End: 2021-12-26

## 2021-11-08 ENCOUNTER — PATIENT MESSAGE (OUTPATIENT)
Dept: ORTHOPEDICS | Facility: CLINIC | Age: 86
End: 2021-11-08
Payer: MEDICARE

## 2021-11-09 ENCOUNTER — PATIENT MESSAGE (OUTPATIENT)
Dept: FAMILY MEDICINE | Facility: CLINIC | Age: 86
End: 2021-11-09
Payer: MEDICARE

## 2021-11-15 ENCOUNTER — OFFICE VISIT (OUTPATIENT)
Dept: FAMILY MEDICINE | Facility: CLINIC | Age: 86
End: 2021-11-15
Payer: MEDICARE

## 2021-11-15 VITALS
WEIGHT: 187.5 LBS | SYSTOLIC BLOOD PRESSURE: 136 MMHG | TEMPERATURE: 99 F | RESPIRATION RATE: 14 BRPM | HEART RATE: 84 BPM | HEIGHT: 62 IN | DIASTOLIC BLOOD PRESSURE: 72 MMHG | BODY MASS INDEX: 34.5 KG/M2

## 2021-11-15 DIAGNOSIS — M25.461 PAIN AND SWELLING OF RIGHT KNEE: ICD-10-CM

## 2021-11-15 DIAGNOSIS — E11.51 TYPE 2 DIABETES MELLITUS WITH DIABETIC PERIPHERAL ANGIOPATHY WITHOUT GANGRENE, WITH LONG-TERM CURRENT USE OF INSULIN: ICD-10-CM

## 2021-11-15 DIAGNOSIS — I48.91 ATRIAL FIBRILLATION WITH RAPID VENTRICULAR RESPONSE: ICD-10-CM

## 2021-11-15 DIAGNOSIS — Z79.4 TYPE 2 DIABETES MELLITUS WITH STAGE 4 CHRONIC KIDNEY DISEASE, WITH LONG-TERM CURRENT USE OF INSULIN: ICD-10-CM

## 2021-11-15 DIAGNOSIS — E11.22 TYPE 2 DIABETES MELLITUS WITH STAGE 4 CHRONIC KIDNEY DISEASE, WITH LONG-TERM CURRENT USE OF INSULIN: ICD-10-CM

## 2021-11-15 DIAGNOSIS — N18.4 TYPE 2 DIABETES MELLITUS WITH STAGE 4 CHRONIC KIDNEY DISEASE, WITH LONG-TERM CURRENT USE OF INSULIN: ICD-10-CM

## 2021-11-15 DIAGNOSIS — M25.422 ELBOW SWELLING, LEFT: Primary | ICD-10-CM

## 2021-11-15 DIAGNOSIS — Z79.4 TYPE 2 DIABETES MELLITUS WITH DIABETIC PERIPHERAL ANGIOPATHY WITHOUT GANGRENE, WITH LONG-TERM CURRENT USE OF INSULIN: ICD-10-CM

## 2021-11-15 DIAGNOSIS — M25.561 PAIN AND SWELLING OF RIGHT KNEE: ICD-10-CM

## 2021-11-15 PROCEDURE — 99499 RISK ADDL DX/OHS AUDIT: ICD-10-PCS | Mod: S$GLB,,, | Performed by: FAMILY MEDICINE

## 2021-11-15 PROCEDURE — 1100F PR PT FALLS ASSESS DOC 2+ FALLS/FALL W/INJURY/YR: ICD-10-PCS | Mod: CPTII,S$GLB,, | Performed by: FAMILY MEDICINE

## 2021-11-15 PROCEDURE — 90694 FLU VACCINE - QUADRIVALENT - ADJUVANTED: ICD-10-PCS | Mod: S$GLB,,, | Performed by: FAMILY MEDICINE

## 2021-11-15 PROCEDURE — 3288F FALL RISK ASSESSMENT DOCD: CPT | Mod: CPTII,S$GLB,, | Performed by: FAMILY MEDICINE

## 2021-11-15 PROCEDURE — 99499 UNLISTED E&M SERVICE: CPT | Mod: S$GLB,,, | Performed by: FAMILY MEDICINE

## 2021-11-15 PROCEDURE — 1125F AMNT PAIN NOTED PAIN PRSNT: CPT | Mod: CPTII,S$GLB,, | Performed by: FAMILY MEDICINE

## 2021-11-15 PROCEDURE — 99214 OFFICE O/P EST MOD 30 MIN: CPT | Mod: 25,S$GLB,, | Performed by: FAMILY MEDICINE

## 2021-11-15 PROCEDURE — G0008 FLU VACCINE - QUADRIVALENT - ADJUVANTED: ICD-10-PCS | Mod: S$GLB,,, | Performed by: FAMILY MEDICINE

## 2021-11-15 PROCEDURE — G0008 ADMIN INFLUENZA VIRUS VAC: HCPCS | Mod: S$GLB,,, | Performed by: FAMILY MEDICINE

## 2021-11-15 PROCEDURE — 1100F PTFALLS ASSESS-DOCD GE2>/YR: CPT | Mod: CPTII,S$GLB,, | Performed by: FAMILY MEDICINE

## 2021-11-15 PROCEDURE — 99214 PR OFFICE/OUTPT VISIT, EST, LEVL IV, 30-39 MIN: ICD-10-PCS | Mod: 25,S$GLB,, | Performed by: FAMILY MEDICINE

## 2021-11-15 PROCEDURE — 1125F PR PAIN SEVERITY QUANTIFIED, PAIN PRESENT: ICD-10-PCS | Mod: CPTII,S$GLB,, | Performed by: FAMILY MEDICINE

## 2021-11-15 PROCEDURE — 99999 PR PBB SHADOW E&M-EST. PATIENT-LVL III: CPT | Mod: PBBFAC,,, | Performed by: FAMILY MEDICINE

## 2021-11-15 PROCEDURE — 90694 VACC AIIV4 NO PRSRV 0.5ML IM: CPT | Mod: S$GLB,,, | Performed by: FAMILY MEDICINE

## 2021-11-15 PROCEDURE — 99999 PR PBB SHADOW E&M-EST. PATIENT-LVL III: ICD-10-PCS | Mod: PBBFAC,,, | Performed by: FAMILY MEDICINE

## 2021-11-15 PROCEDURE — 3288F PR FALLS RISK ASSESSMENT DOCUMENTED: ICD-10-PCS | Mod: CPTII,S$GLB,, | Performed by: FAMILY MEDICINE

## 2021-11-15 RX ORDER — DIGOXIN 125 MCG
0.12 TABLET ORAL DAILY
Status: ON HOLD | COMMUNITY
Start: 2021-11-10 | End: 2022-01-17 | Stop reason: HOSPADM

## 2021-11-18 ENCOUNTER — HOSPITAL ENCOUNTER (OUTPATIENT)
Dept: RADIOLOGY | Facility: HOSPITAL | Age: 86
Discharge: HOME | End: 2021-11-18
Attending: FAMILY MEDICINE
Payer: MEDICARE

## 2021-11-18 ENCOUNTER — OFFICE VISIT (OUTPATIENT)
Dept: ORTHOPEDICS | Facility: CLINIC | Age: 86
End: 2021-11-18
Payer: MEDICARE

## 2021-11-18 VITALS — BODY MASS INDEX: 34.41 KG/M2 | HEIGHT: 62 IN | WEIGHT: 187 LBS

## 2021-11-18 DIAGNOSIS — Z79.4 TYPE 2 DIABETES MELLITUS WITH STAGE 4 CHRONIC KIDNEY DISEASE, WITH LONG-TERM CURRENT USE OF INSULIN: ICD-10-CM

## 2021-11-18 DIAGNOSIS — M17.11 OSTEOARTHROSIS, LOCALIZED, PRIMARY, KNEE, RIGHT: Primary | ICD-10-CM

## 2021-11-18 DIAGNOSIS — M25.422 ELBOW SWELLING, LEFT: ICD-10-CM

## 2021-11-18 DIAGNOSIS — N18.4 TYPE 2 DIABETES MELLITUS WITH STAGE 4 CHRONIC KIDNEY DISEASE, WITH LONG-TERM CURRENT USE OF INSULIN: ICD-10-CM

## 2021-11-18 DIAGNOSIS — E11.22 TYPE 2 DIABETES MELLITUS WITH STAGE 4 CHRONIC KIDNEY DISEASE, WITH LONG-TERM CURRENT USE OF INSULIN: ICD-10-CM

## 2021-11-18 DIAGNOSIS — N18.4 CKD (CHRONIC KIDNEY DISEASE), STAGE IV: ICD-10-CM

## 2021-11-18 PROCEDURE — 99214 OFFICE O/P EST MOD 30 MIN: CPT | Mod: 25,S$GLB,, | Performed by: ORTHOPAEDIC SURGERY

## 2021-11-18 PROCEDURE — 99214 PR OFFICE/OUTPT VISIT, EST, LEVL IV, 30-39 MIN: ICD-10-PCS | Mod: 25,S$GLB,, | Performed by: ORTHOPAEDIC SURGERY

## 2021-11-18 PROCEDURE — 1101F PR PT FALLS ASSESS DOC 0-1 FALLS W/OUT INJ PAST YR: ICD-10-PCS | Mod: CPTII,S$GLB,, | Performed by: ORTHOPAEDIC SURGERY

## 2021-11-18 PROCEDURE — 3288F PR FALLS RISK ASSESSMENT DOCUMENTED: ICD-10-PCS | Mod: CPTII,S$GLB,, | Performed by: ORTHOPAEDIC SURGERY

## 2021-11-18 PROCEDURE — 99999 PR PBB SHADOW E&M-EST. PATIENT-LVL IV: CPT | Mod: PBBFAC,,, | Performed by: ORTHOPAEDIC SURGERY

## 2021-11-18 PROCEDURE — 20610 LARGE JOINT ASPIRATION/INJECTION: R KNEE: ICD-10-PCS | Mod: RT,S$GLB,, | Performed by: ORTHOPAEDIC SURGERY

## 2021-11-18 PROCEDURE — 1101F PT FALLS ASSESS-DOCD LE1/YR: CPT | Mod: CPTII,S$GLB,, | Performed by: ORTHOPAEDIC SURGERY

## 2021-11-18 PROCEDURE — 1125F AMNT PAIN NOTED PAIN PRSNT: CPT | Mod: CPTII,S$GLB,, | Performed by: ORTHOPAEDIC SURGERY

## 2021-11-18 PROCEDURE — 1125F PR PAIN SEVERITY QUANTIFIED, PAIN PRESENT: ICD-10-PCS | Mod: CPTII,S$GLB,, | Performed by: ORTHOPAEDIC SURGERY

## 2021-11-18 PROCEDURE — 1159F MED LIST DOCD IN RCRD: CPT | Mod: CPTII,S$GLB,, | Performed by: ORTHOPAEDIC SURGERY

## 2021-11-18 PROCEDURE — 1160F PR REVIEW ALL MEDS BY PRESCRIBER/CLIN PHARMACIST DOCUMENTED: ICD-10-PCS | Mod: CPTII,S$GLB,, | Performed by: ORTHOPAEDIC SURGERY

## 2021-11-18 PROCEDURE — 73080 X-RAY EXAM OF ELBOW: CPT | Mod: TC,PO,LT

## 2021-11-18 PROCEDURE — 20610 DRAIN/INJ JOINT/BURSA W/O US: CPT | Mod: RT,S$GLB,, | Performed by: ORTHOPAEDIC SURGERY

## 2021-11-18 PROCEDURE — 1159F PR MEDICATION LIST DOCUMENTED IN MEDICAL RECORD: ICD-10-PCS | Mod: CPTII,S$GLB,, | Performed by: ORTHOPAEDIC SURGERY

## 2021-11-18 PROCEDURE — 99999 PR PBB SHADOW E&M-EST. PATIENT-LVL IV: ICD-10-PCS | Mod: PBBFAC,,, | Performed by: ORTHOPAEDIC SURGERY

## 2021-11-18 PROCEDURE — 73080 XR ELBOW COMPLETE 3 VIEW LEFT: ICD-10-PCS | Mod: 26,LT,, | Performed by: RADIOLOGY

## 2021-11-18 PROCEDURE — 73080 X-RAY EXAM OF ELBOW: CPT | Mod: 26,LT,, | Performed by: RADIOLOGY

## 2021-11-18 PROCEDURE — 3288F FALL RISK ASSESSMENT DOCD: CPT | Mod: CPTII,S$GLB,, | Performed by: ORTHOPAEDIC SURGERY

## 2021-11-18 PROCEDURE — 1160F RVW MEDS BY RX/DR IN RCRD: CPT | Mod: CPTII,S$GLB,, | Performed by: ORTHOPAEDIC SURGERY

## 2021-11-18 RX ORDER — TRIAMCINOLONE ACETONIDE 40 MG/ML
40 INJECTION, SUSPENSION INTRA-ARTICULAR; INTRAMUSCULAR
Status: DISCONTINUED | OUTPATIENT
Start: 2021-11-18 | End: 2021-11-18 | Stop reason: HOSPADM

## 2021-11-18 RX ADMIN — TRIAMCINOLONE ACETONIDE 40 MG: 40 INJECTION, SUSPENSION INTRA-ARTICULAR; INTRAMUSCULAR at 02:11

## 2021-11-22 ENCOUNTER — PATIENT MESSAGE (OUTPATIENT)
Dept: FAMILY MEDICINE | Facility: CLINIC | Age: 86
End: 2021-11-22
Payer: MEDICARE

## 2021-11-22 DIAGNOSIS — R26.9 GAIT DIFFICULTY: Primary | ICD-10-CM

## 2021-11-24 ENCOUNTER — TELEPHONE (OUTPATIENT)
Dept: FAMILY MEDICINE | Facility: CLINIC | Age: 86
End: 2021-11-24
Payer: MEDICARE

## 2021-11-29 ENCOUNTER — TELEPHONE (OUTPATIENT)
Dept: FAMILY MEDICINE | Facility: CLINIC | Age: 86
End: 2021-11-29
Payer: MEDICARE

## 2021-12-01 PROCEDURE — G0180 MD CERTIFICATION HHA PATIENT: HCPCS | Mod: ,,, | Performed by: FAMILY MEDICINE

## 2021-12-01 PROCEDURE — G0180 PR HOME HEALTH MD CERTIFICATION: ICD-10-PCS | Mod: ,,, | Performed by: FAMILY MEDICINE

## 2021-12-13 ENCOUNTER — PATIENT MESSAGE (OUTPATIENT)
Dept: FAMILY MEDICINE | Facility: CLINIC | Age: 86
End: 2021-12-13
Payer: MEDICARE

## 2021-12-17 ENCOUNTER — PATIENT MESSAGE (OUTPATIENT)
Dept: FAMILY MEDICINE | Facility: CLINIC | Age: 86
End: 2021-12-17
Payer: MEDICARE

## 2021-12-21 ENCOUNTER — TELEPHONE (OUTPATIENT)
Dept: FAMILY MEDICINE | Facility: CLINIC | Age: 86
End: 2021-12-21
Payer: MEDICARE

## 2021-12-23 ENCOUNTER — PATIENT MESSAGE (OUTPATIENT)
Dept: FAMILY MEDICINE | Facility: CLINIC | Age: 86
End: 2021-12-23

## 2021-12-23 ENCOUNTER — EXTERNAL HOME HEALTH (OUTPATIENT)
Dept: HOME HEALTH SERVICES | Facility: HOSPITAL | Age: 86
End: 2021-12-23
Payer: MEDICARE

## 2021-12-27 ENCOUNTER — TELEPHONE (OUTPATIENT)
Dept: FAMILY MEDICINE | Facility: CLINIC | Age: 86
End: 2021-12-27
Payer: MEDICARE

## 2021-12-27 DIAGNOSIS — D64.9 ANEMIA, UNSPECIFIED TYPE: Primary | ICD-10-CM

## 2021-12-27 RX ORDER — ALBUTEROL SULFATE 0.83 MG/ML
2.5 SOLUTION RESPIRATORY (INHALATION) DAILY PRN
Qty: 3 ML | Refills: 11 | Status: SHIPPED | OUTPATIENT
Start: 2021-12-27 | End: 2022-06-30 | Stop reason: CLARIF

## 2022-01-05 ENCOUNTER — DOCUMENT SCAN (OUTPATIENT)
Dept: HOME HEALTH SERVICES | Facility: HOSPITAL | Age: 87
End: 2022-01-05
Payer: MEDICARE

## 2022-01-06 PROBLEM — I25.10 CAD (CORONARY ARTERY DISEASE): Status: ACTIVE | Noted: 2022-01-06

## 2022-01-07 PROBLEM — R53.81 PHYSICAL DECONDITIONING: Status: ACTIVE | Noted: 2022-01-07

## 2022-01-08 PROBLEM — N17.9 AKI (ACUTE KIDNEY INJURY): Status: ACTIVE | Noted: 2022-01-08

## 2022-01-10 ENCOUNTER — TELEPHONE (OUTPATIENT)
Dept: NEPHROLOGY | Facility: CLINIC | Age: 87
End: 2022-01-10
Payer: MEDICARE

## 2022-01-11 NOTE — TELEPHONE ENCOUNTER
----- Message from Lucho Thompson sent at 1/10/2022 10:13 AM CST -----  Contact: pt's daughter parisa at v  Type: Needs Medical Advice  Who Called:  pt's daughter Parisa  Best Call Back Number: [phone]  Additional Information: pt's daughter Parisa is calling the office to ask the nurse to call her to tell her what the dr wants her to do and what was said. Please call back and advise.        
----- Message from Teodoro Olmedo sent at 1/10/2022  4:22 PM CST -----  Contact: Self  Type: Needs Medical Advice  Who Called:  Anisa/Parisa Pierre Call Back Number: [phone]   Additional Information: Called to speak with office. State pt has been admitted. Would like to speak with Dr HEART regarding pt's liver function and his suggestions for pt.         
I have spoken to her  
Spoke to Parisa.  She is waiting to hear from Dr Pace about what to do next.   
rolling walker

## 2022-01-13 ENCOUNTER — TELEPHONE (OUTPATIENT)
Dept: NEPHROLOGY | Facility: CLINIC | Age: 87
End: 2022-01-13
Payer: MEDICARE

## 2022-01-13 PROBLEM — N17.0 ATN (ACUTE TUBULAR NECROSIS): Status: ACTIVE | Noted: 2022-01-13

## 2022-01-13 NOTE — TELEPHONE ENCOUNTER
----- Message from Varinder Carrasquillo sent at 1/13/2022 10:03 AM CST -----  Type: Needs Medical Advice  Who Called:  Pt's Daughter/ Parisa Pierre Call Back Number: [phone]  Additional Information: Would like a call in regards to Pt's test results this morning and whats the next step.  Please advise -- Thank you

## 2022-01-14 ENCOUNTER — TELEPHONE (OUTPATIENT)
Dept: NEPHROLOGY | Facility: CLINIC | Age: 87
End: 2022-01-14

## 2022-01-14 NOTE — TELEPHONE ENCOUNTER
----- Message from Chhaya Rios sent at 1/14/2022  3:47 PM CST -----  Regarding: advise  Contact: daughterParisa  Type: Needs Medical Advice  Who Called:  Parisa murphy  Best Call Back Number: [phone] (home)   Additional Information: Parisa states patient is in the hospital and she would like the doctor's thoughts on her situation. Please call patient to advise.Thanks!             No lesions; no rash

## 2022-01-16 PROBLEM — I25.10 CAD (CORONARY ARTERY DISEASE): Status: RESOLVED | Noted: 2022-01-06 | Resolved: 2022-01-16

## 2022-01-16 PROBLEM — N17.0 ATN (ACUTE TUBULAR NECROSIS): Status: RESOLVED | Noted: 2022-01-13 | Resolved: 2022-01-16

## 2022-01-17 ENCOUNTER — PATIENT MESSAGE (OUTPATIENT)
Dept: NEPHROLOGY | Facility: CLINIC | Age: 87
End: 2022-01-17
Payer: MEDICARE

## 2022-01-18 ENCOUNTER — TELEPHONE (OUTPATIENT)
Dept: NEPHROLOGY | Facility: CLINIC | Age: 87
End: 2022-01-18
Payer: MEDICARE

## 2022-01-18 DIAGNOSIS — N18.30 STAGE 3 CHRONIC KIDNEY DISEASE, UNSPECIFIED WHETHER STAGE 3A OR 3B CKD: Primary | ICD-10-CM

## 2022-01-18 DIAGNOSIS — E87.1 HYPONATREMIA: ICD-10-CM

## 2022-01-21 ENCOUNTER — PATIENT MESSAGE (OUTPATIENT)
Dept: NEPHROLOGY | Facility: CLINIC | Age: 87
End: 2022-01-21
Payer: MEDICARE

## 2022-01-21 NOTE — TELEPHONE ENCOUNTER
The daughter wanted to know if patient needed lab drawn by Home Health Monday prior to her appointment. Advised daughter will let her know via [employer].     I called [facility] and was given number for Mita at [phone]. No answer x 2.  Left callback number.     Mita called directly back and confirmed she has no lab orders for patient on Monday.  Spoke to her and faxing order for renal function panel for Monday visit.

## 2022-01-21 NOTE — TELEPHONE ENCOUNTER
----- Message from Shameka Rhoades sent at 1/21/2022 12:46 PM CST -----  Type: Needs Medical Advice    Who Called:  Daughter David)  Best Call Back Number: [phone]  Additional Information:  Requesting to speak with nurse or doctor concerning ordering lab work for patient before appointment on January 25th/patient has Home Health/please call back to advise.

## 2022-01-25 ENCOUNTER — OFFICE VISIT (OUTPATIENT)
Dept: NEPHROLOGY | Facility: CLINIC | Age: 87
End: 2022-01-25
Payer: MEDICARE

## 2022-01-25 DIAGNOSIS — E11.21 DIABETIC NEPHROPATHY ASSOCIATED WITH TYPE 2 DIABETES MELLITUS: ICD-10-CM

## 2022-01-25 DIAGNOSIS — E87.1 HYPONATREMIA: ICD-10-CM

## 2022-01-25 DIAGNOSIS — R80.9 PROTEINURIA DUE TO TYPE 2 DIABETES MELLITUS: ICD-10-CM

## 2022-01-25 DIAGNOSIS — E11.29 PROTEINURIA DUE TO TYPE 2 DIABETES MELLITUS: ICD-10-CM

## 2022-01-25 DIAGNOSIS — N18.4 CKD (CHRONIC KIDNEY DISEASE) STAGE 4, GFR 15-29 ML/MIN: Primary | ICD-10-CM

## 2022-01-25 DIAGNOSIS — I12.9 BENIGN HYPERTENSIVE RENAL DISEASE WITHOUT RENAL FAILURE: ICD-10-CM

## 2022-01-25 PROCEDURE — 99499 UNLISTED E&M SERVICE: CPT | Mod: 95,,, | Performed by: INTERNAL MEDICINE

## 2022-01-25 PROCEDURE — 99214 OFFICE O/P EST MOD 30 MIN: CPT | Mod: 95,,, | Performed by: INTERNAL MEDICINE

## 2022-01-25 PROCEDURE — 1160F RVW MEDS BY RX/DR IN RCRD: CPT | Mod: CPTII,95,, | Performed by: INTERNAL MEDICINE

## 2022-01-25 PROCEDURE — 1111F PR DISCHARGE MEDS RECONCILED W/ CURRENT OUTPATIENT MED LIST: ICD-10-PCS | Mod: CPTII,95,, | Performed by: INTERNAL MEDICINE

## 2022-01-25 PROCEDURE — 99499 RISK ADDL DX/OHS AUDIT: ICD-10-PCS | Mod: 95,,, | Performed by: INTERNAL MEDICINE

## 2022-01-25 PROCEDURE — 1159F MED LIST DOCD IN RCRD: CPT | Mod: CPTII,95,, | Performed by: INTERNAL MEDICINE

## 2022-01-25 PROCEDURE — 1159F PR MEDICATION LIST DOCUMENTED IN MEDICAL RECORD: ICD-10-PCS | Mod: CPTII,95,, | Performed by: INTERNAL MEDICINE

## 2022-01-25 PROCEDURE — 1160F PR REVIEW ALL MEDS BY PRESCRIBER/CLIN PHARMACIST DOCUMENTED: ICD-10-PCS | Mod: CPTII,95,, | Performed by: INTERNAL MEDICINE

## 2022-01-25 PROCEDURE — 99214 PR OFFICE/OUTPT VISIT, EST, LEVL IV, 30-39 MIN: ICD-10-PCS | Mod: 95,,, | Performed by: INTERNAL MEDICINE

## 2022-01-25 PROCEDURE — 1111F DSCHRG MED/CURRENT MED MERGE: CPT | Mod: CPTII,95,, | Performed by: INTERNAL MEDICINE

## 2022-01-25 NOTE — Clinical Note
Hello.  Can you contact her daughter as she is having difficulty caring for her at home currently.  Thanks!

## 2022-01-25 NOTE — PROGRESS NOTES
Subjective:       Patient ID: Denice Sheth is a 87 y.o. White female who presents for return patient evaluation for chronic renal failure.    The patient location is:  Patient Home   The chief complaint leading to consultation is: ckd  Visit type: Virtual visit with synchronous audio and video  Total time spent with patient:   Each patient to whom he or she provides medical services by telemedicine is:  (1) informed of the relationship between the physician and patient and the respective role of any other health care provider with respect to management of the patient; and (2) notified that he or she may decline to receive medical services by telemedicine and may withdraw from such care at any time.       The visit was done with her daughter.  She was recently discharged from [facility], [place] and is home now.  She is unable to do much physically and is not transferring from bed to chair well.  She is subjectively feeling better but is not back to her baseline.  Her Garrett was due to contrast she was given in the hospital.      Review of Systems   Constitutional: Negative for appetite change, chills and fever.   Eyes: Negative for visual disturbance.   Respiratory: Positive for shortness of breath (with exertion). Negative for cough.    Cardiovascular: Negative for chest pain and leg swelling.   Gastrointestinal: Negative for abdominal pain, diarrhea, nausea and vomiting.   Genitourinary: Negative for difficulty urinating, dysuria and hematuria.   Musculoskeletal: Positive for arthralgias (right knee) and gait problem. Negative for myalgias.   Skin: Negative for rash.   Neurological: Positive for weakness. Negative for headaches.   Hematological: Bruises/bleeds easily.   Psychiatric/Behavioral: Negative for sleep disturbance.       The past medical, family and social histories were reviewed for this encounter.     LMP  (LMP Unknown)     Objective:      Physical Exam   Assessment:       1. CKD (chronic kidney disease) stage 4, GFR  15-29 ml/min    2. Hyponatremia    3. 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 Diabetic nephropathy associated with type 2 diabetes mellitus    5. Proteinuria due to type 2 diabetes mellitus        Plan:   Return to clinic in 6 weeks.  Labs for next visit include rp   Baseline creatinine is 1.4-1.6 since 2015.  She seems to be settling down to a baseline of 2.0-2.2 now.  PTH is 49 with a calcium of 9.3.  UPC is 0.16.  Renal US shows R 9.7 cm L 10.9 cm.  Blood pressure is controlled on the current regimen.  Continue current medications as prescribed and reviewed.   She is recovering from JANETTE secondary to a contrast load.

## 2022-01-30 DIAGNOSIS — G31.84 MCI (MILD COGNITIVE IMPAIRMENT) WITH MEMORY LOSS: ICD-10-CM

## 2022-01-30 PROCEDURE — G0179 PR HOME HEALTH MD RECERTIFICATION: ICD-10-PCS | Mod: ,,, | Performed by: FAMILY MEDICINE

## 2022-01-30 PROCEDURE — G0179 MD RECERTIFICATION HHA PT: HCPCS | Mod: ,,, | Performed by: FAMILY MEDICINE

## 2022-01-31 RX ORDER — RIVASTIGMINE TARTRATE 1.5 MG/1
CAPSULE ORAL
Qty: 60 CAPSULE | Refills: 2 | Status: SHIPPED | OUTPATIENT
Start: 2022-01-31 | End: 2022-04-26

## 2022-02-01 ENCOUNTER — EXTERNAL HOME HEALTH (OUTPATIENT)
Dept: HOME HEALTH SERVICES | Facility: HOSPITAL | Age: 87
End: 2022-02-01
Payer: MEDICARE

## 2022-02-02 ENCOUNTER — DOCUMENT SCAN (OUTPATIENT)
Dept: HOME HEALTH SERVICES | Facility: HOSPITAL | Age: 87
End: 2022-02-02
Payer: MEDICARE

## 2022-02-03 ENCOUNTER — DOCUMENT SCAN (OUTPATIENT)
Dept: HOME HEALTH SERVICES | Facility: HOSPITAL | Age: 87
End: 2022-02-03
Payer: MEDICARE

## 2022-02-07 ENCOUNTER — DOCUMENT SCAN (OUTPATIENT)
Dept: HOME HEALTH SERVICES | Facility: HOSPITAL | Age: 87
End: 2022-02-07
Payer: MEDICARE

## 2022-02-10 ENCOUNTER — DOCUMENT SCAN (OUTPATIENT)
Dept: HOME HEALTH SERVICES | Facility: HOSPITAL | Age: 87
End: 2022-02-10
Payer: MEDICARE

## 2022-02-14 ENCOUNTER — PATIENT MESSAGE (OUTPATIENT)
Dept: FAMILY MEDICINE | Facility: CLINIC | Age: 87
End: 2022-02-14
Payer: MEDICARE

## 2022-02-14 RX ORDER — ONDANSETRON 4 MG/1
4 TABLET, FILM COATED ORAL EVERY 12 HOURS PRN
Qty: 10 TABLET | Refills: 0 | Status: ON HOLD | OUTPATIENT
Start: 2022-02-14 | End: 2022-07-02 | Stop reason: HOSPADM

## 2022-02-14 NOTE — TELEPHONE ENCOUNTER
No new care gaps identified.  Powered by [employer] by [employer]. Reference number: [id number].   2/14/2022 9:08:30 AM CST

## 2022-02-15 ENCOUNTER — OFFICE VISIT (OUTPATIENT)
Dept: FAMILY MEDICINE | Facility: CLINIC | Age: 87
End: 2022-02-15
Payer: MEDICARE

## 2022-02-15 ENCOUNTER — LAB VISIT (OUTPATIENT)
Dept: LAB | Facility: HOSPITAL | Age: 87
End: 2022-02-15
Attending: FAMILY MEDICINE
Payer: MEDICARE

## 2022-02-15 VITALS
DIASTOLIC BLOOD PRESSURE: 52 MMHG | RESPIRATION RATE: 14 BRPM | TEMPERATURE: 98 F | WEIGHT: 166.69 LBS | BODY MASS INDEX: 30.67 KG/M2 | HEIGHT: 62 IN | SYSTOLIC BLOOD PRESSURE: 126 MMHG | HEART RATE: 56 BPM

## 2022-02-15 DIAGNOSIS — Z79.4 TYPE 2 DIABETES MELLITUS WITH STAGE 4 CHRONIC KIDNEY DISEASE, WITH LONG-TERM CURRENT USE OF INSULIN: Primary | ICD-10-CM

## 2022-02-15 DIAGNOSIS — N18.4 TYPE 2 DIABETES MELLITUS WITH STAGE 4 CHRONIC KIDNEY DISEASE, WITH LONG-TERM CURRENT USE OF INSULIN: ICD-10-CM

## 2022-02-15 DIAGNOSIS — E66.01 MORBID (SEVERE) OBESITY DUE TO EXCESS CALORIES: ICD-10-CM

## 2022-02-15 DIAGNOSIS — Z79.4 TYPE 2 DIABETES MELLITUS WITH DIABETIC PERIPHERAL ANGIOPATHY WITHOUT GANGRENE, WITH LONG-TERM CURRENT USE OF INSULIN: ICD-10-CM

## 2022-02-15 DIAGNOSIS — E11.22 TYPE 2 DIABETES MELLITUS WITH STAGE 4 CHRONIC KIDNEY DISEASE, WITH LONG-TERM CURRENT USE OF INSULIN: Primary | ICD-10-CM

## 2022-02-15 DIAGNOSIS — N18.4 TYPE 2 DIABETES MELLITUS WITH STAGE 4 CHRONIC KIDNEY DISEASE, WITH LONG-TERM CURRENT USE OF INSULIN: Primary | ICD-10-CM

## 2022-02-15 DIAGNOSIS — E11.9 TYPE 2 DIABETES MELLITUS WITHOUT COMPLICATION, WITH LONG-TERM CURRENT USE OF INSULIN: ICD-10-CM

## 2022-02-15 DIAGNOSIS — E11.22 TYPE 2 DIABETES MELLITUS WITH STAGE 4 CHRONIC KIDNEY DISEASE, WITH LONG-TERM CURRENT USE OF INSULIN: ICD-10-CM

## 2022-02-15 DIAGNOSIS — Z79.4 TYPE 2 DIABETES MELLITUS WITH STAGE 4 CHRONIC KIDNEY DISEASE, WITH LONG-TERM CURRENT USE OF INSULIN: ICD-10-CM

## 2022-02-15 DIAGNOSIS — E11.51 TYPE 2 DIABETES MELLITUS WITH DIABETIC PERIPHERAL ANGIOPATHY WITHOUT GANGRENE, WITH LONG-TERM CURRENT USE OF INSULIN: ICD-10-CM

## 2022-02-15 DIAGNOSIS — Z79.4 TYPE 2 DIABETES MELLITUS WITHOUT COMPLICATION, WITH LONG-TERM CURRENT USE OF INSULIN: ICD-10-CM

## 2022-02-15 LAB
ALBUMIN SERPL BCP-MCNC: 3.6 G/DL (ref 3.5–5.2)
ALP SERPL-CCNC: 82 U/L (ref 55–135)
ALT SERPL W/O P-5'-P-CCNC: 53 U/L (ref 10–44)
ANION GAP SERPL CALC-SCNC: 12 MMOL/L (ref 8–16)
AST SERPL-CCNC: 55 U/L (ref 10–40)
BILIRUB SERPL-MCNC: 0.5 MG/DL (ref 0.1–1)
BUN SERPL-MCNC: 56 MG/DL (ref 8–23)
CALCIUM SERPL-MCNC: 10.6 MG/DL (ref 8.7–10.5)
CHLORIDE SERPL-SCNC: 91 MMOL/L (ref 95–110)
CO2 SERPL-SCNC: 32 MMOL/L (ref 23–29)
CREAT SERPL-MCNC: 3 MG/DL (ref 0.5–1.4)
ERYTHROCYTE [DISTWIDTH] IN BLOOD BY AUTOMATED COUNT: 15.5 % (ref 11.5–14.5)
EST. GFR  (AFRICAN AMERICAN): 15.5 ML/MIN/1.73 M^2
EST. GFR  (NON AFRICAN AMERICAN): 13.5 ML/MIN/1.73 M^2
GLUCOSE SERPL-MCNC: 113 MG/DL (ref 70–110)
HCT VFR BLD AUTO: 37.6 % (ref 37–48.5)
HGB BLD-MCNC: 11.6 G/DL (ref 12–16)
MCH RBC QN AUTO: 27.6 PG (ref 27–31)
MCHC RBC AUTO-ENTMCNC: 30.9 G/DL (ref 32–36)
MCV RBC AUTO: 90 FL (ref 82–98)
PLATELET # BLD AUTO: 353 K/UL (ref 150–450)
PMV BLD AUTO: 11.7 FL (ref 9.2–12.9)
POTASSIUM SERPL-SCNC: 4.5 MMOL/L (ref 3.5–5.1)
PROT SERPL-MCNC: 7.2 G/DL (ref 6–8.4)
RBC # BLD AUTO: 4.2 M/UL (ref 4–5.4)
SODIUM SERPL-SCNC: 135 MMOL/L (ref 136–145)
WBC # BLD AUTO: 9.69 K/UL (ref 3.9–12.7)

## 2022-02-15 PROCEDURE — 1111F DSCHRG MED/CURRENT MED MERGE: CPT | Mod: CPTII,S$GLB,, | Performed by: FAMILY MEDICINE

## 2022-02-15 PROCEDURE — 1101F PR PT FALLS ASSESS DOC 0-1 FALLS W/OUT INJ PAST YR: ICD-10-PCS | Mod: CPTII,S$GLB,, | Performed by: FAMILY MEDICINE

## 2022-02-15 PROCEDURE — 1159F PR MEDICATION LIST DOCUMENTED IN MEDICAL RECORD: ICD-10-PCS | Mod: CPTII,S$GLB,, | Performed by: FAMILY MEDICINE

## 2022-02-15 PROCEDURE — 99214 PR OFFICE/OUTPT VISIT, EST, LEVL IV, 30-39 MIN: ICD-10-PCS | Mod: S$GLB,,, | Performed by: FAMILY MEDICINE

## 2022-02-15 PROCEDURE — 80053 COMPREHEN METABOLIC PANEL: CPT | Performed by: FAMILY MEDICINE

## 2022-02-15 PROCEDURE — 99499 UNLISTED E&M SERVICE: CPT | Mod: S$GLB,,, | Performed by: FAMILY MEDICINE

## 2022-02-15 PROCEDURE — 1160F RVW MEDS BY RX/DR IN RCRD: CPT | Mod: CPTII,S$GLB,, | Performed by: FAMILY MEDICINE

## 2022-02-15 PROCEDURE — 36415 COLL VENOUS BLD VENIPUNCTURE: CPT | Mod: PN | Performed by: FAMILY MEDICINE

## 2022-02-15 PROCEDURE — 1101F PT FALLS ASSESS-DOCD LE1/YR: CPT | Mod: CPTII,S$GLB,, | Performed by: FAMILY MEDICINE

## 2022-02-15 PROCEDURE — 99214 OFFICE O/P EST MOD 30 MIN: CPT | Mod: S$GLB,,, | Performed by: FAMILY MEDICINE

## 2022-02-15 PROCEDURE — 99999 PR PBB SHADOW E&M-EST. PATIENT-LVL III: CPT | Mod: PBBFAC,,, | Performed by: FAMILY MEDICINE

## 2022-02-15 PROCEDURE — 1126F PR PAIN SEVERITY QUANTIFIED, NO PAIN PRESENT: ICD-10-PCS | Mod: CPTII,S$GLB,, | Performed by: FAMILY MEDICINE

## 2022-02-15 PROCEDURE — 1126F AMNT PAIN NOTED NONE PRSNT: CPT | Mod: CPTII,S$GLB,, | Performed by: FAMILY MEDICINE

## 2022-02-15 PROCEDURE — 3288F PR FALLS RISK ASSESSMENT DOCUMENTED: ICD-10-PCS | Mod: CPTII,S$GLB,, | Performed by: FAMILY MEDICINE

## 2022-02-15 PROCEDURE — 1159F MED LIST DOCD IN RCRD: CPT | Mod: CPTII,S$GLB,, | Performed by: FAMILY MEDICINE

## 2022-02-15 PROCEDURE — 99999 PR PBB SHADOW E&M-EST. PATIENT-LVL III: ICD-10-PCS | Mod: PBBFAC,,, | Performed by: FAMILY MEDICINE

## 2022-02-15 PROCEDURE — 99499 RISK ADDL DX/OHS AUDIT: ICD-10-PCS | Mod: S$GLB,,, | Performed by: FAMILY MEDICINE

## 2022-02-15 PROCEDURE — 85027 COMPLETE CBC AUTOMATED: CPT | Performed by: FAMILY MEDICINE

## 2022-02-15 PROCEDURE — 3288F FALL RISK ASSESSMENT DOCD: CPT | Mod: CPTII,S$GLB,, | Performed by: FAMILY MEDICINE

## 2022-02-15 PROCEDURE — 1111F PR DISCHARGE MEDS RECONCILED W/ CURRENT OUTPATIENT MED LIST: ICD-10-PCS | Mod: CPTII,S$GLB,, | Performed by: FAMILY MEDICINE

## 2022-02-15 PROCEDURE — 1160F PR REVIEW ALL MEDS BY PRESCRIBER/CLIN PHARMACIST DOCUMENTED: ICD-10-PCS | Mod: CPTII,S$GLB,, | Performed by: FAMILY MEDICINE

## 2022-02-15 NOTE — PROGRESS NOTES
"Subjective:       Patient ID: Denice Sheth is a 87 y.o. female.    Chief Complaint: Hospital Follow Up (Hospital f/u x 2 Dx CHF)    HPI  Patient in clinic for hospital f/u and review.   Had hospital admission x 2 for CHF.  She has had issues previously with fluid retention. Weights at home near-daily. No current fluid around the ankles.   Reviewed most recent labs from 1/2022. Renal function improved to Cr 2.1.   No issues with her BS at home, no hypoglycemia. Her daughter notes that when she does get hypoglycemic, she starts singing her words.     Review of Systems:  Review of Systems   Constitutional: Negative for fatigue and unexpected weight change.   HENT: Negative for congestion, hearing loss, postnasal drip, sore throat and trouble swallowing.    Eyes: Negative for photophobia and visual disturbance.   Respiratory: Negative for cough and wheezing.    Cardiovascular: Negative for chest pain and leg swelling (mild).   Gastrointestinal: Positive for constipation (cannot go without taking laxative and stool softener). Negative for blood in stool and diarrhea.        No gerd c/o   Endocrine: Positive for polyuria. Negative for polydipsia and polyphagia.   Genitourinary: Negative for difficulty urinating and dysuria.   Musculoskeletal: Positive for arthralgias. Negative for joint swelling.   Neurological: Negative for dizziness, tremors, seizures, speech difficulty, weakness and headaches.   Psychiatric/Behavioral: Positive for sleep disturbance. Negative for confusion. The patient is not nervous/anxious.        Objective:     Vitals:    02/15/22 0956   BP: (!) 126/52   BP Location: Right arm   Patient Position: Sitting   BP Method: Medium (Manual)   Pulse: (!) 56   Resp: 14   Temp: 97.7 °F (36.5 °C)   TempSrc: Oral   Weight: 75.6 kg (166 lb 10.7 oz)   Height: 5' 2" (1.575 m)          Physical Exam  Vitals and nursing note reviewed.   Constitutional:       General: She is not in acute distress.     Appearance: " She is well-developed. She is obese.   HENT:      Head: Normocephalic and atraumatic.   Eyes:      General: No scleral icterus.        Right eye: No discharge.         Left eye: No discharge.      Conjunctiva/sclera: Conjunctivae normal.   Cardiovascular:      Rate and Rhythm: Normal rate and regular rhythm.   Pulmonary:      Effort: Pulmonary effort is normal. No respiratory distress.      Breath sounds: Normal breath sounds.   Abdominal:      General: There is no distension.      Palpations: Abdomen is soft.   Musculoskeletal:      Cervical back: Neck supple.      Right lower leg: Edema present.      Left lower leg: Edema present.      Comments: 1+ edema   Lymphadenopathy:      Cervical: No cervical adenopathy.   Skin:     General: Skin is warm and dry.      Findings: No rash.   Neurological:      General: No focal deficit present.      Mental Status: She is alert and oriented to person, place, and time.   Psychiatric:         Mood and Affect: Mood normal.         Behavior: Behavior normal.           Assessment & Plan:  Type 2 diabetes mellitus with stage 4 chronic kidney disease, with long-term current use of insulin  -     CBC Without Differential; Future; Expected date: 02/15/2022  -     Comprehensive Metabolic Panel; Future; Expected date: 02/15/2022  -     Urinalysis, Reflex to Urine Culture Urine, Clean Catch; Future  -     Microalbumin/Creatinine Ratio, Urine; Future    Morbid (severe) obesity due to excess calories    Type 2 diabetes mellitus with diabetic peripheral angiopathy without gangrene, with long-term current use of insulin    Type 2 diabetes mellitus without complication, with long-term current use of insulin  -     blood sugar diagnostic (TRUE METRIX GLUCOSE TEST STRIP) Strp; 1 each by Misc.(Non-Drug; Combo Route) route 3 (three) times daily.  Dispense: 200 each; Refill: 11    BS controlled. Avoidance of hypoglycemia stressed.  Needs updated ua given possible ams.   She had lab/imaging following  onset, will cont to follow.

## 2022-02-16 ENCOUNTER — LAB VISIT (OUTPATIENT)
Dept: LAB | Facility: HOSPITAL | Age: 87
End: 2022-02-16
Attending: FAMILY MEDICINE
Payer: MEDICARE

## 2022-02-16 ENCOUNTER — PES CALL (OUTPATIENT)
Dept: ADMINISTRATIVE | Facility: CLINIC | Age: 87
End: 2022-02-16
Payer: MEDICARE

## 2022-02-16 DIAGNOSIS — R31.9 HEMATURIA, UNSPECIFIED TYPE: ICD-10-CM

## 2022-02-16 LAB
ALBUMIN/CREAT UR: 31.5 UG/MG (ref 0–30)
BACTERIA #/AREA URNS AUTO: ABNORMAL /HPF
BILIRUB UR QL STRIP: NEGATIVE
CLARITY UR REFRACT.AUTO: ABNORMAL
COLOR UR AUTO: YELLOW
CREAT UR-MCNC: 89 MG/DL (ref 15–325)
GLUCOSE UR QL STRIP: NEGATIVE
HGB UR QL STRIP: NEGATIVE
HYALINE CASTS UR QL AUTO: 22 /LPF
KETONES UR QL STRIP: NEGATIVE
LEUKOCYTE ESTERASE UR QL STRIP: ABNORMAL
MICROALBUMIN UR DL<=1MG/L-MCNC: 28 UG/ML
MICROSCOPIC COMMENT: ABNORMAL
NITRITE UR QL STRIP: NEGATIVE
PH UR STRIP: 6 [PH] (ref 5–8)
PROT UR QL STRIP: NEGATIVE
SP GR UR STRIP: 1.01 (ref 1–1.03)
SQUAMOUS #/AREA URNS AUTO: 3 /HPF
URN SPEC COLLECT METH UR: ABNORMAL
WBC #/AREA URNS AUTO: >100 /HPF (ref 0–5)
WBC CLUMPS UR QL AUTO: ABNORMAL

## 2022-02-16 PROCEDURE — 87186 SC STD MICRODIL/AGAR DIL: CPT | Performed by: FAMILY MEDICINE

## 2022-02-16 PROCEDURE — 87086 URINE CULTURE/COLONY COUNT: CPT | Performed by: FAMILY MEDICINE

## 2022-02-16 PROCEDURE — 87088 URINE BACTERIA CULTURE: CPT | Performed by: FAMILY MEDICINE

## 2022-02-16 PROCEDURE — 81001 URINALYSIS AUTO W/SCOPE: CPT | Performed by: FAMILY MEDICINE

## 2022-02-16 PROCEDURE — 82570 ASSAY OF URINE CREATININE: CPT | Performed by: FAMILY MEDICINE

## 2022-02-16 PROCEDURE — 82043 UR ALBUMIN QUANTITATIVE: CPT | Performed by: FAMILY MEDICINE

## 2022-02-16 PROCEDURE — 87077 CULTURE AEROBIC IDENTIFY: CPT | Performed by: FAMILY MEDICINE

## 2022-02-19 LAB — BACTERIA UR CULT: ABNORMAL

## 2022-02-20 ENCOUNTER — PATIENT MESSAGE (OUTPATIENT)
Dept: FAMILY MEDICINE | Facility: CLINIC | Age: 87
End: 2022-02-20
Payer: MEDICARE

## 2022-02-21 RX ORDER — FUROSEMIDE 80 MG/1
80 TABLET ORAL DAILY
Qty: 90 TABLET | Refills: 3 | Status: ON HOLD | OUTPATIENT
Start: 2022-02-21 | End: 2022-07-02 | Stop reason: SDUPTHER

## 2022-02-21 NOTE — TELEPHONE ENCOUNTER
No new care gaps identified.  Powered by [employer] by [employer]. Reference number: [id number].   2/21/2022 2:44:52 PM CST

## 2022-02-21 NOTE — TELEPHONE ENCOUNTER
LOV 02/15/22    meds pended  Please advise   Patient here for [facility]. Platelets 63, absolute neutrophils 0.83, temp 100.6. Per Dr. Edd Weldon, no treatment today. Plan to treat 12/10.

## 2022-02-22 ENCOUNTER — PATIENT MESSAGE (OUTPATIENT)
Dept: FAMILY MEDICINE | Facility: CLINIC | Age: 87
End: 2022-02-22
Payer: MEDICARE

## 2022-02-22 ENCOUNTER — TELEPHONE (OUTPATIENT)
Dept: FAMILY MEDICINE | Facility: CLINIC | Age: 87
End: 2022-02-22

## 2022-02-22 RX ORDER — CEPHALEXIN 500 MG/1
500 CAPSULE ORAL EVERY 12 HOURS
Qty: 14 CAPSULE | Refills: 0 | Status: SHIPPED | OUTPATIENT
Start: 2022-02-22 | End: 2022-04-11 | Stop reason: ALTCHOICE

## 2022-02-23 NOTE — PROGRESS NOTES
Patient, Denice Sheth (MRN #[id number]), presented with a recent estimated Glumerular Filtration Rate (eGFR) less than 15 consistent with the definition of chronic kidney disease stage 5 (ICD10 - N18.5).    eGFR if non    Date Value Ref Range Status   02/15/2022 13.5 (A) >60 mL/min/1.73 m^2 Final     Comment:     Calculation used to obtain the estimated glomerular filtration  rate (eGFR) is the CKD-EPI equation.          The patient's chronic kidney disease stage 5 was monitored, evaluated, addressed and/or treated. This addendum to the medical record is made on 02/23/2022.

## 2022-02-28 NOTE — TELEPHONE ENCOUNTER
No new care gaps identified.  Powered by [employer] by [employer]. Reference number: [id number].   2/28/2022 9:47:25 AM CST

## 2022-03-02 ENCOUNTER — PATIENT MESSAGE (OUTPATIENT)
Dept: FAMILY MEDICINE | Facility: CLINIC | Age: 87
End: 2022-03-02
Payer: MEDICARE

## 2022-03-02 RX ORDER — LEVOTHYROXINE SODIUM 75 UG/1
75 TABLET ORAL
Refills: 3
Start: 2022-03-03 | End: 2022-03-07 | Stop reason: SDUPTHER

## 2022-03-02 RX ORDER — LEVOTHYROXINE SODIUM 75 UG/1
150 TABLET ORAL
Qty: 90 TABLET | Refills: 3 | Status: SHIPPED | OUTPATIENT
Start: 2022-03-02 | End: 2022-04-11 | Stop reason: DRUGHIGH

## 2022-03-02 RX ORDER — LEVOTHYROXINE SODIUM 75 UG/1
TABLET ORAL
Qty: 90 TABLET | Refills: 0 | OUTPATIENT
Start: 2022-03-02

## 2022-03-02 NOTE — TELEPHONE ENCOUNTER
No new care gaps identified.  Powered by [employer] by [employer]. Reference number: [id number].   3/02/2022 10:28:42 AM CST

## 2022-03-02 NOTE — TELEPHONE ENCOUNTER
* pt*    Refill pended. Please advise.    11 16 16 LESS VA, MILD EDEMA, RECOM RETX OZURDEX GIVEN DROP IN VA, AND IOP IN 4 WKS, THEN REEVAL 2 MONTHS LATER.

## 2022-03-02 NOTE — TELEPHONE ENCOUNTER
Brenda DC. Request already responded to by other means (e.g. phone or fax)   Refill Authorization Note   Denice Sheth  is requesting a refill authorization.  Brief Assessment and Rationale for Refill:  Quick Discontinue  Medication Therapy Plan:       Medication Reconciliation Completed:  No      Comments:   Pended Medication(s)       Requested Prescriptions     Refused Prescriptions Disp Refills    levothyroxine (SYNTHROID) 75 MCG tablet [Pharmacy Med Name: Levothyroxine Sodium 75 MCG Oral Tablet] 90 tablet 0     Sig: TAKE 1 TABLET BY MOUTH ONCE DAILY IN THE MORNING     Refused By: BETHANIE LAWRENCE     Reason for Refusal: Request already responded to by other means (e.g. phone or fax)        Duplicate Pended Encounter(s)/ Last Prescribed Details: (includes pharmacy & prescriber details)   Ordering Encounter Report    Associated Reports   View Encounter                Note composed:2:15 PM 03/02/2022

## 2022-03-03 ENCOUNTER — DOCUMENT SCAN (OUTPATIENT)
Dept: HOME HEALTH SERVICES | Facility: HOSPITAL | Age: 87
End: 2022-03-03
Payer: MEDICARE

## 2022-03-04 ENCOUNTER — PATIENT MESSAGE (OUTPATIENT)
Dept: FAMILY MEDICINE | Facility: CLINIC | Age: 87
End: 2022-03-04
Payer: MEDICARE

## 2022-03-07 RX ORDER — LEVOTHYROXINE SODIUM 75 UG/1
75 TABLET ORAL
Qty: 90 TABLET | Refills: 3 | Status: SHIPPED | OUTPATIENT
Start: 2022-03-08 | End: 2022-04-11 | Stop reason: DRUGHIGH

## 2022-03-07 NOTE — TELEPHONE ENCOUNTER
No new care gaps identified.  Powered by [employer] by [employer]. Reference number: [id number].   3/07/2022 2:09:47 PM CST

## 2022-03-07 NOTE — TELEPHONE ENCOUNTER
Levothyroxine rx for Tues, Thurs, Sat and Sun was a No Print. Changed to Normal. Please resend. Pt aware we will send this in.

## 2022-03-07 NOTE — TELEPHONE ENCOUNTER
----- Message from Donnell Wood sent at 3/7/2022  9:56 AM CST -----  Regarding: Resent Rx  Contact: Sadiq/Parisa  Type: Needs Medical Advice  Who Called:  Dtr/Parisa  Symptoms (please be specific):  n/a  How long has patient had these symptoms:  n/a  Pharmacy name and phone #:    [facility] - [place] - [address]  [address]  [place] [address]  Phone: [phone] Fax: [phone]      Best Call Back Number: [phone]  Additional Information: Parisa called in and stated the pharmacy never received the below Rx and needs it resent to the above.    levothyroxine (EUTHYROX) 75 MCG tablet  3 3/3/2022  No  Sig - Route: Take 1 tablet (75 mcg total) by mouth every Tuesday, Thursday, Saturday, Sunday. - Oral  Class: No Print    Also, pharmacy stated the below Rx was only for 30 day supply?    levothyroxine (SYNTHROID) 75 MCG tablet  90 tablet 3 3/2/2022  No  Sig - Route: Take 2 tablets (150 mcg total) by mouth every Mon, Wed, Fri. - Oral  Sent to pharmacy as: levothyroxine (SYNTHROID) 75 MCG tablet

## 2022-03-16 ENCOUNTER — LAB VISIT (OUTPATIENT)
Dept: LAB | Facility: HOSPITAL | Age: 87
End: 2022-03-16
Attending: INTERNAL MEDICINE
Payer: MEDICARE

## 2022-03-16 DIAGNOSIS — N18.30 STAGE 3 CHRONIC KIDNEY DISEASE, UNSPECIFIED WHETHER STAGE 3A OR 3B CKD: ICD-10-CM

## 2022-03-16 PROCEDURE — 84156 ASSAY OF PROTEIN URINE: CPT | Performed by: INTERNAL MEDICINE

## 2022-03-17 LAB
CREAT UR-MCNC: 50 MG/DL (ref 15–325)
PROT UR-MCNC: 20 MG/DL (ref 0–15)
PROT/CREAT UR: 0.4 MG/G{CREAT} (ref 0–0.2)

## 2022-03-18 ENCOUNTER — PATIENT MESSAGE (OUTPATIENT)
Dept: NEPHROLOGY | Facility: CLINIC | Age: 87
End: 2022-03-18
Payer: MEDICARE

## 2022-03-20 ENCOUNTER — PATIENT MESSAGE (OUTPATIENT)
Dept: FAMILY MEDICINE | Facility: CLINIC | Age: 87
End: 2022-03-20
Payer: MEDICARE

## 2022-03-21 RX ORDER — METOPROLOL TARTRATE 50 MG/1
50 TABLET ORAL 2 TIMES DAILY
Qty: 60 TABLET | Refills: 1 | Status: SHIPPED | OUTPATIENT
Start: 2022-03-21 | End: 2024-05-18

## 2022-03-21 NOTE — TELEPHONE ENCOUNTER
No new care gaps identified.  Powered by [employer] by [employer]. Reference number: [id number].   3/21/2022 10:10:58 AM CDT

## 2022-03-21 NOTE — TELEPHONE ENCOUNTER
See [employer] message.   Re: Metoprolol 50mg BID. Pended med. Please review and advise. Upcoming appt 5/16/22.

## 2022-03-23 ENCOUNTER — TELEPHONE (OUTPATIENT)
Dept: NEPHROLOGY | Facility: CLINIC | Age: 87
End: 2022-03-23
Payer: MEDICARE

## 2022-03-23 NOTE — TELEPHONE ENCOUNTER
----- Message from Marleny Hansen, Patient Care Assistant sent at 3/23/2022  1:06 PM CDT -----  Regarding: appointment  Contact: moises young's daughter  Type: Needs Medical Advice  Who Called:  moises young's daughter   Best Call Back Number: [phone] (home)   Additional Information: moises young's daughter  states she would like a callback regarding pt's appointment on 3/30/22. Thanks!

## 2022-04-04 ENCOUNTER — OFFICE VISIT (OUTPATIENT)
Dept: NEPHROLOGY | Facility: CLINIC | Age: 87
End: 2022-04-04
Payer: MEDICARE

## 2022-04-04 VITALS
HEIGHT: 62 IN | BODY MASS INDEX: 30.48 KG/M2 | SYSTOLIC BLOOD PRESSURE: 134 MMHG | DIASTOLIC BLOOD PRESSURE: 52 MMHG | OXYGEN SATURATION: 99 % | HEART RATE: 60 BPM

## 2022-04-04 DIAGNOSIS — E11.29 PROTEINURIA DUE TO TYPE 2 DIABETES MELLITUS: ICD-10-CM

## 2022-04-04 DIAGNOSIS — E11.21 DIABETIC NEPHROPATHY ASSOCIATED WITH TYPE 2 DIABETES MELLITUS: ICD-10-CM

## 2022-04-04 DIAGNOSIS — N18.4 CKD (CHRONIC KIDNEY DISEASE) STAGE 4, GFR 15-29 ML/MIN: Primary | ICD-10-CM

## 2022-04-04 DIAGNOSIS — I12.9 BENIGN HYPERTENSIVE RENAL DISEASE WITHOUT RENAL FAILURE: ICD-10-CM

## 2022-04-04 DIAGNOSIS — R80.9 PROTEINURIA DUE TO TYPE 2 DIABETES MELLITUS: ICD-10-CM

## 2022-04-04 DIAGNOSIS — E87.1 HYPONATREMIA: ICD-10-CM

## 2022-04-04 PROCEDURE — 99999 PR PBB SHADOW E&M-EST. PATIENT-LVL IV: ICD-10-PCS | Mod: PBBFAC,,, | Performed by: INTERNAL MEDICINE

## 2022-04-04 PROCEDURE — 99499 RISK ADDL DX/OHS AUDIT: ICD-10-PCS | Mod: S$GLB,,, | Performed by: INTERNAL MEDICINE

## 2022-04-04 PROCEDURE — 1159F PR MEDICATION LIST DOCUMENTED IN MEDICAL RECORD: ICD-10-PCS | Mod: CPTII,S$GLB,, | Performed by: INTERNAL MEDICINE

## 2022-04-04 PROCEDURE — 3288F FALL RISK ASSESSMENT DOCD: CPT | Mod: CPTII,S$GLB,, | Performed by: INTERNAL MEDICINE

## 2022-04-04 PROCEDURE — 99214 OFFICE O/P EST MOD 30 MIN: CPT | Mod: S$GLB,,, | Performed by: INTERNAL MEDICINE

## 2022-04-04 PROCEDURE — 99214 PR OFFICE/OUTPT VISIT, EST, LEVL IV, 30-39 MIN: ICD-10-PCS | Mod: S$GLB,,, | Performed by: INTERNAL MEDICINE

## 2022-04-04 PROCEDURE — 1101F PR PT FALLS ASSESS DOC 0-1 FALLS W/OUT INJ PAST YR: ICD-10-PCS | Mod: CPTII,S$GLB,, | Performed by: INTERNAL MEDICINE

## 2022-04-04 PROCEDURE — 1101F PT FALLS ASSESS-DOCD LE1/YR: CPT | Mod: CPTII,S$GLB,, | Performed by: INTERNAL MEDICINE

## 2022-04-04 PROCEDURE — 99499 UNLISTED E&M SERVICE: CPT | Mod: S$GLB,,, | Performed by: INTERNAL MEDICINE

## 2022-04-04 PROCEDURE — 1159F MED LIST DOCD IN RCRD: CPT | Mod: CPTII,S$GLB,, | Performed by: INTERNAL MEDICINE

## 2022-04-04 PROCEDURE — 99999 PR PBB SHADOW E&M-EST. PATIENT-LVL IV: CPT | Mod: PBBFAC,,, | Performed by: INTERNAL MEDICINE

## 2022-04-04 PROCEDURE — 3288F PR FALLS RISK ASSESSMENT DOCUMENTED: ICD-10-PCS | Mod: CPTII,S$GLB,, | Performed by: INTERNAL MEDICINE

## 2022-04-04 NOTE — PROGRESS NOTES
"Subjective:       Patient ID: Denice Sheth is a 87 y.o. White female who presents for return patient evaluation for chronic renal failure.      The visit was in person with her daughter.   She has had pain in her right hip for the past week with no injury.  She has no uremic or urinary symptoms.  She had a large dye load in January and has not had recovery of her renal function yet.      Review of Systems   Constitutional: Negative for appetite change, chills and fever.   Eyes: Negative for visual disturbance.   Respiratory: Positive for shortness of breath (with exertion). Negative for cough.    Cardiovascular: Negative for chest pain and leg swelling.   Gastrointestinal: Negative for abdominal pain, diarrhea, nausea and vomiting.   Genitourinary: Negative for difficulty urinating, dysuria and hematuria.   Musculoskeletal: Positive for arthralgias (right knee) and gait problem. Negative for myalgias.   Skin: Negative for rash.   Neurological: Positive for weakness. Negative for headaches.   Hematological: Bruises/bleeds easily.   Psychiatric/Behavioral: Negative for sleep disturbance.       The past medical, family and social histories were reviewed for this encounter.     BP (!) 134/52 (BP Location: Left arm, Patient Position: Sitting, BP Method: Large (Manual))   Pulse 60   Ht 5' 2" (1.575 m)   LMP  (LMP Unknown)   SpO2 99%   BMI 30.48 kg/m²     Objective:      Physical Exam  Vitals reviewed.   Constitutional:       General: She is not in acute distress.     Appearance: She is well-developed. She is obese.   HENT:      Head: Normocephalic and atraumatic.   Eyes:      General: No scleral icterus.     Conjunctiva/sclera: Conjunctivae normal.   Neck:      Vascular: No JVD.   Cardiovascular:      Rate and Rhythm: Normal rate and regular rhythm.      Heart sounds: Normal heart sounds. No murmur heard.    No friction rub. No gallop.   Pulmonary:      Effort: Pulmonary effort is normal. No respiratory distress. "      Breath sounds: Normal breath sounds. No wheezing.   Abdominal:      General: Bowel sounds are normal. There is no distension.      Palpations: Abdomen is soft.      Tenderness: There is no abdominal tenderness.   Musculoskeletal:      Cervical back: Normal range of motion.      Right lower leg: No edema.      Left lower leg: No edema.   Skin:     General: Skin is warm and dry.      Findings: No rash.   Neurological:      Mental Status: She is alert and oriented to person, place, and time.   Psychiatric:         Mood and Affect: Mood normal.         Behavior: Behavior normal.        Assessment:       1. CKD (chronic kidney disease) stage 4, GFR 15-29 ml/min    2. Hyponatremia    3. 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 Diabetic nephropathy associated with type 2 diabetes mellitus    5. Proteinuria due to type 2 diabetes mellitus        Plan:   Return to clinic in 3 months.  Labs for next visit include rp pth cbc   Baseline creatinine is 1.4-1.6 since 2015.  She has not recovered back top her baseline and is ranging between 2.2 and 3.2.  PTH is 66 with a calcium of 10.2.  UPC is 0.4.  Renal US shows R 9.7 cm L 10.9 cm with no cysts/stones/masses.  Blood pressure is controlled on the current regimen.  Continue current medications as prescribed and reviewed.   She had JANETTE secondary to a contrast load.  She does not wish to do dialysis.  I will send Meredith Baird MD a message about her right hip which seems musculoskeletal.

## 2022-04-08 ENCOUNTER — PATIENT MESSAGE (OUTPATIENT)
Dept: FAMILY MEDICINE | Facility: CLINIC | Age: 87
End: 2022-04-08
Payer: MEDICARE

## 2022-04-08 ENCOUNTER — TELEPHONE (OUTPATIENT)
Dept: FAMILY MEDICINE | Facility: CLINIC | Age: 87
End: 2022-04-08
Payer: MEDICARE

## 2022-04-08 NOTE — TELEPHONE ENCOUNTER
----- Message from Jonathan Pace MD sent at 4/4/2022  5:50 PM CDT -----  Please contact her about new right hip pain which has occurred in the past week.  Thanks!

## 2022-04-11 ENCOUNTER — OFFICE VISIT (OUTPATIENT)
Dept: FAMILY MEDICINE | Facility: CLINIC | Age: 87
End: 2022-04-11
Payer: MEDICARE

## 2022-04-11 VITALS
SYSTOLIC BLOOD PRESSURE: 120 MMHG | HEART RATE: 62 BPM | HEIGHT: 62 IN | WEIGHT: 165.69 LBS | DIASTOLIC BLOOD PRESSURE: 52 MMHG | BODY MASS INDEX: 30.49 KG/M2

## 2022-04-11 DIAGNOSIS — E03.9 HYPOTHYROIDISM, UNSPECIFIED TYPE: ICD-10-CM

## 2022-04-11 DIAGNOSIS — M62.838 MUSCLE SPASM: Primary | ICD-10-CM

## 2022-04-11 DIAGNOSIS — E11.21 DIABETIC NEPHROPATHY ASSOCIATED WITH TYPE 2 DIABETES MELLITUS: ICD-10-CM

## 2022-04-11 PROCEDURE — 99214 PR OFFICE/OUTPT VISIT, EST, LEVL IV, 30-39 MIN: ICD-10-PCS | Mod: S$GLB,,, | Performed by: FAMILY MEDICINE

## 2022-04-11 PROCEDURE — 1101F PT FALLS ASSESS-DOCD LE1/YR: CPT | Mod: CPTII,S$GLB,, | Performed by: FAMILY MEDICINE

## 2022-04-11 PROCEDURE — 1160F PR REVIEW ALL MEDS BY PRESCRIBER/CLIN PHARMACIST DOCUMENTED: ICD-10-PCS | Mod: CPTII,S$GLB,, | Performed by: FAMILY MEDICINE

## 2022-04-11 PROCEDURE — 99999 PR PBB SHADOW E&M-EST. PATIENT-LVL IV: CPT | Mod: PBBFAC,,, | Performed by: FAMILY MEDICINE

## 2022-04-11 PROCEDURE — 1159F MED LIST DOCD IN RCRD: CPT | Mod: CPTII,S$GLB,, | Performed by: FAMILY MEDICINE

## 2022-04-11 PROCEDURE — 99999 PR PBB SHADOW E&M-EST. PATIENT-LVL IV: ICD-10-PCS | Mod: PBBFAC,,, | Performed by: FAMILY MEDICINE

## 2022-04-11 PROCEDURE — 1125F AMNT PAIN NOTED PAIN PRSNT: CPT | Mod: CPTII,S$GLB,, | Performed by: FAMILY MEDICINE

## 2022-04-11 PROCEDURE — 3288F PR FALLS RISK ASSESSMENT DOCUMENTED: ICD-10-PCS | Mod: CPTII,S$GLB,, | Performed by: FAMILY MEDICINE

## 2022-04-11 PROCEDURE — 3288F FALL RISK ASSESSMENT DOCD: CPT | Mod: CPTII,S$GLB,, | Performed by: FAMILY MEDICINE

## 2022-04-11 PROCEDURE — 1160F RVW MEDS BY RX/DR IN RCRD: CPT | Mod: CPTII,S$GLB,, | Performed by: FAMILY MEDICINE

## 2022-04-11 PROCEDURE — 99214 OFFICE O/P EST MOD 30 MIN: CPT | Mod: S$GLB,,, | Performed by: FAMILY MEDICINE

## 2022-04-11 PROCEDURE — 1101F PR PT FALLS ASSESS DOC 0-1 FALLS W/OUT INJ PAST YR: ICD-10-PCS | Mod: CPTII,S$GLB,, | Performed by: FAMILY MEDICINE

## 2022-04-11 PROCEDURE — 1125F PR PAIN SEVERITY QUANTIFIED, PAIN PRESENT: ICD-10-PCS | Mod: CPTII,S$GLB,, | Performed by: FAMILY MEDICINE

## 2022-04-11 PROCEDURE — 1159F PR MEDICATION LIST DOCUMENTED IN MEDICAL RECORD: ICD-10-PCS | Mod: CPTII,S$GLB,, | Performed by: FAMILY MEDICINE

## 2022-04-11 RX ORDER — LEVOTHYROXINE SODIUM 75 UG/1
TABLET ORAL
Qty: 52 TABLET | Refills: 11 | Status: ON HOLD | OUTPATIENT
Start: 2022-04-11 | End: 2022-07-02 | Stop reason: HOSPADM

## 2022-04-11 RX ORDER — CYCLOBENZAPRINE HCL 5 MG
5 TABLET ORAL 3 TIMES DAILY PRN
Qty: 20 TABLET | Refills: 0 | Status: SHIPPED | OUTPATIENT
Start: 2022-04-11 | End: 2022-04-21

## 2022-04-11 RX ORDER — DICLOFENAC SODIUM 10 MG/G
2 GEL TOPICAL 2 TIMES DAILY PRN
Qty: 100 G | Refills: 1 | Status: SHIPPED | OUTPATIENT
Start: 2022-04-11

## 2022-04-11 NOTE — PROGRESS NOTES
"Subjective:       Patient ID: Denice Sheth is a 87 y.o. female.    Chief Complaint: Back Pain and Hip Pain (L low back pain radiates across back. No known injury)    HPI  Patient with c/o R flank pain x 1 week. They've used heating pad and tylenol. She gets some relief with this and then the pain subsides. Worse with movement.   Needs correction to thyroid regimen.     Review of Systems:  Review of Systems   Constitutional: Negative for fatigue and unexpected weight change.   HENT: Negative for congestion and postnasal drip.    Eyes: Negative for photophobia and visual disturbance.   Respiratory: Negative for cough and wheezing.    Cardiovascular: Negative for chest pain and leg swelling (mild).   Gastrointestinal: Positive for constipation (cannot go without taking laxative and stool softener). Negative for blood in stool.        No gerd c/o   Genitourinary: Negative for difficulty urinating and dysuria.   Musculoskeletal: Positive for arthralgias and myalgias. Negative for joint swelling.   Neurological: Negative for dizziness, tremors, seizures, speech difficulty, weakness and headaches.   Psychiatric/Behavioral: Positive for sleep disturbance. Negative for confusion. The patient is not nervous/anxious.        Objective:     Vitals:    04/11/22 1316   BP: (!) 120/52   BP Location: Right arm   Pulse: 62   Weight: 75.1 kg (165 lb 10.8 oz)   Height: 5' 2" (1.575 m)          Physical Exam  Vitals and nursing note reviewed.   Constitutional:       General: She is not in acute distress.     Appearance: Normal appearance. She is well-developed. She is obese.   HENT:      Head: Normocephalic and atraumatic.   Eyes:      General: No scleral icterus.        Right eye: No discharge.         Left eye: No discharge.      Conjunctiva/sclera: Conjunctivae normal.   Cardiovascular:      Rate and Rhythm: Normal rate.   Pulmonary:      Effort: Pulmonary effort is normal. No respiratory distress.      Breath sounds: Normal " breath sounds.   Abdominal:      General: There is no distension.      Palpations: Abdomen is soft.   Musculoskeletal:         General: Tenderness (R sciatic notch) present.      Cervical back: Neck supple.      Right lower leg: No edema.      Left lower leg: No edema.   Skin:     General: Skin is warm and dry.      Findings: No rash.   Neurological:      General: No focal deficit present.      Mental Status: She is alert and oriented to person, place, and time.   Psychiatric:         Mood and Affect: Mood normal.         Behavior: Behavior normal.           Assessment & Plan:  Muscle spasm  Comments:  sparing topical nsaids, flexeril 1/2-1 tab prn    Diabetic nephropathy associated with type 2 diabetes mellitus  Comments:  a1c with next lab draw  Orders:  -     Hemoglobin A1C; Future; Expected date: 04/11/2022    Hypothyroidism, unspecified type  Comments:  corrected rx    Other orders  -     diclofenac sodium (VOLTAREN ARTHRITIS PAIN) 1 % Gel; Apply 2 g topically 2 (two) times daily as needed (muscle pain).  Dispense: 100 g; Refill: 1  -     cyclobenzaprine (FLEXERIL) 5 MG tablet; Take 1 tablet (5 mg total) by mouth 3 (three) times daily as needed for Muscle spasms.  Dispense: 20 tablet; Refill: 0  -     levothyroxine (SYNTHROID) 75 MCG tablet; Take 2 tablets (150 mcg total) by mouth every Mon, Wed, Fri AND 1 tablet (75 mcg total) every Tuesday, Thursday, Saturday, Sunday.  Dispense: 52 tablet; Refill: 11

## 2022-04-14 ENCOUNTER — PATIENT MESSAGE (OUTPATIENT)
Dept: FAMILY MEDICINE | Facility: CLINIC | Age: 87
End: 2022-04-14
Payer: MEDICARE

## 2022-04-14 DIAGNOSIS — S41.112A SKIN TEAR OF LEFT UPPER ARM WITHOUT COMPLICATION, INITIAL ENCOUNTER: Primary | ICD-10-CM

## 2022-04-18 ENCOUNTER — TELEPHONE (OUTPATIENT)
Dept: FAMILY MEDICINE | Facility: CLINIC | Age: 87
End: 2022-04-18
Payer: MEDICARE

## 2022-04-18 ENCOUNTER — PATIENT MESSAGE (OUTPATIENT)
Dept: FAMILY MEDICINE | Facility: CLINIC | Age: 87
End: 2022-04-18
Payer: MEDICARE

## 2022-04-18 NOTE — TELEPHONE ENCOUNTER
----- Message from Olga Burnett sent at 4/18/2022  8:06 AM CDT -----  Who Called: Daughter    What is the reqeust in detail: Requesting call back to discuss a skin tear on her left arm and daughter has been taking care of the bleeding with wrapping and dressings. But she has been bleeding since last week Thursday and daughter does not know what else she could be doing to stop the bleeding. Please advise.     Can the clinic reply by [facility]? Yes    Best Call Back Number: [phone]    Additional Information: Please advise.

## 2022-04-18 NOTE — TELEPHONE ENCOUNTER
Signed wound care. If she has HH, we can have them manage it instead.   Ok to use [facility] in interim, keep wound covered.

## 2022-04-19 ENCOUNTER — TELEPHONE (OUTPATIENT)
Dept: FAMILY MEDICINE | Facility: CLINIC | Age: 87
End: 2022-04-19
Payer: MEDICARE

## 2022-04-19 NOTE — TELEPHONE ENCOUNTER
----- Message from Lucho May sent at 4/19/2022  1:14 PM CDT -----  Contact: Angélica from [facility] at [phone]  Type: Needs Medical Advice  Who Called:  Angélica  Best Call Back Number: [phone]  Additional Information: Angélica from [facility] is calling the office the pt's daughter would like home health wound care instead of out patient she would like [facility] can she faxx orders medical Necessity Form and Clinical notes and fax it to [facility] at [phone]

## 2022-04-22 ENCOUNTER — TELEPHONE (OUTPATIENT)
Dept: FAMILY MEDICINE | Facility: CLINIC | Age: 87
End: 2022-04-22
Payer: MEDICARE

## 2022-04-22 DIAGNOSIS — S41.112A SKIN TEAR OF LEFT UPPER ARM WITHOUT COMPLICATION, INITIAL ENCOUNTER: Primary | ICD-10-CM

## 2022-04-22 NOTE — TELEPHONE ENCOUNTER
*Dr. Baird pt*    Spoke with Angélica from [facility].  Pt needs new order for home health specifying skilled nursing for home health wound care.   Referral pended, please advise.   Pt has been dealing with wound for a while and daughter would like to have it assessed and taken care of sooner rather than later.

## 2022-04-22 NOTE — TELEPHONE ENCOUNTER
----- Message from Nataly Gallegos sent at 4/22/2022 10:34 AM CDT -----    Patient Call Back    Who Called: Angélica/ [facility]    What is the request in detail: Angélica calling to speak with someone regarding the request that was requested for the pt to have home healthcare on 4/19. Angélica stated that she received the wrong information. Pls advise.     Can the clinic reply by [facility]?    Best Call Back Number: [phone]

## 2022-04-25 DIAGNOSIS — G31.84 MCI (MILD COGNITIVE IMPAIRMENT) WITH MEMORY LOSS: ICD-10-CM

## 2022-04-26 RX ORDER — RIVASTIGMINE TARTRATE 1.5 MG/1
CAPSULE ORAL
Qty: 60 CAPSULE | Refills: 0 | Status: SHIPPED | OUTPATIENT
Start: 2022-04-26 | End: 2022-05-26

## 2022-04-27 PROCEDURE — G0180 PR HOME HEALTH MD CERTIFICATION: ICD-10-PCS | Mod: ,,, | Performed by: FAMILY MEDICINE

## 2022-04-27 PROCEDURE — G0180 MD CERTIFICATION HHA PATIENT: HCPCS | Mod: ,,, | Performed by: FAMILY MEDICINE

## 2022-05-02 ENCOUNTER — PATIENT OUTREACH (OUTPATIENT)
Dept: ADMINISTRATIVE | Facility: HOSPITAL | Age: 87
End: 2022-05-02
Payer: MEDICARE

## 2022-05-02 ENCOUNTER — PATIENT MESSAGE (OUTPATIENT)
Dept: ADMINISTRATIVE | Facility: HOSPITAL | Age: 87
End: 2022-05-02
Payer: MEDICARE

## 2022-05-02 NOTE — PROGRESS NOTES
2022 Care Everywhere updates requested and reviewed.  Immunizations reconciled. Media reports reviewed.  Duplicate HM overrides and  orders removed.  Overdue HM topic chart audit and/or requested.  Overdue lab testing linked to upcoming lab appointments if applies.  Lab [employer], and [facility] reviewed    Lab testing       Health Maintenance Due   Topic Date Due    Shingles Vaccine (2 of 3) 2009    Foot Exam  2020    COVID-19 Vaccine (3 - Booster for Pfizer series) 2021    Hemoglobin A1c  2022

## 2022-05-16 ENCOUNTER — OFFICE VISIT (OUTPATIENT)
Dept: FAMILY MEDICINE | Facility: CLINIC | Age: 87
End: 2022-05-16
Payer: MEDICARE

## 2022-05-16 VITALS
HEIGHT: 62 IN | WEIGHT: 169.63 LBS | HEART RATE: 70 BPM | DIASTOLIC BLOOD PRESSURE: 64 MMHG | SYSTOLIC BLOOD PRESSURE: 130 MMHG | BODY MASS INDEX: 31.21 KG/M2

## 2022-05-16 DIAGNOSIS — N18.30 TYPE 2 DIABETES MELLITUS WITH STAGE 3 CHRONIC KIDNEY DISEASE, WITH LONG-TERM CURRENT USE OF INSULIN, UNSPECIFIED WHETHER STAGE 3A OR 3B CKD: Primary | ICD-10-CM

## 2022-05-16 DIAGNOSIS — G47.9 TROUBLE IN SLEEPING: ICD-10-CM

## 2022-05-16 DIAGNOSIS — Z79.4 TYPE 2 DIABETES MELLITUS WITH STAGE 3 CHRONIC KIDNEY DISEASE, WITH LONG-TERM CURRENT USE OF INSULIN, UNSPECIFIED WHETHER STAGE 3A OR 3B CKD: Primary | ICD-10-CM

## 2022-05-16 DIAGNOSIS — I50.32 CHRONIC DIASTOLIC CONGESTIVE HEART FAILURE: ICD-10-CM

## 2022-05-16 DIAGNOSIS — D50.9 IRON DEFICIENCY ANEMIA, UNSPECIFIED IRON DEFICIENCY ANEMIA TYPE: ICD-10-CM

## 2022-05-16 DIAGNOSIS — E11.22 TYPE 2 DIABETES MELLITUS WITH STAGE 3 CHRONIC KIDNEY DISEASE, WITH LONG-TERM CURRENT USE OF INSULIN, UNSPECIFIED WHETHER STAGE 3A OR 3B CKD: Primary | ICD-10-CM

## 2022-05-16 PROCEDURE — 3288F FALL RISK ASSESSMENT DOCD: CPT | Mod: CPTII,S$GLB,, | Performed by: FAMILY MEDICINE

## 2022-05-16 PROCEDURE — 1126F AMNT PAIN NOTED NONE PRSNT: CPT | Mod: CPTII,S$GLB,, | Performed by: FAMILY MEDICINE

## 2022-05-16 PROCEDURE — 1101F PT FALLS ASSESS-DOCD LE1/YR: CPT | Mod: CPTII,S$GLB,, | Performed by: FAMILY MEDICINE

## 2022-05-16 PROCEDURE — 99999 PR PBB SHADOW E&M-EST. PATIENT-LVL III: CPT | Mod: PBBFAC,,, | Performed by: FAMILY MEDICINE

## 2022-05-16 PROCEDURE — 1159F PR MEDICATION LIST DOCUMENTED IN MEDICAL RECORD: ICD-10-PCS | Mod: CPTII,S$GLB,, | Performed by: FAMILY MEDICINE

## 2022-05-16 PROCEDURE — 99214 PR OFFICE/OUTPT VISIT, EST, LEVL IV, 30-39 MIN: ICD-10-PCS | Mod: S$GLB,,, | Performed by: FAMILY MEDICINE

## 2022-05-16 PROCEDURE — 99999 PR PBB SHADOW E&M-EST. PATIENT-LVL III: ICD-10-PCS | Mod: PBBFAC,,, | Performed by: FAMILY MEDICINE

## 2022-05-16 PROCEDURE — 99499 RISK ADDL DX/OHS AUDIT: ICD-10-PCS | Mod: S$GLB,,, | Performed by: FAMILY MEDICINE

## 2022-05-16 PROCEDURE — 1160F RVW MEDS BY RX/DR IN RCRD: CPT | Mod: CPTII,S$GLB,, | Performed by: FAMILY MEDICINE

## 2022-05-16 PROCEDURE — 1159F MED LIST DOCD IN RCRD: CPT | Mod: CPTII,S$GLB,, | Performed by: FAMILY MEDICINE

## 2022-05-16 PROCEDURE — 99214 OFFICE O/P EST MOD 30 MIN: CPT | Mod: S$GLB,,, | Performed by: FAMILY MEDICINE

## 2022-05-16 PROCEDURE — 99499 UNLISTED E&M SERVICE: CPT | Mod: S$GLB,,, | Performed by: FAMILY MEDICINE

## 2022-05-16 PROCEDURE — 3288F PR FALLS RISK ASSESSMENT DOCUMENTED: ICD-10-PCS | Mod: CPTII,S$GLB,, | Performed by: FAMILY MEDICINE

## 2022-05-16 PROCEDURE — 1101F PR PT FALLS ASSESS DOC 0-1 FALLS W/OUT INJ PAST YR: ICD-10-PCS | Mod: CPTII,S$GLB,, | Performed by: FAMILY MEDICINE

## 2022-05-16 PROCEDURE — 1126F PR PAIN SEVERITY QUANTIFIED, NO PAIN PRESENT: ICD-10-PCS | Mod: CPTII,S$GLB,, | Performed by: FAMILY MEDICINE

## 2022-05-16 PROCEDURE — 1160F PR REVIEW ALL MEDS BY PRESCRIBER/CLIN PHARMACIST DOCUMENTED: ICD-10-PCS | Mod: CPTII,S$GLB,, | Performed by: FAMILY MEDICINE

## 2022-05-16 RX ORDER — MIRTAZAPINE 15 MG/1
15 TABLET, FILM COATED ORAL NIGHTLY
Qty: 30 TABLET | Refills: 1 | Status: SHIPPED | OUTPATIENT
Start: 2022-05-16 | End: 2022-07-12

## 2022-05-16 NOTE — PROGRESS NOTES
"Subjective:       Patient ID: Denice Sheth is a 87 y.o. female.    Chief Complaint: Diabetes (3 mo f/u)    HPI  Patient in clinic for f/u, accompanied by daughter who assists in care.   BP controlled.  DM2 without hypoglycemia. BS this morning 137.  Notes she doesn't sleep well. Going to the [facility] center during the day, but not sleeping more than a few hours at night. Not sure when it changed from her sleeping well to this.     Review of Systems:  Review of Systems   Constitutional: Positive for fatigue. Negative for unexpected weight change.   HENT: Negative for congestion and postnasal drip.    Eyes: Negative for photophobia and visual disturbance.   Respiratory: Negative for cough and wheezing.    Cardiovascular: Negative for chest pain and leg swelling (mild).   Gastrointestinal: Positive for constipation (cannot go without taking laxative and stool softener). Negative for blood in stool.        No gerd c/o   Genitourinary: Negative for difficulty urinating and dysuria.   Musculoskeletal: Positive for arthralgias and myalgias. Negative for joint swelling.   Neurological: Negative for dizziness, weakness and headaches.   Psychiatric/Behavioral: Positive for sleep disturbance. The patient is not nervous/anxious.        Objective:     Vitals:    05/16/22 1023   BP: 130/64   BP Location: Left arm   Pulse: 70   Weight: 76.9 kg (169 lb 10.3 oz)   Height: 5' 2" (1.575 m)          Physical Exam  Vitals and nursing note reviewed.   Constitutional:       General: She is not in acute distress.     Appearance: Normal appearance. She is well-developed. She is obese.   HENT:      Head: Normocephalic and atraumatic.   Eyes:      General: No scleral icterus.        Right eye: No discharge.         Left eye: No discharge.      Conjunctiva/sclera: Conjunctivae normal.   Cardiovascular:      Rate and Rhythm: Normal rate.   Pulmonary:      Effort: Pulmonary effort is normal. No respiratory distress.      Breath sounds: " Normal breath sounds.   Abdominal:      General: There is no distension.      Palpations: Abdomen is soft.   Musculoskeletal:         General: Tenderness (R sciatic notch) present.      Cervical back: Neck supple.      Right lower leg: No edema.      Left lower leg: No edema.   Skin:     General: Skin is warm and dry.      Findings: No rash.   Neurological:      General: No focal deficit present.      Mental Status: She is alert and oriented to person, place, and time.   Psychiatric:         Mood and Affect: Mood normal.         Behavior: Behavior normal.           Assessment & Plan:  Type 2 diabetes mellitus with stage 3 chronic kidney disease, with long-term current use of insulin, unspecified whether stage 3a or 3b CKD  Comments:  cont regimen  target A1c <8%  Orders:  -     Hepatic Function Panel; Future; Expected date: 05/16/2022    Trouble in sleeping  Comments:  trial of mirtazapine, low dose    Iron deficiency anemia, unspecified iron deficiency anemia type  Comments:  improved on last check, repeat with upcoming lab in July  Orders:  -     Iron; Future; Expected date: 05/16/2022    Chronic diastolic congestive heart failure  -     NT-Pro Natriuretic Peptide; Future; Expected date: 05/16/2022    Other orders  -     mirtazapine (REMERON) 15 MG tablet; Take 1 tablet (15 mg total) by mouth every evening.  Dispense: 30 tablet; Refill: 1

## 2022-05-22 DIAGNOSIS — G31.84 MCI (MILD COGNITIVE IMPAIRMENT) WITH MEMORY LOSS: ICD-10-CM

## 2022-05-24 ENCOUNTER — PATIENT MESSAGE (OUTPATIENT)
Dept: FAMILY MEDICINE | Facility: CLINIC | Age: 87
End: 2022-05-24
Payer: MEDICARE

## 2022-05-24 DIAGNOSIS — G31.84 MCI (MILD COGNITIVE IMPAIRMENT) WITH MEMORY LOSS: ICD-10-CM

## 2022-05-24 NOTE — TELEPHONE ENCOUNTER
No new care gaps identified.  [facility] Embedded Care Gaps. Reference number: [id number]. 5/24/2022   10:32:13 AM IVONT

## 2022-05-24 NOTE — TELEPHONE ENCOUNTER
No new care gaps identified.  [facility] Embedded Care Gaps. Reference number: [id number]. 5/24/2022   12:45:15 PM CDT

## 2022-05-25 NOTE — TELEPHONE ENCOUNTER
Refill Routing Note   Medication(s) are not appropriate for processing by [facility] for the following reason(s):      - Required laboratory values are outdated  - Medication not previously prescribed by PCP    [facility] action(s):  Defer          Medication reconciliation completed: No     Appointments  past 12m or future 3m with PCP    Date Provider   Last Visit   5/16/2022 Meredith Baird MD   Next Visit   Visit date not found Meredith Baird MD   ED visits in past 90 days: 0        Note composed:9:05 PM 05/24/2022

## 2022-05-26 ENCOUNTER — PATIENT MESSAGE (OUTPATIENT)
Dept: FAMILY MEDICINE | Facility: CLINIC | Age: 87
End: 2022-05-26
Payer: MEDICARE

## 2022-05-26 ENCOUNTER — EXTERNAL HOME HEALTH (OUTPATIENT)
Dept: HOME HEALTH SERVICES | Facility: HOSPITAL | Age: 87
End: 2022-05-26
Payer: MEDICARE

## 2022-05-26 RX ORDER — RIVASTIGMINE TARTRATE 1.5 MG/1
CAPSULE ORAL
Qty: 60 CAPSULE | Refills: 0 | Status: SHIPPED | OUTPATIENT
Start: 2022-05-26 | End: 2022-06-30 | Stop reason: CLARIF

## 2022-05-26 RX ORDER — INSULIN GLARGINE 100 [IU]/ML
16 INJECTION, SOLUTION SUBCUTANEOUS NIGHTLY
OUTPATIENT
Start: 2022-05-26

## 2022-05-26 RX ORDER — INSULIN LISPRO 100 [IU]/ML
INJECTION, SOLUTION INTRAVENOUS; SUBCUTANEOUS
Qty: 18 ML | Refills: 1 | Status: SHIPPED | OUTPATIENT
Start: 2022-05-26 | End: 2022-12-12 | Stop reason: SDUPTHER

## 2022-05-26 RX ORDER — INSULIN GLARGINE 100 [IU]/ML
INJECTION, SOLUTION SUBCUTANEOUS
Qty: 15 ML | Refills: 0 | Status: SHIPPED | OUTPATIENT
Start: 2022-05-26 | End: 2022-06-30 | Stop reason: CLARIF

## 2022-05-26 RX ORDER — RIVASTIGMINE TARTRATE 1.5 MG/1
1.5 CAPSULE ORAL 2 TIMES DAILY
Qty: 60 CAPSULE | Refills: 1 | Status: SHIPPED | OUTPATIENT
Start: 2022-05-26 | End: 2022-08-29

## 2022-05-26 RX ORDER — INSULIN LISPRO 100 [IU]/ML
7 INJECTION, SOLUTION INTRAVENOUS; SUBCUTANEOUS
Start: 2022-05-26 | End: 2023-05-26

## 2022-06-01 ENCOUNTER — PES CALL (OUTPATIENT)
Dept: ADMINISTRATIVE | Facility: CLINIC | Age: 87
End: 2022-06-01
Payer: MEDICARE

## 2022-06-26 PROCEDURE — G0179 MD RECERTIFICATION HHA PT: HCPCS | Mod: ,,, | Performed by: FAMILY MEDICINE

## 2022-06-26 PROCEDURE — G0179 PR HOME HEALTH MD RECERTIFICATION: ICD-10-PCS | Mod: ,,, | Performed by: FAMILY MEDICINE

## 2022-06-27 ENCOUNTER — TELEPHONE (OUTPATIENT)
Dept: FAMILY MEDICINE | Facility: CLINIC | Age: 87
End: 2022-06-27
Payer: MEDICARE

## 2022-06-27 NOTE — TELEPHONE ENCOUNTER
----- Message from Jeannine Parry sent at 6/27/2022  8:47 AM CDT -----  Contact: Daughter  Type: Patient Call Back         Who called: Daughter Parisa         What is the request in detail: has been having diarrhea on and off for the last 4 days; been giving Pepto bismol but is not working; please advise         Can the clinic reply by [facility]? call         Would the patient rather a call back or a response via [facility]? Call         Best call back number: [phone]         Additional Information:   [place] [id number] - [place] - [address]  [address]  [place] [address]  Phone: [phone] Fax: [phone]             Thank You

## 2022-06-27 NOTE — TELEPHONE ENCOUNTER
Spoke with pt's daughter  Pt has been having diarrhea x3 days that waxes and wanes  Very watery  Normal color  Giving pt pedialyte, she is also drinking water  She has tried pepto with no relief  She just picked up immodium and will try that at her  nurse's recommendation  Asking if this is ok or other recommendations

## 2022-06-28 NOTE — TELEPHONE ENCOUNTER
Spoke with daughter. Notified okay per Dr. Baird for OTC meds.   Parisa verbalized understanding.

## 2022-06-29 ENCOUNTER — PATIENT MESSAGE (OUTPATIENT)
Dept: FAMILY MEDICINE | Facility: CLINIC | Age: 87
End: 2022-06-29
Payer: MEDICARE

## 2022-06-29 DIAGNOSIS — R19.7 DIARRHEA, UNSPECIFIED TYPE: Primary | ICD-10-CM

## 2022-06-30 ENCOUNTER — TELEPHONE (OUTPATIENT)
Dept: FAMILY MEDICINE | Facility: CLINIC | Age: 87
End: 2022-06-30
Payer: MEDICARE

## 2022-06-30 PROBLEM — S93.402A SPRAIN OF UNSPECIFIED LIGAMENT OF LEFT ANKLE, INITIAL ENCOUNTER: Status: RESOLVED | Noted: 2020-05-25 | Resolved: 2022-06-30

## 2022-06-30 PROBLEM — G31.84 MCI (MILD COGNITIVE IMPAIRMENT) WITH MEMORY LOSS: Status: RESOLVED | Noted: 2020-02-14 | Resolved: 2022-06-30

## 2022-06-30 PROBLEM — R19.7 DIARRHEA: Status: ACTIVE | Noted: 2022-06-30

## 2022-06-30 PROBLEM — I48.91 ATRIAL FIBRILLATION WITH RAPID VENTRICULAR RESPONSE: Status: RESOLVED | Noted: 2019-06-24 | Resolved: 2022-06-30

## 2022-06-30 PROBLEM — H81.12 BPPV (BENIGN PAROXYSMAL POSITIONAL VERTIGO), LEFT: Status: RESOLVED | Noted: 2019-07-01 | Resolved: 2022-06-30

## 2022-06-30 PROBLEM — R53.81 PHYSICAL DECONDITIONING: Status: RESOLVED | Noted: 2022-01-07 | Resolved: 2022-06-30

## 2022-06-30 PROBLEM — Z71.89 ACP (ADVANCE CARE PLANNING): Status: ACTIVE | Noted: 2022-06-30

## 2022-06-30 PROBLEM — R07.89 ATYPICAL CHEST PAIN: Status: RESOLVED | Noted: 2018-11-02 | Resolved: 2022-06-30

## 2022-06-30 PROBLEM — E86.0 DEHYDRATION: Status: ACTIVE | Noted: 2022-06-30

## 2022-06-30 PROBLEM — E66.01 MORBID (SEVERE) OBESITY DUE TO EXCESS CALORIES: Status: RESOLVED | Noted: 2019-03-12 | Resolved: 2022-06-30

## 2022-06-30 PROBLEM — E11.21 DIABETIC NEPHROPATHY ASSOCIATED WITH TYPE 2 DIABETES MELLITUS: Status: RESOLVED | Noted: 2019-03-12 | Resolved: 2022-06-30

## 2022-06-30 NOTE — TELEPHONE ENCOUNTER
----- Message from Rita Newsome sent at 6/30/2022  8:29 AM CDT -----  Type: Needs Medical Advice  Who Called:  Daughter Parisa   Symptoms (please be specific):  Diarrhea every time she eats, just runs out of her  How long has patient had these symptoms:  3 days  Pharmacy name and phone #:    [facility] [id number] - [place] - [address]  [address]  [place] [address]  Phone: [phone] Fax: [phone]      Best Call Back Number: [phone]  Additional Information: She has taken imodium and Pedialyte, not helping.  She would like to speak with Dr Baird

## 2022-07-01 ENCOUNTER — DOCUMENT SCAN (OUTPATIENT)
Dept: HOME HEALTH SERVICES | Facility: HOSPITAL | Age: 87
End: 2022-07-01
Payer: MEDICARE

## 2022-07-05 ENCOUNTER — PATIENT MESSAGE (OUTPATIENT)
Dept: FAMILY MEDICINE | Facility: CLINIC | Age: 87
End: 2022-07-05
Payer: MEDICARE

## 2022-07-05 DIAGNOSIS — Z86.73 H/O: CVA (CEREBROVASCULAR ACCIDENT): Primary | ICD-10-CM

## 2022-07-05 DIAGNOSIS — I69.354 HEMIPARESIS AFFECTING LEFT SIDE AS LATE EFFECT OF CEREBROVASCULAR ACCIDENT (CVA): ICD-10-CM

## 2022-07-05 DIAGNOSIS — I50.32 CHRONIC DIASTOLIC HEART FAILURE: ICD-10-CM

## 2022-07-06 ENCOUNTER — LAB VISIT (OUTPATIENT)
Dept: LAB | Facility: HOSPITAL | Age: 87
End: 2022-07-06
Attending: FAMILY MEDICINE
Payer: MEDICARE

## 2022-07-06 DIAGNOSIS — R19.7 DIARRHEA, UNSPECIFIED TYPE: ICD-10-CM

## 2022-07-06 PROCEDURE — 87449 NOS EACH ORGANISM AG IA: CPT | Performed by: FAMILY MEDICINE

## 2022-07-06 PROCEDURE — 87046 STOOL CULTR AEROBIC BACT EA: CPT | Mod: 59 | Performed by: FAMILY MEDICINE

## 2022-07-06 PROCEDURE — 87209 SMEAR COMPLEX STAIN: CPT | Performed by: FAMILY MEDICINE

## 2022-07-06 PROCEDURE — 89055 LEUKOCYTE ASSESSMENT FECAL: CPT | Performed by: FAMILY MEDICINE

## 2022-07-06 PROCEDURE — 87329 GIARDIA AG IA: CPT | Performed by: FAMILY MEDICINE

## 2022-07-06 PROCEDURE — 82272 OCCULT BLD FECES 1-3 TESTS: CPT | Performed by: FAMILY MEDICINE

## 2022-07-06 PROCEDURE — 87177 OVA AND PARASITES SMEARS: CPT | Performed by: FAMILY MEDICINE

## 2022-07-06 PROCEDURE — 87338 HPYLORI STOOL AG IA: CPT | Performed by: FAMILY MEDICINE

## 2022-07-06 PROCEDURE — 83993 ASSAY FOR CALPROTECTIN FECAL: CPT | Performed by: FAMILY MEDICINE

## 2022-07-06 PROCEDURE — 87045 FECES CULTURE AEROBIC BACT: CPT | Performed by: FAMILY MEDICINE

## 2022-07-06 PROCEDURE — 87427 SHIGA-LIKE TOXIN AG IA: CPT | Mod: 59 | Performed by: FAMILY MEDICINE

## 2022-07-07 LAB
C DIFF GDH STL QL: NEGATIVE
C DIFF TOX A+B STL QL IA: NEGATIVE
CRYPTOSP AG STL QL IA: NEGATIVE
G LAMBLIA AG STL QL IA: NEGATIVE
OB PNL STL: NEGATIVE
WBC #/AREA STL HPF: NORMAL /[HPF]

## 2022-07-08 ENCOUNTER — TELEPHONE (OUTPATIENT)
Dept: FAMILY MEDICINE | Facility: CLINIC | Age: 87
End: 2022-07-08

## 2022-07-08 DIAGNOSIS — I50.32 CHRONIC DIASTOLIC HEART FAILURE: ICD-10-CM

## 2022-07-08 DIAGNOSIS — I69.354 HEMIPARESIS AFFECTING LEFT SIDE AS LATE EFFECT OF CEREBROVASCULAR ACCIDENT (CVA): ICD-10-CM

## 2022-07-08 DIAGNOSIS — Z86.73 H/O: CVA (CEREBROVASCULAR ACCIDENT): Primary | ICD-10-CM

## 2022-07-08 LAB
E COLI SXT1 STL QL IA: NEGATIVE
E COLI SXT2 STL QL IA: NEGATIVE

## 2022-07-08 NOTE — TELEPHONE ENCOUNTER
----- Message from Olga Burnett sent at 7/8/2022 10:22 AM CDT -----  Who Called: Gail from [facility]    What is the reqeust in detail: Requesting call back to have orders for skilled nursing sent to [facility] for authorization. Family has concerns about medications and other items. Orders can be faxed to [facility] at [phone]. Please advise.     Can the clinic reply by [facility]? No     Best Call Back Number: [phone]    Additional Information:

## 2022-07-11 ENCOUNTER — TELEPHONE (OUTPATIENT)
Dept: FAMILY MEDICINE | Facility: CLINIC | Age: 87
End: 2022-07-11
Payer: MEDICARE

## 2022-07-11 LAB — BACTERIA STL CULT: NORMAL

## 2022-07-11 NOTE — TELEPHONE ENCOUNTER
----- Message from Yue Fried sent at 7/11/2022 10:45 AM CDT -----  Contact: @Parisa  Type: Needs Medical Advice  Who Called:  daughter   Symptoms (please be specific):  diarrhea   How long has patient had these symptoms:  a few days   Pharmacy name and phone #:    [id number] - [place] - [address]  [address]  [place] [address]  Phone: [phone] Fax: [phone]  Best Call Back Number: [phone]   Additional Information: per daughter requesting a call back regarding what else can be done, states she's given her immodium with no relief-please advise-thank you

## 2022-07-12 ENCOUNTER — LAB VISIT (OUTPATIENT)
Dept: LAB | Facility: HOSPITAL | Age: 87
End: 2022-07-12
Attending: INTERNAL MEDICINE
Payer: MEDICARE

## 2022-07-12 ENCOUNTER — OFFICE VISIT (OUTPATIENT)
Dept: FAMILY MEDICINE | Facility: CLINIC | Age: 87
End: 2022-07-12
Payer: MEDICARE

## 2022-07-12 ENCOUNTER — PATIENT MESSAGE (OUTPATIENT)
Dept: ADMINISTRATIVE | Facility: HOSPITAL | Age: 87
End: 2022-07-12
Payer: MEDICARE

## 2022-07-12 VITALS
HEART RATE: 60 BPM | HEIGHT: 62 IN | TEMPERATURE: 98 F | DIASTOLIC BLOOD PRESSURE: 62 MMHG | SYSTOLIC BLOOD PRESSURE: 130 MMHG | BODY MASS INDEX: 31.68 KG/M2 | WEIGHT: 172.19 LBS | RESPIRATION RATE: 14 BRPM

## 2022-07-12 DIAGNOSIS — D64.9 ANEMIA, UNSPECIFIED TYPE: Primary | ICD-10-CM

## 2022-07-12 DIAGNOSIS — N18.4 CKD (CHRONIC KIDNEY DISEASE) STAGE 4, GFR 15-29 ML/MIN: ICD-10-CM

## 2022-07-12 DIAGNOSIS — S41.102A OPEN WOUND OF LEFT UPPER ARM, INITIAL ENCOUNTER: ICD-10-CM

## 2022-07-12 DIAGNOSIS — R19.7 DIARRHEA, UNSPECIFIED TYPE: ICD-10-CM

## 2022-07-12 DIAGNOSIS — I10 ESSENTIAL HYPERTENSION: ICD-10-CM

## 2022-07-12 LAB
ALBUMIN SERPL BCP-MCNC: 2.9 G/DL (ref 3.5–5.2)
ANION GAP SERPL CALC-SCNC: 9 MMOL/L (ref 8–16)
BASOPHILS # BLD AUTO: 0.02 K/UL (ref 0–0.2)
BASOPHILS NFR BLD: 0.2 % (ref 0–1.9)
BUN SERPL-MCNC: 31 MG/DL (ref 8–23)
CALCIUM SERPL-MCNC: 9.2 MG/DL (ref 8.7–10.5)
CHLORIDE SERPL-SCNC: 98 MMOL/L (ref 95–110)
CO2 SERPL-SCNC: 33 MMOL/L (ref 23–29)
CREAT SERPL-MCNC: 2 MG/DL (ref 0.5–1.4)
DIFFERENTIAL METHOD: ABNORMAL
EOSINOPHIL # BLD AUTO: 0.1 K/UL (ref 0–0.5)
EOSINOPHIL NFR BLD: 0.5 % (ref 0–8)
ERYTHROCYTE [DISTWIDTH] IN BLOOD BY AUTOMATED COUNT: 13.6 % (ref 11.5–14.5)
EST. GFR  (AFRICAN AMERICAN): 25.3 ML/MIN/1.73 M^2
EST. GFR  (NON AFRICAN AMERICAN): 22 ML/MIN/1.73 M^2
GLUCOSE SERPL-MCNC: 95 MG/DL (ref 70–110)
HCT VFR BLD AUTO: 32.3 % (ref 37–48.5)
HGB BLD-MCNC: 9.7 G/DL (ref 12–16)
IMM GRANULOCYTES # BLD AUTO: 0.08 K/UL (ref 0–0.04)
IMM GRANULOCYTES NFR BLD AUTO: 0.8 % (ref 0–0.5)
LYMPHOCYTES # BLD AUTO: 2.3 K/UL (ref 1–4.8)
LYMPHOCYTES NFR BLD: 22.8 % (ref 18–48)
MCH RBC QN AUTO: 28.6 PG (ref 27–31)
MCHC RBC AUTO-ENTMCNC: 30 G/DL (ref 32–36)
MCV RBC AUTO: 95 FL (ref 82–98)
MONOCYTES # BLD AUTO: 0.8 K/UL (ref 0.3–1)
MONOCYTES NFR BLD: 8.2 % (ref 4–15)
NEUTROPHILS # BLD AUTO: 6.8 K/UL (ref 1.8–7.7)
NEUTROPHILS NFR BLD: 67.5 % (ref 38–73)
NRBC BLD-RTO: 0 /100 WBC
PHOSPHATE SERPL-MCNC: 3.2 MG/DL (ref 2.7–4.5)
PLATELET # BLD AUTO: 421 K/UL (ref 150–450)
PMV BLD AUTO: 10.7 FL (ref 9.2–12.9)
POTASSIUM SERPL-SCNC: 3.3 MMOL/L (ref 3.5–5.1)
PTH-INTACT SERPL-MCNC: 48.4 PG/ML (ref 9–77)
RBC # BLD AUTO: 3.39 M/UL (ref 4–5.4)
SODIUM SERPL-SCNC: 140 MMOL/L (ref 136–145)
WBC # BLD AUTO: 10.05 K/UL (ref 3.9–12.7)

## 2022-07-12 PROCEDURE — 85025 COMPLETE CBC W/AUTO DIFF WBC: CPT | Performed by: INTERNAL MEDICINE

## 2022-07-12 PROCEDURE — 1157F ADVNC CARE PLAN IN RCRD: CPT | Mod: CPTII,S$GLB,, | Performed by: FAMILY MEDICINE

## 2022-07-12 PROCEDURE — 84100 ASSAY OF PHOSPHORUS: CPT | Performed by: INTERNAL MEDICINE

## 2022-07-12 PROCEDURE — 1101F PR PT FALLS ASSESS DOC 0-1 FALLS W/OUT INJ PAST YR: ICD-10-PCS | Mod: CPTII,S$GLB,, | Performed by: FAMILY MEDICINE

## 2022-07-12 PROCEDURE — 3288F PR FALLS RISK ASSESSMENT DOCUMENTED: ICD-10-PCS | Mod: CPTII,S$GLB,, | Performed by: FAMILY MEDICINE

## 2022-07-12 PROCEDURE — 83970 ASSAY OF PARATHORMONE: CPT | Performed by: INTERNAL MEDICINE

## 2022-07-12 PROCEDURE — 1101F PT FALLS ASSESS-DOCD LE1/YR: CPT | Mod: CPTII,S$GLB,, | Performed by: FAMILY MEDICINE

## 2022-07-12 PROCEDURE — 99214 PR OFFICE/OUTPT VISIT, EST, LEVL IV, 30-39 MIN: ICD-10-PCS | Mod: S$GLB,,, | Performed by: FAMILY MEDICINE

## 2022-07-12 PROCEDURE — 3288F FALL RISK ASSESSMENT DOCD: CPT | Mod: CPTII,S$GLB,, | Performed by: FAMILY MEDICINE

## 2022-07-12 PROCEDURE — 1157F PR ADVANCE CARE PLAN OR EQUIV PRESENT IN MEDICAL RECORD: ICD-10-PCS | Mod: CPTII,S$GLB,, | Performed by: FAMILY MEDICINE

## 2022-07-12 PROCEDURE — 99214 OFFICE O/P EST MOD 30 MIN: CPT | Mod: S$GLB,,, | Performed by: FAMILY MEDICINE

## 2022-07-12 PROCEDURE — 99999 PR PBB SHADOW E&M-EST. PATIENT-LVL III: CPT | Mod: PBBFAC,,, | Performed by: FAMILY MEDICINE

## 2022-07-12 PROCEDURE — 1126F AMNT PAIN NOTED NONE PRSNT: CPT | Mod: CPTII,S$GLB,, | Performed by: FAMILY MEDICINE

## 2022-07-12 PROCEDURE — 1126F PR PAIN SEVERITY QUANTIFIED, NO PAIN PRESENT: ICD-10-PCS | Mod: CPTII,S$GLB,, | Performed by: FAMILY MEDICINE

## 2022-07-12 PROCEDURE — 99999 PR PBB SHADOW E&M-EST. PATIENT-LVL III: ICD-10-PCS | Mod: PBBFAC,,, | Performed by: FAMILY MEDICINE

## 2022-07-12 RX ORDER — ACETAMINOPHEN, DIPHENHYDRAMINE HCL, PHENYLEPHRINE HCL 325; 25; 5 MG/1; MG/1; MG/1
1 TABLET ORAL NIGHTLY PRN
COMMUNITY

## 2022-07-12 NOTE — PROGRESS NOTES
"Subjective:       Patient ID: Denice Sheth is a 87 y.o. female.    Chief Complaint: Hospital Follow Up (6/30/22 Hospital admit for Dx Dehydration)    HPI  Patient in clinic for hosp f/u.  Transitional care note reviewed.   Stable diarrhea after meals.   Mirtazapine was too strong - she slept really well, but daughter notes that she was a zombie for a few days following. Still having early am awakening.     Review of Systems:  Review of Systems   Constitutional: Positive for fatigue. Negative for unexpected weight change.   HENT: Negative for congestion and postnasal drip.    Eyes: Negative for photophobia and visual disturbance.   Respiratory: Negative for cough and wheezing.    Cardiovascular: Negative for chest pain and leg swelling (mild).   Gastrointestinal: Positive for diarrhea. Negative for blood in stool and constipation (cannot go without taking laxative and stool softener).        No gerd c/o   Genitourinary: Negative for difficulty urinating and dysuria.   Musculoskeletal: Positive for arthralgias and myalgias. Negative for joint swelling.   Skin: Positive for wound. Negative for rash.   Neurological: Negative for dizziness, weakness and headaches.   Psychiatric/Behavioral: Positive for sleep disturbance. The patient is not nervous/anxious.        Objective:     Vitals:    07/12/22 0940   BP: 130/62   BP Location: Right arm   Patient Position: Sitting   BP Method: Large (Manual)   Pulse: 60   Resp: 14   Temp: 97.9 °F (36.6 °C)   TempSrc: Oral   Weight: 78.1 kg (172 lb 2.9 oz)   Height: 5' 2" (1.575 m)          Physical Exam  Vitals and nursing note reviewed.   Constitutional:       General: She is not in acute distress.     Appearance: Normal appearance. She is well-developed. She is obese.   HENT:      Head: Normocephalic and atraumatic.   Eyes:      General: No scleral icterus.        Right eye: No discharge.         Left eye: No discharge.      Conjunctiva/sclera: Conjunctivae normal. "   Cardiovascular:      Rate and Rhythm: Normal rate and regular rhythm.   Pulmonary:      Effort: Pulmonary effort is normal. No respiratory distress.      Breath sounds: Normal breath sounds.   Abdominal:      General: There is no distension.      Palpations: Abdomen is soft.   Musculoskeletal:         General: No tenderness.      Cervical back: Neck supple.      Right lower leg: No edema.      Left lower leg: No edema.   Skin:     General: Skin is warm and dry.      Findings: No rash.          Neurological:      General: No focal deficit present.      Mental Status: She is alert and oriented to person, place, and time.   Psychiatric:         Mood and Affect: Mood normal.         Behavior: Behavior normal.           Assessment & Plan:  Anemia, unspecified type  Comments:  update cbc for review  Orders:  -     Cancel: CBC Without Differential; Future; Expected date: 07/12/2022  -     Iron; Future; Expected date: 07/12/2022    Diarrhea, unspecified type    Essential hypertension  Comments:  controlled, cont regimen  Orders:  -     Comprehensive Metabolic Panel; Future; Expected date: 07/12/2022  -     Urinalysis, Reflex to Urine Culture Urine, Clean Catch; Future    Open wound of left upper arm, initial encounter  Comments:  schedule review with  for wound care and monitoring  keep clean/covered

## 2022-07-13 ENCOUNTER — PATIENT MESSAGE (OUTPATIENT)
Dept: FAMILY MEDICINE | Facility: CLINIC | Age: 87
End: 2022-07-13
Payer: MEDICARE

## 2022-07-13 LAB
CALPROTECTIN STL-MCNT: 327.5 MCG/G
H PYLORI AG STL QL IA: NOT DETECTED
SPECIMEN SOURCE: 1

## 2022-07-14 ENCOUNTER — PATIENT MESSAGE (OUTPATIENT)
Dept: FAMILY MEDICINE | Facility: CLINIC | Age: 87
End: 2022-07-14
Payer: MEDICARE

## 2022-07-14 DIAGNOSIS — K63.9 DISEASE OF INTESTINE, UNSPECIFIED: ICD-10-CM

## 2022-07-14 LAB — O+P STL MICRO: NORMAL

## 2022-07-14 RX ORDER — POTASSIUM CHLORIDE 750 MG/1
10 TABLET, EXTENDED RELEASE ORAL 2 TIMES DAILY
Qty: 60 TABLET | Refills: 3 | Status: SHIPPED | OUTPATIENT
Start: 2022-07-14 | End: 2022-11-14

## 2022-07-14 NOTE — TELEPHONE ENCOUNTER
Stool studies negative except for 1 inflammatory marker. Recommend CT abd/pelvis if not improving.  UA suggests UTI - waiting on cx results.  Needs potassium supplement, low dose once daily.

## 2022-07-15 ENCOUNTER — PATIENT MESSAGE (OUTPATIENT)
Dept: FAMILY MEDICINE | Facility: CLINIC | Age: 87
End: 2022-07-15
Payer: MEDICARE

## 2022-07-15 ENCOUNTER — TELEPHONE (OUTPATIENT)
Dept: FAMILY MEDICINE | Facility: CLINIC | Age: 87
End: 2022-07-15
Payer: MEDICARE

## 2022-07-15 RX ORDER — CEPHALEXIN 500 MG/1
500 CAPSULE ORAL EVERY 12 HOURS
Qty: 14 CAPSULE | Refills: 0 | Status: SHIPPED | OUTPATIENT
Start: 2022-07-15 | End: 2022-08-23

## 2022-07-15 NOTE — TELEPHONE ENCOUNTER
----- Message from Cristela Main Patient Care Assistant sent at 7/15/2022  9:05 AM CDT -----  Contact: Pt Daughter Parisa  Type: Needs Medical Advice    Who Called: Pt Daughter Parisa  Best Call Back Number: [phone]  Inquiry/Question: Pt daughter is calling to speak with the nurse in regards to her mom having diarrhea and the medication previously prescribed is not helping. Please call back and advise. Thank you~

## 2022-07-15 NOTE — TELEPHONE ENCOUNTER
Spoke with pt daughter and notified that the medication needs time to kick in. Pt daughter verbalized understanding.

## 2022-07-20 ENCOUNTER — OFFICE VISIT (OUTPATIENT)
Dept: NEPHROLOGY | Facility: CLINIC | Age: 87
End: 2022-07-20
Payer: MEDICARE

## 2022-07-20 VITALS
SYSTOLIC BLOOD PRESSURE: 122 MMHG | WEIGHT: 172 LBS | DIASTOLIC BLOOD PRESSURE: 50 MMHG | HEIGHT: 62 IN | BODY MASS INDEX: 31.65 KG/M2

## 2022-07-20 DIAGNOSIS — I12.9 BENIGN HYPERTENSIVE RENAL DISEASE WITHOUT RENAL FAILURE: ICD-10-CM

## 2022-07-20 DIAGNOSIS — E87.1 HYPONATREMIA: ICD-10-CM

## 2022-07-20 DIAGNOSIS — E11.21 DIABETIC NEPHROPATHY ASSOCIATED WITH TYPE 2 DIABETES MELLITUS: ICD-10-CM

## 2022-07-20 DIAGNOSIS — N18.4 CKD (CHRONIC KIDNEY DISEASE) STAGE 4, GFR 15-29 ML/MIN: Primary | ICD-10-CM

## 2022-07-20 DIAGNOSIS — R80.9 PROTEINURIA DUE TO TYPE 2 DIABETES MELLITUS: ICD-10-CM

## 2022-07-20 DIAGNOSIS — E11.29 PROTEINURIA DUE TO TYPE 2 DIABETES MELLITUS: ICD-10-CM

## 2022-07-20 PROCEDURE — 99499 UNLISTED E&M SERVICE: CPT | Mod: S$GLB,,, | Performed by: INTERNAL MEDICINE

## 2022-07-20 PROCEDURE — 1160F PR REVIEW ALL MEDS BY PRESCRIBER/CLIN PHARMACIST DOCUMENTED: ICD-10-PCS | Mod: CPTII,S$GLB,, | Performed by: INTERNAL MEDICINE

## 2022-07-20 PROCEDURE — 1160F RVW MEDS BY RX/DR IN RCRD: CPT | Mod: CPTII,S$GLB,, | Performed by: INTERNAL MEDICINE

## 2022-07-20 PROCEDURE — 99499 RISK ADDL DX/OHS AUDIT: ICD-10-PCS | Mod: S$GLB,,, | Performed by: INTERNAL MEDICINE

## 2022-07-20 PROCEDURE — 99999 PR PBB SHADOW E&M-EST. PATIENT-LVL IV: ICD-10-PCS | Mod: PBBFAC,,, | Performed by: INTERNAL MEDICINE

## 2022-07-20 PROCEDURE — 1101F PT FALLS ASSESS-DOCD LE1/YR: CPT | Mod: CPTII,S$GLB,, | Performed by: INTERNAL MEDICINE

## 2022-07-20 PROCEDURE — 1159F MED LIST DOCD IN RCRD: CPT | Mod: CPTII,S$GLB,, | Performed by: INTERNAL MEDICINE

## 2022-07-20 PROCEDURE — 3288F PR FALLS RISK ASSESSMENT DOCUMENTED: ICD-10-PCS | Mod: CPTII,S$GLB,, | Performed by: INTERNAL MEDICINE

## 2022-07-20 PROCEDURE — 1159F PR MEDICATION LIST DOCUMENTED IN MEDICAL RECORD: ICD-10-PCS | Mod: CPTII,S$GLB,, | Performed by: INTERNAL MEDICINE

## 2022-07-20 PROCEDURE — 1157F PR ADVANCE CARE PLAN OR EQUIV PRESENT IN MEDICAL RECORD: ICD-10-PCS | Mod: CPTII,S$GLB,, | Performed by: INTERNAL MEDICINE

## 2022-07-20 PROCEDURE — 99214 OFFICE O/P EST MOD 30 MIN: CPT | Mod: S$GLB,,, | Performed by: INTERNAL MEDICINE

## 2022-07-20 PROCEDURE — 1157F ADVNC CARE PLAN IN RCRD: CPT | Mod: CPTII,S$GLB,, | Performed by: INTERNAL MEDICINE

## 2022-07-20 PROCEDURE — 1101F PR PT FALLS ASSESS DOC 0-1 FALLS W/OUT INJ PAST YR: ICD-10-PCS | Mod: CPTII,S$GLB,, | Performed by: INTERNAL MEDICINE

## 2022-07-20 PROCEDURE — 3288F FALL RISK ASSESSMENT DOCD: CPT | Mod: CPTII,S$GLB,, | Performed by: INTERNAL MEDICINE

## 2022-07-20 PROCEDURE — 99999 PR PBB SHADOW E&M-EST. PATIENT-LVL IV: CPT | Mod: PBBFAC,,, | Performed by: INTERNAL MEDICINE

## 2022-07-20 PROCEDURE — 99214 PR OFFICE/OUTPT VISIT, EST, LEVL IV, 30-39 MIN: ICD-10-PCS | Mod: S$GLB,,, | Performed by: INTERNAL MEDICINE

## 2022-07-20 NOTE — PROGRESS NOTES
"Subjective:       Patient ID: Denice Sheth is a 87 y.o. White female who presents for return patient evaluation for chronic renal failure.      She has no uremic or urinary symptoms.  She had a large dye load in January and had an episode of diarrhea in July.  She is feeling better and is back to her baseline.      Review of Systems   Constitutional: Negative for appetite change, chills and fever.   Eyes: Negative for visual disturbance.   Respiratory: Positive for shortness of breath (with exertion). Negative for cough.    Cardiovascular: Negative for chest pain and leg swelling.   Gastrointestinal: Negative for abdominal pain, diarrhea, nausea and vomiting.   Genitourinary: Negative for difficulty urinating, dysuria and hematuria.   Musculoskeletal: Positive for arthralgias (right knee) and gait problem. Negative for myalgias.   Skin: Negative for rash.   Neurological: Positive for weakness. Negative for headaches.   Hematological: Bruises/bleeds easily.   Psychiatric/Behavioral: Negative for sleep disturbance.       The past medical, family and social histories were reviewed for this encounter.     BP (!) 122/50 (BP Location: Left arm, Patient Position: Sitting)   Ht 5' 2" (1.575 m)   Wt 78 kg (172 lb)   LMP  (LMP Unknown)   BMI 31.46 kg/m²     Objective:      Physical Exam  Vitals reviewed.   Constitutional:       General: She is not in acute distress.     Appearance: She is well-developed. She is obese.   HENT:      Head: Normocephalic and atraumatic.   Eyes:      General: No scleral icterus.     Conjunctiva/sclera: Conjunctivae normal.   Neck:      Vascular: No JVD.   Cardiovascular:      Rate and Rhythm: Normal rate and regular rhythm.      Heart sounds: Normal heart sounds. No murmur heard.    No friction rub. No gallop.   Pulmonary:      Effort: Pulmonary effort is normal. No respiratory distress.      Breath sounds: Normal breath sounds. No wheezing.   Abdominal:      General: Bowel sounds are " normal. There is no distension.      Palpations: Abdomen is soft.      Tenderness: There is no abdominal tenderness.   Musculoskeletal:      Cervical back: Normal range of motion.      Right lower leg: No edema.      Left lower leg: No edema.   Skin:     General: Skin is warm and dry.      Findings: No rash.   Neurological:      Mental Status: She is alert and oriented to person, place, and time.   Psychiatric:         Mood and Affect: Mood normal.         Behavior: Behavior normal.        Assessment:       1. CKD (chronic kidney disease) stage 4, GFR 15-29 ml/min    2. Hyponatremia    3. 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 Diabetic nephropathy associated with type 2 diabetes mellitus    5. Proteinuria due to type 2 diabetes mellitus        Plan:   Return to clinic in 3 months.  Labs for next visit include rp pth cbc   Baseline creatinine is 1.5-1.9 since 2015.   PTH is 48 with a calcium of 9.3.  UPC is 0.4.  Renal US shows R 9.7 cm L 10.9 cm with no cysts/stones/masses.  Blood pressure is controlled on the current regimen.  Continue current medications as prescribed and reviewed.   She had JANETTE in January secondary to a contrast load.  She does not wish to do dialysis.

## 2022-07-27 ENCOUNTER — EXTERNAL HOME HEALTH (OUTPATIENT)
Dept: HOME HEALTH SERVICES | Facility: HOSPITAL | Age: 87
End: 2022-07-27
Payer: MEDICARE

## 2022-08-03 ENCOUNTER — TELEPHONE (OUTPATIENT)
Dept: FAMILY MEDICINE | Facility: CLINIC | Age: 87
End: 2022-08-03
Payer: MEDICARE

## 2022-08-03 ENCOUNTER — PATIENT MESSAGE (OUTPATIENT)
Dept: FAMILY MEDICINE | Facility: CLINIC | Age: 87
End: 2022-08-03
Payer: MEDICARE

## 2022-08-07 ENCOUNTER — DOCUMENT SCAN (OUTPATIENT)
Dept: HOME HEALTH SERVICES | Facility: HOSPITAL | Age: 87
End: 2022-08-07
Payer: MEDICARE

## 2022-08-08 ENCOUNTER — DOCUMENT SCAN (OUTPATIENT)
Dept: HOME HEALTH SERVICES | Facility: HOSPITAL | Age: 87
End: 2022-08-08
Payer: MEDICARE

## 2022-08-10 ENCOUNTER — PATIENT MESSAGE (OUTPATIENT)
Dept: FAMILY MEDICINE | Facility: CLINIC | Age: 87
End: 2022-08-10
Payer: MEDICARE

## 2022-08-10 DIAGNOSIS — R19.7 DIARRHEA, UNSPECIFIED TYPE: Primary | ICD-10-CM

## 2022-08-10 DIAGNOSIS — K90.49 FOOD INTOLERANCE: ICD-10-CM

## 2022-08-12 ENCOUNTER — TELEPHONE (OUTPATIENT)
Dept: ALLERGY | Facility: CLINIC | Age: 87
End: 2022-08-12
Payer: MEDICARE

## 2022-08-12 NOTE — TELEPHONE ENCOUNTER
----- Message from Roxanna Molina sent at 8/12/2022 10:16 AM CDT -----  Contact: [phone]  Pt is calling back from a missed call.    Pt would like a call back at your earliest convenience. [phone]

## 2022-08-16 ENCOUNTER — DOCUMENT SCAN (OUTPATIENT)
Dept: HOME HEALTH SERVICES | Facility: HOSPITAL | Age: 87
End: 2022-08-16
Payer: MEDICARE

## 2022-08-22 ENCOUNTER — PATIENT MESSAGE (OUTPATIENT)
Dept: FAMILY MEDICINE | Facility: CLINIC | Age: 87
End: 2022-08-22
Payer: MEDICARE

## 2022-08-22 ENCOUNTER — TELEPHONE (OUTPATIENT)
Dept: FAMILY MEDICINE | Facility: CLINIC | Age: 87
End: 2022-08-22
Payer: MEDICARE

## 2022-08-22 NOTE — TELEPHONE ENCOUNTER
Spoke with pt daughter.   States that pt is c/o fatigue, urgency and frequency with decreased stream.     States pt also c/o slight congestion with cough that daughter believes is from post nasal drip.    appt scheduled for first available on 8/25 with Nikhil Cueto.     Requesting message sent to provider to ensure nothing else needed between now and appt.

## 2022-08-22 NOTE — TELEPHONE ENCOUNTER
----- Message from Lucho Thompson sent at 8/22/2022  8:52 AM CDT -----  Contact: pt's daughter Barb at [phone]  Type: Needs Medical Advice  Who Called:  pt's daughter Barb  Best Call Back Number: [phone]  Additional Information: pt's daughter Barb is calling the office in regards to he thinking that her mom has a UTI and a little congestion in her nose not her chest. Please call back and advise.

## 2022-08-23 ENCOUNTER — OFFICE VISIT (OUTPATIENT)
Dept: FAMILY MEDICINE | Facility: CLINIC | Age: 87
End: 2022-08-23
Payer: MEDICARE

## 2022-08-23 VITALS
SYSTOLIC BLOOD PRESSURE: 108 MMHG | HEART RATE: 61 BPM | RESPIRATION RATE: 20 BRPM | OXYGEN SATURATION: 95 % | WEIGHT: 163.25 LBS | BODY MASS INDEX: 30.04 KG/M2 | HEIGHT: 62 IN | DIASTOLIC BLOOD PRESSURE: 64 MMHG

## 2022-08-23 DIAGNOSIS — B96.89 ACUTE BACTERIAL SINUSITIS: Primary | ICD-10-CM

## 2022-08-23 DIAGNOSIS — Z79.4 TYPE 2 DIABETES MELLITUS WITH STAGE 3 CHRONIC KIDNEY DISEASE, WITH LONG-TERM CURRENT USE OF INSULIN, UNSPECIFIED WHETHER STAGE 3A OR 3B CKD: ICD-10-CM

## 2022-08-23 DIAGNOSIS — J01.90 ACUTE BACTERIAL SINUSITIS: Primary | ICD-10-CM

## 2022-08-23 DIAGNOSIS — F03.90 DEMENTIA WITHOUT BEHAVIORAL DISTURBANCE, UNSPECIFIED DEMENTIA TYPE: ICD-10-CM

## 2022-08-23 DIAGNOSIS — R05.9 COUGH: ICD-10-CM

## 2022-08-23 DIAGNOSIS — E11.22 TYPE 2 DIABETES MELLITUS WITH STAGE 3 CHRONIC KIDNEY DISEASE, WITH LONG-TERM CURRENT USE OF INSULIN, UNSPECIFIED WHETHER STAGE 3A OR 3B CKD: ICD-10-CM

## 2022-08-23 DIAGNOSIS — N18.30 TYPE 2 DIABETES MELLITUS WITH STAGE 3 CHRONIC KIDNEY DISEASE, WITH LONG-TERM CURRENT USE OF INSULIN, UNSPECIFIED WHETHER STAGE 3A OR 3B CKD: ICD-10-CM

## 2022-08-23 LAB
CTP QC/QA: YES
SARS-COV-2 RDRP RESP QL NAA+PROBE: NEGATIVE

## 2022-08-23 PROCEDURE — 3288F PR FALLS RISK ASSESSMENT DOCUMENTED: ICD-10-PCS | Mod: CPTII,S$GLB,, | Performed by: FAMILY MEDICINE

## 2022-08-23 PROCEDURE — 99214 PR OFFICE/OUTPT VISIT, EST, LEVL IV, 30-39 MIN: ICD-10-PCS | Mod: S$GLB,,, | Performed by: FAMILY MEDICINE

## 2022-08-23 PROCEDURE — 1101F PR PT FALLS ASSESS DOC 0-1 FALLS W/OUT INJ PAST YR: ICD-10-PCS | Mod: CPTII,S$GLB,, | Performed by: FAMILY MEDICINE

## 2022-08-23 PROCEDURE — U0002: ICD-10-PCS | Mod: QW,S$GLB,, | Performed by: FAMILY MEDICINE

## 2022-08-23 PROCEDURE — 99999 PR PBB SHADOW E&M-EST. PATIENT-LVL III: CPT | Mod: PBBFAC,,, | Performed by: FAMILY MEDICINE

## 2022-08-23 PROCEDURE — U0002 COVID-19 LAB TEST NON-CDC: HCPCS | Mod: QW,S$GLB,, | Performed by: FAMILY MEDICINE

## 2022-08-23 PROCEDURE — 1101F PT FALLS ASSESS-DOCD LE1/YR: CPT | Mod: CPTII,S$GLB,, | Performed by: FAMILY MEDICINE

## 2022-08-23 PROCEDURE — 3288F FALL RISK ASSESSMENT DOCD: CPT | Mod: CPTII,S$GLB,, | Performed by: FAMILY MEDICINE

## 2022-08-23 PROCEDURE — 99999 PR PBB SHADOW E&M-EST. PATIENT-LVL III: ICD-10-PCS | Mod: PBBFAC,,, | Performed by: FAMILY MEDICINE

## 2022-08-23 PROCEDURE — 1160F RVW MEDS BY RX/DR IN RCRD: CPT | Mod: CPTII,S$GLB,, | Performed by: FAMILY MEDICINE

## 2022-08-23 PROCEDURE — 1126F PR PAIN SEVERITY QUANTIFIED, NO PAIN PRESENT: ICD-10-PCS | Mod: CPTII,S$GLB,, | Performed by: FAMILY MEDICINE

## 2022-08-23 PROCEDURE — 1157F ADVNC CARE PLAN IN RCRD: CPT | Mod: CPTII,S$GLB,, | Performed by: FAMILY MEDICINE

## 2022-08-23 PROCEDURE — 99499 UNLISTED E&M SERVICE: CPT | Mod: S$GLB,,, | Performed by: FAMILY MEDICINE

## 2022-08-23 PROCEDURE — 99214 OFFICE O/P EST MOD 30 MIN: CPT | Mod: S$GLB,,, | Performed by: FAMILY MEDICINE

## 2022-08-23 PROCEDURE — 1159F MED LIST DOCD IN RCRD: CPT | Mod: CPTII,S$GLB,, | Performed by: FAMILY MEDICINE

## 2022-08-23 PROCEDURE — 99499 RISK ADDL DX/OHS AUDIT: ICD-10-PCS | Mod: S$GLB,,, | Performed by: FAMILY MEDICINE

## 2022-08-23 PROCEDURE — 1157F PR ADVANCE CARE PLAN OR EQUIV PRESENT IN MEDICAL RECORD: ICD-10-PCS | Mod: CPTII,S$GLB,, | Performed by: FAMILY MEDICINE

## 2022-08-23 PROCEDURE — 1126F AMNT PAIN NOTED NONE PRSNT: CPT | Mod: CPTII,S$GLB,, | Performed by: FAMILY MEDICINE

## 2022-08-23 PROCEDURE — 1159F PR MEDICATION LIST DOCUMENTED IN MEDICAL RECORD: ICD-10-PCS | Mod: CPTII,S$GLB,, | Performed by: FAMILY MEDICINE

## 2022-08-23 PROCEDURE — 1160F PR REVIEW ALL MEDS BY PRESCRIBER/CLIN PHARMACIST DOCUMENTED: ICD-10-PCS | Mod: CPTII,S$GLB,, | Performed by: FAMILY MEDICINE

## 2022-08-23 RX ORDER — INSULIN GLARGINE 100 [IU]/ML
16 INJECTION, SOLUTION SUBCUTANEOUS NIGHTLY
Qty: 5 EACH | Refills: 3 | Status: SHIPPED | OUTPATIENT
Start: 2022-08-23 | End: 2023-08-31 | Stop reason: SDUPTHER

## 2022-08-23 RX ORDER — AMOXICILLIN AND CLAVULANATE POTASSIUM 875; 125 MG/1; MG/1
1 TABLET, FILM COATED ORAL 2 TIMES DAILY
Qty: 14 TABLET | Refills: 0 | Status: SHIPPED | OUTPATIENT
Start: 2022-08-23 | End: 2022-10-27 | Stop reason: ALTCHOICE

## 2022-08-23 NOTE — PROGRESS NOTES
"Subjective:       Patient ID: Denice Sheth is a 87 y.o. female.    Chief Complaint: Cough (Sx since sunday)    HPI  Patient with URI sx x 3 days.   Taking otc tussin and neb tx (last tx today at noon). Afebrile, no chills.  No issues with po intake or hydration.   +dry cough, worse when supine. Improved with nebs.   No n/v/d.    Review of Systems:  Review of Systems   Constitutional:  Positive for fatigue. Negative for activity change, chills and fever.   HENT:  Positive for congestion and postnasal drip. Negative for ear discharge, ear pain, facial swelling, hearing loss, rhinorrhea and tinnitus.    Eyes:  Negative for redness and itching.   Respiratory:  Positive for cough and wheezing. Negative for chest tightness and shortness of breath.    Gastrointestinal:  Negative for diarrhea and nausea.   Musculoskeletal:  Negative for arthralgias and myalgias.     Objective:     Vitals:    08/23/22 1332   BP: 108/64   Pulse: 61   Resp: 20   SpO2: 95%   Weight: 74 kg (163 lb 4 oz)   Height: 5' 2" (1.575 m)          Physical Exam  Vitals and nursing note reviewed.   Constitutional:       General: She is not in acute distress.     Appearance: Normal appearance. She is well-developed.   HENT:      Head: Normocephalic and atraumatic.   Eyes:      General: No scleral icterus.        Right eye: No discharge.         Left eye: No discharge.      Conjunctiva/sclera: Conjunctivae normal.   Cardiovascular:      Rate and Rhythm: Normal rate.      Heart sounds: Normal heart sounds.   Pulmonary:      Effort: Pulmonary effort is normal. No respiratory distress.   Abdominal:      Palpations: Abdomen is soft.      Tenderness: There is no guarding.   Musculoskeletal:         General: No deformity or signs of injury.      Cervical back: Neck supple.   Skin:     General: Skin is warm and dry.      Findings: No rash.   Neurological:      General: No focal deficit present.      Mental Status: She is alert and oriented to person, place, and " time.   Psychiatric:         Mood and Affect: Mood normal.         Behavior: Behavior normal.         Assessment & Plan:  Acute bacterial sinusitis  -     amoxicillin-clavulanate 875-125mg (AUGMENTIN) 875-125 mg per tablet; Take 1 tablet by mouth 2 (two) times daily.  Dispense: 14 tablet; Refill: 0    Cough  -     POCT COVID-19 Rapid Screening    Dementia without behavioral disturbance, unspecified dementia type  Comments:  stable, cont regimen    Type 2 diabetes mellitus with stage 3 chronic kidney disease, with long-term current use of insulin, unspecified whether stage 3a or 3b CKD  -     insulin (LANTUS SOLOSTAR U-100 INSULIN) glargine 100 units/mL SubQ pen; Inject 16 Units into the skin every evening.  Dispense: 5 each; Refill: 3

## 2022-08-29 ENCOUNTER — TELEPHONE (OUTPATIENT)
Dept: FAMILY MEDICINE | Facility: CLINIC | Age: 87
End: 2022-08-29
Payer: MEDICARE

## 2022-08-31 PROBLEM — F03.90 DEMENTIA WITHOUT BEHAVIORAL DISTURBANCE, UNSPECIFIED DEMENTIA TYPE: Status: ACTIVE | Noted: 2022-08-31

## 2022-09-02 ENCOUNTER — DOCUMENT SCAN (OUTPATIENT)
Dept: HOME HEALTH SERVICES | Facility: HOSPITAL | Age: 87
End: 2022-09-02
Payer: MEDICARE

## 2022-09-04 ENCOUNTER — PATIENT MESSAGE (OUTPATIENT)
Dept: FAMILY MEDICINE | Facility: CLINIC | Age: 87
End: 2022-09-04
Payer: MEDICARE

## 2022-09-10 ENCOUNTER — PATIENT MESSAGE (OUTPATIENT)
Dept: FAMILY MEDICINE | Facility: CLINIC | Age: 87
End: 2022-09-10
Payer: MEDICARE

## 2022-09-10 DIAGNOSIS — Z86.73 H/O: CVA (CEREBROVASCULAR ACCIDENT): ICD-10-CM

## 2022-09-10 DIAGNOSIS — R26.9 GAIT DISORDER: Primary | ICD-10-CM

## 2022-09-10 DIAGNOSIS — E11.22 TYPE 2 DIABETES MELLITUS WITH STAGE 3 CHRONIC KIDNEY DISEASE, WITH LONG-TERM CURRENT USE OF INSULIN, UNSPECIFIED WHETHER STAGE 3A OR 3B CKD: ICD-10-CM

## 2022-09-10 DIAGNOSIS — Z79.4 TYPE 2 DIABETES MELLITUS WITH STAGE 3 CHRONIC KIDNEY DISEASE, WITH LONG-TERM CURRENT USE OF INSULIN, UNSPECIFIED WHETHER STAGE 3A OR 3B CKD: ICD-10-CM

## 2022-09-10 DIAGNOSIS — N18.30 TYPE 2 DIABETES MELLITUS WITH STAGE 3 CHRONIC KIDNEY DISEASE, WITH LONG-TERM CURRENT USE OF INSULIN, UNSPECIFIED WHETHER STAGE 3A OR 3B CKD: ICD-10-CM

## 2022-09-10 DIAGNOSIS — I50.30 DIASTOLIC CONGESTIVE HEART FAILURE, NYHA CLASS 1, UNSPECIFIED CONGESTIVE HEART FAILURE CHRONICITY: ICD-10-CM

## 2022-09-13 ENCOUNTER — PATIENT MESSAGE (OUTPATIENT)
Dept: ADMINISTRATIVE | Facility: HOSPITAL | Age: 87
End: 2022-09-13
Payer: MEDICARE

## 2022-09-13 ENCOUNTER — PATIENT OUTREACH (OUTPATIENT)
Dept: ADMINISTRATIVE | Facility: HOSPITAL | Age: 87
End: 2022-09-13
Payer: MEDICARE

## 2022-09-13 NOTE — PROGRESS NOTES
2022 Care Everywhere updates requested and reviewed.  Immunizations reconciled. Media reports reviewed.  Duplicate HM overrides and  orders removed.  Overdue HM topic chart audit and/or requested.  Overdue lab testing linked to upcoming lab appointments if applies.  Lab [facility], and [facility] reviewed    Lab testing       Health Maintenance Due   Topic Date Due    Foot Exam  2020    COVID-19 Vaccine (3 - Booster for Pfizer series) 2021    Hemoglobin A1c  2022    Influenza Vaccine (1) 2022

## 2022-09-21 ENCOUNTER — TELEPHONE (OUTPATIENT)
Dept: FAMILY MEDICINE | Facility: CLINIC | Age: 87
End: 2022-09-21
Payer: MEDICARE

## 2022-09-21 ENCOUNTER — TELEPHONE (OUTPATIENT)
Dept: FAMILY MEDICINE | Facility: CLINIC | Age: 87
End: 2022-09-21

## 2022-09-21 NOTE — TELEPHONE ENCOUNTER
Spoke w/ Priscila. She states [facility] rec'd referral for PT. She states pt was just d/c'd from  PT on 8/16/22. This ref was for the same reason. She states in order for them to go back out, a new order(MNF) with clinicals/face-to-face needs to be sent to [facility]. If they approve it, they will contact [facility] to go see pt. PPt was seen 8/23/22, for URI. If OK, please put in new referral to  PT and add addendum to last note w/ documentation to send to N w/ [facility] Form.

## 2022-09-21 NOTE — TELEPHONE ENCOUNTER
----- Message from Scar Sandoval sent at 9/21/2022  8:53 AM CDT -----  Type: Needs Medical Advice  Who Called: Kristin/ [facility]     Best Call Back Number: [phone]  Additional Information: Caller states that she would like a callback regarding for the patient's initial home health authorization and what disciplines are needed.

## 2022-09-21 NOTE — TELEPHONE ENCOUNTER
----- Message from Nataly Gallegos sent at 9/20/2022  1:35 PM CDT -----  Patient Call Back    Who Called: [facility]/ [facility]     What is the reqeust in detail: [facility] CALLING TO SPEAK WITH SOMEONE REGARDING THE HOME HEALTH ORDERS THAT WAS SENT. [facility] STATED THAT THE PT WAS D/C FROM THEM ON 8/16. [facility] WOULD LIKE TO SPEAK WITH SOMEONE REGARDING A FACE TO FACE INFORMATION THAT IS NEW. [facility] ADVISE.     Can the clinic reply by [facility]?    Best Call Back Number: [phone]

## 2022-09-23 ENCOUNTER — TELEPHONE (OUTPATIENT)
Dept: FAMILY MEDICINE | Facility: CLINIC | Age: 87
End: 2022-09-23
Payer: MEDICARE

## 2022-09-23 NOTE — TELEPHONE ENCOUNTER
----- Message from Tracie Grant sent at 9/23/2022  9:00 AM CDT -----  .Type:  Patient Call Back    Who Called:KINSEY FROM [facility]       Does the patient know what this is regarding?: THEY WILL NEED A DETAILED ORDER FOR PT H.H RESENT TO THEM TO APPROVE IT     Would the patient rather a call back YES     Best Call Back Number: [phone] ATT: KINSEY     Additional Information: Thank You

## 2022-09-27 ENCOUNTER — TELEPHONE (OUTPATIENT)
Dept: FAMILY MEDICINE | Facility: CLINIC | Age: 87
End: 2022-09-27
Payer: MEDICARE

## 2022-09-27 ENCOUNTER — LAB VISIT (OUTPATIENT)
Dept: LAB | Facility: HOSPITAL | Age: 87
End: 2022-09-27
Attending: FAMILY MEDICINE
Payer: MEDICARE

## 2022-09-27 ENCOUNTER — OFFICE VISIT (OUTPATIENT)
Dept: FAMILY MEDICINE | Facility: CLINIC | Age: 87
End: 2022-09-27
Payer: MEDICARE

## 2022-09-27 VITALS
BODY MASS INDEX: 29.01 KG/M2 | DIASTOLIC BLOOD PRESSURE: 56 MMHG | HEIGHT: 62 IN | TEMPERATURE: 98 F | SYSTOLIC BLOOD PRESSURE: 122 MMHG | RESPIRATION RATE: 14 BRPM | HEART RATE: 61 BPM | WEIGHT: 157.63 LBS

## 2022-09-27 DIAGNOSIS — I69.354 HEMIPARESIS AFFECTING LEFT SIDE AS LATE EFFECT OF CEREBROVASCULAR ACCIDENT (CVA): Primary | ICD-10-CM

## 2022-09-27 DIAGNOSIS — K80.20 GALLSTONES: Primary | ICD-10-CM

## 2022-09-27 DIAGNOSIS — K80.20 GALLSTONES: ICD-10-CM

## 2022-09-27 DIAGNOSIS — N28.9 RENAL INSUFFICIENCY: ICD-10-CM

## 2022-09-27 DIAGNOSIS — R74.01 TRANSAMINITIS: ICD-10-CM

## 2022-09-27 DIAGNOSIS — Z86.73 H/O: CVA (CEREBROVASCULAR ACCIDENT): ICD-10-CM

## 2022-09-27 LAB
ALBUMIN SERPL BCP-MCNC: 3.1 G/DL (ref 3.5–5.2)
ALP SERPL-CCNC: 92 U/L (ref 55–135)
ALT SERPL W/O P-5'-P-CCNC: 26 U/L (ref 10–44)
ANION GAP SERPL CALC-SCNC: 13 MMOL/L (ref 8–16)
AST SERPL-CCNC: 36 U/L (ref 10–40)
BILIRUB SERPL-MCNC: 0.4 MG/DL (ref 0.1–1)
BUN SERPL-MCNC: 48 MG/DL (ref 8–23)
CALCIUM SERPL-MCNC: 9.6 MG/DL (ref 8.7–10.5)
CHLORIDE SERPL-SCNC: 101 MMOL/L (ref 95–110)
CO2 SERPL-SCNC: 23 MMOL/L (ref 23–29)
CREAT SERPL-MCNC: 2.5 MG/DL (ref 0.5–1.4)
EST. GFR  (NO RACE VARIABLE): 18.2 ML/MIN/1.73 M^2
GLUCOSE SERPL-MCNC: 158 MG/DL (ref 70–110)
POTASSIUM SERPL-SCNC: 5 MMOL/L (ref 3.5–5.1)
PROT SERPL-MCNC: 6.7 G/DL (ref 6–8.4)
SODIUM SERPL-SCNC: 137 MMOL/L (ref 136–145)

## 2022-09-27 PROCEDURE — 1101F PT FALLS ASSESS-DOCD LE1/YR: CPT | Mod: CPTII,S$GLB,, | Performed by: FAMILY MEDICINE

## 2022-09-27 PROCEDURE — G0008 ADMIN INFLUENZA VIRUS VAC: HCPCS | Mod: S$GLB,,, | Performed by: FAMILY MEDICINE

## 2022-09-27 PROCEDURE — G0008 FLU VACCINE - QUADRIVALENT - ADJUVANTED: ICD-10-PCS | Mod: S$GLB,,, | Performed by: FAMILY MEDICINE

## 2022-09-27 PROCEDURE — 1101F PR PT FALLS ASSESS DOC 0-1 FALLS W/OUT INJ PAST YR: ICD-10-PCS | Mod: CPTII,S$GLB,, | Performed by: FAMILY MEDICINE

## 2022-09-27 PROCEDURE — 1159F MED LIST DOCD IN RCRD: CPT | Mod: CPTII,S$GLB,, | Performed by: FAMILY MEDICINE

## 2022-09-27 PROCEDURE — 1159F PR MEDICATION LIST DOCUMENTED IN MEDICAL RECORD: ICD-10-PCS | Mod: CPTII,S$GLB,, | Performed by: FAMILY MEDICINE

## 2022-09-27 PROCEDURE — 1126F PR PAIN SEVERITY QUANTIFIED, NO PAIN PRESENT: ICD-10-PCS | Mod: CPTII,S$GLB,, | Performed by: FAMILY MEDICINE

## 2022-09-27 PROCEDURE — 1160F PR REVIEW ALL MEDS BY PRESCRIBER/CLIN PHARMACIST DOCUMENTED: ICD-10-PCS | Mod: CPTII,S$GLB,, | Performed by: FAMILY MEDICINE

## 2022-09-27 PROCEDURE — 80053 COMPREHEN METABOLIC PANEL: CPT | Performed by: FAMILY MEDICINE

## 2022-09-27 PROCEDURE — 3288F FALL RISK ASSESSMENT DOCD: CPT | Mod: CPTII,S$GLB,, | Performed by: FAMILY MEDICINE

## 2022-09-27 PROCEDURE — 99213 OFFICE O/P EST LOW 20 MIN: CPT | Mod: 25,S$GLB,, | Performed by: FAMILY MEDICINE

## 2022-09-27 PROCEDURE — 1157F PR ADVANCE CARE PLAN OR EQUIV PRESENT IN MEDICAL RECORD: ICD-10-PCS | Mod: CPTII,S$GLB,, | Performed by: FAMILY MEDICINE

## 2022-09-27 PROCEDURE — 3288F PR FALLS RISK ASSESSMENT DOCUMENTED: ICD-10-PCS | Mod: CPTII,S$GLB,, | Performed by: FAMILY MEDICINE

## 2022-09-27 PROCEDURE — 1157F ADVNC CARE PLAN IN RCRD: CPT | Mod: CPTII,S$GLB,, | Performed by: FAMILY MEDICINE

## 2022-09-27 PROCEDURE — 1160F RVW MEDS BY RX/DR IN RCRD: CPT | Mod: CPTII,S$GLB,, | Performed by: FAMILY MEDICINE

## 2022-09-27 PROCEDURE — 36415 COLL VENOUS BLD VENIPUNCTURE: CPT | Mod: PN | Performed by: FAMILY MEDICINE

## 2022-09-27 PROCEDURE — 85027 COMPLETE CBC AUTOMATED: CPT | Performed by: FAMILY MEDICINE

## 2022-09-27 PROCEDURE — 90694 FLU VACCINE - QUADRIVALENT - ADJUVANTED: ICD-10-PCS | Mod: S$GLB,,, | Performed by: FAMILY MEDICINE

## 2022-09-27 PROCEDURE — 1126F AMNT PAIN NOTED NONE PRSNT: CPT | Mod: CPTII,S$GLB,, | Performed by: FAMILY MEDICINE

## 2022-09-27 PROCEDURE — 90694 VACC AIIV4 NO PRSRV 0.5ML IM: CPT | Mod: S$GLB,,, | Performed by: FAMILY MEDICINE

## 2022-09-27 PROCEDURE — 99999 PR PBB SHADOW E&M-EST. PATIENT-LVL V: CPT | Mod: PBBFAC,,, | Performed by: FAMILY MEDICINE

## 2022-09-27 PROCEDURE — 99999 PR PBB SHADOW E&M-EST. PATIENT-LVL V: ICD-10-PCS | Mod: PBBFAC,,, | Performed by: FAMILY MEDICINE

## 2022-09-27 PROCEDURE — 99213 PR OFFICE/OUTPT VISIT, EST, LEVL III, 20-29 MIN: ICD-10-PCS | Mod: 25,S$GLB,, | Performed by: FAMILY MEDICINE

## 2022-09-27 RX ORDER — DIGOXIN 125 MCG
1 TABLET ORAL EVERY OTHER DAY
COMMUNITY

## 2022-09-27 NOTE — PROGRESS NOTES
"Subjective:       Patient ID: Denice Sheth is a 87 y.o. female.    Chief Complaint: Hospital Follow Up ([facility] f/u)    HPI  Patient in clinic for hosp f/u.  Saw cards/presser re low BP. They adjusted the digoxin to QOD.  +gallstones, adjusted diet. No surgery for now.   N/v resolved, and with dietary changes since she has not had any issues.     Review of Systems:  Review of Systems   Constitutional:  Negative for appetite change, chills and fever.   Eyes:  Negative for visual disturbance.   Respiratory:  Positive for shortness of breath (with exertion). Negative for cough.    Cardiovascular:  Negative for chest pain and leg swelling.   Gastrointestinal:  Negative for abdominal pain, diarrhea and nausea.   Genitourinary:  Negative for difficulty urinating, dysuria and hematuria.   Musculoskeletal:  Positive for arthralgias (right knee) and gait problem. Negative for myalgias.   Skin:  Negative for rash.   Neurological:  Positive for weakness. Negative for headaches.   Hematological:  Bruises/bleeds easily.   Psychiatric/Behavioral:  Negative for sleep disturbance.      Objective:     Vitals:    09/27/22 0923   BP: (!) 122/56   BP Location: Left arm   Patient Position: Sitting   BP Method: Medium (Manual)   Pulse: 61   Resp: 14   Temp: 97.8 °F (36.6 °C)   TempSrc: Oral   Weight: 71.5 kg (157 lb 10.1 oz)   Height: 5' 2" (1.575 m)          Physical Exam  Vitals and nursing note reviewed.   Constitutional:       General: She is not in acute distress.     Appearance: Normal appearance. She is well-developed.   HENT:      Head: Normocephalic and atraumatic.   Eyes:      General: No scleral icterus.        Right eye: No discharge.         Left eye: No discharge.      Conjunctiva/sclera: Conjunctivae normal.   Cardiovascular:      Rate and Rhythm: Normal rate.      Heart sounds: Normal heart sounds.   Pulmonary:      Effort: Pulmonary effort is normal. No respiratory distress.   Abdominal:      Palpations: Abdomen is soft. "      Tenderness: There is no guarding.   Musculoskeletal:         General: No deformity or signs of injury.      Cervical back: Neck supple.   Skin:     General: Skin is warm and dry.      Findings: No rash.   Neurological:      General: No focal deficit present.      Mental Status: She is alert and oriented to person, place, and time.   Psychiatric:         Mood and Affect: Mood normal.         Behavior: Behavior normal.         Assessment & Plan:  Gallstones  -     CBC Without Differential; Future; Expected date: 09/27/2022  -     Comprehensive Metabolic Panel; Future; Expected date: 09/27/2022    Transaminitis  -     CBC Without Differential; Future; Expected date: 09/27/2022  -     Comprehensive Metabolic Panel; Future; Expected date: 09/27/2022    Renal insufficiency  -     CBC Without Differential; Future; Expected date: 09/27/2022  -     Comprehensive Metabolic Panel; Future; Expected date: 09/27/2022    Other orders  -     Influenza - Quadrivalent (Adjuvanted)    Continue dietary restrictions and reviewed alarm sx that would indicate need for emergent f/u.  Update cbc and cmp.

## 2022-09-27 NOTE — TELEPHONE ENCOUNTER
----- Message from Cristela Main, Patient Care Assistant sent at 9/27/2022 10:50 AM CDT -----  Contact: [facility] [employer] [facility]  Type: Needs Medical Advice    Who Called: [employer] [employer] [facility]  Best Call Back Number: [phone]  Fax: [phone]  Inquiry/Question: Kristin is calling to get the home health discipline the pt is in need of. Kristin states she needs this information by today due to she will have to send it to denial if the request is not completed today. Please advise. Thank you~

## 2022-09-27 NOTE — TELEPHONE ENCOUNTER
LORRAINE-Kristin is calling to get the home health discipline the pt is in need of. Kristin states she needs this information by today due to she will have to send it to denial if the request is not completed today.

## 2022-09-28 ENCOUNTER — TELEPHONE (OUTPATIENT)
Dept: FAMILY MEDICINE | Facility: CLINIC | Age: 87
End: 2022-09-28
Payer: MEDICARE

## 2022-09-28 LAB
ERYTHROCYTE [DISTWIDTH] IN BLOOD BY AUTOMATED COUNT: 15.5 % (ref 11.5–14.5)
HCT VFR BLD AUTO: 33.5 % (ref 37–48.5)
HGB BLD-MCNC: 10.3 G/DL (ref 12–16)
MCH RBC QN AUTO: 28.8 PG (ref 27–31)
MCHC RBC AUTO-ENTMCNC: 30.7 G/DL (ref 32–36)
MCV RBC AUTO: 94 FL (ref 82–98)
PLATELET # BLD AUTO: 315 K/UL (ref 150–450)
PMV BLD AUTO: 12.3 FL (ref 9.2–12.9)
RBC # BLD AUTO: 3.58 M/UL (ref 4–5.4)
WBC # BLD AUTO: 10.07 K/UL (ref 3.9–12.7)

## 2022-09-28 NOTE — TELEPHONE ENCOUNTER
----- Message from Bertha Sow sent at 9/28/2022 10:59 AM CDT -----  Contact: vane  Type: Needs Medical Advice  Who Called:  Vane with [employer]   [facility] Call Back Number: [phone]  Additional Information: requesting to speak to Esthela regarding home health authorization- today is the last day to receive the disciplines- if not received today, they will deny it. Please advise.

## 2022-09-28 NOTE — TELEPHONE ENCOUNTER
Orders for HH PT/OT eval and tx placed per Verbal orders. Faxed to [facility] for authorization. Attempted to contact Kristin with [facility] to notify. No answer. Left  for call back.

## 2022-09-28 NOTE — TELEPHONE ENCOUNTER
Orders for HH PT/OT eval and tx placed per Verbal orders. Faxed all paperwork to [facility] for authorization. Attempted to contact Krsitin with [facility] to notify. No answer. Left  for call back.

## 2022-09-29 ENCOUNTER — TELEPHONE (OUTPATIENT)
Dept: FAMILY MEDICINE | Facility: CLINIC | Age: 87
End: 2022-09-29
Payer: MEDICARE

## 2022-09-29 DIAGNOSIS — D50.9 IRON DEFICIENCY ANEMIA, UNSPECIFIED IRON DEFICIENCY ANEMIA TYPE: Primary | ICD-10-CM

## 2022-09-29 DIAGNOSIS — N18.4 CKD (CHRONIC KIDNEY DISEASE) STAGE 4, GFR 15-29 ML/MIN: ICD-10-CM

## 2022-09-30 PROCEDURE — G0180 PR HOME HEALTH MD CERTIFICATION: ICD-10-PCS | Mod: ,,, | Performed by: FAMILY MEDICINE

## 2022-09-30 PROCEDURE — G0180 MD CERTIFICATION HHA PATIENT: HCPCS | Mod: ,,, | Performed by: FAMILY MEDICINE

## 2022-09-30 NOTE — TELEPHONE ENCOUNTER
Mild anemia - ok. Recheck 6-8 weeks.   Liver enzymes have normalized.  Drop in kidney function from her previous baseline. Increase fluids, recheck 2-3 weeks.

## 2022-10-03 ENCOUNTER — PATIENT MESSAGE (OUTPATIENT)
Dept: FAMILY MEDICINE | Facility: CLINIC | Age: 87
End: 2022-10-03
Payer: MEDICARE

## 2022-10-19 ENCOUNTER — LAB VISIT (OUTPATIENT)
Dept: LAB | Facility: HOSPITAL | Age: 87
End: 2022-10-19
Attending: INTERNAL MEDICINE
Payer: MEDICARE

## 2022-10-19 DIAGNOSIS — N18.4 CKD (CHRONIC KIDNEY DISEASE) STAGE 4, GFR 15-29 ML/MIN: ICD-10-CM

## 2022-10-19 LAB
ALBUMIN SERPL BCP-MCNC: 3.2 G/DL (ref 3.5–5.2)
ANION GAP SERPL CALC-SCNC: 8 MMOL/L (ref 8–16)
BASOPHILS # BLD AUTO: 0.03 K/UL (ref 0–0.2)
BASOPHILS NFR BLD: 0.2 % (ref 0–1.9)
BUN SERPL-MCNC: 43 MG/DL (ref 8–23)
CALCIUM SERPL-MCNC: 10 MG/DL (ref 8.7–10.5)
CHLORIDE SERPL-SCNC: 97 MMOL/L (ref 95–110)
CO2 SERPL-SCNC: 29 MMOL/L (ref 23–29)
CREAT SERPL-MCNC: 2.4 MG/DL (ref 0.5–1.4)
DIFFERENTIAL METHOD: ABNORMAL
EOSINOPHIL # BLD AUTO: 0.1 K/UL (ref 0–0.5)
EOSINOPHIL NFR BLD: 0.8 % (ref 0–8)
ERYTHROCYTE [DISTWIDTH] IN BLOOD BY AUTOMATED COUNT: 15.5 % (ref 11.5–14.5)
EST. GFR  (NO RACE VARIABLE): 19.1 ML/MIN/1.73 M^2
GLUCOSE SERPL-MCNC: 223 MG/DL (ref 70–110)
HCT VFR BLD AUTO: 36.8 % (ref 37–48.5)
HGB BLD-MCNC: 10.8 G/DL (ref 12–16)
IMM GRANULOCYTES # BLD AUTO: 0.1 K/UL (ref 0–0.04)
IMM GRANULOCYTES NFR BLD AUTO: 0.7 % (ref 0–0.5)
LYMPHOCYTES # BLD AUTO: 2 K/UL (ref 1–4.8)
LYMPHOCYTES NFR BLD: 14.7 % (ref 18–48)
MCH RBC QN AUTO: 28.2 PG (ref 27–31)
MCHC RBC AUTO-ENTMCNC: 29.3 G/DL (ref 32–36)
MCV RBC AUTO: 96 FL (ref 82–98)
MONOCYTES # BLD AUTO: 1.2 K/UL (ref 0.3–1)
MONOCYTES NFR BLD: 8.3 % (ref 4–15)
NEUTROPHILS # BLD AUTO: 10.5 K/UL (ref 1.8–7.7)
NEUTROPHILS NFR BLD: 75.3 % (ref 38–73)
NRBC BLD-RTO: 0 /100 WBC
PHOSPHATE SERPL-MCNC: 3.2 MG/DL (ref 2.7–4.5)
PLATELET # BLD AUTO: 317 K/UL (ref 150–450)
PMV BLD AUTO: 11.9 FL (ref 9.2–12.9)
POTASSIUM SERPL-SCNC: 4.1 MMOL/L (ref 3.5–5.1)
PTH-INTACT SERPL-MCNC: 38.5 PG/ML (ref 9–77)
RBC # BLD AUTO: 3.83 M/UL (ref 4–5.4)
SODIUM SERPL-SCNC: 134 MMOL/L (ref 136–145)
WBC # BLD AUTO: 13.88 K/UL (ref 3.9–12.7)

## 2022-10-19 PROCEDURE — 83970 ASSAY OF PARATHORMONE: CPT | Performed by: INTERNAL MEDICINE

## 2022-10-19 PROCEDURE — 36415 COLL VENOUS BLD VENIPUNCTURE: CPT | Mod: PN | Performed by: INTERNAL MEDICINE

## 2022-10-19 PROCEDURE — 80069 RENAL FUNCTION PANEL: CPT | Performed by: INTERNAL MEDICINE

## 2022-10-19 PROCEDURE — 85025 COMPLETE CBC W/AUTO DIFF WBC: CPT | Performed by: INTERNAL MEDICINE

## 2022-10-20 ENCOUNTER — EXTERNAL HOME HEALTH (OUTPATIENT)
Dept: HOME HEALTH SERVICES | Facility: HOSPITAL | Age: 87
End: 2022-10-20
Payer: MEDICARE

## 2022-10-21 ENCOUNTER — DOCUMENT SCAN (OUTPATIENT)
Dept: HOME HEALTH SERVICES | Facility: HOSPITAL | Age: 87
End: 2022-10-21
Payer: MEDICARE

## 2022-10-26 ENCOUNTER — OFFICE VISIT (OUTPATIENT)
Dept: NEPHROLOGY | Facility: CLINIC | Age: 87
End: 2022-10-26
Payer: MEDICARE

## 2022-10-26 ENCOUNTER — PATIENT MESSAGE (OUTPATIENT)
Dept: FAMILY MEDICINE | Facility: CLINIC | Age: 87
End: 2022-10-26
Payer: MEDICARE

## 2022-10-26 VITALS
SYSTOLIC BLOOD PRESSURE: 130 MMHG | WEIGHT: 157 LBS | HEIGHT: 62 IN | DIASTOLIC BLOOD PRESSURE: 56 MMHG | BODY MASS INDEX: 28.89 KG/M2

## 2022-10-26 DIAGNOSIS — E11.22 TYPE 2 DIABETES MELLITUS WITH STAGE 4 CHRONIC KIDNEY DISEASE, WITH LONG-TERM CURRENT USE OF INSULIN: ICD-10-CM

## 2022-10-26 DIAGNOSIS — N18.4 TYPE 2 DIABETES MELLITUS WITH STAGE 4 CHRONIC KIDNEY DISEASE, WITH LONG-TERM CURRENT USE OF INSULIN: ICD-10-CM

## 2022-10-26 DIAGNOSIS — N18.4 CKD (CHRONIC KIDNEY DISEASE) STAGE 4, GFR 15-29 ML/MIN: Primary | ICD-10-CM

## 2022-10-26 DIAGNOSIS — R80.9 PROTEINURIA DUE TO TYPE 2 DIABETES MELLITUS: ICD-10-CM

## 2022-10-26 DIAGNOSIS — I10 PRIMARY HYPERTENSION: ICD-10-CM

## 2022-10-26 DIAGNOSIS — E87.1 HYPONATREMIA: ICD-10-CM

## 2022-10-26 DIAGNOSIS — Z79.4 TYPE 2 DIABETES MELLITUS WITH STAGE 4 CHRONIC KIDNEY DISEASE, WITH LONG-TERM CURRENT USE OF INSULIN: ICD-10-CM

## 2022-10-26 DIAGNOSIS — E11.29 PROTEINURIA DUE TO TYPE 2 DIABETES MELLITUS: ICD-10-CM

## 2022-10-26 PROCEDURE — 99214 PR OFFICE/OUTPT VISIT, EST, LEVL IV, 30-39 MIN: ICD-10-PCS | Mod: S$GLB,,, | Performed by: INTERNAL MEDICINE

## 2022-10-26 PROCEDURE — 1157F ADVNC CARE PLAN IN RCRD: CPT | Mod: CPTII,S$GLB,, | Performed by: INTERNAL MEDICINE

## 2022-10-26 PROCEDURE — 99999 PR PBB SHADOW E&M-EST. PATIENT-LVL IV: ICD-10-PCS | Mod: PBBFAC,,, | Performed by: INTERNAL MEDICINE

## 2022-10-26 PROCEDURE — 1159F MED LIST DOCD IN RCRD: CPT | Mod: CPTII,S$GLB,, | Performed by: INTERNAL MEDICINE

## 2022-10-26 PROCEDURE — 1157F PR ADVANCE CARE PLAN OR EQUIV PRESENT IN MEDICAL RECORD: ICD-10-PCS | Mod: CPTII,S$GLB,, | Performed by: INTERNAL MEDICINE

## 2022-10-26 PROCEDURE — 1159F PR MEDICATION LIST DOCUMENTED IN MEDICAL RECORD: ICD-10-PCS | Mod: CPTII,S$GLB,, | Performed by: INTERNAL MEDICINE

## 2022-10-26 PROCEDURE — 1101F PR PT FALLS ASSESS DOC 0-1 FALLS W/OUT INJ PAST YR: ICD-10-PCS | Mod: CPTII,S$GLB,, | Performed by: INTERNAL MEDICINE

## 2022-10-26 PROCEDURE — 99214 OFFICE O/P EST MOD 30 MIN: CPT | Mod: S$GLB,,, | Performed by: INTERNAL MEDICINE

## 2022-10-26 PROCEDURE — 99499 RISK ADDL DX/OHS AUDIT: ICD-10-PCS | Mod: S$GLB,,, | Performed by: INTERNAL MEDICINE

## 2022-10-26 PROCEDURE — 99499 UNLISTED E&M SERVICE: CPT | Mod: S$GLB,,, | Performed by: INTERNAL MEDICINE

## 2022-10-26 PROCEDURE — 1101F PT FALLS ASSESS-DOCD LE1/YR: CPT | Mod: CPTII,S$GLB,, | Performed by: INTERNAL MEDICINE

## 2022-10-26 PROCEDURE — 3288F PR FALLS RISK ASSESSMENT DOCUMENTED: ICD-10-PCS | Mod: CPTII,S$GLB,, | Performed by: INTERNAL MEDICINE

## 2022-10-26 PROCEDURE — 3288F FALL RISK ASSESSMENT DOCD: CPT | Mod: CPTII,S$GLB,, | Performed by: INTERNAL MEDICINE

## 2022-10-26 PROCEDURE — 99999 PR PBB SHADOW E&M-EST. PATIENT-LVL IV: CPT | Mod: PBBFAC,,, | Performed by: INTERNAL MEDICINE

## 2022-10-26 PROCEDURE — 1160F PR REVIEW ALL MEDS BY PRESCRIBER/CLIN PHARMACIST DOCUMENTED: ICD-10-PCS | Mod: CPTII,S$GLB,, | Performed by: INTERNAL MEDICINE

## 2022-10-26 PROCEDURE — 1160F RVW MEDS BY RX/DR IN RCRD: CPT | Mod: CPTII,S$GLB,, | Performed by: INTERNAL MEDICINE

## 2022-10-26 NOTE — PROGRESS NOTES
"Subjective:       Patient ID: Denice Sheth is a 87 y.o. White female who presents for return patient evaluation for chronic renal failure.      She has no uremic or urinary symptoms.  She had a large dye load in January and had an episode of diarrhea in July.  She is feeling better and is back to her baseline.  She is tired of the low sodium diet and wants to eat 10 pounds of fried shrimp.      Review of Systems   Constitutional:  Negative for appetite change, chills and fever.   Eyes:  Negative for visual disturbance.   Respiratory:  Positive for shortness of breath (with exertion). Negative for cough.    Cardiovascular:  Negative for chest pain and leg swelling.   Gastrointestinal:  Negative for abdominal pain, diarrhea, nausea and vomiting.   Genitourinary:  Negative for difficulty urinating, dysuria and hematuria.   Musculoskeletal:  Positive for arthralgias (right knee) and gait problem. Negative for myalgias.   Skin:  Negative for rash.   Neurological:  Positive for weakness. Negative for headaches.   Hematological:  Bruises/bleeds easily.   Psychiatric/Behavioral:  Negative for sleep disturbance.      The past medical, family and social histories were reviewed for this encounter.     BP (!) 130/56 (BP Location: Right arm, Patient Position: Sitting)   Ht 5' 2" (1.575 m)   Wt 71.2 kg (157 lb)   LMP  (LMP Unknown)   BMI 28.72 kg/m²     Objective:      Physical Exam  Vitals reviewed.   Constitutional:       General: She is not in acute distress.     Appearance: She is well-developed. She is obese.   HENT:      Head: Normocephalic and atraumatic.   Eyes:      General: No scleral icterus.     Conjunctiva/sclera: Conjunctivae normal.   Neck:      Vascular: No JVD.   Cardiovascular:      Rate and Rhythm: Normal rate and regular rhythm.      Heart sounds: Normal heart sounds. No murmur heard.    No friction rub. No gallop.   Pulmonary:      Effort: Pulmonary effort is normal. No respiratory distress.      " Breath sounds: Normal breath sounds. No wheezing.   Abdominal:      General: Bowel sounds are normal. There is no distension.      Palpations: Abdomen is soft.      Tenderness: There is no abdominal tenderness.   Musculoskeletal:      Cervical back: Normal range of motion.      Right lower leg: No edema.      Left lower leg: No edema.   Skin:     General: Skin is warm and dry.      Findings: No rash.   Neurological:      Mental Status: She is alert and oriented to person, place, and time.   Psychiatric:         Mood and Affect: Mood normal.         Behavior: Behavior normal.      Assessment:       1. CKD (chronic kidney disease) stage 4, GFR 15-29 ml/min    2. Hyponatremia    3. Primary hypertension    4. 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 Type 2 diabetes mellitus with stage 4 chronic kidney disease, with long-term current use of insulin          Plan:   Return to clinic in 3 months.  Labs for next visit include rp pth upc per so   Baseline creatinine is 1.5-1.9 since 2015.   PTH is 38 with a calcium of 10.0.  UPC is negative.  Renal US shows R 9.7 cm L 10.9 cm with no cysts/stones/masses.  Blood pressure is controlled on the current regimen.  Continue current medications as prescribed and reviewed.   She had JANETTE in January secondary to a contrast load.  She does not wish to do dialysis.

## 2022-10-27 ENCOUNTER — OFFICE VISIT (OUTPATIENT)
Dept: FAMILY MEDICINE | Facility: CLINIC | Age: 87
End: 2022-10-27
Payer: MEDICARE

## 2022-10-27 ENCOUNTER — TELEPHONE (OUTPATIENT)
Dept: PODIATRY | Facility: CLINIC | Age: 87
End: 2022-10-27
Payer: MEDICARE

## 2022-10-27 VITALS
HEIGHT: 62 IN | BODY MASS INDEX: 28.36 KG/M2 | SYSTOLIC BLOOD PRESSURE: 138 MMHG | HEART RATE: 64 BPM | RESPIRATION RATE: 18 BRPM | WEIGHT: 154.13 LBS | DIASTOLIC BLOOD PRESSURE: 86 MMHG

## 2022-10-27 DIAGNOSIS — S91.109A OPEN WOUND OF TOE, INITIAL ENCOUNTER: Primary | ICD-10-CM

## 2022-10-27 PROCEDURE — 1126F AMNT PAIN NOTED NONE PRSNT: CPT | Mod: CPTII,S$GLB,, | Performed by: NURSE PRACTITIONER

## 2022-10-27 PROCEDURE — 3288F FALL RISK ASSESSMENT DOCD: CPT | Mod: CPTII,S$GLB,, | Performed by: NURSE PRACTITIONER

## 2022-10-27 PROCEDURE — 99213 PR OFFICE/OUTPT VISIT, EST, LEVL III, 20-29 MIN: ICD-10-PCS | Mod: S$GLB,,, | Performed by: NURSE PRACTITIONER

## 2022-10-27 PROCEDURE — 3288F PR FALLS RISK ASSESSMENT DOCUMENTED: ICD-10-PCS | Mod: CPTII,S$GLB,, | Performed by: NURSE PRACTITIONER

## 2022-10-27 PROCEDURE — 99999 PR PBB SHADOW E&M-EST. PATIENT-LVL V: CPT | Mod: PBBFAC,,, | Performed by: NURSE PRACTITIONER

## 2022-10-27 PROCEDURE — 99213 OFFICE O/P EST LOW 20 MIN: CPT | Mod: S$GLB,,, | Performed by: NURSE PRACTITIONER

## 2022-10-27 PROCEDURE — 1157F PR ADVANCE CARE PLAN OR EQUIV PRESENT IN MEDICAL RECORD: ICD-10-PCS | Mod: CPTII,S$GLB,, | Performed by: NURSE PRACTITIONER

## 2022-10-27 PROCEDURE — 99999 PR PBB SHADOW E&M-EST. PATIENT-LVL V: ICD-10-PCS | Mod: PBBFAC,,, | Performed by: NURSE PRACTITIONER

## 2022-10-27 PROCEDURE — 1157F ADVNC CARE PLAN IN RCRD: CPT | Mod: CPTII,S$GLB,, | Performed by: NURSE PRACTITIONER

## 2022-10-27 PROCEDURE — 1101F PT FALLS ASSESS-DOCD LE1/YR: CPT | Mod: CPTII,S$GLB,, | Performed by: NURSE PRACTITIONER

## 2022-10-27 PROCEDURE — 1126F PR PAIN SEVERITY QUANTIFIED, NO PAIN PRESENT: ICD-10-PCS | Mod: CPTII,S$GLB,, | Performed by: NURSE PRACTITIONER

## 2022-10-27 PROCEDURE — 1101F PR PT FALLS ASSESS DOC 0-1 FALLS W/OUT INJ PAST YR: ICD-10-PCS | Mod: CPTII,S$GLB,, | Performed by: NURSE PRACTITIONER

## 2022-10-27 RX ORDER — MUPIROCIN 20 MG/G
OINTMENT TOPICAL 3 TIMES DAILY
Qty: 30 G | Refills: 0 | Status: SHIPPED | OUTPATIENT
Start: 2022-10-27 | End: 2023-12-08 | Stop reason: SDUPTHER

## 2022-10-27 RX ORDER — CEFUROXIME AXETIL 250 MG/1
250 TABLET ORAL 2 TIMES DAILY
Qty: 20 TABLET | Refills: 0 | Status: SHIPPED | OUTPATIENT
Start: 2022-10-27 | End: 2022-11-06

## 2022-10-27 NOTE — PATIENT INSTRUCTIONS
Elevatioin  Warm soaks with epsom salt daily.  DRY DRY DRY the foot well  Antibiotic  Podiatry referral.    If you have concerns or questions, please do not hesitate to call.  If you have any questions, please do not hesitate to call.  You can reach us at [phone] Monday through Friday 7 a.m. to 6:30 p.m., Saturday .  Or call  Dr. Baird.    Thank you for using the [facility]!    LEXIE Rodriguez, CNP, FNP-BC [facility]    To rate your experience with GLADIS Rodriguez, click on the link below:      [email or web address]

## 2022-10-27 NOTE — PROGRESS NOTES
Denice Sheth is a 87 y.o. female patient of Meredith Baird MD who presents to the clinic today for for an in-clinic visit..    HPI    Pt, who is not known to me, reports a new problem to me:   Skin lesion type:  skin lesion(s)      Other related symptoms:  increasing diameter, tendency to be traumatized,increasing in size, pain and welling; has a hammer toe that rubs.  Wound noted on the 2nd toe.    Pt denies the following symptoms:  fever and feeling ill.    For Cellulitis:  erythema located entire toe, along with swelling    These symptoms began 3-5 days  and status is worse.     Medications tried are antibiotic ointment OTC    Pertinent medical history:  Positive for hammer toe, DM2 with stage 4 CKD .    ROS    Constitutional:  negative Fever.    Skin:  See chief complaint/HPI.    HEENT: .- none -     All other ROS neg.       Past Medical History:   Diagnosis Date    A-fib     Anticoagulant long-term use     Cecum mass     CHF (congestive heart failure)     Diabetes mellitus     Disorder of kidney and ureter     Followed by Dr. Jonathan Pace    Encounter for blood transfusion     Hypertension     Insomnia     Irregular heart rhythm     Stage 4 chronic kidney disease     Thyroid disease     hypothyroidism    TIA (transient ischemic attack) 03/2017       Current Outpatient Medications:     amiodarone (PACERONE) 200 MG Tab, Take 200 mg by mouth once daily. , Disp: , Rfl:     amoxicillin-clavulanate 875-125mg (AUGMENTIN) 875-125 mg per tablet, Take 1 tablet by mouth 2 (two) times daily. (Patient not taking: Reported on 10/26/2022), Disp: 14 tablet, Rfl: 0    apixaban (ELIQUIS) 2.5 mg Tab, Take 1 tablet (2.5 mg total) by mouth 2 (two) times daily., Disp: 60 tablet, Rfl: 0    ascorbic acid (VITAMIN C) 500 MG tablet, Take 500 mg by mouth once daily., Disp: , Rfl:     b complex vitamins capsule, Take 1 capsule by mouth once daily., Disp: , Rfl:     blood sugar diagnostic (TRUE METRIX GLUCOSE TEST STRIP) Strp, 1 each  by Misc.(Non-Drug; Combo Route) route 3 (three) times daily., Disp: 200 each, Rfl: 11    blood-glucose meter kit, To check BG 2 times daily, to use with insurance preferred meter, Disp: 1 each, Rfl: 0    cholecalciferol, vitamin D3, 125 mcg (5,000 unit) Tab, Take 1 tablet (5,000 Units total) by mouth once daily., Disp: , Rfl:     clopidogrel (PLAVIX) 75 mg tablet, TAKE ONE TABLET BY MOUTH ONCE DAILY (Patient taking differently: Take 75 mg by mouth once daily.), Disp: 30 tablet, Rfl: 11    diclofenac sodium (VOLTAREN ARTHRITIS PAIN) 1 % Gel, Apply 2 g topically 2 (two) times daily as needed (muscle pain)., Disp: 100 g, Rfl: 1    digoxin (LANOXIN) 125 mcg tablet, Take 1 tablet by mouth every other day., Disp: , Rfl:     diphenoxylate-atropine 2.5-0.025 mg (LOMOTIL) 2.5-0.025 mg per tablet, Take 1 tablet by mouth 4 (four) times daily as needed for Diarrhea., Disp: 30 tablet, Rfl: 1    famotidine (PEPCID) 20 MG tablet, Take 1 tablet (20 mg total) by mouth 2 (two) times daily. for 10 days, Disp: 20 tablet, Rfl: 0    fish oil-omega-3 fatty acids 300-1,000 mg capsule, Take 1 capsule by mouth 2 (two) times a day., Disp: , Rfl:     furosemide (LASIX) 40 MG tablet, Take 1 tablet (40 mg total) by mouth once daily. Take an extra pill for any weight gain of 3lb or more on any given day, Disp: 90 tablet, Rfl: 1    insulin (LANTUS SOLOSTAR U-100 INSULIN) glargine 100 units/mL SubQ pen, Inject 16 Units into the skin every evening., Disp: 5 each, Rfl: 3    insulin lispro 100 unit/mL pen, INJECT 7 UNITS SUBCUTANEOUSLY THREE TIMES DAILY WITH MEALS (Patient taking differently: Inject 7 Units into the skin 3 (three) times daily with meals.), Disp: 18 mL, Rfl: 1    lancets 31 gauge Misc, 1 lancet by Misc.(Non-Drug; Combo Route) route 3 (three) times daily as needed., Disp: 100 each, Rfl: 11    levothyroxine (EUTHYROX) 125 MCG tablet, Take 1 tablet (125 mcg total) by mouth every Mon, Wed, Fri., Disp: 30 tablet, Rfl: 5    levothyroxine  "(SYNTHROID) 75 MCG tablet, Take 75 mcg by mouth every Tuesday, Thursday, Saturday, Sunday., Disp: , Rfl:     magnesium 200 mg Tab, Take 200 mg by mouth once daily., Disp: , Rfl:     melatonin 10 mg Tab, Take 1 tablet by mouth nightly as needed., Disp: , Rfl:     memantine (NAMENDA) 10 MG Tab, TAKE 1 TABLET BY MOUTH ONCE DAILY IN THE EVENING, Disp: 90 tablet, Rfl: 1    metoprolol tartrate (LOPRESSOR) 50 MG tablet, Take 1 tablet (50 mg total) by mouth 2 (two) times daily., Disp: 60 tablet, Rfl: 1    multivitamin (THERAGRAN) per tablet, Take 1 tablet by mouth once daily., Disp: , Rfl:     ondansetron (ZOFRAN-ODT) 4 MG TbDL, Take 1 tablet (4 mg total) by mouth every 8 (eight) hours as needed (Nausea)., Disp: 20 tablet, Rfl: 0    pen needle, diabetic (COMFORT EZ PEN NEEDLES) 29 gauge x 1/2" Ndle, 1 Units by Misc.(Non-Drug; Combo Route) route 3 (three) times daily., Disp: 300 each, Rfl: 11    polyethylene glycol (GLYCOLAX) 17 gram PwPk, Take 17 g by mouth as needed (constipation)., Disp: , Rfl:     potassium chloride SA (K-DUR,KLOR-CON) 10 MEQ tablet, Take 1 tablet (10 mEq total) by mouth 2 (two) times daily., Disp: 60 tablet, Rfl: 3    rivastigmine tartrate (EXELON) 1.5 MG capsule, Take 1 capsule by mouth twice daily, Disp: 60 capsule, Rfl: 3    simvastatin (ZOCOR) 10 MG tablet, Take 10 mg by mouth every evening., Disp: , Rfl:     Patient has never been a smoker.    PHYSICAL EXAM    Alert, coop 87 y.o. female patient in no apparent distress, is not ill-appearing.    Vitals:    10/27/22 1349   BP: 138/86   Pulse: 64   Resp: 18   Weight: 69.9 kg (154 lb 1.6 oz)   Height: 5' 2" (1.575 m)       VS reviewed.  VS Stable.  CC, nursing note, medications & PMH all reviewed today.    Head:  Normocephalic, atraumatic.    EENT:  Ext nose/ears without lesions or erythema..               Eye lid without erythema or lesions, conjunctivae not injected.     Resp:  Respirations even unlabored.    CV: Heart regular rate.    MS:  Ambulates " 1. Moderate impairment: Walks 20', slow speed, abnormal gait pattern, evidence for imbalance., with wheelchair                    NEURO:  Alert and oriented x 2.  Does not respond appropriately during the visit.                  Sensation to light touch intact over affected area and symmetrical side to side. .  Skin:  Warm, dry, color good.                         LESION(S):  7 mm diameter lesion(s)                                 Color:small wound on dorsum of 2nd toe, red                                 Configuration:  discrete                                 Features:  wound                                  Shape:  round                                 Type:  open wound located on the                                 Location  2nd toe  which is is tender to palp.                                 Exudate is not present     Psych:  does not respond appropriately throughout the visit.               isRelaxed; is well-groomed     Assessment:    Open wound of toe, initial encounter  -     Ambulatory referral/consult to Podiatry; Future; Expected date: 11/03/2022  -     cefUROXime (CEFTIN) 250 MG tablet; Take 1 tablet (250 mg total) by mouth 2 (two) times daily. for 10 days  Dispense: 20 tablet; Refill: 0  -     mupirocin (BACTROBAN) 2 % ointment; Apply topically 3 (three) times daily.  Dispense: 30 g; Refill: 0          Pt today presents with has hammer toes and the 2nd toe rubs on her shoe, creating a small lesion   that is worse.  negative h/o same.      This is a new problem to me.  no work up is planned.        Known Diagnostic Results r/t this visit:  Other none    Prescription Antibiotics:   cefuroxime    Prescribing Considerations:  Renal function: >15, Allergies to medications @allergies@, and Age > 65 ([facility] list).       Referred to  podiatry                        Education:  Pt given education re: condition and advised to perform any comfort measures/ treatment recommended on patient instruction sheet, which  were reviewed at the time of the visit.    Follow Up:    If worse or concerns, the patient is advised to call for advise to this office or the PCP office or call [facility] or go to the nearest URGENT CARE or ER.    Explained exam findings, diagnosis and treatment plan to patient and family member.  Questions answered and patient states understanding.

## 2022-10-27 NOTE — TELEPHONE ENCOUNTER
----- Message from Judith Lo MA sent at 10/27/2022  2:36 PM CDT -----  Regarding: urgent referral  Good afternoon,      Pt is here in clinic and was just seen by Mahogany Villasenor NP. Referral is for a open wound to second toe on right foot. Please let me know or reach put to pt to get scheduled.      Pt can be reached at [phone](Parisa daughter)        Thank you for your time

## 2022-10-31 ENCOUNTER — HOSPITAL ENCOUNTER (OUTPATIENT)
Dept: RADIOLOGY | Facility: HOSPITAL | Age: 87
Discharge: HOME | End: 2022-10-31
Attending: PODIATRIST
Payer: MEDICARE

## 2022-10-31 ENCOUNTER — OFFICE VISIT (OUTPATIENT)
Dept: PODIATRY | Facility: CLINIC | Age: 87
End: 2022-10-31
Payer: MEDICARE

## 2022-10-31 VITALS — HEIGHT: 62 IN | BODY MASS INDEX: 28.36 KG/M2 | WEIGHT: 154.13 LBS

## 2022-10-31 DIAGNOSIS — E11.621 DIABETIC ULCER OF TOE OF RIGHT FOOT ASSOCIATED WITH TYPE 2 DIABETES MELLITUS, WITH FAT LAYER EXPOSED: ICD-10-CM

## 2022-10-31 DIAGNOSIS — E11.42 DIABETIC POLYNEUROPATHY ASSOCIATED WITH TYPE 2 DIABETES MELLITUS: ICD-10-CM

## 2022-10-31 DIAGNOSIS — L97.512 DIABETIC ULCER OF TOE OF RIGHT FOOT ASSOCIATED WITH TYPE 2 DIABETES MELLITUS, WITH FAT LAYER EXPOSED: Primary | ICD-10-CM

## 2022-10-31 DIAGNOSIS — L97.512 DIABETIC ULCER OF TOE OF RIGHT FOOT ASSOCIATED WITH TYPE 2 DIABETES MELLITUS, WITH FAT LAYER EXPOSED: ICD-10-CM

## 2022-10-31 DIAGNOSIS — E11.621 DIABETIC ULCER OF TOE OF RIGHT FOOT ASSOCIATED WITH TYPE 2 DIABETES MELLITUS, WITH FAT LAYER EXPOSED: Primary | ICD-10-CM

## 2022-10-31 PROCEDURE — 73630 XR FOOT COMPLETE 3 VIEW RIGHT: ICD-10-PCS | Mod: 26,RT,, | Performed by: RADIOLOGY

## 2022-10-31 PROCEDURE — 1126F AMNT PAIN NOTED NONE PRSNT: CPT | Mod: CPTII,S$GLB,, | Performed by: PODIATRIST

## 2022-10-31 PROCEDURE — 99999 PR PBB SHADOW E&M-EST. PATIENT-LVL II: CPT | Mod: PBBFAC,,, | Performed by: PODIATRIST

## 2022-10-31 PROCEDURE — 73630 X-RAY EXAM OF FOOT: CPT | Mod: 26,RT,, | Performed by: RADIOLOGY

## 2022-10-31 PROCEDURE — 99499 UNLISTED E&M SERVICE: CPT | Mod: S$GLB,,, | Performed by: PODIATRIST

## 2022-10-31 PROCEDURE — 1157F ADVNC CARE PLAN IN RCRD: CPT | Mod: CPTII,S$GLB,, | Performed by: PODIATRIST

## 2022-10-31 PROCEDURE — 1126F PR PAIN SEVERITY QUANTIFIED, NO PAIN PRESENT: ICD-10-PCS | Mod: CPTII,S$GLB,, | Performed by: PODIATRIST

## 2022-10-31 PROCEDURE — 87070 CULTURE OTHR SPECIMN AEROBIC: CPT | Performed by: PODIATRIST

## 2022-10-31 PROCEDURE — 99204 PR OFFICE/OUTPT VISIT, NEW, LEVL IV, 45-59 MIN: ICD-10-PCS | Mod: S$GLB,,, | Performed by: PODIATRIST

## 2022-10-31 PROCEDURE — 1157F PR ADVANCE CARE PLAN OR EQUIV PRESENT IN MEDICAL RECORD: ICD-10-PCS | Mod: CPTII,S$GLB,, | Performed by: PODIATRIST

## 2022-10-31 PROCEDURE — 99999 PR PBB SHADOW E&M-EST. PATIENT-LVL II: ICD-10-PCS | Mod: PBBFAC,,, | Performed by: PODIATRIST

## 2022-10-31 PROCEDURE — 87075 CULTR BACTERIA EXCEPT BLOOD: CPT | Performed by: PODIATRIST

## 2022-10-31 PROCEDURE — 99204 OFFICE O/P NEW MOD 45 MIN: CPT | Mod: S$GLB,,, | Performed by: PODIATRIST

## 2022-10-31 PROCEDURE — 87186 SC STD MICRODIL/AGAR DIL: CPT | Mod: 59 | Performed by: PODIATRIST

## 2022-10-31 PROCEDURE — 87077 CULTURE AEROBIC IDENTIFY: CPT | Performed by: PODIATRIST

## 2022-10-31 PROCEDURE — 73630 X-RAY EXAM OF FOOT: CPT | Mod: TC,PO,RT

## 2022-10-31 NOTE — PROGRESS NOTES
"Subjective:      Patient ID: Denice Sheth is a 87 y.o. female.    Chief Complaint: Wound Check (2nd toe on Right foot )    Denice is a 87 y.o. female with a past medical history of A-fib, Anticoagulant long-term use, Cecum mass, CHF (congestive heart failure), Diabetes mellitus, Disorder of kidney and ureter, Encounter for blood transfusion, Hypertension, Insomnia, Irregular heart rhythm, Stage 4 chronic kidney disease, Thyroid disease, and TIA (transient ischemic attack) (03/2017). Patient relates sustaining a wound to the dorsum of the Rt. 2nd toe early last week.  States the site has since begun to deteriorate with the sensation of "coldness" to the wound in conjunction with surrounding erythema of the toe.  Notes pain only with applied pressure to the site.  Rates currently as a 0/10.  Symptoms are alleviated with rest.  Has been applying antibiotic ointment and a band aid to the area.  Denies experiencing N/V/F/C/D.  Denies noticing purulent drainage from the digit.  Denies any additional pedal complaints.      Past Medical History:   Diagnosis Date    A-fib     Anticoagulant long-term use     Cecum mass     CHF (congestive heart failure)     Diabetes mellitus     Disorder of kidney and ureter     Followed by Dr. Jonathan Pace    Encounter for blood transfusion     Hypertension     Insomnia     Irregular heart rhythm     Stage 4 chronic kidney disease     Thyroid disease     hypothyroidism    TIA (transient ischemic attack) 03/2017       Past Surgical History:   Procedure Laterality Date    BILATERAL SALPINGO-OOPHORECTOMY (BSO) Bilateral 8/19/2019    Procedure: SALPINGO-OOPHORECTOMY, BILATERAL;  Surgeon: Blayne Wallace MD;  Location: [facility];  Service: OB/GYN;  Laterality: Bilateral;    HYSTERECTOMY      partial, benign causes by reported hx    INSERTION OF PACEMAKER  08/2017    LEFT HEART CATHETERIZATION Left 1/6/2022    Procedure: Left heart cath;  Surgeon: Bhaskar Hanson III, MD;  Location: [facility] CATH;  " Service: Cardiology;  Laterality: Left;  Right radial    ROBOT-ASSISTED LAPAROSCOPIC COLECTOMY USING DA MICHELE XI Right 8/19/2019    Procedure: XI ROBOTIC COLECTOMY;  Surgeon: Kourtney Dodson MD;  Location: [facility];  Service: General;  Laterality: Right;       Family History   Problem Relation Age of Onset    Diabetes Mother     Heart disease Mother     Cancer Father         type unknown (nonsmoker, no etoh)    Heart disease Brother     Dementia Sister     Cancer Daughter         fallopian ca    Cancer Son         esophageal ca    Dementia Sister     Dementia Sister        Social History     Socioeconomic History    Marital status:    Tobacco Use    Smoking status: Never    Smokeless tobacco: Never   Substance and Sexual Activity    Alcohol use: No    Drug use: No    Sexual activity: Never     Birth control/protection: Post-menopausal     Social Determinants of Health     Financial Resource Strain: Low Risk     Difficulty of Paying Living Expenses: Not hard at all   Food Insecurity: No Food Insecurity    Worried About Running Out of Food in the Last Year: Never true    Ran Out of Food in the Last Year: Never true   Transportation Needs: No Transportation Needs    Lack of Transportation (Medical): No    Lack of Transportation (Non-Medical): No   Physical Activity: Unknown    Days of Exercise per Week: 0 days   Stress: No Stress Concern Present    Feeling of Stress : Not at all   Social Connections: Socially Isolated    Frequency of Communication with Friends and Family: Once a week    Frequency of Social Gatherings with Friends and Family: Never    Attends [employer] Services: More than 4 times per year    Active Member of Clubs or Organizations: No    Attends Club or Organization Meetings: Never    Marital Status:    Housing Stability: Low Risk     Unable to Pay for Housing in the Last Year: No    Number of Places Lived in the Last Year: 1    Unstable Housing in the Last Year: No       Current  Outpatient Medications   Medication Sig Dispense Refill    amiodarone (PACERONE) 200 MG Tab Take 200 mg by mouth once daily.       apixaban (ELIQUIS) 2.5 mg Tab Take 1 tablet (2.5 mg total) by mouth 2 (two) times daily. 60 tablet 0    ascorbic acid (VITAMIN C) 500 MG tablet Take 500 mg by mouth once daily.      b complex vitamins capsule Take 1 capsule by mouth once daily.      blood sugar diagnostic (TRUE METRIX GLUCOSE TEST STRIP) Strp 1 each by Misc.(Non-Drug; Combo Route) route 3 (three) times daily. 200 each 11    blood-glucose meter kit To check BG 2 times daily, to use with insurance preferred meter 1 each 0    cefUROXime (CEFTIN) 250 MG tablet Take 1 tablet (250 mg total) by mouth 2 (two) times daily. for 10 days 20 tablet 0    cholecalciferol, vitamin D3, 125 mcg (5,000 unit) Tab Take 1 tablet (5,000 Units total) by mouth once daily.      clopidogrel (PLAVIX) 75 mg tablet TAKE ONE TABLET BY MOUTH ONCE DAILY (Patient taking differently: Take 75 mg by mouth once daily.) 30 tablet 11    diclofenac sodium (VOLTAREN ARTHRITIS PAIN) 1 % Gel Apply 2 g topically 2 (two) times daily as needed (muscle pain). 100 g 1    digoxin (LANOXIN) 125 mcg tablet Take 1 tablet by mouth every other day.      diphenoxylate-atropine 2.5-0.025 mg (LOMOTIL) 2.5-0.025 mg per tablet Take 1 tablet by mouth 4 (four) times daily as needed for Diarrhea. 30 tablet 1    famotidine (PEPCID) 20 MG tablet Take 1 tablet (20 mg total) by mouth 2 (two) times daily. for 10 days 20 tablet 0    fish oil-omega-3 fatty acids 300-1,000 mg capsule Take 1 capsule by mouth 2 (two) times a day.      furosemide (LASIX) 40 MG tablet Take 1 tablet (40 mg total) by mouth once daily. Take an extra pill for any weight gain of 3lb or more on any given day 90 tablet 1    insulin (LANTUS SOLOSTAR U-100 INSULIN) glargine 100 units/mL SubQ pen Inject 16 Units into the skin every evening. 5 each 3    insulin lispro 100 unit/mL pen INJECT 7 UNITS SUBCUTANEOUSLY THREE  "TIMES DAILY WITH MEALS (Patient taking differently: Inject 7 Units into the skin 3 (three) times daily with meals.) 18 mL 1    lancets 31 gauge Misc 1 lancet by Misc.(Non-Drug; Combo Route) route 3 (three) times daily as needed. 100 each 11    levothyroxine (EUTHYROX) 125 MCG tablet Take 1 tablet (125 mcg total) by mouth every Mon, Wed, Fri. 30 tablet 5    levothyroxine (SYNTHROID) 75 MCG tablet Take 75 mcg by mouth every Tuesday, Thursday, Saturday, Sunday.      magnesium 200 mg Tab Take 200 mg by mouth once daily.      melatonin 10 mg Tab Take 1 tablet by mouth nightly as needed.      memantine (NAMENDA) 10 MG Tab TAKE 1 TABLET BY MOUTH ONCE DAILY IN THE EVENING 90 tablet 1    metoprolol tartrate (LOPRESSOR) 50 MG tablet Take 1 tablet (50 mg total) by mouth 2 (two) times daily. 60 tablet 1    multivitamin (THERAGRAN) per tablet Take 1 tablet by mouth once daily.      mupirocin (BACTROBAN) 2 % ointment Apply topically 3 (three) times daily. 30 g 0    ondansetron (ZOFRAN-ODT) 4 MG TbDL Take 1 tablet (4 mg total) by mouth every 8 (eight) hours as needed (Nausea). 20 tablet 0    pen needle, diabetic (COMFORT EZ PEN NEEDLES) 29 gauge x 1/2" Ndle 1 Units by Misc.(Non-Drug; Combo Route) route 3 (three) times daily. 300 each 11    polyethylene glycol (GLYCOLAX) 17 gram PwPk Take 17 g by mouth as needed (constipation).      potassium chloride SA (K-DUR,KLOR-CON) 10 MEQ tablet Take 1 tablet (10 mEq total) by mouth 2 (two) times daily. 60 tablet 3    rivastigmine tartrate (EXELON) 1.5 MG capsule Take 1 capsule by mouth twice daily 60 capsule 3    simvastatin (ZOCOR) 10 MG tablet Take 10 mg by mouth every evening.       No current facility-administered medications for this visit.       Review of patient's allergies indicates:   Allergen Reactions    Adhesive Itching    Mucinex [guaifenesin]      Says caused CHF     Latex, natural rubber Rash        Hemoglobin A1C   Date Value Ref Range Status   10/06/2021 6.9 (H) 4.0 - 5.6 % " Final     Comment:     ADA Screening Guidelines:  5.7-6.4%  Consistent with prediabetes  >or=6.5%  Consistent with diabetes    High levels of fetal hemoglobin interfere with the HbA1C  assay. Heterozygous hemoglobin variants (HbS, HgC, etc)do  not significantly interfere with this assay.   However, presence of multiple variants may affect accuracy.     07/22/2021 7.1 (H) 4.0 - 5.6 % Final     Comment:     ADA Screening Guidelines:  5.7-6.4%  Consistent with prediabetes  >or=6.5%  Consistent with diabetes    High levels of fetal hemoglobin interfere with the HbA1C  assay. Heterozygous hemoglobin variants (HbS, HgC, etc)do  not significantly interfere with this assay.   However, presence of multiple variants may affect accuracy.     03/04/2021 7.2 (H) 4.0 - 5.6 % Final     Comment:     ADA Screening Guidelines:  5.7-6.4%  Consistent with prediabetes  >or=6.5%  Consistent with diabetes    High levels of fetal hemoglobin interfere with the HbA1C  assay. Heterozygous hemoglobin variants (HbS, HgC, etc)do  not significantly interfere with this assay.   However, presence of multiple variants may affect accuracy.           Review of Systems   Constitutional: Negative for chills and fever.   Cardiovascular:  Negative for claudication and leg swelling.   Skin:  Positive for poor wound healing. Negative for color change and nail changes.   Musculoskeletal:  Negative for joint pain, joint swelling, muscle cramps and muscle weakness.   Gastrointestinal:  Negative for nausea and vomiting.   Neurological:  Positive for numbness.   Psychiatric/Behavioral:  Negative for altered mental status.          Objective:      Physical Exam  Constitutional:       Appearance: Normal appearance. She is not ill-appearing.   Cardiovascular:      Pulses:           Dorsalis pedis pulses are 2+ on the right side and 2+ on the left side.        Posterior tibial pulses are 2+ on the right side and 2+ on the left side.      Comments: CFT is < 3 seconds  bilateral.  Pedal hair growth is decreased bilateral.  No lower extremity edema noted bilateral.  Toes are warm to touch bilateral.    Musculoskeletal:         General: Tenderness and deformity present.      Right lower leg: No edema.      Left lower leg: No edema.      Comments: Muscle strength 5/5 in all muscle groups bilateral.  No tenderness nor crepitation with ROM of foot/ankle joints bilateral.  Pain with palpation to the wound overlying the dorsum of the Rt. 2nd PIP joint.  Bilateral semi-reducible contracture of toes 2-5.  Bilateral HAV.   Skin:     General: Skin is warm and dry.      Capillary Refill: Capillary refill takes 2 to 3 seconds.      Findings: Wound present. No bruising, ecchymosis, erythema, signs of injury, laceration, lesion, petechiae or rash.      Comments: Location: Open wound noted to the dorsum of the Rt. 2nd toe at the PIP joint.  Base: Down to subQ and comprised of fibrin.    Drainage: None  Lata wound: Devoid of edema, fluctuance, purulence, and malodor. Lata wound erythema noted.   Measurement: 1 x 1 x 0.1cm      Neurological:      Mental Status: She is alert.      Sensory: Sensory deficit present.      Motor: No weakness or atrophy.      Comments: Decreased protective sensation noted bilateral.  Light touch is intact bilateral.               Assessment:       Encounter Diagnoses   Name Primary?    Diabetic ulcer of toe of right foot associated with type 2 diabetes mellitus, with fat layer exposed Yes    Diabetic polyneuropathy associated with type 2 diabetes mellitus          Plan:       Denice was seen today for wound check.    Diagnoses and all orders for this visit:    Diabetic ulcer of toe of right foot associated with type 2 diabetes mellitus, with fat layer exposed  -     Aerobic culture  -     Culture, Anaerobic  -     X-Ray Foot Complete Right; Future    Diabetic polyneuropathy associated with type 2 diabetes mellitus      I counseled the patient on her conditions, their  implications and medical management.    No wound debridement was performed with today's exam.    Wound cultures were obtained.    Orders written for an x-ray of the Rt. Foot.    The wound base was covered with iodosorb ointment, and a football dressing was applied.    Advised to keep the dressing CDI x 1 week.    Advised to rest and elevate the affected extremity.    Instructed to minimize weight bearing to facilitate wound healing.    Advised to ambulate only in the postoperative shoe/boot.    Discussed optimal hydration and protein consumption and how they facilitate wound healing.      RTC in 1 week.    Pankaj Julian DPM

## 2022-11-01 ENCOUNTER — PES CALL (OUTPATIENT)
Dept: ADMINISTRATIVE | Facility: CLINIC | Age: 87
End: 2022-11-01
Payer: MEDICARE

## 2022-11-03 ENCOUNTER — PATIENT MESSAGE (OUTPATIENT)
Dept: PODIATRY | Facility: CLINIC | Age: 87
End: 2022-11-03
Payer: MEDICARE

## 2022-11-04 ENCOUNTER — TELEPHONE (OUTPATIENT)
Dept: PODIATRY | Facility: CLINIC | Age: 87
End: 2022-11-04
Payer: MEDICARE

## 2022-11-04 DIAGNOSIS — E11.628 DIABETIC INFECTION OF RIGHT FOOT: Primary | ICD-10-CM

## 2022-11-04 DIAGNOSIS — L08.9 DIABETIC INFECTION OF RIGHT FOOT: Primary | ICD-10-CM

## 2022-11-04 RX ORDER — LEVOFLOXACIN 500 MG/1
500 TABLET, FILM COATED ORAL DAILY
Qty: 10 TABLET | Refills: 0 | Status: SHIPPED | OUTPATIENT
Start: 2022-11-04 | End: 2022-12-27

## 2022-11-04 NOTE — TELEPHONE ENCOUNTER
----- Message from Pankaj Julian DPM sent at 11/4/2022  1:29 PM CDT -----  Please let the patient know cultures were positive for infection.  I have sent levofloxacin to her pharmacy to take for 10 days.  Thanks!  ----- Message -----  From: [facility] ([email or web address]) Lab Interface  Sent: 11/2/2022   6:38 AM CDT  To: Pankaj Julian DPM

## 2022-11-05 LAB
BACTERIA SPEC AEROBE CULT: ABNORMAL

## 2022-11-06 ENCOUNTER — PATIENT MESSAGE (OUTPATIENT)
Dept: FAMILY MEDICINE | Facility: CLINIC | Age: 87
End: 2022-11-06
Payer: MEDICARE

## 2022-11-07 ENCOUNTER — OFFICE VISIT (OUTPATIENT)
Dept: PODIATRY | Facility: CLINIC | Age: 87
End: 2022-11-07
Payer: MEDICARE

## 2022-11-07 DIAGNOSIS — L08.9 DIABETIC INFECTION OF RIGHT FOOT: Primary | ICD-10-CM

## 2022-11-07 DIAGNOSIS — E11.628 DIABETIC INFECTION OF RIGHT FOOT: Primary | ICD-10-CM

## 2022-11-07 DIAGNOSIS — S91.109A OPEN WOUND OF TOE, INITIAL ENCOUNTER: ICD-10-CM

## 2022-11-07 LAB — BACTERIA SPEC ANAEROBE CULT: NORMAL

## 2022-11-07 PROCEDURE — 99499 NO LOS: ICD-10-PCS | Mod: S$GLB,,, | Performed by: PODIATRIST

## 2022-11-07 PROCEDURE — 1126F AMNT PAIN NOTED NONE PRSNT: CPT | Mod: CPTII,S$GLB,, | Performed by: PODIATRIST

## 2022-11-07 PROCEDURE — 11042 DBRDMT SUBQ TIS 1ST 20SQCM/<: CPT | Mod: S$GLB,,, | Performed by: PODIATRIST

## 2022-11-07 PROCEDURE — 1159F PR MEDICATION LIST DOCUMENTED IN MEDICAL RECORD: ICD-10-PCS | Mod: CPTII,S$GLB,, | Performed by: PODIATRIST

## 2022-11-07 PROCEDURE — 1157F ADVNC CARE PLAN IN RCRD: CPT | Mod: CPTII,S$GLB,, | Performed by: PODIATRIST

## 2022-11-07 PROCEDURE — 99999 PR PBB SHADOW E&M-EST. PATIENT-LVL IV: CPT | Mod: PBBFAC,,, | Performed by: PODIATRIST

## 2022-11-07 PROCEDURE — 99999 PR PBB SHADOW E&M-EST. PATIENT-LVL IV: ICD-10-PCS | Mod: PBBFAC,,, | Performed by: PODIATRIST

## 2022-11-07 PROCEDURE — 1157F PR ADVANCE CARE PLAN OR EQUIV PRESENT IN MEDICAL RECORD: ICD-10-PCS | Mod: CPTII,S$GLB,, | Performed by: PODIATRIST

## 2022-11-07 PROCEDURE — 11042 WOUND DEBRIDEMENT: ICD-10-PCS | Mod: S$GLB,,, | Performed by: PODIATRIST

## 2022-11-07 PROCEDURE — 1159F MED LIST DOCD IN RCRD: CPT | Mod: CPTII,S$GLB,, | Performed by: PODIATRIST

## 2022-11-07 PROCEDURE — 1126F PR PAIN SEVERITY QUANTIFIED, NO PAIN PRESENT: ICD-10-PCS | Mod: CPTII,S$GLB,, | Performed by: PODIATRIST

## 2022-11-07 PROCEDURE — 99499 UNLISTED E&M SERVICE: CPT | Mod: S$GLB,,, | Performed by: PODIATRIST

## 2022-11-07 RX ORDER — ONDANSETRON 4 MG/1
TABLET, FILM COATED ORAL
COMMUNITY
End: 2023-09-17

## 2022-11-07 RX ORDER — TETRACYCLINE HYDROCHLORIDE 250 MG/1
250 CAPSULE ORAL EVERY 6 HOURS
Qty: 28 CAPSULE | Refills: 0 | Status: SHIPPED | OUTPATIENT
Start: 2022-11-07 | End: 2022-12-27

## 2022-11-07 RX ORDER — LEVOTHYROXINE SODIUM 125 UG/1
125 TABLET ORAL
Qty: 30 TABLET | Refills: 3 | Status: SHIPPED | OUTPATIENT
Start: 2022-11-07 | End: 2023-06-19

## 2022-11-07 NOTE — PROCEDURES
"Wound Debridement    Date/Time: 11/7/2022 9:20 AM  Performed by: Pankaj Julian DPM  Authorized by: Pankaj Julian DPM     Time out: Immediately prior to procedure a "time out" was called to verify the correct patient, procedure, equipment, support staff and site/side marked as required.    Consent Done?:  Yes (Verbal)    Preparation: Patient was prepped and draped in usual sterile fashion    Local anesthesia used?: No      Wound Details:    Location:  Right foot    Location:  Right 2nd Toe    Type of Debridement:  Excisional       Length (cm):  0.5       Area (sq cm):  0.3       Width (cm):  0.6       Percent Debrided (%):  100       Depth (cm):  0.1       Total Area Debrided (sq cm):  0.3    Depth of debridement:  Subcutaneous tissue    Tissue debrided:  Subcutaneous    Devitalized tissue debrided:  Fibrin    Instruments:  Curette    Bleeding:  Minimal  Hemostasis Achieved: Yes    Method Used:  Pressure  Patient tolerance:  Patient tolerated the procedure well with no immediate complications  "

## 2022-11-07 NOTE — TELEPHONE ENCOUNTER
Care Due:                  Date            Visit Type   Department     Provider  --------------------------------------------------------------------------------                                HOSPITAL     [facility]  Last Visit: 09-      FOLLOW UP    MEDICINE       Meredith Baird                              EP -                              PRIMARY      [facility]  Next Visit: 12-      CARE ([facility])   MEDICINE       Meredith Baird                                                            Last  Test          Frequency    Reason                     Performed    Due Date  --------------------------------------------------------------------------------    HBA1C.......  6 months...  insulin..................  10-   04-    [employer] Embedded Care Gaps. Reference number: [id number]. 11/07/2022   10:32:16 AM CST

## 2022-11-07 NOTE — PROGRESS NOTES
Subjective:      Patient ID: Denice Sheth is a 87 y.o. female.    Chief Complaint: Dressing Change    Patient presents to clinic for a 1 week wound check of the Rt. Foot.  Denies experiencing pain from the Rt. 2nd toe wound with today's exam.  Has kept the prior football dressing clean, dry, and intact for the past week.   Relates taking oral levaquin and tetracycline as instructed.  Denies experiencing N/V/F/C/D.  Denies any additional pedal complaints.      Past Medical History:   Diagnosis Date    A-fib     Anticoagulant long-term use     Cecum mass     CHF (congestive heart failure)     Diabetes mellitus     Disorder of kidney and ureter     Followed by Dr. Jonathan Pace    Encounter for blood transfusion     Hypertension     Insomnia     Irregular heart rhythm     Stage 4 chronic kidney disease     Thyroid disease     hypothyroidism    TIA (transient ischemic attack) 03/2017       Past Surgical History:   Procedure Laterality Date    BILATERAL SALPINGO-OOPHORECTOMY (BSO) Bilateral 8/19/2019    Procedure: SALPINGO-OOPHORECTOMY, BILATERAL;  Surgeon: Blayne Wallace MD;  Location: [facility] OR;  Service: OB/GYN;  Laterality: Bilateral;    HYSTERECTOMY      partial, benign causes by reported hx    INSERTION OF PACEMAKER  08/2017    LEFT HEART CATHETERIZATION Left 1/6/2022    Procedure: Left heart cath;  Surgeon: Bhaskar Hanson III, MD;  Location: [facility] CATH;  Service: Cardiology;  Laterality: Left;  Right radial    ROBOT-ASSISTED LAPAROSCOPIC COLECTOMY USING DA MICHELE XI Right 8/19/2019    Procedure: XI ROBOTIC COLECTOMY;  Surgeon: Kourtney Dodson MD;  Location: [facility] OR;  Service: General;  Laterality: Right;       Family History   Problem Relation Age of Onset    Diabetes Mother     Heart disease Mother     Cancer Father         type unknown (nonsmoker, no etoh)    Heart disease Brother     Dementia Sister     Cancer Daughter         fallopian ca    Cancer Son         esophageal ca    Dementia Sister     Dementia  Sister        Social History     Socioeconomic History    Marital status:    Tobacco Use    Smoking status: Never    Smokeless tobacco: Never   Substance and Sexual Activity    Alcohol use: No    Drug use: No    Sexual activity: Never     Birth control/protection: Post-menopausal     Social Determinants of Health     Financial Resource Strain: Low Risk     Difficulty of Paying Living Expenses: Not hard at all   Food Insecurity: No Food Insecurity    Worried About Running Out of Food in the Last Year: Never true    Ran Out of Food in the Last Year: Never true   Transportation Needs: No Transportation Needs    Lack of Transportation (Medical): No    Lack of Transportation (Non-Medical): No   Physical Activity: Unknown    Days of Exercise per Week: 0 days   Stress: No Stress Concern Present    Feeling of Stress : Not at all   Social Connections: Socially Isolated    Frequency of Communication with Friends and Family: Once a week    Frequency of Social Gatherings with Friends and Family: Never    Attends [employer] Services: More than 4 times per year    Active Member of Clubs or Organizations: No    Attends Club or Organization Meetings: Never    Marital Status:    Housing Stability: Low Risk     Unable to Pay for Housing in the Last Year: No    Number of Places Lived in the Last Year: 1    Unstable Housing in the Last Year: No       Current Outpatient Medications   Medication Sig Dispense Refill    amiodarone (PACERONE) 200 MG Tab Take 200 mg by mouth once daily.       apixaban (ELIQUIS) 2.5 mg Tab Take 1 tablet (2.5 mg total) by mouth 2 (two) times daily. 60 tablet 0    ascorbic acid (VITAMIN C) 500 MG tablet Take 500 mg by mouth once daily.      b complex vitamins capsule Take 1 capsule by mouth once daily.      blood sugar diagnostic (TRUE METRIX GLUCOSE TEST STRIP) Strp 1 each by Misc.(Non-Drug; Combo Route) route 3 (three) times daily. 200 each 11    blood-glucose meter kit To check BG 2 times  daily, to use with insurance preferred meter 1 each 0    cholecalciferol, vitamin D3, 125 mcg (5,000 unit) Tab Take 1 tablet (5,000 Units total) by mouth once daily.      clopidogrel (PLAVIX) 75 mg tablet TAKE ONE TABLET BY MOUTH ONCE DAILY (Patient taking differently: Take 75 mg by mouth once daily.) 30 tablet 11    diclofenac sodium (VOLTAREN ARTHRITIS PAIN) 1 % Gel Apply 2 g topically 2 (two) times daily as needed (muscle pain). 100 g 1    digoxin (LANOXIN) 125 mcg tablet Take 1 tablet by mouth every other day.      diphenoxylate-atropine 2.5-0.025 mg (LOMOTIL) 2.5-0.025 mg per tablet Take 1 tablet by mouth 4 (four) times daily as needed for Diarrhea. 30 tablet 1    famotidine (PEPCID) 20 MG tablet Take 1 tablet (20 mg total) by mouth 2 (two) times daily. for 10 days 20 tablet 0    fish oil-omega-3 fatty acids 300-1,000 mg capsule Take 1 capsule by mouth 2 (two) times a day.      furosemide (LASIX) 40 MG tablet Take 1 tablet (40 mg total) by mouth once daily. Take an extra pill for any weight gain of 3lb or more on any given day 90 tablet 1    insulin (LANTUS SOLOSTAR U-100 INSULIN) glargine 100 units/mL SubQ pen Inject 16 Units into the skin every evening. 5 each 3    insulin lispro 100 unit/mL pen INJECT 7 UNITS SUBCUTANEOUSLY THREE TIMES DAILY WITH MEALS (Patient taking differently: Inject 7 Units into the skin 3 (three) times daily with meals.) 18 mL 1    lancets 31 gauge Misc 1 lancet by Misc.(Non-Drug; Combo Route) route 3 (three) times daily as needed. 100 each 11    levoFLOXacin (LEVAQUIN) 500 MG tablet Take 1 tablet (500 mg total) by mouth once daily. 10 tablet 0    levothyroxine (EUTHYROX) 125 MCG tablet Take 1 tablet (125 mcg total) by mouth every Mon, Wed, Fri. 30 tablet 5    levothyroxine (SYNTHROID) 75 MCG tablet Take 75 mcg by mouth every Tuesday, Thursday, Saturday, Sunday.      magnesium 200 mg Tab Take 200 mg by mouth once daily.      melatonin 10 mg Tab Take 1 tablet by mouth nightly as needed.  "     memantine (NAMENDA) 10 MG Tab TAKE 1 TABLET BY MOUTH ONCE DAILY IN THE EVENING 90 tablet 1    metoprolol tartrate (LOPRESSOR) 50 MG tablet Take 1 tablet (50 mg total) by mouth 2 (two) times daily. 60 tablet 1    multivitamin (THERAGRAN) per tablet Take 1 tablet by mouth once daily.      mupirocin (BACTROBAN) 2 % ointment Apply topically 3 (three) times daily. 30 g 0    ondansetron (ZOFRAN) 4 MG tablet ondansetron HCl 4 mg tablet   TAKE 1 TABLET BY MOUTH EVERY 6 HOURS PRN      ondansetron (ZOFRAN-ODT) 4 MG TbDL Take 1 tablet (4 mg total) by mouth every 8 (eight) hours as needed (Nausea). 20 tablet 0    pen needle, diabetic (COMFORT EZ PEN NEEDLES) 29 gauge x 1/2" Ndle 1 Units by Misc.(Non-Drug; Combo Route) route 3 (three) times daily. 300 each 11    polyethylene glycol (GLYCOLAX) 17 gram PwPk Take 17 g by mouth as needed (constipation).      potassium chloride SA (K-DUR,KLOR-CON) 10 MEQ tablet Take 1 tablet (10 mEq total) by mouth 2 (two) times daily. 60 tablet 3    rivastigmine tartrate (EXELON) 1.5 MG capsule Take 1 capsule by mouth twice daily 60 capsule 3    simvastatin (ZOCOR) 10 MG tablet Take 10 mg by mouth every evening.      tetracycline (ACHROMYCIN,SUMYCIN) 250 MG capsule Take 1 capsule (250 mg total) by mouth every 6 (six) hours. 28 capsule 0     No current facility-administered medications for this visit.       Review of patient's allergies indicates:   Allergen Reactions    Adhesive Itching    Mucinex [guaifenesin]      Says caused CHF     Latex, natural rubber Rash        Hemoglobin A1C   Date Value Ref Range Status   10/06/2021 6.9 (H) 4.0 - 5.6 % Final     Comment:     ADA Screening Guidelines:  5.7-6.4%  Consistent with prediabetes  >or=6.5%  Consistent with diabetes    High levels of fetal hemoglobin interfere with the HbA1C  assay. Heterozygous hemoglobin variants (HbS, HgC, etc)do  not significantly interfere with this assay.   However, presence of multiple variants may affect accuracy.   "   07/22/2021 7.1 (H) 4.0 - 5.6 % Final     Comment:     ADA Screening Guidelines:  5.7-6.4%  Consistent with prediabetes  >or=6.5%  Consistent with diabetes    High levels of fetal hemoglobin interfere with the HbA1C  assay. Heterozygous hemoglobin variants (HbS, HgC, etc)do  not significantly interfere with this assay.   However, presence of multiple variants may affect accuracy.     03/04/2021 7.2 (H) 4.0 - 5.6 % Final     Comment:     ADA Screening Guidelines:  5.7-6.4%  Consistent with prediabetes  >or=6.5%  Consistent with diabetes    High levels of fetal hemoglobin interfere with the HbA1C  assay. Heterozygous hemoglobin variants (HbS, HgC, etc)do  not significantly interfere with this assay.   However, presence of multiple variants may affect accuracy.           Review of Systems   Constitutional: Negative for chills and fever.   Cardiovascular:  Negative for claudication and leg swelling.   Skin:  Negative for color change, nail changes and poor wound healing.   Musculoskeletal:  Negative for joint pain, joint swelling, muscle cramps and muscle weakness.   Gastrointestinal:  Negative for nausea and vomiting.   Neurological:  Positive for numbness.   Psychiatric/Behavioral:  Negative for altered mental status.          Objective:      Physical Exam  Constitutional:       Appearance: Normal appearance. She is not ill-appearing.   Cardiovascular:      Pulses:           Dorsalis pedis pulses are 2+ on the right side and 2+ on the left side.        Posterior tibial pulses are 2+ on the right side and 2+ on the left side.      Comments: CFT is < 3 seconds bilateral.  Pedal hair growth is decreased bilateral.  No lower extremity edema noted bilateral.  Toes are warm to touch bilateral.    Musculoskeletal:         General: Deformity present. No tenderness.      Right lower leg: No edema.      Left lower leg: No edema.      Comments: Muscle strength 5/5 in all muscle groups bilateral.  No tenderness nor crepitation  with ROM of foot/ankle joints bilateral.  (-) for pain with palpation to the wound overlying the dorsum of the Rt. 2nd PIP joint.  Bilateral semi-reducible contracture of toes 2-5.  Bilateral HAV.   Skin:     General: Skin is warm and dry.      Capillary Refill: Capillary refill takes 2 to 3 seconds.      Findings: Wound present. No bruising, ecchymosis, erythema, signs of injury, laceration, lesion, petechiae or rash.      Comments: Location: Open wound noted to the dorsum of the Rt. 2nd toe at the PIP joint.  Base: Down to subQ and comprised of fibrin.    Drainage: None  Lata wound: Devoid of edema, fluctuance, purulence, and malodor. Lata wound erythema noted.   Pre-debridement measurement: 0.5 x 0.6 x 0.1cm  Post-debridement measurement: 0.7 x 0.8 x 0.1cm     Neurological:      Mental Status: She is alert.      Sensory: Sensory deficit present.      Motor: No weakness or atrophy.      Comments: Decreased protective sensation noted bilateral.  Light touch is intact bilateral.               Assessment:       Encounter Diagnosis   Name Primary?    Open wound of toe, initial encounter          Plan:       Denice was seen today for dressing change.    Diagnoses and all orders for this visit:    Open wound of toe, initial encounter  -     Ambulatory referral/consult to Podiatry  -     Wound Debridement    I counseled the patient on her conditions, their implications and medical management.    Performed a selective excisional debridement of the Rt. Foot.  See attached procedure note.    The wound base was covered with silver alginate, and a football dressing was applied.    Advised to keep the dressing CDI x 1 week.    Advised to rest and elevate the affected extremity.    Instructed to minimize weight bearing to facilitate wound healing.    Advised to ambulate only in the postoperative shoe/boot.    Advised to finish the current course of antibiotics.    RTC in 1 week.    Pankaj Julian DPM

## 2022-11-11 ENCOUNTER — OFFICE VISIT (OUTPATIENT)
Dept: ORTHOPEDICS | Facility: CLINIC | Age: 87
End: 2022-11-11
Payer: MEDICARE

## 2022-11-11 VITALS — BODY MASS INDEX: 28.34 KG/M2 | WEIGHT: 154 LBS | HEIGHT: 62 IN

## 2022-11-11 DIAGNOSIS — M17.11 OSTEOARTHROSIS, LOCALIZED, PRIMARY, KNEE, RIGHT: Primary | ICD-10-CM

## 2022-11-11 DIAGNOSIS — Z86.73 H/O: CVA (CEREBROVASCULAR ACCIDENT): ICD-10-CM

## 2022-11-11 DIAGNOSIS — N18.4 CKD (CHRONIC KIDNEY DISEASE) STAGE 4, GFR 15-29 ML/MIN: ICD-10-CM

## 2022-11-11 PROCEDURE — 3288F PR FALLS RISK ASSESSMENT DOCUMENTED: ICD-10-PCS | Mod: CPTII,S$GLB,, | Performed by: ORTHOPAEDIC SURGERY

## 2022-11-11 PROCEDURE — 20610 DRAIN/INJ JOINT/BURSA W/O US: CPT | Mod: RT,S$GLB,, | Performed by: ORTHOPAEDIC SURGERY

## 2022-11-11 PROCEDURE — 1160F RVW MEDS BY RX/DR IN RCRD: CPT | Mod: CPTII,S$GLB,, | Performed by: ORTHOPAEDIC SURGERY

## 2022-11-11 PROCEDURE — 99214 OFFICE O/P EST MOD 30 MIN: CPT | Mod: 25,S$GLB,, | Performed by: ORTHOPAEDIC SURGERY

## 2022-11-11 PROCEDURE — 1125F AMNT PAIN NOTED PAIN PRSNT: CPT | Mod: CPTII,S$GLB,, | Performed by: ORTHOPAEDIC SURGERY

## 2022-11-11 PROCEDURE — 1101F PT FALLS ASSESS-DOCD LE1/YR: CPT | Mod: CPTII,S$GLB,, | Performed by: ORTHOPAEDIC SURGERY

## 2022-11-11 PROCEDURE — 1157F ADVNC CARE PLAN IN RCRD: CPT | Mod: CPTII,S$GLB,, | Performed by: ORTHOPAEDIC SURGERY

## 2022-11-11 PROCEDURE — 1159F MED LIST DOCD IN RCRD: CPT | Mod: CPTII,S$GLB,, | Performed by: ORTHOPAEDIC SURGERY

## 2022-11-11 PROCEDURE — 3288F FALL RISK ASSESSMENT DOCD: CPT | Mod: CPTII,S$GLB,, | Performed by: ORTHOPAEDIC SURGERY

## 2022-11-11 PROCEDURE — 20610 LARGE JOINT ASPIRATION/INJECTION: R KNEE: ICD-10-PCS | Mod: RT,S$GLB,, | Performed by: ORTHOPAEDIC SURGERY

## 2022-11-11 PROCEDURE — 99999 PR PBB SHADOW E&M-EST. PATIENT-LVL II: ICD-10-PCS | Mod: PBBFAC,,, | Performed by: ORTHOPAEDIC SURGERY

## 2022-11-11 PROCEDURE — 99999 PR PBB SHADOW E&M-EST. PATIENT-LVL II: CPT | Mod: PBBFAC,,, | Performed by: ORTHOPAEDIC SURGERY

## 2022-11-11 PROCEDURE — 1157F PR ADVANCE CARE PLAN OR EQUIV PRESENT IN MEDICAL RECORD: ICD-10-PCS | Mod: CPTII,S$GLB,, | Performed by: ORTHOPAEDIC SURGERY

## 2022-11-11 PROCEDURE — 1160F PR REVIEW ALL MEDS BY PRESCRIBER/CLIN PHARMACIST DOCUMENTED: ICD-10-PCS | Mod: CPTII,S$GLB,, | Performed by: ORTHOPAEDIC SURGERY

## 2022-11-11 PROCEDURE — 1101F PR PT FALLS ASSESS DOC 0-1 FALLS W/OUT INJ PAST YR: ICD-10-PCS | Mod: CPTII,S$GLB,, | Performed by: ORTHOPAEDIC SURGERY

## 2022-11-11 PROCEDURE — 99214 PR OFFICE/OUTPT VISIT, EST, LEVL IV, 30-39 MIN: ICD-10-PCS | Mod: 25,S$GLB,, | Performed by: ORTHOPAEDIC SURGERY

## 2022-11-11 PROCEDURE — 1159F PR MEDICATION LIST DOCUMENTED IN MEDICAL RECORD: ICD-10-PCS | Mod: CPTII,S$GLB,, | Performed by: ORTHOPAEDIC SURGERY

## 2022-11-11 PROCEDURE — 1125F PR PAIN SEVERITY QUANTIFIED, PAIN PRESENT: ICD-10-PCS | Mod: CPTII,S$GLB,, | Performed by: ORTHOPAEDIC SURGERY

## 2022-11-11 RX ORDER — TRIAMCINOLONE ACETONIDE 40 MG/ML
40 INJECTION, SUSPENSION INTRA-ARTICULAR; INTRAMUSCULAR
Status: DISCONTINUED | OUTPATIENT
Start: 2022-11-11 | End: 2022-11-11 | Stop reason: HOSPADM

## 2022-11-11 RX ADMIN — TRIAMCINOLONE ACETONIDE 40 MG: 40 INJECTION, SUSPENSION INTRA-ARTICULAR; INTRAMUSCULAR at 09:11

## 2022-11-11 NOTE — PROCEDURES
Large Joint Aspiration/Injection: R knee    Date/Time: 11/11/2022 9:15 AM  Performed by: Alexandr Chirinos MD  Authorized by: Alexandr Chirinos MD     Consent Done?:  Yes (Verbal)  Timeout: prior to procedure the correct patient, procedure, and site was verified    Prep: patient was prepped and draped in usual sterile fashion      Details:  Needle Size:  21 G  Approach:  Anterolateral  Location:  Knee  Site:  R knee  Medications:  40 mg triamcinolone acetonide 40 mg/mL  Patient tolerance:  Patient tolerated the procedure well with no immediate complications

## 2022-11-11 NOTE — PROGRESS NOTES
87 y.o. year old presents to the clinic today with recurring pain in the right knee.  Patient has had Kenlaog injections in the past and responded well.  Last injection was 12 months ago. Patient is requesting injection today into right knee    Exam shows tenderness at the joint line without signs of infection or instability    X-rays show arthritic changes    Assessment:  right knee arthrosis    Plan:  Kenlaog into the right knee.  Encourage strengthening over time.  Followup as needed.      Imaging studies ordered and reviewed by me    Further History  Aching pain  Worse with activity  Relieved with rest  No other associated symptoms  No other radiation    Further Exam  Alert and oriented  Pleasant  Contralateral limb has appropriate range of motion for age and condition  Contralateral limb has appropriate strength for age and condition  Contralateral limb has appropriate stability  for age and condition  No adenopathy  Pulses are appropriate for current condition  Skin is intact        Chief Complaint    Chief Complaint   Patient presents with    Right Knee - Pain       HPI  Denice Sheth is a 87 y.o.  female who presents with       Past Medical History  Past Medical History:   Diagnosis Date    A-fib     Anticoagulant long-term use     Cecum mass     CHF (congestive heart failure)     Diabetes mellitus     Disorder of kidney and ureter     Followed by Dr. Jonathan Pace    Encounter for blood transfusion     Hypertension     Insomnia     Irregular heart rhythm     Stage 4 chronic kidney disease     Thyroid disease     hypothyroidism    TIA (transient ischemic attack) 03/2017       Past Surgical History  Past Surgical History:   Procedure Laterality Date    BILATERAL SALPINGO-OOPHORECTOMY (BSO) Bilateral 8/19/2019    Procedure: SALPINGO-OOPHORECTOMY, BILATERAL;  Surgeon: Blayne Wallace MD;  Location: [facility];  Service: OB/GYN;  Laterality: Bilateral;    HYSTERECTOMY      partial, benign causes by reported hx     INSERTION OF PACEMAKER  08/2017    LEFT HEART CATHETERIZATION Left 1/6/2022    Procedure: Left heart cath;  Surgeon: Bhaskar Hanson III, MD;  Location: [facility] [place] CATH;  Service: Cardiology;  Laterality: Left;  Right radial    ROBOT-ASSISTED LAPAROSCOPIC COLECTOMY USING DA MICHELE XI Right 8/19/2019    Procedure: XI ROBOTIC COLECTOMY;  Surgeon: Kourtney Dodson MD;  Location: [facility] [place] OR;  Service: General;  Laterality: Right;       Medications  Current Outpatient Medications   Medication Sig    amiodarone (PACERONE) 200 MG Tab Take 200 mg by mouth once daily.     apixaban (ELIQUIS) 2.5 mg Tab Take 1 tablet (2.5 mg total) by mouth 2 (two) times daily.    ascorbic acid (VITAMIN C) 500 MG tablet Take 500 mg by mouth once daily.    b complex vitamins capsule Take 1 capsule by mouth once daily.    blood sugar diagnostic (TRUE METRIX GLUCOSE TEST STRIP) Strp 1 each by Misc.(Non-Drug; Combo Route) route 3 (three) times daily.    cholecalciferol, vitamin D3, 125 mcg (5,000 unit) Tab Take 1 tablet (5,000 Units total) by mouth once daily.    clopidogrel (PLAVIX) 75 mg tablet TAKE ONE TABLET BY MOUTH ONCE DAILY (Patient taking differently: Take 75 mg by mouth once daily.)    diclofenac sodium (VOLTAREN ARTHRITIS PAIN) 1 % Gel Apply 2 g topically 2 (two) times daily as needed (muscle pain).    digoxin (LANOXIN) 125 mcg tablet Take 1 tablet by mouth every other day.    diphenoxylate-atropine 2.5-0.025 mg (LOMOTIL) 2.5-0.025 mg per tablet Take 1 tablet by mouth 4 (four) times daily as needed for Diarrhea.    fish oil-omega-3 fatty acids 300-1,000 mg capsule Take 1 capsule by mouth 2 (two) times a day.    furosemide (LASIX) 40 MG tablet Take 1 tablet (40 mg total) by mouth once daily. Take an extra pill for any weight gain of 3lb or more on any given day    insulin (LANTUS SOLOSTAR U-100 INSULIN) glargine 100 units/mL SubQ pen Inject 16 Units into the skin every evening.    insulin lispro 100 unit/mL pen INJECT 7 UNITS SUBCUTANEOUSLY  "THREE TIMES DAILY WITH MEALS (Patient taking differently: Inject 7 Units into the skin 3 (three) times daily with meals.)    lancets 31 gauge Misc 1 lancet by Misc.(Non-Drug; Combo Route) route 3 (three) times daily as needed.    levoFLOXacin (LEVAQUIN) 500 MG tablet Take 1 tablet (500 mg total) by mouth once daily.    levothyroxine (SYNTHROID) 125 MCG tablet Take 1 tablet (125 mcg total) by mouth every Mon, Wed, Fri.    levothyroxine (SYNTHROID) 75 MCG tablet Take 75 mcg by mouth every Tuesday, Thursday, Saturday, Sunday.    magnesium 200 mg Tab Take 200 mg by mouth once daily.    melatonin 10 mg Tab Take 1 tablet by mouth nightly as needed.    memantine (NAMENDA) 10 MG Tab TAKE 1 TABLET BY MOUTH ONCE DAILY IN THE EVENING    metoprolol tartrate (LOPRESSOR) 50 MG tablet Take 1 tablet (50 mg total) by mouth 2 (two) times daily.    multivitamin (THERAGRAN) per tablet Take 1 tablet by mouth once daily.    mupirocin (BACTROBAN) 2 % ointment Apply topically 3 (three) times daily.    ondansetron (ZOFRAN) 4 MG tablet ondansetron HCl 4 mg tablet   TAKE 1 TABLET BY MOUTH EVERY 6 HOURS PRN    ondansetron (ZOFRAN-ODT) 4 MG TbDL Take 1 tablet (4 mg total) by mouth every 8 (eight) hours as needed (Nausea).    pen needle, diabetic (COMFORT EZ PEN NEEDLES) 29 gauge x 1/2" Ndle 1 Units by Misc.(Non-Drug; Combo Route) route 3 (three) times daily.    polyethylene glycol (GLYCOLAX) 17 gram PwPk Take 17 g by mouth as needed (constipation).    potassium chloride SA (K-DUR,KLOR-CON) 10 MEQ tablet Take 1 tablet (10 mEq total) by mouth 2 (two) times daily.    rivastigmine tartrate (EXELON) 1.5 MG capsule Take 1 capsule by mouth twice daily    simvastatin (ZOCOR) 10 MG tablet Take 10 mg by mouth every evening.    tetracycline (ACHROMYCIN,SUMYCIN) 250 MG capsule Take 1 capsule (250 mg total) by mouth every 6 (six) hours.    blood-glucose meter kit To check BG 2 times daily, to use with insurance preferred meter    famotidine (PEPCID) 20 MG " tablet Take 1 tablet (20 mg total) by mouth 2 (two) times daily. for 10 days     No current facility-administered medications for this visit.       Allergies  Review of patient's allergies indicates:   Allergen Reactions    Adhesive Itching    Mucinex [guaifenesin]      Says caused CHF     Latex, natural rubber Rash       Family History  Family History   Problem Relation Age of Onset    Diabetes Mother     Heart disease Mother     Cancer Father         type unknown (nonsmoker, no etoh)    Heart disease Brother     Dementia Sister     Cancer Daughter         fallopian ca    Cancer Son         esophageal ca    Dementia Sister     Dementia Sister        Social History  Social History     Socioeconomic History    Marital status:    Tobacco Use    Smoking status: Never    Smokeless tobacco: Never   Substance and Sexual Activity    Alcohol use: No    Drug use: No    Sexual activity: Never     Birth control/protection: Post-menopausal     Social Determinants of Health     Financial Resource Strain: Low Risk     Difficulty of Paying Living Expenses: Not hard at all   Food Insecurity: No Food Insecurity    Worried About Running Out of Food in the Last Year: Never true    Ran Out of Food in the Last Year: Never true   Transportation Needs: No Transportation Needs    Lack of Transportation (Medical): No    Lack of Transportation (Non-Medical): No   Physical Activity: Unknown    Days of Exercise per Week: 0 days   Stress: No Stress Concern Present    Feeling of Stress : Not at all   Social Connections: Socially Isolated    Frequency of Communication with Friends and Family: Once a week    Frequency of Social Gatherings with Friends and Family: Never    Attends Nondenominational Services: More than 4 times per year    Active Member of Clubs or Organizations: No    Attends Club or Organization Meetings: Never    Marital Status:    Housing Stability: Low Risk     Unable to Pay for Housing in the Last Year: No    Number of  Places Lived in the Last Year: 1    Unstable Housing in the Last Year: No               Review of Systems     Constitutional: Negative    HENT: Negative  Eyes: Negative  Respiratory: Negative  Cardiovascular: Negative  Musculoskeletal: HPI  Skin: Negative  Neurological: Negative  Hematological: Negative  Endocrine: Negative                 Physical Exam    There were no vitals filed for this visit.  Body mass index is 28.17 kg/m².  Physical Examination:     General appearance -  well appearing, and in no distress  Mental status - awake  Neck - supple  Chest -  symmetric air entry  Heart - normal rate   Abdomen - soft      Assessment     1. Osteoarthrosis, localized, primary, knee, right    2. CKD (chronic kidney disease) stage 4, GFR 15-29 ml/min    3. H/O: CVA (cerebrovascular accident)          Plan

## 2022-11-14 ENCOUNTER — OFFICE VISIT (OUTPATIENT)
Dept: PODIATRY | Facility: CLINIC | Age: 87
End: 2022-11-14
Payer: MEDICARE

## 2022-11-14 DIAGNOSIS — L97.512 DIABETIC ULCER OF TOE OF RIGHT FOOT ASSOCIATED WITH TYPE 2 DIABETES MELLITUS, WITH FAT LAYER EXPOSED: Primary | ICD-10-CM

## 2022-11-14 DIAGNOSIS — E11.621 DIABETIC ULCER OF TOE OF RIGHT FOOT ASSOCIATED WITH TYPE 2 DIABETES MELLITUS, WITH FAT LAYER EXPOSED: Primary | ICD-10-CM

## 2022-11-14 DIAGNOSIS — E11.42 DIABETIC POLYNEUROPATHY ASSOCIATED WITH TYPE 2 DIABETES MELLITUS: ICD-10-CM

## 2022-11-14 PROCEDURE — 11042 WOUND DEBRIDEMENT: ICD-10-PCS | Mod: S$GLB,,, | Performed by: PODIATRIST

## 2022-11-14 PROCEDURE — 99999 PR PBB SHADOW E&M-EST. PATIENT-LVL III: CPT | Mod: PBBFAC,,, | Performed by: PODIATRIST

## 2022-11-14 PROCEDURE — 11042 DBRDMT SUBQ TIS 1ST 20SQCM/<: CPT | Mod: S$GLB,,, | Performed by: PODIATRIST

## 2022-11-14 PROCEDURE — 99499 NO LOS: ICD-10-PCS | Mod: S$GLB,,, | Performed by: PODIATRIST

## 2022-11-14 PROCEDURE — 1157F PR ADVANCE CARE PLAN OR EQUIV PRESENT IN MEDICAL RECORD: ICD-10-PCS | Mod: CPTII,S$GLB,, | Performed by: PODIATRIST

## 2022-11-14 PROCEDURE — 99999 PR PBB SHADOW E&M-EST. PATIENT-LVL III: ICD-10-PCS | Mod: PBBFAC,,, | Performed by: PODIATRIST

## 2022-11-14 PROCEDURE — 99499 UNLISTED E&M SERVICE: CPT | Mod: S$GLB,,, | Performed by: PODIATRIST

## 2022-11-14 PROCEDURE — 1126F AMNT PAIN NOTED NONE PRSNT: CPT | Mod: CPTII,S$GLB,, | Performed by: PODIATRIST

## 2022-11-14 PROCEDURE — 1159F MED LIST DOCD IN RCRD: CPT | Mod: CPTII,S$GLB,, | Performed by: PODIATRIST

## 2022-11-14 PROCEDURE — 1157F ADVNC CARE PLAN IN RCRD: CPT | Mod: CPTII,S$GLB,, | Performed by: PODIATRIST

## 2022-11-14 PROCEDURE — 1126F PR PAIN SEVERITY QUANTIFIED, NO PAIN PRESENT: ICD-10-PCS | Mod: CPTII,S$GLB,, | Performed by: PODIATRIST

## 2022-11-14 PROCEDURE — 1159F PR MEDICATION LIST DOCUMENTED IN MEDICAL RECORD: ICD-10-PCS | Mod: CPTII,S$GLB,, | Performed by: PODIATRIST

## 2022-11-14 NOTE — PROGRESS NOTES
Subjective:      Patient ID: Denice Sheth is a 87 y.o. female.    Chief Complaint: Wound Care    Patient presents to clinic for a 1 week wound check of the Rt. Foot.  Denies experiencing pain from the Rt. 2nd toe wound with today's exam.  Has kept the prior football dressing clean, dry, and intact for the past week.  Notes being almost finished with the current course of oral antibiotics.  Denies experiencing N/V/F/C/D.  Denies any additional pedal complaints.      Past Medical History:   Diagnosis Date    A-fib     Anticoagulant long-term use     Cecum mass     CHF (congestive heart failure)     Diabetes mellitus     Disorder of kidney and ureter     Followed by Dr. Jonathan Pace    Encounter for blood transfusion     Hypertension     Insomnia     Irregular heart rhythm     Stage 4 chronic kidney disease     Thyroid disease     hypothyroidism    TIA (transient ischemic attack) 03/2017       Past Surgical History:   Procedure Laterality Date    BILATERAL SALPINGO-OOPHORECTOMY (BSO) Bilateral 8/19/2019    Procedure: SALPINGO-OOPHORECTOMY, BILATERAL;  Surgeon: Blayne Wallace MD;  Location: [facility];  Service: OB/GYN;  Laterality: Bilateral;    HYSTERECTOMY      partial, benign causes by reported hx    INSERTION OF PACEMAKER  08/2017    LEFT HEART CATHETERIZATION Left 1/6/2022    Procedure: Left heart cath;  Surgeon: Bhaskar Hanson III, MD;  Location: [facility] CATH;  Service: Cardiology;  Laterality: Left;  Right radial    ROBOT-ASSISTED LAPAROSCOPIC COLECTOMY USING DA MICHELE XI Right 8/19/2019    Procedure: XI ROBOTIC COLECTOMY;  Surgeon: Kourtney Dodson MD;  Location: [facility] OR;  Service: General;  Laterality: Right;       Family History   Problem Relation Age of Onset    Diabetes Mother     Heart disease Mother     Cancer Father         type unknown (nonsmoker, no etoh)    Heart disease Brother     Dementia Sister     Cancer Daughter         fallopian ca    Cancer Son         esophageal ca    Dementia Sister      Dementia Sister        Social History     Socioeconomic History    Marital status:    Tobacco Use    Smoking status: Never    Smokeless tobacco: Never   Substance and Sexual Activity    Alcohol use: No    Drug use: No    Sexual activity: Never     Birth control/protection: Post-menopausal     Social Determinants of Health     Financial Resource Strain: Low Risk     Difficulty of Paying Living Expenses: Not hard at all   Food Insecurity: No Food Insecurity    Worried About Running Out of Food in the Last Year: Never true    Ran Out of Food in the Last Year: Never true   Transportation Needs: No Transportation Needs    Lack of Transportation (Medical): No    Lack of Transportation (Non-Medical): No   Physical Activity: Unknown    Days of Exercise per Week: 0 days   Stress: No Stress Concern Present    Feeling of Stress : Not at all   Social Connections: Socially Isolated    Frequency of Communication with Friends and Family: Once a week    Frequency of Social Gatherings with Friends and Family: Never    Attends Shinto Services: More than 4 times per year    Active Member of Clubs or Organizations: No    Attends Club or Organization Meetings: Never    Marital Status:    Housing Stability: Low Risk     Unable to Pay for Housing in the Last Year: No    Number of Places Lived in the Last Year: 1    Unstable Housing in the Last Year: No       Current Outpatient Medications   Medication Sig Dispense Refill    amiodarone (PACERONE) 200 MG Tab Take 200 mg by mouth once daily.       apixaban (ELIQUIS) 2.5 mg Tab Take 1 tablet (2.5 mg total) by mouth 2 (two) times daily. 60 tablet 0    ascorbic acid (VITAMIN C) 500 MG tablet Take 500 mg by mouth once daily.      b complex vitamins capsule Take 1 capsule by mouth once daily.      blood sugar diagnostic (TRUE METRIX GLUCOSE TEST STRIP) Strp 1 each by Misc.(Non-Drug; Combo Route) route 3 (three) times daily. 200 each 11    cholecalciferol, vitamin D3, 125 mcg  (5,000 unit) Tab Take 1 tablet (5,000 Units total) by mouth once daily.      clopidogrel (PLAVIX) 75 mg tablet TAKE ONE TABLET BY MOUTH ONCE DAILY (Patient taking differently: Take 75 mg by mouth once daily.) 30 tablet 11    diclofenac sodium (VOLTAREN ARTHRITIS PAIN) 1 % Gel Apply 2 g topically 2 (two) times daily as needed (muscle pain). 100 g 1    digoxin (LANOXIN) 125 mcg tablet Take 1 tablet by mouth every other day.      diphenoxylate-atropine 2.5-0.025 mg (LOMOTIL) 2.5-0.025 mg per tablet Take 1 tablet by mouth 4 (four) times daily as needed for Diarrhea. 30 tablet 1    fish oil-omega-3 fatty acids 300-1,000 mg capsule Take 1 capsule by mouth 2 (two) times a day.      furosemide (LASIX) 40 MG tablet Take 1 tablet (40 mg total) by mouth once daily. Take an extra pill for any weight gain of 3lb or more on any given day 90 tablet 1    insulin (LANTUS SOLOSTAR U-100 INSULIN) glargine 100 units/mL SubQ pen Inject 16 Units into the skin every evening. 5 each 3    insulin lispro 100 unit/mL pen INJECT 7 UNITS SUBCUTANEOUSLY THREE TIMES DAILY WITH MEALS (Patient taking differently: Inject 7 Units into the skin 3 (three) times daily with meals.) 18 mL 1    lancets 31 gauge Misc 1 lancet by Misc.(Non-Drug; Combo Route) route 3 (three) times daily as needed. 100 each 11    levoFLOXacin (LEVAQUIN) 500 MG tablet Take 1 tablet (500 mg total) by mouth once daily. 10 tablet 0    levothyroxine (SYNTHROID) 125 MCG tablet Take 1 tablet (125 mcg total) by mouth every Mon, Wed, Fri. 30 tablet 3    levothyroxine (SYNTHROID) 75 MCG tablet Take 75 mcg by mouth every Tuesday, Thursday, Saturday, Sunday.      magnesium 200 mg Tab Take 200 mg by mouth once daily.      melatonin 10 mg Tab Take 1 tablet by mouth nightly as needed.      memantine (NAMENDA) 10 MG Tab TAKE 1 TABLET BY MOUTH ONCE DAILY IN THE EVENING 90 tablet 1    metoprolol tartrate (LOPRESSOR) 50 MG tablet Take 1 tablet (50 mg total) by mouth 2 (two) times daily. 60  "tablet 1    multivitamin (THERAGRAN) per tablet Take 1 tablet by mouth once daily.      mupirocin (BACTROBAN) 2 % ointment Apply topically 3 (three) times daily. 30 g 0    ondansetron (ZOFRAN) 4 MG tablet ondansetron HCl 4 mg tablet   TAKE 1 TABLET BY MOUTH EVERY 6 HOURS PRN      ondansetron (ZOFRAN-ODT) 4 MG TbDL Take 1 tablet (4 mg total) by mouth every 8 (eight) hours as needed (Nausea). 20 tablet 0    pen needle, diabetic (COMFORT EZ PEN NEEDLES) 29 gauge x 1/2" Ndle 1 Units by Misc.(Non-Drug; Combo Route) route 3 (three) times daily. 300 each 11    polyethylene glycol (GLYCOLAX) 17 gram PwPk Take 17 g by mouth as needed (constipation).      potassium chloride SA (K-DUR,KLOR-CON) 10 MEQ tablet Take 1 tablet (10 mEq total) by mouth 2 (two) times daily. 60 tablet 3    rivastigmine tartrate (EXELON) 1.5 MG capsule Take 1 capsule by mouth twice daily 60 capsule 3    simvastatin (ZOCOR) 10 MG tablet Take 10 mg by mouth every evening.      tetracycline (ACHROMYCIN,SUMYCIN) 250 MG capsule Take 1 capsule (250 mg total) by mouth every 6 (six) hours. 28 capsule 0    blood-glucose meter kit To check BG 2 times daily, to use with insurance preferred meter 1 each 0    famotidine (PEPCID) 20 MG tablet Take 1 tablet (20 mg total) by mouth 2 (two) times daily. for 10 days 20 tablet 0     No current facility-administered medications for this visit.       Review of patient's allergies indicates:   Allergen Reactions    Adhesive Itching    Mucinex [guaifenesin]      Says caused CHF     Latex, natural rubber Rash        Hemoglobin A1C   Date Value Ref Range Status   10/06/2021 6.9 (H) 4.0 - 5.6 % Final     Comment:     ADA Screening Guidelines:  5.7-6.4%  Consistent with prediabetes  >or=6.5%  Consistent with diabetes    High levels of fetal hemoglobin interfere with the HbA1C  assay. Heterozygous hemoglobin variants (HbS, HgC, etc)do  not significantly interfere with this assay.   However, presence of multiple variants may affect " accuracy.     07/22/2021 7.1 (H) 4.0 - 5.6 % Final     Comment:     ADA Screening Guidelines:  5.7-6.4%  Consistent with prediabetes  >or=6.5%  Consistent with diabetes    High levels of fetal hemoglobin interfere with the HbA1C  assay. Heterozygous hemoglobin variants (HbS, HgC, etc)do  not significantly interfere with this assay.   However, presence of multiple variants may affect accuracy.     03/04/2021 7.2 (H) 4.0 - 5.6 % Final     Comment:     ADA Screening Guidelines:  5.7-6.4%  Consistent with prediabetes  >or=6.5%  Consistent with diabetes    High levels of fetal hemoglobin interfere with the HbA1C  assay. Heterozygous hemoglobin variants (HbS, HgC, etc)do  not significantly interfere with this assay.   However, presence of multiple variants may affect accuracy.           Review of Systems   Constitutional: Negative for chills and fever.   Cardiovascular:  Negative for claudication and leg swelling.   Skin:  Negative for color change, nail changes and poor wound healing.   Musculoskeletal:  Negative for joint pain, joint swelling, muscle cramps and muscle weakness.   Gastrointestinal:  Negative for nausea and vomiting.   Neurological:  Positive for numbness.   Psychiatric/Behavioral:  Negative for altered mental status.          Objective:      Physical Exam  Constitutional:       Appearance: Normal appearance. She is not ill-appearing.   Cardiovascular:      Pulses:           Dorsalis pedis pulses are 2+ on the right side and 2+ on the left side.        Posterior tibial pulses are 2+ on the right side and 2+ on the left side.      Comments: CFT is < 3 seconds bilateral.  Pedal hair growth is decreased bilateral.  No lower extremity edema noted bilateral.  Toes are warm to touch bilateral.    Musculoskeletal:         General: Deformity present. No tenderness.      Right lower leg: No edema.      Left lower leg: No edema.      Comments: Muscle strength 5/5 in all muscle groups bilateral.  No tenderness nor  crepitation with ROM of foot/ankle joints bilateral.  (-) for pain with palpation to the wound overlying the dorsum of the Rt. 2nd PIP joint.  Bilateral semi-reducible contracture of toes 2-5.  Bilateral HAV.   Skin:     General: Skin is warm and dry.      Capillary Refill: Capillary refill takes 2 to 3 seconds.      Findings: Wound present. No bruising, ecchymosis, erythema, signs of injury, laceration, lesion, petechiae or rash.      Comments: Location: Open wound noted to the dorsum of the Rt. 2nd toe at the PIP joint.  Base: Down to subQ and comprised of fibrin.    Drainage: None  Lata wound: Devoid of edema, fluctuance, purulence, and malodor. Lata wound erythema noted.   Pre-debridement measurement: 0.4 x 0.4 x 0.1cm  Post-debridement measurement: 0.6 x 0.6 x 0.1cm     Neurological:      Mental Status: She is alert.      Sensory: Sensory deficit present.      Motor: No weakness or atrophy.      Comments: Decreased protective sensation noted bilateral.  Light touch is intact bilateral.               Assessment:       Encounter Diagnoses   Name Primary?    Diabetic ulcer of toe of right foot associated with type 2 diabetes mellitus, with fat layer exposed Yes    Diabetic polyneuropathy associated with type 2 diabetes mellitus          Plan:       Denice was seen today for wound care.    Diagnoses and all orders for this visit:    Diabetic ulcer of toe of right foot associated with type 2 diabetes mellitus, with fat layer exposed    Diabetic polyneuropathy associated with type 2 diabetes mellitus    I counseled the patient on her conditions, their implications and medical management.    Performed a selective excisional debridement of the Rt. Foot.  See attached procedure note.    The wound base was covered with franklin, and a football dressing was applied.    Advised to keep the dressing CDI x 1 week.    Advised to rest and elevate the affected extremity.    Instructed to minimize weight bearing to facilitate  wound healing.    Advised to ambulate only in the postoperative shoe/boot.    Advised to finish the current course of antibiotics.    RTC in 1 week.    Pankaj Julian DPM

## 2022-11-14 NOTE — PROCEDURES
"Wound Debridement    Date/Time: 11/14/2022 8:40 AM  Performed by: Pankaj Julian DPM  Authorized by: Pankaj Julian DPM     Time out: Immediately prior to procedure a "time out" was called to verify the correct patient, procedure, equipment, support staff and site/side marked as required.    Consent Done?:  Yes (Verbal)    Preparation: Patient was prepped and draped in usual sterile fashion    Local anesthesia used?: No      Wound Details:    Location:  Right foot    Location:  Right 2nd Toe    Type of Debridement:  Excisional       Length (cm):  0.4       Area (sq cm):  0.16       Width (cm):  0.4       Percent Debrided (%):  100       Depth (cm):  0.1       Total Area Debrided (sq cm):  0.16    Depth of debridement:  Subcutaneous tissue    Tissue debrided:  Subcutaneous    Devitalized tissue debrided:  Fibrin    Instruments:  Blade    Bleeding:  Minimal  Hemostasis Achieved: Yes    Method Used:  Pressure  Patient tolerance:  Patient tolerated the procedure well with no immediate complications  "

## 2022-11-21 ENCOUNTER — OFFICE VISIT (OUTPATIENT)
Dept: PODIATRY | Facility: CLINIC | Age: 87
End: 2022-11-21
Payer: MEDICARE

## 2022-11-21 DIAGNOSIS — E11.42 DIABETIC POLYNEUROPATHY ASSOCIATED WITH TYPE 2 DIABETES MELLITUS: ICD-10-CM

## 2022-11-21 DIAGNOSIS — L97.512 DIABETIC ULCER OF TOE OF RIGHT FOOT ASSOCIATED WITH TYPE 2 DIABETES MELLITUS, WITH FAT LAYER EXPOSED: Primary | ICD-10-CM

## 2022-11-21 DIAGNOSIS — E11.621 DIABETIC ULCER OF TOE OF RIGHT FOOT ASSOCIATED WITH TYPE 2 DIABETES MELLITUS, WITH FAT LAYER EXPOSED: Primary | ICD-10-CM

## 2022-11-21 PROCEDURE — 99499 NO LOS: ICD-10-PCS | Mod: S$GLB,,, | Performed by: PODIATRIST

## 2022-11-21 PROCEDURE — 99499 UNLISTED E&M SERVICE: CPT | Mod: S$GLB,,, | Performed by: PODIATRIST

## 2022-11-21 PROCEDURE — 1157F ADVNC CARE PLAN IN RCRD: CPT | Mod: CPTII,S$GLB,, | Performed by: PODIATRIST

## 2022-11-21 PROCEDURE — 11042 WOUND DEBRIDEMENT: ICD-10-PCS | Mod: S$GLB,,, | Performed by: PODIATRIST

## 2022-11-21 PROCEDURE — 1159F MED LIST DOCD IN RCRD: CPT | Mod: CPTII,S$GLB,, | Performed by: PODIATRIST

## 2022-11-21 PROCEDURE — 3288F FALL RISK ASSESSMENT DOCD: CPT | Mod: CPTII,S$GLB,, | Performed by: PODIATRIST

## 2022-11-21 PROCEDURE — 1126F PR PAIN SEVERITY QUANTIFIED, NO PAIN PRESENT: ICD-10-PCS | Mod: CPTII,S$GLB,, | Performed by: PODIATRIST

## 2022-11-21 PROCEDURE — 1126F AMNT PAIN NOTED NONE PRSNT: CPT | Mod: CPTII,S$GLB,, | Performed by: PODIATRIST

## 2022-11-21 PROCEDURE — 99999 PR PBB SHADOW E&M-EST. PATIENT-LVL III: ICD-10-PCS | Mod: PBBFAC,,, | Performed by: PODIATRIST

## 2022-11-21 PROCEDURE — 11042 DBRDMT SUBQ TIS 1ST 20SQCM/<: CPT | Mod: S$GLB,,, | Performed by: PODIATRIST

## 2022-11-21 PROCEDURE — 99999 PR PBB SHADOW E&M-EST. PATIENT-LVL III: CPT | Mod: PBBFAC,,, | Performed by: PODIATRIST

## 2022-11-21 PROCEDURE — 1101F PR PT FALLS ASSESS DOC 0-1 FALLS W/OUT INJ PAST YR: ICD-10-PCS | Mod: CPTII,S$GLB,, | Performed by: PODIATRIST

## 2022-11-21 PROCEDURE — 3288F PR FALLS RISK ASSESSMENT DOCUMENTED: ICD-10-PCS | Mod: CPTII,S$GLB,, | Performed by: PODIATRIST

## 2022-11-21 PROCEDURE — 1101F PT FALLS ASSESS-DOCD LE1/YR: CPT | Mod: CPTII,S$GLB,, | Performed by: PODIATRIST

## 2022-11-21 PROCEDURE — 1157F PR ADVANCE CARE PLAN OR EQUIV PRESENT IN MEDICAL RECORD: ICD-10-PCS | Mod: CPTII,S$GLB,, | Performed by: PODIATRIST

## 2022-11-21 PROCEDURE — 1159F PR MEDICATION LIST DOCUMENTED IN MEDICAL RECORD: ICD-10-PCS | Mod: CPTII,S$GLB,, | Performed by: PODIATRIST

## 2022-11-21 NOTE — PROCEDURES
"Wound Debridement    Date/Time: 11/21/2022 8:40 AM  Performed by: Pankaj Julian DPM  Authorized by: Pankaj Julian DPM     Time out: Immediately prior to procedure a "time out" was called to verify the correct patient, procedure, equipment, support staff and site/side marked as required.    Consent Done?:  Yes (Verbal)    Preparation: Patient was prepped and draped in usual sterile fashion    Local anesthesia used?: No      Wound Details:    Location:  Right foot    Location:  Right 2nd Toe    Type of Debridement:  Excisional       Length (cm):  0.2       Area (sq cm):  0.06       Width (cm):  0.3       Percent Debrided (%):  100       Depth (cm):  0.1       Total Area Debrided (sq cm):  0.06    Depth of debridement:  Subcutaneous tissue    Tissue debrided:  Subcutaneous    Devitalized tissue debrided:  Fibrin    Instruments:  Blade    Bleeding:  Minimal  Hemostasis Achieved: Yes    Method Used:  Pressure  Patient tolerance:  Patient tolerated the procedure well with no immediate complications  "

## 2022-11-21 NOTE — PROGRESS NOTES
Subjective:      Patient ID: Denice Sheth is a 87 y.o. female.    Chief Complaint: Dressing Change    Patient presents to clinic for a 1 week wound check of the Rt. Foot.  Denies experiencing pain from the Rt. 2nd toe wound with today's exam.  Has kept the prior football dressing clean, dry, and intact for the past week.  Denies experiencing N/V/F/C/D.  Denies any additional pedal complaints.      Past Medical History:   Diagnosis Date    A-fib     Anticoagulant long-term use     Cecum mass     CHF (congestive heart failure)     Diabetes mellitus     Disorder of kidney and ureter     Followed by Dr. Jonathan Pace    Encounter for blood transfusion     Hypertension     Insomnia     Irregular heart rhythm     Stage 4 chronic kidney disease     Thyroid disease     hypothyroidism    TIA (transient ischemic attack) 03/2017       Past Surgical History:   Procedure Laterality Date    BILATERAL SALPINGO-OOPHORECTOMY (BSO) Bilateral 8/19/2019    Procedure: SALPINGO-OOPHORECTOMY, BILATERAL;  Surgeon: Blayne Wallace MD;  Location: [facility] OR;  Service: OB/GYN;  Laterality: Bilateral;    HYSTERECTOMY      partial, benign causes by reported hx    INSERTION OF PACEMAKER  08/2017    LEFT HEART CATHETERIZATION Left 1/6/2022    Procedure: Left heart cath;  Surgeon: Bhaskar Hanson III, MD;  Location: [facility] CATH;  Service: Cardiology;  Laterality: Left;  Right radial    ROBOT-ASSISTED LAPAROSCOPIC COLECTOMY USING DA MICHELE XI Right 8/19/2019    Procedure: XI ROBOTIC COLECTOMY;  Surgeon: Kourtney Dodson MD;  Location: [facility] OR;  Service: General;  Laterality: Right;       Family History   Problem Relation Age of Onset    Diabetes Mother     Heart disease Mother     Cancer Father         type unknown (nonsmoker, no etoh)    Heart disease Brother     Dementia Sister     Cancer Daughter         fallopian ca    Cancer Son         esophageal ca    Dementia Sister     Dementia Sister        Social History      Socioeconomic History    Marital status:    Tobacco Use    Smoking status: Never    Smokeless tobacco: Never   Substance and Sexual Activity    Alcohol use: No    Drug use: No    Sexual activity: Never     Birth control/protection: Post-menopausal     Social Determinants of Health     Financial Resource Strain: Low Risk     Difficulty of Paying Living Expenses: Not hard at all   Food Insecurity: No Food Insecurity    Worried About Running Out of Food in the Last Year: Never true    Ran Out of Food in the Last Year: Never true   Transportation Needs: No Transportation Needs    Lack of Transportation (Medical): No    Lack of Transportation (Non-Medical): No   Physical Activity: Unknown    Days of Exercise per Week: 0 days   Stress: No Stress Concern Present    Feeling of Stress : Not at all   Social Connections: Socially Isolated    Frequency of Communication with Friends and Family: Once a week    Frequency of Social Gatherings with Friends and Family: Never    Attends Scientologist Services: More than 4 times per year    Active Member of Clubs or Organizations: No    Attends Club or Organization Meetings: Never    Marital Status:    Housing Stability: Low Risk     Unable to Pay for Housing in the Last Year: No    Number of Places Lived in the Last Year: 1    Unstable Housing in the Last Year: No       Current Outpatient Medications   Medication Sig Dispense Refill    amiodarone (PACERONE) 200 MG Tab Take 200 mg by mouth once daily.       apixaban (ELIQUIS) 2.5 mg Tab Take 1 tablet (2.5 mg total) by mouth 2 (two) times daily. 60 tablet 0    ascorbic acid (VITAMIN C) 500 MG tablet Take 500 mg by mouth once daily.      b complex vitamins capsule Take 1 capsule by mouth once daily.      blood sugar diagnostic (TRUE METRIX GLUCOSE TEST STRIP) Strp 1 each by Misc.(Non-Drug; Combo Route) route 3 (three) times daily. 200 each 11    cholecalciferol, vitamin D3, 125 mcg (5,000 unit)  Tab Take 1 tablet (5,000 Units total) by mouth once daily.      clopidogrel (PLAVIX) 75 mg tablet TAKE ONE TABLET BY MOUTH ONCE DAILY (Patient taking differently: Take 75 mg by mouth once daily.) 30 tablet 11    diclofenac sodium (VOLTAREN ARTHRITIS PAIN) 1 % Gel Apply 2 g topically 2 (two) times daily as needed (muscle pain). 100 g 1    digoxin (LANOXIN) 125 mcg tablet Take 1 tablet by mouth every other day.      diphenoxylate-atropine 2.5-0.025 mg (LOMOTIL) 2.5-0.025 mg per tablet Take 1 tablet by mouth 4 (four) times daily as needed for Diarrhea. 30 tablet 1    fish oil-omega-3 fatty acids 300-1,000 mg capsule Take 1 capsule by mouth 2 (two) times a day.      furosemide (LASIX) 40 MG tablet Take 1 tablet (40 mg total) by mouth once daily. Take an extra pill for any weight gain of 3lb or more on any given day 90 tablet 1    insulin (LANTUS SOLOSTAR U-100 INSULIN) glargine 100 units/mL SubQ pen Inject 16 Units into the skin every evening. 5 each 3    insulin lispro 100 unit/mL pen INJECT 7 UNITS SUBCUTANEOUSLY THREE TIMES DAILY WITH MEALS (Patient taking differently: Inject 7 Units into the skin 3 (three) times daily with meals.) 18 mL 1    lancets 31 gauge Misc 1 lancet by Misc.(Non-Drug; Combo Route) route 3 (three) times daily as needed. 100 each 11    levoFLOXacin (LEVAQUIN) 500 MG tablet Take 1 tablet (500 mg total) by mouth once daily. 10 tablet 0    levothyroxine (SYNTHROID) 125 MCG tablet Take 1 tablet (125 mcg total) by mouth every Mon, Wed, Fri. 30 tablet 3    levothyroxine (SYNTHROID) 75 MCG tablet Take 75 mcg by mouth every Tuesday, Thursday, Saturday, Sunday.      magnesium 200 mg Tab Take 200 mg by mouth once daily.      melatonin 10 mg Tab Take 1 tablet by mouth nightly as needed.      memantine (NAMENDA) 10 MG Tab TAKE 1 TABLET BY MOUTH ONCE DAILY IN THE EVENING 90 tablet 1    metoprolol tartrate (LOPRESSOR) 50 MG tablet Take 1 tablet (50 mg total) by mouth 2 (two) times daily. 60  "tablet 1    multivitamin (THERAGRAN) per tablet Take 1 tablet by mouth once daily.      mupirocin (BACTROBAN) 2 % ointment Apply topically 3 (three) times daily. 30 g 0    ondansetron (ZOFRAN) 4 MG tablet ondansetron HCl 4 mg tablet   TAKE 1 TABLET BY MOUTH EVERY 6 HOURS PRN      ondansetron (ZOFRAN-ODT) 4 MG TbDL Take 1 tablet (4 mg total) by mouth every 8 (eight) hours as needed (Nausea). 20 tablet 0    pen needle, diabetic (COMFORT EZ PEN NEEDLES) 29 gauge x 1/2" Ndle 1 Units by Misc.(Non-Drug; Combo Route) route 3 (three) times daily. 300 each 11    polyethylene glycol (GLYCOLAX) 17 gram PwPk Take 17 g by mouth as needed (constipation).      potassium chloride SA (K-DUR,KLOR-CON M) 10 MEQ tablet Take 1 tablet by mouth twice daily 60 tablet 3    rivastigmine tartrate (EXELON) 1.5 MG capsule Take 1 capsule by mouth twice daily 60 capsule 3    simvastatin (ZOCOR) 10 MG tablet Take 10 mg by mouth every evening.      tetracycline (ACHROMYCIN,SUMYCIN) 250 MG capsule Take 1 capsule (250 mg total) by mouth every 6 (six) hours. 28 capsule 0    blood-glucose meter kit To check BG 2 times daily, to use with insurance preferred meter 1 each 0    famotidine (PEPCID) 20 MG tablet Take 1 tablet (20 mg total) by mouth 2 (two) times daily. for 10 days 20 tablet 0     No current facility-administered medications for this visit.       Review of patient's allergies indicates:   Allergen Reactions    Adhesive Itching    Mucinex [guaifenesin]      Says caused CHF     Latex, natural rubber Rash        Hemoglobin A1C   Date Value Ref Range Status   10/06/2021 6.9 (H) 4.0 - 5.6 % Final     Comment:     ADA Screening Guidelines:  5.7-6.4%  Consistent with prediabetes  >or=6.5%  Consistent with diabetes    High levels of fetal hemoglobin interfere with the HbA1C  assay. Heterozygous hemoglobin variants (HbS, HgC, etc)do  not significantly interfere with this assay.   However, presence of multiple variants may affect " accuracy.     07/22/2021 7.1 (H) 4.0 - 5.6 % Final     Comment:     ADA Screening Guidelines:  5.7-6.4%  Consistent with prediabetes  >or=6.5%  Consistent with diabetes    High levels of fetal hemoglobin interfere with the HbA1C  assay. Heterozygous hemoglobin variants (HbS, HgC, etc)do  not significantly interfere with this assay.   However, presence of multiple variants may affect accuracy.     03/04/2021 7.2 (H) 4.0 - 5.6 % Final     Comment:     ADA Screening Guidelines:  5.7-6.4%  Consistent with prediabetes  >or=6.5%  Consistent with diabetes    High levels of fetal hemoglobin interfere with the HbA1C  assay. Heterozygous hemoglobin variants (HbS, HgC, etc)do  not significantly interfere with this assay.   However, presence of multiple variants may affect accuracy.           Review of Systems   Constitutional: Negative for chills and fever.   Cardiovascular:  Negative for claudication and leg swelling.   Skin:  Negative for color change, nail changes and poor wound healing.   Musculoskeletal:  Negative for joint pain, joint swelling, muscle cramps and muscle weakness.   Gastrointestinal:  Negative for nausea and vomiting.   Neurological:  Positive for numbness.   Psychiatric/Behavioral:  Negative for altered mental status.          Objective:      Physical Exam  Constitutional:       Appearance: Normal appearance. She is not ill-appearing.   Cardiovascular:      Pulses:           Dorsalis pedis pulses are 2+ on the right side and 2+ on the left side.        Posterior tibial pulses are 2+ on the right side and 2+ on the left side.      Comments: CFT is < 3 seconds bilateral.  Pedal hair growth is decreased bilateral.  No lower extremity edema noted bilateral.  Toes are warm to touch bilateral.    Musculoskeletal:         General: Deformity present. No tenderness.      Right lower leg: No edema.      Left lower leg: No edema.      Comments: Muscle strength 5/5 in all muscle groups bilateral.  No tenderness nor  crepitation with ROM of foot/ankle joints bilateral.  (-) for pain with palpation to the wound overlying the dorsum of the Rt. 2nd PIP joint.  Bilateral semi-reducible contracture of toes 2-5.  Bilateral HAV.   Skin:     General: Skin is warm and dry.      Capillary Refill: Capillary refill takes 2 to 3 seconds.      Findings: Wound present. No bruising, ecchymosis, erythema, signs of injury, laceration, lesion, petechiae or rash.      Comments: Location: Open wound noted to the dorsum of the Rt. 2nd toe at the PIP joint.  Base: Down to subQ and comprised of fibrin.    Drainage: None  Lata wound: Devoid of edema, fluctuance, purulence, and malodor. Lata wound erythema noted.   Pre-debridement measurement: 0.2 x 0.3 x 0.1cm  Post-debridement measurement: 0.4 x 0.5 x 0.1cm     Neurological:      Mental Status: She is alert.      Sensory: Sensory deficit present.      Motor: No weakness or atrophy.      Comments: Decreased protective sensation noted bilateral.  Light touch is intact bilateral.               Assessment:       Encounter Diagnoses   Name Primary?    Diabetic ulcer of toe of right foot associated with type 2 diabetes mellitus, with fat layer exposed Yes    Diabetic polyneuropathy associated with type 2 diabetes mellitus          Plan:       Denice was seen today for dressing change.    Diagnoses and all orders for this visit:    Diabetic ulcer of toe of right foot associated with type 2 diabetes mellitus, with fat layer exposed    Diabetic polyneuropathy associated with type 2 diabetes mellitus    I counseled the patient on her conditions, their implications and medical management.    Performed a selective excisional debridement of the Rt. Foot.  See attached procedure note.    The wound base was covered with franklin, and a football dressing was applied.    Advised to keep the dressing CDI x 1 week.    Advised to rest and elevate the affected extremity.    Instructed to minimize weight bearing to  facilitate wound healing.    Advised to ambulate only in the postoperative shoe/boot.    RTC in 1 week.    Pankaj Julian DPM

## 2022-11-28 ENCOUNTER — OFFICE VISIT (OUTPATIENT)
Dept: PODIATRY | Facility: CLINIC | Age: 87
End: 2022-11-28
Payer: MEDICARE

## 2022-11-28 VITALS — HEIGHT: 62 IN | BODY MASS INDEX: 28.36 KG/M2 | WEIGHT: 154.13 LBS

## 2022-11-28 DIAGNOSIS — L92.9 HYPERGRANULATION: ICD-10-CM

## 2022-11-28 DIAGNOSIS — L97.512 DIABETIC ULCER OF TOE OF RIGHT FOOT ASSOCIATED WITH TYPE 2 DIABETES MELLITUS, WITH FAT LAYER EXPOSED: Primary | ICD-10-CM

## 2022-11-28 DIAGNOSIS — E11.42 DIABETIC POLYNEUROPATHY ASSOCIATED WITH TYPE 2 DIABETES MELLITUS: ICD-10-CM

## 2022-11-28 DIAGNOSIS — E11.621 DIABETIC ULCER OF TOE OF RIGHT FOOT ASSOCIATED WITH TYPE 2 DIABETES MELLITUS, WITH FAT LAYER EXPOSED: Primary | ICD-10-CM

## 2022-11-28 PROCEDURE — 99499 UNLISTED E&M SERVICE: CPT | Mod: S$GLB,,, | Performed by: PODIATRIST

## 2022-11-28 PROCEDURE — 1126F AMNT PAIN NOTED NONE PRSNT: CPT | Mod: CPTII,S$GLB,, | Performed by: PODIATRIST

## 2022-11-28 PROCEDURE — 11042 DBRDMT SUBQ TIS 1ST 20SQCM/<: CPT | Mod: S$GLB,,, | Performed by: PODIATRIST

## 2022-11-28 PROCEDURE — 99999 PR PBB SHADOW E&M-EST. PATIENT-LVL IV: ICD-10-PCS | Mod: PBBFAC,,, | Performed by: PODIATRIST

## 2022-11-28 PROCEDURE — 1157F ADVNC CARE PLAN IN RCRD: CPT | Mod: CPTII,S$GLB,, | Performed by: PODIATRIST

## 2022-11-28 PROCEDURE — 1157F PR ADVANCE CARE PLAN OR EQUIV PRESENT IN MEDICAL RECORD: ICD-10-PCS | Mod: CPTII,S$GLB,, | Performed by: PODIATRIST

## 2022-11-28 PROCEDURE — 1101F PR PT FALLS ASSESS DOC 0-1 FALLS W/OUT INJ PAST YR: ICD-10-PCS | Mod: CPTII,S$GLB,, | Performed by: PODIATRIST

## 2022-11-28 PROCEDURE — 1126F PR PAIN SEVERITY QUANTIFIED, NO PAIN PRESENT: ICD-10-PCS | Mod: CPTII,S$GLB,, | Performed by: PODIATRIST

## 2022-11-28 PROCEDURE — 11042 WOUND DEBRIDEMENT: ICD-10-PCS | Mod: S$GLB,,, | Performed by: PODIATRIST

## 2022-11-28 PROCEDURE — 99499 NO LOS: ICD-10-PCS | Mod: S$GLB,,, | Performed by: PODIATRIST

## 2022-11-28 PROCEDURE — 3288F PR FALLS RISK ASSESSMENT DOCUMENTED: ICD-10-PCS | Mod: CPTII,S$GLB,, | Performed by: PODIATRIST

## 2022-11-28 PROCEDURE — 1101F PT FALLS ASSESS-DOCD LE1/YR: CPT | Mod: CPTII,S$GLB,, | Performed by: PODIATRIST

## 2022-11-28 PROCEDURE — 1159F PR MEDICATION LIST DOCUMENTED IN MEDICAL RECORD: ICD-10-PCS | Mod: CPTII,S$GLB,, | Performed by: PODIATRIST

## 2022-11-28 PROCEDURE — 3288F FALL RISK ASSESSMENT DOCD: CPT | Mod: CPTII,S$GLB,, | Performed by: PODIATRIST

## 2022-11-28 PROCEDURE — 1159F MED LIST DOCD IN RCRD: CPT | Mod: CPTII,S$GLB,, | Performed by: PODIATRIST

## 2022-11-28 PROCEDURE — 99999 PR PBB SHADOW E&M-EST. PATIENT-LVL IV: CPT | Mod: PBBFAC,,, | Performed by: PODIATRIST

## 2022-11-28 NOTE — PROCEDURES
"Wound Debridement    Date/Time: 11/28/2022 9:20 AM  Performed by: Pankaj Julian DPM  Authorized by: Pankaj Julian DPM     Time out: Immediately prior to procedure a "time out" was called to verify the correct patient, procedure, equipment, support staff and site/side marked as required.    Consent Done?:  Yes (Verbal)    Preparation: Patient was prepped and draped in usual sterile fashion    Local anesthesia used?: No      Wound Details:    Location:  Right foot    Location:  Right 2nd Toe    Type of Debridement:  Excisional       Length (cm):  0.2       Area (sq cm):  0.04       Width (cm):  0.2       Percent Debrided (%):  100       Depth (cm):  0.1       Total Area Debrided (sq cm):  0.04    Depth of debridement:  Subcutaneous tissue    Tissue debrided:  Subcutaneous and Hypergranulation    Devitalized tissue debrided:  Fibrin    Debridement - 1st Wound - Instruments: silver nitrate.    Bleeding:  Minimal  Hemostasis Achieved: Yes    Method Used:  Pressure  Patient tolerance:  Patient tolerated the procedure well with no immediate complications  "

## 2022-11-28 NOTE — PROGRESS NOTES
Subjective:      Patient ID: Denice Sheth is a 87 y.o. female.    Chief Complaint: Wound Care    Patient presents to clinic for a 1 week wound check of the Rt. Foot.  Denies experiencing pain from the Rt. 2nd toe wound with today's exam.  Has kept the prior football dressing clean, dry, and intact for the past week.  Denies experiencing N/V/F/C/D.  Denies any additional pedal complaints.      Past Medical History:   Diagnosis Date    A-fib     Anticoagulant long-term use     Cecum mass     CHF (congestive heart failure)     Diabetes mellitus     Disorder of kidney and ureter     Followed by Dr. Jonathan Pace    Encounter for blood transfusion     Hypertension     Insomnia     Irregular heart rhythm     Stage 4 chronic kidney disease     Thyroid disease     hypothyroidism    TIA (transient ischemic attack) 03/2017       Past Surgical History:   Procedure Laterality Date    BILATERAL SALPINGO-OOPHORECTOMY (BSO) Bilateral 8/19/2019    Procedure: SALPINGO-OOPHORECTOMY, BILATERAL;  Surgeon: Blayne Wallace MD;  Location: [facility];  Service: OB/GYN;  Laterality: Bilateral;    HYSTERECTOMY      partial, benign causes by reported hx    INSERTION OF PACEMAKER  08/2017    LEFT HEART CATHETERIZATION Left 1/6/2022    Procedure: Left heart cath;  Surgeon: Bhaskar Hanson III, MD;  Location: [facility] CATH;  Service: Cardiology;  Laterality: Left;  Right radial    ROBOT-ASSISTED LAPAROSCOPIC COLECTOMY USING DA MICHELE XI Right 8/19/2019    Procedure: XI ROBOTIC COLECTOMY;  Surgeon: Kourtney Dodson MD;  Location: [facility] OR;  Service: General;  Laterality: Right;       Family History   Problem Relation Age of Onset    Diabetes Mother     Heart disease Mother     Cancer Father         type unknown (nonsmoker, no etoh)    Heart disease Brother     Dementia Sister     Cancer Daughter         fallopian ca    Cancer Son         esophageal ca    Dementia Sister     Dementia Sister        Social History      Socioeconomic History    Marital status:    Tobacco Use    Smoking status: Never    Smokeless tobacco: Never   Substance and Sexual Activity    Alcohol use: No    Drug use: No    Sexual activity: Never     Birth control/protection: Post-menopausal     Social Determinants of Health     Financial Resource Strain: Low Risk     Difficulty of Paying Living Expenses: Not hard at all   Food Insecurity: No Food Insecurity    Worried About Running Out of Food in the Last Year: Never true    Ran Out of Food in the Last Year: Never true   Transportation Needs: No Transportation Needs    Lack of Transportation (Medical): No    Lack of Transportation (Non-Medical): No   Physical Activity: Unknown    Days of Exercise per Week: 0 days   Stress: No Stress Concern Present    Feeling of Stress : Not at all   Social Connections: Socially Isolated    Frequency of Communication with Friends and Family: Once a week    Frequency of Social Gatherings with Friends and Family: Never    Attends Christian Services: More than 4 times per year    Active Member of Clubs or Organizations: No    Attends Club or Organization Meetings: Never    Marital Status:    Housing Stability: Low Risk     Unable to Pay for Housing in the Last Year: No    Number of Places Lived in the Last Year: 1    Unstable Housing in the Last Year: No       Current Outpatient Medications   Medication Sig Dispense Refill    amiodarone (PACERONE) 200 MG Tab Take 200 mg by mouth once daily.       apixaban (ELIQUIS) 2.5 mg Tab Take 1 tablet (2.5 mg total) by mouth 2 (two) times daily. 60 tablet 0    ascorbic acid (VITAMIN C) 500 MG tablet Take 500 mg by mouth once daily.      b complex vitamins capsule Take 1 capsule by mouth once daily.      blood sugar diagnostic (TRUE METRIX GLUCOSE TEST STRIP) Strp 1 each by Misc.(Non-Drug; Combo Route) route 3 (three) times daily. 200 each 11    cholecalciferol, vitamin D3, 125 mcg (5,000 unit)  Tab Take 1 tablet (5,000 Units total) by mouth once daily.      clopidogrel (PLAVIX) 75 mg tablet TAKE ONE TABLET BY MOUTH ONCE DAILY (Patient taking differently: Take 75 mg by mouth once daily.) 30 tablet 11    diclofenac sodium (VOLTAREN ARTHRITIS PAIN) 1 % Gel Apply 2 g topically 2 (two) times daily as needed (muscle pain). 100 g 1    digoxin (LANOXIN) 125 mcg tablet Take 1 tablet by mouth every other day.      diphenoxylate-atropine 2.5-0.025 mg (LOMOTIL) 2.5-0.025 mg per tablet Take 1 tablet by mouth 4 (four) times daily as needed for Diarrhea. 30 tablet 1    fish oil-omega-3 fatty acids 300-1,000 mg capsule Take 1 capsule by mouth 2 (two) times a day.      furosemide (LASIX) 40 MG tablet Take 1 tablet (40 mg total) by mouth once daily. Take an extra pill for any weight gain of 3lb or more on any given day 90 tablet 1    insulin (LANTUS SOLOSTAR U-100 INSULIN) glargine 100 units/mL SubQ pen Inject 16 Units into the skin every evening. 5 each 3    insulin lispro 100 unit/mL pen INJECT 7 UNITS SUBCUTANEOUSLY THREE TIMES DAILY WITH MEALS (Patient taking differently: Inject 7 Units into the skin 3 (three) times daily with meals.) 18 mL 1    lancets 31 gauge Misc 1 lancet by Misc.(Non-Drug; Combo Route) route 3 (three) times daily as needed. 100 each 11    levoFLOXacin (LEVAQUIN) 500 MG tablet Take 1 tablet (500 mg total) by mouth once daily. 10 tablet 0    levothyroxine (SYNTHROID) 125 MCG tablet Take 1 tablet (125 mcg total) by mouth every Mon, Wed, Fri. 30 tablet 3    levothyroxine (SYNTHROID) 75 MCG tablet Take 75 mcg by mouth every Tuesday, Thursday, Saturday, Sunday.      magnesium 200 mg Tab Take 200 mg by mouth once daily.      melatonin 10 mg Tab Take 1 tablet by mouth nightly as needed.      memantine (NAMENDA) 10 MG Tab TAKE 1 TABLET BY MOUTH ONCE DAILY IN THE EVENING 90 tablet 1    metoprolol tartrate (LOPRESSOR) 50 MG tablet Take 1 tablet (50 mg total) by mouth 2 (two) times daily. 60  "tablet 1    multivitamin (THERAGRAN) per tablet Take 1 tablet by mouth once daily.      mupirocin (BACTROBAN) 2 % ointment Apply topically 3 (three) times daily. 30 g 0    ondansetron (ZOFRAN) 4 MG tablet ondansetron HCl 4 mg tablet   TAKE 1 TABLET BY MOUTH EVERY 6 HOURS PRN      ondansetron (ZOFRAN-ODT) 4 MG TbDL Take 1 tablet (4 mg total) by mouth every 8 (eight) hours as needed (Nausea). 20 tablet 0    pen needle, diabetic (COMFORT EZ PEN NEEDLES) 29 gauge x 1/2" Ndle 1 Units by Misc.(Non-Drug; Combo Route) route 3 (three) times daily. 300 each 11    polyethylene glycol (GLYCOLAX) 17 gram PwPk Take 17 g by mouth as needed (constipation).      potassium chloride SA (K-DUR,KLOR-CON M) 10 MEQ tablet Take 1 tablet by mouth twice daily 60 tablet 3    rivastigmine tartrate (EXELON) 1.5 MG capsule Take 1 capsule by mouth twice daily 60 capsule 3    simvastatin (ZOCOR) 10 MG tablet Take 10 mg by mouth every evening.      tetracycline (ACHROMYCIN,SUMYCIN) 250 MG capsule Take 1 capsule (250 mg total) by mouth every 6 (six) hours. 28 capsule 0    blood-glucose meter kit To check BG 2 times daily, to use with insurance preferred meter 1 each 0    famotidine (PEPCID) 20 MG tablet Take 1 tablet (20 mg total) by mouth 2 (two) times daily. for 10 days 20 tablet 0     No current facility-administered medications for this visit.       Review of patient's allergies indicates:   Allergen Reactions    Adhesive Itching    Mucinex [guaifenesin]      Says caused CHF     Latex, natural rubber Rash        Hemoglobin A1C   Date Value Ref Range Status   10/06/2021 6.9 (H) 4.0 - 5.6 % Final     Comment:     ADA Screening Guidelines:  5.7-6.4%  Consistent with prediabetes  >or=6.5%  Consistent with diabetes    High levels of fetal hemoglobin interfere with the HbA1C  assay. Heterozygous hemoglobin variants (HbS, HgC, etc)do  not significantly interfere with this assay.   However, presence of multiple variants may affect " accuracy.     07/22/2021 7.1 (H) 4.0 - 5.6 % Final     Comment:     ADA Screening Guidelines:  5.7-6.4%  Consistent with prediabetes  >or=6.5%  Consistent with diabetes    High levels of fetal hemoglobin interfere with the HbA1C  assay. Heterozygous hemoglobin variants (HbS, HgC, etc)do  not significantly interfere with this assay.   However, presence of multiple variants may affect accuracy.     03/04/2021 7.2 (H) 4.0 - 5.6 % Final     Comment:     ADA Screening Guidelines:  5.7-6.4%  Consistent with prediabetes  >or=6.5%  Consistent with diabetes    High levels of fetal hemoglobin interfere with the HbA1C  assay. Heterozygous hemoglobin variants (HbS, HgC, etc)do  not significantly interfere with this assay.   However, presence of multiple variants may affect accuracy.           Review of Systems   Constitutional: Negative for chills and fever.   Cardiovascular:  Negative for claudication and leg swelling.   Skin:  Negative for color change, nail changes and poor wound healing.   Musculoskeletal:  Negative for joint pain, joint swelling, muscle cramps and muscle weakness.   Gastrointestinal:  Negative for nausea and vomiting.   Neurological:  Positive for numbness.   Psychiatric/Behavioral:  Negative for altered mental status.          Objective:      Physical Exam  Constitutional:       Appearance: Normal appearance. She is not ill-appearing.   Cardiovascular:      Pulses:           Dorsalis pedis pulses are 2+ on the right side and 2+ on the left side.        Posterior tibial pulses are 2+ on the right side and 2+ on the left side.      Comments: CFT is < 3 seconds bilateral.  Pedal hair growth is decreased bilateral.  No lower extremity edema noted bilateral.  Toes are warm to touch bilateral.    Musculoskeletal:         General: Deformity present. No tenderness.      Right lower leg: No edema.      Left lower leg: No edema.      Comments: Muscle strength 5/5 in all muscle groups bilateral.  No tenderness nor  crepitation with ROM of foot/ankle joints bilateral.  (-) for pain with palpation to the wound overlying the dorsum of the Rt. 2nd PIP joint.  Bilateral semi-reducible contracture of toes 2-5.  Bilateral HAV.   Skin:     General: Skin is warm and dry.      Capillary Refill: Capillary refill takes 2 to 3 seconds.      Findings: Wound present. No bruising, ecchymosis, erythema, signs of injury, laceration, lesion, petechiae or rash.      Comments: Location: Open wound noted to the dorsum of the Rt. 2nd toe at the PIP joint.  Base: Down to subQ and comprised of hypergranulation.  Drainage: None  Lata wound: Devoid of edema, fluctuance, purulence, and malodor. Lata wound erythema noted.   Pre-debridement measurement: 0.2 x 0.2 x 0.1cm  Post-debridement measurement: 0.4 x 0.4 x 0.1cm     Neurological:      Mental Status: She is alert.      Sensory: Sensory deficit present.      Motor: No weakness or atrophy.      Comments: Decreased protective sensation noted bilateral.  Light touch is intact bilateral.               Assessment:       Encounter Diagnoses   Name Primary?    Diabetic ulcer of toe of right foot associated with type 2 diabetes mellitus, with fat layer exposed Yes    Hypergranulation     Diabetic polyneuropathy associated with type 2 diabetes mellitus            Plan:       Denice was seen today for wound care.    Diagnoses and all orders for this visit:    Diabetic ulcer of toe of right foot associated with type 2 diabetes mellitus, with fat layer exposed    Hypergranulation    Diabetic polyneuropathy associated with type 2 diabetes mellitus      I counseled the patient on her conditions, their implications and medical management.    Performed a selective excisional debridement of the Rt. Foot.  See attached procedure note.    The wound base was covered with franklin, and a football dressing was applied.    Advised to keep the dressing CDI x 1 week.    Advised to rest and elevate the affected  extremity.    Instructed to minimize weight bearing to facilitate wound healing.    Advised to ambulate only in the postoperative shoe/boot.    RTC in 1 week.    Pankaj Julian DPM

## 2022-11-29 PROCEDURE — G0179 MD RECERTIFICATION HHA PT: HCPCS | Mod: ,,, | Performed by: FAMILY MEDICINE

## 2022-11-29 PROCEDURE — G0179 PR HOME HEALTH MD RECERTIFICATION: ICD-10-PCS | Mod: ,,, | Performed by: FAMILY MEDICINE

## 2022-12-05 ENCOUNTER — OFFICE VISIT (OUTPATIENT)
Dept: PODIATRY | Facility: CLINIC | Age: 87
End: 2022-12-05
Payer: MEDICARE

## 2022-12-05 VITALS — BODY MASS INDEX: 28.36 KG/M2 | HEIGHT: 62 IN | WEIGHT: 154.13 LBS

## 2022-12-05 DIAGNOSIS — E11.42 DIABETIC POLYNEUROPATHY ASSOCIATED WITH TYPE 2 DIABETES MELLITUS: ICD-10-CM

## 2022-12-05 DIAGNOSIS — L97.511 DIABETIC ULCER OF TOE OF RIGHT FOOT ASSOCIATED WITH TYPE 2 DIABETES MELLITUS, LIMITED TO BREAKDOWN OF SKIN: Primary | ICD-10-CM

## 2022-12-05 DIAGNOSIS — E11.621 DIABETIC ULCER OF TOE OF RIGHT FOOT ASSOCIATED WITH TYPE 2 DIABETES MELLITUS, LIMITED TO BREAKDOWN OF SKIN: Primary | ICD-10-CM

## 2022-12-05 PROCEDURE — 1159F PR MEDICATION LIST DOCUMENTED IN MEDICAL RECORD: ICD-10-PCS | Mod: CPTII,S$GLB,, | Performed by: PODIATRIST

## 2022-12-05 PROCEDURE — 1126F PR PAIN SEVERITY QUANTIFIED, NO PAIN PRESENT: ICD-10-PCS | Mod: CPTII,S$GLB,, | Performed by: PODIATRIST

## 2022-12-05 PROCEDURE — 1126F AMNT PAIN NOTED NONE PRSNT: CPT | Mod: CPTII,S$GLB,, | Performed by: PODIATRIST

## 2022-12-05 PROCEDURE — 3288F FALL RISK ASSESSMENT DOCD: CPT | Mod: CPTII,S$GLB,, | Performed by: PODIATRIST

## 2022-12-05 PROCEDURE — 99999 PR PBB SHADOW E&M-EST. PATIENT-LVL II: ICD-10-PCS | Mod: PBBFAC,,, | Performed by: PODIATRIST

## 2022-12-05 PROCEDURE — 99499 UNLISTED E&M SERVICE: CPT | Mod: S$GLB,,, | Performed by: PODIATRIST

## 2022-12-05 PROCEDURE — 97597 DBRDMT OPN WND 1ST 20 CM/<: CPT | Mod: S$GLB,,, | Performed by: PODIATRIST

## 2022-12-05 PROCEDURE — 99499 NO LOS: ICD-10-PCS | Mod: S$GLB,,, | Performed by: PODIATRIST

## 2022-12-05 PROCEDURE — 97597 WOUND DEBRIDEMENT: ICD-10-PCS | Mod: S$GLB,,, | Performed by: PODIATRIST

## 2022-12-05 PROCEDURE — 1101F PT FALLS ASSESS-DOCD LE1/YR: CPT | Mod: CPTII,S$GLB,, | Performed by: PODIATRIST

## 2022-12-05 PROCEDURE — 1157F PR ADVANCE CARE PLAN OR EQUIV PRESENT IN MEDICAL RECORD: ICD-10-PCS | Mod: CPTII,S$GLB,, | Performed by: PODIATRIST

## 2022-12-05 PROCEDURE — 1157F ADVNC CARE PLAN IN RCRD: CPT | Mod: CPTII,S$GLB,, | Performed by: PODIATRIST

## 2022-12-05 PROCEDURE — 1101F PR PT FALLS ASSESS DOC 0-1 FALLS W/OUT INJ PAST YR: ICD-10-PCS | Mod: CPTII,S$GLB,, | Performed by: PODIATRIST

## 2022-12-05 PROCEDURE — 99999 PR PBB SHADOW E&M-EST. PATIENT-LVL II: CPT | Mod: PBBFAC,,, | Performed by: PODIATRIST

## 2022-12-05 PROCEDURE — 3288F PR FALLS RISK ASSESSMENT DOCUMENTED: ICD-10-PCS | Mod: CPTII,S$GLB,, | Performed by: PODIATRIST

## 2022-12-05 PROCEDURE — 1159F MED LIST DOCD IN RCRD: CPT | Mod: CPTII,S$GLB,, | Performed by: PODIATRIST

## 2022-12-05 NOTE — PROCEDURES
"Wound Debridement    Date/Time: 12/5/2022 9:00 AM  Performed by: Pankaj Julian DPM  Authorized by: Pankaj Julian DPM     Time out: Immediately prior to procedure a "time out" was called to verify the correct patient, procedure, equipment, support staff and site/side marked as required.    Consent Done?:  Yes (Verbal)    Preparation: Patient was prepped and draped in usual sterile fashion    Local anesthesia used?: No      Wound Details:    Location:  Right foot    Location:  Right 2nd Toe    Type of Debridement:  Excisional       Length (cm):  0.1       Area (sq cm):  0.01       Width (cm):  0.1       Percent Debrided (%):  100       Depth (cm):  0.1       Total Area Debrided (sq cm):  0.01    Depth of debridement:  Epidermis/Dermis    Tissue debrided:  Dermis    Devitalized tissue debrided:  Fibrin    Instruments:  Blade    Bleeding:  Minimal  Hemostasis Achieved: Yes    Method Used:  Pressure  Patient tolerance:  Patient tolerated the procedure well with no immediate complications  "

## 2022-12-05 NOTE — PROGRESS NOTES
Subjective:      Patient ID: Denice Sheth is a 88 y.o. female.    Chief Complaint: Wound Care    Patient presents to clinic for a 1 week wound check of the Rt. Foot.  Denies experiencing pain from the Rt. 2nd toe wound with today's exam.  Has kept the prior football dressing clean, dry, and intact for the past week.  Denies experiencing N/V/F/C/D.  Denies any additional pedal complaints.      Past Medical History:   Diagnosis Date    A-fib     Anticoagulant long-term use     Cecum mass     CHF (congestive heart failure)     Diabetes mellitus     Disorder of kidney and ureter     Followed by Dr. Jonathan Pace    Encounter for blood transfusion     Hypertension     Insomnia     Irregular heart rhythm     Stage 4 chronic kidney disease     Thyroid disease     hypothyroidism    TIA (transient ischemic attack) 03/2017       Past Surgical History:   Procedure Laterality Date    BILATERAL SALPINGO-OOPHORECTOMY (BSO) Bilateral 8/19/2019    Procedure: SALPINGO-OOPHORECTOMY, BILATERAL;  Surgeon: Blayne Wallace MD;  Location: [facility];  Service: OB/GYN;  Laterality: Bilateral;    HYSTERECTOMY      partial, benign causes by reported hx    INSERTION OF PACEMAKER  08/2017    LEFT HEART CATHETERIZATION Left 1/6/2022    Procedure: Left heart cath;  Surgeon: Bhaskar Hanson III, MD;  Location: [facility] CATH;  Service: Cardiology;  Laterality: Left;  Right radial    ROBOT-ASSISTED LAPAROSCOPIC COLECTOMY USING DA MICHELE XI Right 8/19/2019    Procedure: XI ROBOTIC COLECTOMY;  Surgeon: Kourtney Dodson MD;  Location: [facility] OR;  Service: General;  Laterality: Right;       Family History   Problem Relation Age of Onset    Diabetes Mother     Heart disease Mother     Cancer Father         type unknown (nonsmoker, no etoh)    Heart disease Brother     Dementia Sister     Cancer Daughter         fallopian ca    Cancer Son         esophageal ca    Dementia Sister     Dementia Sister        Social History     Socioeconomic History    Marital  status:    Tobacco Use    Smoking status: Never    Smokeless tobacco: Never   Substance and Sexual Activity    Alcohol use: No    Drug use: No    Sexual activity: Never     Birth control/protection: Post-menopausal     Social Determinants of Health     Financial Resource Strain: Low Risk     Difficulty of Paying Living Expenses: Not hard at all   Food Insecurity: No Food Insecurity    Worried About Running Out of Food in the Last Year: Never true    Ran Out of Food in the Last Year: Never true   Transportation Needs: No Transportation Needs    Lack of Transportation (Medical): No    Lack of Transportation (Non-Medical): No   Physical Activity: Unknown    Days of Exercise per Week: 0 days   Stress: No Stress Concern Present    Feeling of Stress : Not at all   Social Connections: Socially Isolated    Frequency of Communication with Friends and Family: Once a week    Frequency of Social Gatherings with Friends and Family: Never    Attends Anabaptism Services: More than 4 times per year    Active Member of Clubs or Organizations: No    Attends Club or Organization Meetings: Never    Marital Status:    Housing Stability: Low Risk     Unable to Pay for Housing in the Last Year: No    Number of Places Lived in the Last Year: 1    Unstable Housing in the Last Year: No       Current Outpatient Medications   Medication Sig Dispense Refill    amiodarone (PACERONE) 200 MG Tab Take 200 mg by mouth once daily.       apixaban (ELIQUIS) 2.5 mg Tab Take 1 tablet (2.5 mg total) by mouth 2 (two) times daily. 60 tablet 0    ascorbic acid (VITAMIN C) 500 MG tablet Take 500 mg by mouth once daily.      b complex vitamins capsule Take 1 capsule by mouth once daily.      blood sugar diagnostic (TRUE METRIX GLUCOSE TEST STRIP) Strp 1 each by Misc.(Non-Drug; Combo Route) route 3 (three) times daily. 200 each 11    cholecalciferol, vitamin D3, 125 mcg (5,000 unit) Tab Take 1 tablet (5,000 Units total) by mouth once daily.       clopidogrel (PLAVIX) 75 mg tablet TAKE ONE TABLET BY MOUTH ONCE DAILY (Patient taking differently: Take 75 mg by mouth once daily.) 30 tablet 11    diclofenac sodium (VOLTAREN ARTHRITIS PAIN) 1 % Gel Apply 2 g topically 2 (two) times daily as needed (muscle pain). 100 g 1    digoxin (LANOXIN) 125 mcg tablet Take 1 tablet by mouth every other day.      diphenoxylate-atropine 2.5-0.025 mg (LOMOTIL) 2.5-0.025 mg per tablet Take 1 tablet by mouth 4 (four) times daily as needed for Diarrhea. 30 tablet 1    fish oil-omega-3 fatty acids 300-1,000 mg capsule Take 1 capsule by mouth 2 (two) times a day.      furosemide (LASIX) 40 MG tablet Take 1 tablet (40 mg total) by mouth once daily. Take an extra pill for any weight gain of 3lb or more on any given day 90 tablet 1    insulin (LANTUS SOLOSTAR U-100 INSULIN) glargine 100 units/mL SubQ pen Inject 16 Units into the skin every evening. 5 each 3    insulin lispro 100 unit/mL pen INJECT 7 UNITS SUBCUTANEOUSLY THREE TIMES DAILY WITH MEALS (Patient taking differently: Inject 7 Units into the skin 3 (three) times daily with meals.) 18 mL 1    lancets 31 gauge Misc 1 lancet by Misc.(Non-Drug; Combo Route) route 3 (three) times daily as needed. 100 each 11    levoFLOXacin (LEVAQUIN) 500 MG tablet Take 1 tablet (500 mg total) by mouth once daily. 10 tablet 0    levothyroxine (SYNTHROID) 125 MCG tablet Take 1 tablet (125 mcg total) by mouth every Mon, Wed, Fri. 30 tablet 3    levothyroxine (SYNTHROID) 75 MCG tablet Take 75 mcg by mouth every Tuesday, Thursday, Saturday, Sunday.      magnesium 200 mg Tab Take 200 mg by mouth once daily.      melatonin 10 mg Tab Take 1 tablet by mouth nightly as needed.      memantine (NAMENDA) 10 MG Tab TAKE 1 TABLET BY MOUTH ONCE DAILY IN THE EVENING 90 tablet 1    metoprolol tartrate (LOPRESSOR) 50 MG tablet Take 1 tablet (50 mg total) by mouth 2 (two) times daily. 60 tablet 1    multivitamin (THERAGRAN) per tablet Take 1 tablet by mouth once daily.  "     mupirocin (BACTROBAN) 2 % ointment Apply topically 3 (three) times daily. 30 g 0    ondansetron (ZOFRAN) 4 MG tablet ondansetron HCl 4 mg tablet   TAKE 1 TABLET BY MOUTH EVERY 6 HOURS PRN      ondansetron (ZOFRAN-ODT) 4 MG TbDL Take 1 tablet (4 mg total) by mouth every 8 (eight) hours as needed (Nausea). 20 tablet 0    pen needle, diabetic (COMFORT EZ PEN NEEDLES) 29 gauge x 1/2" Ndle 1 Units by Misc.(Non-Drug; Combo Route) route 3 (three) times daily. 300 each 11    polyethylene glycol (GLYCOLAX) 17 gram PwPk Take 17 g by mouth as needed (constipation).      potassium chloride SA (K-DUR,KLOR-CON M) 10 MEQ tablet Take 1 tablet by mouth twice daily 60 tablet 3    rivastigmine tartrate (EXELON) 1.5 MG capsule Take 1 capsule by mouth twice daily 60 capsule 3    simvastatin (ZOCOR) 10 MG tablet Take 10 mg by mouth every evening.      tetracycline (ACHROMYCIN,SUMYCIN) 250 MG capsule Take 1 capsule (250 mg total) by mouth every 6 (six) hours. 28 capsule 0    blood-glucose meter kit To check BG 2 times daily, to use with insurance preferred meter 1 each 0    famotidine (PEPCID) 20 MG tablet Take 1 tablet (20 mg total) by mouth 2 (two) times daily. for 10 days 20 tablet 0     No current facility-administered medications for this visit.       Review of patient's allergies indicates:   Allergen Reactions    Adhesive Itching    Mucinex [guaifenesin]      Says caused CHF     Latex, natural rubber Rash        Hemoglobin A1C   Date Value Ref Range Status   10/06/2021 6.9 (H) 4.0 - 5.6 % Final     Comment:     ADA Screening Guidelines:  5.7-6.4%  Consistent with prediabetes  >or=6.5%  Consistent with diabetes    High levels of fetal hemoglobin interfere with the HbA1C  assay. Heterozygous hemoglobin variants (HbS, HgC, etc)do  not significantly interfere with this assay.   However, presence of multiple variants may affect accuracy.     07/22/2021 7.1 (H) 4.0 - 5.6 % Final     Comment:     ADA Screening Guidelines:  5.7-6.4%  " Consistent with prediabetes  >or=6.5%  Consistent with diabetes    High levels of fetal hemoglobin interfere with the HbA1C  assay. Heterozygous hemoglobin variants (HbS, HgC, etc)do  not significantly interfere with this assay.   However, presence of multiple variants may affect accuracy.     03/04/2021 7.2 (H) 4.0 - 5.6 % Final     Comment:     ADA Screening Guidelines:  5.7-6.4%  Consistent with prediabetes  >or=6.5%  Consistent with diabetes    High levels of fetal hemoglobin interfere with the HbA1C  assay. Heterozygous hemoglobin variants (HbS, HgC, etc)do  not significantly interfere with this assay.   However, presence of multiple variants may affect accuracy.           Review of Systems   Constitutional: Negative for chills and fever.   Cardiovascular:  Negative for claudication and leg swelling.   Skin:  Negative for color change, nail changes and poor wound healing.   Musculoskeletal:  Negative for joint pain, joint swelling, muscle cramps and muscle weakness.   Gastrointestinal:  Negative for nausea and vomiting.   Neurological:  Positive for numbness.   Psychiatric/Behavioral:  Negative for altered mental status.          Objective:      Physical Exam  Constitutional:       Appearance: Normal appearance. She is not ill-appearing.   Cardiovascular:      Pulses:           Dorsalis pedis pulses are 2+ on the right side and 2+ on the left side.        Posterior tibial pulses are 2+ on the right side and 2+ on the left side.      Comments: CFT is < 3 seconds bilateral.  Pedal hair growth is decreased bilateral.  No lower extremity edema noted bilateral.  Toes are warm to touch bilateral.    Musculoskeletal:         General: Deformity present. No tenderness.      Right lower leg: No edema.      Left lower leg: No edema.      Comments: Muscle strength 5/5 in all muscle groups bilateral.  No tenderness nor crepitation with ROM of foot/ankle joints bilateral.  (-) for pain with palpation to the wound overlying  the dorsum of the Rt. 2nd PIP joint.  Bilateral semi-reducible contracture of toes 2-5.  Bilateral HAV.   Skin:     General: Skin is warm and dry.      Capillary Refill: Capillary refill takes 2 to 3 seconds.      Findings: Wound present. No bruising, ecchymosis, erythema, signs of injury, laceration, lesion, petechiae or rash.      Comments: Location: Open wound noted to the dorsum of the Rt. 2nd toe at the PIP joint.  Base: Down to dermis and comprised of fibrin.  Drainage: None  Lata wound: Devoid of edema, erythema, fluctuance, purulence, and malodor.   Pre-debridement measurement: 0.1 x 0.1 x 0.1cm  Post-debridement measurement: 0.3 x 0.3 x 0.1cm     Neurological:      Mental Status: She is alert.      Sensory: Sensory deficit present.      Motor: No weakness or atrophy.      Comments: Decreased protective sensation noted bilateral.  Light touch is intact bilateral.               Assessment:       Encounter Diagnoses   Name Primary?    Diabetic ulcer of toe of right foot associated with type 2 diabetes mellitus, limited to breakdown of skin Yes    Diabetic polyneuropathy associated with type 2 diabetes mellitus              Plan:       Denice was seen today for wound care.    Diagnoses and all orders for this visit:    Diabetic ulcer of toe of right foot associated with type 2 diabetes mellitus, limited to breakdown of skin  -     Wound Debridement    Diabetic polyneuropathy associated with type 2 diabetes mellitus        I counseled the patient on her conditions, their implications and medical management.    Performed a selective excisional debridement of the Rt. Foot.  See attached procedure note.    The wound base was covered with franklin, and a football dressing was applied.    Advised to keep the dressing CDI x 1 week.    Advised to rest and elevate the affected extremity.    Instructed to minimize weight bearing to facilitate wound healing.    Advised to ambulate only in the postoperative  shoe/boot.    RTC in 1 week.    Pankaj Julian DPM

## 2022-12-12 ENCOUNTER — OFFICE VISIT (OUTPATIENT)
Dept: PODIATRY | Facility: CLINIC | Age: 87
End: 2022-12-12
Payer: MEDICARE

## 2022-12-12 DIAGNOSIS — M20.41 HAMMER TOES OF BOTH FEET: ICD-10-CM

## 2022-12-12 DIAGNOSIS — M20.42 HAMMER TOES OF BOTH FEET: ICD-10-CM

## 2022-12-12 DIAGNOSIS — E11.42 DIABETIC POLYNEUROPATHY ASSOCIATED WITH TYPE 2 DIABETES MELLITUS: ICD-10-CM

## 2022-12-12 DIAGNOSIS — Z87.2 HEALED FOOT ULCER: Primary | ICD-10-CM

## 2022-12-12 PROCEDURE — 99212 OFFICE O/P EST SF 10 MIN: CPT | Mod: S$GLB,,, | Performed by: PODIATRIST

## 2022-12-12 PROCEDURE — 99999 PR PBB SHADOW E&M-EST. PATIENT-LVL III: CPT | Mod: PBBFAC,,, | Performed by: PODIATRIST

## 2022-12-12 PROCEDURE — 1126F AMNT PAIN NOTED NONE PRSNT: CPT | Mod: CPTII,S$GLB,, | Performed by: PODIATRIST

## 2022-12-12 PROCEDURE — 1159F PR MEDICATION LIST DOCUMENTED IN MEDICAL RECORD: ICD-10-PCS | Mod: CPTII,S$GLB,, | Performed by: PODIATRIST

## 2022-12-12 PROCEDURE — 3288F PR FALLS RISK ASSESSMENT DOCUMENTED: ICD-10-PCS | Mod: CPTII,S$GLB,, | Performed by: PODIATRIST

## 2022-12-12 PROCEDURE — 99212 PR OFFICE/OUTPT VISIT, EST, LEVL II, 10-19 MIN: ICD-10-PCS | Mod: S$GLB,,, | Performed by: PODIATRIST

## 2022-12-12 PROCEDURE — 99999 PR PBB SHADOW E&M-EST. PATIENT-LVL III: ICD-10-PCS | Mod: PBBFAC,,, | Performed by: PODIATRIST

## 2022-12-12 PROCEDURE — 3288F FALL RISK ASSESSMENT DOCD: CPT | Mod: CPTII,S$GLB,, | Performed by: PODIATRIST

## 2022-12-12 PROCEDURE — 1101F PT FALLS ASSESS-DOCD LE1/YR: CPT | Mod: CPTII,S$GLB,, | Performed by: PODIATRIST

## 2022-12-12 PROCEDURE — 1157F PR ADVANCE CARE PLAN OR EQUIV PRESENT IN MEDICAL RECORD: ICD-10-PCS | Mod: CPTII,S$GLB,, | Performed by: PODIATRIST

## 2022-12-12 PROCEDURE — 1157F ADVNC CARE PLAN IN RCRD: CPT | Mod: CPTII,S$GLB,, | Performed by: PODIATRIST

## 2022-12-12 PROCEDURE — 1101F PR PT FALLS ASSESS DOC 0-1 FALLS W/OUT INJ PAST YR: ICD-10-PCS | Mod: CPTII,S$GLB,, | Performed by: PODIATRIST

## 2022-12-12 PROCEDURE — 1126F PR PAIN SEVERITY QUANTIFIED, NO PAIN PRESENT: ICD-10-PCS | Mod: CPTII,S$GLB,, | Performed by: PODIATRIST

## 2022-12-12 PROCEDURE — 1159F MED LIST DOCD IN RCRD: CPT | Mod: CPTII,S$GLB,, | Performed by: PODIATRIST

## 2022-12-12 NOTE — PROGRESS NOTES
Subjective:      Patient ID: Denice Sheth is a 88 y.o. female.    Chief Complaint: Wound Care      Patient presents to clinic for a 1 week wound check of the Rt. Foot.  Denies experiencing pain from the Rt. 2nd toe wound with today's exam.  Has kept the prior football dressing clean, dry, and intact for the past week.  Denies experiencing N/V/F/C/D.  Denies any additional pedal complaints.      Past Medical History:   Diagnosis Date    A-fib     Anticoagulant long-term use     Cecum mass     CHF (congestive heart failure)     Diabetes mellitus     Disorder of kidney and ureter     Followed by Dr. Jonathan Pace    Encounter for blood transfusion     Hypertension     Insomnia     Irregular heart rhythm     Stage 4 chronic kidney disease     Thyroid disease     hypothyroidism    TIA (transient ischemic attack) 03/2017       Past Surgical History:   Procedure Laterality Date    BILATERAL SALPINGO-OOPHORECTOMY (BSO) Bilateral 8/19/2019    Procedure: SALPINGO-OOPHORECTOMY, BILATERAL;  Surgeon: Blayne Wallace MD;  Location: [facility];  Service: OB/GYN;  Laterality: Bilateral;    HYSTERECTOMY      partial, benign causes by reported hx    INSERTION OF PACEMAKER  08/2017    LEFT HEART CATHETERIZATION Left 1/6/2022    Procedure: Left heart cath;  Surgeon: Bhaskar Hanson III, MD;  Location: [facility] CATH;  Service: Cardiology;  Laterality: Left;  Right radial    ROBOT-ASSISTED LAPAROSCOPIC COLECTOMY USING DA MICHELE XI Right 8/19/2019    Procedure: XI ROBOTIC COLECTOMY;  Surgeon: Kourtney Dodson MD;  Location: [facility] OR;  Service: General;  Laterality: Right;       Family History   Problem Relation Age of Onset    Diabetes Mother     Heart disease Mother     Cancer Father         type unknown (nonsmoker, no etoh)    Heart disease Brother     Dementia Sister     Cancer Daughter         fallopian ca    Cancer Son         esophageal ca    Dementia Sister     Dementia Sister        Social History     Socioeconomic History    Marital  status:    Tobacco Use    Smoking status: Never    Smokeless tobacco: Never   Substance and Sexual Activity    Alcohol use: No    Drug use: No    Sexual activity: Never     Birth control/protection: Post-menopausal     Social Determinants of Health     Financial Resource Strain: Low Risk     Difficulty of Paying Living Expenses: Not hard at all   Food Insecurity: No Food Insecurity    Worried About Running Out of Food in the Last Year: Never true    Ran Out of Food in the Last Year: Never true   Transportation Needs: No Transportation Needs    Lack of Transportation (Medical): No    Lack of Transportation (Non-Medical): No   Physical Activity: Unknown    Days of Exercise per Week: 0 days   Stress: No Stress Concern Present    Feeling of Stress : Not at all   Social Connections: Socially Isolated    Frequency of Communication with Friends and Family: Once a week    Frequency of Social Gatherings with Friends and Family: Never    Attends [employer]ology Services: More than 4 times per year    Active Member of Clubs or Organizations: No    Attends Club or Organization Meetings: Never    Marital Status:    Housing Stability: Low Risk     Unable to Pay for Housing in the Last Year: No    Number of Places Lived in the Last Year: 1    Unstable Housing in the Last Year: No       Current Outpatient Medications   Medication Sig Dispense Refill    amiodarone (PACERONE) 200 MG Tab Take 200 mg by mouth once daily.       apixaban (ELIQUIS) 2.5 mg Tab Take 1 tablet (2.5 mg total) by mouth 2 (two) times daily. 60 tablet 0    ascorbic acid (VITAMIN C) 500 MG tablet Take 500 mg by mouth once daily.      b complex vitamins capsule Take 1 capsule by mouth once daily.      blood sugar diagnostic (TRUE METRIX GLUCOSE TEST STRIP) Strp 1 each by Misc.(Non-Drug; Combo Route) route 3 (three) times daily. 200 each 11    cholecalciferol, vitamin D3, 125 mcg (5,000 unit) Tab Take 1 tablet (5,000 Units total) by mouth once daily.       clopidogrel (PLAVIX) 75 mg tablet TAKE ONE TABLET BY MOUTH ONCE DAILY (Patient taking differently: Take 75 mg by mouth once daily.) 30 tablet 11    diclofenac sodium (VOLTAREN ARTHRITIS PAIN) 1 % Gel Apply 2 g topically 2 (two) times daily as needed (muscle pain). 100 g 1    digoxin (LANOXIN) 125 mcg tablet Take 1 tablet by mouth every other day.      diphenoxylate-atropine 2.5-0.025 mg (LOMOTIL) 2.5-0.025 mg per tablet Take 1 tablet by mouth 4 (four) times daily as needed for Diarrhea. 30 tablet 1    fish oil-omega-3 fatty acids 300-1,000 mg capsule Take 1 capsule by mouth 2 (two) times a day.      furosemide (LASIX) 40 MG tablet Take 1 tablet (40 mg total) by mouth once daily. Take an extra pill for any weight gain of 3lb or more on any given day 90 tablet 1    insulin (LANTUS SOLOSTAR U-100 INSULIN) glargine 100 units/mL SubQ pen Inject 16 Units into the skin every evening. 5 each 3    insulin lispro 100 unit/mL pen INJECT 7 UNITS SUBCUTANEOUSLY THREE TIMES DAILY WITH MEALS (Patient taking differently: Inject 7 Units into the skin 3 (three) times daily with meals.) 18 mL 1    lancets 31 gauge Misc 1 lancet by Misc.(Non-Drug; Combo Route) route 3 (three) times daily as needed. 100 each 11    levoFLOXacin (LEVAQUIN) 500 MG tablet Take 1 tablet (500 mg total) by mouth once daily. 10 tablet 0    levothyroxine (SYNTHROID) 125 MCG tablet Take 1 tablet (125 mcg total) by mouth every Mon, Wed, Fri. 30 tablet 3    levothyroxine (SYNTHROID) 75 MCG tablet Take 75 mcg by mouth every Tuesday, Thursday, Saturday, Sunday.      magnesium 200 mg Tab Take 200 mg by mouth once daily.      melatonin 10 mg Tab Take 1 tablet by mouth nightly as needed.      memantine (NAMENDA) 10 MG Tab TAKE 1 TABLET BY MOUTH ONCE DAILY IN THE EVENING 90 tablet 1    metoprolol tartrate (LOPRESSOR) 50 MG tablet Take 1 tablet (50 mg total) by mouth 2 (two) times daily. 60 tablet 1    multivitamin (THERAGRAN) per tablet Take 1 tablet by mouth once daily.  "     mupirocin (BACTROBAN) 2 % ointment Apply topically 3 (three) times daily. 30 g 0    ondansetron (ZOFRAN) 4 MG tablet ondansetron HCl 4 mg tablet   TAKE 1 TABLET BY MOUTH EVERY 6 HOURS PRN      ondansetron (ZOFRAN-ODT) 4 MG TbDL Take 1 tablet (4 mg total) by mouth every 8 (eight) hours as needed (Nausea). 20 tablet 0    pen needle, diabetic (COMFORT EZ PEN NEEDLES) 29 gauge x 1/2" Ndle 1 Units by Misc.(Non-Drug; Combo Route) route 3 (three) times daily. 300 each 11    polyethylene glycol (GLYCOLAX) 17 gram PwPk Take 17 g by mouth as needed (constipation).      potassium chloride SA (K-DUR,KLOR-CON M) 10 MEQ tablet Take 1 tablet by mouth twice daily 60 tablet 3    rivastigmine tartrate (EXELON) 1.5 MG capsule Take 1 capsule by mouth twice daily 60 capsule 3    simvastatin (ZOCOR) 10 MG tablet Take 10 mg by mouth every evening.      tetracycline (ACHROMYCIN,SUMYCIN) 250 MG capsule Take 1 capsule (250 mg total) by mouth every 6 (six) hours. 28 capsule 0    blood-glucose meter kit To check BG 2 times daily, to use with insurance preferred meter 1 each 0    famotidine (PEPCID) 20 MG tablet Take 1 tablet (20 mg total) by mouth 2 (two) times daily. for 10 days 20 tablet 0     No current facility-administered medications for this visit.       Review of patient's allergies indicates:   Allergen Reactions    Adhesive Itching    Mucinex [guaifenesin]      Says caused CHF     Latex, natural rubber Rash        Hemoglobin A1C   Date Value Ref Range Status   10/06/2021 6.9 (H) 4.0 - 5.6 % Final     Comment:     ADA Screening Guidelines:  5.7-6.4%  Consistent with prediabetes  >or=6.5%  Consistent with diabetes    High levels of fetal hemoglobin interfere with the HbA1C  assay. Heterozygous hemoglobin variants (HbS, HgC, etc)do  not significantly interfere with this assay.   However, presence of multiple variants may affect accuracy.     07/22/2021 7.1 (H) 4.0 - 5.6 % Final     Comment:     ADA Screening Guidelines:  5.7-6.4%  " Consistent with prediabetes  >or=6.5%  Consistent with diabetes    High levels of fetal hemoglobin interfere with the HbA1C  assay. Heterozygous hemoglobin variants (HbS, HgC, etc)do  not significantly interfere with this assay.   However, presence of multiple variants may affect accuracy.     03/04/2021 7.2 (H) 4.0 - 5.6 % Final     Comment:     ADA Screening Guidelines:  5.7-6.4%  Consistent with prediabetes  >or=6.5%  Consistent with diabetes    High levels of fetal hemoglobin interfere with the HbA1C  assay. Heterozygous hemoglobin variants (HbS, HgC, etc)do  not significantly interfere with this assay.   However, presence of multiple variants may affect accuracy.           Review of Systems   Constitutional: Negative for chills and fever.   Cardiovascular:  Negative for claudication and leg swelling.   Skin:  Negative for color change, nail changes and poor wound healing.   Musculoskeletal:  Negative for joint pain, joint swelling, muscle cramps and muscle weakness.   Gastrointestinal:  Negative for nausea and vomiting.   Neurological:  Positive for numbness.   Psychiatric/Behavioral:  Negative for altered mental status.          Objective:      Physical Exam  Constitutional:       Appearance: Normal appearance. She is not ill-appearing.   Cardiovascular:      Pulses:           Dorsalis pedis pulses are 2+ on the right side and 2+ on the left side.        Posterior tibial pulses are 2+ on the right side and 2+ on the left side.      Comments: CFT is < 3 seconds bilateral.  Pedal hair growth is decreased bilateral.  No lower extremity edema noted bilateral.  Toes are warm to touch bilateral.    Musculoskeletal:         General: Deformity present. No tenderness.      Right lower leg: No edema.      Left lower leg: No edema.      Comments: Muscle strength 5/5 in all muscle groups bilateral.  No tenderness nor crepitation with ROM of foot/ankle joints bilateral.  (-) for pain with palpation to the wound overlying  the dorsum of the Rt. 2nd PIP joint.  Bilateral semi-reducible contracture of toes 2-5.  Bilateral HAV.   Skin:     General: Skin is warm and dry.      Capillary Refill: Capillary refill takes 2 to 3 seconds.      Findings: No bruising, ecchymosis, erythema, signs of injury, laceration, lesion, petechiae, rash or wound.      Comments: Maturing epithelium noted to the dorsum of the Rt. 2nd toe at the PIP joint.       Neurological:      Mental Status: She is alert.      Sensory: Sensory deficit present.      Motor: No weakness or atrophy.      Comments: Decreased protective sensation noted bilateral.  Light touch is intact bilateral.               Assessment:       Encounter Diagnoses   Name Primary?    Healed foot ulcer Yes    Diabetic polyneuropathy associated with type 2 diabetes mellitus     Hammer toes of both feet                Plan:       Denice was seen today for wound care.    Diagnoses and all orders for this visit:    Healed foot ulcer  -     DIABETIC SHOES FOR HOME USE    Diabetic polyneuropathy associated with type 2 diabetes mellitus  -     DIABETIC SHOES FOR HOME USE    Hammer toes of both feet  -     DIABETIC SHOES FOR HOME USE            I counseled the patient on her conditions, their implications and medical management.    No wound debridement performed, as the site is fully healed with today's exam.    Advised to keep the digit covered with a padded band aid once daily x 1 final week to protect maturing skin.    May resume her normal showering routine but discouraged from soaking/scrubbing the affected area x 2 weeks.    Recommend continued use of the [employer] shoe x 1 final week.  May then transition into shoe gear with an accommodating toe box thereafter.    Rx written for diabetic shoes with custom molded insoles.    To inspect the site of prior ulceration daily, for the next two weeks, for signs of shearing with transitioning back into casual shoe gear.    RTC in 1 month for a follow  up.    JOSEF JackM

## 2022-12-13 ENCOUNTER — EXTERNAL HOME HEALTH (OUTPATIENT)
Dept: HOME HEALTH SERVICES | Facility: HOSPITAL | Age: 87
End: 2022-12-13
Payer: MEDICARE

## 2022-12-21 ENCOUNTER — OFFICE VISIT (OUTPATIENT)
Dept: HOME HEALTH SERVICES | Facility: CLINIC | Age: 87
End: 2022-12-21
Payer: MEDICARE

## 2022-12-21 VITALS
HEART RATE: 59 BPM | WEIGHT: 154 LBS | SYSTOLIC BLOOD PRESSURE: 124 MMHG | BODY MASS INDEX: 28.17 KG/M2 | DIASTOLIC BLOOD PRESSURE: 54 MMHG | OXYGEN SATURATION: 99 %

## 2022-12-21 DIAGNOSIS — H35.3211 EXUDATIVE AGE-RELATED MACULAR DEGENERATION OF RIGHT EYE WITH ACTIVE CHOROIDAL NEOVASCULARIZATION: ICD-10-CM

## 2022-12-21 DIAGNOSIS — I69.354 HEMIPARESIS AFFECTING LEFT SIDE AS LATE EFFECT OF CEREBROVASCULAR ACCIDENT (CVA): ICD-10-CM

## 2022-12-21 DIAGNOSIS — I70.0 AORTIC CALCIFICATION: ICD-10-CM

## 2022-12-21 DIAGNOSIS — Z99.3 DEPENDENCE ON WHEELCHAIR: ICD-10-CM

## 2022-12-21 DIAGNOSIS — I50.30 DIASTOLIC CONGESTIVE HEART FAILURE, NYHA CLASS 1, UNSPECIFIED CONGESTIVE HEART FAILURE CHRONICITY: ICD-10-CM

## 2022-12-21 DIAGNOSIS — Z95.0 PACEMAKER: ICD-10-CM

## 2022-12-21 DIAGNOSIS — F03.90 DEMENTIA WITHOUT BEHAVIORAL DISTURBANCE, PSYCHOTIC DISTURBANCE, MOOD DISTURBANCE, OR ANXIETY, UNSPECIFIED DEMENTIA SEVERITY, UNSPECIFIED DEMENTIA TYPE: ICD-10-CM

## 2022-12-21 DIAGNOSIS — N18.4 TYPE 2 DIABETES MELLITUS WITH STAGE 4 CHRONIC KIDNEY DISEASE, WITH LONG-TERM CURRENT USE OF INSULIN: ICD-10-CM

## 2022-12-21 DIAGNOSIS — Z00.00 ENCOUNTER FOR PREVENTIVE HEALTH EXAMINATION: Primary | ICD-10-CM

## 2022-12-21 DIAGNOSIS — E03.9 HYPOTHYROIDISM, UNSPECIFIED TYPE: ICD-10-CM

## 2022-12-21 DIAGNOSIS — E11.3293 MILD NONPROLIFERATIVE DIABETIC RETINOPATHY OF BOTH EYES WITHOUT MACULAR EDEMA ASSOCIATED WITH TYPE 2 DIABETES MELLITUS: ICD-10-CM

## 2022-12-21 DIAGNOSIS — D69.2 SENILE PURPURA: ICD-10-CM

## 2022-12-21 DIAGNOSIS — I10 PRIMARY HYPERTENSION: ICD-10-CM

## 2022-12-21 DIAGNOSIS — Z79.4 TYPE 2 DIABETES MELLITUS WITH STAGE 4 CHRONIC KIDNEY DISEASE, WITH LONG-TERM CURRENT USE OF INSULIN: ICD-10-CM

## 2022-12-21 DIAGNOSIS — I48.20 CHRONIC ATRIAL FIBRILLATION: ICD-10-CM

## 2022-12-21 DIAGNOSIS — E11.22 TYPE 2 DIABETES MELLITUS WITH STAGE 4 CHRONIC KIDNEY DISEASE, WITH LONG-TERM CURRENT USE OF INSULIN: ICD-10-CM

## 2022-12-21 DIAGNOSIS — R26.9 ABNORMALITY OF GAIT AND MOBILITY: ICD-10-CM

## 2022-12-21 PROCEDURE — 99499 RISK ADDL DX/OHS AUDIT: ICD-10-PCS | Mod: S$GLB,,, | Performed by: NURSE PRACTITIONER

## 2022-12-21 PROCEDURE — 3288F PR FALLS RISK ASSESSMENT DOCUMENTED: ICD-10-PCS | Mod: CPTII,S$GLB,, | Performed by: NURSE PRACTITIONER

## 2022-12-21 PROCEDURE — 1101F PR PT FALLS ASSESS DOC 0-1 FALLS W/OUT INJ PAST YR: ICD-10-PCS | Mod: CPTII,S$GLB,, | Performed by: NURSE PRACTITIONER

## 2022-12-21 PROCEDURE — 1157F PR ADVANCE CARE PLAN OR EQUIV PRESENT IN MEDICAL RECORD: ICD-10-PCS | Mod: CPTII,S$GLB,, | Performed by: NURSE PRACTITIONER

## 2022-12-21 PROCEDURE — 1157F ADVNC CARE PLAN IN RCRD: CPT | Mod: CPTII,S$GLB,, | Performed by: NURSE PRACTITIONER

## 2022-12-21 PROCEDURE — 99499 UNLISTED E&M SERVICE: CPT | Mod: S$GLB,,, | Performed by: NURSE PRACTITIONER

## 2022-12-21 PROCEDURE — 1101F PT FALLS ASSESS-DOCD LE1/YR: CPT | Mod: CPTII,S$GLB,, | Performed by: NURSE PRACTITIONER

## 2022-12-21 PROCEDURE — 3288F FALL RISK ASSESSMENT DOCD: CPT | Mod: CPTII,S$GLB,, | Performed by: NURSE PRACTITIONER

## 2022-12-21 PROCEDURE — G0439 PPPS, SUBSEQ VISIT: HCPCS | Mod: S$GLB,,, | Performed by: NURSE PRACTITIONER

## 2022-12-21 PROCEDURE — G0439 PR MEDICARE ANNUAL WELLNESS SUBSEQUENT VISIT: ICD-10-PCS | Mod: S$GLB,,, | Performed by: NURSE PRACTITIONER

## 2022-12-23 ENCOUNTER — NURSE TRIAGE (OUTPATIENT)
Dept: ADMINISTRATIVE | Facility: CLINIC | Age: 87
End: 2022-12-23
Payer: MEDICARE

## 2022-12-23 ENCOUNTER — PATIENT MESSAGE (OUTPATIENT)
Dept: FAMILY MEDICINE | Facility: CLINIC | Age: 87
End: 2022-12-23
Payer: MEDICARE

## 2022-12-23 ENCOUNTER — TELEPHONE (OUTPATIENT)
Dept: FAMILY MEDICINE | Facility: CLINIC | Age: 87
End: 2022-12-23
Payer: MEDICARE

## 2022-12-23 NOTE — TELEPHONE ENCOUNTER
Daughter calling on behalf of pt. States she thinks pt may have UTI. States pt has fatigue, low grade fever, and is putting out large amount of urine. Adds pt is not following commands and not as responsive as usual. Unable to get up this afternoon - requiring total assistance and that is unusual for pt. Advised to call 911. Daughter verbalized understanding.   Reason for Disposition   Shock suspected (e.g., cold/pale/clammy skin, too weak to stand, low BP, rapid pulse)    Additional Information   Negative: Difficult to awaken or acting confused (disoriented, slurred speech) and has diabetes mellitus   Negative: Difficult to awaken or acting confused (disoriented, slurred speech) and new-onset   Negative: Weakness of the face, arm, or leg on one side of the body and new-onset    Protocols used: Confusion - Delirium-A-OH, Urinary Symptoms-A-OH

## 2022-12-23 NOTE — TELEPHONE ENCOUNTER
----- Message from Ellie Paul sent at 12/23/2022  4:08 PM CST -----  Contact: pt's daughter Parisa at [phone]  Type: Needs Medical Advice  Who Called:  pt's daughter Parisa  Pharmacy name and phone #:    [facility] - [place] - [address]  [address]  [place] [address]  Phone: [phone] Fax: [phone]  Best Call Back Number: [phone]  Additional Information: pt is exibting signs of a URI. Frequent urination/fever, the pt's daughter would like to have an antibiotic called in and to speak with the nurse asap today. Please call back to advise.

## 2022-12-26 ENCOUNTER — PATIENT MESSAGE (OUTPATIENT)
Dept: PODIATRY | Facility: CLINIC | Age: 87
End: 2022-12-26
Payer: MEDICARE

## 2022-12-27 ENCOUNTER — PATIENT MESSAGE (OUTPATIENT)
Dept: ADMINISTRATIVE | Facility: HOSPITAL | Age: 87
End: 2022-12-27
Payer: MEDICARE

## 2022-12-27 ENCOUNTER — OFFICE VISIT (OUTPATIENT)
Dept: FAMILY MEDICINE | Facility: CLINIC | Age: 87
End: 2022-12-27
Payer: MEDICARE

## 2022-12-27 DIAGNOSIS — Z79.4 TYPE 2 DIABETES MELLITUS WITH STAGE 3 CHRONIC KIDNEY DISEASE, WITH LONG-TERM CURRENT USE OF INSULIN, UNSPECIFIED WHETHER STAGE 3A OR 3B CKD: ICD-10-CM

## 2022-12-27 DIAGNOSIS — N18.30 TYPE 2 DIABETES MELLITUS WITH STAGE 3 CHRONIC KIDNEY DISEASE, WITH LONG-TERM CURRENT USE OF INSULIN, UNSPECIFIED WHETHER STAGE 3A OR 3B CKD: ICD-10-CM

## 2022-12-27 DIAGNOSIS — N30.00 ACUTE CYSTITIS WITHOUT HEMATURIA: Primary | ICD-10-CM

## 2022-12-27 DIAGNOSIS — U07.1 COVID: ICD-10-CM

## 2022-12-27 DIAGNOSIS — G31.84 MCI (MILD COGNITIVE IMPAIRMENT) WITH MEMORY LOSS: ICD-10-CM

## 2022-12-27 DIAGNOSIS — E11.22 TYPE 2 DIABETES MELLITUS WITH STAGE 3 CHRONIC KIDNEY DISEASE, WITH LONG-TERM CURRENT USE OF INSULIN, UNSPECIFIED WHETHER STAGE 3A OR 3B CKD: ICD-10-CM

## 2022-12-27 DIAGNOSIS — R74.01 TRANSAMINITIS: ICD-10-CM

## 2022-12-27 PROCEDURE — 1159F MED LIST DOCD IN RCRD: CPT | Mod: CPTII,95,, | Performed by: FAMILY MEDICINE

## 2022-12-27 PROCEDURE — 1160F RVW MEDS BY RX/DR IN RCRD: CPT | Mod: CPTII,95,, | Performed by: FAMILY MEDICINE

## 2022-12-27 PROCEDURE — 99213 OFFICE O/P EST LOW 20 MIN: CPT | Mod: 95,,, | Performed by: FAMILY MEDICINE

## 2022-12-27 PROCEDURE — 1157F ADVNC CARE PLAN IN RCRD: CPT | Mod: CPTII,95,, | Performed by: FAMILY MEDICINE

## 2022-12-27 PROCEDURE — 1159F PR MEDICATION LIST DOCUMENTED IN MEDICAL RECORD: ICD-10-PCS | Mod: CPTII,95,, | Performed by: FAMILY MEDICINE

## 2022-12-27 PROCEDURE — 1157F PR ADVANCE CARE PLAN OR EQUIV PRESENT IN MEDICAL RECORD: ICD-10-PCS | Mod: CPTII,95,, | Performed by: FAMILY MEDICINE

## 2022-12-27 PROCEDURE — 99213 PR OFFICE/OUTPT VISIT, EST, LEVL III, 20-29 MIN: ICD-10-PCS | Mod: 95,,, | Performed by: FAMILY MEDICINE

## 2022-12-27 PROCEDURE — 1160F PR REVIEW ALL MEDS BY PRESCRIBER/CLIN PHARMACIST DOCUMENTED: ICD-10-PCS | Mod: CPTII,95,, | Performed by: FAMILY MEDICINE

## 2022-12-27 RX ORDER — RIVASTIGMINE TARTRATE 1.5 MG/1
1.5 CAPSULE ORAL 2 TIMES DAILY
Qty: 60 CAPSULE | Refills: 11 | Status: SHIPPED | OUTPATIENT
Start: 2022-12-27 | End: 2023-12-27

## 2022-12-27 RX ORDER — CEPHALEXIN 500 MG/1
500 CAPSULE ORAL EVERY 12 HOURS
Qty: 10 CAPSULE | Refills: 0 | Status: SHIPPED | OUTPATIENT
Start: 2022-12-27 | End: 2023-01-01

## 2022-12-27 RX ORDER — FUROSEMIDE 40 MG/1
40 TABLET ORAL DAILY
Qty: 90 TABLET | Refills: 1 | Status: SHIPPED | OUTPATIENT
Start: 2022-12-27 | End: 2023-06-26

## 2022-12-27 NOTE — PROGRESS NOTES
Subjective:       Patient ID: Denice Sheth is a 88 y.o. female.    Chief Complaint: Follow-up      The patient location is: [place]  The chief complaint leading to consultation is: f/u  Visit type: Virtual visit with synchronous audio and video  Total time spent with patient: 10mins incl doc  Each patient to whom he or she provides medical services by telemedicine is:  (1) informed of the relationship between the physician and patient and the respective role of any other health care provider with respect to management of the patient; and (2) notified that he or she may decline to receive medical services by telemedicine and may withdraw from such care at any time.    Notes:   Patient seen for f/u.  ER eval for AMS, low grade temp with cough, +UTI. Eval noted also for transaminitis, covid+. Daughter notes that her mom has felt fine at home, occ cough when supine, but otherwise asymp. Pulse ox remaining at/near 100%.     Review of Systems   Constitutional:  Positive for fever (low grade, improving). Negative for activity change and unexpected weight change.   HENT:  Positive for hearing loss and rhinorrhea. Negative for trouble swallowing.    Eyes:  Negative for discharge and visual disturbance.   Respiratory:  Negative for chest tightness and wheezing.    Cardiovascular:  Negative for chest pain and palpitations.   Gastrointestinal:  Negative for blood in stool, constipation, diarrhea and vomiting.   Endocrine: Negative for polydipsia and polyuria.   Genitourinary:  Negative for difficulty urinating, dysuria, hematuria and menstrual problem.   Musculoskeletal:  Negative for arthralgias, joint swelling and neck pain.   Neurological:  Positive for weakness. Negative for headaches.   Psychiatric/Behavioral:  Negative for confusion and dysphoric mood.      Objective:        Physical Exam  Vitals and nursing note reviewed.   Constitutional:       General: She is not in acute distress.     Appearance: Normal appearance. She  is well-developed.   HENT:      Head: Normocephalic and atraumatic.   Eyes:      General: No scleral icterus.        Right eye: No discharge.         Left eye: No discharge.      Conjunctiva/sclera: Conjunctivae normal.   Pulmonary:      Effort: Pulmonary effort is normal. No respiratory distress.   Skin:     General: Skin is warm and dry.   Neurological:      General: No focal deficit present.      Mental Status: She is alert and oriented to person, place, and time.   Psychiatric:         Mood and Affect: Mood normal.         Behavior: Behavior normal.           Assessment:   Acute cystitis without hematuria  Comments:  cont addl 5 days of keflex  Orders:  -     cephALEXin (KEFLEX) 500 MG capsule; Take 1 capsule (500 mg total) by mouth every 12 (twelve) hours. for 5 days  Dispense: 10 capsule; Refill: 0  -     CULTURE, URINE    Transaminitis  Comments:  lab next week for f/u  Orders:  -     Comprehensive Metabolic Panel; Future; Expected date: 12/27/2022    Type 2 diabetes mellitus with stage 3 chronic kidney disease, with long-term current use of insulin, unspecified whether stage 3a or 3b CKD  -     Hemoglobin A1C; Future; Expected date: 12/27/2022  -     Microalbumin/Creatinine Ratio, Urine; Future    COVID  Comments:  discussed eligibility of ab infusion, daughter declined for now as pt is improving, relatively asymp with normal pulse ox    MCI (mild cognitive impairment) with memory loss  -     rivastigmine tartrate (EXELON) 1.5 MG capsule; Take 1 capsule (1.5 mg total) by mouth 2 (two) times daily.  Dispense: 60 capsule; Refill: 11    Other orders  -     furosemide (LASIX) 40 MG tablet; Take 1 tablet (40 mg total) by mouth once daily. Take an extra pill for any weight gain of 3lb or more on any given day  Dispense: 90 tablet; Refill: 1         Patient aware of limitations to assessment and exam of telemedicine. Deemed appropriate at this time given current covid-19 concerns.

## 2022-12-29 ENCOUNTER — PATIENT MESSAGE (OUTPATIENT)
Dept: FAMILY MEDICINE | Facility: CLINIC | Age: 87
End: 2022-12-29
Payer: MEDICARE

## 2022-12-30 DIAGNOSIS — E11.9 TYPE 2 DIABETES MELLITUS WITHOUT COMPLICATION, UNSPECIFIED WHETHER LONG TERM INSULIN USE: ICD-10-CM

## 2023-01-02 PROBLEM — I70.0 AORTIC CALCIFICATION: Status: ACTIVE | Noted: 2022-01-02

## 2023-01-02 PROBLEM — E11.319 DIABETIC RETINOPATHY: Status: ACTIVE | Noted: 2021-03-24

## 2023-01-02 PROBLEM — H35.3211 EXUDATIVE AGE-RELATED MACULAR DEGENERATION OF RIGHT EYE WITH ACTIVE CHOROIDAL NEOVASCULARIZATION: Status: ACTIVE | Noted: 2022-09-12

## 2023-01-02 PROBLEM — Z95.0 PACEMAKER: Status: ACTIVE | Noted: 2023-01-02

## 2023-01-02 PROBLEM — D69.2 SENILE PURPURA: Status: ACTIVE | Noted: 2022-12-21

## 2023-01-02 NOTE — PATIENT INSTRUCTIONS
Counseling and Referral of Other Preventative  (Italic type indicates deductible and co-insurance are waived)    Patient Name: Denice Sheth  Today's Date: 1/2/2023    Health Maintenance       Date Due Completion Date    COVID-19 Vaccine (3 - Booster for Pfizer series) 03/30/2021 2/2/2021    Hemoglobin A1c 04/06/2022 10/6/2021    Lipid Panel 01/02/2023 1/2/2022    Diabetes Urine Screening 02/16/2023 2/16/2022    Shingles Vaccine (2 of 3) 05/16/2023 (Originally 7/9/2009) 5/14/2009    Eye Exam 09/12/2023 9/12/2022    Override on 12/1/2016: Done (Dr Acevedo; positive retinopathy)    TETANUS VACCINE 04/07/2026 4/7/2016    Colonoscopy 06/18/2029 6/18/2019        No orders of the defined types were placed in this encounter.    The following information is provided to all patients.  This information is to help you find resources for any of the problems found today that may be affecting your health:                Living healthy guide: www.[facility].[place].gov      Understanding Diabetes: www.diabetes.org      Eating healthy: www.cdc.gov/healthyweight      CDC home safety checklist: www.cdc.gov/steadi/patient.html      Agency on Aging: www.goea.[place].gov      Alcoholics anonymous (AA): www.aa.org      Physical Activity: www.srinivasa.nih.gov/kz2ivgy      Tobacco use: www.quitwithusla.org

## 2023-01-02 NOTE — PROGRESS NOTES
Denice Sheth presented for a  Medicare AWV and comprehensive Health Risk Assessment today. The following components were reviewed and updated:    Medical history  Family History  Social history  Allergies and Current Medications  Health Risk Assessment  Health Maintenance  Care Team         ** See Completed Assessments for Annual Wellness Visit within the encounter summary.**         The following assessments were completed:  Living Situation  CAGE  Depression Screening  Timed Get Up and Go - not performed, wheelchair bound  Whisper Test - hearing aids  Cognitive Function Screening - not performed, dementia  Nutrition Screening  ADL Screening  PAQ Screening    Review for Opioid Screening: Patient does not have rx for Opioids.  Review for Substance Use Disorders: Patient does not use substance.    Vitals:    12/21/22 0905   BP: (!) 124/54   Pulse: (!) 59   SpO2: 99%   Weight: 69.9 kg (154 lb)     Body mass index is 28.17 kg/m².  Physical Exam  Vitals reviewed.   Constitutional:       Appearance: She is overweight.   HENT:      Head: Normocephalic.      Right Ear: Decreased hearing noted.      Left Ear: Decreased hearing noted.   Eyes:      Pupils: Pupils are equal, round, and reactive to light.   Cardiovascular:      Rate and Rhythm: Normal rate and regular rhythm.      Heart sounds: Normal heart sounds.      Comments: pacemaker  Pulmonary:      Effort: Pulmonary effort is normal.      Breath sounds: Normal breath sounds.   Abdominal:      General: Bowel sounds are normal.      Palpations: Abdomen is soft.   Musculoskeletal:         General: Normal range of motion.      Cervical back: Normal range of motion.      Right lower leg: Edema present.      Left lower leg: Edema present.   Skin:     General: Skin is warm and dry.      Findings: Bruising present.   Neurological:      Mental Status: She is alert and oriented to person, place, and time.      Motor: Weakness present.      Gait: Gait abnormal (wheelchair).    Psychiatric:         Behavior: Behavior normal.         Cognition and Memory: Cognition is impaired. Memory is impaired.             Diagnoses and health risks identified today and associated recommendations/orders:    1. Encounter for preventive health examination  - Above assessments completed. Preventive measures and health maintenance reviewed with patient and daughter.    2. Type 2 diabetes mellitus with stage 4 chronic kidney disease, with long-term current use of insulin  Stable, followed by PCP  -A1C 6.9, on insulin  -CKD stable, encouraged hydration and avoidance of NSAIDs    3. Chronic atrial fibrillation  Stable, followed by Cardiology  -on apixaban, clopidogrel, amiodarone, digoxin and metoprolol    4. Diastolic congestive heart failure, NYHA class 1, unspecified congestive heart failure chronicity  Stable, followed by Cardiology  -on furosemide  -no acute issues    5. Aortic calcification  Stable, followed by Cardiology    6. Dementia without behavioral disturbance, psychotic disturbance, mood disturbance, or anxiety, unspecified dementia severity, unspecified dementia type  Stable, followed by PCP  -on memantine    7. Mild nonproliferative diabetic retinopathy of both eyes without macular edema associated with type 2 diabetes mellitus  Stable, followed by Ophthalmology    8. Exudative age-related macular degeneration of right eye with active choroidal neovascularization  Stable, followed by Ophthalmology    9. Senile purpura  Stable, followed by PCP      10. Hemiparesis affecting left side as late effect of cerebrovascular accident (CVA)  Chronic, followed by PCP    11. Pacemaker  Stable, followed by Cardiology    12. Primary hypertension  Stable, followed by PCP  -on metoprolol    13. Hypothyroidism, unspecified type  Stable, followed by PCP  -on levothyroxine    14. Dependence on wheelchair  15. Abnormality of gait and mobility      Provided Denice with a 5-10 year written screening schedule and  personal prevention plan. Recommendations were developed using the USPSTF age appropriate recommendations. Education, counseling, and referrals were provided as needed. After Visit Summary printed and given to patient which includes a list of additional screenings\tests needed.    Follow up in about 1 year (around 12/21/2023) for your next annual wellness visit.    Allie Herrera NP    I offered to discuss advanced care planning, including how to pick a person who would make decisions for you if you were unable to make them for yourself, called a health care power of , and what kind of decisions you might make such as use of life sustaining treatments such as ventilators and tube feeding when faced with a life limiting illness recorded on a living will that they will need to know. (How you want to be cared for as you near the end of your natural life)     X  Patient has advanced directives on file, which we reviewed, and they do not wish to make changes.

## 2023-01-10 ENCOUNTER — DOCUMENT SCAN (OUTPATIENT)
Dept: HOME HEALTH SERVICES | Facility: HOSPITAL | Age: 88
End: 2023-01-10
Payer: MEDICARE

## 2023-01-10 ENCOUNTER — PATIENT MESSAGE (OUTPATIENT)
Dept: FAMILY MEDICINE | Facility: CLINIC | Age: 88
End: 2023-01-10
Payer: MEDICARE

## 2023-01-11 ENCOUNTER — PATIENT MESSAGE (OUTPATIENT)
Dept: FAMILY MEDICINE | Facility: CLINIC | Age: 88
End: 2023-01-11
Payer: MEDICARE

## 2023-01-12 ENCOUNTER — OFFICE VISIT (OUTPATIENT)
Dept: FAMILY MEDICINE | Facility: CLINIC | Age: 88
End: 2023-01-12
Payer: MEDICARE

## 2023-01-12 VITALS
HEIGHT: 62 IN | BODY MASS INDEX: 24.3 KG/M2 | WEIGHT: 132.06 LBS | SYSTOLIC BLOOD PRESSURE: 118 MMHG | OXYGEN SATURATION: 98 % | HEART RATE: 61 BPM | DIASTOLIC BLOOD PRESSURE: 66 MMHG | RESPIRATION RATE: 18 BRPM

## 2023-01-12 DIAGNOSIS — R30.0 DYSURIA: Primary | ICD-10-CM

## 2023-01-12 PROCEDURE — 1157F ADVNC CARE PLAN IN RCRD: CPT | Mod: CPTII,S$GLB,, | Performed by: STUDENT IN AN ORGANIZED HEALTH CARE EDUCATION/TRAINING PROGRAM

## 2023-01-12 PROCEDURE — 99999 PR PBB SHADOW E&M-EST. PATIENT-LVL V: CPT | Mod: PBBFAC,,, | Performed by: STUDENT IN AN ORGANIZED HEALTH CARE EDUCATION/TRAINING PROGRAM

## 2023-01-12 PROCEDURE — 99999 PR PBB SHADOW E&M-EST. PATIENT-LVL V: ICD-10-PCS | Mod: PBBFAC,,, | Performed by: STUDENT IN AN ORGANIZED HEALTH CARE EDUCATION/TRAINING PROGRAM

## 2023-01-12 PROCEDURE — 1101F PR PT FALLS ASSESS DOC 0-1 FALLS W/OUT INJ PAST YR: ICD-10-PCS | Mod: CPTII,S$GLB,, | Performed by: STUDENT IN AN ORGANIZED HEALTH CARE EDUCATION/TRAINING PROGRAM

## 2023-01-12 PROCEDURE — 1159F PR MEDICATION LIST DOCUMENTED IN MEDICAL RECORD: ICD-10-PCS | Mod: CPTII,S$GLB,, | Performed by: STUDENT IN AN ORGANIZED HEALTH CARE EDUCATION/TRAINING PROGRAM

## 2023-01-12 PROCEDURE — 1125F PR PAIN SEVERITY QUANTIFIED, PAIN PRESENT: ICD-10-PCS | Mod: CPTII,S$GLB,, | Performed by: STUDENT IN AN ORGANIZED HEALTH CARE EDUCATION/TRAINING PROGRAM

## 2023-01-12 PROCEDURE — 1125F AMNT PAIN NOTED PAIN PRSNT: CPT | Mod: CPTII,S$GLB,, | Performed by: STUDENT IN AN ORGANIZED HEALTH CARE EDUCATION/TRAINING PROGRAM

## 2023-01-12 PROCEDURE — 1101F PT FALLS ASSESS-DOCD LE1/YR: CPT | Mod: CPTII,S$GLB,, | Performed by: STUDENT IN AN ORGANIZED HEALTH CARE EDUCATION/TRAINING PROGRAM

## 2023-01-12 PROCEDURE — 87086 URINE CULTURE/COLONY COUNT: CPT | Performed by: STUDENT IN AN ORGANIZED HEALTH CARE EDUCATION/TRAINING PROGRAM

## 2023-01-12 PROCEDURE — 99213 OFFICE O/P EST LOW 20 MIN: CPT | Mod: S$GLB,,, | Performed by: STUDENT IN AN ORGANIZED HEALTH CARE EDUCATION/TRAINING PROGRAM

## 2023-01-12 PROCEDURE — 99213 PR OFFICE/OUTPT VISIT, EST, LEVL III, 20-29 MIN: ICD-10-PCS | Mod: S$GLB,,, | Performed by: STUDENT IN AN ORGANIZED HEALTH CARE EDUCATION/TRAINING PROGRAM

## 2023-01-12 PROCEDURE — 3288F PR FALLS RISK ASSESSMENT DOCUMENTED: ICD-10-PCS | Mod: CPTII,S$GLB,, | Performed by: STUDENT IN AN ORGANIZED HEALTH CARE EDUCATION/TRAINING PROGRAM

## 2023-01-12 PROCEDURE — 1159F MED LIST DOCD IN RCRD: CPT | Mod: CPTII,S$GLB,, | Performed by: STUDENT IN AN ORGANIZED HEALTH CARE EDUCATION/TRAINING PROGRAM

## 2023-01-12 PROCEDURE — 3288F FALL RISK ASSESSMENT DOCD: CPT | Mod: CPTII,S$GLB,, | Performed by: STUDENT IN AN ORGANIZED HEALTH CARE EDUCATION/TRAINING PROGRAM

## 2023-01-12 PROCEDURE — 1157F PR ADVANCE CARE PLAN OR EQUIV PRESENT IN MEDICAL RECORD: ICD-10-PCS | Mod: CPTII,S$GLB,, | Performed by: STUDENT IN AN ORGANIZED HEALTH CARE EDUCATION/TRAINING PROGRAM

## 2023-01-12 RX ORDER — AMPICILLIN 500 MG/1
500 CAPSULE ORAL 2 TIMES DAILY
Qty: 14 CAPSULE | Refills: 0 | Status: SHIPPED | OUTPATIENT
Start: 2023-01-12 | End: 2023-01-19

## 2023-01-12 NOTE — PROGRESS NOTES
"Subjective:      Patient ID: Denice Sheth is a 88 y.o. female    Chief Complaint   Patient presents with    Urinary Tract Infection     HPI  88 y.o. female with a PMHx as documented below presents to clinic today for the following:    Pt reports concern for UTI given mild dysuria and more elevated BG measurements (100s - low 200s) over the past few days. Recently w/ UTI in 12/2022, s/p treatment w/ ampicillin. No fever, chills, chest pain, SOB, abdominal pain, pelvic pain, N/V, hematuria, urinary frequency.     Past Medical History:   Diagnosis Date    A-fib     Anticoagulant long-term use     Cecum mass     CHF (congestive heart failure)     Diabetes mellitus     Disorder of kidney and ureter     Followed by Dr. Jonathan Pace    Encounter for blood transfusion     Hypertension     Insomnia     Irregular heart rhythm     Stage 4 chronic kidney disease     Thyroid disease     hypothyroidism    TIA (transient ischemic attack) 03/2017      Review of Systems   Constitutional:  Negative for chills and fever.   Respiratory:  Negative for shortness of breath.    Cardiovascular:  Negative for chest pain.   Gastrointestinal:  Negative for abdominal pain, constipation, diarrhea, nausea and vomiting.   Genitourinary:  Negative for dysuria.     Objective:      Vitals:    01/12/23 1051   BP: 118/66   BP Location: Right arm   Patient Position: Sitting   Pulse: 61   Resp: 18   SpO2: 98%   Weight: 59.9 kg (132 lb 0.9 oz)   Height: 5' 2" (1.575 m)     Physical Exam  Vitals reviewed.   Constitutional:       General: She is not in acute distress.  HENT:      Head: Normocephalic and atraumatic.   Cardiovascular:      Rate and Rhythm: Normal rate.   Pulmonary:      Effort: Pulmonary effort is normal. No respiratory distress.   Neurological:      General: No focal deficit present.      Mental Status: She is alert and oriented to person, place, and time. Mental status is at baseline.      Assessment:       1. Dysuria      Plan:     "   Denice was seen today for urinary tract infection.    Diagnoses and all orders for this visit:    Dysuria  -     POCT Urinalysis(Instrument)  -     CULTURE, URINE  -     ampicillin (PRINCIPEN) 500 MG capsule; Take 1 capsule (500 mg total) by mouth 2 (two) times a day. for 7 days    Unable to perform urine dip in clinic, will send to lab. Empiric treatment based on last culture results/sensitivities as above. Advised patient to RTC if symptoms worsen or fail to improve.     Allison Woods MD  [facility]  Office: [phone]   Fax: [phone]  01/12/2023      Disclaimer: This note was partly generated using dictation software which may occasionally result in transcription errors.

## 2023-01-13 ENCOUNTER — TELEPHONE (OUTPATIENT)
Dept: FAMILY MEDICINE | Facility: CLINIC | Age: 88
End: 2023-01-13
Payer: MEDICARE

## 2023-01-13 LAB
BACTERIA UR CULT: NORMAL
BACTERIA UR CULT: NORMAL

## 2023-01-13 NOTE — TELEPHONE ENCOUNTER
Spoke with pt daughter and notified to continue ABX per Dr. Woods. Advised ER protocol for mental status changes.   Pt daughter verbalized understanding.

## 2023-01-13 NOTE — TELEPHONE ENCOUNTER
----- Message from Scar Sandoval sent at 1/13/2023  8:41 AM CST -----  Type: Needs Medical Advice  Who Called:  Daughter/ Parisa  Symptoms (please be specific):  Pt acting confused-- Possibly dehydrated  How long has patient had these symptoms:  2 days    Pharmacy name and phone #:    [facility] - [place] - [address]  [address]  [place] [address]  Phone: [phone] Fax: [phone]      Best Call Back Number: [phone]  Additional Information: Please call to advise

## 2023-01-13 NOTE — TELEPHONE ENCOUNTER
Spoke with pt daughter. States pt was very confused and disoriented yesterday. Did not know where she lived.   States that today she seems to be doing better. Is getting pt to drink water through out the day. Taking ABX and tolerating well.     Awaiting results of urine culture-   Requesting advice on next step please advise.

## 2023-01-18 ENCOUNTER — TELEPHONE (OUTPATIENT)
Dept: FAMILY MEDICINE | Facility: CLINIC | Age: 88
End: 2023-01-18
Payer: MEDICARE

## 2023-01-18 LAB
COMMENTS: ABNORMAL
EST. AVERAGE GLUCOSE BLD GHB EST-MCNC: 174 MG/DL
HBA1C MFR BLD: 7.7 % (ref 4.8–5.6)

## 2023-01-18 NOTE — TELEPHONE ENCOUNTER
Please advise  Pt seen last week   Elevated blood sugar [id number]  Pt taking meds consistently and no dietary changes  Doing sliding scale insulin to cover

## 2023-01-18 NOTE — TELEPHONE ENCOUNTER
----- Message from Juan Carlos White sent at 1/18/2023 12:43 PM CST -----      Name of Who is Calling:          What is the request in detail:PT/Daughter is requesting a call back to discuss PT blood sugar, she stated it's ranging from 172 to 183, she also stated she knows PT has a UTI and she is taking her meds as normal but daughter is very concerned with PT blood sugar          Can the clinic reply by [facility]:no          What Number to Call Back if not in [facility]

## 2023-01-20 ENCOUNTER — DOCUMENT SCAN (OUTPATIENT)
Dept: HOME HEALTH SERVICES | Facility: HOSPITAL | Age: 88
End: 2023-01-20
Payer: MEDICARE

## 2023-01-20 NOTE — TELEPHONE ENCOUNTER
"Spoke with daughter. Notified her of Dr. Baird's review. Daughter reports that pt completed abtx Thursday morning and BS improve this morning. Fast ; . Reported pt's mental status is normal and pt "back to her normal self." Informed Parisa to continue to monitor BS at home over the weekend  and if no improvement noted in readings to contact the clinic on Monday. Instructed to report to UC or ER for hypo/hyperglycemia symptoms over weekend. Verbalized understanding.   "

## 2023-01-30 DIAGNOSIS — N18.4 TYPE 2 DIABETES MELLITUS WITH STAGE 4 CHRONIC KIDNEY DISEASE, WITH LONG-TERM CURRENT USE OF INSULIN: Primary | ICD-10-CM

## 2023-01-30 DIAGNOSIS — E11.22 TYPE 2 DIABETES MELLITUS WITH STAGE 4 CHRONIC KIDNEY DISEASE, WITH LONG-TERM CURRENT USE OF INSULIN: Primary | ICD-10-CM

## 2023-01-30 DIAGNOSIS — Z79.4 TYPE 2 DIABETES MELLITUS WITH STAGE 4 CHRONIC KIDNEY DISEASE, WITH LONG-TERM CURRENT USE OF INSULIN: Primary | ICD-10-CM

## 2023-01-30 NOTE — TELEPHONE ENCOUNTER
----- Message from Scar Sandoval sent at 1/30/2023 10:02 AM CST -----  Type: Needs Medical Advice  Who Called:  Angélica/ [facility]    Best Call Back Number: [phone]  Additional Information:Caller states that the patient is requesting that an order for a new glucometer be faxed to [facility] --Fax [phone]

## 2023-01-30 NOTE — TELEPHONE ENCOUNTER
No new care gaps identified.  [facility] Embedded Care Gaps. Reference number: [id number]. 1/30/2023   3:03:30 PM CST

## 2023-01-30 NOTE — TELEPHONE ENCOUNTER
Please advise.  Re: New orders for Glucose meter and supplies. Pended rx and orders. To be PRINTED and signed. Needing to fax to [employer] [employer].

## 2023-02-02 ENCOUNTER — PATIENT MESSAGE (OUTPATIENT)
Dept: FAMILY MEDICINE | Facility: CLINIC | Age: 88
End: 2023-02-02
Payer: MEDICARE

## 2023-02-02 ENCOUNTER — LAB VISIT (OUTPATIENT)
Dept: LAB | Facility: HOSPITAL | Age: 88
End: 2023-02-02
Attending: INTERNAL MEDICINE
Payer: MEDICARE

## 2023-02-02 ENCOUNTER — OFFICE VISIT (OUTPATIENT)
Dept: PODIATRY | Facility: CLINIC | Age: 88
End: 2023-02-02
Payer: MEDICARE

## 2023-02-02 VITALS — BODY MASS INDEX: 24.3 KG/M2 | WEIGHT: 132.06 LBS | HEIGHT: 62 IN

## 2023-02-02 DIAGNOSIS — N18.4 CKD (CHRONIC KIDNEY DISEASE) STAGE 4, GFR 15-29 ML/MIN: ICD-10-CM

## 2023-02-02 DIAGNOSIS — M20.42 HAMMER TOES OF BOTH FEET: Primary | ICD-10-CM

## 2023-02-02 DIAGNOSIS — Z87.2 HEALED ULCER OF RIGHT FOOT: ICD-10-CM

## 2023-02-02 DIAGNOSIS — M20.41 HAMMER TOES OF BOTH FEET: Primary | ICD-10-CM

## 2023-02-02 DIAGNOSIS — E11.42 DIABETIC POLYNEUROPATHY ASSOCIATED WITH TYPE 2 DIABETES MELLITUS: ICD-10-CM

## 2023-02-02 LAB
CREAT UR-MCNC: 43 MG/DL (ref 15–325)
PROT UR-MCNC: <7 MG/DL (ref 0–15)
PROT/CREAT UR: NORMAL MG/G{CREAT} (ref 0–0.2)

## 2023-02-02 PROCEDURE — 1157F ADVNC CARE PLAN IN RCRD: CPT | Mod: CPTII,S$GLB,, | Performed by: PODIATRIST

## 2023-02-02 PROCEDURE — 84156 ASSAY OF PROTEIN URINE: CPT | Performed by: INTERNAL MEDICINE

## 2023-02-02 PROCEDURE — 99499 UNLISTED E&M SERVICE: CPT | Mod: S$GLB,,, | Performed by: PODIATRIST

## 2023-02-02 PROCEDURE — 1159F PR MEDICATION LIST DOCUMENTED IN MEDICAL RECORD: ICD-10-PCS | Mod: CPTII,S$GLB,, | Performed by: PODIATRIST

## 2023-02-02 PROCEDURE — 1157F PR ADVANCE CARE PLAN OR EQUIV PRESENT IN MEDICAL RECORD: ICD-10-PCS | Mod: CPTII,S$GLB,, | Performed by: PODIATRIST

## 2023-02-02 PROCEDURE — 99999 PR PBB SHADOW E&M-EST. PATIENT-LVL II: ICD-10-PCS | Mod: PBBFAC,,, | Performed by: PODIATRIST

## 2023-02-02 PROCEDURE — 1159F MED LIST DOCD IN RCRD: CPT | Mod: CPTII,S$GLB,, | Performed by: PODIATRIST

## 2023-02-02 PROCEDURE — 3288F FALL RISK ASSESSMENT DOCD: CPT | Mod: CPTII,S$GLB,, | Performed by: PODIATRIST

## 2023-02-02 PROCEDURE — 99499 RISK ADDL DX/OHS AUDIT: ICD-10-PCS | Mod: S$GLB,,, | Performed by: PODIATRIST

## 2023-02-02 PROCEDURE — 99213 OFFICE O/P EST LOW 20 MIN: CPT | Mod: S$GLB,,, | Performed by: PODIATRIST

## 2023-02-02 PROCEDURE — 1126F AMNT PAIN NOTED NONE PRSNT: CPT | Mod: CPTII,S$GLB,, | Performed by: PODIATRIST

## 2023-02-02 PROCEDURE — 1101F PR PT FALLS ASSESS DOC 0-1 FALLS W/OUT INJ PAST YR: ICD-10-PCS | Mod: CPTII,S$GLB,, | Performed by: PODIATRIST

## 2023-02-02 PROCEDURE — 99999 PR PBB SHADOW E&M-EST. PATIENT-LVL II: CPT | Mod: PBBFAC,,, | Performed by: PODIATRIST

## 2023-02-02 PROCEDURE — 1126F PR PAIN SEVERITY QUANTIFIED, NO PAIN PRESENT: ICD-10-PCS | Mod: CPTII,S$GLB,, | Performed by: PODIATRIST

## 2023-02-02 PROCEDURE — 1101F PT FALLS ASSESS-DOCD LE1/YR: CPT | Mod: CPTII,S$GLB,, | Performed by: PODIATRIST

## 2023-02-02 PROCEDURE — 99213 PR OFFICE/OUTPT VISIT, EST, LEVL III, 20-29 MIN: ICD-10-PCS | Mod: S$GLB,,, | Performed by: PODIATRIST

## 2023-02-02 PROCEDURE — 3288F PR FALLS RISK ASSESSMENT DOCUMENTED: ICD-10-PCS | Mod: CPTII,S$GLB,, | Performed by: PODIATRIST

## 2023-02-02 RX ORDER — INSULIN PUMP SYRINGE, 3 ML
EACH MISCELLANEOUS
Qty: 1 EACH | Refills: 0 | Status: SHIPPED | OUTPATIENT
Start: 2023-02-02 | End: 2024-02-05

## 2023-02-02 RX ORDER — COVID-19 MOLECULAR TEST ASSAY
KIT MISCELLANEOUS
COMMUNITY
Start: 2022-12-23

## 2023-02-02 NOTE — TELEPHONE ENCOUNTER
[facility] completed and signed orders with form faxed to [facility]. Notified pt via portal msg.

## 2023-02-03 NOTE — PROGRESS NOTES
Subjective:      Patient ID: Denice Sheth is a 88 y.o. female.    Chief Complaint: Wound Care (F/u healed toe)    Denice is a 88 y.o. female with a past medical history of A-fib, Anticoagulant long-term use, Cecum mass, CHF (congestive heart failure), Diabetes mellitus, Disorder of kidney and ureter, Encounter for blood transfusion, Hypertension, Insomnia, Irregular heart rhythm, Stage 4 chronic kidney disease, Thyroid disease, and TIA (transient ischemic attack) (03/2017). Patient is following up for a prior wound to the dorsum of the Rt. 2nd toe.  States the site re-ulcerated right after [place].  Notes applying antibiotic ointment and a bandage to the area once daily with subsequent healing.  Denies any new issues with today's exam.        Hemoglobin A1C   Date Value Ref Range Status   10/06/2021 6.9 (H) 4.0 - 5.6 % Final     Comment:     ADA Screening Guidelines:  5.7-6.4%  Consistent with prediabetes  >or=6.5%  Consistent with diabetes    High levels of fetal hemoglobin interfere with the HbA1C  assay. Heterozygous hemoglobin variants (HbS, HgC, etc)do  not significantly interfere with this assay.   However, presence of multiple variants may affect accuracy.     07/22/2021 7.1 (H) 4.0 - 5.6 % Final     Comment:     ADA Screening Guidelines:  5.7-6.4%  Consistent with prediabetes  >or=6.5%  Consistent with diabetes    High levels of fetal hemoglobin interfere with the HbA1C  assay. Heterozygous hemoglobin variants (HbS, HgC, etc)do  not significantly interfere with this assay.   However, presence of multiple variants may affect accuracy.     03/04/2021 7.2 (H) 4.0 - 5.6 % Final     Comment:     ADA Screening Guidelines:  5.7-6.4%  Consistent with prediabetes  >or=6.5%  Consistent with diabetes    High levels of fetal hemoglobin interfere with the HbA1C  assay. Heterozygous hemoglobin variants (HbS, HgC, etc)do  not significantly interfere with this assay.   However, presence of multiple variants may affect  accuracy.           Past Medical History:   Diagnosis Date    A-fib     Anticoagulant long-term use     Cecum mass     CHF (congestive heart failure)     Diabetes mellitus     Disorder of kidney and ureter     Followed by Dr. Jonathan Pace    Encounter for blood transfusion     Hypertension     Insomnia     Irregular heart rhythm     Stage 4 chronic kidney disease     Thyroid disease     hypothyroidism    TIA (transient ischemic attack) 03/2017       Past Surgical History:   Procedure Laterality Date    BILATERAL SALPINGO-OOPHORECTOMY (BSO) Bilateral 8/19/2019    Procedure: SALPINGO-OOPHORECTOMY, BILATERAL;  Surgeon: Blayne Wallace MD;  Location: [facility] OR;  Service: OB/GYN;  Laterality: Bilateral;    HYSTERECTOMY      partial, benign causes by reported hx    INSERTION OF PACEMAKER  08/2017    LEFT HEART CATHETERIZATION Left 1/6/2022    Procedure: Left heart cath;  Surgeon: Bhaskar Hanson III, MD;  Location: [facility] CATH;  Service: Cardiology;  Laterality: Left;  Right radial    ROBOT-ASSISTED LAPAROSCOPIC COLECTOMY USING DA MICHELE XI Right 8/19/2019    Procedure: XI ROBOTIC COLECTOMY;  Surgeon: Kourtney Dodson MD;  Location: [facility] OR;  Service: General;  Laterality: Right;       Family History   Problem Relation Age of Onset    Diabetes Mother     Heart disease Mother     Cancer Father         type unknown (nonsmoker, no etoh)    Heart disease Brother     Dementia Sister     Cancer Daughter         fallopian ca    Cancer Son         esophageal ca    Dementia Sister     Dementia Sister        Social History     Socioeconomic History    Marital status:    Tobacco Use    Smoking status: Never    Smokeless tobacco: Never   Substance and Sexual Activity    Alcohol use: No    Drug use: No    Sexual activity: Never     Birth control/protection: Post-menopausal     Social Determinants of Health     Financial Resource Strain: Low Risk     Difficulty of Paying Living Expenses: Not very hard   Food Insecurity: Unknown     Worried About Running Out of Food in the Last Year: Patient refused    Ran Out of Food in the Last Year: Patient refused   Transportation Needs: Unknown    Lack of Transportation (Medical): Patient refused    Lack of Transportation (Non-Medical): Patient refused   Physical Activity: Unknown    Days of Exercise per Week: Patient refused    Minutes of Exercise per Session: 0 min   Stress: Unknown    Feeling of Stress : Patient refused   Social Connections: Unknown    Frequency of Communication with Friends and Family: Once a week    Frequency of Social Gatherings with Friends and Family: Patient refused    Attends [employer] Services: Never    Active Member of Clubs or Organizations: Yes    Attends Club or Organization Meetings: Patient refused    Marital Status:    Housing Stability: Unknown    Unable to Pay for Housing in the Last Year: Patient refused    Unstable Housing in the Last Year: Patient refused       Current Outpatient Medications   Medication Sig Dispense Refill    amiodarone (PACERONE) 200 MG Tab Take 200 mg by mouth once daily.       apixaban (ELIQUIS) 2.5 mg Tab Take 1 tablet (2.5 mg total) by mouth 2 (two) times daily. 60 tablet 0    ascorbic acid (VITAMIN C) 500 MG tablet Take 500 mg by mouth once daily.      b complex vitamins capsule Take 1 capsule by mouth once daily.      BINAXNOW COVID-19 AG SELF TEST Kit Use as Directed on the Package      blood sugar diagnostic Strp 1 strip 2 times daily, to use with insurance preferred meter 100 each 2    blood-glucose meter kit To check BG 2 times daily, to use with insurance preferred meter 1 each 0    cholecalciferol, vitamin D3, 125 mcg (5,000 unit) Tab Take 1 tablet (5,000 Units total) by mouth once daily.      clopidogrel (PLAVIX) 75 mg tablet TAKE ONE TABLET BY MOUTH ONCE DAILY (Patient taking differently: Take 75 mg by mouth once daily.) 30 tablet 11    diclofenac sodium (VOLTAREN ARTHRITIS PAIN) 1 % Gel Apply 2 g topically 2 (two) times  daily as needed (muscle pain). 100 g 1    digoxin (LANOXIN) 125 mcg tablet Take 1 tablet by mouth every other day.      diphenoxylate-atropine 2.5-0.025 mg (LOMOTIL) 2.5-0.025 mg per tablet Take 1 tablet by mouth 4 (four) times daily as needed for Diarrhea. 30 tablet 1    famotidine (PEPCID) 20 MG tablet Take 1 tablet (20 mg total) by mouth 2 (two) times daily. for 10 days 20 tablet 0    fish oil-omega-3 fatty acids 300-1,000 mg capsule Take 1 capsule by mouth 2 (two) times a day.      furosemide (LASIX) 40 MG tablet Take 1 tablet (40 mg total) by mouth once daily. Take an extra pill for any weight gain of 3lb or more on any given day 90 tablet 1    insulin (LANTUS SOLOSTAR U-100 INSULIN) glargine 100 units/mL SubQ pen Inject 16 Units into the skin every evening. 5 each 3    insulin lispro 100 unit/mL pen INJECT 7  THREE TIMES DAILY WITH MEALS 15 mL 3    lancets 31 gauge Misc 1 strip 2 times daily, to use with insurance preferred meter 100 each 2    levothyroxine (SYNTHROID) 125 MCG tablet Take 1 tablet (125 mcg total) by mouth every Mon, Wed, Fri. 30 tablet 3    levothyroxine (SYNTHROID) 75 MCG tablet Take 75 mcg by mouth every Tuesday, Thursday, Saturday, Sunday.      magnesium 200 mg Tab Take 200 mg by mouth once daily.      melatonin 10 mg Tab Take 1 tablet by mouth nightly as needed.      memantine (NAMENDA) 10 MG Tab TAKE 1 TABLET BY MOUTH ONCE DAILY IN THE EVENING 90 tablet 1    metoprolol tartrate (LOPRESSOR) 50 MG tablet Take 1 tablet (50 mg total) by mouth 2 (two) times daily. 60 tablet 1    multivitamin (THERAGRAN) per tablet Take 1 tablet by mouth once daily.      mupirocin (BACTROBAN) 2 % ointment Apply topically 3 (three) times daily. 30 g 0    ondansetron (ZOFRAN) 4 MG tablet ondansetron HCl 4 mg tablet   TAKE 1 TABLET BY MOUTH EVERY 6 HOURS PRN      ondansetron (ZOFRAN-ODT) 4 MG TbDL Take 1 tablet (4 mg total) by mouth every 8 (eight) hours as needed (Nausea). 20 tablet 0    pen needle, diabetic  "(COMFORT EZ PEN NEEDLES) 29 gauge x 1/2" Ndle 1 Units by Misc.(Non-Drug; Combo Route) route 3 (three) times daily. 300 each 11    polyethylene glycol (GLYCOLAX) 17 gram PwPk Take 17 g by mouth as needed (constipation).      potassium chloride SA (K-DUR,KLOR-CON M) 10 MEQ tablet Take 1 tablet by mouth twice daily 60 tablet 3    rivastigmine tartrate (EXELON) 1.5 MG capsule Take 1 capsule (1.5 mg total) by mouth 2 (two) times daily. 60 capsule 11    simvastatin (ZOCOR) 10 MG tablet Take 10 mg by mouth every evening.       No current facility-administered medications for this visit.       Review of patient's allergies indicates:   Allergen Reactions    Adhesive Itching    Mucinex [guaifenesin]      Says caused CHF     Latex, natural rubber Rash         Review of Systems   Constitutional: Negative for chills and fever.   Cardiovascular:  Negative for claudication and leg swelling.   Skin:  Negative for color change, nail changes and poor wound healing.   Musculoskeletal:  Negative for joint pain, joint swelling, muscle cramps and muscle weakness.   Gastrointestinal:  Negative for nausea and vomiting.   Neurological:  Positive for numbness.   Psychiatric/Behavioral:  Negative for altered mental status.          Objective:      Physical Exam  Constitutional:       Appearance: Normal appearance. She is not ill-appearing.   Cardiovascular:      Pulses:           Dorsalis pedis pulses are 2+ on the right side and 2+ on the left side.        Posterior tibial pulses are 2+ on the right side and 2+ on the left side.      Comments: CFT is < 3 seconds bilateral.  Pedal hair growth is decreased bilateral.  No lower extremity edema noted bilateral.  Toes are warm to touch bilateral.    Musculoskeletal:         General: Deformity present. No tenderness.      Right lower leg: No edema.      Left lower leg: No edema.      Comments: Muscle strength 5/5 in all muscle groups bilateral.  No tenderness nor crepitation with ROM of " foot/ankle joints bilateral.  (-) for pain with palpation to the skin overlying the dorsum of the Rt. 2nd PIP joint.  Bilateral semi-reducible contracture of toes 2-5.  Bilateral HAV.   Skin:     General: Skin is warm and dry.      Capillary Refill: Capillary refill takes 2 to 3 seconds.      Findings: No bruising, ecchymosis, erythema, signs of injury, laceration, lesion, petechiae, rash or wound.      Comments: Mature epithelium noted to the dorsum of the Rt. 2nd toe at the PIP joint.  Skin at the site is hyperpigmented.       Neurological:      Mental Status: She is alert.      Sensory: Sensory deficit present.      Motor: No weakness or atrophy.      Comments: Decreased protective sensation noted bilateral.  Light touch is intact bilateral.               Assessment:       Encounter Diagnoses   Name Primary?    Hammer toes of both feet Yes    Healed ulcer of right foot     Diabetic polyneuropathy associated with type 2 diabetes mellitus          Plan:       Denice was seen today for wound care.    Diagnoses and all orders for this visit:    Hammer toes of both feet    Healed ulcer of right foot    Diabetic polyneuropathy associated with type 2 diabetes mellitus      I counseled the patient on her conditions, their implications and medical management.    Based on today's exam, there is no new wound attempting to form along the dorsal aspect of the Rt. 2nd PIP joint.    Inspected the latest diabetic shoes which are slightly too shallow.  I fear this will result in a recurring wound to the said toe.  Advised to have this further stretched by [facility].      Instructed to inspect the feet daily for any signs of infection or skin breakdown.      RTC prn.    Pankaj Julian DPM

## 2023-02-07 ENCOUNTER — DOCUMENT SCAN (OUTPATIENT)
Dept: HOME HEALTH SERVICES | Facility: HOSPITAL | Age: 88
End: 2023-02-07
Payer: MEDICARE

## 2023-02-13 ENCOUNTER — OFFICE VISIT (OUTPATIENT)
Dept: NEPHROLOGY | Facility: CLINIC | Age: 88
End: 2023-02-13
Payer: MEDICARE

## 2023-02-13 VITALS
BODY MASS INDEX: 24.29 KG/M2 | HEIGHT: 62 IN | WEIGHT: 132 LBS | DIASTOLIC BLOOD PRESSURE: 58 MMHG | SYSTOLIC BLOOD PRESSURE: 136 MMHG

## 2023-02-13 DIAGNOSIS — N18.4 CKD (CHRONIC KIDNEY DISEASE) STAGE 4, GFR 15-29 ML/MIN: Primary | ICD-10-CM

## 2023-02-13 DIAGNOSIS — N18.4 TYPE 2 DIABETES MELLITUS WITH STAGE 4 CHRONIC KIDNEY DISEASE, WITH LONG-TERM CURRENT USE OF INSULIN: ICD-10-CM

## 2023-02-13 DIAGNOSIS — Z79.4 TYPE 2 DIABETES MELLITUS WITH STAGE 4 CHRONIC KIDNEY DISEASE, WITH LONG-TERM CURRENT USE OF INSULIN: ICD-10-CM

## 2023-02-13 DIAGNOSIS — E11.29 PROTEINURIA DUE TO TYPE 2 DIABETES MELLITUS: ICD-10-CM

## 2023-02-13 DIAGNOSIS — R80.9 PROTEINURIA DUE TO TYPE 2 DIABETES MELLITUS: ICD-10-CM

## 2023-02-13 DIAGNOSIS — E87.1 HYPONATREMIA: ICD-10-CM

## 2023-02-13 DIAGNOSIS — E11.22 TYPE 2 DIABETES MELLITUS WITH STAGE 4 CHRONIC KIDNEY DISEASE, WITH LONG-TERM CURRENT USE OF INSULIN: ICD-10-CM

## 2023-02-13 DIAGNOSIS — I10 PRIMARY HYPERTENSION: ICD-10-CM

## 2023-02-13 PROCEDURE — 1159F PR MEDICATION LIST DOCUMENTED IN MEDICAL RECORD: ICD-10-PCS | Mod: CPTII,S$GLB,, | Performed by: INTERNAL MEDICINE

## 2023-02-13 PROCEDURE — 1157F PR ADVANCE CARE PLAN OR EQUIV PRESENT IN MEDICAL RECORD: ICD-10-PCS | Mod: CPTII,S$GLB,, | Performed by: INTERNAL MEDICINE

## 2023-02-13 PROCEDURE — 3288F FALL RISK ASSESSMENT DOCD: CPT | Mod: CPTII,S$GLB,, | Performed by: INTERNAL MEDICINE

## 2023-02-13 PROCEDURE — 99999 PR PBB SHADOW E&M-EST. PATIENT-LVL II: CPT | Mod: PBBFAC,,, | Performed by: INTERNAL MEDICINE

## 2023-02-13 PROCEDURE — 99214 OFFICE O/P EST MOD 30 MIN: CPT | Mod: S$GLB,,, | Performed by: INTERNAL MEDICINE

## 2023-02-13 PROCEDURE — 99214 PR OFFICE/OUTPT VISIT, EST, LEVL IV, 30-39 MIN: ICD-10-PCS | Mod: S$GLB,,, | Performed by: INTERNAL MEDICINE

## 2023-02-13 PROCEDURE — 3288F PR FALLS RISK ASSESSMENT DOCUMENTED: ICD-10-PCS | Mod: CPTII,S$GLB,, | Performed by: INTERNAL MEDICINE

## 2023-02-13 PROCEDURE — 1160F RVW MEDS BY RX/DR IN RCRD: CPT | Mod: CPTII,S$GLB,, | Performed by: INTERNAL MEDICINE

## 2023-02-13 PROCEDURE — 99999 PR PBB SHADOW E&M-EST. PATIENT-LVL II: ICD-10-PCS | Mod: PBBFAC,,, | Performed by: INTERNAL MEDICINE

## 2023-02-13 PROCEDURE — 1101F PT FALLS ASSESS-DOCD LE1/YR: CPT | Mod: CPTII,S$GLB,, | Performed by: INTERNAL MEDICINE

## 2023-02-13 PROCEDURE — 1160F PR REVIEW ALL MEDS BY PRESCRIBER/CLIN PHARMACIST DOCUMENTED: ICD-10-PCS | Mod: CPTII,S$GLB,, | Performed by: INTERNAL MEDICINE

## 2023-02-13 PROCEDURE — 1101F PR PT FALLS ASSESS DOC 0-1 FALLS W/OUT INJ PAST YR: ICD-10-PCS | Mod: CPTII,S$GLB,, | Performed by: INTERNAL MEDICINE

## 2023-02-13 PROCEDURE — 1159F MED LIST DOCD IN RCRD: CPT | Mod: CPTII,S$GLB,, | Performed by: INTERNAL MEDICINE

## 2023-02-13 PROCEDURE — 99499 RISK ADDL DX/OHS AUDIT: ICD-10-PCS | Mod: S$GLB,,, | Performed by: INTERNAL MEDICINE

## 2023-02-13 PROCEDURE — 1157F ADVNC CARE PLAN IN RCRD: CPT | Mod: CPTII,S$GLB,, | Performed by: INTERNAL MEDICINE

## 2023-02-13 PROCEDURE — 99499 UNLISTED E&M SERVICE: CPT | Mod: S$GLB,,, | Performed by: INTERNAL MEDICINE

## 2023-02-13 NOTE — PROGRESS NOTES
"  Subjective:       Patient ID: Denice Sheth is a 88 y.o. White female who presents for return patient evaluation for chronic renal failure.      She has no uremic or urinary symptoms.  She had COVID in Dec 2022 and then had a UTI.  She is feeling better and is back to her baseline.        Review of Systems   Constitutional:  Negative for appetite change, chills and fever.   Eyes:  Negative for visual disturbance.   Respiratory:  Positive for shortness of breath (with exertion). Negative for cough.    Cardiovascular:  Negative for chest pain and leg swelling.   Gastrointestinal:  Negative for abdominal pain, diarrhea, nausea and vomiting.   Genitourinary:  Negative for difficulty urinating, dysuria and hematuria.   Musculoskeletal:  Positive for arthralgias (right knee) and gait problem. Negative for myalgias.   Skin:  Negative for rash.   Neurological:  Positive for weakness. Negative for headaches.   Hematological:  Bruises/bleeds easily.   Psychiatric/Behavioral:  Negative for sleep disturbance.        The past medical, family and social histories were reviewed for this encounter.     BP (!) 136/58 (BP Location: Right arm, Patient Position: Sitting, BP Method: Medium (Manual))   Ht 5' 2" (1.575 m)   Wt 59.9 kg (132 lb)   LMP  (LMP Unknown)   BMI 24.14 kg/m²     Objective:      Physical Exam  Vitals reviewed.   Constitutional:       General: She is not in acute distress.     Appearance: She is well-developed. She is obese.   HENT:      Head: Normocephalic and atraumatic.   Eyes:      General: No scleral icterus.     Conjunctiva/sclera: Conjunctivae normal.   Neck:      Vascular: No JVD.   Cardiovascular:      Rate and Rhythm: Normal rate and regular rhythm.      Heart sounds: Normal heart sounds. No murmur heard.    No friction rub. No gallop.   Pulmonary:      Effort: Pulmonary effort is normal. No respiratory distress.      Breath sounds: Normal breath sounds. No wheezing.   Abdominal:      General: Bowel " sounds are normal. There is no distension.      Palpations: Abdomen is soft.      Tenderness: There is no abdominal tenderness.   Musculoskeletal:      Cervical back: Normal range of motion.      Right lower leg: No edema.      Left lower leg: No edema.   Skin:     General: Skin is warm and dry.      Findings: No rash.   Neurological:      Mental Status: She is alert and oriented to person, place, and time.   Psychiatric:         Mood and Affect: Mood normal.         Behavior: Behavior normal.      Assessment:       1. CKD (chronic kidney disease) stage 4, GFR 15-29 ml/min    2. Hyponatremia    3. Primary hypertension    4. 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 Type 2 diabetes mellitus with stage 4 chronic kidney disease, with long-term current use of insulin          Plan:   Return to clinic in 3 months.  Labs for next visit include rp pth upc per so.  RP in 1 month.   Baseline creatinine is 1.5-1.9 since 2015.   PTH is 21 with a calcium of 10.4.  Change D3 to MWF.  UPC is negative.  Renal US shows R 9.7 cm L 10.9 cm with no cysts/stones/masses.  Blood pressure is controlled on the current regimen.  Continue current medications as prescribed and reviewed.   She had JANETTE in January secondary to a contrast load.  She does not wish to do dialysis.

## 2023-02-14 ENCOUNTER — PATIENT MESSAGE (OUTPATIENT)
Dept: PODIATRY | Facility: CLINIC | Age: 88
End: 2023-02-14
Payer: MEDICARE

## 2023-02-16 ENCOUNTER — PATIENT MESSAGE (OUTPATIENT)
Dept: PODIATRY | Facility: CLINIC | Age: 88
End: 2023-02-16
Payer: MEDICARE

## 2023-02-21 ENCOUNTER — PATIENT MESSAGE (OUTPATIENT)
Dept: NEPHROLOGY | Facility: CLINIC | Age: 88
End: 2023-02-21
Payer: MEDICARE

## 2023-03-02 ENCOUNTER — PATIENT MESSAGE (OUTPATIENT)
Dept: PODIATRY | Facility: CLINIC | Age: 88
End: 2023-03-02
Payer: MEDICARE

## 2023-03-05 DIAGNOSIS — M20.42 HAMMER TOES OF BOTH FEET: ICD-10-CM

## 2023-03-05 DIAGNOSIS — E11.42 DIABETIC POLYNEUROPATHY ASSOCIATED WITH TYPE 2 DIABETES MELLITUS: Primary | ICD-10-CM

## 2023-03-05 DIAGNOSIS — M20.41 HAMMER TOES OF BOTH FEET: ICD-10-CM

## 2023-03-05 DIAGNOSIS — Z87.898 HISTORY OF ULCERATION: ICD-10-CM

## 2023-03-06 ENCOUNTER — PATIENT MESSAGE (OUTPATIENT)
Dept: PODIATRY | Facility: CLINIC | Age: 88
End: 2023-03-06
Payer: MEDICARE

## 2023-03-15 ENCOUNTER — LAB VISIT (OUTPATIENT)
Dept: LAB | Facility: HOSPITAL | Age: 88
End: 2023-03-15
Attending: INTERNAL MEDICINE
Payer: MEDICARE

## 2023-03-15 DIAGNOSIS — N18.4 CKD (CHRONIC KIDNEY DISEASE) STAGE 4, GFR 15-29 ML/MIN: ICD-10-CM

## 2023-03-15 LAB
ALBUMIN SERPL BCP-MCNC: 3.3 G/DL (ref 3.5–5.2)
ANION GAP SERPL CALC-SCNC: 12 MMOL/L (ref 8–16)
BUN SERPL-MCNC: 53 MG/DL (ref 8–23)
CALCIUM SERPL-MCNC: 10 MG/DL (ref 8.7–10.5)
CHLORIDE SERPL-SCNC: 96 MMOL/L (ref 95–110)
CO2 SERPL-SCNC: 28 MMOL/L (ref 23–29)
CREAT SERPL-MCNC: 2.8 MG/DL (ref 0.5–1.4)
EST. GFR  (NO RACE VARIABLE): 15.8 ML/MIN/1.73 M^2
GLUCOSE SERPL-MCNC: 256 MG/DL (ref 70–110)
PHOSPHATE SERPL-MCNC: 3.7 MG/DL (ref 2.7–4.5)
POTASSIUM SERPL-SCNC: 4.4 MMOL/L (ref 3.5–5.1)
SODIUM SERPL-SCNC: 136 MMOL/L (ref 136–145)

## 2023-03-15 PROCEDURE — 80069 RENAL FUNCTION PANEL: CPT | Performed by: INTERNAL MEDICINE

## 2023-03-15 PROCEDURE — 36415 COLL VENOUS BLD VENIPUNCTURE: CPT | Mod: PO | Performed by: INTERNAL MEDICINE

## 2023-03-24 ENCOUNTER — IMMUNIZATION (OUTPATIENT)
Dept: FAMILY MEDICINE | Facility: CLINIC | Age: 88
End: 2023-03-24
Payer: MEDICARE

## 2023-03-24 DIAGNOSIS — Z23 NEED FOR VACCINATION: Primary | ICD-10-CM

## 2023-03-24 PROCEDURE — 91312 COVID-19, MRNA, LNP-S, BIVALENT BOOSTER, PF, 30 MCG/0.3 ML DOSE: ICD-10-PCS | Mod: S$GLB,,, | Performed by: FAMILY MEDICINE

## 2023-03-24 PROCEDURE — 91312 COVID-19, MRNA, LNP-S, BIVALENT BOOSTER, PF, 30 MCG/0.3 ML DOSE: CPT | Mod: S$GLB,,, | Performed by: FAMILY MEDICINE

## 2023-03-24 PROCEDURE — 0124A COVID-19, MRNA, LNP-S, BIVALENT BOOSTER, PF, 30 MCG/0.3 ML DOSE: CPT | Mod: PBBFAC | Performed by: FAMILY MEDICINE

## 2023-04-18 ENCOUNTER — TELEPHONE (OUTPATIENT)
Dept: FAMILY MEDICINE | Facility: CLINIC | Age: 88
End: 2023-04-18
Payer: MEDICARE

## 2023-04-18 NOTE — TELEPHONE ENCOUNTER
----- Message from Marleny Hansen, Patient Care Assistant sent at 4/18/2023 11:32 AM CDT -----  Regarding: advice  Contact: bruno with [facility]  Type: Needs Medical Advice    Who Called:  bruno with [facility]     Best Call Back Number:      Additional Information: bruno with [facility]  states she would like a callback regarding an fax sent on 4/6/23. Thanks!

## 2023-04-18 NOTE — TELEPHONE ENCOUNTER
Spoke with Priscila at [facility]. Verbalized that she received the signed order and needs the clinic note and HA1C. Faxed OV with lab A1C to [facility] per request with success.

## 2023-04-21 ENCOUNTER — TELEPHONE (OUTPATIENT)
Dept: PODIATRY | Facility: CLINIC | Age: 88
End: 2023-04-21
Payer: MEDICARE

## 2023-04-21 NOTE — TELEPHONE ENCOUNTER
Scheduled pt for first next available    ----- Message from Harriet Khan sent at 4/21/2023 10:14 AM CDT -----  Contact: patient Daughter  Type:  Sooner Apoointment Request    Caller is requesting a sooner appointment.  Caller declined first available appointment listed below.  Caller will not accept being placed on the waitlist and is requesting a message be sent to doctor.    Name of Caller: Patient daughter. Parsia     When is the first available appointment? 6/29    Symptoms: toenail cut     Would the patient rather a call back or a response via [facility]? Call     Best Call Back Number: [phone]    Additional Information:

## 2023-04-27 ENCOUNTER — TELEPHONE (OUTPATIENT)
Dept: FAMILY MEDICINE | Facility: CLINIC | Age: 88
End: 2023-04-27
Payer: MEDICARE

## 2023-04-27 NOTE — TELEPHONE ENCOUNTER
Spoke w/ Arianna at [facility]. Orders were done w/ [facility] for DM shoes. She states pt just got shoes in Feb. SHe is wondering if any specific reason pt needs another pair. Advised her that we did not know she just rec'd a pair and OK to cancel recent order.

## 2023-04-27 NOTE — TELEPHONE ENCOUNTER
----- Message from Amari Valladares sent at 4/27/2023 10:28 AM CDT -----  Contact: [facility]  Type: Needs Medical Advice  Who Called:  [email or web address]     Best Call Back Number: [phone] - [facility]   Additional Information: [facility] needs to clarification on pt order for diabetic shoes. Please advise.

## 2023-05-12 ENCOUNTER — LAB VISIT (OUTPATIENT)
Dept: LAB | Facility: HOSPITAL | Age: 88
End: 2023-05-12
Attending: INTERNAL MEDICINE
Payer: MEDICARE

## 2023-05-12 DIAGNOSIS — N18.4 CKD (CHRONIC KIDNEY DISEASE) STAGE 4, GFR 15-29 ML/MIN: ICD-10-CM

## 2023-05-12 LAB
ALBUMIN SERPL BCP-MCNC: 3.3 G/DL (ref 3.5–5.2)
ANION GAP SERPL CALC-SCNC: 10 MMOL/L (ref 8–16)
BUN SERPL-MCNC: 51 MG/DL (ref 8–23)
CALCIUM SERPL-MCNC: 10.3 MG/DL (ref 8.7–10.5)
CHLORIDE SERPL-SCNC: 96 MMOL/L (ref 95–110)
CO2 SERPL-SCNC: 29 MMOL/L (ref 23–29)
CREAT SERPL-MCNC: 2.8 MG/DL (ref 0.5–1.4)
CREAT UR-MCNC: 51 MG/DL (ref 15–325)
EST. GFR  (NO RACE VARIABLE): 15.8 ML/MIN/1.73 M^2
GLUCOSE SERPL-MCNC: 211 MG/DL (ref 70–110)
PHOSPHATE SERPL-MCNC: 3.7 MG/DL (ref 2.7–4.5)
POTASSIUM SERPL-SCNC: 4.6 MMOL/L (ref 3.5–5.1)
PROT UR-MCNC: <7 MG/DL (ref 0–15)
PROT/CREAT UR: NORMAL MG/G{CREAT} (ref 0–0.2)
PTH-INTACT SERPL-MCNC: 34.3 PG/ML (ref 9–77)
SODIUM SERPL-SCNC: 135 MMOL/L (ref 136–145)

## 2023-05-12 PROCEDURE — 80069 RENAL FUNCTION PANEL: CPT | Performed by: INTERNAL MEDICINE

## 2023-05-12 PROCEDURE — 83970 ASSAY OF PARATHORMONE: CPT | Performed by: INTERNAL MEDICINE

## 2023-05-12 PROCEDURE — 36415 COLL VENOUS BLD VENIPUNCTURE: CPT | Mod: PO | Performed by: INTERNAL MEDICINE

## 2023-05-12 PROCEDURE — 84156 ASSAY OF PROTEIN URINE: CPT | Performed by: INTERNAL MEDICINE

## 2023-05-18 ENCOUNTER — OFFICE VISIT (OUTPATIENT)
Dept: NEPHROLOGY | Facility: CLINIC | Age: 88
End: 2023-05-18
Payer: MEDICARE

## 2023-05-18 VITALS — BODY MASS INDEX: 24.14 KG/M2 | HEIGHT: 62 IN | DIASTOLIC BLOOD PRESSURE: 60 MMHG | SYSTOLIC BLOOD PRESSURE: 130 MMHG

## 2023-05-18 DIAGNOSIS — Z79.4 TYPE 2 DIABETES MELLITUS WITH STAGE 4 CHRONIC KIDNEY DISEASE, WITH LONG-TERM CURRENT USE OF INSULIN: ICD-10-CM

## 2023-05-18 DIAGNOSIS — E87.1 HYPONATREMIA: ICD-10-CM

## 2023-05-18 DIAGNOSIS — E11.29 PROTEINURIA DUE TO TYPE 2 DIABETES MELLITUS: ICD-10-CM

## 2023-05-18 DIAGNOSIS — N18.4 TYPE 2 DIABETES MELLITUS WITH STAGE 4 CHRONIC KIDNEY DISEASE, WITH LONG-TERM CURRENT USE OF INSULIN: ICD-10-CM

## 2023-05-18 DIAGNOSIS — R80.9 PROTEINURIA DUE TO TYPE 2 DIABETES MELLITUS: ICD-10-CM

## 2023-05-18 DIAGNOSIS — N18.4 CKD (CHRONIC KIDNEY DISEASE) STAGE 4, GFR 15-29 ML/MIN: Primary | ICD-10-CM

## 2023-05-18 DIAGNOSIS — E11.22 TYPE 2 DIABETES MELLITUS WITH STAGE 4 CHRONIC KIDNEY DISEASE, WITH LONG-TERM CURRENT USE OF INSULIN: ICD-10-CM

## 2023-05-18 DIAGNOSIS — I10 PRIMARY HYPERTENSION: ICD-10-CM

## 2023-05-18 PROCEDURE — 99214 OFFICE O/P EST MOD 30 MIN: CPT | Mod: PN | Performed by: INTERNAL MEDICINE

## 2023-05-18 PROCEDURE — 1101F PT FALLS ASSESS-DOCD LE1/YR: CPT | Mod: CPTII,,, | Performed by: INTERNAL MEDICINE

## 2023-05-18 PROCEDURE — 99999 PR PBB SHADOW E&M-EST. PATIENT-LVL IV: CPT | Mod: PBBFAC,,, | Performed by: INTERNAL MEDICINE

## 2023-05-18 PROCEDURE — 99214 PR OFFICE/OUTPT VISIT, EST, LEVL IV, 30-39 MIN: ICD-10-PCS | Mod: ,,, | Performed by: INTERNAL MEDICINE

## 2023-05-18 PROCEDURE — 1157F ADVNC CARE PLAN IN RCRD: CPT | Mod: CPTII,,, | Performed by: INTERNAL MEDICINE

## 2023-05-18 PROCEDURE — 99499 RISK ADDL DX/OHS AUDIT: ICD-10-PCS | Mod: S$GLB,,, | Performed by: INTERNAL MEDICINE

## 2023-05-18 PROCEDURE — 3288F PR FALLS RISK ASSESSMENT DOCUMENTED: ICD-10-PCS | Mod: CPTII,,, | Performed by: INTERNAL MEDICINE

## 2023-05-18 PROCEDURE — 1159F PR MEDICATION LIST DOCUMENTED IN MEDICAL RECORD: ICD-10-PCS | Mod: CPTII,,, | Performed by: INTERNAL MEDICINE

## 2023-05-18 PROCEDURE — 1157F PR ADVANCE CARE PLAN OR EQUIV PRESENT IN MEDICAL RECORD: ICD-10-PCS | Mod: CPTII,,, | Performed by: INTERNAL MEDICINE

## 2023-05-18 PROCEDURE — 99499 UNLISTED E&M SERVICE: CPT | Mod: S$GLB,,, | Performed by: INTERNAL MEDICINE

## 2023-05-18 PROCEDURE — 99214 OFFICE O/P EST MOD 30 MIN: CPT | Mod: ,,, | Performed by: INTERNAL MEDICINE

## 2023-05-18 PROCEDURE — 1160F PR REVIEW ALL MEDS BY PRESCRIBER/CLIN PHARMACIST DOCUMENTED: ICD-10-PCS | Mod: CPTII,,, | Performed by: INTERNAL MEDICINE

## 2023-05-18 PROCEDURE — 1101F PR PT FALLS ASSESS DOC 0-1 FALLS W/OUT INJ PAST YR: ICD-10-PCS | Mod: CPTII,,, | Performed by: INTERNAL MEDICINE

## 2023-05-18 PROCEDURE — 1160F RVW MEDS BY RX/DR IN RCRD: CPT | Mod: CPTII,,, | Performed by: INTERNAL MEDICINE

## 2023-05-18 PROCEDURE — 99999 PR PBB SHADOW E&M-EST. PATIENT-LVL IV: ICD-10-PCS | Mod: PBBFAC,,, | Performed by: INTERNAL MEDICINE

## 2023-05-18 PROCEDURE — 1159F MED LIST DOCD IN RCRD: CPT | Mod: CPTII,,, | Performed by: INTERNAL MEDICINE

## 2023-05-18 PROCEDURE — 3288F FALL RISK ASSESSMENT DOCD: CPT | Mod: CPTII,,, | Performed by: INTERNAL MEDICINE

## 2023-05-18 NOTE — PROGRESS NOTES
"    Subjective:       Patient ID: Denice Sheth is a 88 y.o. White female who presents for return patient evaluation for chronic renal failure.      She has no uremic or urinary symptoms.  She had COVID in Dec 2022.  She is back to her baseline.        Review of Systems   Constitutional:  Negative for appetite change, chills and fever.   Eyes:  Negative for visual disturbance.   Respiratory:  Positive for shortness of breath (with exertion). Negative for cough.    Cardiovascular:  Negative for chest pain and leg swelling.   Gastrointestinal:  Negative for abdominal pain, diarrhea, nausea and vomiting.   Endocrine: Positive for cold intolerance.   Genitourinary:  Negative for difficulty urinating, dysuria and hematuria.   Musculoskeletal:  Positive for arthralgias (right knee) and gait problem. Negative for myalgias.   Skin:  Negative for rash.   Neurological:  Positive for weakness. Negative for headaches.   Hematological:  Bruises/bleeds easily.   Psychiatric/Behavioral:  Negative for sleep disturbance.        The past medical, family and social histories were reviewed for this encounter.     /60 (BP Location: Right arm, Patient Position: Sitting, BP Method: Large (Manual))   Ht 5' 2" (1.575 m)   LMP  (LMP Unknown)   BMI 24.14 kg/m²     Objective:      Physical Exam  Vitals reviewed.   Constitutional:       General: She is not in acute distress.     Appearance: She is well-developed.   HENT:      Head: Normocephalic and atraumatic.   Eyes:      General: No scleral icterus.     Conjunctiva/sclera: Conjunctivae normal.   Neck:      Vascular: No JVD.   Cardiovascular:      Rate and Rhythm: Normal rate and regular rhythm.      Heart sounds: Normal heart sounds. No murmur heard.    No friction rub. No gallop.   Pulmonary:      Effort: Pulmonary effort is normal. No respiratory distress.      Breath sounds: Normal breath sounds. No wheezing.   Abdominal:      General: Bowel sounds are normal. There is no " distension.      Palpations: Abdomen is soft.      Tenderness: There is no abdominal tenderness.   Musculoskeletal:      Cervical back: Normal range of motion.      Right lower leg: No edema.      Left lower leg: No edema.   Skin:     General: Skin is warm and dry.      Findings: No rash.   Neurological:      Mental Status: She is alert and oriented to person, place, and time.   Psychiatric:         Mood and Affect: Mood normal.         Behavior: Behavior normal.      Assessment:       1. CKD (chronic kidney disease) stage 4, GFR 15-29 ml/min    2. Hyponatremia    3. Primary hypertension    4. 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 Type 2 diabetes mellitus with stage 4 chronic kidney disease, with long-term current use of insulin       Lab Results   Component Value Date    CREATININE 2.8 (H) 05/12/2023    BUN 51 (H) 05/12/2023     (L) 05/12/2023    K 4.6 05/12/2023    CL 96 05/12/2023    CO2 29 05/12/2023     Lab Results   Component Value Date    PTH 34.3 05/12/2023    CALCIUM 10.3 05/12/2023    PHOS 3.7 05/12/2023     Lab Results   Component Value Date    HCT 39.9 12/23/2022     Prot/Creat Ratio, Urine   Date Value Ref Range Status   05/12/2023 Unable to calculate 0.00 - 0.20 Final   03/15/2023 Unable to calculate 0.00 - 0.20 Final   02/02/2023 Unable to calculate 0.00 - 0.20 Final      Plan:   Return to clinic in 3 months.  Labs for next visit include rp pth upc per standing orders.  RP in 1 month.   Baseline creatinine is 1.5-1.9 since 2015. Lately she has been 2.0-2.5.  PTH is 21 with a calcium of 10.4.  Change D3 to MWF.  UPC is negative.  Renal US shows R 9.7 cm L 10.9 cm with no cysts/stones/masses.  Blood pressure is controlled on the current regimen.  Continue current medications as prescribed and reviewed.   She had JANETTE in January secondary to a contrast load.  She does not wish to do dialysis.

## 2023-06-09 ENCOUNTER — TELEPHONE (OUTPATIENT)
Dept: FAMILY MEDICINE | Facility: CLINIC | Age: 88
End: 2023-06-09
Payer: MEDICARE

## 2023-06-09 ENCOUNTER — PATIENT MESSAGE (OUTPATIENT)
Dept: FAMILY MEDICINE | Facility: CLINIC | Age: 88
End: 2023-06-09
Payer: MEDICARE

## 2023-06-13 ENCOUNTER — PATIENT MESSAGE (OUTPATIENT)
Dept: FAMILY MEDICINE | Facility: CLINIC | Age: 88
End: 2023-06-13
Payer: MEDICARE

## 2023-06-13 RX ORDER — LEVOTHYROXINE SODIUM 75 UG/1
75 TABLET ORAL
Qty: 90 TABLET | Refills: 3 | Status: SHIPPED | OUTPATIENT
Start: 2023-06-13 | End: 2023-12-12

## 2023-06-13 NOTE — TELEPHONE ENCOUNTER
Care Due:                  Date            Visit Type   Department     Provider  --------------------------------------------------------------------------------                                EP -                              PRIMARY      [facility]  Last Visit: 01-      CARE ([facility])   MEDICINE       Allison VALENTINE                              FOLLOWUP/OF  [facility]  Next Visit: 07-      [facility] VISIT   MEDICINE       Meredith Baird                                                            Last  Test          Frequency    Reason                     Performed    Due Date  --------------------------------------------------------------------------------    HBA1C.......  6 months...  insulin..................  01- 07-    TSH.........  12 months..  levothyroxine............  07- 06-    [facility] Embedded Care Due Messages. Reference number: [id number].   6/13/2023 2:53:30 PM CDT

## 2023-06-15 ENCOUNTER — LAB VISIT (OUTPATIENT)
Dept: LAB | Facility: HOSPITAL | Age: 88
End: 2023-06-15
Attending: INTERNAL MEDICINE
Payer: MEDICARE

## 2023-06-15 DIAGNOSIS — E11.22 TYPE 2 DIABETES MELLITUS WITH STAGE 4 CHRONIC KIDNEY DISEASE, WITH LONG-TERM CURRENT USE OF INSULIN: ICD-10-CM

## 2023-06-15 DIAGNOSIS — Z79.4 TYPE 2 DIABETES MELLITUS WITH STAGE 4 CHRONIC KIDNEY DISEASE, WITH LONG-TERM CURRENT USE OF INSULIN: ICD-10-CM

## 2023-06-15 DIAGNOSIS — E11.29 PROTEINURIA DUE TO TYPE 2 DIABETES MELLITUS: ICD-10-CM

## 2023-06-15 DIAGNOSIS — N18.4 CKD (CHRONIC KIDNEY DISEASE) STAGE 4, GFR 15-29 ML/MIN: ICD-10-CM

## 2023-06-15 DIAGNOSIS — E87.1 HYPONATREMIA: ICD-10-CM

## 2023-06-15 DIAGNOSIS — I10 PRIMARY HYPERTENSION: ICD-10-CM

## 2023-06-15 DIAGNOSIS — R80.9 PROTEINURIA DUE TO TYPE 2 DIABETES MELLITUS: ICD-10-CM

## 2023-06-15 DIAGNOSIS — N18.4 TYPE 2 DIABETES MELLITUS WITH STAGE 4 CHRONIC KIDNEY DISEASE, WITH LONG-TERM CURRENT USE OF INSULIN: ICD-10-CM

## 2023-06-15 LAB
ALBUMIN SERPL BCP-MCNC: 3.2 G/DL (ref 3.5–5.2)
ANION GAP SERPL CALC-SCNC: 11 MMOL/L (ref 8–16)
BUN SERPL-MCNC: 50 MG/DL (ref 8–23)
CALCIUM SERPL-MCNC: 9.4 MG/DL (ref 8.7–10.5)
CHLORIDE SERPL-SCNC: 100 MMOL/L (ref 95–110)
CO2 SERPL-SCNC: 24 MMOL/L (ref 23–29)
CREAT SERPL-MCNC: 2.7 MG/DL (ref 0.5–1.4)
EST. GFR  (NO RACE VARIABLE): 16.5 ML/MIN/1.73 M^2
GLUCOSE SERPL-MCNC: 200 MG/DL (ref 70–110)
PHOSPHATE SERPL-MCNC: 3.3 MG/DL (ref 2.7–4.5)
POTASSIUM SERPL-SCNC: 4.7 MMOL/L (ref 3.5–5.1)
SODIUM SERPL-SCNC: 135 MMOL/L (ref 136–145)

## 2023-06-15 PROCEDURE — 80069 RENAL FUNCTION PANEL: CPT | Performed by: INTERNAL MEDICINE

## 2023-06-15 PROCEDURE — 36415 COLL VENOUS BLD VENIPUNCTURE: CPT | Mod: PN | Performed by: INTERNAL MEDICINE

## 2023-06-18 NOTE — TELEPHONE ENCOUNTER
No care due was identified.  [facility] Embedded Care Due Messages. Reference number: [id number].   6/18/2023 5:55:35 PM CDT

## 2023-06-19 RX ORDER — LEVOTHYROXINE SODIUM 125 UG/1
TABLET ORAL
Qty: 90 TABLET | Refills: 0 | Status: SHIPPED | OUTPATIENT
Start: 2023-06-19 | End: 2023-11-30 | Stop reason: SDUPTHER

## 2023-06-19 NOTE — TELEPHONE ENCOUNTER
Refill Decision Note   Denice Sheth  is requesting a refill authorization.  Brief Assessment and Rationale for Refill:  Approve     Medication Therapy Plan:         Comments:     Note composed:12:02 PM 06/19/2023

## 2023-06-25 NOTE — TELEPHONE ENCOUNTER
No care due was identified.  [facility] Embedded Care Due Messages. Reference number: [id number].   6/25/2023 6:02:30 PM CDT

## 2023-06-26 RX ORDER — FUROSEMIDE 40 MG/1
TABLET ORAL
Qty: 90 TABLET | Refills: 1 | Status: SHIPPED | OUTPATIENT
Start: 2023-06-26 | End: 2023-12-27

## 2023-06-26 NOTE — TELEPHONE ENCOUNTER
Refill Authorization Note     Refill Decision Note   Denice Sheth  is requesting a refill authorization.  Brief Assessment and Rationale for Refill:  Approve     Medication Therapy Plan:       Medication Reconciliation Completed: No   Comments:     No Care Gaps recommended.     Note composed:7:56 AM 06/26/2023

## 2023-06-30 ENCOUNTER — OFFICE VISIT (OUTPATIENT)
Dept: PODIATRY | Facility: CLINIC | Age: 88
End: 2023-06-30
Payer: MEDICARE

## 2023-06-30 VITALS — HEIGHT: 62 IN | BODY MASS INDEX: 24.3 KG/M2 | WEIGHT: 132.06 LBS

## 2023-06-30 DIAGNOSIS — E11.42 DIABETIC POLYNEUROPATHY ASSOCIATED WITH TYPE 2 DIABETES MELLITUS: Primary | ICD-10-CM

## 2023-06-30 DIAGNOSIS — M20.41 HAMMER TOES OF BOTH FEET: ICD-10-CM

## 2023-06-30 DIAGNOSIS — M20.42 HAMMER TOES OF BOTH FEET: ICD-10-CM

## 2023-06-30 DIAGNOSIS — B35.1 ONYCHOMYCOSIS DUE TO DERMATOPHYTE: ICD-10-CM

## 2023-06-30 PROCEDURE — 11721 PR DEBRIDEMENT OF NAILS, 6 OR MORE: ICD-10-PCS | Mod: Q9,S$GLB,, | Performed by: PODIATRIST

## 2023-06-30 PROCEDURE — 99499 UNLISTED E&M SERVICE: CPT | Mod: S$GLB,,, | Performed by: PODIATRIST

## 2023-06-30 PROCEDURE — 99499 NO LOS: ICD-10-PCS | Mod: S$GLB,,, | Performed by: PODIATRIST

## 2023-06-30 PROCEDURE — 99999 PR PBB SHADOW E&M-EST. PATIENT-LVL IV: CPT | Mod: PBBFAC,,, | Performed by: PODIATRIST

## 2023-06-30 PROCEDURE — 99999 PR PBB SHADOW E&M-EST. PATIENT-LVL IV: ICD-10-PCS | Mod: PBBFAC,,, | Performed by: PODIATRIST

## 2023-06-30 PROCEDURE — 11721 DEBRIDE NAIL 6 OR MORE: CPT | Mod: Q9,S$GLB,, | Performed by: PODIATRIST

## 2023-06-30 NOTE — PROGRESS NOTES
Subjective:      Patient ID: Denice Sheth is a 88 y.o. female.    Chief Complaint: Nail Care    Denice is a 88 y.o. female who presents to the clinic for evaluation and treatment of diabetic feet. Denice has a past medical history of A-fib, Anticoagulant long-term use, Cecum mass, CHF (congestive heart failure), Diabetes mellitus, Disorder of kidney and ureter, Encounter for blood transfusion, Hypertension, Insomnia, Irregular heart rhythm, Stage 4 chronic kidney disease, Thyroid disease, and TIA (transient ischemic attack) (03/2017). Patient relates needing her toenails trimmed.  Denies this being a source of pain.  Has not attempted to self trim.  Also, relates having some issues with the fit of her new diabetic shoes.  States her narrow heels are resulting in slippage of her shoes with use.  Inquires as to how this can be resolved.  Denies any additional pedal complaints.     PCP: Meredith Baird MD    Date Last Seen by PCP: 1/23    Hemoglobin A1C   Date Value Ref Range Status   10/06/2021 6.9 (H) 4.0 - 5.6 % Final     Comment:     ADA Screening Guidelines:  5.7-6.4%  Consistent with prediabetes  >or=6.5%  Consistent with diabetes    High levels of fetal hemoglobin interfere with the HbA1C  assay. Heterozygous hemoglobin variants (HbS, HgC, etc)do  not significantly interfere with this assay.   However, presence of multiple variants may affect accuracy.     07/22/2021 7.1 (H) 4.0 - 5.6 % Final     Comment:     ADA Screening Guidelines:  5.7-6.4%  Consistent with prediabetes  >or=6.5%  Consistent with diabetes    High levels of fetal hemoglobin interfere with the HbA1C  assay. Heterozygous hemoglobin variants (HbS, HgC, etc)do  not significantly interfere with this assay.   However, presence of multiple variants may affect accuracy.     03/04/2021 7.2 (H) 4.0 - 5.6 % Final     Comment:     ADA Screening Guidelines:  5.7-6.4%  Consistent with prediabetes  >or=6.5%  Consistent with diabetes    High levels of  fetal hemoglobin interfere with the HbA1C  assay. Heterozygous hemoglobin variants (HbS, HgC, etc)do  not significantly interfere with this assay.   However, presence of multiple variants may affect accuracy.           Past Medical History:   Diagnosis Date    A-fib     Anticoagulant long-term use     Cecum mass     CHF (congestive heart failure)     Diabetes mellitus     Disorder of kidney and ureter     Followed by Dr. Jonathan Pace    Encounter for blood transfusion     Hypertension     Insomnia     Irregular heart rhythm     Stage 4 chronic kidney disease     Thyroid disease     hypothyroidism    TIA (transient ischemic attack) 03/2017       Past Surgical History:   Procedure Laterality Date    BILATERAL SALPINGO-OOPHORECTOMY (BSO) Bilateral 8/19/2019    Procedure: SALPINGO-OOPHORECTOMY, BILATERAL;  Surgeon: Blayne Wallace MD;  Location: [facility] OR;  Service: OB/GYN;  Laterality: Bilateral;    HYSTERECTOMY      partial, benign causes by reported hx    INSERTION OF PACEMAKER  08/2017    LEFT HEART CATHETERIZATION Left 1/6/2022    Procedure: Left heart cath;  Surgeon: Bhaskar Hanson III, MD;  Location: [facility] CATH;  Service: Cardiology;  Laterality: Left;  Right radial    ROBOT-ASSISTED LAPAROSCOPIC COLECTOMY USING DA MICHELE XI Right 8/19/2019    Procedure: XI ROBOTIC COLECTOMY;  Surgeon: Kourtney Dodson MD;  Location: [facility] OR;  Service: General;  Laterality: Right;       Family History   Problem Relation Age of Onset    Diabetes Mother     Heart disease Mother     Cancer Father         type unknown (nonsmoker, no etoh)    Heart disease Brother     Dementia Sister     Cancer Daughter         fallopian ca    Cancer Son         esophageal ca    Dementia Sister     Dementia Sister        Social History     Socioeconomic History    Marital status:    Tobacco Use    Smoking status: Never    Smokeless tobacco: Never   Substance and Sexual Activity    Alcohol use: No    Drug use: No    Sexual activity: Never      Birth control/protection: Post-menopausal     Social Determinants of Health     Financial Resource Strain: Low Risk     Difficulty of Paying Living Expenses: Not very hard   Food Insecurity: Unknown    Worried About Running Out of Food in the Last Year: Patient refused    Ran Out of Food in the Last Year: Patient refused   Transportation Needs: Unknown    Lack of Transportation (Medical): Patient refused    Lack of Transportation (Non-Medical): Patient refused   Physical Activity: Unknown    Days of Exercise per Week: Patient refused    Minutes of Exercise per Session: 0 min   Stress: Unknown    Feeling of Stress : Patient refused   Social Connections: Unknown    Frequency of Communication with Friends and Family: Once a week    Frequency of Social Gatherings with Friends and Family: Patient refused    Attends Zoroastrianism Services: Never    Active Member of Clubs or Organizations: Yes    Attends Club or Organization Meetings: Patient refused    Marital Status:    Housing Stability: Unknown    Unable to Pay for Housing in the Last Year: Patient refused    Unstable Housing in the Last Year: Patient refused       Current Outpatient Medications   Medication Sig Dispense Refill    amiodarone (PACERONE) 200 MG Tab Take 200 mg by mouth once daily.       apixaban (ELIQUIS) 2.5 mg Tab Take 1 tablet (2.5 mg total) by mouth 2 (two) times daily. 60 tablet 0    ascorbic acid (VITAMIN C) 500 MG tablet Take 500 mg by mouth once daily.      b complex vitamins capsule Take 1 capsule by mouth once daily.      BINAXNOW COVID-19 AG SELF TEST Kit Use as Directed on the Package      blood sugar diagnostic Strp 1 strip 2 times daily, to use with insurance preferred meter 100 each 2    blood-glucose meter kit To check BG 2 times daily, to use with insurance preferred meter 1 each 0    cholecalciferol, vitamin D3, 125 mcg (5,000 unit) Tab Take 1 tablet (5,000 Units total) by mouth once daily.      clopidogrel (PLAVIX) 75 mg tablet TAKE  ONE TABLET BY MOUTH ONCE DAILY (Patient taking differently: Take 75 mg by mouth once daily.) 30 tablet 11    diclofenac sodium (VOLTAREN ARTHRITIS PAIN) 1 % Gel Apply 2 g topically 2 (two) times daily as needed (muscle pain). 100 g 1    digoxin (LANOXIN) 125 mcg tablet Take 1 tablet by mouth every other day.      diphenoxylate-atropine 2.5-0.025 mg (LOMOTIL) 2.5-0.025 mg per tablet Take 1 tablet by mouth 4 (four) times daily as needed for Diarrhea. 30 tablet 1    famotidine (PEPCID) 20 MG tablet Take 1 tablet (20 mg total) by mouth 2 (two) times daily. for 10 days 20 tablet 0    fish oil-omega-3 fatty acids 300-1,000 mg capsule Take 1 capsule by mouth 2 (two) times a day.      furosemide (LASIX) 40 MG tablet TAKE 1 TABLET BY MOUTH ONCE DAILY (TAKE  AN  EXTRA  TABLET  FOR  ANY  WEIGHT  GAIN  OF  3LB  OR  MORE  ON  ANY  GIVEN  DAY) 90 tablet 1    insulin (LANTUS SOLOSTAR U-100 INSULIN) glargine 100 units/mL SubQ pen Inject 16 Units into the skin every evening. 5 each 3    insulin lispro 100 unit/mL pen INJECT 7  THREE TIMES DAILY WITH MEALS 15 mL 3    lancets 31 gauge Misc 1 strip 2 times daily, to use with insurance preferred meter 100 each 2    levothyroxine (SYNTHROID) 125 MCG tablet TAKE 1 TABLET BY MOUTH EVERY MONDAY, WEDNESDAY, AND FRIDAY 90 tablet 0    levothyroxine (SYNTHROID) 75 MCG tablet Take 1 tablet (75 mcg total) by mouth every Tuesday, Thursday, Saturday, Sunday. 90 tablet 3    magnesium 200 mg Tab Take 200 mg by mouth once daily.      melatonin 10 mg Tab Take 1 tablet by mouth nightly as needed.      memantine (NAMENDA) 10 MG Tab TAKE 1 TABLET BY MOUTH ONCE DAILY IN THE EVENING 90 tablet 3    metoprolol tartrate (LOPRESSOR) 50 MG tablet Take 1 tablet (50 mg total) by mouth 2 (two) times daily. 60 tablet 1    multivitamin (THERAGRAN) per tablet Take 1 tablet by mouth once daily.      mupirocin (BACTROBAN) 2 % ointment Apply topically 3 (three) times daily. 30 g 0    ondansetron (ZOFRAN) 4 MG tablet  "ondansetron HCl 4 mg tablet   TAKE 1 TABLET BY MOUTH EVERY 6 HOURS PRN      ondansetron (ZOFRAN-ODT) 4 MG TbDL Take 1 tablet (4 mg total) by mouth every 8 (eight) hours as needed (Nausea). 20 tablet 0    pen needle, diabetic (COMFORT EZ PEN NEEDLES) 29 gauge x 1/2" Ndle 1 Units by Misc.(Non-Drug; Combo Route) route 3 (three) times daily. 300 each 11    polyethylene glycol (GLYCOLAX) 17 gram PwPk Take 17 g by mouth as needed (constipation).      potassium chloride SA (K-DUR,KLOR-CON M) 10 MEQ tablet Take 1 tablet (10 mEq total) by mouth 2 (two) times daily. 180 tablet 3    rivastigmine tartrate (EXELON) 1.5 MG capsule Take 1 capsule (1.5 mg total) by mouth 2 (two) times daily. 60 capsule 11    simvastatin (ZOCOR) 10 MG tablet Take 10 mg by mouth every evening.       No current facility-administered medications for this visit.       Review of patient's allergies indicates:   Allergen Reactions    Adhesive Itching    Mucinex [guaifenesin]      Says caused CHF     Latex, natural rubber Rash         Review of Systems   Constitutional: Negative for chills and fever.   Cardiovascular:  Negative for claudication and leg swelling.   Skin:  Negative for color change, nail changes and poor wound healing.   Musculoskeletal:  Negative for joint pain, joint swelling, muscle cramps and muscle weakness.   Gastrointestinal:  Negative for nausea and vomiting.   Neurological:  Positive for numbness.   Psychiatric/Behavioral:  Negative for altered mental status.          Objective:      Physical Exam  Constitutional:       Appearance: Normal appearance. She is not ill-appearing.   Cardiovascular:      Pulses:           Dorsalis pedis pulses are 2+ on the right side and 2+ on the left side.        Posterior tibial pulses are 2+ on the right side and 2+ on the left side.      Comments: CFT is < 3 seconds bilateral.  Pedal hair growth is decreased bilateral.  No lower extremity edema noted bilateral.  Toes are warm to touch bilateral.  "   Musculoskeletal:         General: Deformity present. No tenderness.      Right lower leg: No edema.      Left lower leg: No edema.      Comments: Muscle strength 5/5 in all muscle groups bilateral.  No tenderness nor crepitation with ROM of foot/ankle joints bilateral.  (-) for pain with palpation of bilateral foot and ankle. Bilateral semi-reducible contracture of toes 2-5.  Bilateral HAV.   Skin:     General: Skin is warm and dry.      Capillary Refill: Capillary refill takes 2 to 3 seconds.      Findings: No bruising, ecchymosis, erythema, signs of injury, laceration, lesion, petechiae, rash or wound.      Comments: Mature epithelium noted to the dorsum of the Rt. 2nd toe at the PIP joint.  Skin at the site is hyperpigmented.  Pedal skin appears thin bilateral. Toenails x 10 appear thickened by 2 mm, elongated by 4 mm, and discolored with subungual debris.            Neurological:      Mental Status: She is alert.      Sensory: Sensory deficit present.      Motor: No weakness or atrophy.      Comments: Decreased protective sensation noted bilateral.  Light touch is intact bilateral.               Assessment:       Encounter Diagnoses   Name Primary?    Diabetic polyneuropathy associated with type 2 diabetes mellitus Yes    Hammer toes of both feet     Onychomycosis due to dermatophyte          Plan:       Denice was seen today for nail care.    Diagnoses and all orders for this visit:    Diabetic polyneuropathy associated with type 2 diabetes mellitus    Hammer toes of both feet    Onychomycosis due to dermatophyte      I counseled the patient on her conditions, their implications and medical management.    Advised to continue wearing supportive shoe gear that limits pressure to the digits.    Recommend obtaining a heel counter to obtain a more flush fit within her shoe gear.     Shoe inspection. Diabetic Foot Education. Patient reminded of the importance of good nutrition and blood sugar control to help  prevent podiatric complications of diabetes. Patient instructed on proper foot hygeine. We discussed wearing proper shoe gear, daily foot inspections, never walking without protective shoe gear, never putting sharp instruments to feet    With patient's permission, nails were aggressively reduced and debrided x 10 to their soft tissue attachment mechanically and with electric , removing all offending nail and debris. Patient relates relief following the procedure. She will continue to monitor the areas daily, inspect her feet, wear protective shoe gear when ambulatory, moisturizer to maintain skin integrity and follow in this office in approximately 3 months, sooner p.r.n.    RTC in 3 months for routine nail care.    Pankaj Julian DPM

## 2023-07-24 ENCOUNTER — LAB VISIT (OUTPATIENT)
Dept: LAB | Facility: HOSPITAL | Age: 88
End: 2023-07-24
Attending: INTERNAL MEDICINE
Payer: MEDICARE

## 2023-07-24 ENCOUNTER — OFFICE VISIT (OUTPATIENT)
Dept: FAMILY MEDICINE | Facility: CLINIC | Age: 88
End: 2023-07-24
Payer: MEDICARE

## 2023-07-24 VITALS
HEIGHT: 62 IN | WEIGHT: 155.63 LBS | SYSTOLIC BLOOD PRESSURE: 138 MMHG | DIASTOLIC BLOOD PRESSURE: 62 MMHG | HEART RATE: 68 BPM | BODY MASS INDEX: 28.64 KG/M2

## 2023-07-24 DIAGNOSIS — E78.5 HYPERLIPIDEMIA, UNSPECIFIED HYPERLIPIDEMIA TYPE: ICD-10-CM

## 2023-07-24 DIAGNOSIS — I69.354 HEMIPARESIS AFFECTING LEFT SIDE AS LATE EFFECT OF CEREBROVASCULAR ACCIDENT (CVA): ICD-10-CM

## 2023-07-24 DIAGNOSIS — E11.22 TYPE 2 DIABETES MELLITUS WITH STAGE 3 CHRONIC KIDNEY DISEASE, WITH LONG-TERM CURRENT USE OF INSULIN, UNSPECIFIED WHETHER STAGE 3A OR 3B CKD: ICD-10-CM

## 2023-07-24 DIAGNOSIS — N18.4 CKD (CHRONIC KIDNEY DISEASE) STAGE 4, GFR 15-29 ML/MIN: ICD-10-CM

## 2023-07-24 DIAGNOSIS — E11.22 TYPE 2 DIABETES MELLITUS WITH STAGE 4 CHRONIC KIDNEY DISEASE, WITH LONG-TERM CURRENT USE OF INSULIN: ICD-10-CM

## 2023-07-24 DIAGNOSIS — G47.9 TROUBLE IN SLEEPING: ICD-10-CM

## 2023-07-24 DIAGNOSIS — Z79.4 TYPE 2 DIABETES MELLITUS WITH STAGE 3 CHRONIC KIDNEY DISEASE, WITH LONG-TERM CURRENT USE OF INSULIN, UNSPECIFIED WHETHER STAGE 3A OR 3B CKD: ICD-10-CM

## 2023-07-24 DIAGNOSIS — N18.30 TYPE 2 DIABETES MELLITUS WITH STAGE 3 CHRONIC KIDNEY DISEASE, WITH LONG-TERM CURRENT USE OF INSULIN, UNSPECIFIED WHETHER STAGE 3A OR 3B CKD: ICD-10-CM

## 2023-07-24 DIAGNOSIS — Z79.4 TYPE 2 DIABETES MELLITUS WITH STAGE 4 CHRONIC KIDNEY DISEASE, WITH LONG-TERM CURRENT USE OF INSULIN: ICD-10-CM

## 2023-07-24 DIAGNOSIS — N18.4 TYPE 2 DIABETES MELLITUS WITH STAGE 4 CHRONIC KIDNEY DISEASE, WITH LONG-TERM CURRENT USE OF INSULIN: ICD-10-CM

## 2023-07-24 DIAGNOSIS — R74.01 TRANSAMINITIS: ICD-10-CM

## 2023-07-24 DIAGNOSIS — Z00.00 ROUTINE GENERAL MEDICAL EXAMINATION AT A HEALTH CARE FACILITY: Primary | ICD-10-CM

## 2023-07-24 DIAGNOSIS — I10 PRIMARY HYPERTENSION: ICD-10-CM

## 2023-07-24 LAB
ALBUMIN SERPL BCP-MCNC: 3.4 G/DL (ref 3.5–5.2)
ALBUMIN SERPL BCP-MCNC: 3.4 G/DL (ref 3.5–5.2)
ALP SERPL-CCNC: 78 U/L (ref 55–135)
ALT SERPL W/O P-5'-P-CCNC: 25 U/L (ref 10–44)
ANION GAP SERPL CALC-SCNC: 9 MMOL/L (ref 8–16)
ANION GAP SERPL CALC-SCNC: 9 MMOL/L (ref 8–16)
AST SERPL-CCNC: 32 U/L (ref 10–40)
BASOPHILS # BLD AUTO: 0.04 K/UL (ref 0–0.2)
BASOPHILS NFR BLD: 0.5 % (ref 0–1.9)
BILIRUB SERPL-MCNC: 0.4 MG/DL (ref 0.1–1)
BUN SERPL-MCNC: 50 MG/DL (ref 8–23)
BUN SERPL-MCNC: 51 MG/DL (ref 8–23)
CALCIUM SERPL-MCNC: 10.3 MG/DL (ref 8.7–10.5)
CALCIUM SERPL-MCNC: 10.4 MG/DL (ref 8.7–10.5)
CHLORIDE SERPL-SCNC: 98 MMOL/L (ref 95–110)
CHLORIDE SERPL-SCNC: 98 MMOL/L (ref 95–110)
CHOLEST SERPL-MCNC: 156 MG/DL (ref 120–199)
CHOLEST/HDLC SERPL: 3.4 {RATIO} (ref 2–5)
CO2 SERPL-SCNC: 29 MMOL/L (ref 23–29)
CO2 SERPL-SCNC: 30 MMOL/L (ref 23–29)
CREAT SERPL-MCNC: 2.6 MG/DL (ref 0.5–1.4)
CREAT SERPL-MCNC: 2.7 MG/DL (ref 0.5–1.4)
DIFFERENTIAL METHOD: ABNORMAL
EOSINOPHIL # BLD AUTO: 0.1 K/UL (ref 0–0.5)
EOSINOPHIL NFR BLD: 1.5 % (ref 0–8)
ERYTHROCYTE [DISTWIDTH] IN BLOOD BY AUTOMATED COUNT: 12.5 % (ref 11.5–14.5)
EST. GFR  (NO RACE VARIABLE): 16.5 ML/MIN/1.73 M^2
EST. GFR  (NO RACE VARIABLE): 17.2 ML/MIN/1.73 M^2
ESTIMATED AVG GLUCOSE: 160 MG/DL (ref 68–131)
GLUCOSE SERPL-MCNC: 167 MG/DL (ref 70–110)
GLUCOSE SERPL-MCNC: 167 MG/DL (ref 70–110)
HBA1C MFR BLD: 7.2 % (ref 4–5.6)
HCT VFR BLD AUTO: 41.2 % (ref 37–48.5)
HDLC SERPL-MCNC: 46 MG/DL (ref 40–75)
HDLC SERPL: 29.5 % (ref 20–50)
HGB BLD-MCNC: 13.5 G/DL (ref 12–16)
IMM GRANULOCYTES # BLD AUTO: 0.03 K/UL (ref 0–0.04)
IMM GRANULOCYTES NFR BLD AUTO: 0.4 % (ref 0–0.5)
LDLC SERPL CALC-MCNC: 69.2 MG/DL (ref 63–159)
LYMPHOCYTES # BLD AUTO: 3 K/UL (ref 1–4.8)
LYMPHOCYTES NFR BLD: 37.2 % (ref 18–48)
MCH RBC QN AUTO: 31.5 PG (ref 27–31)
MCHC RBC AUTO-ENTMCNC: 32.8 G/DL (ref 32–36)
MCV RBC AUTO: 96 FL (ref 82–98)
MONOCYTES # BLD AUTO: 0.7 K/UL (ref 0.3–1)
MONOCYTES NFR BLD: 8.4 % (ref 4–15)
NEUTROPHILS # BLD AUTO: 4.1 K/UL (ref 1.8–7.7)
NEUTROPHILS NFR BLD: 52 % (ref 38–73)
NONHDLC SERPL-MCNC: 110 MG/DL
NRBC BLD-RTO: 0 /100 WBC
PHOSPHATE SERPL-MCNC: 3.5 MG/DL (ref 2.7–4.5)
PLATELET # BLD AUTO: 235 K/UL (ref 150–450)
PMV BLD AUTO: 12 FL (ref 9.2–12.9)
POTASSIUM SERPL-SCNC: 4.5 MMOL/L (ref 3.5–5.1)
POTASSIUM SERPL-SCNC: 4.8 MMOL/L (ref 3.5–5.1)
PROT SERPL-MCNC: 6.7 G/DL (ref 6–8.4)
PTH-INTACT SERPL-MCNC: 23.4 PG/ML (ref 9–77)
RBC # BLD AUTO: 4.28 M/UL (ref 4–5.4)
SODIUM SERPL-SCNC: 136 MMOL/L (ref 136–145)
SODIUM SERPL-SCNC: 137 MMOL/L (ref 136–145)
T4 FREE SERPL-MCNC: 1.19 NG/DL (ref 0.71–1.51)
TRIGL SERPL-MCNC: 204 MG/DL (ref 30–150)
TSH SERPL DL<=0.005 MIU/L-ACNC: 4.62 UIU/ML (ref 0.4–4)
WBC # BLD AUTO: 7.96 K/UL (ref 3.9–12.7)

## 2023-07-24 PROCEDURE — 84100 ASSAY OF PHOSPHORUS: CPT | Performed by: INTERNAL MEDICINE

## 2023-07-24 PROCEDURE — 99397 PER PM REEVAL EST PAT 65+ YR: CPT | Mod: GZ,S$GLB,, | Performed by: FAMILY MEDICINE

## 2023-07-24 PROCEDURE — 1160F RVW MEDS BY RX/DR IN RCRD: CPT | Mod: CPTII,S$GLB,, | Performed by: FAMILY MEDICINE

## 2023-07-24 PROCEDURE — 1101F PR PT FALLS ASSESS DOC 0-1 FALLS W/OUT INJ PAST YR: ICD-10-PCS | Mod: CPTII,S$GLB,, | Performed by: FAMILY MEDICINE

## 2023-07-24 PROCEDURE — 3288F PR FALLS RISK ASSESSMENT DOCUMENTED: ICD-10-PCS | Mod: CPTII,S$GLB,, | Performed by: FAMILY MEDICINE

## 2023-07-24 PROCEDURE — 1157F PR ADVANCE CARE PLAN OR EQUIV PRESENT IN MEDICAL RECORD: ICD-10-PCS | Mod: CPTII,S$GLB,, | Performed by: FAMILY MEDICINE

## 2023-07-24 PROCEDURE — 84443 ASSAY THYROID STIM HORMONE: CPT | Performed by: FAMILY MEDICINE

## 2023-07-24 PROCEDURE — 80069 RENAL FUNCTION PANEL: CPT | Performed by: INTERNAL MEDICINE

## 2023-07-24 PROCEDURE — 1157F ADVNC CARE PLAN IN RCRD: CPT | Mod: CPTII,S$GLB,, | Performed by: FAMILY MEDICINE

## 2023-07-24 PROCEDURE — 1101F PT FALLS ASSESS-DOCD LE1/YR: CPT | Mod: CPTII,S$GLB,, | Performed by: FAMILY MEDICINE

## 2023-07-24 PROCEDURE — 36415 COLL VENOUS BLD VENIPUNCTURE: CPT | Mod: PN | Performed by: FAMILY MEDICINE

## 2023-07-24 PROCEDURE — 1160F PR REVIEW ALL MEDS BY PRESCRIBER/CLIN PHARMACIST DOCUMENTED: ICD-10-PCS | Mod: CPTII,S$GLB,, | Performed by: FAMILY MEDICINE

## 2023-07-24 PROCEDURE — 99999 PR PBB SHADOW E&M-EST. PATIENT-LVL V: ICD-10-PCS | Mod: PBBFAC,,, | Performed by: FAMILY MEDICINE

## 2023-07-24 PROCEDURE — 1159F MED LIST DOCD IN RCRD: CPT | Mod: CPTII,S$GLB,, | Performed by: FAMILY MEDICINE

## 2023-07-24 PROCEDURE — 83970 ASSAY OF PARATHORMONE: CPT | Performed by: INTERNAL MEDICINE

## 2023-07-24 PROCEDURE — 1159F PR MEDICATION LIST DOCUMENTED IN MEDICAL RECORD: ICD-10-PCS | Mod: CPTII,S$GLB,, | Performed by: FAMILY MEDICINE

## 2023-07-24 PROCEDURE — 83036 HEMOGLOBIN GLYCOSYLATED A1C: CPT | Performed by: FAMILY MEDICINE

## 2023-07-24 PROCEDURE — 1126F AMNT PAIN NOTED NONE PRSNT: CPT | Mod: CPTII,S$GLB,, | Performed by: FAMILY MEDICINE

## 2023-07-24 PROCEDURE — 99397 PR PREVENTIVE VISIT,EST,65 & OVER: ICD-10-PCS | Mod: GZ,S$GLB,, | Performed by: FAMILY MEDICINE

## 2023-07-24 PROCEDURE — 1126F PR PAIN SEVERITY QUANTIFIED, NO PAIN PRESENT: ICD-10-PCS | Mod: CPTII,S$GLB,, | Performed by: FAMILY MEDICINE

## 2023-07-24 PROCEDURE — 36415 COLL VENOUS BLD VENIPUNCTURE: CPT | Mod: PN | Performed by: INTERNAL MEDICINE

## 2023-07-24 PROCEDURE — 3288F FALL RISK ASSESSMENT DOCD: CPT | Mod: CPTII,S$GLB,, | Performed by: FAMILY MEDICINE

## 2023-07-24 PROCEDURE — 99999 PR PBB SHADOW E&M-EST. PATIENT-LVL V: CPT | Mod: PBBFAC,,, | Performed by: FAMILY MEDICINE

## 2023-07-24 PROCEDURE — 85025 COMPLETE CBC W/AUTO DIFF WBC: CPT | Performed by: FAMILY MEDICINE

## 2023-07-24 PROCEDURE — 84439 ASSAY OF FREE THYROXINE: CPT | Performed by: FAMILY MEDICINE

## 2023-07-24 PROCEDURE — 80053 COMPREHEN METABOLIC PANEL: CPT | Performed by: FAMILY MEDICINE

## 2023-07-24 PROCEDURE — 80061 LIPID PANEL: CPT | Performed by: FAMILY MEDICINE

## 2023-07-24 RX ORDER — TRAZODONE HYDROCHLORIDE 50 MG/1
50 TABLET ORAL NIGHTLY
Qty: 30 TABLET | Refills: 11 | Status: SHIPPED | OUTPATIENT
Start: 2023-07-24 | End: 2023-12-08

## 2023-07-24 NOTE — PROGRESS NOTES
Subjective:       Patient ID: Denice Sheth is a 88 y.o. female.    Chief Complaint: Annual Exam (Annual check up)      Denice Sheth is in the office for annual exam.    HPI  Medical hx reviewed, no updates.   Past Medical History:   Diagnosis Date    A-fib     Anticoagulant long-term use     Cecum mass     CHF (congestive heart failure)     Diabetes mellitus     Disorder of kidney and ureter     Followed by Dr. Jonathan Pace    Encounter for blood transfusion     Hypertension     Insomnia     Irregular heart rhythm     Stage 4 chronic kidney disease     Thyroid disease     hypothyroidism    TIA (transient ischemic attack) 03/2017     They are working with podiatry and orthotics to find a good fit for her shoes - very narrow heal and wide toe box.   DM2: infrequency hypoglycemia, but symptomatic.   Trouble falling asleep.     Current Outpatient Medications:     amiodarone (PACERONE) 200 MG Tab, Take 200 mg by mouth once daily. , Disp: , Rfl:     apixaban (ELIQUIS) 2.5 mg Tab, Take 1 tablet (2.5 mg total) by mouth 2 (two) times daily., Disp: 60 tablet, Rfl: 0    ascorbic acid (VITAMIN C) 500 MG tablet, Take 500 mg by mouth once daily., Disp: , Rfl:     b complex vitamins capsule, Take 1 capsule by mouth once daily., Disp: , Rfl:     BINAXNOW COVID-19 AG SELF TEST Kit, Use as Directed on the Package, Disp: , Rfl:     blood sugar diagnostic Strp, 1 strip 2 times daily, to use with insurance preferred meter, Disp: 100 each, Rfl: 2    blood-glucose meter kit, To check BG 2 times daily, to use with insurance preferred meter, Disp: 1 each, Rfl: 0    cholecalciferol, vitamin D3, 125 mcg (5,000 unit) Tab, Take 1 tablet (5,000 Units total) by mouth once daily., Disp: , Rfl:     clopidogrel (PLAVIX) 75 mg tablet, TAKE ONE TABLET BY MOUTH ONCE DAILY (Patient taking differently: Take 75 mg by mouth once daily.), Disp: 30 tablet, Rfl: 11    diclofenac sodium (VOLTAREN ARTHRITIS PAIN) 1 % Gel, Apply 2 g topically 2 (two) times  daily as needed (muscle pain)., Disp: 100 g, Rfl: 1    digoxin (LANOXIN) 125 mcg tablet, Take 1 tablet by mouth every other day., Disp: , Rfl:     diphenoxylate-atropine 2.5-0.025 mg (LOMOTIL) 2.5-0.025 mg per tablet, Take 1 tablet by mouth 4 (four) times daily as needed for Diarrhea., Disp: 30 tablet, Rfl: 1    famotidine (PEPCID) 20 MG tablet, Take 1 tablet (20 mg total) by mouth 2 (two) times daily. for 10 days, Disp: 20 tablet, Rfl: 0    fish oil-omega-3 fatty acids 300-1,000 mg capsule, Take 1 capsule by mouth 2 (two) times a day., Disp: , Rfl:     furosemide (LASIX) 40 MG tablet, TAKE 1 TABLET BY MOUTH ONCE DAILY (TAKE  AN  EXTRA  TABLET  FOR  ANY  WEIGHT  GAIN  OF  3LB  OR  MORE  ON  ANY  GIVEN  DAY), Disp: 90 tablet, Rfl: 1    insulin (LANTUS SOLOSTAR U-100 INSULIN) glargine 100 units/mL SubQ pen, Inject 16 Units into the skin every evening., Disp: 5 each, Rfl: 3    insulin lispro 100 unit/mL pen, INJECT 7  THREE TIMES DAILY WITH MEALS, Disp: 15 mL, Rfl: 3    lancets 31 gauge Misc, 1 strip 2 times daily, to use with insurance preferred meter, Disp: 100 each, Rfl: 2    levothyroxine (SYNTHROID) 125 MCG tablet, TAKE 1 TABLET BY MOUTH EVERY MONDAY, WEDNESDAY, AND FRIDAY, Disp: 90 tablet, Rfl: 0    levothyroxine (SYNTHROID) 75 MCG tablet, Take 1 tablet (75 mcg total) by mouth every Tuesday, Thursday, Saturday, Sunday., Disp: 90 tablet, Rfl: 3    magnesium 200 mg Tab, Take 200 mg by mouth once daily., Disp: , Rfl:     melatonin 10 mg Tab, Take 1 tablet by mouth nightly as needed., Disp: , Rfl:     memantine (NAMENDA) 10 MG Tab, TAKE 1 TABLET BY MOUTH ONCE DAILY IN THE EVENING, Disp: 90 tablet, Rfl: 3    metoprolol tartrate (LOPRESSOR) 50 MG tablet, Take 1 tablet (50 mg total) by mouth 2 (two) times daily., Disp: 60 tablet, Rfl: 1    multivitamin (THERAGRAN) per tablet, Take 1 tablet by mouth once daily., Disp: , Rfl:     mupirocin (BACTROBAN) 2 % ointment, Apply topically 3 (three) times daily., Disp: 30 g, Rfl:  "0    ondansetron (ZOFRAN) 4 MG tablet, ondansetron HCl 4 mg tablet  TAKE 1 TABLET BY MOUTH EVERY 6 HOURS PRN, Disp: , Rfl:     ondansetron (ZOFRAN-ODT) 4 MG TbDL, Take 1 tablet (4 mg total) by mouth every 8 (eight) hours as needed (Nausea)., Disp: 20 tablet, Rfl: 0    pen needle, diabetic (COMFORT EZ PEN NEEDLES) 29 gauge x 1/2" Ndle, 1 Units by Misc.(Non-Drug; Combo Route) route 3 (three) times daily., Disp: 300 each, Rfl: 11    polyethylene glycol (GLYCOLAX) 17 gram PwPk, Take 17 g by mouth as needed (constipation)., Disp: , Rfl:     potassium chloride SA (K-DUR,KLOR-CON M) 10 MEQ tablet, Take 1 tablet (10 mEq total) by mouth 2 (two) times daily., Disp: 180 tablet, Rfl: 3    rivastigmine tartrate (EXELON) 1.5 MG capsule, Take 1 capsule (1.5 mg total) by mouth 2 (two) times daily., Disp: 60 capsule, Rfl: 11    simvastatin (ZOCOR) 10 MG tablet, Take 10 mg by mouth every evening., Disp: , Rfl:     traZODone (DESYREL) 50 MG tablet, Take 1 tablet (50 mg total) by mouth every evening., Disp: 30 tablet, Rfl: 11    The ASCVD Risk score (Saxon DK, et al., 2019) failed to calculate for the following reasons:    The 2019 ASCVD risk score is only valid for ages 40 to 79     Lab Results   Component Value Date    HGBA1C 7.2 (H) 07/24/2023    HGBA1C 6.9 (H) 10/06/2021    HGBA1C 7.1 (H) 07/22/2021     Lab Results   Component Value Date    LDLCALC 69.2 07/24/2023    CREATININE 2.7 (H) 07/24/2023   Labs 2023 rev.     Review of Systems   Constitutional:  Negative for fever and unexpected weight change.   HENT:  Positive for hearing loss. Negative for rhinorrhea and trouble swallowing.    Respiratory:  Negative for cough, chest tightness and shortness of breath.    Cardiovascular:  Negative for chest pain, palpitations and leg swelling.   Gastrointestinal:  Negative for blood in stool, constipation (occ) and diarrhea.   Genitourinary:  Positive for frequency. Negative for difficulty urinating and dysuria.   Musculoskeletal:  " Positive for gait problem. Negative for arthralgias, joint swelling and neck pain.   Neurological:  Positive for weakness. Negative for dizziness, light-headedness and headaches.   Psychiatric/Behavioral:  Positive for sleep disturbance (trouble falling asleep). Negative for dysphoric mood.          Objective:      Physical Exam  Vitals and nursing note reviewed.   Constitutional:       General: She is not in acute distress.     Appearance: Normal appearance. She is well-developed.   HENT:      Head: Normocephalic and atraumatic.   Eyes:      General: No scleral icterus.        Right eye: No discharge.         Left eye: No discharge.      Conjunctiva/sclera: Conjunctivae normal.   Pulmonary:      Effort: Pulmonary effort is normal. No respiratory distress.   Skin:     General: Skin is warm and dry.   Neurological:      General: No focal deficit present.      Mental Status: She is alert and oriented to person, place, and time.   Psychiatric:         Mood and Affect: Mood normal.         Behavior: Behavior normal.           Screening recommendations appropriate to age and health status were reviewed.    Assessment & Plan:    Routine general medical examination at a health care facility  Comments:  anticipatory guidance reviewed    Hyperlipidemia, unspecified hyperlipidemia type  -     TSH; Future; Expected date: 07/24/2023  -     CBC Auto Differential; Future; Expected date: 07/24/2023  -     Lipid Panel; Future; Expected date: 07/24/2023    Primary hypertension  Comments:  controlled, cont regimen    CKD (chronic kidney disease) stage 4, GFR 15-29 ml/min  Comments:  update lab today, cont monitoring    Type 2 diabetes mellitus with stage 4 chronic kidney disease, with long-term current use of insulin  Comments:  variable, thye'll check with cardiology about potential for use of cgm    Hemiparesis affecting left side as late effect of cerebrovascular accident (CVA)  Comments:  stable, cont monitoring    Trouble in  sleeping  Comments:  trial trazodone, start 1/2 tablet  remeron was too strong  Orders:  -     traZODone (DESYREL) 50 MG tablet; Take 1 tablet (50 mg total) by mouth every evening.  Dispense: 30 tablet; Refill: 11

## 2023-08-04 ENCOUNTER — OFFICE VISIT (OUTPATIENT)
Dept: NEPHROLOGY | Facility: CLINIC | Age: 88
End: 2023-08-04
Payer: MEDICARE

## 2023-08-04 VITALS — SYSTOLIC BLOOD PRESSURE: 142 MMHG | HEIGHT: 62 IN | BODY MASS INDEX: 28.47 KG/M2 | DIASTOLIC BLOOD PRESSURE: 60 MMHG

## 2023-08-04 DIAGNOSIS — Z79.4 TYPE 2 DIABETES MELLITUS WITH STAGE 4 CHRONIC KIDNEY DISEASE, WITH LONG-TERM CURRENT USE OF INSULIN: ICD-10-CM

## 2023-08-04 DIAGNOSIS — R80.9 PROTEINURIA DUE TO TYPE 2 DIABETES MELLITUS: ICD-10-CM

## 2023-08-04 DIAGNOSIS — N18.4 CKD (CHRONIC KIDNEY DISEASE) STAGE 4, GFR 15-29 ML/MIN: Primary | ICD-10-CM

## 2023-08-04 DIAGNOSIS — E11.22 TYPE 2 DIABETES MELLITUS WITH STAGE 4 CHRONIC KIDNEY DISEASE, WITH LONG-TERM CURRENT USE OF INSULIN: ICD-10-CM

## 2023-08-04 DIAGNOSIS — N18.4 TYPE 2 DIABETES MELLITUS WITH STAGE 4 CHRONIC KIDNEY DISEASE, WITH LONG-TERM CURRENT USE OF INSULIN: ICD-10-CM

## 2023-08-04 DIAGNOSIS — E87.1 HYPONATREMIA: ICD-10-CM

## 2023-08-04 DIAGNOSIS — E11.29 PROTEINURIA DUE TO TYPE 2 DIABETES MELLITUS: ICD-10-CM

## 2023-08-04 DIAGNOSIS — I10 PRIMARY HYPERTENSION: ICD-10-CM

## 2023-08-04 PROCEDURE — 1159F PR MEDICATION LIST DOCUMENTED IN MEDICAL RECORD: ICD-10-PCS | Mod: CPTII,S$GLB,, | Performed by: INTERNAL MEDICINE

## 2023-08-04 PROCEDURE — 99999 PR PBB SHADOW E&M-EST. PATIENT-LVL IV: CPT | Mod: PBBFAC,,, | Performed by: INTERNAL MEDICINE

## 2023-08-04 PROCEDURE — 1159F MED LIST DOCD IN RCRD: CPT | Mod: CPTII,S$GLB,, | Performed by: INTERNAL MEDICINE

## 2023-08-04 PROCEDURE — 1101F PR PT FALLS ASSESS DOC 0-1 FALLS W/OUT INJ PAST YR: ICD-10-PCS | Mod: CPTII,S$GLB,, | Performed by: INTERNAL MEDICINE

## 2023-08-04 PROCEDURE — 1157F ADVNC CARE PLAN IN RCRD: CPT | Mod: CPTII,S$GLB,, | Performed by: INTERNAL MEDICINE

## 2023-08-04 PROCEDURE — 1101F PT FALLS ASSESS-DOCD LE1/YR: CPT | Mod: CPTII,S$GLB,, | Performed by: INTERNAL MEDICINE

## 2023-08-04 PROCEDURE — 99214 OFFICE O/P EST MOD 30 MIN: CPT | Mod: S$GLB,,, | Performed by: INTERNAL MEDICINE

## 2023-08-04 PROCEDURE — 3288F FALL RISK ASSESSMENT DOCD: CPT | Mod: CPTII,S$GLB,, | Performed by: INTERNAL MEDICINE

## 2023-08-04 PROCEDURE — 1157F PR ADVANCE CARE PLAN OR EQUIV PRESENT IN MEDICAL RECORD: ICD-10-PCS | Mod: CPTII,S$GLB,, | Performed by: INTERNAL MEDICINE

## 2023-08-04 PROCEDURE — 99214 PR OFFICE/OUTPT VISIT, EST, LEVL IV, 30-39 MIN: ICD-10-PCS | Mod: S$GLB,,, | Performed by: INTERNAL MEDICINE

## 2023-08-04 PROCEDURE — 3288F PR FALLS RISK ASSESSMENT DOCUMENTED: ICD-10-PCS | Mod: CPTII,S$GLB,, | Performed by: INTERNAL MEDICINE

## 2023-08-04 PROCEDURE — 99999 PR PBB SHADOW E&M-EST. PATIENT-LVL IV: ICD-10-PCS | Mod: PBBFAC,,, | Performed by: INTERNAL MEDICINE

## 2023-08-04 NOTE — PROGRESS NOTES
"      Subjective:       Patient ID: Denice Sheth is a 88 y.o. White female who presents for return patient evaluation for chronic renal failure.      She has no uremic or urinary symptoms.  She had COVID in Dec 2022.  She is back to her baseline.        Review of Systems   Constitutional:  Negative for appetite change, chills and fever.   Eyes:  Negative for visual disturbance.   Respiratory:  Positive for shortness of breath (with exertion). Negative for cough.    Cardiovascular:  Negative for chest pain and leg swelling.   Gastrointestinal:  Negative for abdominal pain, diarrhea, nausea and vomiting.   Endocrine: Positive for cold intolerance.   Genitourinary:  Negative for difficulty urinating, dysuria and hematuria.   Musculoskeletal:  Positive for arthralgias (right knee) and gait problem. Negative for myalgias.   Skin:  Negative for rash.   Neurological:  Positive for weakness. Negative for headaches.   Hematological:  Bruises/bleeds easily.   Psychiatric/Behavioral:  Negative for sleep disturbance.        The past medical, family and social histories were reviewed for this encounter.     BP (!) 142/60 (BP Location: Right arm, Patient Position: Sitting, BP Method: Medium (Manual))   Ht 5' 2" (1.575 m)   LMP  (LMP Unknown)   BMI 28.47 kg/m²     Objective:      Physical Exam  Vitals reviewed.   Constitutional:       General: She is not in acute distress.     Appearance: She is well-developed.   HENT:      Head: Normocephalic and atraumatic.   Eyes:      General: No scleral icterus.     Conjunctiva/sclera: Conjunctivae normal.   Neck:      Vascular: No JVD.   Cardiovascular:      Rate and Rhythm: Normal rate and regular rhythm.      Heart sounds: Normal heart sounds. No murmur heard.     No friction rub. No gallop.   Pulmonary:      Effort: Pulmonary effort is normal. No respiratory distress.      Breath sounds: Normal breath sounds. No wheezing.   Abdominal:      General: Bowel sounds are normal. There is " no distension.      Palpations: Abdomen is soft.      Tenderness: There is no abdominal tenderness.   Musculoskeletal:      Cervical back: Normal range of motion.      Right lower leg: No edema.      Left lower leg: No edema.   Skin:     General: Skin is warm and dry.      Findings: No rash.   Neurological:      Mental Status: She is alert and oriented to person, place, and time.   Psychiatric:         Mood and Affect: Mood normal.         Behavior: Behavior normal.        Assessment:       1. CKD (chronic kidney disease) stage 4, GFR 15-29 ml/min    2. Hyponatremia    3. Primary hypertension    4. 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 Type 2 diabetes mellitus with stage 4 chronic kidney disease, with long-term current use of insulin       Lab Results   Component Value Date    CREATININE 2.7 (H) 07/24/2023    BUN 51 (H) 07/24/2023     07/24/2023    K 4.8 07/24/2023    CL 98 07/24/2023    CO2 29 07/24/2023     Lab Results   Component Value Date    PTH 23.4 07/24/2023    CALCIUM 10.4 07/24/2023    PHOS 3.5 07/24/2023     Lab Results   Component Value Date    HCT 41.2 07/24/2023     Prot/Creat Ratio, Urine   Date Value Ref Range Status   07/24/2023 Unable to calculate 0.00 - 0.20 Final   06/16/2023 0.09 0.00 - 0.20 Final   05/12/2023 Unable to calculate 0.00 - 0.20 Final      Plan:   Return to clinic in 6 months.  Labs for next visit include rp pth upc per standing orders q 3 months.   Baseline creatinine is 1.5-1.9 since 2015. Lately she has been 2.0-2.5.  PTH is 23 with a calcium of 10.4.  Change D3 to MWF.  UPC is negative.  Renal US shows R 9.7 cm L 10.9 cm with no cysts/stones/masses.  Blood pressure is controlled on the current regimen.  Continue current medications as prescribed and reviewed.   She had JANETTE in January secondary to a contrast load.  She does not wish to do dialysis.

## 2023-08-07 ENCOUNTER — TELEPHONE (OUTPATIENT)
Dept: NEPHROLOGY | Facility: CLINIC | Age: 88
End: 2023-08-07

## 2023-08-09 ENCOUNTER — PATIENT MESSAGE (OUTPATIENT)
Dept: FAMILY MEDICINE | Facility: CLINIC | Age: 88
End: 2023-08-09
Payer: MEDICARE

## 2023-08-25 ENCOUNTER — TELEPHONE (OUTPATIENT)
Dept: FAMILY MEDICINE | Facility: CLINIC | Age: 88
End: 2023-08-25
Payer: MEDICARE

## 2023-08-25 NOTE — TELEPHONE ENCOUNTER
----- Message from Aaliyah Flaherty sent at 8/25/2023  8:36 AM CDT -----  Contact: self  Type: Sooner Appointment Request        Caller is requesting a sooner appointment. Caller declined first available appointment listed below. Caller will not accept being placed on the waitlist and is requesting a message be sent to doctor.        Name of Caller: Patient   Best Call Back Number: [phone]  Additional Information: Pt states she really needs to get in her levels are really high. Pt states she needs to get in cant wait until October plz call pt today to get seen. Thanks

## 2023-08-31 ENCOUNTER — OFFICE VISIT (OUTPATIENT)
Dept: FAMILY MEDICINE | Facility: CLINIC | Age: 88
End: 2023-08-31
Payer: MEDICARE

## 2023-08-31 VITALS
BODY MASS INDEX: 28.24 KG/M2 | HEART RATE: 72 BPM | SYSTOLIC BLOOD PRESSURE: 124 MMHG | OXYGEN SATURATION: 98 % | DIASTOLIC BLOOD PRESSURE: 72 MMHG | WEIGHT: 153.44 LBS | RESPIRATION RATE: 18 BRPM | HEIGHT: 62 IN

## 2023-08-31 DIAGNOSIS — E11.22 TYPE 2 DIABETES MELLITUS WITH STAGE 3 CHRONIC KIDNEY DISEASE, WITH LONG-TERM CURRENT USE OF INSULIN, UNSPECIFIED WHETHER STAGE 3A OR 3B CKD: ICD-10-CM

## 2023-08-31 DIAGNOSIS — Z79.4 TYPE 2 DIABETES MELLITUS WITH STAGE 3 CHRONIC KIDNEY DISEASE, WITH LONG-TERM CURRENT USE OF INSULIN, UNSPECIFIED WHETHER STAGE 3A OR 3B CKD: ICD-10-CM

## 2023-08-31 DIAGNOSIS — N18.30 TYPE 2 DIABETES MELLITUS WITH STAGE 3 CHRONIC KIDNEY DISEASE, WITH LONG-TERM CURRENT USE OF INSULIN, UNSPECIFIED WHETHER STAGE 3A OR 3B CKD: ICD-10-CM

## 2023-08-31 PROCEDURE — 1157F ADVNC CARE PLAN IN RCRD: CPT | Mod: CPTII,S$GLB,, | Performed by: STUDENT IN AN ORGANIZED HEALTH CARE EDUCATION/TRAINING PROGRAM

## 2023-08-31 PROCEDURE — 99999 PR PBB SHADOW E&M-EST. PATIENT-LVL V: CPT | Mod: PBBFAC,,, | Performed by: STUDENT IN AN ORGANIZED HEALTH CARE EDUCATION/TRAINING PROGRAM

## 2023-08-31 PROCEDURE — 1159F MED LIST DOCD IN RCRD: CPT | Mod: CPTII,S$GLB,, | Performed by: STUDENT IN AN ORGANIZED HEALTH CARE EDUCATION/TRAINING PROGRAM

## 2023-08-31 PROCEDURE — 99214 OFFICE O/P EST MOD 30 MIN: CPT | Mod: S$GLB,,, | Performed by: STUDENT IN AN ORGANIZED HEALTH CARE EDUCATION/TRAINING PROGRAM

## 2023-08-31 PROCEDURE — 1101F PT FALLS ASSESS-DOCD LE1/YR: CPT | Mod: CPTII,S$GLB,, | Performed by: STUDENT IN AN ORGANIZED HEALTH CARE EDUCATION/TRAINING PROGRAM

## 2023-08-31 PROCEDURE — 1159F PR MEDICATION LIST DOCUMENTED IN MEDICAL RECORD: ICD-10-PCS | Mod: CPTII,S$GLB,, | Performed by: STUDENT IN AN ORGANIZED HEALTH CARE EDUCATION/TRAINING PROGRAM

## 2023-08-31 PROCEDURE — 99999 PR PBB SHADOW E&M-EST. PATIENT-LVL V: ICD-10-PCS | Mod: PBBFAC,,, | Performed by: STUDENT IN AN ORGANIZED HEALTH CARE EDUCATION/TRAINING PROGRAM

## 2023-08-31 PROCEDURE — 1101F PR PT FALLS ASSESS DOC 0-1 FALLS W/OUT INJ PAST YR: ICD-10-PCS | Mod: CPTII,S$GLB,, | Performed by: STUDENT IN AN ORGANIZED HEALTH CARE EDUCATION/TRAINING PROGRAM

## 2023-08-31 PROCEDURE — 1126F PR PAIN SEVERITY QUANTIFIED, NO PAIN PRESENT: ICD-10-PCS | Mod: CPTII,S$GLB,, | Performed by: STUDENT IN AN ORGANIZED HEALTH CARE EDUCATION/TRAINING PROGRAM

## 2023-08-31 PROCEDURE — 99214 PR OFFICE/OUTPT VISIT, EST, LEVL IV, 30-39 MIN: ICD-10-PCS | Mod: S$GLB,,, | Performed by: STUDENT IN AN ORGANIZED HEALTH CARE EDUCATION/TRAINING PROGRAM

## 2023-08-31 PROCEDURE — 1126F AMNT PAIN NOTED NONE PRSNT: CPT | Mod: CPTII,S$GLB,, | Performed by: STUDENT IN AN ORGANIZED HEALTH CARE EDUCATION/TRAINING PROGRAM

## 2023-08-31 PROCEDURE — 1157F PR ADVANCE CARE PLAN OR EQUIV PRESENT IN MEDICAL RECORD: ICD-10-PCS | Mod: CPTII,S$GLB,, | Performed by: STUDENT IN AN ORGANIZED HEALTH CARE EDUCATION/TRAINING PROGRAM

## 2023-08-31 PROCEDURE — 3288F FALL RISK ASSESSMENT DOCD: CPT | Mod: CPTII,S$GLB,, | Performed by: STUDENT IN AN ORGANIZED HEALTH CARE EDUCATION/TRAINING PROGRAM

## 2023-08-31 PROCEDURE — 3288F PR FALLS RISK ASSESSMENT DOCUMENTED: ICD-10-PCS | Mod: CPTII,S$GLB,, | Performed by: STUDENT IN AN ORGANIZED HEALTH CARE EDUCATION/TRAINING PROGRAM

## 2023-08-31 RX ORDER — INSULIN GLARGINE 100 [IU]/ML
20 INJECTION, SOLUTION SUBCUTANEOUS NIGHTLY
Qty: 5 EACH | Refills: 3 | Status: SHIPPED | OUTPATIENT
Start: 2023-08-31 | End: 2023-09-14 | Stop reason: SDUPTHER

## 2023-08-31 RX ORDER — INSULIN LISPRO 100 [IU]/ML
INJECTION, SOLUTION INTRAVENOUS; SUBCUTANEOUS
Qty: 15 ML | Refills: 3 | Status: SHIPPED | OUTPATIENT
Start: 2023-08-31 | End: 2023-09-14 | Stop reason: SDUPTHER

## 2023-08-31 NOTE — PROGRESS NOTES
Plan:     Denice was seen today for blood sugar problem.    Diagnoses and all orders for this visit:    Type 2 diabetes mellitus with stage 3 chronic kidney disease, with long-term current use of insulin, unspecified whether stage 3a or 3b CKD  -     insulin (LANTUS SOLOSTAR U-100 INSULIN) glargine 100 units/mL SubQ pen; Inject 20 Units into the skin every evening.  -     insulin lispro 100 unit/mL pen; INJECT 10  THREE TIMES DAILY WITH MEALS    Monitor for hypoglycemia w/ increase in insulin dosage.     Follow up in about 2 weeks (around 9/14/2023), or if symptoms worsen or fail to improve.    Allison Woods MD  08/31/2023    Subjective:      Patient ID: Denice Sheth is a 88 y.o. female    Chief Complaint   Patient presents with    Blood Sugar Problem     HPI  88 y.o. female with a PMHx as documented below presents to clinic today for the following:      Pt w/ DMT2, current medication regimen as follows: Lantus 16 units qhs, insulin lispro 7 units TID w/ meals + sliding scale. Pt presents to clinic today w/ daughter to review recent BG levels. Fasting AM BG levels averaging about 200 despite consistent diet and compliance w/ medication regimen. Of note, pt w/ CKD, progressively worsening. She is not interested in dialysis. No recent hypoglycemic episodes. \    ROS  Constitutional:  Negative for chills and fever.   Respiratory:  Negative for shortness of breath.    Cardiovascular:  Negative for chest pain.   Gastrointestinal:  Negative for abdominal pain, constipation, diarrhea, nausea and vomiting.     Current Outpatient Medications   Medication Instructions    amiodarone (PACERONE) 200 mg, Oral, Daily    apixaban (ELIQUIS) 2.5 mg, Oral, 2 times daily    ascorbic acid (vitamin C) (VITAMIN C) 500 mg, Oral, Daily    b complex vitamins capsule 1 capsule, Oral, Daily    BINAXNOW COVID-19 AG SELF TEST Kit Use as Directed on the Package    blood sugar diagnostic Strp 1 strip 2 times daily, to use with insurance  preferred meter    blood-glucose meter kit To check BG 2 times daily, to use with insurance preferred meter    cholecalciferol (vitamin D3) 5,000 Units, Oral, Daily    clopidogrel (PLAVIX) 75 mg tablet TAKE ONE TABLET BY MOUTH ONCE DAILY    diclofenac sodium (VOLTAREN ARTHRITIS PAIN) 2 g, Topical (Top), 2 times daily PRN    digoxin (LANOXIN) 125 mcg tablet 1 tablet, Oral, Every other day    diphenoxylate-atropine 2.5-0.025 mg (LOMOTIL) 2.5-0.025 mg per tablet 1 tablet, Oral, 4 times daily PRN    famotidine (PEPCID) 20 mg, Oral, 2 times daily    fish oil-omega-3 fatty acids 300-1,000 mg capsule 1 capsule, Oral, 2 times daily    furosemide (LASIX) 40 MG tablet TAKE 1 TABLET BY MOUTH ONCE DAILY (TAKE  AN  EXTRA  TABLET  FOR  ANY  WEIGHT  GAIN  OF  3LB  OR  MORE  ON  ANY  GIVEN  DAY)    insulin (LANTUS SOLOSTAR U-100 INSULIN) 20 Units, Subcutaneous, Nightly    insulin lispro 100 unit/mL pen INJECT 10  THREE TIMES DAILY WITH MEALS    lancets 31 gauge Misc 1 strip 2 times daily, to use with insurance preferred meter    levothyroxine (SYNTHROID) 125 MCG tablet TAKE 1 TABLET BY MOUTH EVERY MONDAY, WEDNESDAY, AND FRIDAY    levothyroxine (SYNTHROID) 75 mcg, Oral, Every Tues, Thurs, Sat, Sun    magnesium 200 mg, Oral, Daily    melatonin 10 mg Tab 1 tablet, Oral, Nightly PRN    memantine (NAMENDA) 10 MG Tab TAKE 1 TABLET BY MOUTH ONCE DAILY IN THE EVENING    metoprolol tartrate (LOPRESSOR) 50 mg, Oral, 2 times daily    multivitamin (THERAGRAN) per tablet 1 tablet, Daily    mupirocin (BACTROBAN) 2 % ointment Topical (Top), 3 times daily    ondansetron (ZOFRAN) 4 MG tablet ondansetron HCl 4 mg tablet   TAKE 1 TABLET BY MOUTH EVERY 6 HOURS PRN    ondansetron (ZOFRAN-ODT) 4 mg, Oral, Every 8 hours PRN    pen needle, diabetic (COMFORT EZ PEN NEEDLES) 1 Units, Misc.(Non-Drug; Combo Route), 3 times daily    polyethylene glycol (GLYCOLAX) 17 g, Oral, As needed (PRN)    potassium chloride SA (K-DUR,KLOR-CON M) 10 MEQ tablet 10 mEq,  Oral, 2 times daily    rivastigmine tartrate (EXELON) 1.5 mg, Oral, 2 times daily    simvastatin (ZOCOR) 10 mg, Oral, Nightly    traZODone (DESYREL) 50 mg, Oral, Nightly      Past Medical History:   Diagnosis Date    A-fib     Anticoagulant long-term use     Cecum mass     CHF (congestive heart failure)     Diabetes mellitus     Disorder of kidney and ureter     Followed by Dr. Jonathan Pace    Encounter for blood transfusion     Hypertension     Insomnia     Irregular heart rhythm     Stage 4 chronic kidney disease     Thyroid disease     hypothyroidism    TIA (transient ischemic attack) 03/2017     Past Surgical History:   Procedure Laterality Date    BILATERAL SALPINGO-OOPHORECTOMY (BSO) Bilateral 8/19/2019    Procedure: SALPINGO-OOPHORECTOMY, BILATERAL;  Surgeon: Blayne Wallace MD;  Location: [facility] OR;  Service: OB/GYN;  Laterality: Bilateral;    HYSTERECTOMY      partial, benign causes by reported hx    INSERTION OF PACEMAKER  08/2017    LEFT HEART CATHETERIZATION Left 1/6/2022    Procedure: Left heart cath;  Surgeon: Bhaskar Hanson III, MD;  Location: [facility] CATH;  Service: Cardiology;  Laterality: Left;  Right radial    ROBOT-ASSISTED LAPAROSCOPIC COLECTOMY USING DA MICHELE XI Right 8/19/2019    Procedure: XI ROBOTIC COLECTOMY;  Surgeon: Kourtney Dodson MD;  Location: [facility] OR;  Service: General;  Laterality: Right;     Review of patient's allergies indicates:   Allergen Reactions    Adhesive Itching    Mucinex [guaifenesin]      Says caused CHF     Latex, natural rubber Rash     Family History   Problem Relation Age of Onset    Diabetes Mother     Heart disease Mother     Cancer Father         type unknown (nonsmoker, no etoh)    Heart disease Brother     Dementia Sister     Cancer Daughter         fallopian ca    Cancer Son         esophageal ca    Dementia Sister     Dementia Sister      Social History     Tobacco Use    Smoking status: Never    Smokeless tobacco: Never   Substance Use Topics    Alcohol  "use: No    Drug use: No     Currently on File with [facility]:   Most Recent Immunizations   Administered Date(s) Administered    COVID-19, MRNA, LN-S, PF (Pfizer) (Purple Cap) 02/02/2021    COVID-19, mRNA, LNP-S, bivalent booster, PF (PFIZER OMICRON) 03/24/2023    Influenza 09/08/2011    Influenza (FLUAD) - Quadrivalent - Adjuvanted - PF *Preferred* (65+) 09/27/2022    Influenza (FLUAD) - Trivalent - Adjuvanted - PF (65+) 09/24/2019    Influenza - High Dose - PF (65 years and older) 09/06/2018    Influenza - Trivalent (ADULT) 09/08/2011    Pneumococcal Conjugate - 13 Valent 05/24/2016    Pneumococcal Polysaccharide - 23 Valent 01/02/2018    Tdap 04/07/2016    Zoster 05/14/2009     Objective:      Vitals:    08/31/23 1040   BP: 124/72   BP Location: Right arm   Patient Position: Sitting   Pulse: 72   Resp: 18   SpO2: 98%   Weight: 69.6 kg (153 lb 7 oz)   Height: 5' 2" (1.575 m)     Body mass index is 28.06 kg/m².    Physical Exam   Constitutional:       General: No acute distress.  HENT:      Head: Normocephalic and atraumatic.   Pulmonary:      Effort: Pulmonary effort is normal. No respiratory distress.   Neurological:      General: No focal deficit present.      Mental Status: Alert and oriented to person, place, and time. Mental status is at baseline.    Assessment:       1. Type 2 diabetes mellitus with stage 3 chronic kidney disease, with long-term current use of insulin, unspecified whether stage 3a or 3b CKD        Allison Woods MD  [facility]  Office: [phone]   Fax: [phone]  08/31/2023      Disclaimer: This note was partly generated using dictation software which may occasionally result in transcription errors.    Total time spent on this encounter includes face to face time and non-face to face time preparing to see the patient (eg, review of tests), obtaining and/or reviewing separately obtained history, documenting clinical information in the electronic " or other health record, independently interpreting results, and communicating results to the patient/family/caregiver, or care coordinator.

## 2023-09-14 ENCOUNTER — PATIENT MESSAGE (OUTPATIENT)
Dept: FAMILY MEDICINE | Facility: CLINIC | Age: 88
End: 2023-09-14

## 2023-09-14 ENCOUNTER — OFFICE VISIT (OUTPATIENT)
Dept: FAMILY MEDICINE | Facility: CLINIC | Age: 88
End: 2023-09-14
Payer: MEDICARE

## 2023-09-14 DIAGNOSIS — F03.90 DEMENTIA WITHOUT BEHAVIORAL DISTURBANCE, PSYCHOTIC DISTURBANCE, MOOD DISTURBANCE, OR ANXIETY, UNSPECIFIED DEMENTIA SEVERITY, UNSPECIFIED DEMENTIA TYPE: ICD-10-CM

## 2023-09-14 DIAGNOSIS — I70.0 AORTIC CALCIFICATION: ICD-10-CM

## 2023-09-14 DIAGNOSIS — I10 PRIMARY HYPERTENSION: Chronic | ICD-10-CM

## 2023-09-14 DIAGNOSIS — Z79.4 TYPE 2 DIABETES MELLITUS WITH STAGE 3 CHRONIC KIDNEY DISEASE, WITH LONG-TERM CURRENT USE OF INSULIN, UNSPECIFIED WHETHER STAGE 3A OR 3B CKD: Primary | ICD-10-CM

## 2023-09-14 DIAGNOSIS — I65.22 STENOSIS OF LEFT CAROTID ARTERY: Chronic | ICD-10-CM

## 2023-09-14 DIAGNOSIS — I48.20 CHRONIC ATRIAL FIBRILLATION: Chronic | ICD-10-CM

## 2023-09-14 DIAGNOSIS — D69.2 SENILE PURPURA: ICD-10-CM

## 2023-09-14 DIAGNOSIS — E11.22 TYPE 2 DIABETES MELLITUS WITH STAGE 3 CHRONIC KIDNEY DISEASE, WITH LONG-TERM CURRENT USE OF INSULIN, UNSPECIFIED WHETHER STAGE 3A OR 3B CKD: Primary | ICD-10-CM

## 2023-09-14 DIAGNOSIS — N18.30 TYPE 2 DIABETES MELLITUS WITH STAGE 3 CHRONIC KIDNEY DISEASE, WITH LONG-TERM CURRENT USE OF INSULIN, UNSPECIFIED WHETHER STAGE 3A OR 3B CKD: Primary | ICD-10-CM

## 2023-09-14 DIAGNOSIS — E03.8 OTHER SPECIFIED HYPOTHYROIDISM: Chronic | ICD-10-CM

## 2023-09-14 DIAGNOSIS — N18.4 CKD (CHRONIC KIDNEY DISEASE) STAGE 4, GFR 15-29 ML/MIN: Chronic | ICD-10-CM

## 2023-09-14 DIAGNOSIS — Z86.73 HISTORY OF TIA (TRANSIENT ISCHEMIC ATTACK): Chronic | ICD-10-CM

## 2023-09-14 DIAGNOSIS — I50.30 DIASTOLIC CONGESTIVE HEART FAILURE, NYHA CLASS 1, UNSPECIFIED CONGESTIVE HEART FAILURE CHRONICITY: ICD-10-CM

## 2023-09-14 DIAGNOSIS — Z86.73 H/O: CVA (CEREBROVASCULAR ACCIDENT): Chronic | ICD-10-CM

## 2023-09-14 DIAGNOSIS — K21.9 GASTROESOPHAGEAL REFLUX DISEASE WITHOUT ESOPHAGITIS: Chronic | ICD-10-CM

## 2023-09-14 DIAGNOSIS — I25.10 CORONARY ARTERY DISEASE INVOLVING NATIVE CORONARY ARTERY OF NATIVE HEART WITHOUT ANGINA PECTORIS: Chronic | ICD-10-CM

## 2023-09-14 PROCEDURE — 99215 OFFICE O/P EST HI 40 MIN: CPT | Mod: 95,,, | Performed by: STUDENT IN AN ORGANIZED HEALTH CARE EDUCATION/TRAINING PROGRAM

## 2023-09-14 PROCEDURE — 99215 PR OFFICE/OUTPT VISIT, EST, LEVL V, 40-54 MIN: ICD-10-PCS | Mod: 95,,, | Performed by: STUDENT IN AN ORGANIZED HEALTH CARE EDUCATION/TRAINING PROGRAM

## 2023-09-14 PROCEDURE — 1157F PR ADVANCE CARE PLAN OR EQUIV PRESENT IN MEDICAL RECORD: ICD-10-PCS | Mod: CPTII,95,, | Performed by: STUDENT IN AN ORGANIZED HEALTH CARE EDUCATION/TRAINING PROGRAM

## 2023-09-14 PROCEDURE — 1157F ADVNC CARE PLAN IN RCRD: CPT | Mod: CPTII,95,, | Performed by: STUDENT IN AN ORGANIZED HEALTH CARE EDUCATION/TRAINING PROGRAM

## 2023-09-14 RX ORDER — INSULIN GLARGINE 100 [IU]/ML
24 INJECTION, SOLUTION SUBCUTANEOUS NIGHTLY
Qty: 5 EACH | Refills: 3 | Status: SHIPPED | OUTPATIENT
Start: 2023-09-14 | End: 2023-10-26 | Stop reason: SDUPTHER

## 2023-09-14 RX ORDER — INSULIN LISPRO 100 [IU]/ML
INJECTION, SOLUTION INTRAVENOUS; SUBCUTANEOUS
Qty: 15 ML | Refills: 3 | Status: SHIPPED | OUTPATIENT
Start: 2023-09-14 | End: 2023-09-15

## 2023-09-14 NOTE — PROGRESS NOTES
Assessment and Plan:    Diagnoses and all orders for this visit:    Type 2 diabetes mellitus with stage 3 chronic kidney disease, with long-term current use of insulin, unspecified whether stage 3a or 3b CKD  -     insulin (LANTUS SOLOSTAR U-100 INSULIN) glargine 100 units/mL SubQ pen; Inject 24 Units into the skin every evening.  -     insulin lispro 100 unit/mL pen; INJECT 13 THREE TIMES DAILY WITH MEALS    H/O: CVA (cerebrovascular accident): Stable, continue current regimen.  - ASA 81 mg daily, Zocor 10 mg qhs    History of TIA (transient ischemic attack): Stable, continue current regimen.  - ASA 81 mg daily, Zocor 10 mg qhs    Dementia without behavioral disturbance, psychotic disturbance, mood disturbance, or anxiety, unspecified dementia severity, unspecified dementia type: Stable, continue current regimen.  - Rivastigmine tartrate 1.5 mg BID, memantine 10 mg daily    Diastolic congestive heart failure, NYHA class 1, unspecified congestive heart failure chronicity: Stable, continue current regimen. Continue following w/ Cardiology.  - Lasix 40 mg daily     Primary hypertension: Stable, continue current regimen.  - Lopressor 50 mg daily BID    Chronic atrial fibrillation: Stable, continue current regimen.  - Lopressor 50 mg daily BID, digoxin 125 mg every other day, amiodarone 200 mg daily  - Eliquis 2.5 mg BID    Coronary artery disease involving native coronary artery of native heart without angina pectoris: Stable, continue current regimen.  - ASA 81 mg daily, Plavix 75 mg daily, Zocor 10 mg qhs    Stenosis of left carotid artery: Stable, continue current regimen.  - ASA 81 mg daily, Plavix 75 mg daily, Zocor 10 mg qhs    Aortic calcification: Stable, continue current regimen.  - ASA 81 mg daily, Plavix 75 mg daily, Zocor 10 mg qhs    CKD (chronic kidney disease) stage 4, GFR 15-29 ml/min: Stable; pt continues to decline dialysis. Renally dose meds.     Other specified hypothyroidism: Stable, continue  current regimen.  - Synthroid 75 mcg T/Th/Sat/Sun  - Synthroid 125 mcg M/W/F    Senile purpura  No further intervention needed at this time.     Follow up in about 4 weeks (around 10/12/2023), or if symptoms worsen or fail to improve.    Allison Woods MD  09/14/2023     Audiovisual Telehealth Visit:     The patient location is: Home  The chief complaint leading to consultation is: (documented below in HPI)  Visit type: Virtual visit with audiovisual  Total time spend on encounter: 40-54 minutes. This includes face to face time and non-face to face time preparing to see the patient (eg, review of tests), obtaining and/or reviewing separately obtained history, documenting clinical information in the electronic or other health record, independently interpreting results and communicating results to the patient/family/caregiver, or care coordinator.       Each patient to whom I provide medical services by telemedicine is: (1) informed of the relationship between the physician and patient and the respective role of any other health care provider with respect to management of the patient; and (2) notified that they may decline to receive medical services by telemedicine and may withdraw from such care at any time. Patient verbally consented to receive this service via audiovisual call.    Patient ID: Denice Sheth is a 88 y.o. female     HPI: 88 y.o. female with a PMHx as documented below presents to clinic today (via audiovisual telehealth visit) for the following:    BG readings (fasting and post-prandial) improved, although still elevated. See pictures of record from pt message 9/14/23. No episodes of hypoglycemia.     Hx of CVA, Hx of TIA; left-sided hemiparesis 2/2 CVA:   - ASA 81 mg daily, Zocor 10 mg qhs    Dementia:   - Rivastigmine tartrate 1.5 mg BID, memantine 10 mg daily  -------------------  **Follows w/ Cardiology ([facility])    HTN:   - Lopressor 50 mg daily  BID    Hypertriglyceridemia:   - Lipid panel (7/24/23) w/ elevated triglycerides 204  - Zocor 10 mg qhs    CAD:   - ASA 81 mg daily, Plavix 75 mg daily, Zocor 10 mg qhs    Carotid artery stenosis, left:   - ASA 81 mg daily, Plavix 75 mg daily, Zocor 10 mg qhs    HFpEF:   - Lasix 40 mg daily     TTE (6/2/23, outside reports, see scanned media):  - Mild concentric LVH  - EF 55-60%  - Pseudo-normal LV filling pattern, consistent with elevated LA pressure  - Left atrium is markedy dilated   - mild aortic valve sclerosis without stenosis  - mild-moderate aortic regurgitation  - Mild mitral annular calcification  mild mitral regurgitation    Afib:   - Lopressor 50 mg daily BID, digoxin 125 mg every other day, amiodarone 200 mg daily  - Eliquis 2.5 mg BID  -------------------  CKD stage 4:  - BUN/Cr 51/2.7, eGFR 16.5 (7/24/23)    DMT2:   - Hgb A1c 7.2 (7/24/23)   - Insulin glardine 20 u qhs, lispro 10 u qhs  - Zocor 10 mg qhs  - UTD on diabetic eye exam and foot exam    GERD:   - Pepcid 20 mg BID    Hypothyroidism:   - TSH 4.616, free T4 wnl (7/24/23)   - Synthroid 75 mcg T/Th/Sat/Sun  - Synthroid 125 mcg M/W/F    Insomnia:   - Trazodone PRN    Past Medical History:   Diagnosis Date    Anticoagulant long-term use     Aortic calcification 01/02/2022    CXR 1/2/22    Aortic valve regurgitation 10/13/2021    CAD (coronary artery disease) 01/06/2022    Cecum mass     Chronic atrial fibrillation 05/02/2017    Chronic diastolic heart failure     CKD (chronic kidney disease) stage 4, GFR 15-29 ml/min 02/22/2016    Dementia 08/31/2022    Diabetic retinopathy 03/24/2021    Disorder of kidney and ureter     follows w/ Dr. 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 09/12/2022    H/O: CVA (cerebrovascular accident) 07/30/2019    Hemiparesis affecting left side as late effect of cerebrovascular accident (CVA) 06/05/2017    History  of TIA (transient ischemic attack) 09/17/2023    Hypertension     KRISTEN (iron deficiency anemia) 01/12/2018    Insomnia     Irregular heart rhythm     Mitral valve regurgitation 10/13/2021    Other specified hypothyroidism 06/24/2019    Pacemaker 01/02/2023    Primary hypertension 02/09/2016    Proteinuria due to type 2 diabetes mellitus 03/12/2019    Senile purpura 12/21/2022    Sick sinus syndrome 2018    s/p pacemaker placement    Stenosis of left carotid artery 12/06/2016    TIA (transient ischemic attack) 03/2017    Type 2 diabetes mellitus with stage 4 chronic kidney disease, with long-term current use of insulin      Review of Systems   Constitutional:  Negative for weight loss.   Eyes:  Negative for blurred vision.   Cardiovascular:  Negative for chest pain.   Neurological:  Negative for dizziness, tremors, seizures, weakness and headaches.   Endo/Heme/Allergies:  Negative for polydipsia.   Psychiatric/Behavioral:  The patient is not nervous/anxious.      Physical Exam:  Constitutional:       General: No acute distress.  HENT:      Head: Normocephalic and atraumatic.   Pulmonary:      Effort: Pulmonary effort is normal. No respiratory distress.   Neurological:      General: No focal deficit present.      Mental Status: Alert and oriented to person, place, and time. Mental status is at baseline.     Assessment and Plan:   See above    Allison Woods MD  [facility]  Office: [phone]   Fax: [phone]  09/14/2023       Disclaimer: This note was partly generated using dictation software which may occasionally result in transcription errors.

## 2023-09-15 ENCOUNTER — PATIENT MESSAGE (OUTPATIENT)
Dept: FAMILY MEDICINE | Facility: CLINIC | Age: 88
End: 2023-09-15
Payer: MEDICARE

## 2023-09-15 RX ORDER — INSULIN LISPRO 100 [IU]/ML
INJECTION, SOLUTION INTRAVENOUS; SUBCUTANEOUS
Qty: 15 ML | Refills: 3 | Status: SHIPPED | OUTPATIENT
Start: 2023-09-15 | End: 2023-10-26 | Stop reason: SDUPTHER

## 2023-09-15 NOTE — TELEPHONE ENCOUNTER
Please Advise.       Patient daughter  requesting instructions for insulin administration to mother.

## 2023-09-17 PROBLEM — I25.10 CORONARY ARTERIOSCLEROSIS: Status: RESOLVED | Noted: 2018-11-20 | Resolved: 2023-09-17

## 2023-09-17 PROBLEM — I48.20 CHRONIC ATRIAL FIBRILLATION: Chronic | Status: ACTIVE | Noted: 2017-05-02

## 2023-09-17 PROBLEM — N18.4 DIABETES MELLITUS WITH STAGE 4 CHRONIC KIDNEY DISEASE: Status: ACTIVE | Noted: 2023-09-17

## 2023-09-17 PROBLEM — Z79.4 TYPE 2 DIABETES MELLITUS WITH STAGE 4 CHRONIC KIDNEY DISEASE, WITH LONG-TERM CURRENT USE OF INSULIN: Chronic | Status: ACTIVE | Noted: 2017-03-30

## 2023-09-17 PROBLEM — D50.9 IDA (IRON DEFICIENCY ANEMIA): Status: RESOLVED | Noted: 2018-01-12 | Resolved: 2023-09-17

## 2023-09-17 PROBLEM — Z86.73 HISTORY OF TIA (TRANSIENT ISCHEMIC ATTACK): Status: ACTIVE | Noted: 2023-09-17

## 2023-09-17 PROBLEM — I35.1 AORTIC VALVE REGURGITATION: Chronic | Status: ACTIVE | Noted: 2021-10-13

## 2023-09-17 PROBLEM — I35.1 AORTIC VALVE REGURGITATION: Status: ACTIVE | Noted: 2021-10-13

## 2023-09-17 PROBLEM — N18.4 DIABETES MELLITUS WITH STAGE 4 CHRONIC KIDNEY DISEASE: Status: RESOLVED | Noted: 2023-09-17 | Resolved: 2023-09-17

## 2023-09-17 PROBLEM — N18.4 TYPE 2 DIABETES MELLITUS WITH STAGE 4 CHRONIC KIDNEY DISEASE, WITH LONG-TERM CURRENT USE OF INSULIN: Chronic | Status: ACTIVE | Noted: 2017-03-30

## 2023-09-17 PROBLEM — I34.0 MITRAL VALVE REGURGITATION: Status: ACTIVE | Noted: 2021-10-13

## 2023-09-17 PROBLEM — K21.9 GASTROESOPHAGEAL REFLUX DISEASE WITHOUT ESOPHAGITIS: Chronic | Status: ACTIVE | Noted: 2023-09-17

## 2023-09-17 PROBLEM — E11.22 DIABETES MELLITUS WITH STAGE 4 CHRONIC KIDNEY DISEASE: Status: ACTIVE | Noted: 2023-09-17

## 2023-09-17 PROBLEM — N18.5 CHRONIC KIDNEY DISEASE, STAGE 5: Status: RESOLVED | Noted: 2023-09-17 | Resolved: 2023-09-17

## 2023-09-17 PROBLEM — I70.0 AORTIC CALCIFICATION: Chronic | Status: ACTIVE | Noted: 2022-01-02

## 2023-09-17 PROBLEM — Z71.89 ACP (ADVANCE CARE PLANNING): Status: RESOLVED | Noted: 2022-06-30 | Resolved: 2023-09-17

## 2023-09-17 PROBLEM — I34.0 MITRAL VALVE REGURGITATION: Chronic | Status: ACTIVE | Noted: 2021-10-13

## 2023-09-17 PROBLEM — F03.90 DEMENTIA: Chronic | Status: ACTIVE | Noted: 2022-08-31

## 2023-09-17 PROBLEM — Z95.0 PACEMAKER: Chronic | Status: ACTIVE | Noted: 2023-01-02

## 2023-09-17 PROBLEM — E11.29 PROTEINURIA DUE TO TYPE 2 DIABETES MELLITUS: Chronic | Status: ACTIVE | Noted: 2019-03-12

## 2023-09-17 PROBLEM — Z86.73 HISTORY OF TIA (TRANSIENT ISCHEMIC ATTACK): Chronic | Status: ACTIVE | Noted: 2023-09-17

## 2023-09-17 PROBLEM — E11.22 TYPE 2 DIABETES MELLITUS WITH STAGE 4 CHRONIC KIDNEY DISEASE, WITH LONG-TERM CURRENT USE OF INSULIN: Chronic | Status: ACTIVE | Noted: 2017-03-30

## 2023-09-17 PROBLEM — Z86.73 H/O: CVA (CEREBROVASCULAR ACCIDENT): Chronic | Status: ACTIVE | Noted: 2019-07-30

## 2023-09-17 PROBLEM — D69.2 SENILE PURPURA: Chronic | Status: ACTIVE | Noted: 2022-12-21

## 2023-09-17 PROBLEM — E11.22 DIABETES MELLITUS WITH STAGE 4 CHRONIC KIDNEY DISEASE: Status: RESOLVED | Noted: 2023-09-17 | Resolved: 2023-09-17

## 2023-09-17 PROBLEM — I25.10 CAD (CORONARY ARTERY DISEASE): Chronic | Status: ACTIVE | Noted: 2022-01-06

## 2023-09-17 PROBLEM — R80.9 PROTEINURIA DUE TO TYPE 2 DIABETES MELLITUS: Chronic | Status: ACTIVE | Noted: 2019-03-12

## 2023-09-17 PROBLEM — I69.354 HEMIPARESIS AFFECTING LEFT SIDE AS LATE EFFECT OF CEREBROVASCULAR ACCIDENT (CVA): Chronic | Status: ACTIVE | Noted: 2017-06-05

## 2023-09-17 PROBLEM — R19.7 DIARRHEA: Status: RESOLVED | Noted: 2022-06-30 | Resolved: 2023-09-17

## 2023-09-17 PROBLEM — E86.0 DEHYDRATION: Status: RESOLVED | Noted: 2022-06-30 | Resolved: 2023-09-17

## 2023-09-17 PROBLEM — N18.5 CHRONIC KIDNEY DISEASE, STAGE 5: Status: ACTIVE | Noted: 2023-09-17

## 2023-09-17 PROBLEM — E03.8 OTHER SPECIFIED HYPOTHYROIDISM: Chronic | Status: ACTIVE | Noted: 2019-06-24

## 2023-09-17 PROBLEM — E11.319 DIABETIC RETINOPATHY: Chronic | Status: ACTIVE | Noted: 2021-03-24

## 2023-09-17 PROBLEM — H35.3211 EXUDATIVE AGE-RELATED MACULAR DEGENERATION OF RIGHT EYE WITH ACTIVE CHOROIDAL NEOVASCULARIZATION: Chronic | Status: ACTIVE | Noted: 2022-09-12

## 2023-09-28 ENCOUNTER — PATIENT MESSAGE (OUTPATIENT)
Dept: FAMILY MEDICINE | Facility: CLINIC | Age: 88
End: 2023-09-28
Payer: MEDICARE

## 2023-10-26 ENCOUNTER — OFFICE VISIT (OUTPATIENT)
Dept: FAMILY MEDICINE | Facility: CLINIC | Age: 88
End: 2023-10-26
Payer: MEDICARE

## 2023-10-26 VITALS
HEART RATE: 60 BPM | HEIGHT: 62 IN | SYSTOLIC BLOOD PRESSURE: 136 MMHG | WEIGHT: 153.44 LBS | DIASTOLIC BLOOD PRESSURE: 70 MMHG | OXYGEN SATURATION: 97 % | BODY MASS INDEX: 28.24 KG/M2

## 2023-10-26 DIAGNOSIS — E03.8 OTHER SPECIFIED HYPOTHYROIDISM: Chronic | ICD-10-CM

## 2023-10-26 DIAGNOSIS — H61.22 IMPACTED CERUMEN OF LEFT EAR: ICD-10-CM

## 2023-10-26 DIAGNOSIS — I10 PRIMARY HYPERTENSION: Chronic | ICD-10-CM

## 2023-10-26 DIAGNOSIS — E11.22 TYPE 2 DIABETES MELLITUS WITH STAGE 4 CHRONIC KIDNEY DISEASE, WITH LONG-TERM CURRENT USE OF INSULIN: Primary | Chronic | ICD-10-CM

## 2023-10-26 DIAGNOSIS — N18.4 TYPE 2 DIABETES MELLITUS WITH STAGE 4 CHRONIC KIDNEY DISEASE, WITH LONG-TERM CURRENT USE OF INSULIN: Primary | Chronic | ICD-10-CM

## 2023-10-26 DIAGNOSIS — I25.10 CORONARY ARTERY DISEASE INVOLVING NATIVE CORONARY ARTERY OF NATIVE HEART WITHOUT ANGINA PECTORIS: Chronic | ICD-10-CM

## 2023-10-26 DIAGNOSIS — Z79.4 TYPE 2 DIABETES MELLITUS WITH STAGE 4 CHRONIC KIDNEY DISEASE, WITH LONG-TERM CURRENT USE OF INSULIN: Primary | Chronic | ICD-10-CM

## 2023-10-26 PROCEDURE — 1159F MED LIST DOCD IN RCRD: CPT | Mod: CPTII,S$GLB,, | Performed by: STUDENT IN AN ORGANIZED HEALTH CARE EDUCATION/TRAINING PROGRAM

## 2023-10-26 PROCEDURE — 3288F FALL RISK ASSESSMENT DOCD: CPT | Mod: CPTII,S$GLB,, | Performed by: STUDENT IN AN ORGANIZED HEALTH CARE EDUCATION/TRAINING PROGRAM

## 2023-10-26 PROCEDURE — 99214 PR OFFICE/OUTPT VISIT, EST, LEVL IV, 30-39 MIN: ICD-10-PCS | Mod: S$GLB,,, | Performed by: STUDENT IN AN ORGANIZED HEALTH CARE EDUCATION/TRAINING PROGRAM

## 2023-10-26 PROCEDURE — 1101F PR PT FALLS ASSESS DOC 0-1 FALLS W/OUT INJ PAST YR: ICD-10-PCS | Mod: CPTII,S$GLB,, | Performed by: STUDENT IN AN ORGANIZED HEALTH CARE EDUCATION/TRAINING PROGRAM

## 2023-10-26 PROCEDURE — 1157F ADVNC CARE PLAN IN RCRD: CPT | Mod: CPTII,S$GLB,, | Performed by: STUDENT IN AN ORGANIZED HEALTH CARE EDUCATION/TRAINING PROGRAM

## 2023-10-26 PROCEDURE — 1160F PR REVIEW ALL MEDS BY PRESCRIBER/CLIN PHARMACIST DOCUMENTED: ICD-10-PCS | Mod: CPTII,S$GLB,, | Performed by: STUDENT IN AN ORGANIZED HEALTH CARE EDUCATION/TRAINING PROGRAM

## 2023-10-26 PROCEDURE — 1159F PR MEDICATION LIST DOCUMENTED IN MEDICAL RECORD: ICD-10-PCS | Mod: CPTII,S$GLB,, | Performed by: STUDENT IN AN ORGANIZED HEALTH CARE EDUCATION/TRAINING PROGRAM

## 2023-10-26 PROCEDURE — 1101F PT FALLS ASSESS-DOCD LE1/YR: CPT | Mod: CPTII,S$GLB,, | Performed by: STUDENT IN AN ORGANIZED HEALTH CARE EDUCATION/TRAINING PROGRAM

## 2023-10-26 PROCEDURE — 1126F PR PAIN SEVERITY QUANTIFIED, NO PAIN PRESENT: ICD-10-PCS | Mod: CPTII,S$GLB,, | Performed by: STUDENT IN AN ORGANIZED HEALTH CARE EDUCATION/TRAINING PROGRAM

## 2023-10-26 PROCEDURE — 1126F AMNT PAIN NOTED NONE PRSNT: CPT | Mod: CPTII,S$GLB,, | Performed by: STUDENT IN AN ORGANIZED HEALTH CARE EDUCATION/TRAINING PROGRAM

## 2023-10-26 PROCEDURE — 99214 OFFICE O/P EST MOD 30 MIN: CPT | Mod: S$GLB,,, | Performed by: STUDENT IN AN ORGANIZED HEALTH CARE EDUCATION/TRAINING PROGRAM

## 2023-10-26 PROCEDURE — 1157F PR ADVANCE CARE PLAN OR EQUIV PRESENT IN MEDICAL RECORD: ICD-10-PCS | Mod: CPTII,S$GLB,, | Performed by: STUDENT IN AN ORGANIZED HEALTH CARE EDUCATION/TRAINING PROGRAM

## 2023-10-26 PROCEDURE — 99999 PR PBB SHADOW E&M-EST. PATIENT-LVL V: CPT | Mod: PBBFAC,,, | Performed by: STUDENT IN AN ORGANIZED HEALTH CARE EDUCATION/TRAINING PROGRAM

## 2023-10-26 PROCEDURE — 99999 PR PBB SHADOW E&M-EST. PATIENT-LVL V: ICD-10-PCS | Mod: PBBFAC,,, | Performed by: STUDENT IN AN ORGANIZED HEALTH CARE EDUCATION/TRAINING PROGRAM

## 2023-10-26 PROCEDURE — 3288F PR FALLS RISK ASSESSMENT DOCUMENTED: ICD-10-PCS | Mod: CPTII,S$GLB,, | Performed by: STUDENT IN AN ORGANIZED HEALTH CARE EDUCATION/TRAINING PROGRAM

## 2023-10-26 PROCEDURE — 1160F RVW MEDS BY RX/DR IN RCRD: CPT | Mod: CPTII,S$GLB,, | Performed by: STUDENT IN AN ORGANIZED HEALTH CARE EDUCATION/TRAINING PROGRAM

## 2023-10-26 RX ORDER — INSULIN GLARGINE 100 [IU]/ML
28 INJECTION, SOLUTION SUBCUTANEOUS NIGHTLY
Qty: 5 EACH | Refills: 3 | Status: SHIPPED | OUTPATIENT
Start: 2023-10-26 | End: 2024-03-25 | Stop reason: SDUPTHER

## 2023-10-26 RX ORDER — INSULIN LISPRO 100 [IU]/ML
INJECTION, SOLUTION INTRAVENOUS; SUBCUTANEOUS
Qty: 15 ML | Refills: 3 | Status: SHIPPED | OUTPATIENT
Start: 2023-10-26 | End: 2024-02-14

## 2023-10-26 NOTE — PROGRESS NOTES
Plan:      Denice was seen today for follow-up.    Diagnoses and all orders for this visit:    Type 2 diabetes mellitus with stage 4 chronic kidney disease, with long-term current use of insulin: Increase insulin as noted below.  -     HEMOGLOBIN A1C; Future  -     Comprehensive Metabolic Panel; Future  -     blood-glucose meter,continuous (DEXCOM ) Misc; 1 each by Misc.(Non-Drug; Combo Route) route once daily.  -     blood-glucose sensor Petty; 1 each by Misc.(Non-Drug; Combo Route) route Daily.  -     blood-glucose transmitter (DEXCOM G5 TRANSMITTER) Petty; 1 each by Misc.(Non-Drug; Combo Route) route Daily.  -     insulin (LANTUS SOLOSTAR U-100 INSULIN) glargine 100 units/mL SubQ pen; Inject 28 Units into the skin every evening.  -     insulin lispro 100 unit/mL pen; INJECT 16 THREE TIMES DAILY WITH MEALS    Other specified hypothyroidism  -     TSH; Future    Primary hypertension  -     Comprehensive Metabolic Panel; Future    Coronary artery disease involving native coronary artery of native heart without angina pectoris  -     Lipid Panel; Future  -     Comprehensive Metabolic Panel; Future    Impacted cerumen of left ear: Unable to completely remove cerumen with ear wash, will need to refer to ENT for further Tx.  -     Ambulatory referral/consult to ENT; Future      Follow up in about 6 weeks (around 12/7/2023), or if symptoms worsen or fail to improve.    Allison Woods MD  10/26/2023    Subjective:      Patient ID: Denice Sheth is a 88 y.o. female    Chief Complaint   Patient presents with    Follow-up     HPI  88 y.o. female with a PMHx as documented below presents to clinic today for the following:    DMT2:   - Hgb A1c 7.2 (7/24/23)   - Insulin glardine 24 u qhs, lispro 13 u qhs  - Zocor 10 mg qhs  - UTD on diabetic eye exam and foot exam  - Home AM fasting BG 180s on average, few 120s, no episodes of hypoglycemia  -------------------  Hx of CVA, Hx of TIA; left-sided hemiparesis 2/2 CVA:   -  ASA 81 mg daily, Zocor 10 mg qhs     Dementia:   - Rivastigmine tartrate 1.5 mg BID, memantine 10 mg daily  -------------------  **Follows w/ Cardiology ([facility])     HTN:   - Lopressor 50 mg daily BID     Hypertriglyceridemia:   - Lipid panel (7/24/23) w/ elevated triglycerides 204  - Zocor 10 mg qhs     CAD:   - ASA 81 mg daily, Plavix 75 mg daily, Zocor 10 mg qhs     Carotid artery stenosis, left:   - ASA 81 mg daily, Plavix 75 mg daily, Zocor 10 mg qhs     HFpEF:   - Lasix 40 mg daily      TTE (6/2/23, outside reports, see scanned media):  - Mild concentric LVH  - EF 55-60%  - Pseudo-normal LV filling pattern, consistent with elevated LA pressure  - Left atrium is markedy dilated   - mild aortic valve sclerosis without stenosis  - mild-moderate aortic regurgitation  - Mild mitral annular calcification  mild mitral regurgitation     Afib:   - Lopressor 50 mg daily BID, digoxin 125 mg every other day, amiodarone 200 mg daily  - Eliquis 2.5 mg BID  -------------------  CKD stage 4:  - BUN/Cr 51/2.7, eGFR 16.5 (7/24/23)       GERD:   - Pepcid 20 mg BID     Hypothyroidism:   - TSH 4.616, free T4 wnl (7/24/23)   - Synthroid 75 mcg T/Th/Sat/Sun  - Synthroid 125 mcg M/W/F     Insomnia:   - Trazodone PRN    ROS  Constitutional:  Negative for chills and fever.   Respiratory:  Negative for shortness of breath.    Cardiovascular:  Negative for chest pain.   Gastrointestinal:  Negative for abdominal pain, constipation, diarrhea, nausea and vomiting.     Current Outpatient Medications   Medication Instructions    amiodarone (PACERONE) 200 mg, Oral, Daily    apixaban (ELIQUIS) 2.5 mg, Oral, 2 times daily    ascorbic acid (vitamin C) (VITAMIN C) 500 mg, Oral, Daily    b complex vitamins capsule 1 capsule, Oral, Daily    BINAXNOW COVID-19 AG SELF TEST Kit Use as Directed on the Package    blood sugar diagnostic Strp 1 strip 2 times daily, to use with insurance preferred meter    blood-glucose meter kit  To check BG 2 times daily, to use with insurance preferred meter    blood-glucose meter,continuous (DEXCOM ) Misc 1 each, Misc.(Non-Drug; Combo Route), Daily    blood-glucose sensor Petty 1 each, Misc.(Non-Drug; Combo Route), Daily    blood-glucose transmitter (DEXCOM G5 TRANSMITTER) Petty 1 each, Misc.(Non-Drug; Combo Route), Daily    cholecalciferol (vitamin D3) 5,000 Units, Oral, Daily    clopidogrel (PLAVIX) 75 mg tablet TAKE ONE TABLET BY MOUTH ONCE DAILY    diclofenac sodium (VOLTAREN ARTHRITIS PAIN) 2 g, Topical (Top), 2 times daily PRN    digoxin (LANOXIN) 125 mcg tablet 1 tablet, Oral, Every other day    diphenoxylate-atropine 2.5-0.025 mg (LOMOTIL) 2.5-0.025 mg per tablet 1 tablet, Oral, 4 times daily PRN    famotidine (PEPCID) 20 mg, Oral, 2 times daily    fish oil-omega-3 fatty acids 300-1,000 mg capsule 1 capsule, Oral, 2 times daily    furosemide (LASIX) 40 MG tablet TAKE 1 TABLET BY MOUTH ONCE DAILY (TAKE  AN  EXTRA  TABLET  FOR  ANY  WEIGHT  GAIN  OF  3LB  OR  MORE  ON  ANY  GIVEN  DAY)    insulin (LANTUS SOLOSTAR U-100 INSULIN) 28 Units, Subcutaneous, Nightly    insulin lispro 100 unit/mL pen INJECT 16 THREE TIMES DAILY WITH MEALS    lancets 31 gauge Misc 1 strip 2 times daily, to use with insurance preferred meter    levothyroxine (SYNTHROID) 125 MCG tablet TAKE 1 TABLET BY MOUTH EVERY MONDAY, WEDNESDAY, AND FRIDAY    levothyroxine (SYNTHROID) 75 mcg, Oral, Every Tues, Thurs, Sat, Sun    magnesium 200 mg, Oral, Daily    melatonin 10 mg Tab 1 tablet, Oral, Nightly PRN    memantine (NAMENDA) 10 MG Tab TAKE 1 TABLET BY MOUTH ONCE DAILY IN THE EVENING    metoprolol tartrate (LOPRESSOR) 50 mg, Oral, 2 times daily    multivitamin (THERAGRAN) per tablet 1 tablet, Daily    mupirocin (BACTROBAN) 2 % ointment Topical (Top), 3 times daily    ondansetron (ZOFRAN-ODT) 4 mg, Oral, Every 8 hours PRN    pen needle, diabetic (COMFORT EZ PEN NEEDLES) 1 Units, Misc.(Non-Drug; Combo Route), 3 times daily     "polyethylene glycol (GLYCOLAX) 17 g, Oral, As needed (PRN)    potassium chloride SA (K-DUR,KLOR-CON M) 10 MEQ tablet 10 mEq, Oral, 2 times daily    rivastigmine tartrate (EXELON) 1.5 mg, Oral, 2 times daily    simvastatin (ZOCOR) 10 mg, Oral, Nightly    traZODone (DESYREL) 50 mg, Oral, Nightly      Past Medical History:   Diagnosis Date    Anticoagulant long-term use     Aortic calcification 01/02/2022    CXR 1/2/22    Aortic valve regurgitation 10/13/2021    CAD (coronary artery disease) 01/06/2022    Cecum mass     Chronic atrial fibrillation 05/02/2017    Chronic diastolic heart failure     CKD (chronic kidney disease) stage 4, GFR 15-29 ml/min 02/22/2016    Dementia 08/31/2022    Diabetic retinopathy 03/24/2021    Disorder of kidney and ureter     follows w/ Dr. 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 09/12/2022    Gastroesophageal reflux disease without esophagitis 9/17/2023    H/O: CVA (cerebrovascular accident) 07/30/2019    Hemiparesis affecting left side as late effect of cerebrovascular accident (CVA) 06/05/2017    History of TIA (transient ischemic attack) 09/17/2023    Hypertension     KRISTEN (iron deficiency anemia) 01/12/2018    Insomnia     Irregular heart rhythm     Mitral valve regurgitation 10/13/2021    Other specified hypothyroidism 06/24/2019    Pacemaker 01/02/2023    Primary hypertension 02/09/2016    Proteinuria due to type 2 diabetes mellitus 03/12/2019    Senile purpura 12/21/2022    Sick sinus syndrome 2018    s/p pacemaker placement    Stenosis of left carotid artery 12/06/2016    TIA (transient ischemic attack) 03/2017    Type 2 diabetes mellitus with stage 4 chronic kidney disease, with long-term current use of insulin       Objective:      Vitals:    10/26/23 1433   BP: 136/70   Pulse: 60   SpO2: 97%   Weight: 69.6 kg (153 lb 7 oz)   Height: 5' 2" (1.575 m)     Body mass index is " 28.06 kg/m².    Physical Exam  Vitals reviewed.   Constitutional:       General: She is not in acute distress.  HENT:      Head: Normocephalic and atraumatic.      Right Ear: There is no impacted cerumen.      Left Ear: There is impacted cerumen.   Cardiovascular:      Rate and Rhythm: Normal rate.   Pulmonary:      Effort: Pulmonary effort is normal. No respiratory distress.   Neurological:      General: No focal deficit present.      Mental Status: She is alert and oriented to person, place, and time. Mental status is at baseline.        Assessment:       1. Type 2 diabetes mellitus with stage 4 chronic kidney disease, with long-term current use of insulin    2. Other specified hypothyroidism    3. Primary hypertension    4. Coronary artery disease involving native coronary artery of native heart without angina pectoris    5. Impacted cerumen of left ear        Allison Woods MD  [facility]  Office: [phone]   Fax: [phone]  10/26/2023      Disclaimer: This note was partly generated using dictation software which may occasionally result in transcription errors.    Total time spent on this encounter includes face to face time and non-face to face time preparing to see the patient (eg, review of tests), obtaining and/or reviewing separately obtained history, documenting clinical information in the electronic or other health record, independently interpreting results, and communicating results to the patient/family/caregiver, or care coordinator.

## 2023-11-02 DIAGNOSIS — Z79.4 TYPE 2 DIABETES MELLITUS WITH STAGE 4 CHRONIC KIDNEY DISEASE, WITH LONG-TERM CURRENT USE OF INSULIN: Chronic | ICD-10-CM

## 2023-11-02 DIAGNOSIS — E11.22 TYPE 2 DIABETES MELLITUS WITH STAGE 4 CHRONIC KIDNEY DISEASE, WITH LONG-TERM CURRENT USE OF INSULIN: Chronic | ICD-10-CM

## 2023-11-02 DIAGNOSIS — N18.4 TYPE 2 DIABETES MELLITUS WITH STAGE 4 CHRONIC KIDNEY DISEASE, WITH LONG-TERM CURRENT USE OF INSULIN: Chronic | ICD-10-CM

## 2023-11-02 RX ORDER — BLOOD-GLUCOSE SENSOR
EACH MISCELLANEOUS
Qty: 9 EACH | Refills: 3 | Status: SHIPPED | OUTPATIENT
Start: 2023-11-02

## 2023-11-02 NOTE — TELEPHONE ENCOUNTER
Refill Routing Note   Medication(s) are not appropriate for processing by [facility] for the following reason(s):      New or recently adjusted medication    [facility] action(s):  Defer Care Due:  Labs due              Appointments  past 12m or future 3m with PCP    Date Provider   Last Visit   10/26/2023 Allison Woods MD   Next Visit   Visit date not found Allison Woods MD   ED visits in past 90 days: 0        Note composed:4:24 PM 11/02/2023

## 2023-11-02 NOTE — TELEPHONE ENCOUNTER
Care Due:                  Date            Visit Type   Department     Provider  --------------------------------------------------------------------------------                                MYCHART                              FOLLOWUP/OF  [facility]  Last Visit: 10-      FICE VISIT   MEDICINE       Allison Woods  Next Visit: None Scheduled  None         None Found                                                            Last  Test          Frequency    Reason                     Performed    Due Date  --------------------------------------------------------------------------------    HBA1C.......  6 months...  insulin..................  07- 01-    [facility] Embedded Care Due Messages. Reference number: [id number].   11/02/2023 9:30:35 AM CDT

## 2023-11-06 ENCOUNTER — LAB VISIT (OUTPATIENT)
Dept: LAB | Facility: HOSPITAL | Age: 88
End: 2023-11-06
Attending: INTERNAL MEDICINE
Payer: MEDICARE

## 2023-11-06 DIAGNOSIS — N18.4 CKD (CHRONIC KIDNEY DISEASE) STAGE 4, GFR 15-29 ML/MIN: ICD-10-CM

## 2023-11-06 LAB
CREAT UR-MCNC: 77 MG/DL (ref 15–325)
PROT UR-MCNC: 16 MG/DL (ref 0–15)
PROT/CREAT UR: 0.21 MG/G{CREAT} (ref 0–0.2)

## 2023-11-06 PROCEDURE — 84156 ASSAY OF PROTEIN URINE: CPT | Performed by: INTERNAL MEDICINE

## 2023-11-30 ENCOUNTER — PATIENT MESSAGE (OUTPATIENT)
Dept: FAMILY MEDICINE | Facility: CLINIC | Age: 88
End: 2023-11-30
Payer: MEDICARE

## 2023-11-30 RX ORDER — LEVOTHYROXINE SODIUM 125 UG/1
125 TABLET ORAL
Qty: 90 TABLET | Refills: 0 | Status: SHIPPED | OUTPATIENT
Start: 2023-12-01 | End: 2023-12-12

## 2023-11-30 NOTE — TELEPHONE ENCOUNTER
No care due was identified.  [facility] Embedded Care Due Messages. Reference number: [id number].   11/30/2023 10:02:38 AM CST   yes

## 2023-12-04 ENCOUNTER — LAB VISIT (OUTPATIENT)
Dept: LAB | Facility: HOSPITAL | Age: 88
End: 2023-12-04
Attending: STUDENT IN AN ORGANIZED HEALTH CARE EDUCATION/TRAINING PROGRAM
Payer: MEDICARE

## 2023-12-04 ENCOUNTER — IMMUNIZATION (OUTPATIENT)
Dept: FAMILY MEDICINE | Facility: CLINIC | Age: 88
End: 2023-12-04
Payer: MEDICARE

## 2023-12-04 DIAGNOSIS — E03.8 OTHER SPECIFIED HYPOTHYROIDISM: Chronic | ICD-10-CM

## 2023-12-04 DIAGNOSIS — Z23 NEED FOR VACCINATION: Primary | ICD-10-CM

## 2023-12-04 DIAGNOSIS — I25.10 CORONARY ARTERY DISEASE INVOLVING NATIVE CORONARY ARTERY OF NATIVE HEART WITHOUT ANGINA PECTORIS: Chronic | ICD-10-CM

## 2023-12-04 DIAGNOSIS — E11.22 TYPE 2 DIABETES MELLITUS WITH STAGE 4 CHRONIC KIDNEY DISEASE, WITH LONG-TERM CURRENT USE OF INSULIN: Chronic | ICD-10-CM

## 2023-12-04 DIAGNOSIS — N18.4 TYPE 2 DIABETES MELLITUS WITH STAGE 4 CHRONIC KIDNEY DISEASE, WITH LONG-TERM CURRENT USE OF INSULIN: Chronic | ICD-10-CM

## 2023-12-04 DIAGNOSIS — I10 PRIMARY HYPERTENSION: Chronic | ICD-10-CM

## 2023-12-04 DIAGNOSIS — Z79.4 TYPE 2 DIABETES MELLITUS WITH STAGE 4 CHRONIC KIDNEY DISEASE, WITH LONG-TERM CURRENT USE OF INSULIN: Chronic | ICD-10-CM

## 2023-12-04 LAB
ALBUMIN SERPL BCP-MCNC: 3.4 G/DL (ref 3.5–5.2)
ALP SERPL-CCNC: 76 U/L (ref 55–135)
ALT SERPL W/O P-5'-P-CCNC: 21 U/L (ref 10–44)
ANION GAP SERPL CALC-SCNC: 13 MMOL/L (ref 8–16)
AST SERPL-CCNC: 25 U/L (ref 10–40)
BILIRUB SERPL-MCNC: 0.5 MG/DL (ref 0.1–1)
BUN SERPL-MCNC: 55 MG/DL (ref 8–23)
CALCIUM SERPL-MCNC: 10.1 MG/DL (ref 8.7–10.5)
CHLORIDE SERPL-SCNC: 106 MMOL/L (ref 95–110)
CHOLEST SERPL-MCNC: 169 MG/DL (ref 120–199)
CHOLEST/HDLC SERPL: 3.8 {RATIO} (ref 2–5)
CO2 SERPL-SCNC: 24 MMOL/L (ref 23–29)
CREAT SERPL-MCNC: 2.6 MG/DL (ref 0.5–1.4)
EST. GFR  (NO RACE VARIABLE): 17.1 ML/MIN/1.73 M^2
ESTIMATED AVG GLUCOSE: 137 MG/DL (ref 68–131)
GLUCOSE SERPL-MCNC: 130 MG/DL (ref 70–110)
HBA1C MFR BLD: 6.4 % (ref 4–5.6)
HDLC SERPL-MCNC: 44 MG/DL (ref 40–75)
HDLC SERPL: 26 % (ref 20–50)
LDLC SERPL CALC-MCNC: 99.2 MG/DL (ref 63–159)
NONHDLC SERPL-MCNC: 125 MG/DL
POTASSIUM SERPL-SCNC: 4.4 MMOL/L (ref 3.5–5.1)
PROT SERPL-MCNC: 7 G/DL (ref 6–8.4)
SODIUM SERPL-SCNC: 143 MMOL/L (ref 136–145)
TRIGL SERPL-MCNC: 129 MG/DL (ref 30–150)
TSH SERPL DL<=0.005 MIU/L-ACNC: 7.1 UIU/ML (ref 0.4–4)

## 2023-12-04 PROCEDURE — 84439 ASSAY OF FREE THYROXINE: CPT | Performed by: STUDENT IN AN ORGANIZED HEALTH CARE EDUCATION/TRAINING PROGRAM

## 2023-12-04 PROCEDURE — G0008 FLU VACCINE - QUADRIVALENT - ADJUVANTED: ICD-10-PCS | Mod: S$GLB,,, | Performed by: FAMILY MEDICINE

## 2023-12-04 PROCEDURE — 36415 COLL VENOUS BLD VENIPUNCTURE: CPT | Mod: PN | Performed by: STUDENT IN AN ORGANIZED HEALTH CARE EDUCATION/TRAINING PROGRAM

## 2023-12-04 PROCEDURE — 80053 COMPREHEN METABOLIC PANEL: CPT | Performed by: STUDENT IN AN ORGANIZED HEALTH CARE EDUCATION/TRAINING PROGRAM

## 2023-12-04 PROCEDURE — 80061 LIPID PANEL: CPT | Performed by: STUDENT IN AN ORGANIZED HEALTH CARE EDUCATION/TRAINING PROGRAM

## 2023-12-04 PROCEDURE — G0008 ADMIN INFLUENZA VIRUS VAC: HCPCS | Mod: S$GLB,,, | Performed by: FAMILY MEDICINE

## 2023-12-04 PROCEDURE — 84443 ASSAY THYROID STIM HORMONE: CPT | Performed by: STUDENT IN AN ORGANIZED HEALTH CARE EDUCATION/TRAINING PROGRAM

## 2023-12-04 PROCEDURE — 90694 FLU VACCINE - QUADRIVALENT - ADJUVANTED: ICD-10-PCS | Mod: S$GLB,,, | Performed by: FAMILY MEDICINE

## 2023-12-04 PROCEDURE — 90694 VACC AIIV4 NO PRSRV 0.5ML IM: CPT | Mod: S$GLB,,, | Performed by: FAMILY MEDICINE

## 2023-12-04 PROCEDURE — 83036 HEMOGLOBIN GLYCOSYLATED A1C: CPT | Performed by: STUDENT IN AN ORGANIZED HEALTH CARE EDUCATION/TRAINING PROGRAM

## 2023-12-05 LAB — T4 FREE SERPL-MCNC: 1.12 NG/DL (ref 0.71–1.51)

## 2023-12-06 ENCOUNTER — PATIENT MESSAGE (OUTPATIENT)
Dept: FAMILY MEDICINE | Facility: CLINIC | Age: 88
End: 2023-12-06
Payer: MEDICARE

## 2023-12-08 ENCOUNTER — OFFICE VISIT (OUTPATIENT)
Dept: FAMILY MEDICINE | Facility: CLINIC | Age: 88
End: 2023-12-08
Payer: MEDICARE

## 2023-12-08 VITALS
HEART RATE: 60 BPM | BODY MASS INDEX: 28.37 KG/M2 | RESPIRATION RATE: 16 BRPM | OXYGEN SATURATION: 98 % | WEIGHT: 154.19 LBS | HEIGHT: 62 IN | SYSTOLIC BLOOD PRESSURE: 134 MMHG | DIASTOLIC BLOOD PRESSURE: 70 MMHG

## 2023-12-08 DIAGNOSIS — S60.511A ABRASION OF RIGHT HAND, INITIAL ENCOUNTER: Primary | ICD-10-CM

## 2023-12-08 PROCEDURE — 1157F ADVNC CARE PLAN IN RCRD: CPT | Mod: CPTII,S$GLB,,

## 2023-12-08 PROCEDURE — 99999 PR PBB SHADOW E&M-EST. PATIENT-LVL V: ICD-10-PCS | Mod: PBBFAC,,,

## 2023-12-08 PROCEDURE — 3288F PR FALLS RISK ASSESSMENT DOCUMENTED: ICD-10-PCS | Mod: CPTII,S$GLB,,

## 2023-12-08 PROCEDURE — 1101F PR PT FALLS ASSESS DOC 0-1 FALLS W/OUT INJ PAST YR: ICD-10-PCS | Mod: CPTII,S$GLB,,

## 2023-12-08 PROCEDURE — 99999 PR PBB SHADOW E&M-EST. PATIENT-LVL V: CPT | Mod: PBBFAC,,,

## 2023-12-08 PROCEDURE — 1159F MED LIST DOCD IN RCRD: CPT | Mod: CPTII,S$GLB,,

## 2023-12-08 PROCEDURE — 3288F FALL RISK ASSESSMENT DOCD: CPT | Mod: CPTII,S$GLB,,

## 2023-12-08 PROCEDURE — 99213 PR OFFICE/OUTPT VISIT, EST, LEVL III, 20-29 MIN: ICD-10-PCS | Mod: S$GLB,,,

## 2023-12-08 PROCEDURE — 1157F PR ADVANCE CARE PLAN OR EQUIV PRESENT IN MEDICAL RECORD: ICD-10-PCS | Mod: CPTII,S$GLB,,

## 2023-12-08 PROCEDURE — 1159F PR MEDICATION LIST DOCUMENTED IN MEDICAL RECORD: ICD-10-PCS | Mod: CPTII,S$GLB,,

## 2023-12-08 PROCEDURE — 99213 OFFICE O/P EST LOW 20 MIN: CPT | Mod: S$GLB,,,

## 2023-12-08 PROCEDURE — 1101F PT FALLS ASSESS-DOCD LE1/YR: CPT | Mod: CPTII,S$GLB,,

## 2023-12-08 RX ORDER — MUPIROCIN 20 MG/G
OINTMENT TOPICAL 3 TIMES DAILY
Qty: 30 G | Refills: 0 | Status: SHIPPED | OUTPATIENT
Start: 2023-12-08 | End: 2024-02-26 | Stop reason: SDUPTHER

## 2023-12-08 NOTE — PROGRESS NOTES
[facility]  [address] [place] [address]    Subjective    Chief Complaint:   Chief Complaint   Patient presents with    Follow-up     Patient is here today for follow up for her right hand       History of Present Illness:     Denice Sheth is a(n) 89 y.o. female with past medical history as noted below who presents to the clinic today for injury of R hand. She is present with a family member.     She hit her hand on a recliner chair a few days ago, causing an abrasion to the dorsal aspect of the R hand. Her daughter has been applying adhesive bandages, which she thinks initially caused redness of the area. No drainage from the abrasion or fever. UTD on tdap.            Problem List:   Patient Active Problem List   Diagnosis    Gait disorder    Insomnia    Primary hypertension    Diastolic congestive heart failure, NYHA class 1    CKD (chronic kidney disease) stage 4, GFR 15-29 ml/min    Stenosis of left carotid artery    Type 2 diabetes mellitus with stage 4 chronic kidney disease, with long-term current use of insulin    Chronic atrial fibrillation    Hemiparesis affecting left side as late effect of cerebrovascular accident (CVA)    Proteinuria due to type 2 diabetes mellitus    Other specified hypothyroidism    H/O: CVA (cerebrovascular accident)    Chronic diastolic heart failure    CAD (coronary artery disease)    Dementia    Aortic calcification    Senile purpura    Exudative age-related macular degeneration of right eye with active choroidal neovascularization    Diabetic retinopathy    Pacemaker    Aortic valve regurgitation    Mitral valve regurgitation    History of TIA (transient ischemic attack)    Gastroesophageal reflux disease without esophagitis       Current Outpatient Medications:   Current Outpatient Medications   Medication Instructions    amiodarone (PACERONE) 200 mg, Oral, Daily    apixaban (ELIQUIS) 2.5 mg, Oral, 2 times daily     ascorbic acid (vitamin C) (VITAMIN C) 500 mg, Oral, Daily    b complex vitamins capsule 1 capsule, Oral, Daily    BINAXNOW COVID-19 AG SELF TEST Kit Use as Directed on the Package    blood sugar diagnostic Strp 1 strip 2 times daily, to use with insurance preferred meter    blood-glucose meter kit To check BG 2 times daily, to use with insurance preferred meter    blood-glucose meter,continuous (DEXCOM ) Misc 1 each, Misc.(Non-Drug; Combo Route), Daily    blood-glucose transmitter (DEXCOM G5 TRANSMITTER) Petty 1 each, Misc.(Non-Drug; Combo Route), Daily    cholecalciferol (vitamin D3) 5,000 Units, Oral, Daily    clopidogrel (PLAVIX) 75 mg tablet TAKE ONE TABLET BY MOUTH ONCE DAILY    DEXCOM G7 SENSOR Petty USE TO CHECK BLOOD GLUCOSE AS DIRECTED. REPLACE EVERY 10 DAYS    diclofenac sodium (VOLTAREN ARTHRITIS PAIN) 2 g, Topical (Top), 2 times daily PRN    digoxin (LANOXIN) 125 mcg tablet 1 tablet, Oral, Every other day    diphenoxylate-atropine 2.5-0.025 mg (LOMOTIL) 2.5-0.025 mg per tablet 1 tablet, Oral, 4 times daily PRN    famotidine (PEPCID) 20 mg, Oral, 2 times daily    fish oil-omega-3 fatty acids 300-1,000 mg capsule 1 capsule, Oral, 2 times daily    furosemide (LASIX) 40 MG tablet TAKE 1 TABLET BY MOUTH ONCE DAILY (TAKE  AN  EXTRA  TABLET  FOR  ANY  WEIGHT  GAIN  OF  3LB  OR  MORE  ON  ANY  GIVEN  DAY)    insulin (LANTUS SOLOSTAR U-100 INSULIN) 28 Units, Subcutaneous, Nightly    insulin lispro 100 unit/mL pen INJECT 16 THREE TIMES DAILY WITH MEALS    lancets 31 gauge Misc 1 strip 2 times daily, to use with insurance preferred meter    levothyroxine (SYNTHROID) 75 mcg, Oral, Every Tues, Thurs, Sat, Sun    levothyroxine (SYNTHROID) 125 mcg, Oral, Every Mon, Wed, Fri    magnesium 200 mg, Oral, Daily    melatonin 10 mg Tab 1 tablet, Oral, Nightly PRN    memantine (NAMENDA) 10 MG Tab TAKE 1 TABLET BY MOUTH ONCE DAILY IN THE EVENING    metoprolol tartrate (LOPRESSOR) 50 mg, Oral, 2 times daily    multivitamin  (THERAGRAN) per tablet 1 tablet, Daily    mupirocin (BACTROBAN) 2 % ointment Topical (Top), 3 times daily    ondansetron (ZOFRAN-ODT) 4 mg, Oral, Every 8 hours PRN    pen needle, diabetic (COMFORT EZ PEN NEEDLES) 1 Units, Misc.(Non-Drug; Combo Route), 3 times daily    polyethylene glycol (GLYCOLAX) 17 g, Oral, As needed (PRN)    potassium chloride SA (K-DUR,KLOR-CON M) 10 MEQ tablet 10 mEq, Oral, 2 times daily    rivastigmine tartrate (EXELON) 1.5 mg, Oral, 2 times daily    simvastatin (ZOCOR) 10 mg, Oral, Nightly    traZODone (DESYREL) 50 mg, Oral, Nightly       Surgical History:   Past Surgical History:   Procedure Laterality Date    ADENOIDECTOMY  8/18    BILATERAL SALPINGO-OOPHORECTOMY (BSO) Bilateral 08/19/2019    Procedure: SALPINGO-OOPHORECTOMY, BILATERAL;  Surgeon: Blayne Wallace MD;  Location: [facility] OR;  Service: OB/GYN;  Laterality: Bilateral;    COLON SURGERY  8/18    HYSTERECTOMY      partial, benign causes by reported hx    INSERTION OF PACEMAKER  08/2017    LEFT HEART CATHETERIZATION Left 01/06/2022    Procedure: Left heart cath;  Surgeon: Bhaskar Hanson III, MD;  Location: [facility] CATH;  Service: Cardiology;  Laterality: Left;  Right radial    ROBOT-ASSISTED LAPAROSCOPIC COLECTOMY USING DA MICHELE XI Right 08/19/2019    Procedure: XI ROBOTIC COLECTOMY;  Surgeon: Kourtney Dodson MD;  Location: [facility] OR;  Service: General;  Laterality: Right;       Family History:   Family History   Problem Relation Age of Onset    Diabetes Mother     Heart disease Mother     Cancer Father         type unknown (nonsmoker, no etoh)    Heart disease Brother     Dementia Sister     Cancer Daughter         fallopian ca    Cancer Son         esophageal ca    Dementia Sister     Dementia Sister        Allergies:   Review of patient's allergies indicates:   Allergen Reactions    Adhesive Itching    Mucinex [guaifenesin]      Says caused CHF     Latex, natural rubber Rash       Tobacco Status:   Tobacco Use: Low Risk   (10/26/2023)    Patient History     Smoking Tobacco Use: Never     Smokeless Tobacco Use: Never     Passive Exposure: Not on file       Sexual Activity:   Social History     Substance and Sexual Activity   Sexual Activity Never    Birth control/protection: Post-menopausal       Alcohol Use:   Social History     Substance and Sexual Activity   Alcohol Use No         Objective       There were no vitals filed for this visit.    Review of Systems   Constitutional:  Negative for chills and fever.   Respiratory:  Negative for cough and shortness of breath.    Cardiovascular:  Negative for chest pain.       Physical Exam  Constitutional:       General: She is not in acute distress.     Appearance: Normal appearance.   HENT:      Head: Normocephalic and atraumatic.   Cardiovascular:      Rate and Rhythm: Normal rate and regular rhythm.      Heart sounds: Normal heart sounds. No murmur heard.  Pulmonary:      Effort: Pulmonary effort is normal. No respiratory distress.      Breath sounds: Normal breath sounds. No wheezing.   Skin:     General: Skin is warm.      Comments: 2 cm diameter shallow abrasion to dorsal aspect of R hand with dried blood. Wound is otherwise clean. No surrounding erythema or tenderness.    Neurological:      Mental Status: She is alert and oriented to person, place, and time.   Psychiatric:         Behavior: Behavior normal.           Assessment and Plan:    1. Abrasion of right hand, initial encounter  -     mupirocin (BACTROBAN) 2 % ointment; Apply topically 3 (three) times daily.  Dispense: 30 g; Refill: 0        Visit summary:    Denice Sheth presented today for abrasion.    Do not suspect cellulitis at this point. Prescribed mupirocin ointment, recommend she keep the wound clean and covered. Monitor for signs of infection, which were discussed with the patient and her family member.       Patient was instructed to report to ER if symptoms become severe.    Follow up: for any worsening  symptoms or signs of infection      Yesenia Ennis PA-C    This note was created partially with voice dictation software and is prone to errors. This note has been reviewed by me but some errors are inevitable.

## 2023-12-12 ENCOUNTER — OFFICE VISIT (OUTPATIENT)
Dept: FAMILY MEDICINE | Facility: CLINIC | Age: 88
End: 2023-12-12
Payer: MEDICARE

## 2023-12-12 ENCOUNTER — PATIENT MESSAGE (OUTPATIENT)
Dept: FAMILY MEDICINE | Facility: CLINIC | Age: 88
End: 2023-12-12

## 2023-12-12 VITALS
DIASTOLIC BLOOD PRESSURE: 64 MMHG | HEIGHT: 62 IN | SYSTOLIC BLOOD PRESSURE: 132 MMHG | RESPIRATION RATE: 16 BRPM | BODY MASS INDEX: 28.46 KG/M2 | WEIGHT: 154.63 LBS | HEART RATE: 64 BPM

## 2023-12-12 DIAGNOSIS — E03.8 OTHER SPECIFIED HYPOTHYROIDISM: Primary | ICD-10-CM

## 2023-12-12 DIAGNOSIS — Z71.2 ENCOUNTER TO DISCUSS TEST RESULTS: ICD-10-CM

## 2023-12-12 PROCEDURE — 99999 PR PBB SHADOW E&M-EST. PATIENT-LVL V: CPT | Mod: PBBFAC,,, | Performed by: STUDENT IN AN ORGANIZED HEALTH CARE EDUCATION/TRAINING PROGRAM

## 2023-12-12 PROCEDURE — 3288F PR FALLS RISK ASSESSMENT DOCUMENTED: ICD-10-PCS | Mod: CPTII,S$GLB,, | Performed by: STUDENT IN AN ORGANIZED HEALTH CARE EDUCATION/TRAINING PROGRAM

## 2023-12-12 PROCEDURE — 99214 OFFICE O/P EST MOD 30 MIN: CPT | Mod: S$GLB,,, | Performed by: STUDENT IN AN ORGANIZED HEALTH CARE EDUCATION/TRAINING PROGRAM

## 2023-12-12 PROCEDURE — 1160F PR REVIEW ALL MEDS BY PRESCRIBER/CLIN PHARMACIST DOCUMENTED: ICD-10-PCS | Mod: CPTII,S$GLB,, | Performed by: STUDENT IN AN ORGANIZED HEALTH CARE EDUCATION/TRAINING PROGRAM

## 2023-12-12 PROCEDURE — 99999 PR PBB SHADOW E&M-EST. PATIENT-LVL V: ICD-10-PCS | Mod: PBBFAC,,, | Performed by: STUDENT IN AN ORGANIZED HEALTH CARE EDUCATION/TRAINING PROGRAM

## 2023-12-12 PROCEDURE — 3288F FALL RISK ASSESSMENT DOCD: CPT | Mod: CPTII,S$GLB,, | Performed by: STUDENT IN AN ORGANIZED HEALTH CARE EDUCATION/TRAINING PROGRAM

## 2023-12-12 PROCEDURE — 1159F PR MEDICATION LIST DOCUMENTED IN MEDICAL RECORD: ICD-10-PCS | Mod: CPTII,S$GLB,, | Performed by: STUDENT IN AN ORGANIZED HEALTH CARE EDUCATION/TRAINING PROGRAM

## 2023-12-12 PROCEDURE — 1160F RVW MEDS BY RX/DR IN RCRD: CPT | Mod: CPTII,S$GLB,, | Performed by: STUDENT IN AN ORGANIZED HEALTH CARE EDUCATION/TRAINING PROGRAM

## 2023-12-12 PROCEDURE — 1157F ADVNC CARE PLAN IN RCRD: CPT | Mod: CPTII,S$GLB,, | Performed by: STUDENT IN AN ORGANIZED HEALTH CARE EDUCATION/TRAINING PROGRAM

## 2023-12-12 PROCEDURE — 1101F PT FALLS ASSESS-DOCD LE1/YR: CPT | Mod: CPTII,S$GLB,, | Performed by: STUDENT IN AN ORGANIZED HEALTH CARE EDUCATION/TRAINING PROGRAM

## 2023-12-12 PROCEDURE — 1157F PR ADVANCE CARE PLAN OR EQUIV PRESENT IN MEDICAL RECORD: ICD-10-PCS | Mod: CPTII,S$GLB,, | Performed by: STUDENT IN AN ORGANIZED HEALTH CARE EDUCATION/TRAINING PROGRAM

## 2023-12-12 PROCEDURE — 99214 PR OFFICE/OUTPT VISIT, EST, LEVL IV, 30-39 MIN: ICD-10-PCS | Mod: S$GLB,,, | Performed by: STUDENT IN AN ORGANIZED HEALTH CARE EDUCATION/TRAINING PROGRAM

## 2023-12-12 PROCEDURE — 1159F MED LIST DOCD IN RCRD: CPT | Mod: CPTII,S$GLB,, | Performed by: STUDENT IN AN ORGANIZED HEALTH CARE EDUCATION/TRAINING PROGRAM

## 2023-12-12 PROCEDURE — 1101F PR PT FALLS ASSESS DOC 0-1 FALLS W/OUT INJ PAST YR: ICD-10-PCS | Mod: CPTII,S$GLB,, | Performed by: STUDENT IN AN ORGANIZED HEALTH CARE EDUCATION/TRAINING PROGRAM

## 2023-12-12 RX ORDER — LEVOTHYROXINE SODIUM 100 UG/1
100 TABLET ORAL
Qty: 30 TABLET | Refills: 11 | Status: SHIPPED | OUTPATIENT
Start: 2023-12-12 | End: 2024-12-11

## 2023-12-12 NOTE — PROGRESS NOTES
Plan:      Denice was seen today for follow-up.    Diagnoses and all orders for this visit:    Other specified hypothyroidism: Repeat labs in 6 weeks.  -     TSH; Future  -     T4, FREE; Future  -     levothyroxine (SYNTHROID) 100 MCG tablet; Take 1 tablet (100 mcg total) by mouth before breakfast.    Encounter to discuss test results    Reviewed most recent test results - all questions answered, pt expressed understanding.    Follow up in about 3 months (around 3/12/2024), or if symptoms worsen or fail to improve.    Allison Woods MD  12/12/2023    Subjective:      Patient ID: Denice Sheth is a 89 y.o. female    Chief Complaint   Patient presents with    Follow-up     Patient is coming in today to review lab results     HPI  89 y.o. 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:   - ASA 81 mg daily, Zocor 10 mg qhs     Dementia:   - Rivastigmine tartrate 1.5 mg BID, memantine 10 mg daily  -------------------  **Follows w/ Cardiology ([facility])     HTN:   - Lopressor 50 mg daily BID     Hypertriglyceridemia:   - Lipid panel wnl (12/4/23)  - Zocor 10 mg qhs     CAD:   - ASA 81 mg daily, Plavix 75 mg daily, Zocor 10 mg qhs     Carotid artery stenosis, left:   - ASA 81 mg daily, Plavix 75 mg daily, Zocor 10 mg qhs     HFpEF:   - Lasix 40 mg daily      TTE (6/2/23, outside reports, see scanned media):  - Mild concentric LVH  - EF 55-60%  - Pseudo-normal LV filling pattern, consistent with elevated LA pressure  - Left atrium is markedy dilated   - mild aortic valve sclerosis without stenosis  - mild-moderate aortic regurgitation  - Mild mitral annular calcification  mild mitral regurgitation     Afib:   - Lopressor 50 mg daily BID,  digoxin 125 mg every other day, amiodarone 200 mg daily  - Eliquis 2.5 mg BID  -------------------  CKD stage 4:  - BUN/Cr 55/2.6, eGFR 17.1 (12/4/23)       GERD:   - Pepcid 20 mg BID     Hypothyroidism:   - TSH 7.103, free T4 wnl (12/4/23)   - Synthroid 75 mcg T/Th/Sat/Sun  - Synthroid 125 mcg M/W/F     Insomnia:   - Trazodone PRN     ROS  Constitutional:  Negative for chills and fever.   Respiratory:  Negative for shortness of breath.    Cardiovascular:  Negative for chest pain.   Gastrointestinal:  Negative for abdominal pain, constipation, diarrhea, nausea and vomiting.     Current Outpatient Medications   Medication Instructions    amiodarone (PACERONE) 200 mg, Oral, Daily    apixaban (ELIQUIS) 2.5 mg, Oral, 2 times daily    ascorbic acid (vitamin C) (VITAMIN C) 500 mg, Oral, Daily    b complex vitamins capsule 1 capsule, Oral, Daily    BINAXNOW COVID-19 AG SELF TEST Kit Use as Directed on the Package    blood sugar diagnostic Strp 1 strip 2 times daily, to use with insurance preferred meter    blood-glucose meter kit To check BG 2 times daily, to use with insurance preferred meter    blood-glucose meter,continuous (DEXCOM ) Misc 1 each, Misc.(Non-Drug; Combo Route), Daily    blood-glucose transmitter (DEXCOM G5 TRANSMITTER) Petty 1 each, Misc.(Non-Drug; Combo Route), Daily    cholecalciferol (vitamin D3) 5,000 Units, Oral, Daily    clopidogrel (PLAVIX) 75 mg tablet TAKE ONE TABLET BY MOUTH ONCE DAILY    DEXCOM G7 SENSOR Petty USE TO CHECK BLOOD GLUCOSE AS DIRECTED. REPLACE EVERY 10 DAYS    diclofenac sodium (VOLTAREN ARTHRITIS PAIN) 2 g, Topical (Top), 2 times daily PRN    digoxin (LANOXIN) 125 mcg tablet 1 tablet, Oral, Every other day    diphenoxylate-atropine 2.5-0.025 mg (LOMOTIL) 2.5-0.025 mg per tablet 1 tablet, Oral, 4 times daily PRN    famotidine (PEPCID) 20 mg, Oral, 2 times daily    fish oil-omega-3 fatty acids 300-1,000 mg capsule 1 capsule, Oral, 2 times daily    furosemide (LASIX) 40 MG  tablet TAKE 1 TABLET BY MOUTH ONCE DAILY (TAKE  AN  EXTRA  TABLET  FOR  ANY  WEIGHT  GAIN  OF  3LB  OR  MORE  ON  ANY  GIVEN  DAY)    insulin (LANTUS SOLOSTAR U-100 INSULIN) 28 Units, Subcutaneous, Nightly    insulin lispro 100 unit/mL pen INJECT 16 THREE TIMES DAILY WITH MEALS    lancets 31 gauge Misc 1 strip 2 times daily, to use with insurance preferred meter    levothyroxine (SYNTHROID) 100 mcg, Oral, Before breakfast    magnesium 200 mg, Oral, Daily    melatonin 10 mg Tab 1 tablet, Oral, Nightly PRN    memantine (NAMENDA) 10 MG Tab TAKE 1 TABLET BY MOUTH ONCE DAILY IN THE EVENING    metoprolol tartrate (LOPRESSOR) 50 mg, Oral, 2 times daily    multivitamin (THERAGRAN) per tablet 1 tablet, Daily    mupirocin (BACTROBAN) 2 % ointment Topical (Top), 3 times daily    ondansetron (ZOFRAN-ODT) 4 mg, Oral, Every 8 hours PRN    pen needle, diabetic (COMFORT EZ PEN NEEDLES) 1 Units, Misc.(Non-Drug; Combo Route), 3 times daily    polyethylene glycol (GLYCOLAX) 17 g, Oral, As needed (PRN)    potassium chloride SA (K-DUR,KLOR-CON M) 10 MEQ tablet 10 mEq, Oral, 2 times daily    rivastigmine tartrate (EXELON) 1.5 mg, Oral, 2 times daily    simvastatin (ZOCOR) 10 mg, Oral, Nightly      Past Medical History:   Diagnosis Date    Anticoagulant long-term use     Aortic calcification 01/02/2022    CXR 1/2/22    Aortic valve regurgitation 10/13/2021    CAD (coronary artery disease) 01/06/2022    Cecum mass     Chronic atrial fibrillation 05/02/2017    Chronic diastolic heart failure     CKD (chronic kidney disease) stage 4, GFR 15-29 ml/min 02/22/2016    Dementia 08/31/2022    Diabetic retinopathy 03/24/2021    Disorder of kidney and ureter     follows w/ Dr. 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 09/12/2022    Gastroesophageal reflux disease without esophagitis 9/17/2023    H/O: CVA (cerebrovascular accident) 07/30/2019     "Hemiparesis affecting left side as late effect of cerebrovascular accident (CVA) 06/05/2017    History of TIA (transient ischemic attack) 09/17/2023    Hypertension     KRISTEN (iron deficiency anemia) 01/12/2018    Insomnia     Irregular heart rhythm     Mitral valve regurgitation 10/13/2021    Other specified hypothyroidism 06/24/2019    Pacemaker 01/02/2023    Primary hypertension 02/09/2016    Proteinuria due to type 2 diabetes mellitus 03/12/2019    Senile purpura 12/21/2022    Sick sinus syndrome 2018    s/p pacemaker placement    Stenosis of left carotid artery 12/06/2016    TIA (transient ischemic attack) 03/2017    Type 2 diabetes mellitus with stage 4 chronic kidney disease, with long-term current use of insulin       Objective:      Vitals:    12/12/23 0901   BP: 132/64   BP Location: Left arm   Patient Position: Sitting   Pulse: 64   Resp: 16   Weight: 70.1 kg (154 lb 10.4 oz)   Height: 5' 2" (1.575 m)     Body mass index is 28.29 kg/m².    Physical Exam   Constitutional:       General: No acute distress.  HENT:      Head: Normocephalic and atraumatic.   Pulmonary:      Effort: Pulmonary effort is normal. No respiratory distress.   Neurological:      General: No focal deficit present.      Mental Status: Alert and oriented to person, place, and time. Mental status is at baseline.    Assessment:       1. Other specified hypothyroidism    2. Encounter to discuss test results        Allison Woods MD  [facility]  Office: [phone]   Fax: [phone]  12/12/2023      Disclaimer: This note was partly generated using dictation software which may occasionally result in transcription errors.    Total time spent on this encounter includes face to face time and non-face to face time preparing to see the patient (eg, review of tests), obtaining and/or reviewing separately obtained history, documenting clinical information in the electronic or other health record, independently " interpreting results, and communicating results to the patient/family/caregiver, or care coordinator.

## 2023-12-24 DIAGNOSIS — G31.84 MCI (MILD COGNITIVE IMPAIRMENT) WITH MEMORY LOSS: ICD-10-CM

## 2023-12-24 NOTE — TELEPHONE ENCOUNTER
No care due was identified.  [facility] Embedded Care Due Messages. Reference number: [id number].   12/24/2023 5:41:24 PM CST

## 2023-12-26 NOTE — TELEPHONE ENCOUNTER
Refill Routing Note   Medication(s) are not appropriate for processing by [facility] for the following reason(s):        No active prescription written by provider  Outside of protocol    [facility] action(s):  Route        Medication Therapy Plan: Lasix PRN usage outside of [facility] protocol; Exelon OOP      Appointments  past 12m or future 3m with PCP    Date Provider   Last Visit   12/12/2023 Allison Woods MD   Next Visit   Visit date not found Allison Woods MD   ED visits in past 90 days: 0        Note composed:4:44 PM 12/26/2023

## 2023-12-27 RX ORDER — FUROSEMIDE 40 MG/1
TABLET ORAL
Qty: 90 TABLET | Refills: 0 | Status: SHIPPED | OUTPATIENT
Start: 2023-12-27 | End: 2024-03-25 | Stop reason: SDUPTHER

## 2023-12-27 RX ORDER — RIVASTIGMINE TARTRATE 1.5 MG/1
1.5 CAPSULE ORAL 2 TIMES DAILY
Qty: 60 CAPSULE | Refills: 0 | Status: SHIPPED | OUTPATIENT
Start: 2023-12-27 | End: 2024-02-01 | Stop reason: SDUPTHER

## 2024-01-01 ENCOUNTER — PATIENT MESSAGE (OUTPATIENT)
Dept: FAMILY MEDICINE | Facility: CLINIC | Age: 89
End: 2024-01-01
Payer: MEDICARE

## 2024-01-11 DIAGNOSIS — Z00.00 ENCOUNTER FOR MEDICARE ANNUAL WELLNESS EXAM: ICD-10-CM

## 2024-01-12 ENCOUNTER — OFFICE VISIT (OUTPATIENT)
Dept: PODIATRY | Facility: CLINIC | Age: 89
End: 2024-01-12
Payer: MEDICARE

## 2024-01-12 VITALS — HEIGHT: 62 IN | WEIGHT: 154 LBS | BODY MASS INDEX: 28.34 KG/M2

## 2024-01-12 DIAGNOSIS — B35.1 ONYCHOMYCOSIS DUE TO DERMATOPHYTE: ICD-10-CM

## 2024-01-12 DIAGNOSIS — M20.42 HAMMER TOES OF BOTH FEET: ICD-10-CM

## 2024-01-12 DIAGNOSIS — M20.41 HAMMER TOES OF BOTH FEET: ICD-10-CM

## 2024-01-12 DIAGNOSIS — E11.42 DIABETIC POLYNEUROPATHY ASSOCIATED WITH TYPE 2 DIABETES MELLITUS: Primary | ICD-10-CM

## 2024-01-12 PROCEDURE — 11721 DEBRIDE NAIL 6 OR MORE: CPT | Mod: Q9,S$GLB,, | Performed by: PODIATRIST

## 2024-01-12 PROCEDURE — 99999 PR PBB SHADOW E&M-EST. PATIENT-LVL IV: CPT | Mod: PBBFAC,,, | Performed by: PODIATRIST

## 2024-01-12 PROCEDURE — 99213 OFFICE O/P EST LOW 20 MIN: CPT | Mod: 25,S$GLB,, | Performed by: PODIATRIST

## 2024-01-12 NOTE — PROGRESS NOTES
Subjective:      Patient ID: Denice Sheth is a 89 y.o. female.    Chief Complaint: Diabetes Mellitus    Denice is a 89 y.o. female who presents to the clinic for evaluation and treatment of diabetic feet. Denice has a past medical history of Anticoagulant long-term use, Aortic calcification (01/02/2022), Aortic valve regurgitation (10/13/2021), CAD (coronary artery disease) (01/06/2022), Cecum mass, Chronic atrial fibrillation (05/02/2017), Chronic diastolic heart failure, CKD (chronic kidney disease) stage 4, GFR 15-29 ml/min (02/22/2016), Dementia (08/31/2022), Diabetic retinopathy (03/24/2021), Disorder of kidney and ureter, Encounter for blood transfusion, Exudative age-related macular degeneration of right eye with active choroidal neovascularization (09/12/2022), Gastroesophageal reflux disease without esophagitis (9/17/2023), H/O: CVA (cerebrovascular accident) (07/30/2019), Hemiparesis affecting left side as late effect of cerebrovascular accident (CVA) (06/05/2017), History of TIA (transient ischemic attack) (09/17/2023), Hypertension, KRISTEN (iron deficiency anemia) (01/12/2018), Insomnia, Irregular heart rhythm, Mitral valve regurgitation (10/13/2021), Other specified hypothyroidism (06/24/2019), Pacemaker (01/02/2023), Primary hypertension (02/09/2016), Proteinuria due to type 2 diabetes mellitus (03/12/2019), Senile purpura (12/21/2022), Sick sinus syndrome (2018), Stenosis of left carotid artery (12/06/2016), TIA (transient ischemic attack) (03/2017), and Type 2 diabetes mellitus with stage 4 chronic kidney disease, with long-term current use of insulin. Patient relates needing her toenails trimmed.  Denies this being a source of pain.  Has not attempted to self trim.  Relates needing a new pair of diabetic shoes/insoles following today's exam.  Continues with good control over her blood glucose.  Denies any additional pedal complaints.     PCP: Allison Woods MD    Date Last Seen by PCP:  12/23    Hemoglobin A1C   Date Value Ref Range Status   12/04/2023 6.4 (H) 4.0 - 5.6 % Final     Comment:     ADA Screening Guidelines:  5.7-6.4%  Consistent with prediabetes  >or=6.5%  Consistent with diabetes    High levels of fetal hemoglobin interfere with the HbA1C  assay. Heterozygous hemoglobin variants (HbS, HgC, etc)do  not significantly interfere with this assay.   However, presence of multiple variants may affect accuracy.     07/24/2023 7.2 (H) 4.0 - 5.6 % Final     Comment:     ADA Screening Guidelines:  5.7-6.4%  Consistent with prediabetes  >or=6.5%  Consistent with diabetes    High levels of fetal hemoglobin interfere with the HbA1C  assay. Heterozygous hemoglobin variants (HbS, HgC, etc)do  not significantly interfere with this assay.   However, presence of multiple variants may affect accuracy.     10/06/2021 6.9 (H) 4.0 - 5.6 % Final     Comment:     ADA Screening Guidelines:  5.7-6.4%  Consistent with prediabetes  >or=6.5%  Consistent with diabetes    High levels of fetal hemoglobin interfere with the HbA1C  assay. Heterozygous hemoglobin variants (HbS, HgC, etc)do  not significantly interfere with this assay.   However, presence of multiple variants may affect accuracy.           Past Medical History:   Diagnosis Date    Anticoagulant long-term use     Aortic calcification 01/02/2022    CXR 1/2/22    Aortic valve regurgitation 10/13/2021    CAD (coronary artery disease) 01/06/2022    Cecum mass     Chronic atrial fibrillation 05/02/2017    Chronic diastolic heart failure     CKD (chronic kidney disease) stage 4, GFR 15-29 ml/min 02/22/2016    Dementia 08/31/2022    Diabetic retinopathy 03/24/2021    Disorder of kidney and ureter     follows w/ Dr. 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 09/12/2022    Gastroesophageal reflux disease without esophagitis 9/17/2023    H/O: CVA  (cerebrovascular accident) 07/30/2019    Hemiparesis affecting left side as late effect of cerebrovascular accident (CVA) 06/05/2017    History of TIA (transient ischemic attack) 09/17/2023    Hypertension     KRISTEN (iron deficiency anemia) 01/12/2018    Insomnia     Irregular heart rhythm     Mitral valve regurgitation 10/13/2021    Other specified hypothyroidism 06/24/2019    Pacemaker 01/02/2023    Primary hypertension 02/09/2016    Proteinuria due to type 2 diabetes mellitus 03/12/2019    Senile purpura 12/21/2022    Sick sinus syndrome 2018    s/p pacemaker placement    Stenosis of left carotid artery 12/06/2016    TIA (transient ischemic attack) 03/2017    Type 2 diabetes mellitus with stage 4 chronic kidney disease, with long-term current use of insulin        Past Surgical History:   Procedure Laterality Date    ADENOIDECTOMY  8/18    BILATERAL SALPINGO-OOPHORECTOMY (BSO) Bilateral 08/19/2019    Procedure: SALPINGO-OOPHORECTOMY, BILATERAL;  Surgeon: Blayne Wallace MD;  Location: [facility] OR;  Service: OB/GYN;  Laterality: Bilateral;    COLON SURGERY  8/18    HYSTERECTOMY      partial, benign causes by reported hx    INSERTION OF PACEMAKER  08/2017    LEFT HEART CATHETERIZATION Left 01/06/2022    Procedure: Left heart cath;  Surgeon: Bhaskar Hanson III, MD;  Location: [facility] CATH;  Service: Cardiology;  Laterality: Left;  Right radial    ROBOT-ASSISTED LAPAROSCOPIC COLECTOMY USING DA MICHELE XI Right 08/19/2019    Procedure: XI ROBOTIC COLECTOMY;  Surgeon: Kourtney Dodson MD;  Location: [facility] OR;  Service: General;  Laterality: Right;       Family History   Problem Relation Age of Onset    Diabetes Mother     Heart disease Mother     Cancer Father         type unknown (nonsmoker, no etoh)    Heart disease Brother     Dementia Sister     Cancer Daughter         fallopian ca    Cancer Son         esophageal ca    Dementia Sister     Dementia Sister        Social History     Socioeconomic History    Marital status:     Tobacco Use    Smoking status: Never    Smokeless tobacco: Never   Substance and Sexual Activity    Alcohol use: No    Drug use: No    Sexual activity: Never     Birth control/protection: Post-menopausal     Social Determinants of Health     Financial Resource Strain: Low Risk  (12/27/2022)    Overall Financial Resource Strain (CARDIA)     Difficulty of Paying Living Expenses: Not very hard   Food Insecurity: Patient Declined (12/27/2022)    Hunger Vital Sign     Worried About Running Out of Food in the Last Year: Patient declined     Ran Out of Food in the Last Year: Patient declined   Transportation Needs: Patient Declined (12/27/2022)    PRAPARE - Transportation     Lack of Transportation (Medical): Patient declined     Lack of Transportation (Non-Medical): Patient declined   Physical Activity: Unknown (12/27/2022)    Exercise Vital Sign     Days of Exercise per Week: Patient declined   Stress: Patient Declined (12/27/2022)    [place] citizen [place] of [employer] - Occupational Stress Questionnaire     Feeling of Stress : Patient declined   Social Connections: Unknown (12/27/2022)    Social Connection and Isolation Panel [NHANES]     Frequency of Communication with Friends and Family: Once a week     Frequency of Social Gatherings with Friends and Family: Patient declined     Active Member of Clubs or Organizations: Yes     Attends Club or Organization Meetings: Patient declined     Marital Status:    Housing Stability: Unknown (12/27/2022)    Housing Stability Vital Sign     Unable to Pay for Housing in the Last Year: Patient refused     Unstable Housing in the Last Year: Patient refused       Current Outpatient Medications   Medication Sig Dispense Refill    amiodarone (PACERONE) 200 MG Tab Take 200 mg by mouth once daily.       apixaban (ELIQUIS) 2.5 mg Tab Take 1 tablet (2.5 mg total) by mouth 2 (two) times daily. 60 tablet 0    ascorbic acid (VITAMIN C) 500 MG tablet Take 500 mg by  mouth once daily.      b complex vitamins capsule Take 1 capsule by mouth once daily.      BINAXNOW COVID-19 AG SELF TEST Kit Use as Directed on the Package      blood sugar diagnostic Strp 1 strip 2 times daily, to use with insurance preferred meter 100 each 2    blood-glucose meter kit To check BG 2 times daily, to use with insurance preferred meter 1 each 0    blood-glucose meter,continuous (DEXCOM ) Misc 1 each by Misc.(Non-Drug; Combo Route) route once daily. 1 each 0    blood-glucose transmitter (DEXCOM G5 TRANSMITTER) Petty 1 each by Misc.(Non-Drug; Combo Route) route Daily. 1 each 0    cholecalciferol, vitamin D3, 125 mcg (5,000 unit) Tab Take 1 tablet (5,000 Units total) by mouth once daily.      clopidogrel (PLAVIX) 75 mg tablet TAKE ONE TABLET BY MOUTH ONCE DAILY (Patient taking differently: Take 75 mg by mouth once daily.) 30 tablet 11    DEXCOM G7 SENSOR Petty USE TO CHECK BLOOD GLUCOSE AS DIRECTED. REPLACE EVERY 10 DAYS 9 each 3    diclofenac sodium (VOLTAREN ARTHRITIS PAIN) 1 % Gel Apply 2 g topically 2 (two) times daily as needed (muscle pain). 100 g 1    digoxin (LANOXIN) 125 mcg tablet Take 1 tablet by mouth every other day.      diphenoxylate-atropine 2.5-0.025 mg (LOMOTIL) 2.5-0.025 mg per tablet Take 1 tablet by mouth 4 (four) times daily as needed for Diarrhea. 30 tablet 1    famotidine (PEPCID) 20 MG tablet Take 1 tablet (20 mg total) by mouth 2 (two) times daily. for 10 days 20 tablet 0    fish oil-omega-3 fatty acids 300-1,000 mg capsule Take 1 capsule by mouth 2 (two) times a day.      furosemide (LASIX) 40 MG tablet TAKE 1 TABLET BY MOUTH ONCE DAILY (TAKE AN EXTRA TABLET FOR ANY WEIGHT GAIN OF 3 POUNDS OR MORE ON ANY GIVEN DAY) 90 tablet 0    insulin (LANTUS SOLOSTAR U-100 INSULIN) glargine 100 units/mL SubQ pen Inject 28 Units into the skin every evening. 5 each 3    insulin lispro 100 unit/mL pen INJECT 16 THREE TIMES DAILY WITH MEALS 15 mL 3    lancets 31 gauge Misc 1 strip 2  "times daily, to use with insurance preferred meter 100 each 2    levothyroxine (SYNTHROID) 100 MCG tablet Take 1 tablet (100 mcg total) by mouth before breakfast. 30 tablet 11    magnesium 200 mg Tab Take 200 mg by mouth once daily.      melatonin 10 mg Tab Take 1 tablet by mouth nightly as needed.      memantine (NAMENDA) 10 MG Tab TAKE 1 TABLET BY MOUTH ONCE DAILY IN THE EVENING 90 tablet 3    metoprolol tartrate (LOPRESSOR) 50 MG tablet Take 1 tablet (50 mg total) by mouth 2 (two) times daily. 60 tablet 1    multivitamin (THERAGRAN) per tablet Take 1 tablet by mouth once daily.      mupirocin (BACTROBAN) 2 % ointment Apply topically 3 (three) times daily. 30 g 0    ondansetron (ZOFRAN-ODT) 4 MG TbDL Take 1 tablet (4 mg total) by mouth every 8 (eight) hours as needed (Nausea). 20 tablet 0    pen needle, diabetic (COMFORT EZ PEN NEEDLES) 29 gauge x 1/2" Ndle 1 Units by Misc.(Non-Drug; Combo Route) route 3 (three) times daily. 300 each 11    polyethylene glycol (GLYCOLAX) 17 gram PwPk Take 17 g by mouth as needed (constipation).      potassium chloride SA (K-DUR,KLOR-CON M) 10 MEQ tablet Take 1 tablet (10 mEq total) by mouth 2 (two) times daily. 180 tablet 3    rivastigmine tartrate (EXELON) 1.5 MG capsule Take 1 capsule by mouth twice daily 60 capsule 0    simvastatin (ZOCOR) 10 MG tablet Take 10 mg by mouth every evening.       No current facility-administered medications for this visit.       Review of patient's allergies indicates:   Allergen Reactions    Adhesive Itching    Mucinex [guaifenesin]      Says caused CHF     Latex, natural rubber Rash         Review of Systems   Constitutional: Negative for chills and fever.   Cardiovascular:  Negative for claudication and leg swelling.   Skin:  Negative for color change, nail changes and poor wound healing.   Musculoskeletal:  Negative for joint pain, joint swelling, muscle cramps and muscle weakness.   Gastrointestinal:  Negative for nausea and vomiting. "   Neurological:  Positive for numbness.   Psychiatric/Behavioral:  Negative for altered mental status.            Objective:      Physical Exam  Constitutional:       Appearance: Normal appearance. She is not ill-appearing.   Cardiovascular:      Pulses:           Dorsalis pedis pulses are 2+ on the right side and 2+ on the left side.        Posterior tibial pulses are 2+ on the right side and 2+ on the left side.      Comments: CFT is < 3 seconds bilateral.  Pedal hair growth is decreased bilateral.  No lower extremity edema noted bilateral.  Toes are warm to touch bilateral.    Musculoskeletal:         General: Deformity present. No tenderness.      Right lower leg: No edema.      Left lower leg: No edema.      Comments: Muscle strength 5/5 in all muscle groups bilateral.  No tenderness nor crepitation with ROM of foot/ankle joints bilateral.  (-) for pain with palpation of bilateral foot and ankle. Bilateral semi-reducible contracture of toes 2-5.  Bilateral HAV.   Skin:     General: Skin is warm and dry.      Capillary Refill: Capillary refill takes 2 to 3 seconds.      Findings: No bruising, ecchymosis, erythema, signs of injury, laceration, lesion, petechiae, rash or wound.      Comments: Pedal skin appears thin bilateral. Toenails x 10 appear thickened by 2 mm, elongated by 4 mm, and discolored with subungual debris.            Neurological:      Mental Status: She is alert.      Sensory: Sensory deficit present.      Motor: No weakness or atrophy.      Comments: Decreased protective sensation noted bilateral.  Light touch is intact bilateral.                 Assessment:       Encounter Diagnoses   Name Primary?    Diabetic polyneuropathy associated with type 2 diabetes mellitus Yes    Hammer toes of both feet     Onychomycosis due to dermatophyte          Plan:       Denice was seen today for diabetes mellitus.    Diagnoses and all orders for this visit:    Diabetic polyneuropathy associated with type 2  diabetes mellitus  -     DIABETIC SHOES FOR HOME USE    Hammer toes of both feet  -     DIABETIC SHOES FOR HOME USE    Onychomycosis due to dermatophyte      I counseled the patient on her conditions, their implications and medical management.    Advised to continue wearing supportive shoe gear that limits pressure to the digits.  Rx written for new diabetic shoes to accommodate.    Shoe inspection. Diabetic Foot Education. Patient reminded of the importance of good nutrition and blood sugar control to help prevent podiatric complications of diabetes. Patient instructed on proper foot hygeine. We discussed wearing proper shoe gear, daily foot inspections, never walking without protective shoe gear, never putting sharp instruments to feet    With patient's permission, nails were aggressively reduced and debrided x 10 to their soft tissue attachment mechanically and with electric , removing all offending nail and debris. Patient relates relief following the procedure. She will continue to monitor the areas daily, inspect her feet, wear protective shoe gear when ambulatory, moisturizer to maintain skin integrity and follow in this office in approximately 3 months, sooner p.r.n.    RTC in 3 months for routine nail care.    Pankaj Julian DPM

## 2024-01-29 ENCOUNTER — LAB VISIT (OUTPATIENT)
Dept: LAB | Facility: HOSPITAL | Age: 89
End: 2024-01-29
Attending: INTERNAL MEDICINE
Payer: MEDICARE

## 2024-01-29 DIAGNOSIS — N18.4 CKD (CHRONIC KIDNEY DISEASE) STAGE 4, GFR 15-29 ML/MIN: ICD-10-CM

## 2024-01-29 PROCEDURE — 84156 ASSAY OF PROTEIN URINE: CPT | Performed by: INTERNAL MEDICINE

## 2024-01-30 LAB
CREAT UR-MCNC: 48 MG/DL (ref 15–325)
PROT UR-MCNC: 9 MG/DL (ref 0–15)
PROT/CREAT UR: 0.19 MG/G{CREAT} (ref 0–0.2)

## 2024-02-01 ENCOUNTER — TELEPHONE (OUTPATIENT)
Dept: FAMILY MEDICINE | Facility: CLINIC | Age: 89
End: 2024-02-01
Payer: MEDICARE

## 2024-02-01 ENCOUNTER — PATIENT MESSAGE (OUTPATIENT)
Dept: FAMILY MEDICINE | Facility: CLINIC | Age: 89
End: 2024-02-01
Payer: MEDICARE

## 2024-02-01 DIAGNOSIS — G31.84 MCI (MILD COGNITIVE IMPAIRMENT) WITH MEMORY LOSS: ICD-10-CM

## 2024-02-01 RX ORDER — RIVASTIGMINE TARTRATE 1.5 MG/1
1.5 CAPSULE ORAL 2 TIMES DAILY
Qty: 60 CAPSULE | Refills: 0 | Status: SHIPPED | OUTPATIENT
Start: 2024-02-01 | End: 2024-02-29

## 2024-02-01 NOTE — TELEPHONE ENCOUNTER
----- Message from Bertha Sow sent at 2/1/2024  9:59 AM CST -----  Type:  RX Refill Request    Who Called:  Parisa Sheth, daughter  Refill or New Rx:  refill  RX Name and Strength:  rivastigmine tartrate (EXELON) 1.5 MG capsule  How is the patient currently taking it? (ex. 1XDay):  as directed  Is this a 30 day or 90 day RX:  90  Preferred Pharmacy with phone number:    [place] [id number] - [place]IN[facility] CHAVIS - [address]  [address]  [place] [address]  Phone: [phone] Fax: [phone]  Local or Mail Order:  local  Ordering Provider:  marcio Pierre Call Back Number:  [phone] (home)   Additional Information:

## 2024-02-05 ENCOUNTER — OFFICE VISIT (OUTPATIENT)
Dept: NEPHROLOGY | Facility: CLINIC | Age: 89
End: 2024-02-05
Payer: MEDICARE

## 2024-02-05 VITALS — SYSTOLIC BLOOD PRESSURE: 120 MMHG | DIASTOLIC BLOOD PRESSURE: 56 MMHG

## 2024-02-05 DIAGNOSIS — I10 PRIMARY HYPERTENSION: ICD-10-CM

## 2024-02-05 DIAGNOSIS — Z79.4 TYPE 2 DIABETES MELLITUS WITH STAGE 4 CHRONIC KIDNEY DISEASE, WITH LONG-TERM CURRENT USE OF INSULIN: ICD-10-CM

## 2024-02-05 DIAGNOSIS — E87.1 HYPONATREMIA: ICD-10-CM

## 2024-02-05 DIAGNOSIS — N18.4 CKD (CHRONIC KIDNEY DISEASE) STAGE 4, GFR 15-29 ML/MIN: Primary | ICD-10-CM

## 2024-02-05 DIAGNOSIS — N18.4 TYPE 2 DIABETES MELLITUS WITH STAGE 4 CHRONIC KIDNEY DISEASE, WITH LONG-TERM CURRENT USE OF INSULIN: ICD-10-CM

## 2024-02-05 DIAGNOSIS — R80.9 PROTEINURIA DUE TO TYPE 2 DIABETES MELLITUS: ICD-10-CM

## 2024-02-05 DIAGNOSIS — E11.29 PROTEINURIA DUE TO TYPE 2 DIABETES MELLITUS: ICD-10-CM

## 2024-02-05 DIAGNOSIS — E11.22 TYPE 2 DIABETES MELLITUS WITH STAGE 4 CHRONIC KIDNEY DISEASE, WITH LONG-TERM CURRENT USE OF INSULIN: ICD-10-CM

## 2024-02-05 PROCEDURE — 99214 OFFICE O/P EST MOD 30 MIN: CPT | Mod: S$GLB,,, | Performed by: INTERNAL MEDICINE

## 2024-02-05 PROCEDURE — 99999 PR PBB SHADOW E&M-EST. PATIENT-LVL III: CPT | Mod: PBBFAC,,, | Performed by: INTERNAL MEDICINE

## 2024-02-05 NOTE — PROGRESS NOTES
Subjective:       Patient ID: Denice Sheth is a 89 y.o. White female who presents for return patient evaluation for chronic renal failure.      She has no uremic or urinary symptoms.  She had COVID in Dec 2022.  She is back to her baseline.        Review of Systems   Constitutional:  Negative for appetite change, chills and fever.   Eyes:  Negative for visual disturbance.   Respiratory:  Positive for shortness of breath (with exertion). Negative for cough.    Cardiovascular:  Negative for chest pain and leg swelling.   Gastrointestinal:  Negative for abdominal pain, diarrhea, nausea and vomiting.   Endocrine: Positive for cold intolerance.   Genitourinary:  Negative for difficulty urinating, dysuria and hematuria.   Musculoskeletal:  Positive for arthralgias (right knee) and gait problem. Negative for myalgias.   Skin:  Negative for rash.   Neurological:  Positive for weakness. Negative for headaches.   Hematological:  Bruises/bleeds easily.   Psychiatric/Behavioral:  Negative for sleep disturbance.        The past medical, family and social histories were reviewed for this encounter.     BP (!) 120/56 (BP Location: Right arm, Patient Position: Sitting, BP Method: Medium (Manual))   LMP  (LMP Unknown)     Objective:      Physical Exam  Vitals reviewed.   Constitutional:       General: She is not in acute distress.     Appearance: She is well-developed.   HENT:      Head: Normocephalic and atraumatic.   Eyes:      General: No scleral icterus.     Conjunctiva/sclera: Conjunctivae normal.   Neck:      Vascular: No JVD.   Cardiovascular:      Rate and Rhythm: Normal rate and regular rhythm.      Heart sounds: Normal heart sounds. No murmur heard.     No friction rub. No gallop.   Pulmonary:      Effort: Pulmonary effort is normal. No respiratory distress.      Breath sounds: Normal breath sounds. No wheezing.   Abdominal:      General: Bowel sounds are normal. There is no distension.      Palpations: Abdomen  is soft.      Tenderness: There is no abdominal tenderness.   Musculoskeletal:      Cervical back: Normal range of motion.      Right lower leg: No edema.      Left lower leg: No edema.   Skin:     General: Skin is warm and dry.      Findings: No rash.   Neurological:      Mental Status: She is alert and oriented to person, place, and time.   Psychiatric:         Mood and Affect: Mood normal.         Behavior: Behavior normal.        Assessment:       1. CKD (chronic kidney disease) stage 4, GFR 15-29 ml/min    2. Hyponatremia    3. Primary hypertension    4. 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 Type 2 diabetes mellitus with stage 4 chronic kidney disease, with long-term current use of insulin       Lab Results   Component Value Date    CREATININE 2.5 (H) 01/29/2024    BUN 54 (H) 01/29/2024     01/29/2024    K 4.3 01/29/2024     01/29/2024    CO2 26 01/29/2024     Lab Results   Component Value Date    PTH 51.1 01/29/2024    CALCIUM 9.9 01/29/2024    PHOS 3.3 01/29/2024     Lab Results   Component Value Date    HCT 41.2 07/24/2023     Prot/Creat Ratio, Urine   Date Value Ref Range Status   01/29/2024 0.19 0.00 - 0.20 Final   11/06/2023 0.21 (H) 0.00 - 0.20 Final   07/24/2023 Unable to calculate 0.00 - 0.20 Final      Plan:   Return to clinic in 6 months.  Labs for next visit include rp pth upc per standing orders q 3 months.   Baseline creatinine is 1.5-1.9 since 2015. Lately she has been 2.0-2.5.  PTH is 51 with a calcium of 9.9.  Continue D3 to MWF.  UPC is negative.  Renal US shows R 9.7 cm L 10.9 cm with no cysts/stones/masses.  Blood pressure is controlled on the current regimen.  Continue current medications as prescribed and reviewed.   She does not wish to do dialysis.

## 2024-02-11 DIAGNOSIS — N18.4 TYPE 2 DIABETES MELLITUS WITH STAGE 4 CHRONIC KIDNEY DISEASE, WITH LONG-TERM CURRENT USE OF INSULIN: Chronic | ICD-10-CM

## 2024-02-11 DIAGNOSIS — E11.22 TYPE 2 DIABETES MELLITUS WITH STAGE 4 CHRONIC KIDNEY DISEASE, WITH LONG-TERM CURRENT USE OF INSULIN: Chronic | ICD-10-CM

## 2024-02-11 DIAGNOSIS — Z79.4 TYPE 2 DIABETES MELLITUS WITH STAGE 4 CHRONIC KIDNEY DISEASE, WITH LONG-TERM CURRENT USE OF INSULIN: Chronic | ICD-10-CM

## 2024-02-11 NOTE — TELEPHONE ENCOUNTER
No care due was identified.  [facility] Embedded Care Due Messages. Reference number: [id number].   2/11/2024 5:54:59 PM CST

## 2024-02-12 NOTE — TELEPHONE ENCOUNTER
Refill Routing Note   Medication(s) are not appropriate for processing by [facility] for the following reason(s):        Required labs abnormal    [facility] action(s):  Defer             Pharmacist review requested: Yes     Appointments  past 12m or future 3m with PCP    Date Provider   Last Visit   12/12/2023 Allison Woods MD   Next Visit   Visit date not found Allison Woods MD   ED visits in past 90 days: 0        Note composed:4:16 PM 02/12/2024

## 2024-02-13 NOTE — TELEPHONE ENCOUNTER
Refill Routing Note   Medication(s) are not appropriate for processing by [facility] for the following reason(s):      New or recently adjusted medication (pharmacy requested 17 units TID. Pending for 16 units TID per last script)    [facility] action(s):  Defer Care Due:  None identified          Pharmacist review requested: Yes     Appointments  past 12m or future 3m with PCP    Date Provider   Last Visit   12/12/2023 Allison Woods MD   Next Visit   Visit date not found Allison Woods MD   ED visits in past 90 days: 0        Note composed:7:09 PM 02/12/2024

## 2024-02-14 RX ORDER — INSULIN LISPRO 100 [IU]/ML
INJECTION, SOLUTION INTRAVENOUS; SUBCUTANEOUS
Qty: 15 ML | Refills: 1 | Status: SHIPPED | OUTPATIENT
Start: 2024-02-14

## 2024-02-19 ENCOUNTER — PATIENT MESSAGE (OUTPATIENT)
Dept: FAMILY MEDICINE | Facility: CLINIC | Age: 89
End: 2024-02-19

## 2024-02-19 ENCOUNTER — OFFICE VISIT (OUTPATIENT)
Dept: FAMILY MEDICINE | Facility: CLINIC | Age: 89
End: 2024-02-19
Payer: MEDICARE

## 2024-02-19 DIAGNOSIS — B96.89 ACUTE BACTERIAL SINUSITIS: Primary | ICD-10-CM

## 2024-02-19 DIAGNOSIS — J01.90 ACUTE BACTERIAL SINUSITIS: Primary | ICD-10-CM

## 2024-02-19 PROCEDURE — 99213 OFFICE O/P EST LOW 20 MIN: CPT | Mod: 95,,, | Performed by: STUDENT IN AN ORGANIZED HEALTH CARE EDUCATION/TRAINING PROGRAM

## 2024-02-19 RX ORDER — AMOXICILLIN AND CLAVULANATE POTASSIUM 875; 125 MG/1; MG/1
1 TABLET, FILM COATED ORAL 2 TIMES DAILY
Qty: 10 TABLET | Refills: 0 | Status: SHIPPED | OUTPATIENT
Start: 2024-02-19 | End: 2024-02-24

## 2024-02-19 NOTE — PROGRESS NOTES
Assessment and Plan:    Diagnoses and all orders for this visit:    Acute bacterial sinusitis  -     amoxicillin-clavulanate 875-125mg (AUGMENTIN) 875-125 mg per tablet; Take 1 tablet by mouth 2 (two) times daily. for 5 days      Discussed symptomatic management with OTC meds and saline rinses (prescription medications as indicated). Patient advised to let us know if symptoms persist or if she develops any new or worsening symptoms.      Follow-up if symptoms worsen or fail to improve.    Allison Woods MD  02/19/2024      Audiovisual Telehealth Visit:      The patient location is: Home  The chief complaint leading to consultation is: (documented below in HPI)  Visit type: Virtual visit with audiovisual  Total time spent on this encounter: 20 minutes.This includes face to face time and non-face to face time preparing to see the patient (eg, review of tests), obtaining and/or reviewing separately obtained history, documenting clinical information in the electronic or other health record, independently interpreting results, and communicating results to the patient/family/caregiver, or care coordinator.       Each patient to whom I provide medical services by telemedicine is: (1) informed of the relationship between the physician and patient and the respective role of any other health care provider with respect to management of the patient; and (2) notified that they may decline to receive medical services by telemedicine and may withdraw from such care at any time. Patient verbally consented to receive this service via audiovisual call.    HPI  89 y.o. female with a PMHx as documented below presents to clinic today for URI symptoms.     She reports that symptoms first started 5-6 days sgo.  Sick contacts include: none.    She has been having sinus pressure, congestion, and dry cough. She denies having any fever, chills, sore throat, wheezing, SOB, nausea, vomiting, and diarrhea. Sinus congested is localized to the  right maxillary sinus.     Home vitals all wnl including SpO2. Pt denies evidence of fluid retention.     Past Medical History:   Diagnosis Date    Anticoagulant long-term use     Aortic calcification 01/02/2022    CXR 1/2/22    Aortic valve regurgitation 10/13/2021    CAD (coronary artery disease) 01/06/2022    Cecum mass     Chronic atrial fibrillation 05/02/2017    Chronic diastolic heart failure     CKD (chronic kidney disease) stage 4, GFR 15-29 ml/min 02/22/2016    Dementia 08/31/2022    Diabetic retinopathy 03/24/2021    Disorder of kidney and ureter     follows w/ Dr. 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 09/12/2022    Gastroesophageal reflux disease without esophagitis 9/17/2023    H/O: CVA (cerebrovascular accident) 07/30/2019    Hemiparesis affecting left side as late effect of cerebrovascular accident (CVA) 06/05/2017    History of TIA (transient ischemic attack) 09/17/2023    Hypertension     KRISTEN (iron deficiency anemia) 01/12/2018    Insomnia     Irregular heart rhythm     Mitral valve regurgitation 10/13/2021    Other specified hypothyroidism 06/24/2019    Pacemaker 01/02/2023    Primary hypertension 02/09/2016    Proteinuria due to type 2 diabetes mellitus 03/12/2019    Senile purpura 12/21/2022    Sick sinus syndrome 2018    s/p pacemaker placement    Stenosis of left carotid artery 12/06/2016    TIA (transient ischemic attack) 03/2017    Type 2 diabetes mellitus with stage 4 chronic kidney disease, with long-term current use of insulin       ROS completed and negative unless otherwise mentioned above in HPI.    Answers submitted by the patient for this visit:  Cough Questionnaire (Submitted on 2/19/2024)  Chief Complaint: Cough  Chronicity: new  Onset: in the past 7 days  Progression since onset: gradually improving  Frequency: hourly  Cough characteristics: non-productive  chest pain:  No  chills: No  ear congestion: No  ear pain: No  fever: No  headaches: No  heartburn: No  hemoptysis: No  myalgias: No  nasal congestion: Yes  postnasal drip: Yes  rash: No  rhinorrhea: Yes  shortness of breath: No  sore throat: No  sweats: No  weight loss: No  wheezing: No  Aggravated by: lying down  asthma: No  bronchiectasis: No  bronchitis: No  COPD: No  environmental allergies: Yes  pneumonia: Yes  Improvement on treatment: significant    Physical Exam:   Constitutional:       General: No acute distress.  HENT:      Head: Normocephalic and atraumatic.   Pulmonary:      Effort: Pulmonary effort is normal. No respiratory distress.   Neurological:      General: No focal deficit present.      Mental Status: Alert and oriented to person, place, and time. Mental status is at baseline.      Assessment and Plan:   See above    Allison Woods MD  [facility]  Office: [phone]   Fax: [phone]  02/19/2024       Disclaimer: This note was partly generated using dictation software which may occasionally result in transcription errors.

## 2024-02-25 DIAGNOSIS — G31.84 MCI (MILD COGNITIVE IMPAIRMENT) WITH MEMORY LOSS: ICD-10-CM

## 2024-02-26 ENCOUNTER — OFFICE VISIT (OUTPATIENT)
Dept: FAMILY MEDICINE | Facility: CLINIC | Age: 89
End: 2024-02-26
Payer: MEDICARE

## 2024-02-26 VITALS
HEART RATE: 60 BPM | RESPIRATION RATE: 18 BRPM | DIASTOLIC BLOOD PRESSURE: 74 MMHG | BODY MASS INDEX: 28.79 KG/M2 | WEIGHT: 157.44 LBS | SYSTOLIC BLOOD PRESSURE: 142 MMHG | OXYGEN SATURATION: 100 %

## 2024-02-26 DIAGNOSIS — R05.1 ACUTE COUGH: Primary | ICD-10-CM

## 2024-02-26 DIAGNOSIS — S40.811A ABRASION OF RIGHT UPPER EXTREMITY, INITIAL ENCOUNTER: ICD-10-CM

## 2024-02-26 LAB
CTP QC/QA: YES
CTP QC/QA: YES
POC MOLECULAR INFLUENZA A AGN: NEGATIVE
POC MOLECULAR INFLUENZA B AGN: NEGATIVE
SARS-COV-2 RDRP RESP QL NAA+PROBE: NEGATIVE

## 2024-02-26 PROCEDURE — 99999 PR PBB SHADOW E&M-EST. PATIENT-LVL IV: CPT | Mod: PBBFAC,,,

## 2024-02-26 PROCEDURE — 99214 OFFICE O/P EST MOD 30 MIN: CPT | Mod: S$GLB,,,

## 2024-02-26 PROCEDURE — 87502 INFLUENZA DNA AMP PROBE: CPT | Mod: QW,S$GLB,,

## 2024-02-26 PROCEDURE — 87635 SARS-COV-2 COVID-19 AMP PRB: CPT | Mod: QW,S$GLB,,

## 2024-02-26 RX ORDER — BENZONATATE 100 MG/1
100 CAPSULE ORAL 3 TIMES DAILY PRN
Qty: 30 CAPSULE | Refills: 0 | Status: SHIPPED | OUTPATIENT
Start: 2024-02-26 | End: 2024-03-07

## 2024-02-26 RX ORDER — MUPIROCIN 20 MG/G
OINTMENT TOPICAL 3 TIMES DAILY
Qty: 30 G | Refills: 0 | Status: SHIPPED | OUTPATIENT
Start: 2024-02-26

## 2024-02-26 NOTE — TELEPHONE ENCOUNTER
Refill Routing Note   Medication(s) are not appropriate for processing by [facility] for the following reason(s):        Outside of protocol    [facility] action(s):  Route               Appointments  past 12m or future 3m with PCP    Date Provider   Last Visit   2/19/2024 Allison Woods MD   Next Visit   Visit date not found Allison Woods MD   ED visits in past 90 days: 0        Note composed:8:36 AM 02/26/2024

## 2024-02-26 NOTE — PROGRESS NOTES
[facility]  [address]  [place] [address]    Subjective    Chief Complaint:   Chief Complaint   Patient presents with    Cough     X1 week, anaprox, breathing treatment started Tuesday every 4 hrs    Nasal Congestion       History of Present Illness:     Denice Sheth is a(n) 89 y.o. female with past medical history as noted below who presents to the clinic today for cough and congestion.    She was recently treated for bacterial sinusitis with Augmentin. She has completed this regimen and her sinus pain has resolved, no fever or purulent nasal drainage. She is still having a dry cough, slightly worse than before and is here to make sure nothing new is going on. No shortness of breath, chest pain, wheezing, or sputum. No new URI symptoms such as sore throat or ear pain.    She bumped her left arm last week and has a minor skin tear, has it wrapped with a nonstick gauze and and a velcro gauze wrap today. Has been putting mupirocin on it, requesting refill. Last Tdap on file 2016.         Problem List:   Patient Active Problem List   Diagnosis    Gait disorder    Insomnia    Primary hypertension    Diastolic congestive heart failure, NYHA class 1    CKD (chronic kidney disease) stage 4, GFR 15-29 ml/min    Stenosis of left carotid artery    Type 2 diabetes mellitus with stage 4 chronic kidney disease, with long-term current use of insulin    Chronic atrial fibrillation    Hemiparesis affecting left side as late effect of cerebrovascular accident (CVA)    Proteinuria due to type 2 diabetes mellitus    Other specified hypothyroidism    H/O: CVA (cerebrovascular accident)    Chronic diastolic heart failure    CAD (coronary artery disease)    Dementia    Aortic calcification    Senile purpura    Exudative age-related macular degeneration of right eye with active choroidal neovascularization    Diabetic retinopathy    Pacemaker    Aortic valve regurgitation     Mitral valve regurgitation    History of TIA (transient ischemic attack)    Gastroesophageal reflux disease without esophagitis       Current Outpatient Medications:   Current Outpatient Medications   Medication Instructions    amiodarone (PACERONE) 200 mg, Oral, Daily    apixaban (ELIQUIS) 2.5 mg, Oral, 2 times daily    ascorbic acid (vitamin C) (VITAMIN C) 500 mg, Oral, Daily    b complex vitamins capsule 1 capsule, Oral, Daily    BINAXNOW COVID-19 AG SELF TEST Kit Use as Directed on the Package    blood sugar diagnostic Strp 1 strip 2 times daily, to use with insurance preferred meter    blood-glucose meter kit To check BG 2 times daily, to use with insurance preferred meter    blood-glucose meter,continuous (DEXCOM ) Misc 1 each, Misc.(Non-Drug; Combo Route), Daily    blood-glucose transmitter (DEXCOM G5 TRANSMITTER) Petty 1 each, Misc.(Non-Drug; Combo Route), Daily    cholecalciferol (vitamin D3) 5,000 Units, Oral, Daily    clopidogrel (PLAVIX) 75 mg tablet TAKE ONE TABLET BY MOUTH ONCE DAILY    DEXCOM G7 SENSOR Petty USE TO CHECK BLOOD GLUCOSE AS DIRECTED. REPLACE EVERY 10 DAYS    diclofenac sodium (VOLTAREN ARTHRITIS PAIN) 2 g, Topical (Top), 2 times daily PRN    digoxin (LANOXIN) 125 mcg tablet 1 tablet, Oral, Every other day    diphenoxylate-atropine 2.5-0.025 mg (LOMOTIL) 2.5-0.025 mg per tablet 1 tablet, Oral, 4 times daily PRN    famotidine (PEPCID) 20 mg, Oral, 2 times daily    fish oil-omega-3 fatty acids 300-1,000 mg capsule 1 capsule, Oral, 2 times daily    furosemide (LASIX) 40 MG tablet TAKE 1 TABLET BY MOUTH ONCE DAILY (TAKE AN EXTRA TABLET FOR ANY WEIGHT GAIN OF 3 POUNDS OR MORE ON ANY GIVEN DAY)    insulin (LANTUS SOLOSTAR U-100 INSULIN) 28 Units, Subcutaneous, Nightly    insulin lispro 100 unit/mL pen INJECT 16 UNITS THREE TIMES DAILY WITH MEALS    lancets 31 gauge Misc 1 strip 2 times daily, to use with insurance preferred meter    levothyroxine (SYNTHROID) 100 mcg, Oral, Before  breakfast    magnesium 200 mg, Oral, Daily    melatonin 10 mg Tab 1 tablet, Oral, Nightly PRN    memantine (NAMENDA) 10 MG Tab TAKE 1 TABLET BY MOUTH ONCE DAILY IN THE EVENING    metoprolol tartrate (LOPRESSOR) 50 mg, Oral, 2 times daily    multivitamin (THERAGRAN) per tablet 1 tablet, Daily    mupirocin (BACTROBAN) 2 % ointment Topical (Top), 3 times daily    ondansetron (ZOFRAN-ODT) 4 mg, Oral, Every 8 hours PRN    pen needle, diabetic (COMFORT EZ PEN NEEDLES) 1 Units, Misc.(Non-Drug; Combo Route), 3 times daily    polyethylene glycol (GLYCOLAX) 17 g, Oral, As needed (PRN)    potassium chloride SA (K-DUR,KLOR-CON M) 10 MEQ tablet 10 mEq, Oral, 2 times daily    rivastigmine tartrate (EXELON) 1.5 mg, Oral, 2 times daily    simvastatin (ZOCOR) 10 mg, Oral, Nightly       Surgical History:   Past Surgical History:   Procedure Laterality Date    ADENOIDECTOMY  8/18    BILATERAL SALPINGO-OOPHORECTOMY (BSO) Bilateral 08/19/2019    Procedure: SALPINGO-OOPHORECTOMY, BILATERAL;  Surgeon: Blayne Wallace MD;  Location: [facility] OR;  Service: OB/GYN;  Laterality: Bilateral;    COLON SURGERY  8/18    HYSTERECTOMY      partial, benign causes by reported hx    INSERTION OF PACEMAKER  08/2017    LEFT HEART CATHETERIZATION Left 01/06/2022    Procedure: Left heart cath;  Surgeon: Bhaskar Hanson III, MD;  Location: [facility] CATH;  Service: Cardiology;  Laterality: Left;  Right radial    ROBOT-ASSISTED LAPAROSCOPIC COLECTOMY USING DA MICHELE XI Right 08/19/2019    Procedure: XI ROBOTIC COLECTOMY;  Surgeon: Kourtney Dodson MD;  Location: [facility] OR;  Service: General;  Laterality: Right;       Family History:   Family History   Problem Relation Age of Onset    Diabetes Mother     Heart disease Mother     Cancer Father         type unknown (nonsmoker, no etoh)    Heart disease Brother     Dementia Sister     Cancer Daughter         fallopian ca    Cancer Son         esophageal ca    Dementia Sister     Dementia Sister        Allergies:    Review of patient's allergies indicates:   Allergen Reactions    Adhesive Itching    Mucinex [guaifenesin]      Says caused CHF     Latex, natural rubber Rash       Tobacco Status:   Tobacco Use: Low Risk  (2/19/2024)    Patient History     Smoking Tobacco Use: Never     Smokeless Tobacco Use: Never     Passive Exposure: Not on file       Sexual Activity:   Social History     Substance and Sexual Activity   Sexual Activity Never    Birth control/protection: Post-menopausal       Alcohol Use:   Social History     Substance and Sexual Activity   Alcohol Use No         Objective       There were no vitals filed for this visit.    Review of Systems   Constitutional:  Negative for chills and fever.   HENT:  Positive for congestion. Negative for ear pain, sinus pain and sore throat.    Respiratory:  Positive for cough. Negative for sputum production, shortness of breath and wheezing.    Cardiovascular:  Negative for chest pain.       Physical Exam  Constitutional:       General: She is not in acute distress.     Appearance: Normal appearance.   HENT:      Head: Normocephalic and atraumatic.      Nose: Nose normal.      Mouth/Throat:      Mouth: Mucous membranes are moist.      Pharynx: Oropharynx is clear. No oropharyngeal exudate or posterior oropharyngeal erythema.   Cardiovascular:      Rate and Rhythm: Normal rate and regular rhythm.      Heart sounds: Normal heart sounds. No murmur heard.  Pulmonary:      Effort: Pulmonary effort is normal. No respiratory distress.      Breath sounds: Normal breath sounds. No wheezing, rhonchi or rales.   Skin:     General: Skin is warm.             Comments: Approx 2 cm diameter shallow skin tear to left forearm   Neurological:      Mental Status: She is alert and oriented to person, place, and time.   Psychiatric:         Behavior: Behavior normal.           Assessment and Plan:    1. Acute cough  -     POCT Influenza A/B Molecular  -     POCT COVID-19 Rapid Screening  -      benzonatate (TESSALON) 100 MG capsule; Take 1 capsule (100 mg total) by mouth 3 (three) times daily as needed for Cough.  Dispense: 30 capsule; Refill: 0    2. Abrasion of right upper extremity, initial encounter  -     mupirocin (BACTROBAN) 2 % ointment; Apply topically 3 (three) times daily.  Dispense: 30 g; Refill: 0        Visit summary:    Denice Sheth presented today for cough.    Covid and flu tests negative. Lungs clear today, do not suspect bacterial pneumonia. Close return precautions and ER precautions were given.     Bacterial sinusitis appears to have resolved.     Refilled mupirocin for skin tear, f/u with signs and symptoms of infection which were discussed with patient.     Patient was instructed to report to ER if symptoms become severe.    Follow up: in 2 weeks for BP r/c nursing visit       Yesenia Ennis PA-C    This note was created partially with voice dictation software and is prone to errors. This note has been reviewed by me but some errors are inevitable.

## 2024-02-27 ENCOUNTER — OFFICE VISIT (OUTPATIENT)
Dept: HOME HEALTH SERVICES | Facility: CLINIC | Age: 89
End: 2024-02-27
Payer: MEDICARE

## 2024-02-27 VITALS
DIASTOLIC BLOOD PRESSURE: 63 MMHG | SYSTOLIC BLOOD PRESSURE: 130 MMHG | HEART RATE: 60 BPM | WEIGHT: 157 LBS | OXYGEN SATURATION: 97 % | BODY MASS INDEX: 28.72 KG/M2

## 2024-02-27 DIAGNOSIS — I70.0 AORTIC CALCIFICATION: ICD-10-CM

## 2024-02-27 DIAGNOSIS — G31.84 MCI (MILD COGNITIVE IMPAIRMENT) WITH MEMORY LOSS: ICD-10-CM

## 2024-02-27 DIAGNOSIS — N18.4 TYPE 2 DIABETES MELLITUS WITH STAGE 4 CHRONIC KIDNEY DISEASE, WITH LONG-TERM CURRENT USE OF INSULIN: Chronic | ICD-10-CM

## 2024-02-27 DIAGNOSIS — E11.22 TYPE 2 DIABETES MELLITUS WITH STAGE 4 CHRONIC KIDNEY DISEASE, WITH LONG-TERM CURRENT USE OF INSULIN: Chronic | ICD-10-CM

## 2024-02-27 DIAGNOSIS — Z00.00 ENCOUNTER FOR PREVENTIVE HEALTH EXAMINATION: Primary | ICD-10-CM

## 2024-02-27 DIAGNOSIS — Z79.4 TYPE 2 DIABETES MELLITUS WITH STAGE 4 CHRONIC KIDNEY DISEASE, WITH LONG-TERM CURRENT USE OF INSULIN: Chronic | ICD-10-CM

## 2024-02-27 DIAGNOSIS — I50.30 DIASTOLIC CONGESTIVE HEART FAILURE, NYHA CLASS 1, UNSPECIFIED CONGESTIVE HEART FAILURE CHRONICITY: ICD-10-CM

## 2024-02-27 DIAGNOSIS — E03.8 OTHER SPECIFIED HYPOTHYROIDISM: Chronic | ICD-10-CM

## 2024-02-27 DIAGNOSIS — I48.20 CHRONIC ATRIAL FIBRILLATION: ICD-10-CM

## 2024-02-27 DIAGNOSIS — H35.3211 EXUDATIVE AGE-RELATED MACULAR DEGENERATION OF RIGHT EYE WITH ACTIVE CHOROIDAL NEOVASCULARIZATION: ICD-10-CM

## 2024-02-27 DIAGNOSIS — I69.354 HEMIPARESIS AFFECTING LEFT SIDE AS LATE EFFECT OF CEREBROVASCULAR ACCIDENT (CVA): ICD-10-CM

## 2024-02-27 DIAGNOSIS — Z99.89 DEPENDENCE ON OTHER ENABLING MACHINES AND DEVICES: ICD-10-CM

## 2024-02-27 DIAGNOSIS — D69.2 SENILE PURPURA: ICD-10-CM

## 2024-02-27 DIAGNOSIS — E11.3293 MILD NONPROLIFERATIVE DIABETIC RETINOPATHY OF BOTH EYES WITHOUT MACULAR EDEMA ASSOCIATED WITH TYPE 2 DIABETES MELLITUS: Chronic | ICD-10-CM

## 2024-02-27 DIAGNOSIS — F03.90 DEMENTIA WITHOUT BEHAVIORAL DISTURBANCE, PSYCHOTIC DISTURBANCE, MOOD DISTURBANCE, OR ANXIETY, UNSPECIFIED DEMENTIA SEVERITY, UNSPECIFIED DEMENTIA TYPE: ICD-10-CM

## 2024-02-27 DIAGNOSIS — K21.9 GASTROESOPHAGEAL REFLUX DISEASE WITHOUT ESOPHAGITIS: Chronic | ICD-10-CM

## 2024-02-27 DIAGNOSIS — I10 PRIMARY HYPERTENSION: Chronic | ICD-10-CM

## 2024-02-27 PROCEDURE — G0439 PPPS, SUBSEQ VISIT: HCPCS | Mod: S$GLB,,, | Performed by: NURSE PRACTITIONER

## 2024-02-27 NOTE — PATIENT INSTRUCTIONS
Counseling and Referral of Other Preventative  (Italic type indicates deductible and co-insurance are waived)    Patient Name: Denice Sheth  Today's Date: 2/27/2024    Health Maintenance       Date Due Completion Date    RSV Vaccine (Age 60+ and Pregnant patients) (1 - 1-dose 60+ series) Never done ---    Shingles Vaccine (2 of 3) 07/09/2009 5/14/2009    COVID-19 Vaccine (4 - 2023-24 season) 09/01/2023 3/24/2023    Hemoglobin A1c 06/04/2024 12/4/2023    Diabetes Urine Screening 07/24/2024 7/24/2023    Eye Exam 11/01/2024 11/1/2023    Override on 12/1/2016: Done (Dr Acevedo; positive retinopathy)    Lipid Panel 12/04/2024 12/4/2023    TETANUS VACCINE 04/07/2026 4/7/2016    Colonoscopy 06/04/2029 6/4/2019        No orders of the defined types were placed in this encounter.    The following information is provided to all patients.  This information is to help you find resources for any of the problems found today that may be affecting your health:                  Living healthy guide: www.[facility].[place].gov      Understanding Diabetes: www.diabetes.org      Eating healthy: www.cdc.gov/healthyweight      CDC home safety checklist: www.cdc.gov/steadi/patient.html      Agency on Aging: www.goea.[place].[facility]      Alcoholics anonymous (AA): www.aa.org      Physical Activity: www.srinivasa.nih.gov/rs2qpsg      Tobacco use: www.quitwithusla.org

## 2024-02-27 NOTE — TELEPHONE ENCOUNTER
Refill Routing Note   Medication(s) are not appropriate for processing by [facility] for the following reason(s):        Outside of protocol    [facility] action(s):  Route             Appointments  past 12m or future 3m with PCP    Date Provider   Last Visit   2/19/2024 Allison Woods MD   Next Visit   Visit date not found Allison Woods MD   ED visits in past 90 days: 0        Note composed:8:42 AM 02/27/2024

## 2024-02-27 NOTE — PROGRESS NOTES
Denice Sheth presented for a follow-up Medicare AWV today. The following components were reviewed and updated:    Medical history  Family History  Social history  Allergies and Current Medications  Health Risk Assessment  Health Maintenance  Care Team    **See Completed Assessments for Annual Wellness visit with in the encounter summary    The following assessments were completed:  Depression Screening  Cognitive function Screening - not performed  Timed Get Up Test  Whisper Test      Opioid documentation:      Patient does not have a current opioid prescription.          Vitals:    02/27/24 1022   BP: 130/63   Pulse: 60   SpO2: 97%   Weight: 71.2 kg (157 lb)     Body mass index is 28.72 kg/m².       Physical Exam  HENT:      Head: Normocephalic.      Nose: Nose normal.   Eyes:      Pupils: Pupils are equal, round, and reactive to light.   Cardiovascular:      Pulses: Normal pulses.      Heart sounds: Normal heart sounds.      Comments: pacemaker  Pulmonary:      Effort: Pulmonary effort is normal.      Breath sounds: Normal breath sounds.   Abdominal:      General: Bowel sounds are normal.   Musculoskeletal:         General: Normal range of motion.      Cervical back: Normal range of motion.      Right lower leg: No edema.      Left lower leg: No edema.   Skin:     General: Skin is warm and dry.      Findings: Bruising present.   Neurological:      Mental Status: She is alert and oriented to person, place, and time.      Motor: Weakness present.      Gait: Gait abnormal (walker).   Psychiatric:         Cognition and Memory: Memory is impaired.           Diagnoses and health risks identified today and associated recommendations/orders:  1. Encounter for preventive health examination  - Above assessments completed. Preventive measures and health maintenance reviewed with patient and daughter Parisa.    2. Dementia without behavioral disturbance, psychotic disturbance, mood disturbance, or anxiety, unspecified  dementia severity, unspecified dementia type  Stable, followed by PCP  -on memantine and rivastigmine    3. Type 2 diabetes mellitus with stage 4 chronic kidney disease, with long-term current use of insulin  Stable, followed by PCP  -A1C 6.4, on insulin  -ckd stable, followed by Nephrology    4. Mild nonproliferative diabetic retinopathy of both eyes without macular edema associated with type 2 diabetes mellitus  Stable, followed by Ophthalmology    5. Exudative age-related macular degeneration of right eye with active choroidal neovascularization  Stable, followed by Ophthalmology    6. Diastolic congestive heart failure, NYHA class 1, unspecified congestive heart failure chronicity  Stable, followed by PCP and Cardiology  -on metoprolol, furosemide and K+    7. Chronic atrial fibrillation  Stable, followed by PCP and Cardiology  -on apixaban, amiodarone and digoxin and metoprolol    8. Hemiparesis affecting left side as late effect of cerebrovascular accident (CVA)  Stable, followed by PCP    9. Senile purpura  Stable, followed by PCP      10. Aortic calcification  Stable, followed by PCP  -on statin    11. Primary hypertension  Stable, followed by PCP  -on metoprolol and furosemide    12. Other specified hypothyroidism  Stable, followed by PCP  -on levothyroxine    13. Gastroesophageal reflux disease without esophagitis  Stable, followed by PCP  -on PPI    14. Dependence on other enabling machines and devices  -uses walker, no falls reported  -discussed safety and fall precautions      Provided Denice with a 5-10 year written screening schedule and personal prevention plan. Recommendations were developed using the USPSTF age appropriate recommendations. Education, counseling, and referrals were provided as needed.  After Visit Summary printed and given to patient which includes a list of additional screenings\tests needed.    Follow up in about 1 year (around 2/27/2025) for your next annual wellness  visit.      Allie Herrera, PRUDENCIO    I offered to discuss advanced care planning, including how to pick a person who would make decisions for you if you were unable to make them for yourself, called a health care power of , and what kind of decisions you might make such as use of life sustaining treatments such as ventilators and tube feeding when faced with a life limiting illness recorded on a living will that they will need to know. (How you want to be cared for as you near the end of your natural life)     X  Patient has advanced directives on file, which we reviewed, and they do not wish to make changes.

## 2024-02-29 ENCOUNTER — PATIENT MESSAGE (OUTPATIENT)
Dept: FAMILY MEDICINE | Facility: CLINIC | Age: 89
End: 2024-02-29
Payer: MEDICARE

## 2024-02-29 RX ORDER — RIVASTIGMINE TARTRATE 1.5 MG/1
1.5 CAPSULE ORAL 2 TIMES DAILY
Qty: 60 CAPSULE | Refills: 0 | OUTPATIENT
Start: 2024-02-29

## 2024-02-29 RX ORDER — RIVASTIGMINE TARTRATE 1.5 MG/1
1.5 CAPSULE ORAL 2 TIMES DAILY
Qty: 60 CAPSULE | Refills: 0 | Status: SHIPPED | OUTPATIENT
Start: 2024-02-29 | End: 2024-03-25 | Stop reason: SDUPTHER

## 2024-03-08 ENCOUNTER — PATIENT MESSAGE (OUTPATIENT)
Dept: FAMILY MEDICINE | Facility: CLINIC | Age: 89
End: 2024-03-08
Payer: MEDICARE

## 2024-03-11 ENCOUNTER — CLINICAL SUPPORT (OUTPATIENT)
Dept: FAMILY MEDICINE | Facility: CLINIC | Age: 89
End: 2024-03-11
Payer: MEDICARE

## 2024-03-11 VITALS — DIASTOLIC BLOOD PRESSURE: 58 MMHG | SYSTOLIC BLOOD PRESSURE: 130 MMHG

## 2024-03-11 DIAGNOSIS — L12.0 BP (BULLOUS PEMPHIGOID): Primary | ICD-10-CM

## 2024-03-11 PROCEDURE — 99999 PR PBB SHADOW E&M-EST. PATIENT-LVL I: CPT | Mod: PBBFAC,,,

## 2024-03-11 NOTE — PROGRESS NOTES
Denice Sheth 89 y.o. female is here today for Blood Pressure check.   History of HTN yes.    Review of patient's allergies indicates:   Allergen Reactions    Adhesive Itching    Mucinex [guaifenesin]      Says caused CHF     Latex, natural rubber Rash     Creatinine   Date Value Ref Range Status   01/29/2024 2.5 (H) 0.5 - 1.4 mg/dL Final     Sodium   Date Value Ref Range Status   01/29/2024 143 136 - 145 mmol/L Final     Potassium   Date Value Ref Range Status   01/29/2024 4.3 3.5 - 5.1 mmol/L Final   ]  Patient verifies taking blood pressure medications on a regular basis at the same time of the day.     Current Outpatient Medications:     amiodarone (PACERONE) 200 MG Tab, Take 200 mg by mouth once daily. , Disp: , Rfl:     apixaban (ELIQUIS) 2.5 mg Tab, Take 1 tablet (2.5 mg total) by mouth 2 (two) times daily., Disp: 60 tablet, Rfl: 0    ascorbic acid (VITAMIN C) 500 MG tablet, Take 500 mg by mouth once daily., Disp: , Rfl:     b complex vitamins capsule, Take 1 capsule by mouth once daily., Disp: , Rfl:     BINAXNOW COVID-19 AG SELF TEST Kit, Use as Directed on the Package, Disp: , Rfl:     blood sugar diagnostic Strp, 1 strip 2 times daily, to use with insurance preferred meter, Disp: 100 each, Rfl: 2    blood-glucose meter kit, To check BG 2 times daily, to use with insurance preferred meter, Disp: 1 each, Rfl: 0    blood-glucose meter,continuous (DEXCOM ) Misc, 1 each by Misc.(Non-Drug; Combo Route) route once daily., Disp: 1 each, Rfl: 0    blood-glucose transmitter (DEXCOM G5 TRANSMITTER) Petty, 1 each by Misc.(Non-Drug; Combo Route) route Daily., Disp: 1 each, Rfl: 0    cholecalciferol, vitamin D3, 125 mcg (5,000 unit) Tab, Take 1 tablet (5,000 Units total) by mouth once daily., Disp: , Rfl:     clopidogrel (PLAVIX) 75 mg tablet, TAKE ONE TABLET BY MOUTH ONCE DAILY (Patient taking differently: Take 75 mg by mouth once daily.), Disp: 30 tablet, Rfl: 11    DEXCOM G7 SENSOR Petty, USE TO CHECK BLOOD  GLUCOSE AS DIRECTED. REPLACE EVERY 10 DAYS, Disp: 9 each, Rfl: 3    diclofenac sodium (VOLTAREN ARTHRITIS PAIN) 1 % Gel, Apply 2 g topically 2 (two) times daily as needed (muscle pain)., Disp: 100 g, Rfl: 1    digoxin (LANOXIN) 125 mcg tablet, Take 1 tablet by mouth every other day., Disp: , Rfl:     diphenoxylate-atropine 2.5-0.025 mg (LOMOTIL) 2.5-0.025 mg per tablet, Take 1 tablet by mouth 4 (four) times daily as needed for Diarrhea., Disp: 30 tablet, Rfl: 1    famotidine (PEPCID) 20 MG tablet, Take 1 tablet (20 mg total) by mouth 2 (two) times daily. for 10 days (Patient not taking: Reported on 2/27/2024), Disp: 20 tablet, Rfl: 0    fish oil-omega-3 fatty acids 300-1,000 mg capsule, Take 1 capsule by mouth 2 (two) times a day., Disp: , Rfl:     furosemide (LASIX) 40 MG tablet, TAKE 1 TABLET BY MOUTH ONCE DAILY (TAKE AN EXTRA TABLET FOR ANY WEIGHT GAIN OF 3 POUNDS OR MORE ON ANY GIVEN DAY), Disp: 90 tablet, Rfl: 0    insulin (LANTUS SOLOSTAR U-100 INSULIN) glargine 100 units/mL SubQ pen, Inject 28 Units into the skin every evening., Disp: 5 each, Rfl: 3    insulin lispro 100 unit/mL pen, INJECT 16 UNITS THREE TIMES DAILY WITH MEALS, Disp: 15 mL, Rfl: 1    lancets 31 gauge Misc, 1 strip 2 times daily, to use with insurance preferred meter, Disp: 100 each, Rfl: 2    levothyroxine (SYNTHROID) 100 MCG tablet, Take 1 tablet (100 mcg total) by mouth before breakfast., Disp: 30 tablet, Rfl: 11    magnesium 200 mg Tab, Take 200 mg by mouth once daily., Disp: , Rfl:     melatonin 10 mg Tab, Take 1 tablet by mouth nightly as needed., Disp: , Rfl:     memantine (NAMENDA) 10 MG Tab, TAKE 1 TABLET BY MOUTH ONCE DAILY IN THE EVENING, Disp: 90 tablet, Rfl: 3    metoprolol tartrate (LOPRESSOR) 50 MG tablet, Take 1 tablet (50 mg total) by mouth 2 (two) times daily., Disp: 60 tablet, Rfl: 1    multivitamin (THERAGRAN) per tablet, Take 1 tablet by mouth once daily., Disp: , Rfl:     mupirocin (BACTROBAN) 2 % ointment, Apply  "topically 3 (three) times daily., Disp: 30 g, Rfl: 0    ondansetron (ZOFRAN-ODT) 4 MG TbDL, Take 1 tablet (4 mg total) by mouth every 8 (eight) hours as needed (Nausea)., Disp: 20 tablet, Rfl: 0    pen needle, diabetic (COMFORT EZ PEN NEEDLES) 29 gauge x 1/2" Ndle, 1 Units by Misc.(Non-Drug; Combo Route) route 3 (three) times daily., Disp: 300 each, Rfl: 11    polyethylene glycol (GLYCOLAX) 17 gram PwPk, Take 17 g by mouth as needed (constipation)., Disp: , Rfl:     potassium chloride SA (K-DUR,KLOR-CON M) 10 MEQ tablet, Take 1 tablet (10 mEq total) by mouth 2 (two) times daily., Disp: 180 tablet, Rfl: 3    rivastigmine tartrate (EXELON) 1.5 MG capsule, Take 1 capsule by mouth twice daily, Disp: 60 capsule, Rfl: 0    simvastatin (ZOCOR) 10 MG tablet, Take 10 mg by mouth every evening., Disp: , Rfl:   Does patient have record of home blood pressure readings yes. Readings have been averaging 139/53.   Last dose of blood pressure medication was taken at this am.  Patient is asymptomatic.   Complains of none.      ,   .    Blood pressure reading after 15 minutes was 130/58/, Pulse 60.  Dr. Woods notified.      "

## 2024-03-18 ENCOUNTER — PATIENT MESSAGE (OUTPATIENT)
Dept: FAMILY MEDICINE | Facility: CLINIC | Age: 89
End: 2024-03-18
Payer: MEDICARE

## 2024-03-18 RX ORDER — POTASSIUM CHLORIDE 750 MG/1
10 TABLET, EXTENDED RELEASE ORAL 2 TIMES DAILY
Qty: 180 TABLET | Refills: 3 | Status: SHIPPED | OUTPATIENT
Start: 2024-03-18

## 2024-03-18 NOTE — TELEPHONE ENCOUNTER
Care Due:                  Date            Visit Type   Department     Provider  --------------------------------------------------------------------------------                                ESTABLISHED                              PATIENT -    [facility] FAMILY  Last Visit: 02-      VIRTUAL      MEDICINE       Allison Woods                              EP -                              PRIMARY      [facility] FAMILY  Next Visit: 08-      CARE ([facility])   MEDICINE       Allison Woods                                                            Last  Test          Frequency    Reason                     Performed    Due Date  --------------------------------------------------------------------------------    HBA1C.......  6 months...  insulin..................  12- 06-    [facility] Embedded Care Due Messages. Reference number: [id number].   3/18/2024 3:52:29 PM CDT

## 2024-03-22 ENCOUNTER — PATIENT MESSAGE (OUTPATIENT)
Dept: FAMILY MEDICINE | Facility: CLINIC | Age: 89
End: 2024-03-22
Payer: MEDICARE

## 2024-03-24 ENCOUNTER — PATIENT MESSAGE (OUTPATIENT)
Dept: FAMILY MEDICINE | Facility: CLINIC | Age: 89
End: 2024-03-24
Payer: MEDICARE

## 2024-03-24 DIAGNOSIS — E11.22 TYPE 2 DIABETES MELLITUS WITH STAGE 4 CHRONIC KIDNEY DISEASE, WITH LONG-TERM CURRENT USE OF INSULIN: Chronic | ICD-10-CM

## 2024-03-24 DIAGNOSIS — G31.84 MCI (MILD COGNITIVE IMPAIRMENT) WITH MEMORY LOSS: ICD-10-CM

## 2024-03-24 DIAGNOSIS — Z79.4 TYPE 2 DIABETES MELLITUS WITH STAGE 4 CHRONIC KIDNEY DISEASE, WITH LONG-TERM CURRENT USE OF INSULIN: Chronic | ICD-10-CM

## 2024-03-24 DIAGNOSIS — N18.4 TYPE 2 DIABETES MELLITUS WITH STAGE 4 CHRONIC KIDNEY DISEASE, WITH LONG-TERM CURRENT USE OF INSULIN: Chronic | ICD-10-CM

## 2024-03-24 NOTE — TELEPHONE ENCOUNTER
No care due was identified.  [facility] Embedded Care Due Messages. Reference number: [id number].   3/24/2024 5:48:17 PM CDT

## 2024-03-25 RX ORDER — INSULIN GLARGINE 100 [IU]/ML
28 INJECTION, SOLUTION SUBCUTANEOUS NIGHTLY
Qty: 5 EACH | Refills: 3 | Status: SHIPPED | OUTPATIENT
Start: 2024-03-25 | End: 2025-03-25

## 2024-03-25 RX ORDER — FUROSEMIDE 40 MG/1
TABLET ORAL
Qty: 90 TABLET | Refills: 1 | Status: SHIPPED | OUTPATIENT
Start: 2024-03-25

## 2024-03-25 RX ORDER — FUROSEMIDE 40 MG/1
TABLET ORAL
Qty: 90 TABLET | Refills: 0 | OUTPATIENT
Start: 2024-03-25

## 2024-03-25 RX ORDER — RIVASTIGMINE TARTRATE 1.5 MG/1
1.5 CAPSULE ORAL 2 TIMES DAILY
Qty: 60 CAPSULE | Refills: 0 | OUTPATIENT
Start: 2024-03-25

## 2024-03-25 RX ORDER — RIVASTIGMINE TARTRATE 1.5 MG/1
1.5 CAPSULE ORAL 2 TIMES DAILY
Qty: 60 CAPSULE | Refills: 1 | Status: SHIPPED | OUTPATIENT
Start: 2024-03-25 | End: 2024-05-27

## 2024-03-25 NOTE — TELEPHONE ENCOUNTER
No care due was identified.  [facility] Embedded Care Due Messages. Reference number: [id number].   3/25/2024 10:31:18 AM CDT

## 2024-04-15 RX ORDER — MEMANTINE HYDROCHLORIDE 10 MG/1
TABLET ORAL
Qty: 90 TABLET | Refills: 0 | Status: SHIPPED | OUTPATIENT
Start: 2024-04-15

## 2024-04-15 NOTE — TELEPHONE ENCOUNTER
Refill Routing Note   Medication(s) are not appropriate for processing by [facility] for the following reason(s):        Outside of protocol    [facility] action(s):  Route               Appointments  past 12m or future 3m with PCP    Date Provider   Last Visit   2/19/2024 Allison Woods MD   Next Visit   8/13/2024 Allison Woods MD   ED visits in past 90 days: 0        Note composed:4:42 PM 04/15/2024

## 2024-04-22 ENCOUNTER — OFFICE VISIT (OUTPATIENT)
Dept: ORTHOPEDICS | Facility: CLINIC | Age: 89
End: 2024-04-22
Payer: MEDICARE

## 2024-04-22 VITALS — WEIGHT: 153 LBS | HEIGHT: 62 IN | BODY MASS INDEX: 28.16 KG/M2

## 2024-04-22 DIAGNOSIS — M17.11 OSTEOARTHROSIS, LOCALIZED, PRIMARY, KNEE, RIGHT: Primary | ICD-10-CM

## 2024-04-22 PROCEDURE — 1157F ADVNC CARE PLAN IN RCRD: CPT | Mod: CPTII,S$GLB,, | Performed by: ORTHOPAEDIC SURGERY

## 2024-04-22 PROCEDURE — 1125F AMNT PAIN NOTED PAIN PRSNT: CPT | Mod: CPTII,S$GLB,, | Performed by: ORTHOPAEDIC SURGERY

## 2024-04-22 PROCEDURE — 20610 DRAIN/INJ JOINT/BURSA W/O US: CPT | Mod: RT,S$GLB,, | Performed by: ORTHOPAEDIC SURGERY

## 2024-04-22 PROCEDURE — 1101F PT FALLS ASSESS-DOCD LE1/YR: CPT | Mod: CPTII,S$GLB,, | Performed by: ORTHOPAEDIC SURGERY

## 2024-04-22 PROCEDURE — 99214 OFFICE O/P EST MOD 30 MIN: CPT | Mod: 25,S$GLB,, | Performed by: ORTHOPAEDIC SURGERY

## 2024-04-22 PROCEDURE — 3288F FALL RISK ASSESSMENT DOCD: CPT | Mod: CPTII,S$GLB,, | Performed by: ORTHOPAEDIC SURGERY

## 2024-04-22 PROCEDURE — 99999 PR PBB SHADOW E&M-EST. PATIENT-LVL IV: CPT | Mod: PBBFAC,,, | Performed by: ORTHOPAEDIC SURGERY

## 2024-04-22 PROCEDURE — 1159F MED LIST DOCD IN RCRD: CPT | Mod: CPTII,S$GLB,, | Performed by: ORTHOPAEDIC SURGERY

## 2024-04-22 RX ORDER — TRIAMCINOLONE ACETONIDE 40 MG/ML
40 INJECTION, SUSPENSION INTRA-ARTICULAR; INTRAMUSCULAR
Status: DISCONTINUED | OUTPATIENT
Start: 2024-04-22 | End: 2024-04-22 | Stop reason: HOSPADM

## 2024-04-22 RX ADMIN — TRIAMCINOLONE ACETONIDE 40 MG: 40 INJECTION, SUSPENSION INTRA-ARTICULAR; INTRAMUSCULAR at 09:04

## 2024-04-22 NOTE — PROGRESS NOTES
89 y.o. year old presents to the clinic today with recurring pain in the right knee.  Chronic problem.  Patient has had Kenlaog injections in the past and responded well.  Last injection was 17 months ago.  Symptoms not improving    Exam shows tenderness at the joint line without signs of infection or instability    X-rays show arthritic changes    Assessment:  right knee arthrosis    Plan:  Kenlaog into the right knee.  Encourage strengthening over time.  Followup as needed.

## 2024-04-22 NOTE — PROCEDURES
Large Joint Aspiration/Injection: R knee    Date/Time: 4/22/2024 9:45 AM    Performed by: Alexandr Chirinos MD  Authorized by: Alexandr Chirinos MD    Consent Done?:  Yes (Verbal)  Timeout: prior to procedure the correct patient, procedure, and site was verified    Prep: patient was prepped and draped in usual sterile fashion      Details:  Needle Size:  21 G  Approach:  Anterolateral  Location:  Knee  Site:  R knee  Medications:  40 mg triamcinolone acetonide 40 mg/mL  Patient tolerance:  Patient tolerated the procedure well with no immediate complications

## 2024-04-26 ENCOUNTER — OFFICE VISIT (OUTPATIENT)
Dept: PODIATRY | Facility: CLINIC | Age: 89
End: 2024-04-26
Payer: MEDICARE

## 2024-04-26 VITALS — WEIGHT: 153 LBS | BODY MASS INDEX: 28.16 KG/M2 | HEIGHT: 62 IN

## 2024-04-26 DIAGNOSIS — E11.42 DIABETIC POLYNEUROPATHY ASSOCIATED WITH TYPE 2 DIABETES MELLITUS: Primary | ICD-10-CM

## 2024-04-26 DIAGNOSIS — M20.41 HAMMER TOES OF BOTH FEET: ICD-10-CM

## 2024-04-26 DIAGNOSIS — M20.42 HAMMER TOES OF BOTH FEET: ICD-10-CM

## 2024-04-26 DIAGNOSIS — B35.1 ONYCHOMYCOSIS DUE TO DERMATOPHYTE: ICD-10-CM

## 2024-04-26 PROCEDURE — 11721 DEBRIDE NAIL 6 OR MORE: CPT | Mod: Q9,S$GLB,, | Performed by: PODIATRIST

## 2024-04-26 PROCEDURE — 99999 PR PBB SHADOW E&M-EST. PATIENT-LVL IV: CPT | Mod: PBBFAC,,, | Performed by: PODIATRIST

## 2024-04-26 PROCEDURE — 99499 UNLISTED E&M SERVICE: CPT | Mod: S$GLB,,, | Performed by: PODIATRIST

## 2024-04-26 NOTE — PROGRESS NOTES
Subjective:      Patient ID: Denice Sheth is a 89 y.o. female.    Chief Complaint: No chief complaint on file.    Denice is a 89 y.o. female who presents to the clinic for evaluation and treatment of diabetic feet. Denice has a past medical history of Anticoagulant long-term use, Aortic calcification (01/02/2022), Aortic valve regurgitation (10/13/2021), CAD (coronary artery disease) (01/06/2022), Cecum mass, Chronic atrial fibrillation (05/02/2017), Chronic diastolic heart failure, CKD (chronic kidney disease) stage 4, GFR 15-29 ml/min (02/22/2016), Dementia (08/31/2022), Diabetic retinopathy (03/24/2021), Disorder of kidney and ureter, Encounter for blood transfusion, Exudative age-related macular degeneration of right eye with active choroidal neovascularization (09/12/2022), Gastroesophageal reflux disease without esophagitis (9/17/2023), H/O: CVA (cerebrovascular accident) (07/30/2019), Hemiparesis affecting left side as late effect of cerebrovascular accident (CVA) (06/05/2017), History of TIA (transient ischemic attack) (09/17/2023), Hypertension, KRISTEN (iron deficiency anemia) (01/12/2018), Insomnia, Irregular heart rhythm, Mitral valve regurgitation (10/13/2021), Other specified hypothyroidism (06/24/2019), Pacemaker (01/02/2023), Primary hypertension (02/09/2016), Proteinuria due to type 2 diabetes mellitus (03/12/2019), Senile purpura (12/21/2022), Sick sinus syndrome (2018), Stenosis of left carotid artery (12/06/2016), TIA (transient ischemic attack) (03/2017), and Type 2 diabetes mellitus with stage 4 chronic kidney disease, with long-term current use of insulin. Patient relates needing her toenails trimmed.  Denies this being a source of pain.  Has not attempted to self trim.  Notes excellent control over her blood glucose.  Denies any issues with shearing with use of the new diabetic shoes.  Denies any additional pedal complaints.     PCP: Allison Woods MD    Date Last Seen by PCP:  2/24    Hemoglobin A1C   Date Value Ref Range Status   12/04/2023 6.4 (H) 4.0 - 5.6 % Final     Comment:     ADA Screening Guidelines:  5.7-6.4%  Consistent with prediabetes  >or=6.5%  Consistent with diabetes    High levels of fetal hemoglobin interfere with the HbA1C  assay. Heterozygous hemoglobin variants (HbS, HgC, etc)do  not significantly interfere with this assay.   However, presence of multiple variants may affect accuracy.     07/24/2023 7.2 (H) 4.0 - 5.6 % Final     Comment:     ADA Screening Guidelines:  5.7-6.4%  Consistent with prediabetes  >or=6.5%  Consistent with diabetes    High levels of fetal hemoglobin interfere with the HbA1C  assay. Heterozygous hemoglobin variants (HbS, HgC, etc)do  not significantly interfere with this assay.   However, presence of multiple variants may affect accuracy.     10/06/2021 6.9 (H) 4.0 - 5.6 % Final     Comment:     ADA Screening Guidelines:  5.7-6.4%  Consistent with prediabetes  >or=6.5%  Consistent with diabetes    High levels of fetal hemoglobin interfere with the HbA1C  assay. Heterozygous hemoglobin variants (HbS, HgC, etc)do  not significantly interfere with this assay.   However, presence of multiple variants may affect accuracy.           Past Medical History:   Diagnosis Date    Anticoagulant long-term use     Aortic calcification 01/02/2022    CXR 1/2/22    Aortic valve regurgitation 10/13/2021    CAD (coronary artery disease) 01/06/2022    Cecum mass     Chronic atrial fibrillation 05/02/2017    Chronic diastolic heart failure     CKD (chronic kidney disease) stage 4, GFR 15-29 ml/min 02/22/2016    Dementia 08/31/2022    Diabetic retinopathy 03/24/2021    Disorder of kidney and ureter     follows w/ Dr. 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 09/12/2022    Gastroesophageal reflux disease without esophagitis 9/17/2023    H/O: CVA (cerebrovascular  accident) 07/30/2019    Hemiparesis affecting left side as late effect of cerebrovascular accident (CVA) 06/05/2017    History of TIA (transient ischemic attack) 09/17/2023    Hypertension     KRISTEN (iron deficiency anemia) 01/12/2018    Insomnia     Irregular heart rhythm     Mitral valve regurgitation 10/13/2021    Other specified hypothyroidism 06/24/2019    Pacemaker 01/02/2023    Primary hypertension 02/09/2016    Proteinuria due to type 2 diabetes mellitus 03/12/2019    Senile purpura 12/21/2022    Sick sinus syndrome 2018    s/p pacemaker placement    Stenosis of left carotid artery 12/06/2016    TIA (transient ischemic attack) 03/2017    Type 2 diabetes mellitus with stage 4 chronic kidney disease, with long-term current use of insulin        Past Surgical History:   Procedure Laterality Date    ADENOIDECTOMY  8/18    BILATERAL SALPINGO-OOPHORECTOMY (BSO) Bilateral 08/19/2019    Procedure: SALPINGO-OOPHORECTOMY, BILATERAL;  Surgeon: Blayne Wallace MD;  Location: [facility] OR;  Service: OB/GYN;  Laterality: Bilateral;    COLON SURGERY  8/18    HYSTERECTOMY      partial, benign causes by reported hx    INSERTION OF PACEMAKER  08/2017    LEFT HEART CATHETERIZATION Left 01/06/2022    Procedure: Left heart cath;  Surgeon: Bhaskar Hanson III, MD;  Location: [facility] CATH;  Service: Cardiology;  Laterality: Left;  Right radial    ROBOT-ASSISTED LAPAROSCOPIC COLECTOMY USING DA MICHELE XI Right 08/19/2019    Procedure: XI ROBOTIC COLECTOMY;  Surgeon: Kourtney Dodson MD;  Location: [facility] OR;  Service: General;  Laterality: Right;       Family History   Problem Relation Name Age of Onset    Diabetes Mother Blake     Heart disease Mother Blake     Cancer Father Julian         type unknown (nonsmoker, no etoh)    Heart disease Brother Julian     Dementia Sister      Cancer Daughter Luz         fallopian ca    Cancer Son Lukas         esophageal ca    Dementia Sister      Dementia Sister         Social History      Socioeconomic History    Marital status:    Tobacco Use    Smoking status: Never    Smokeless tobacco: Never   Substance and Sexual Activity    Alcohol use: No    Drug use: No    Sexual activity: Never     Birth control/protection: Post-menopausal     Social Determinants of Health     Financial Resource Strain: Low Risk  (2/27/2024)    Overall Financial Resource Strain (CARDIA)     Difficulty of Paying Living Expenses: Not hard at all   Food Insecurity: No Food Insecurity (2/27/2024)    Hunger Vital Sign     Worried About Running Out of Food in the Last Year: Never true     Ran Out of Food in the Last Year: Never true   Transportation Needs: No Transportation Needs (2/27/2024)    PRAPARE - Transportation     Lack of Transportation (Medical): No     Lack of Transportation (Non-Medical): No   Physical Activity: Inactive (2/27/2024)    Exercise Vital Sign     Days of Exercise per Week: 0 days     Minutes of Exercise per Session: 0 min   Stress: No Stress Concern Present (2/27/2024)    [employer] - Occupational Stress Questionnaire     Feeling of Stress : Not at all   Social Connections: Unknown (2/27/2024)    Social Connection and Isolation Panel [NHANES]     Frequency of Communication with Friends and Family: More than three times a week     Frequency of Social Gatherings with Friends and Family: More than three times a week     Attends [employer] Services: Never     Active Member of Clubs or Organizations: No     Attends Club or Organization Meetings: Never     Marital Status: Patient declined   Housing Stability: Unknown (2/27/2024)    Housing Stability Vital Sign     Unable to Pay for Housing in the Last Year: No     Unstable Housing in the Last Year: No       Current Outpatient Medications   Medication Sig Dispense Refill    amiodarone (PACERONE) 200 MG Tab Take 200 mg by mouth once daily.       apixaban (ELIQUIS) 2.5 mg Tab Take 1 tablet (2.5 mg total) by mouth 2 (two)  times daily. (Patient not taking: Reported on 4/22/2024) 60 tablet 0    ascorbic acid (VITAMIN C) 500 MG tablet Take 500 mg by mouth once daily.      b complex vitamins capsule Take 1 capsule by mouth once daily.      BINAXNOW COVID-19 AG SELF TEST Kit Use as Directed on the Package      blood sugar diagnostic Strp 1 strip 2 times daily, to use with insurance preferred meter 100 each 2    blood-glucose meter kit To check BG 2 times daily, to use with insurance preferred meter 1 each 0    blood-glucose meter,continuous (DEXCOM ) Misc 1 each by Misc.(Non-Drug; Combo Route) route once daily. 1 each 0    blood-glucose transmitter (DEXCOM G5 TRANSMITTER) Petty 1 each by Misc.(Non-Drug; Combo Route) route Daily. 1 each 0    cholecalciferol, vitamin D3, 125 mcg (5,000 unit) Tab Take 1 tablet (5,000 Units total) by mouth once daily.      clopidogrel (PLAVIX) 75 mg tablet TAKE ONE TABLET BY MOUTH ONCE DAILY (Patient taking differently: Take 75 mg by mouth once daily.) 30 tablet 11    DEXCOM G7 SENSOR Petty USE TO CHECK BLOOD GLUCOSE AS DIRECTED. REPLACE EVERY 10 DAYS 9 each 3    diclofenac sodium (VOLTAREN ARTHRITIS PAIN) 1 % Gel Apply 2 g topically 2 (two) times daily as needed (muscle pain). 100 g 1    digoxin (LANOXIN) 125 mcg tablet Take 1 tablet by mouth every other day.      diphenoxylate-atropine 2.5-0.025 mg (LOMOTIL) 2.5-0.025 mg per tablet Take 1 tablet by mouth 4 (four) times daily as needed for Diarrhea. 30 tablet 1    famotidine (PEPCID) 20 MG tablet Take 1 tablet (20 mg total) by mouth 2 (two) times daily. for 10 days 20 tablet 0    fish oil-omega-3 fatty acids 300-1,000 mg capsule Take 1 capsule by mouth 2 (two) times a day.      furosemide (LASIX) 40 MG tablet TAKE 1 TABLET BY MOUTH ONCE DAILY (TAKE AN EXTRA TABLET FOR ANY WEIGHT GAIN OF 3 POUNDS OR MORE ON ANY GIVEN DAY) 90 tablet 1    insulin (LANTUS SOLOSTAR U-100 INSULIN) glargine 100 units/mL SubQ pen Inject 28 Units into the skin every evening. 5  "each 3    insulin lispro 100 unit/mL pen INJECT 16 UNITS THREE TIMES DAILY WITH MEALS 15 mL 1    lancets 31 gauge Misc 1 strip 2 times daily, to use with insurance preferred meter 100 each 2    levothyroxine (SYNTHROID) 100 MCG tablet Take 1 tablet (100 mcg total) by mouth before breakfast. 30 tablet 11    magnesium 200 mg Tab Take 200 mg by mouth once daily.      melatonin 10 mg Tab Take 1 tablet by mouth nightly as needed.      memantine (NAMENDA) 10 MG Tab TAKE 1 TABLET BY MOUTH ONCE DAILY IN THE EVENING 90 tablet 0    metoprolol tartrate (LOPRESSOR) 50 MG tablet Take 1 tablet (50 mg total) by mouth 2 (two) times daily. 60 tablet 1    multivitamin (THERAGRAN) per tablet Take 1 tablet by mouth once daily.      mupirocin (BACTROBAN) 2 % ointment Apply topically 3 (three) times daily. 30 g 0    ondansetron (ZOFRAN-ODT) 4 MG TbDL Take 1 tablet (4 mg total) by mouth every 8 (eight) hours as needed (Nausea). 20 tablet 0    pen needle, diabetic (COMFORT EZ PEN NEEDLES) 29 gauge x 1/2" Ndle 1 Units by Misc.(Non-Drug; Combo Route) route 3 (three) times daily. 300 each 11    polyethylene glycol (GLYCOLAX) 17 gram PwPk Take 17 g by mouth as needed (constipation).      potassium chloride SA (K-DUR,KLOR-CON M) 10 MEQ tablet Take 1 tablet (10 mEq total) by mouth 2 (two) times daily. 180 tablet 3    rivastigmine tartrate (EXELON) 1.5 MG capsule Take 1 capsule (1.5 mg total) by mouth 2 (two) times daily. 60 capsule 1    simvastatin (ZOCOR) 10 MG tablet Take 10 mg by mouth every evening.       No current facility-administered medications for this visit.       Review of patient's allergies indicates:   Allergen Reactions    Adhesive Itching    Mucinex [guaifenesin]      Says caused CHF     Latex, natural rubber Rash         Review of Systems   Constitutional: Negative for chills and fever.   Cardiovascular:  Negative for claudication and leg swelling.   Skin:  Negative for color change, nail changes and poor wound healing. "   Musculoskeletal:  Negative for joint pain, joint swelling, muscle cramps and muscle weakness.   Gastrointestinal:  Negative for nausea and vomiting.   Neurological:  Positive for numbness.   Psychiatric/Behavioral:  Negative for altered mental status.            Objective:      Physical Exam  Constitutional:       Appearance: Normal appearance. She is not ill-appearing.   Cardiovascular:      Pulses:           Dorsalis pedis pulses are 2+ on the right side and 2+ on the left side.        Posterior tibial pulses are 2+ on the right side and 2+ on the left side.      Comments: CFT is < 3 seconds bilateral.  Pedal hair growth is decreased bilateral.  No lower extremity edema noted bilateral.  Toes are warm to touch bilateral.    Musculoskeletal:         General: Deformity present. No tenderness.      Right lower leg: No edema.      Left lower leg: No edema.      Comments: Muscle strength 5/5 in all muscle groups bilateral.  No tenderness nor crepitation with ROM of foot/ankle joints bilateral.  (-) for pain with palpation of bilateral foot and ankle. Bilateral semi-reducible contracture of toes 2-5.  Bilateral HAV.   Skin:     General: Skin is warm and dry.      Capillary Refill: Capillary refill takes 2 to 3 seconds.      Findings: No bruising, ecchymosis, erythema, signs of injury, laceration, lesion, petechiae, rash or wound.      Comments: Pedal skin appears thin bilateral. Toenails x 10 appear thickened by 2 mm, elongated by 4 mm, and discolored with subungual debris.            Neurological:      Mental Status: She is alert.      Sensory: Sensory deficit present.      Motor: No weakness or atrophy.      Comments: Decreased protective sensation noted bilateral.  Light touch is intact bilateral.                 Assessment:       Encounter Diagnoses   Name Primary?    Diabetic polyneuropathy associated with type 2 diabetes mellitus Yes    Hammer toes of both feet     Onychomycosis due to dermatophyte           Plan:       Diagnoses and all orders for this visit:    Diabetic polyneuropathy associated with type 2 diabetes mellitus    Hammer toes of both feet    Onychomycosis due to dermatophyte      I counseled the patient on her conditions, their implications and medical management.    Advised to continue wearing supportive shoe gear that limits pressure to the digits.      Shoe inspection. Diabetic Foot Education. Patient reminded of the importance of good nutrition and blood sugar control to help prevent podiatric complications of diabetes. Patient instructed on proper foot hygeine. We discussed wearing proper shoe gear, daily foot inspections, never walking without protective shoe gear, never putting sharp instruments to feet    With patient's permission, nails were aggressively reduced and debrided x 10 to their soft tissue attachment mechanically and with electric , removing all offending nail and debris. Patient relates relief following the procedure. She will continue to monitor the areas daily, inspect her feet, wear protective shoe gear when ambulatory, moisturizer to maintain skin integrity and follow in this office in approximately 3 months, sooner p.r.n.    RTC in 3 months for routine nail care.    Pankaj Julian DPM

## 2024-05-06 ENCOUNTER — LAB VISIT (OUTPATIENT)
Dept: LAB | Facility: HOSPITAL | Age: 89
End: 2024-05-06
Attending: INTERNAL MEDICINE
Payer: MEDICARE

## 2024-05-06 DIAGNOSIS — N18.4 CKD (CHRONIC KIDNEY DISEASE) STAGE 4, GFR 15-29 ML/MIN: ICD-10-CM

## 2024-05-06 LAB
ALBUMIN SERPL BCP-MCNC: 3.3 G/DL (ref 3.5–5.2)
ANION GAP SERPL CALC-SCNC: 11 MMOL/L (ref 8–16)
BUN SERPL-MCNC: 57 MG/DL (ref 8–23)
CALCIUM SERPL-MCNC: 10.1 MG/DL (ref 8.7–10.5)
CHLORIDE SERPL-SCNC: 103 MMOL/L (ref 95–110)
CO2 SERPL-SCNC: 28 MMOL/L (ref 23–29)
CREAT SERPL-MCNC: 2.7 MG/DL (ref 0.5–1.4)
EST. GFR  (NO RACE VARIABLE): 16.4 ML/MIN/1.73 M^2
GLUCOSE SERPL-MCNC: 186 MG/DL (ref 70–110)
PHOSPHATE SERPL-MCNC: 4.1 MG/DL (ref 2.7–4.5)
POTASSIUM SERPL-SCNC: 4.8 MMOL/L (ref 3.5–5.1)
PTH-INTACT SERPL-MCNC: 30.5 PG/ML (ref 9–77)
SODIUM SERPL-SCNC: 142 MMOL/L (ref 136–145)

## 2024-05-06 PROCEDURE — 80069 RENAL FUNCTION PANEL: CPT | Performed by: INTERNAL MEDICINE

## 2024-05-06 PROCEDURE — 83970 ASSAY OF PARATHORMONE: CPT | Performed by: INTERNAL MEDICINE

## 2024-05-06 PROCEDURE — 82570 ASSAY OF URINE CREATININE: CPT | Performed by: INTERNAL MEDICINE

## 2024-05-06 PROCEDURE — 36415 COLL VENOUS BLD VENIPUNCTURE: CPT | Mod: PN | Performed by: INTERNAL MEDICINE

## 2024-05-07 ENCOUNTER — PATIENT MESSAGE (OUTPATIENT)
Dept: FAMILY MEDICINE | Facility: CLINIC | Age: 89
End: 2024-05-07
Payer: MEDICARE

## 2024-05-07 LAB
CREAT UR-MCNC: 60 MG/DL (ref 15–325)
PROT UR-MCNC: 13 MG/DL (ref 0–15)
PROT/CREAT UR: 0.22 MG/G{CREAT} (ref 0–0.2)

## 2024-05-20 NOTE — TELEPHONE ENCOUNTER
----- Message from Amalia Hahn sent at 12/4/2017  1:01 PM CST -----  Daughter/Parisa [phone] is calling to see if office received a fax on her insulin glargine (LANTUS) 100 unit/mL injection from [employer], company that makes the Lantus. Please call daughter to let her know if received because patient needs the refills.    No

## 2024-05-26 DIAGNOSIS — G31.84 MCI (MILD COGNITIVE IMPAIRMENT) WITH MEMORY LOSS: ICD-10-CM

## 2024-05-27 RX ORDER — RIVASTIGMINE TARTRATE 1.5 MG/1
1.5 CAPSULE ORAL 2 TIMES DAILY
Qty: 60 CAPSULE | Refills: 0 | Status: SHIPPED | OUTPATIENT
Start: 2024-05-27

## 2024-05-27 NOTE — TELEPHONE ENCOUNTER
Refill Routing Note   Medication(s) are not appropriate for processing by [facility] for the following reason(s):        Outside of protocol    [facility] action(s):  Route               Appointments  past 12m or future 3m with PCP    Date Provider   Last Visit   2/19/2024 Allison Woods MD   Next Visit   8/13/2024 Allison Woods MD   ED visits in past 90 days: 0        Note composed:9:04 AM 05/27/2024

## 2024-06-19 NOTE — TELEPHONE ENCOUNTER
Refill Routing Note   Medication(s) are not appropriate for processing by [facility] for the following reason(s):        Required labs abnormal    [facility] action(s):  Defer     Requires labs : Yes             Appointments  past 12m or future 3m with PCP    Date Provider   Last Visit   2/19/2024 Allison Woods MD   Next Visit   8/13/2024 Allison Woods MD   ED visits in past 90 days: 0        Note composed:10:00 AM 06/19/2024

## 2024-06-19 NOTE — TELEPHONE ENCOUNTER
Care Due:                  Date            Visit Type   Department     Provider  --------------------------------------------------------------------------------                                ESTABLISHED                              PATIENT -    [facility] FAMILY  Last Visit: 02-      tydy      MEDICINE       Allison Woods  Next Visit: None Scheduled  None         None Found                                                            Last  Test          Frequency    Reason                     Performed    Due Date  --------------------------------------------------------------------------------    HBA1C.......  6 months...  insulin..................  12- 06-    [facility] Embedded Care Due Messages. Reference number: [id number].   6/19/2024 6:44:29 AM IVONT

## 2024-06-20 RX ORDER — FUROSEMIDE 40 MG/1
TABLET ORAL
Qty: 90 TABLET | Refills: 2 | Status: SHIPPED | OUTPATIENT
Start: 2024-06-20

## 2024-06-21 ENCOUNTER — PATIENT MESSAGE (OUTPATIENT)
Dept: PODIATRY | Facility: CLINIC | Age: 89
End: 2024-06-21
Payer: MEDICARE

## 2024-06-21 NOTE — TELEPHONE ENCOUNTER
Spoke with the patient's daughter and provided instructions per the provider. She expressed understanding of the message and said that she would take care of it.

## 2024-06-23 DIAGNOSIS — G31.84 MCI (MILD COGNITIVE IMPAIRMENT) WITH MEMORY LOSS: ICD-10-CM

## 2024-06-24 RX ORDER — RIVASTIGMINE TARTRATE 1.5 MG/1
1.5 CAPSULE ORAL 2 TIMES DAILY
Qty: 90 CAPSULE | Refills: 3 | Status: SHIPPED | OUTPATIENT
Start: 2024-06-24

## 2024-06-24 NOTE — TELEPHONE ENCOUNTER
Refill Routing Note   Medication(s) are not appropriate for processing by [facility] for the following reason(s):        Outside of protocol    [facility] action(s):  Route             Appointments  past 12m or future 3m with PCP    Date Provider   Last Visit   2/19/2024 Allison Woods MD   Next Visit   8/13/2024 Allison Woods MD   ED visits in past 90 days: 0        Note composed:9:22 AM 06/24/2024

## 2024-07-08 RX ORDER — MEMANTINE HYDROCHLORIDE 10 MG/1
TABLET ORAL
Qty: 90 TABLET | Refills: 0 | Status: SHIPPED | OUTPATIENT
Start: 2024-07-08

## 2024-07-08 NOTE — TELEPHONE ENCOUNTER
Please approve for Memantine HCl 10 MG Oral Tablet     Last OV 04/17/24  Last refill date 04/15/24  Last labs 02/26/24    Next appt 08/13/24

## 2024-07-08 NOTE — TELEPHONE ENCOUNTER
Refill Routing Note   Medication(s) are not appropriate for processing by [facility] for the following reason(s):        Outside of protocol    [facility] action(s):  Route               Appointments  past 12m or future 3m with PCP    Date Provider   Last Visit   2/19/2024 Allison Woods MD   Next Visit   8/13/2024 Allison Woods MD   ED visits in past 90 days: 0        Note composed:8:45 AM 07/08/2024

## 2024-07-22 ENCOUNTER — TELEPHONE (OUTPATIENT)
Dept: PRIMARY CARE CLINIC | Facility: CLINIC | Age: 89
End: 2024-07-22
Payer: MEDICARE

## 2024-07-22 NOTE — TELEPHONE ENCOUNTER
Spoke to pts daughter, states she would really like for pt to be seen by Dr Washburn, not interested in making appt with NP or PA. Reminded daughter of August appt. States she will keep that appt and call if she feels she needs to be seen sooner.

## 2024-07-22 NOTE — TELEPHONE ENCOUNTER
----- Message from Varinder Carrasquillo sent at 7/22/2024  8:48 AM CDT -----  Contact: Daughter  Type:  Needs Medical Advice    Who Called: Pts Daughter Parisa    Symptoms (please be specific): Forgetting more than usual     Would the patient rather a call back or a response via [facility]? Call back    Best Call Back Number: [phone]      Additional Information: Sts that this weekend she was scary with how she did not know where she was. Tried to book her an appt but earliest was sept 5th and she said this needs to be addressed sooner and wasn't sure if its a medication issue or what it is.    Please advise -- Thank you

## 2024-07-24 ENCOUNTER — TELEPHONE (OUTPATIENT)
Dept: PODIATRY | Facility: CLINIC | Age: 89
End: 2024-07-24
Payer: MEDICARE

## 2024-07-24 NOTE — TELEPHONE ENCOUNTER
----- Message from Demetrice Richmond sent at 7/24/2024  3:31 PM CDT -----  Contact: pt daughter moises  Type:  Sooner Appointment Request    Caller is requesting a sooner appointment.  Caller declined first available appointment listed below.  Caller will not accept being placed on the waitlist and is requesting a message be sent to doctor.    Name of Caller:  pt daughter moises  When is the first available appointment?  7/26 nothing coming up after  Symptoms:  pt has pink eye and would like to reschedule  Would the patient rather a call back or a response via [facility]? call  Best Call Back Number:  [phone]  Additional Information:  please call

## 2024-07-31 ENCOUNTER — PATIENT MESSAGE (OUTPATIENT)
Dept: FAMILY MEDICINE | Facility: CLINIC | Age: 89
End: 2024-07-31
Payer: MEDICARE

## 2024-08-02 ENCOUNTER — LAB VISIT (OUTPATIENT)
Dept: LAB | Facility: HOSPITAL | Age: 89
End: 2024-08-02
Attending: STUDENT IN AN ORGANIZED HEALTH CARE EDUCATION/TRAINING PROGRAM
Payer: MEDICARE

## 2024-08-02 ENCOUNTER — OFFICE VISIT (OUTPATIENT)
Dept: FAMILY MEDICINE | Facility: CLINIC | Age: 89
End: 2024-08-02
Payer: MEDICARE

## 2024-08-02 ENCOUNTER — PATIENT MESSAGE (OUTPATIENT)
Dept: FAMILY MEDICINE | Facility: CLINIC | Age: 89
End: 2024-08-02

## 2024-08-02 DIAGNOSIS — R39.15 URINARY URGENCY: ICD-10-CM

## 2024-08-02 DIAGNOSIS — J02.9 SORE THROAT: ICD-10-CM

## 2024-08-02 DIAGNOSIS — R35.0 URINARY FREQUENCY: ICD-10-CM

## 2024-08-02 DIAGNOSIS — R09.81 SINUS CONGESTION: ICD-10-CM

## 2024-08-02 DIAGNOSIS — U07.1 COVID-19 VIRUS DETECTED: ICD-10-CM

## 2024-08-02 DIAGNOSIS — U07.1 COVID-19: Primary | ICD-10-CM

## 2024-08-02 LAB
CTP QC/QA: YES
SARS-COV-2 RDRP RESP QL NAA+PROBE: POSITIVE

## 2024-08-02 PROCEDURE — 87186 SC STD MICRODIL/AGAR DIL: CPT | Performed by: STUDENT IN AN ORGANIZED HEALTH CARE EDUCATION/TRAINING PROGRAM

## 2024-08-02 PROCEDURE — 87088 URINE BACTERIA CULTURE: CPT | Performed by: STUDENT IN AN ORGANIZED HEALTH CARE EDUCATION/TRAINING PROGRAM

## 2024-08-02 PROCEDURE — 87086 URINE CULTURE/COLONY COUNT: CPT | Performed by: STUDENT IN AN ORGANIZED HEALTH CARE EDUCATION/TRAINING PROGRAM

## 2024-08-02 NOTE — PROGRESS NOTES
Assessment and Plan:    Diagnoses and all orders for this visit:    COVID-19  -     molnupiravir 200 mg capsule (EUA); Take 4 capsules (800 mg total) by mouth every 12 (twelve) hours. for 5 days    Sinus congestion  -     POCT COVID-19 Rapid Screening    Sore throat  -     POCT COVID-19 Rapid Screening    Urinary frequency  -     Urine culture; Future    Urinary urgency  -     Urine culture; Future    Holding off on antibiotic therapy for presumed UTI due to pt w/ CKD IV (eGFR 16.4; CrCl 15) and thus limited Abx options - waiting for culture results. Until then, if any new/worsening/concerning symptoms develop, pt advised to go to the ED for further eval/treatment.    Follow up if symptoms worsen or fail to improve.    Allison Woods MD  08/02/2024     Audiovisual Telehealth Visit:     The patient location is: Home  The chief complaint leading to consultation is: (documented below in HPI)  Visit type: Virtual visit with audiovisual  Total time spent on this encounter: 20 minutes.This includes face to face time and non-face to face time preparing to see the patient (eg, review of tests), obtaining and/or reviewing separately obtained history, documenting clinical information in the electronic or other health record, independently interpreting results, and communicating results to the patient/family/caregiver, or care coordinator.       Each patient to whom I provide medical services by telemedicine is: (1) informed of the relationship between the physician and patient and the respective role of any other health care provider with respect to management of the patient; and (2) notified that they may decline to receive medical services by telemedicine and may withdraw from such care at any time. Patient verbally consented to receive this service via audiovisual call.    Patient ID: Denice Sheth is a 89 y.o. female     HPI: 89 y.o. female with a PMHx as documented below presents to clinic today (via audiovisual  telehealth visit) for the following:    Several day Hx of nasal congestion, sore throat, dry cough. No fever, N/V, diarrhea. + Urinary hesitancy and frequency. No dysuria, hematuria, pelvic pain.     Past Medical History:   Diagnosis Date    Anticoagulant long-term use     Aortic calcification 01/02/2022    CXR 1/2/22    Aortic valve regurgitation 10/13/2021    CAD (coronary artery disease) 01/06/2022    Cecum mass     Chronic atrial fibrillation 05/02/2017    Chronic diastolic heart failure     CKD (chronic kidney disease) stage 4, GFR 15-29 ml/min 02/22/2016    Dementia 08/31/2022    Diabetic retinopathy 03/24/2021    Disorder of kidney and ureter     follows w/ Dr. 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 09/12/2022    Gastroesophageal reflux disease without esophagitis 9/17/2023    H/O: CVA (cerebrovascular accident) 07/30/2019    Hemiparesis affecting left side as late effect of cerebrovascular accident (CVA) 06/05/2017    History of TIA (transient ischemic attack) 09/17/2023    Hypertension     KRISTEN (iron deficiency anemia) 01/12/2018    Insomnia     Irregular heart rhythm     Mitral valve regurgitation 10/13/2021    Other specified hypothyroidism 06/24/2019    Pacemaker 01/02/2023    Primary hypertension 02/09/2016    Proteinuria due to type 2 diabetes mellitus 03/12/2019    Senile purpura 12/21/2022    Sick sinus syndrome 2018    s/p pacemaker placement    Stenosis of left carotid artery 12/06/2016    TIA (transient ischemic attack) 03/2017    Type 2 diabetes mellitus with stage 4 chronic kidney disease, with long-term current use of insulin      Review of Systems   Constitutional:  Negative for chills, fever and weight loss.   HENT:  Negative for ear pain and sore throat.    Respiratory:  Positive for cough. Negative for hemoptysis, shortness of breath and wheezing.    Cardiovascular:  Negative for  chest pain.   Gastrointestinal:  Negative for heartburn.   Musculoskeletal:  Negative for myalgias.   Skin:  Negative for rash.   Neurological:  Negative for headaches.   Endo/Heme/Allergies:  Negative for environmental allergies.     Answers submitted by the patient for this visit:  Cough Questionnaire (Submitted on 8/2/2024)  Chief Complaint: Cough  Chronicity: recurrent  Onset: in the past 7 days  Progression since onset: waxing and waning  Frequency: every few minutes  Cough characteristics: non-productive  ear congestion: No  nasal congestion: Yes  postnasal drip: Yes  rhinorrhea: Yes  sweats: No  Aggravated by: lying down  asthma: No  bronchiectasis: No  bronchitis: No  COPD: No  emphysema: No  pneumonia: No  Improvement on treatment: moderate    Physical Exam:  Constitutional:       General: No acute distress.  HENT:      Head: Normocephalic and atraumatic.   Pulmonary:      Effort: Pulmonary effort is normal. No respiratory distress.   Neurological:      General: No focal deficit present.      Mental Status: Alert and oriented to person, place, and time. Mental status is at baseline.     Assessment and Plan:   See above    Allison Woods MD  [facility]  Office: [phone]   Fax: [phone]  08/02/2024       Disclaimer: This note was partly generated using dictation software which may occasionally result in transcription errors.

## 2024-08-03 ENCOUNTER — NURSE TRIAGE (OUTPATIENT)
Dept: ADMINISTRATIVE | Facility: CLINIC | Age: 89
End: 2024-08-03
Payer: MEDICARE

## 2024-08-03 NOTE — TELEPHONE ENCOUNTER
Pt called for response to text message about covid symptoms and her daughter answered and she said that she responded for herself. I will close out her chart              Reason for Disposition   Message left with person in household.    Protocols used: No Contact or Duplicate Contact Call-A-AH

## 2024-08-05 ENCOUNTER — PATIENT MESSAGE (OUTPATIENT)
Dept: FAMILY MEDICINE | Facility: CLINIC | Age: 89
End: 2024-08-05
Payer: MEDICARE

## 2024-08-05 DIAGNOSIS — N30.00 ACUTE CYSTITIS WITHOUT HEMATURIA: Primary | ICD-10-CM

## 2024-08-05 LAB — BACTERIA UR CULT: ABNORMAL

## 2024-08-05 RX ORDER — SULFAMETHOXAZOLE AND TRIMETHOPRIM 400; 80 MG/1; MG/1
1 TABLET ORAL DAILY
Qty: 3 TABLET | Refills: 0 | Status: SHIPPED | OUTPATIENT
Start: 2024-08-05 | End: 2024-08-08

## 2024-08-06 ENCOUNTER — LAB VISIT (OUTPATIENT)
Dept: LAB | Facility: HOSPITAL | Age: 89
End: 2024-08-06
Attending: INTERNAL MEDICINE
Payer: MEDICARE

## 2024-08-06 DIAGNOSIS — N18.4 CKD (CHRONIC KIDNEY DISEASE) STAGE 4, GFR 15-29 ML/MIN: ICD-10-CM

## 2024-08-06 LAB
ALBUMIN SERPL BCP-MCNC: 3.2 G/DL (ref 3.5–5.2)
ANION GAP SERPL CALC-SCNC: 13 MMOL/L (ref 8–16)
BUN SERPL-MCNC: 51 MG/DL (ref 8–23)
CALCIUM SERPL-MCNC: 9.9 MG/DL (ref 8.7–10.5)
CHLORIDE SERPL-SCNC: 101 MMOL/L (ref 95–110)
CO2 SERPL-SCNC: 25 MMOL/L (ref 23–29)
CREAT SERPL-MCNC: 2.8 MG/DL (ref 0.5–1.4)
CREAT UR-MCNC: 48 MG/DL (ref 15–325)
EST. GFR  (NO RACE VARIABLE): 15.7 ML/MIN/1.73 M^2
GLUCOSE SERPL-MCNC: 99 MG/DL (ref 70–110)
PHOSPHATE SERPL-MCNC: 3.4 MG/DL (ref 2.7–4.5)
POTASSIUM SERPL-SCNC: 4.2 MMOL/L (ref 3.5–5.1)
PROT UR-MCNC: 12 MG/DL (ref 0–15)
PROT/CREAT UR: 0.25 MG/G{CREAT} (ref 0–0.2)
PTH-INTACT SERPL-MCNC: 30.9 PG/ML (ref 9–77)
SODIUM SERPL-SCNC: 139 MMOL/L (ref 136–145)

## 2024-08-06 PROCEDURE — 80069 RENAL FUNCTION PANEL: CPT | Performed by: INTERNAL MEDICINE

## 2024-08-06 PROCEDURE — 82570 ASSAY OF URINE CREATININE: CPT | Performed by: INTERNAL MEDICINE

## 2024-08-06 PROCEDURE — 36415 COLL VENOUS BLD VENIPUNCTURE: CPT | Mod: PN | Performed by: INTERNAL MEDICINE

## 2024-08-06 PROCEDURE — 83970 ASSAY OF PARATHORMONE: CPT | Performed by: INTERNAL MEDICINE

## 2024-08-13 ENCOUNTER — PATIENT MESSAGE (OUTPATIENT)
Dept: FAMILY MEDICINE | Facility: CLINIC | Age: 89
End: 2024-08-13
Payer: MEDICARE

## 2024-08-13 ENCOUNTER — OFFICE VISIT (OUTPATIENT)
Dept: FAMILY MEDICINE | Facility: CLINIC | Age: 89
End: 2024-08-13
Payer: MEDICARE

## 2024-08-13 ENCOUNTER — LAB VISIT (OUTPATIENT)
Dept: LAB | Facility: HOSPITAL | Age: 89
End: 2024-08-13
Attending: STUDENT IN AN ORGANIZED HEALTH CARE EDUCATION/TRAINING PROGRAM
Payer: MEDICARE

## 2024-08-13 VITALS
BODY MASS INDEX: 28.2 KG/M2 | WEIGHT: 153.25 LBS | HEIGHT: 62 IN | SYSTOLIC BLOOD PRESSURE: 134 MMHG | DIASTOLIC BLOOD PRESSURE: 64 MMHG

## 2024-08-13 DIAGNOSIS — E03.8 OTHER SPECIFIED HYPOTHYROIDISM: Chronic | ICD-10-CM

## 2024-08-13 DIAGNOSIS — N18.4 TYPE 2 DIABETES MELLITUS WITH STAGE 4 CHRONIC KIDNEY DISEASE, WITH LONG-TERM CURRENT USE OF INSULIN: Chronic | ICD-10-CM

## 2024-08-13 DIAGNOSIS — E11.22 TYPE 2 DIABETES MELLITUS WITH STAGE 4 CHRONIC KIDNEY DISEASE, WITH LONG-TERM CURRENT USE OF INSULIN: Primary | Chronic | ICD-10-CM

## 2024-08-13 DIAGNOSIS — E11.22 TYPE 2 DIABETES MELLITUS WITH STAGE 4 CHRONIC KIDNEY DISEASE, WITH LONG-TERM CURRENT USE OF INSULIN: Chronic | ICD-10-CM

## 2024-08-13 DIAGNOSIS — N18.4 TYPE 2 DIABETES MELLITUS WITH STAGE 4 CHRONIC KIDNEY DISEASE, WITH LONG-TERM CURRENT USE OF INSULIN: Primary | Chronic | ICD-10-CM

## 2024-08-13 DIAGNOSIS — Z79.4 TYPE 2 DIABETES MELLITUS WITH STAGE 4 CHRONIC KIDNEY DISEASE, WITH LONG-TERM CURRENT USE OF INSULIN: Chronic | ICD-10-CM

## 2024-08-13 DIAGNOSIS — Z79.4 TYPE 2 DIABETES MELLITUS WITH STAGE 4 CHRONIC KIDNEY DISEASE, WITH LONG-TERM CURRENT USE OF INSULIN: Primary | Chronic | ICD-10-CM

## 2024-08-13 LAB
ESTIMATED AVG GLUCOSE: 134 MG/DL (ref 68–131)
HBA1C MFR BLD: 6.3 % (ref 4–5.6)
TSH SERPL DL<=0.005 MIU/L-ACNC: 2.91 UIU/ML (ref 0.4–4)

## 2024-08-13 PROCEDURE — 1126F AMNT PAIN NOTED NONE PRSNT: CPT | Mod: CPTII,S$GLB,, | Performed by: STUDENT IN AN ORGANIZED HEALTH CARE EDUCATION/TRAINING PROGRAM

## 2024-08-13 PROCEDURE — 83036 HEMOGLOBIN GLYCOSYLATED A1C: CPT | Performed by: STUDENT IN AN ORGANIZED HEALTH CARE EDUCATION/TRAINING PROGRAM

## 2024-08-13 PROCEDURE — 99214 OFFICE O/P EST MOD 30 MIN: CPT | Mod: S$GLB,,, | Performed by: STUDENT IN AN ORGANIZED HEALTH CARE EDUCATION/TRAINING PROGRAM

## 2024-08-13 PROCEDURE — 1159F MED LIST DOCD IN RCRD: CPT | Mod: CPTII,S$GLB,, | Performed by: STUDENT IN AN ORGANIZED HEALTH CARE EDUCATION/TRAINING PROGRAM

## 2024-08-13 PROCEDURE — 36415 COLL VENOUS BLD VENIPUNCTURE: CPT | Mod: PN | Performed by: STUDENT IN AN ORGANIZED HEALTH CARE EDUCATION/TRAINING PROGRAM

## 2024-08-13 PROCEDURE — 1101F PT FALLS ASSESS-DOCD LE1/YR: CPT | Mod: CPTII,S$GLB,, | Performed by: STUDENT IN AN ORGANIZED HEALTH CARE EDUCATION/TRAINING PROGRAM

## 2024-08-13 PROCEDURE — 99999 PR PBB SHADOW E&M-EST. PATIENT-LVL IV: CPT | Mod: PBBFAC,,, | Performed by: STUDENT IN AN ORGANIZED HEALTH CARE EDUCATION/TRAINING PROGRAM

## 2024-08-13 PROCEDURE — 1157F ADVNC CARE PLAN IN RCRD: CPT | Mod: CPTII,S$GLB,, | Performed by: STUDENT IN AN ORGANIZED HEALTH CARE EDUCATION/TRAINING PROGRAM

## 2024-08-13 PROCEDURE — 3288F FALL RISK ASSESSMENT DOCD: CPT | Mod: CPTII,S$GLB,, | Performed by: STUDENT IN AN ORGANIZED HEALTH CARE EDUCATION/TRAINING PROGRAM

## 2024-08-13 PROCEDURE — 84443 ASSAY THYROID STIM HORMONE: CPT | Performed by: STUDENT IN AN ORGANIZED HEALTH CARE EDUCATION/TRAINING PROGRAM

## 2024-08-13 NOTE — PROGRESS NOTES
Plan:      Denice was seen today for follow-up.    Diagnoses and all orders for this visit:    Type 2 diabetes mellitus with stage 4 chronic kidney disease, with long-term current use of insulin  -     HEMOGLOBIN A1C; Future  -     Cancel: Microalbumin/Creatinine Ratio, Urine; Future    Other specified hypothyroidism  -     TSH; Future      Follow up in about 4 weeks (around 9/10/2024), or if symptoms worsen or fail to improve.    Allison Woods MD  08/16/2024    Subjective:      Patient ID: Denice Sheth is a 89 y.o. female    Chief Complaint   Patient presents with    Follow-up     6 mo f/u     HPI  89 y.o. 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:   - ASA 81 mg daily, Zocor 10 mg qhs     Dementia:   - Rivastigmine tartrate 1.5 mg BID, memantine 10 mg daily  -------------------  **Follows w/ Cardiology ([facility])     HTN:   - Lopressor 50 mg daily BID     Hypertriglyceridemia:   - Lipid panel wnl (12/4/23)  - Zocor 10 mg qhs     CAD:   - ASA 81 mg daily, Plavix 75 mg daily, Zocor 10 mg qhs     Carotid artery stenosis, left:   - ASA 81 mg daily, Plavix 75 mg daily, Zocor 10 mg qhs     HFpEF:   - Lasix 40 mg daily      TTE (6/2/23, outside reports, see scanned media):  - Mild concentric LVH  - EF 55-60%  - Pseudo-normal LV filling pattern, consistent with elevated LA pressure  - Left atrium is markedy dilated   - mild aortic valve sclerosis without stenosis  - mild-moderate aortic regurgitation  - Mild mitral annular calcification  mild mitral regurgitation     Afib:   - Lopressor 50 mg daily BID, digoxin 125 mg every other day, amiodarone 200 mg daily  - Eliquis 2.5 mg BID  -------------------  CKD stage 4:  - BUN/Cr 55/2.6, eGFR  17.1 (12/4/23)       GERD:   - Pepcid 20 mg BID     Hypothyroidism:   - TSH 7.103, free T4 wnl (12/4/23)   - Synthroid 100 mcg daily     Insomnia:   - Trazodone PRN    ROS  Constitutional:  Negative for chills and fever.   Respiratory:  Negative for shortness of breath.    Cardiovascular:  Negative for chest pain.   Gastrointestinal:  Negative for abdominal pain, constipation, diarrhea, nausea and vomiting.     Current Outpatient Medications   Medication Instructions    amiodarone (PACERONE) 200 mg, Oral, Daily    apixaban (ELIQUIS) 2.5 mg, Oral, 2 times daily    ascorbic acid (vitamin C) (VITAMIN C) 500 mg, Oral, Daily    b complex vitamins capsule 1 capsule, Oral, Daily    BINAXNOW COVID-19 AG SELF TEST Kit Use as Directed on the Package    blood sugar diagnostic Strp 1 strip 2 times daily, to use with insurance preferred meter    blood-glucose meter kit To check BG 2 times daily, to use with insurance preferred meter    blood-glucose meter,continuous (DEXCOM ) Misc 1 each, Misc.(Non-Drug; Combo Route), Daily    blood-glucose transmitter (DEXCOM G5 TRANSMITTER) Petty 1 each, Misc.(Non-Drug; Combo Route), Daily    cholecalciferol (vitamin D3) 5,000 Units, Oral, Daily    clopidogrel (PLAVIX) 75 mg tablet TAKE ONE TABLET BY MOUTH ONCE DAILY    DEXCOM G7 SENSOR Petty USE TO CHECK BLOOD GLUCOSE AS DIRECTED. REPLACE EVERY 10 DAYS    diclofenac sodium (VOLTAREN ARTHRITIS PAIN) 2 g, Topical (Top), 2 times daily PRN    digoxin (LANOXIN) 125 mcg tablet 1 tablet, Oral, Every other day    diphenoxylate-atropine 2.5-0.025 mg (LOMOTIL) 2.5-0.025 mg per tablet 1 tablet, Oral, 4 times daily PRN    famotidine (PEPCID) 20 mg, Oral, 2 times daily    fish oil-omega-3 fatty acids 300-1,000 mg capsule 1 capsule, Oral, 2 times daily    furosemide (LASIX) 40 MG tablet TAKE 1 TABLET BY MOUTH ONCE DAILY (TAKE  AN  EXTRA  TABLET  FOR  ANY  WEIGHT  GAIN  OF  3  POUNDS  OR  MORE  ON  ANY  GIVEN  DAY)    insulin lispro 100 unit/mL pen  INJECT 16 UNITS THREE TIMES DAILY WITH MEALS    lancets 31 gauge Misc 1 strip 2 times daily, to use with insurance preferred meter    LANTUS SOLOSTAR U-100 INSULIN 28 Units, Subcutaneous, Nightly    levothyroxine (SYNTHROID) 100 mcg, Oral, Before breakfast    magnesium 200 mg, Oral, Daily    melatonin 10 mg Tab 1 tablet, Oral, Nightly PRN    memantine (NAMENDA) 10 MG Tab TAKE 1 TABLET BY MOUTH ONCE DAILY IN THE EVENING    metoprolol tartrate (LOPRESSOR) 50 mg, Oral, 2 times daily    multivitamin (THERAGRAN) per tablet 1 tablet, Daily    mupirocin (BACTROBAN) 2 % ointment Topical (Top), 3 times daily    ondansetron (ZOFRAN-ODT) 4 mg, Oral, Every 8 hours PRN    pen needle, diabetic (COMFORT EZ PEN NEEDLES) 1 Units, Misc.(Non-Drug; Combo Route), 3 times daily    polyethylene glycol (GLYCOLAX) 17 g, Oral, As needed (PRN)    potassium chloride SA (K-DUR,KLOR-CON M) 10 MEQ tablet 10 mEq, Oral, 2 times daily    rivastigmine tartrate (EXELON) 1.5 mg, Oral, 2 times daily    simvastatin (ZOCOR) 10 mg, Oral, Nightly      Past Medical History:   Diagnosis Date    Anticoagulant long-term use     Aortic calcification 01/02/2022    CXR 1/2/22    Aortic valve regurgitation 10/13/2021    CAD (coronary artery disease) 01/06/2022    Cecum mass     Chronic atrial fibrillation 05/02/2017    Chronic diastolic heart failure     CKD (chronic kidney disease) stage 4, GFR 15-29 ml/min 02/22/2016    Dementia 08/31/2022    Diabetic retinopathy 03/24/2021    Disorder of kidney and ureter     follows w/ Dr. 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 09/12/2022    Gastroesophageal reflux disease without esophagitis 9/17/2023    H/O: CVA (cerebrovascular accident) 07/30/2019    Hemiparesis affecting left side as late effect of cerebrovascular accident (CVA) 06/05/2017    History of TIA (transient ischemic attack) 09/17/2023    Hypertension      "KRISTEN (iron deficiency anemia) 01/12/2018    Insomnia     Irregular heart rhythm     Mitral valve regurgitation 10/13/2021    Other specified hypothyroidism 06/24/2019    Pacemaker 01/02/2023    Primary hypertension 02/09/2016    Proteinuria due to type 2 diabetes mellitus 03/12/2019    Senile purpura 12/21/2022    Sick sinus syndrome 2018    s/p pacemaker placement    Stenosis of left carotid artery 12/06/2016    TIA (transient ischemic attack) 03/2017    Type 2 diabetes mellitus with stage 4 chronic kidney disease, with long-term current use of insulin         Objective:      Vitals:    08/13/24 0836   BP: 134/64   Weight: 69.5 kg (153 lb 3.5 oz)   Height: 5' 2" (1.575 m)     Body mass index is 28.02 kg/m².    Physical Exam   Constitutional:       General: No acute distress.  HENT:      Head: Normocephalic and atraumatic.   Pulmonary:      Effort: Pulmonary effort is normal. No respiratory distress.   Neurological:      General: No focal deficit present.      Mental Status: Alert and oriented to person, place, and time. Mental status is at baseline.    Assessment:       1. Type 2 diabetes mellitus with stage 4 chronic kidney disease, with long-term current use of insulin    2. Other specified hypothyroidism        Allison Woods MD  [facility]  Office: [phone]   Fax: [phone]  08/16/2024      Disclaimer: This note was partly generated using dictation software which may occasionally result in transcription errors.    Total time spent on this encounter includes face to face time and non-face to face time preparing to see the patient (eg, review of tests), obtaining and/or reviewing separately obtained history, documenting clinical information in the electronic or other health record, independently interpreting results, and communicating results to the patient/family/caregiver, or care coordinator.    "

## 2024-08-15 ENCOUNTER — LAB VISIT (OUTPATIENT)
Dept: LAB | Facility: HOSPITAL | Age: 89
End: 2024-08-15
Attending: INTERNAL MEDICINE
Payer: MEDICARE

## 2024-08-15 DIAGNOSIS — N18.4 CKD (CHRONIC KIDNEY DISEASE) STAGE 4, GFR 15-29 ML/MIN: ICD-10-CM

## 2024-08-15 LAB
CREAT UR-MCNC: 96 MG/DL (ref 15–325)
PROT UR-MCNC: 19 MG/DL (ref 0–15)
PROT/CREAT UR: 0.2 MG/G{CREAT} (ref 0–0.2)

## 2024-08-15 PROCEDURE — 84156 ASSAY OF PROTEIN URINE: CPT | Performed by: INTERNAL MEDICINE

## 2024-08-16 ENCOUNTER — PATIENT MESSAGE (OUTPATIENT)
Dept: FAMILY MEDICINE | Facility: CLINIC | Age: 89
End: 2024-08-16
Payer: MEDICARE

## 2024-08-16 ENCOUNTER — OFFICE VISIT (OUTPATIENT)
Dept: NEPHROLOGY | Facility: CLINIC | Age: 89
End: 2024-08-16
Payer: MEDICARE

## 2024-08-16 VITALS
BODY MASS INDEX: 28.02 KG/M2 | HEART RATE: 68 BPM | SYSTOLIC BLOOD PRESSURE: 128 MMHG | DIASTOLIC BLOOD PRESSURE: 54 MMHG | HEIGHT: 62 IN

## 2024-08-16 DIAGNOSIS — M25.561 CHRONIC PAIN OF BOTH KNEES: Primary | ICD-10-CM

## 2024-08-16 DIAGNOSIS — N18.4 CKD (CHRONIC KIDNEY DISEASE) STAGE 4, GFR 15-29 ML/MIN: Primary | ICD-10-CM

## 2024-08-16 DIAGNOSIS — E11.22 TYPE 2 DIABETES MELLITUS WITH STAGE 4 CHRONIC KIDNEY DISEASE, WITH LONG-TERM CURRENT USE OF INSULIN: ICD-10-CM

## 2024-08-16 DIAGNOSIS — M25.562 CHRONIC PAIN OF BOTH KNEES: Primary | ICD-10-CM

## 2024-08-16 DIAGNOSIS — I10 PRIMARY HYPERTENSION: ICD-10-CM

## 2024-08-16 DIAGNOSIS — N18.4 TYPE 2 DIABETES MELLITUS WITH STAGE 4 CHRONIC KIDNEY DISEASE, WITH LONG-TERM CURRENT USE OF INSULIN: ICD-10-CM

## 2024-08-16 DIAGNOSIS — R26.9 GAIT DISORDER: ICD-10-CM

## 2024-08-16 DIAGNOSIS — R80.9 PROTEINURIA DUE TO TYPE 2 DIABETES MELLITUS: ICD-10-CM

## 2024-08-16 DIAGNOSIS — G89.29 CHRONIC PAIN OF BOTH KNEES: Primary | ICD-10-CM

## 2024-08-16 DIAGNOSIS — E87.1 HYPONATREMIA: ICD-10-CM

## 2024-08-16 DIAGNOSIS — Z79.4 TYPE 2 DIABETES MELLITUS WITH STAGE 4 CHRONIC KIDNEY DISEASE, WITH LONG-TERM CURRENT USE OF INSULIN: ICD-10-CM

## 2024-08-16 DIAGNOSIS — E11.29 PROTEINURIA DUE TO TYPE 2 DIABETES MELLITUS: ICD-10-CM

## 2024-08-16 PROCEDURE — 99999 PR PBB SHADOW E&M-EST. PATIENT-LVL II: CPT | Mod: PBBFAC,,, | Performed by: INTERNAL MEDICINE

## 2024-08-16 NOTE — PROGRESS NOTES
"  Subjective:       Patient ID: Denice Sheth is a 89 y.o. White female who presents for return patient evaluation for chronic renal failure.      She has no uremic or urinary symptoms.  She had COVID in Dec 2022 and July 2024.  She also had a recent UTI and pink eye.       Review of Systems   Constitutional:  Negative for appetite change, chills and fever.   Eyes:  Negative for visual disturbance.   Respiratory:  Positive for shortness of breath (with exertion). Negative for cough.    Cardiovascular:  Negative for chest pain and leg swelling.   Gastrointestinal:  Negative for abdominal pain, diarrhea, nausea and vomiting.   Endocrine: Positive for cold intolerance.   Genitourinary:  Negative for difficulty urinating, dysuria and hematuria.   Musculoskeletal:  Positive for arthralgias (right knee) and gait problem. Negative for myalgias.   Skin:  Negative for rash.   Neurological:  Positive for weakness. Negative for headaches.   Hematological:  Bruises/bleeds easily.   Psychiatric/Behavioral:  Negative for sleep disturbance.        The past medical, family and social histories were reviewed for this encounter.     BP (!) 128/54 (BP Location: Left arm, Patient Position: Sitting, BP Method: Large (Manual))   Pulse 68   Ht 5' 2" (1.575 m)   LMP  (LMP Unknown)   BMI 28.02 kg/m²     Objective:      Physical Exam  Vitals reviewed.   Constitutional:       General: She is not in acute distress.     Appearance: She is well-developed.   HENT:      Head: Normocephalic and atraumatic.   Eyes:      General: No scleral icterus.     Conjunctiva/sclera: Conjunctivae normal.   Neck:      Vascular: No JVD.   Cardiovascular:      Rate and Rhythm: Normal rate and regular rhythm.      Heart sounds: Normal heart sounds. No murmur heard.     No friction rub. No gallop.   Pulmonary:      Effort: Pulmonary effort is normal. No respiratory distress.      Breath sounds: Normal breath sounds. No wheezing.   Abdominal:      General: " Bowel sounds are normal. There is no distension.      Palpations: Abdomen is soft.      Tenderness: There is no abdominal tenderness.   Musculoskeletal:      Cervical back: Normal range of motion.      Right lower leg: No edema.      Left lower leg: No edema.   Skin:     General: Skin is warm and dry.      Findings: No rash.   Neurological:      Mental Status: She is alert and oriented to person, place, and time.   Psychiatric:         Mood and Affect: Mood normal.         Behavior: Behavior normal.        Assessment:       1. CKD (chronic kidney disease) stage 4, GFR 15-29 ml/min    2. Hyponatremia    3. Primary hypertension    4. 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 Type 2 diabetes mellitus with stage 4 chronic kidney disease, with long-term current use of insulin       Lab Results   Component Value Date    CREATININE 2.8 (H) 08/06/2024    BUN 51 (H) 08/06/2024     08/06/2024    K 4.2 08/06/2024     08/06/2024    CO2 25 08/06/2024     Lab Results   Component Value Date    PTH 30.9 08/06/2024    CALCIUM 9.9 08/06/2024    PHOS 3.4 08/06/2024     Lab Results   Component Value Date    HCT 41.2 07/24/2023     Prot/Creat Ratio, Urine   Date Value Ref Range Status   08/15/2024 0.20 0.00 - 0.20 Final   08/06/2024 0.25 (H) 0.00 - 0.20 Final   05/07/2024 0.22 (H) 0.00 - 0.20 Final      Plan:   Return to clinic in 6 months.  Labs for next visit include rp pth upc per standing orders q 3 months.   Baseline creatinine is 1.5-1.9 since 2015. Lately she has been 2.0-2.5.  PTH is 31 with a calcium of 9.9.  Continue D3 to MWF.  UPC is negative.  Renal US shows R 9.7 cm L 10.9 cm with no cysts/stones/masses.  Blood pressure is controlled on the current regimen.  Continue current medications as prescribed and reviewed.   She does not wish to do dialysis.

## 2024-08-20 PROCEDURE — G0180 MD CERTIFICATION HHA PATIENT: HCPCS | Mod: ,,, | Performed by: STUDENT IN AN ORGANIZED HEALTH CARE EDUCATION/TRAINING PROGRAM

## 2024-09-08 DIAGNOSIS — E11.22 TYPE 2 DIABETES MELLITUS WITH STAGE 4 CHRONIC KIDNEY DISEASE, WITH LONG-TERM CURRENT USE OF INSULIN: Chronic | ICD-10-CM

## 2024-09-08 DIAGNOSIS — Z79.4 TYPE 2 DIABETES MELLITUS WITH STAGE 4 CHRONIC KIDNEY DISEASE, WITH LONG-TERM CURRENT USE OF INSULIN: Chronic | ICD-10-CM

## 2024-09-08 DIAGNOSIS — N18.4 TYPE 2 DIABETES MELLITUS WITH STAGE 4 CHRONIC KIDNEY DISEASE, WITH LONG-TERM CURRENT USE OF INSULIN: Chronic | ICD-10-CM

## 2024-09-08 NOTE — TELEPHONE ENCOUNTER
No care due was identified.  [facility] Embedded Care Due Messages. Reference number: [id number].   9/08/2024 6:40:08 PM CDT

## 2024-09-09 RX ORDER — BLOOD-GLUCOSE SENSOR
EACH MISCELLANEOUS
Qty: 9 EACH | Refills: 3 | Status: SHIPPED | OUTPATIENT
Start: 2024-09-09

## 2024-09-09 RX ORDER — INSULIN LISPRO 100 [IU]/ML
INJECTION, SOLUTION INTRAVENOUS; SUBCUTANEOUS
Qty: 45 ML | Refills: 1 | Status: SHIPPED | OUTPATIENT
Start: 2024-09-09

## 2024-09-09 NOTE — TELEPHONE ENCOUNTER
Refill Routing Note   Medication(s) are not appropriate for processing by [facility] for the following reason(s):        Required labs abnormal    [facility] action(s):  Defer  Approve             Pharmacist review requested: Yes     Appointments  past 12m or future 3m with PCP    Date Provider   Last Visit   8/13/2024 Allison Woods MD   Next Visit   Visit date not found Allison Woods MD   ED visits in past 90 days: 0        Note composed:9:20 AM 09/09/2024

## 2024-09-09 NOTE — TELEPHONE ENCOUNTER
Refill Routing Note   Medication(s) are not appropriate for processing by [facility] for the following reason(s):        Required labs abnormal: eGFR 15.7      [facility] action(s):  Defer  Approve             Pharmacist review requested: Yes     Appointments  past 12m or future 3m with PCP    Date Provider   Last Visit   8/13/2024 Allison Woods MD   Next Visit   Visit date not found Allison Woods MD   ED visits in past 90 days: 0        Note composed:11:46 AM 09/09/2024

## 2024-09-19 ENCOUNTER — PATIENT MESSAGE (OUTPATIENT)
Dept: FAMILY MEDICINE | Facility: CLINIC | Age: 89
End: 2024-09-19
Payer: MEDICARE

## 2024-09-20 ENCOUNTER — TELEPHONE (OUTPATIENT)
Dept: FAMILY MEDICINE | Facility: CLINIC | Age: 89
End: 2024-09-20
Payer: MEDICARE

## 2024-09-20 NOTE — TELEPHONE ENCOUNTER
----- Message from Teddy Dejesus sent at 9/20/2024  8:24 AM CDT -----  Contact: Pt. Portal  .Type:  Same Day Appointment Request    Caller is requesting a same day appointment.  Caller declined first available appointment listed below.    Name of Caller:pt  When is the first available appointment?  Symptoms: UTI  Best Call Back Number:[phone]  Additional Information:

## 2024-09-21 ENCOUNTER — EXTERNAL HOME HEALTH (OUTPATIENT)
Dept: HOME HEALTH SERVICES | Facility: HOSPITAL | Age: 89
End: 2024-09-21
Payer: MEDICARE

## 2024-09-23 ENCOUNTER — OFFICE VISIT (OUTPATIENT)
Dept: PODIATRY | Facility: CLINIC | Age: 89
End: 2024-09-23
Payer: MEDICARE

## 2024-09-23 VITALS — WEIGHT: 153.25 LBS | HEIGHT: 62 IN | BODY MASS INDEX: 28.2 KG/M2

## 2024-09-23 DIAGNOSIS — M20.42 HAMMER TOES OF BOTH FEET: ICD-10-CM

## 2024-09-23 DIAGNOSIS — E11.42 DIABETIC POLYNEUROPATHY ASSOCIATED WITH TYPE 2 DIABETES MELLITUS: Primary | ICD-10-CM

## 2024-09-23 DIAGNOSIS — B35.1 ONYCHOMYCOSIS DUE TO DERMATOPHYTE: ICD-10-CM

## 2024-09-23 DIAGNOSIS — M20.41 HAMMER TOES OF BOTH FEET: ICD-10-CM

## 2024-09-23 PROCEDURE — 11721 DEBRIDE NAIL 6 OR MORE: CPT | Mod: Q9,S$GLB,, | Performed by: PODIATRIST

## 2024-09-23 PROCEDURE — 99999 PR PBB SHADOW E&M-EST. PATIENT-LVL IV: CPT | Mod: PBBFAC,,, | Performed by: PODIATRIST

## 2024-09-23 PROCEDURE — 99499 UNLISTED E&M SERVICE: CPT | Mod: S$GLB,,, | Performed by: PODIATRIST

## 2024-09-23 RX ORDER — PREDNISOLONE ACETATE 10 MG/ML
1 SUSPENSION/ DROPS OPHTHALMIC 2 TIMES DAILY
COMMUNITY
Start: 2024-07-24 | End: 2024-09-25 | Stop reason: ALTCHOICE

## 2024-09-23 RX ORDER — POTASSIUM CHLORIDE 750 MG/1
10 TABLET, EXTENDED RELEASE ORAL 2 TIMES DAILY
COMMUNITY
Start: 2024-09-12 | End: 2024-09-25 | Stop reason: SDUPTHER

## 2024-09-23 NOTE — PROGRESS NOTES
Subjective:      Patient ID: Denice Sheth is a 89 y.o. female.    Chief Complaint: Nail Care    Denice is a 89 y.o. female who presents to the clinic for evaluation and treatment of diabetic feet. Denice has a past medical history of Anticoagulant long-term use, Aortic calcification (01/02/2022), Aortic valve regurgitation (10/13/2021), CAD (coronary artery disease) (01/06/2022), Cecum mass, Chronic atrial fibrillation (05/02/2017), Chronic diastolic heart failure, CKD (chronic kidney disease) stage 4, GFR 15-29 ml/min (02/22/2016), Dementia (08/31/2022), Diabetic retinopathy (03/24/2021), Disorder of kidney and ureter, Encounter for blood transfusion, Exudative age-related macular degeneration of right eye with active choroidal neovascularization (09/12/2022), Gastroesophageal reflux disease without esophagitis (9/17/2023), H/O: CVA (cerebrovascular accident) (07/30/2019), Hemiparesis affecting left side as late effect of cerebrovascular accident (CVA) (06/05/2017), History of TIA (transient ischemic attack) (09/17/2023), Hypertension, KRISTEN (iron deficiency anemia) (01/12/2018), Insomnia, Irregular heart rhythm, Mitral valve regurgitation (10/13/2021), Other specified hypothyroidism (06/24/2019), Pacemaker (01/02/2023), Primary hypertension (02/09/2016), Proteinuria due to type 2 diabetes mellitus (03/12/2019), Senile purpura (12/21/2022), Sick sinus syndrome (2018), Stenosis of left carotid artery (12/06/2016), TIA (transient ischemic attack) (03/2017), and Type 2 diabetes mellitus with stage 4 chronic kidney disease, with long-term current use of insulin. Patient relates needing her toenails trimmed.  Denies this being a source of pain.  Has not attempted to self trim.  Continues with good control over her blood glucose.  Denies any additional pedal complaints.     PCP: Allison Woods MD    Date Last Seen by PCP: 8/24    Hemoglobin A1C   Date Value Ref Range Status   08/13/2024 6.3 (H) 4.0 - 5.6 % Final      Comment:     ADA Screening Guidelines:  5.7-6.4%  Consistent with prediabetes  >or=6.5%  Consistent with diabetes    High levels of fetal hemoglobin interfere with the HbA1C  assay. Heterozygous hemoglobin variants (HbS, HgC, etc)do  not significantly interfere with this assay.   However, presence of multiple variants may affect accuracy.     12/04/2023 6.4 (H) 4.0 - 5.6 % Final     Comment:     ADA Screening Guidelines:  5.7-6.4%  Consistent with prediabetes  >or=6.5%  Consistent with diabetes    High levels of fetal hemoglobin interfere with the HbA1C  assay. Heterozygous hemoglobin variants (HbS, HgC, etc)do  not significantly interfere with this assay.   However, presence of multiple variants may affect accuracy.     07/24/2023 7.2 (H) 4.0 - 5.6 % Final     Comment:     ADA Screening Guidelines:  5.7-6.4%  Consistent with prediabetes  >or=6.5%  Consistent with diabetes    High levels of fetal hemoglobin interfere with the HbA1C  assay. Heterozygous hemoglobin variants (HbS, HgC, etc)do  not significantly interfere with this assay.   However, presence of multiple variants may affect accuracy.           Past Medical History:   Diagnosis Date    Anticoagulant long-term use     Aortic calcification 01/02/2022    CXR 1/2/22    Aortic valve regurgitation 10/13/2021    CAD (coronary artery disease) 01/06/2022    Cecum mass     Chronic atrial fibrillation 05/02/2017    Chronic diastolic heart failure     CKD (chronic kidney disease) stage 4, GFR 15-29 ml/min 02/22/2016    Dementia 08/31/2022    Diabetic retinopathy 03/24/2021    Disorder of kidney and ureter     follows w/ Dr. 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 09/12/2022    Gastroesophageal reflux disease without esophagitis 9/17/2023    H/O: CVA (cerebrovascular accident) 07/30/2019    Hemiparesis affecting left side as late effect of cerebrovascular  accident (CVA) 06/05/2017    History of TIA (transient ischemic attack) 09/17/2023    Hypertension     KRISTEN (iron deficiency anemia) 01/12/2018    Insomnia     Irregular heart rhythm     Mitral valve regurgitation 10/13/2021    Other specified hypothyroidism 06/24/2019    Pacemaker 01/02/2023    Primary hypertension 02/09/2016    Proteinuria due to type 2 diabetes mellitus 03/12/2019    Senile purpura 12/21/2022    Sick sinus syndrome 2018    s/p pacemaker placement    Stenosis of left carotid artery 12/06/2016    TIA (transient ischemic attack) 03/2017    Type 2 diabetes mellitus with stage 4 chronic kidney disease, with long-term current use of insulin        Past Surgical History:   Procedure Laterality Date    ADENOIDECTOMY  8/18    BILATERAL SALPINGO-OOPHORECTOMY (BSO) Bilateral 08/19/2019    Procedure: SALPINGO-OOPHORECTOMY, BILATERAL;  Surgeon: Blayne Wallace MD;  Location: [facility] OR;  Service: OB/GYN;  Laterality: Bilateral;    COLON SURGERY  8/18    HYSTERECTOMY      partial, benign causes by reported hx    INSERTION OF PACEMAKER  08/2017    LEFT HEART CATHETERIZATION Left 01/06/2022    Procedure: Left heart cath;  Surgeon: Bhaskar Hanson III, MD;  Location: [facility] CATH;  Service: Cardiology;  Laterality: Left;  Right radial    ROBOT-ASSISTED LAPAROSCOPIC COLECTOMY USING DA MICHELE XI Right 08/19/2019    Procedure: XI ROBOTIC COLECTOMY;  Surgeon: Kourtney Dodson MD;  Location: [facility] OR;  Service: General;  Laterality: Right;       Family History   Problem Relation Name Age of Onset    Diabetes Mother Blake     Heart disease Mother Blake     Cancer Father Julian         type unknown (nonsmoker, no etoh)    Heart disease Brother Julian     Dementia Sister      Cancer Daughter Luz         fallopian ca    Cancer Son Lukas         esophageal ca    Dementia Sister      Dementia Sister         Social History     Socioeconomic History    Marital status:    Tobacco Use    Smoking status: Never     Smokeless tobacco: Never   Substance and Sexual Activity    Alcohol use: No    Drug use: No    Sexual activity: Never     Birth control/protection: Post-menopausal     Social Determinants of Health     Financial Resource Strain: Low Risk  (2/27/2024)    Overall Financial Resource Strain (CARDIA)     Difficulty of Paying Living Expenses: Not hard at all   Food Insecurity: No Food Insecurity (2/27/2024)    Hunger Vital Sign     Worried About Running Out of Food in the Last Year: Never true     Ran Out of Food in the Last Year: Never true   Transportation Needs: No Transportation Needs (2/27/2024)    PRAPARE - Transportation     Lack of Transportation (Medical): No     Lack of Transportation (Non-Medical): No   Physical Activity: Inactive (2/27/2024)    Exercise Vital Sign     Days of Exercise per Week: 0 days     Minutes of Exercise per Session: 0 min   Stress: No Stress Concern Present (2/27/2024)    [employer] - Occupational Stress Questionnaire     Feeling of Stress : Not at all   Housing Stability: Unknown (2/27/2024)    Housing Stability Vital Sign     Unable to Pay for Housing in the Last Year: No     Unstable Housing in the Last Year: No       Current Outpatient Medications   Medication Sig Dispense Refill    amiodarone (PACERONE) 200 MG Tab Take 200 mg by mouth once daily.       ascorbic acid (VITAMIN C) 500 MG tablet Take 500 mg by mouth once daily.      b complex vitamins capsule Take 1 capsule by mouth once daily.      blood sugar diagnostic Strp 1 strip 2 times daily, to use with insurance preferred meter 100 each 2    blood-glucose meter,continuous (DEXCOM ) Misc 1 each by Misc.(Non-Drug; Combo Route) route once daily. 1 each 0    blood-glucose sensor (DEXCOM G7 SENSOR) Petty USE TO CHECK BLOOD GLUCOSE AS DIRECTED. CHANGE SENSOR EVERY 10 DAYS 9 each 3    blood-glucose transmitter (DEXCOM G5 TRANSMITTER) Petty 1 each by Misc.(Non-Drug; Combo Route) route Daily. 1 each 0  "   cholecalciferol, vitamin D3, 125 mcg (5,000 unit) Tab Take 1 tablet (5,000 Units total) by mouth once daily.      clopidogrel (PLAVIX) 75 mg tablet TAKE ONE TABLET BY MOUTH ONCE DAILY 30 tablet 11    diclofenac sodium (VOLTAREN ARTHRITIS PAIN) 1 % Gel Apply 2 g topically 2 (two) times daily as needed (muscle pain). 100 g 1    digoxin (LANOXIN) 125 mcg tablet Take 1 tablet by mouth every other day.      diphenoxylate-atropine 2.5-0.025 mg (LOMOTIL) 2.5-0.025 mg per tablet Take 1 tablet by mouth 4 (four) times daily as needed for Diarrhea. 30 tablet 1    fish oil-omega-3 fatty acids 300-1,000 mg capsule Take 1 capsule by mouth 2 (two) times a day.      furosemide (LASIX) 40 MG tablet TAKE 1 TABLET BY MOUTH ONCE DAILY (TAKE  AN  EXTRA  TABLET  FOR  ANY  WEIGHT  GAIN  OF  3  POUNDS  OR  MORE  ON  ANY  GIVEN  DAY) 90 tablet 2    insulin (LANTUS SOLOSTAR U-100 INSULIN) glargine 100 units/mL SubQ pen Inject 28 Units into the skin every evening. 5 each 3    insulin lispro 100 unit/mL pen INJECT 16 UNITS THREE TIMES DAILY WITH MEALS 45 mL 1    lancets 31 gauge Misc 1 strip 2 times daily, to use with insurance preferred meter 100 each 2    levothyroxine (SYNTHROID) 100 MCG tablet Take 1 tablet (100 mcg total) by mouth before breakfast. 30 tablet 11    magnesium 200 mg Tab Take 200 mg by mouth once daily.      melatonin 10 mg Tab Take 1 tablet by mouth nightly as needed.      memantine (NAMENDA) 10 MG Tab TAKE 1 TABLET BY MOUTH ONCE DAILY IN THE EVENING 90 tablet 0    multivitamin (THERAGRAN) per tablet Take 1 tablet by mouth once daily.      mupirocin (BACTROBAN) 2 % ointment Apply topically 3 (three) times daily. 30 g 0    ondansetron (ZOFRAN-ODT) 4 MG TbDL Take 1 tablet (4 mg total) by mouth every 8 (eight) hours as needed (Nausea). 20 tablet 0    pen needle, diabetic (COMFORT EZ PEN NEEDLES) 29 gauge x 1/2" Ndle 1 Units by Misc.(Non-Drug; Combo Route) route 3 (three) times daily. 300 each 11    polyethylene glycol " (GLYCOLAX) 17 gram PwPk Take 17 g by mouth as needed (constipation).      potassium chloride (KLOR-CON) 10 MEQ TbSR Take 10 mEq by mouth 2 (two) times daily.      potassium chloride SA (K-DUR,KLOR-CON M) 10 MEQ tablet Take 1 tablet (10 mEq total) by mouth 2 (two) times daily. 180 tablet 3    prednisoLONE acetate (PRED FORTE) 1 % DrpS Place 1 drop into the left eye 2 (two) times daily.      rivastigmine tartrate (EXELON) 1.5 MG capsule Take 1 capsule by mouth twice daily 90 capsule 3    simvastatin (ZOCOR) 10 MG tablet Take 10 mg by mouth every evening.      blood-glucose meter kit To check BG 2 times daily, to use with insurance preferred meter 1 each 0    famotidine (PEPCID) 20 MG tablet Take 1 tablet (20 mg total) by mouth 2 (two) times daily. for 10 days (Patient not taking: Reported on 8/16/2024) 20 tablet 0    metoprolol tartrate (LOPRESSOR) 50 MG tablet Take 1 tablet (50 mg total) by mouth 2 (two) times daily. 60 tablet 1     No current facility-administered medications for this visit.       Review of patient's allergies indicates:   Allergen Reactions    Adhesive Itching    Mucinex [guaifenesin]      Says caused CHF     Latex, natural rubber Rash         Review of Systems   Constitutional: Negative for chills and fever.   Cardiovascular:  Negative for claudication and leg swelling.   Skin:  Negative for color change, nail changes and poor wound healing.   Musculoskeletal:  Negative for joint pain, joint swelling, muscle cramps and muscle weakness.   Gastrointestinal:  Negative for nausea and vomiting.   Neurological:  Positive for numbness.   Psychiatric/Behavioral:  Negative for altered mental status.            Objective:      Physical Exam  Constitutional:       Appearance: Normal appearance. She is not ill-appearing.   Cardiovascular:      Pulses:           Dorsalis pedis pulses are 2+ on the right side and 2+ on the left side.        Posterior tibial pulses are 2+ on the right side and 2+ on the left  side.      Comments: CFT is < 3 seconds bilateral.  Pedal hair growth is decreased bilateral.  No lower extremity edema noted bilateral.  Toes are warm to touch bilateral.    Musculoskeletal:         General: Deformity present. No tenderness.      Right lower leg: No edema.      Left lower leg: No edema.      Comments: Muscle strength 5/5 in all muscle groups bilateral.  No tenderness nor crepitation with ROM of foot/ankle joints bilateral.  (-) for pain with palpation of bilateral foot and ankle. Bilateral semi-reducible contracture of toes 2-5.  Bilateral HAV.   Skin:     General: Skin is warm and dry.      Capillary Refill: Capillary refill takes 2 to 3 seconds.      Findings: No bruising, ecchymosis, erythema, signs of injury, laceration, lesion, petechiae, rash or wound.      Comments: Pedal skin appears thin bilateral. Toenails x 10 appear thickened by 2 mm, elongated by 6 mm, and discolored with subungual debris.            Neurological:      Mental Status: She is alert.      Sensory: Sensory deficit present.      Motor: No weakness or atrophy.      Comments: Decreased protective sensation noted bilateral.  Light touch is intact bilateral.                 Assessment:       Encounter Diagnoses   Name Primary?    Diabetic polyneuropathy associated with type 2 diabetes mellitus Yes    Hammer toes of both feet     Onychomycosis due to dermatophyte          Plan:       Denice was seen today for nail care.    Diagnoses and all orders for this visit:    Diabetic polyneuropathy associated with type 2 diabetes mellitus    Hammer toes of both feet    Onychomycosis due to dermatophyte      I counseled the patient on her conditions, their implications and medical management.    Advised to continue wearing supportive shoe gear that limits pressure to the digits.      Recommend continued use of a corn pad to offload the Rt. Dorsal 2nd toe.    Shoe inspection. Diabetic Foot Education. Patient reminded of the importance of  good nutrition and blood sugar control to help prevent podiatric complications of diabetes. Patient instructed on proper foot hygeine. We discussed wearing proper shoe gear, daily foot inspections, never walking without protective shoe gear, never putting sharp instruments to feet    With patient's permission, nails were aggressively reduced and debrided x 10 to their soft tissue attachment mechanically and with electric , removing all offending nail and debris. Patient relates relief following the procedure. She will continue to monitor the areas daily, inspect her feet, wear protective shoe gear when ambulatory, moisturizer to maintain skin integrity and follow in this office in approximately 3 months, sooner p.r.n.    RTC in 3 months for routine nail care.    Pankaj Julian DPM

## 2024-09-25 ENCOUNTER — OFFICE VISIT (OUTPATIENT)
Dept: FAMILY MEDICINE | Facility: CLINIC | Age: 89
End: 2024-09-25
Payer: MEDICARE

## 2024-09-25 VITALS
HEIGHT: 62 IN | WEIGHT: 150.81 LBS | OXYGEN SATURATION: 98 % | SYSTOLIC BLOOD PRESSURE: 102 MMHG | DIASTOLIC BLOOD PRESSURE: 62 MMHG | BODY MASS INDEX: 27.75 KG/M2 | HEART RATE: 60 BPM

## 2024-09-25 DIAGNOSIS — E11.22 TYPE 2 DIABETES MELLITUS WITH STAGE 4 CHRONIC KIDNEY DISEASE, WITH LONG-TERM CURRENT USE OF INSULIN: ICD-10-CM

## 2024-09-25 DIAGNOSIS — Z79.4 TYPE 2 DIABETES MELLITUS WITH STAGE 4 CHRONIC KIDNEY DISEASE, WITH LONG-TERM CURRENT USE OF INSULIN: ICD-10-CM

## 2024-09-25 DIAGNOSIS — N18.4 TYPE 2 DIABETES MELLITUS WITH STAGE 4 CHRONIC KIDNEY DISEASE, WITH LONG-TERM CURRENT USE OF INSULIN: ICD-10-CM

## 2024-09-25 DIAGNOSIS — R39.15 URINARY URGENCY: ICD-10-CM

## 2024-09-25 DIAGNOSIS — N30.01 ACUTE CYSTITIS WITH HEMATURIA: Primary | ICD-10-CM

## 2024-09-25 LAB
BILIRUBIN, UA POC OHS: NEGATIVE
BLOOD, UA POC OHS: ABNORMAL
CLARITY, UA POC OHS: ABNORMAL
COLOR, UA POC OHS: YELLOW
GLUCOSE, UA POC OHS: NEGATIVE
KETONES, UA POC OHS: NEGATIVE
LEUKOCYTES, UA POC OHS: ABNORMAL
NITRITE, UA POC OHS: NEGATIVE
PH, UA POC OHS: 5.5
PROTEIN, UA POC OHS: 100
SPECIFIC GRAVITY, UA POC OHS: 1.02
UROBILINOGEN, UA POC OHS: 0.2

## 2024-09-25 PROCEDURE — 87147 CULTURE TYPE IMMUNOLOGIC: CPT | Performed by: NURSE PRACTITIONER

## 2024-09-25 PROCEDURE — 1159F MED LIST DOCD IN RCRD: CPT | Mod: CPTII,S$GLB,, | Performed by: NURSE PRACTITIONER

## 2024-09-25 PROCEDURE — 3288F FALL RISK ASSESSMENT DOCD: CPT | Mod: CPTII,S$GLB,, | Performed by: NURSE PRACTITIONER

## 2024-09-25 PROCEDURE — 1126F AMNT PAIN NOTED NONE PRSNT: CPT | Mod: CPTII,S$GLB,, | Performed by: NURSE PRACTITIONER

## 2024-09-25 PROCEDURE — 87086 URINE CULTURE/COLONY COUNT: CPT | Performed by: NURSE PRACTITIONER

## 2024-09-25 PROCEDURE — 99999 PR PBB SHADOW E&M-EST. PATIENT-LVL V: CPT | Mod: PBBFAC,,, | Performed by: NURSE PRACTITIONER

## 2024-09-25 PROCEDURE — 81003 URINALYSIS AUTO W/O SCOPE: CPT | Mod: QW,S$GLB,, | Performed by: NURSE PRACTITIONER

## 2024-09-25 PROCEDURE — 99214 OFFICE O/P EST MOD 30 MIN: CPT | Mod: S$GLB,,, | Performed by: NURSE PRACTITIONER

## 2024-09-25 PROCEDURE — 1160F RVW MEDS BY RX/DR IN RCRD: CPT | Mod: CPTII,S$GLB,, | Performed by: NURSE PRACTITIONER

## 2024-09-25 PROCEDURE — 87088 URINE BACTERIA CULTURE: CPT | Performed by: NURSE PRACTITIONER

## 2024-09-25 PROCEDURE — 1101F PT FALLS ASSESS-DOCD LE1/YR: CPT | Mod: CPTII,S$GLB,, | Performed by: NURSE PRACTITIONER

## 2024-09-25 PROCEDURE — 1157F ADVNC CARE PLAN IN RCRD: CPT | Mod: CPTII,S$GLB,, | Performed by: NURSE PRACTITIONER

## 2024-09-25 RX ORDER — CIPROFLOXACIN 250 MG/1
250 TABLET, FILM COATED ORAL 2 TIMES DAILY
Qty: 10 TABLET | Refills: 0 | Status: SHIPPED | OUTPATIENT
Start: 2024-09-25 | End: 2024-09-27

## 2024-09-25 NOTE — PROGRESS NOTES
THIS DOCUMENT WAS MADE IN PART WITH VOICE RECOGNITION SOFTWARE.  OCCASIONALLY THIS SOFTWARE WILL MISINTERPRET WORDS OR PHRASES.     Assessment and Plan:    Acute cystitis with hematuria  -     Urine culture  -     ciprofloxacin HCl (CIPRO) 250 MG tablet; Take 1 tablet (250 mg total) by mouth 2 (two) times daily. for 5 days  Dispense: 10 tablet; Refill: 0    Type 2 diabetes mellitus with stage 4 chronic kidney disease, with long-term current use of insulin  Comments:  Recommended close monitoring blood sugar  Continue regimen    Urinary urgency  -     POCT Urinalysis(Instrument)             Visit summary:     Patient was seen today in clinic for UTI     POCT urinalysis  reviewed     Urine specimen sent for culture and sensitivity     Rx:  Cipro    Advised on diagnosis, medications and symptomatic treatment.     Increase hydration,  may take Tylenol as needed for pain and/or fever.     Emergent conditions/ ER precautions discussed       Follow up in 2 weeks (on 10/9/2024).   ______________________________________________________________________  Subjective:    Chief Complaint:  Possible UTI    HPI:  Denice is a 89 y.o. year old female here concerned regarding dysuria, urinary urgency, frequency and foul smelling urine since last week   Pt's daughter reports that the patient has had some confusion- she states more agreeable than usual, which is common when when she has UTIs.        DM- some elevated blood sugar since she's been having UTI symptoms.-   monitoring closely-         Medications:  Current Outpatient Medications on File Prior to Visit   Medication Sig Dispense Refill    amiodarone (PACERONE) 200 MG Tab Take 200 mg by mouth once daily.       ascorbic acid (VITAMIN C) 500 MG tablet Take 500 mg by mouth once daily.      b complex vitamins capsule Take 1 capsule by mouth once daily.      blood sugar diagnostic Strp 1 strip 2 times daily, to use with insurance preferred meter 100 each 2    blood-glucose meter  kit To check BG 2 times daily, to use with insurance preferred meter 1 each 0    blood-glucose meter,continuous (DEXCOM ) Misc 1 each by Misc.(Non-Drug; Combo Route) route once daily. 1 each 0    blood-glucose sensor (DEXCOM G7 SENSOR) Petty USE TO CHECK BLOOD GLUCOSE AS DIRECTED. CHANGE SENSOR EVERY 10 DAYS 9 each 3    cholecalciferol, vitamin D3, 125 mcg (5,000 unit) Tab Take 1 tablet (5,000 Units total) by mouth once daily.      clopidogrel (PLAVIX) 75 mg tablet TAKE ONE TABLET BY MOUTH ONCE DAILY 30 tablet 11    diclofenac sodium (VOLTAREN ARTHRITIS PAIN) 1 % Gel Apply 2 g topically 2 (two) times daily as needed (muscle pain). 100 g 1    digoxin (LANOXIN) 125 mcg tablet Take 1 tablet by mouth every other day.      diphenoxylate-atropine 2.5-0.025 mg (LOMOTIL) 2.5-0.025 mg per tablet Take 1 tablet by mouth 4 (four) times daily as needed for Diarrhea. 30 tablet 1    fish oil-omega-3 fatty acids 300-1,000 mg capsule Take 1 capsule by mouth 2 (two) times a day.      furosemide (LASIX) 40 MG tablet TAKE 1 TABLET BY MOUTH ONCE DAILY (TAKE  AN  EXTRA  TABLET  FOR  ANY  WEIGHT  GAIN  OF  3  POUNDS  OR  MORE  ON  ANY  GIVEN  DAY) 90 tablet 2    insulin (LANTUS SOLOSTAR U-100 INSULIN) glargine 100 units/mL SubQ pen Inject 28 Units into the skin every evening. 5 each 3    insulin lispro 100 unit/mL pen INJECT 16 UNITS THREE TIMES DAILY WITH MEALS 45 mL 1    lancets 31 gauge Misc 1 strip 2 times daily, to use with insurance preferred meter 100 each 2    levothyroxine (SYNTHROID) 100 MCG tablet Take 1 tablet (100 mcg total) by mouth before breakfast. 30 tablet 11    magnesium 200 mg Tab Take 200 mg by mouth once daily.      melatonin 10 mg Tab Take 1 tablet by mouth nightly as needed.      memantine (NAMENDA) 10 MG Tab TAKE 1 TABLET BY MOUTH ONCE DAILY IN THE EVENING 90 tablet 0    metoprolol tartrate (LOPRESSOR) 50 MG tablet Take 1 tablet (50 mg total) by mouth 2 (two) times daily. 60 tablet 1    multivitamin  "(THERAGRAN) per tablet Take 1 tablet by mouth once daily.      mupirocin (BACTROBAN) 2 % ointment Apply topically 3 (three) times daily. 30 g 0    ondansetron (ZOFRAN-ODT) 4 MG TbDL Take 1 tablet (4 mg total) by mouth every 8 (eight) hours as needed (Nausea). 20 tablet 0    pen needle, diabetic (COMFORT EZ PEN NEEDLES) 29 gauge x 1/2" Ndle 1 Units by Misc.(Non-Drug; Combo Route) route 3 (three) times daily. 300 each 11    polyethylene glycol (GLYCOLAX) 17 gram PwPk Take 17 g by mouth as needed (constipation).      potassium chloride SA (K-DUR,KLOR-CON M) 10 MEQ tablet Take 1 tablet (10 mEq total) by mouth 2 (two) times daily. 180 tablet 3    rivastigmine tartrate (EXELON) 1.5 MG capsule Take 1 capsule by mouth twice daily 90 capsule 3    simvastatin (ZOCOR) 10 MG tablet Take 10 mg by mouth every evening.      blood-glucose transmitter (DEXCOM G5 TRANSMITTER) Petty 1 each by Misc.(Non-Drug; Combo Route) route Daily. 1 each 0    [DISCONTINUED] escitalopram oxalate (LEXAPRO) 10 MG tablet TAKE 1 TABLET BY MOUTH ONCE DAILY IN THE EVENING 90 tablet 1    [DISCONTINUED] famotidine (PEPCID) 20 MG tablet Take 1 tablet (20 mg total) by mouth 2 (two) times daily. for 10 days 20 tablet 0    [DISCONTINUED] nitroGLYCERIN (NITROSTAT) 0.4 MG SL tablet Place 1 tablet (0.4 mg total) under the tongue every 5 (five) minutes as needed for Chest pain. (Patient not taking: No sig reported) 30 tablet 0    [DISCONTINUED] potassium chloride (KLOR-CON) 10 MEQ TbSR Take 10 mEq by mouth 2 (two) times daily.      [DISCONTINUED] prednisoLONE acetate (PRED FORTE) 1 % DrpS Place 1 drop into the left eye 2 (two) times daily.       No current facility-administered medications on file prior to visit.       Review of Systems:  Review of Systems   Constitutional:  Negative for fever.   Respiratory:  Negative for shortness of breath.    Gastrointestinal:  Negative for abdominal distention, abdominal pain, constipation, diarrhea, nausea and vomiting. " "  Genitourinary:  Positive for dysuria, frequency and urgency. Negative for difficulty urinating, hematuria and pelvic pain.   Psychiatric/Behavioral:  Positive for confusion.        Past Medical History:  Past Medical History:   Diagnosis Date    Anticoagulant long-term use     Aortic calcification 01/02/2022    CXR 1/2/22    Aortic valve regurgitation 10/13/2021    CAD (coronary artery disease) 01/06/2022    Cecum mass     Chronic atrial fibrillation 05/02/2017    Chronic diastolic heart failure     CKD (chronic kidney disease) stage 4, GFR 15-29 ml/min 02/22/2016    Dementia 08/31/2022    Diabetic retinopathy 03/24/2021    Disorder of kidney and ureter     follows w/ Dr. 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 09/12/2022    Gastroesophageal reflux disease without esophagitis 9/17/2023    H/O: CVA (cerebrovascular accident) 07/30/2019    Hemiparesis affecting left side as late effect of cerebrovascular accident (CVA) 06/05/2017    History of TIA (transient ischemic attack) 09/17/2023    Hypertension     KRISTEN (iron deficiency anemia) 01/12/2018    Insomnia     Irregular heart rhythm     Mitral valve regurgitation 10/13/2021    Other specified hypothyroidism 06/24/2019    Pacemaker 01/02/2023    Primary hypertension 02/09/2016    Proteinuria due to type 2 diabetes mellitus 03/12/2019    Senile purpura 12/21/2022    Sick sinus syndrome 2018    s/p pacemaker placement    Stenosis of left carotid artery 12/06/2016    TIA (transient ischemic attack) 03/2017    Type 2 diabetes mellitus with stage 4 chronic kidney disease, with long-term current use of insulin        Objective:    Vitals:  Vitals:    09/25/24 1000   BP: 102/62   Pulse: 60   SpO2: 98%   Weight: 68.4 kg (150 lb 12.7 oz)   Height: 5' 2" (1.575 m)   PainSc: 0-No pain       Physical Exam  Vitals and nursing note reviewed.   Constitutional:       General: " She is not in acute distress.  HENT:      Head: Normocephalic and atraumatic.      Nose: Nose normal.      Mouth/Throat:      Mouth: Mucous membranes are moist.      Pharynx: Oropharynx is clear.   Eyes:      General: No scleral icterus.     Conjunctiva/sclera: Conjunctivae normal.   Cardiovascular:      Rate and Rhythm: Normal rate and regular rhythm.   Pulmonary:      Effort: Pulmonary effort is normal. No respiratory distress.      Breath sounds: Normal breath sounds.   Abdominal:      General: Bowel sounds are normal. There is no distension.      Palpations: Abdomen is soft.      Tenderness: There is no abdominal tenderness. There is no right CVA tenderness or left CVA tenderness.   Musculoskeletal:      Right lower leg: No edema.      Left lower leg: No edema.   Skin:     General: Skin is warm and dry.   Neurological:      Mental Status: She is alert and oriented to person, place, and time.   Psychiatric:         Mood and Affect: Mood normal.         Behavior: Behavior normal.         Thought Content: Thought content normal.         Data:        Medical history reviewed, Medications reconciled.          GLENN ZavalaP-C  Family Medicine

## 2024-09-27 ENCOUNTER — TELEPHONE (OUTPATIENT)
Dept: FAMILY MEDICINE | Facility: CLINIC | Age: 89
End: 2024-09-27

## 2024-09-27 ENCOUNTER — DOCUMENT SCAN (OUTPATIENT)
Dept: HOME HEALTH SERVICES | Facility: HOSPITAL | Age: 89
End: 2024-09-27
Payer: MEDICARE

## 2024-09-27 DIAGNOSIS — N30.01 ACUTE CYSTITIS WITH HEMATURIA: Primary | ICD-10-CM

## 2024-09-27 LAB — BACTERIA UR CULT: ABNORMAL

## 2024-09-27 RX ORDER — CEPHALEXIN 250 MG/1
250 CAPSULE ORAL EVERY 12 HOURS
Qty: 10 CAPSULE | Refills: 0 | Status: SHIPPED | OUTPATIENT
Start: 2024-09-27 | End: 2024-10-02

## 2024-09-27 NOTE — TELEPHONE ENCOUNTER
Urine culture positive for Streptococcus Agalactiae (group B).  This bacteria is not susceptible to Cipro.  I recommend patient stop Cipro and start cephalexin 250 mg twice a day for 5 days.  I will send in prescription.

## 2024-10-07 RX ORDER — MEMANTINE HYDROCHLORIDE 10 MG/1
TABLET ORAL
Qty: 90 TABLET | Refills: 0 | Status: SHIPPED | OUTPATIENT
Start: 2024-10-07

## 2024-10-07 NOTE — TELEPHONE ENCOUNTER
Refill Routing Note   Medication(s) are not appropriate for processing by [facility] for the following reason(s):        Outside of protocol    [facility] action(s):  Route               Appointments  past 12m or future 3m with PCP    Date Provider   Last Visit   8/13/2024 Allison Woods MD   Next Visit   Visit date not found Allison Woods MD   ED visits in past 90 days: 0        Note composed:12:24 PM 10/07/2024

## 2024-10-11 DIAGNOSIS — E11.22 TYPE 2 DIABETES MELLITUS WITH STAGE 4 CHRONIC KIDNEY DISEASE, WITH LONG-TERM CURRENT USE OF INSULIN: Chronic | ICD-10-CM

## 2024-10-11 DIAGNOSIS — Z79.4 TYPE 2 DIABETES MELLITUS WITH STAGE 4 CHRONIC KIDNEY DISEASE, WITH LONG-TERM CURRENT USE OF INSULIN: Chronic | ICD-10-CM

## 2024-10-11 DIAGNOSIS — N18.4 TYPE 2 DIABETES MELLITUS WITH STAGE 4 CHRONIC KIDNEY DISEASE, WITH LONG-TERM CURRENT USE OF INSULIN: Chronic | ICD-10-CM

## 2024-10-11 NOTE — TELEPHONE ENCOUNTER
No care due was identified.  [facility] Embedded Care Due Messages. Reference number: [id number].   10/11/2024 2:05:33 PM CDT

## 2024-10-12 NOTE — TELEPHONE ENCOUNTER
Refill Routing Note   Medication(s) are not appropriate for processing by [facility] for the following reason(s):        Required labs abnormal  Allergy or intolerance    [facility] action(s):  Defer        Medication Therapy Plan: sCr and eGFR out of range. Allergy: latex      Appointments  past 12m or future 3m with PCP    Date Provider   Last Visit   8/13/2024 Allison Woods MD   Next Visit   Visit date not found Allison Woods MD   ED visits in past 90 days: 0        Note composed:9:53 PM 10/11/2024

## 2024-10-15 RX ORDER — INSULIN GLARGINE 100 [IU]/ML
INJECTION, SOLUTION SUBCUTANEOUS
Qty: 30 ML | Refills: 1 | OUTPATIENT
Start: 2024-10-15

## 2024-10-15 NOTE — TELEPHONE ENCOUNTER
Refill Decision Note   Denice Meryl  is requesting a refill authorization.  Brief Assessment and Rationale for Refill:  Quick Discontinue     Medication Therapy Plan: Duplicate      Comments:     Note composed:1:37 PM 10/15/2024

## 2024-10-16 RX ORDER — INSULIN GLARGINE 100 [IU]/ML
28 INJECTION, SOLUTION SUBCUTANEOUS NIGHTLY
Qty: 5 EACH | Refills: 3 | Status: SHIPPED | OUTPATIENT
Start: 2024-10-16 | End: 2025-10-16

## 2024-10-17 DIAGNOSIS — N18.4 TYPE 2 DIABETES MELLITUS WITH STAGE 4 CHRONIC KIDNEY DISEASE, WITH LONG-TERM CURRENT USE OF INSULIN: Chronic | ICD-10-CM

## 2024-10-17 DIAGNOSIS — Z79.4 TYPE 2 DIABETES MELLITUS WITH STAGE 4 CHRONIC KIDNEY DISEASE, WITH LONG-TERM CURRENT USE OF INSULIN: Chronic | ICD-10-CM

## 2024-10-17 DIAGNOSIS — E11.22 TYPE 2 DIABETES MELLITUS WITH STAGE 4 CHRONIC KIDNEY DISEASE, WITH LONG-TERM CURRENT USE OF INSULIN: Chronic | ICD-10-CM

## 2024-10-17 RX ORDER — INSULIN GLARGINE 100 [IU]/ML
28 INJECTION, SOLUTION SUBCUTANEOUS NIGHTLY
Qty: 5 EACH | Refills: 3 | OUTPATIENT
Start: 2024-10-17 | End: 2025-10-17

## 2024-10-17 NOTE — TELEPHONE ENCOUNTER
No care due was identified.  [facility] Embedded Care Due Messages. Reference number: [id number].   10/17/2024 10:29:23 AM CDT

## 2024-10-17 NOTE — TELEPHONE ENCOUNTER
Refill Decision Note   Denice Sheth  is requesting a refill authorization.  Brief Assessment and Rationale for Refill:  Quick Discontinue     Medication Therapy Plan: E-Prescribing Status: Receipt confirmed by pharmacy (10/16/2024  7:40 AM CDT)      Comments:     Note composed:6:26 PM 10/17/2024

## 2024-11-12 DIAGNOSIS — E03.8 OTHER SPECIFIED HYPOTHYROIDISM: ICD-10-CM

## 2024-11-12 RX ORDER — LEVOTHYROXINE SODIUM 100 UG/1
100 TABLET ORAL
Qty: 90 TABLET | Refills: 0 | Status: SHIPPED | OUTPATIENT
Start: 2024-11-12

## 2024-11-12 NOTE — TELEPHONE ENCOUNTER
Provider Staff:  Action required for this patient    Requires labs      Please see care gap opportunities below in Care Due Message.    Thanks!  [facility]     Appointments      Date Provider   Last Visit   8/13/2024 Allison Woods MD   Next Visit   Visit date not found Allison Woods MD     Refill Decision Note   Denice Sheth  is requesting a refill authorization.  Brief Assessment and Rationale for Refill:  Approve     Medication Therapy Plan:         Comments:     Note composed:9:39 AM 11/12/2024

## 2024-11-12 NOTE — TELEPHONE ENCOUNTER
Care Due:                  Date            Visit Type   Department     Provider  --------------------------------------------------------------------------------                                EP -                              PRIMARY      [facility] FAMILY  Last Visit: 08-      CARE ([facility])   MEDICINE       Allison Woods  Next Visit: None Scheduled  None         None Found                                                            Last  Test          Frequency    Reason                     Performed    Due Date  --------------------------------------------------------------------------------    HBA1C.......  6 months...  insulin..................  08-   02-    [facility] Embedded Care Due Messages. Reference number: [id number].   11/12/2024 6:43:55 AM CST

## 2024-11-15 ENCOUNTER — IMMUNIZATION (OUTPATIENT)
Dept: FAMILY MEDICINE | Facility: CLINIC | Age: 89
End: 2024-11-15
Payer: MEDICARE

## 2024-11-15 ENCOUNTER — LAB VISIT (OUTPATIENT)
Dept: LAB | Facility: HOSPITAL | Age: 89
End: 2024-11-15
Attending: INTERNAL MEDICINE
Payer: MEDICARE

## 2024-11-15 DIAGNOSIS — Z23 NEED FOR VACCINATION: Primary | ICD-10-CM

## 2024-11-15 DIAGNOSIS — N18.4 CKD (CHRONIC KIDNEY DISEASE) STAGE 4, GFR 15-29 ML/MIN: ICD-10-CM

## 2024-11-15 LAB
CREAT UR-MCNC: 54 MG/DL (ref 15–325)
PROT UR-MCNC: 17 MG/DL (ref 0–15)
PROT/CREAT UR: 0.31 MG/G{CREAT} (ref 0–0.2)

## 2024-11-15 PROCEDURE — 84156 ASSAY OF PROTEIN URINE: CPT | Performed by: INTERNAL MEDICINE

## 2024-11-29 ENCOUNTER — TELEPHONE (OUTPATIENT)
Dept: PODIATRY | Facility: CLINIC | Age: 89
End: 2024-11-29
Payer: MEDICARE

## 2024-11-29 NOTE — TELEPHONE ENCOUNTER
Spoke with the the patient's daughter to reschedule appointment to first available on 01/14/25 @ 9:00 am. She expressed understanding of the message.

## 2024-12-11 DIAGNOSIS — G31.84 MCI (MILD COGNITIVE IMPAIRMENT) WITH MEMORY LOSS: ICD-10-CM

## 2024-12-11 RX ORDER — RIVASTIGMINE TARTRATE 1.5 MG/1
1.5 CAPSULE ORAL 2 TIMES DAILY
Qty: 90 CAPSULE | Refills: 0 | Status: SHIPPED | OUTPATIENT
Start: 2024-12-11

## 2024-12-11 NOTE — TELEPHONE ENCOUNTER
Refill Routing Note   Medication(s) are not appropriate for processing by [facility] for the following reason(s):        Outside of protocol    [facility] action(s):  Route             Appointments  past 12m or future 3m with PCP    Date Provider   Last Visit   8/13/2024 Allison Woods MD   Next Visit   Visit date not found Allison Woods MD   ED visits in past 90 days: 0        Note composed:9:28 AM 12/11/2024

## 2025-01-02 DIAGNOSIS — G31.84 MCI (MILD COGNITIVE IMPAIRMENT) WITH MEMORY LOSS: ICD-10-CM

## 2025-01-02 RX ORDER — RIVASTIGMINE TARTRATE 1.5 MG/1
1.5 CAPSULE ORAL 2 TIMES DAILY
Qty: 90 CAPSULE | Refills: 0 | Status: SHIPPED | OUTPATIENT
Start: 2025-01-02

## 2025-01-03 NOTE — TELEPHONE ENCOUNTER
Refill Routing Note   Medication(s) are not appropriate for processing by [facility] for the following reason(s):        Outside of protocol    [facility] action(s):  Route               Appointments  past 12m or future 3m with PCP    Date Provider   Last Visit   8/13/2024 Allison Woods MD   Next Visit   Visit date not found Allison Woods MD   ED visits in past 90 days: 0        Note composed:7:29 PM 01/02/2025

## 2025-01-06 NOTE — TELEPHONE ENCOUNTER
Refill Routing Note   Medication(s) are not appropriate for processing by [facility] for the following reason(s):        Outside of protocol    [facility] action(s):  Route               Appointments  past 12m or future 3m with PCP    Date Provider   Last Visit   8/13/2024 Allison Woods MD   Next Visit   Visit date not found Allison Woods MD   ED visits in past 90 days: 0        Note composed:11:02 AM 01/06/2025

## 2025-01-07 RX ORDER — MEMANTINE HYDROCHLORIDE 10 MG/1
TABLET ORAL
Qty: 90 TABLET | Refills: 3 | Status: SHIPPED | OUTPATIENT
Start: 2025-01-07

## 2025-01-14 ENCOUNTER — OFFICE VISIT (OUTPATIENT)
Dept: PODIATRY | Facility: CLINIC | Age: OVER 89
End: 2025-01-14
Payer: MEDICARE

## 2025-01-14 VITALS — WEIGHT: 150.81 LBS | HEIGHT: 62 IN | BODY MASS INDEX: 27.75 KG/M2

## 2025-01-14 DIAGNOSIS — M20.41 HAMMER TOES OF BOTH FEET: ICD-10-CM

## 2025-01-14 DIAGNOSIS — M20.42 HAMMER TOES OF BOTH FEET: ICD-10-CM

## 2025-01-14 DIAGNOSIS — E11.42 DIABETIC POLYNEUROPATHY ASSOCIATED WITH TYPE 2 DIABETES MELLITUS: Primary | ICD-10-CM

## 2025-01-14 DIAGNOSIS — B35.1 ONYCHOMYCOSIS DUE TO DERMATOPHYTE: ICD-10-CM

## 2025-01-14 PROCEDURE — 1101F PT FALLS ASSESS-DOCD LE1/YR: CPT | Mod: CPTII,S$GLB,, | Performed by: PODIATRIST

## 2025-01-14 PROCEDURE — 11721 DEBRIDE NAIL 6 OR MORE: CPT | Mod: Q9,S$GLB,, | Performed by: PODIATRIST

## 2025-01-14 PROCEDURE — 99999 PR PBB SHADOW E&M-EST. PATIENT-LVL IV: CPT | Mod: PBBFAC,,, | Performed by: PODIATRIST

## 2025-01-14 PROCEDURE — 3288F FALL RISK ASSESSMENT DOCD: CPT | Mod: CPTII,S$GLB,, | Performed by: PODIATRIST

## 2025-01-14 PROCEDURE — 1126F AMNT PAIN NOTED NONE PRSNT: CPT | Mod: CPTII,S$GLB,, | Performed by: PODIATRIST

## 2025-01-14 PROCEDURE — 99213 OFFICE O/P EST LOW 20 MIN: CPT | Mod: 25,S$GLB,, | Performed by: PODIATRIST

## 2025-01-14 PROCEDURE — 1157F ADVNC CARE PLAN IN RCRD: CPT | Mod: CPTII,S$GLB,, | Performed by: PODIATRIST

## 2025-01-14 PROCEDURE — 1159F MED LIST DOCD IN RCRD: CPT | Mod: CPTII,S$GLB,, | Performed by: PODIATRIST

## 2025-01-14 RX ORDER — POTASSIUM CHLORIDE 750 MG/1
10 TABLET, EXTENDED RELEASE ORAL 2 TIMES DAILY
COMMUNITY
Start: 2024-12-02

## 2025-01-14 NOTE — PROGRESS NOTES
Subjective:      Patient ID: Denice Sheth is a 89 y.o. female.    Chief Complaint: Diabetes Mellitus and Nail Care (DM 3 month nail care, right great toe fungus)    eDnice is a 89 y.o. female who presents to the clinic for evaluation and treatment of diabetic feet. Denice has a past medical history of Anticoagulant long-term use, Aortic calcification (01/02/2022), Aortic valve regurgitation (10/13/2021), CAD (coronary artery disease) (01/06/2022), Cecum mass, Chronic atrial fibrillation (05/02/2017), Chronic diastolic heart failure, CKD (chronic kidney disease) stage 4, GFR 15-29 ml/min (02/22/2016), Dementia (08/31/2022), Diabetic retinopathy (03/24/2021), Disorder of kidney and ureter, Encounter for blood transfusion, Exudative age-related macular degeneration of right eye with active choroidal neovascularization (09/12/2022), Gastroesophageal reflux disease without esophagitis (9/17/2023), H/O: CVA (cerebrovascular accident) (07/30/2019), Hemiparesis affecting left side as late effect of cerebrovascular accident (CVA) (06/05/2017), History of TIA (transient ischemic attack) (09/17/2023), Hypertension, KRISTEN (iron deficiency anemia) (01/12/2018), Insomnia, Irregular heart rhythm, Mitral valve regurgitation (10/13/2021), Other specified hypothyroidism (06/24/2019), Pacemaker (01/02/2023), Primary hypertension (02/09/2016), Proteinuria due to type 2 diabetes mellitus (03/12/2019), Senile purpura (12/21/2022), Sick sinus syndrome (2018), Stenosis of left carotid artery (12/06/2016), TIA (transient ischemic attack) (03/2017), and Type 2 diabetes mellitus with stage 4 chronic kidney disease, with long-term current use of insulin. Patient relates needing her toenails trimmed.  Denies this being a source of pain.  Has not attempted to self trim.  Notes good control over her blood glucose. She continues to offload the Rt. 2nd PIP joint with a aperture pad with no further injury noted to the digit.  Denies any  additional pedal complaints.     PCP: Allison Woods MD    Date Last Seen by PCP: 9/24    Hemoglobin A1C   Date Value Ref Range Status   08/13/2024 6.3 (H) 4.0 - 5.6 % Final     Comment:     ADA Screening Guidelines:  5.7-6.4%  Consistent with prediabetes  >or=6.5%  Consistent with diabetes    High levels of fetal hemoglobin interfere with the HbA1C  assay. Heterozygous hemoglobin variants (HbS, HgC, etc)do  not significantly interfere with this assay.   However, presence of multiple variants may affect accuracy.     12/04/2023 6.4 (H) 4.0 - 5.6 % Final     Comment:     ADA Screening Guidelines:  5.7-6.4%  Consistent with prediabetes  >or=6.5%  Consistent with diabetes    High levels of fetal hemoglobin interfere with the HbA1C  assay. Heterozygous hemoglobin variants (HbS, HgC, etc)do  not significantly interfere with this assay.   However, presence of multiple variants may affect accuracy.     07/24/2023 7.2 (H) 4.0 - 5.6 % Final     Comment:     ADA Screening Guidelines:  5.7-6.4%  Consistent with prediabetes  >or=6.5%  Consistent with diabetes    High levels of fetal hemoglobin interfere with the HbA1C  assay. Heterozygous hemoglobin variants (HbS, HgC, etc)do  not significantly interfere with this assay.   However, presence of multiple variants may affect accuracy.           Past Medical History:   Diagnosis Date    Anticoagulant long-term use     Aortic calcification 01/02/2022    CXR 1/2/22    Aortic valve regurgitation 10/13/2021    CAD (coronary artery disease) 01/06/2022    Cecum mass     Chronic atrial fibrillation 05/02/2017    Chronic diastolic heart failure     CKD (chronic kidney disease) stage 4, GFR 15-29 ml/min 02/22/2016    Dementia 08/31/2022    Diabetic retinopathy 03/24/2021    Disorder of kidney and ureter     follows w/ Dr. 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 09/12/2022     Gastroesophageal reflux disease without esophagitis 9/17/2023    H/O: CVA (cerebrovascular accident) 07/30/2019    Hemiparesis affecting left side as late effect of cerebrovascular accident (CVA) 06/05/2017    History of TIA (transient ischemic attack) 09/17/2023    Hypertension     KRISTEN (iron deficiency anemia) 01/12/2018    Insomnia     Irregular heart rhythm     Mitral valve regurgitation 10/13/2021    Other specified hypothyroidism 06/24/2019    Pacemaker 01/02/2023    Primary hypertension 02/09/2016    Proteinuria due to type 2 diabetes mellitus 03/12/2019    Senile purpura 12/21/2022    Sick sinus syndrome 2018    s/p pacemaker placement    Stenosis of left carotid artery 12/06/2016    TIA (transient ischemic attack) 03/2017    Type 2 diabetes mellitus with stage 4 chronic kidney disease, with long-term current use of insulin        Past Surgical History:   Procedure Laterality Date    ADENOIDECTOMY  8/18    BILATERAL SALPINGO-OOPHORECTOMY (BSO) Bilateral 08/19/2019    Procedure: SALPINGO-OOPHORECTOMY, BILATERAL;  Surgeon: Blayne Wallace MD;  Location: [facility] OR;  Service: OB/GYN;  Laterality: Bilateral;    COLON SURGERY  8/18    HYSTERECTOMY      partial, benign causes by reported hx    INSERTION OF PACEMAKER  08/2017    LEFT HEART CATHETERIZATION Left 01/06/2022    Procedure: Left heart cath;  Surgeon: Bhaskar Hanson III, MD;  Location: [facility] CATH;  Service: Cardiology;  Laterality: Left;  Right radial    ROBOT-ASSISTED LAPAROSCOPIC COLECTOMY USING DA MICHELE XI Right 08/19/2019    Procedure: XI ROBOTIC COLECTOMY;  Surgeon: Kourtney Dodson MD;  Location: [facility] OR;  Service: General;  Laterality: Right;       Family History   Problem Relation Name Age of Onset    Diabetes Mother Blake     Heart disease Mother Blake     Cancer Father Julian         type unknown (nonsmoker, no etoh)    Heart disease Brother Julian     Dementia Sister      Cancer Daughter Luz         fallopian ca    Cancer Son Lukas          esophageal ca    Dementia Sister      Dementia Sister         Social History     Socioeconomic History    Marital status:    Tobacco Use    Smoking status: Never     Passive exposure: Never    Smokeless tobacco: Never   Substance and Sexual Activity    Alcohol use: No    Drug use: No    Sexual activity: Never     Birth control/protection: Post-menopausal     Social Drivers of Health     Financial Resource Strain: Low Risk  (2/27/2024)    Overall Financial Resource Strain (CARDIA)     Difficulty of Paying Living Expenses: Not hard at all   Food Insecurity: No Food Insecurity (2/27/2024)    Hunger Vital Sign     Worried About Running Out of Food in the Last Year: Never true     Ran Out of Food in the Last Year: Never true   Transportation Needs: No Transportation Needs (2/27/2024)    PRAPARE - Transportation     Lack of Transportation (Medical): No     Lack of Transportation (Non-Medical): No   Physical Activity: Inactive (2/27/2024)    Exercise Vital Sign     Days of Exercise per Week: 0 days     Minutes of Exercise per Session: 0 min   Stress: No Stress Concern Present (2/27/2024)    [employer] [place] [employer] - Occupational Stress Questionnaire     Feeling of Stress : Not at all   Housing Stability: Unknown (2/27/2024)    Housing Stability Vital Sign     Unable to Pay for Housing in the Last Year: No     Unstable Housing in the Last Year: No       Current Outpatient Medications   Medication Sig Dispense Refill    amiodarone (PACERONE) 200 MG Tab Take 200 mg by mouth once daily.       ascorbic acid (VITAMIN C) 500 MG tablet Take 500 mg by mouth once daily.      b complex vitamins capsule Take 1 capsule by mouth once daily.      blood sugar diagnostic Strp 1 strip 2 times daily, to use with insurance preferred meter 100 each 2    blood-glucose sensor (DEXCOM G7 SENSOR) Petty USE TO CHECK BLOOD GLUCOSE AS DIRECTED. CHANGE SENSOR EVERY 10 DAYS 9 each 3    cholecalciferol, vitamin D3, 125 mcg (5,000  unit) Tab Take 1 tablet (5,000 Units total) by mouth once daily.      clopidogrel (PLAVIX) 75 mg tablet TAKE ONE TABLET BY MOUTH ONCE DAILY 30 tablet 11    diclofenac sodium (VOLTAREN ARTHRITIS PAIN) 1 % Gel Apply 2 g topically 2 (two) times daily as needed (muscle pain). 100 g 1    digoxin (LANOXIN) 125 mcg tablet Take 1 tablet by mouth every other day.      diphenoxylate-atropine 2.5-0.025 mg (LOMOTIL) 2.5-0.025 mg per tablet Take 1 tablet by mouth 4 (four) times daily as needed for Diarrhea. 30 tablet 1    fish oil-omega-3 fatty acids 300-1,000 mg capsule Take 1 capsule by mouth 2 (two) times a day.      furosemide (LASIX) 40 MG tablet TAKE 1 TABLET BY MOUTH ONCE DAILY (TAKE  AN  EXTRA  TABLET  FOR  ANY  WEIGHT  GAIN  OF  3  POUNDS  OR  MORE  ON  ANY  GIVEN  DAY) 90 tablet 2    insulin glargine U-100, Lantus, (LANTUS SOLOSTAR U-100 INSULIN) 100 unit/mL (3 mL) InPn pen Inject 28 Units into the skin every evening. 5 each 3    insulin lispro 100 unit/mL pen INJECT 16 UNITS THREE TIMES DAILY WITH MEALS 45 mL 1    lancets 31 gauge Misc 1 strip 2 times daily, to use with insurance preferred meter 100 each 2    levothyroxine (SYNTHROID) 100 MCG tablet TAKE 1 TABLET BY MOUTH BEFORE BREAKFAST 90 tablet 0    magnesium 200 mg Tab Take 200 mg by mouth once daily.      memantine (NAMENDA) 10 MG Tab TAKE 1 TABLET BY MOUTH ONCE DAILY IN THE EVENING 90 tablet 3    multivitamin (THERAGRAN) per tablet Take 1 tablet by mouth once daily.      ondansetron (ZOFRAN-ODT) 4 MG TbDL Take 1 tablet (4 mg total) by mouth every 8 (eight) hours as needed (Nausea). 20 tablet 0    polyethylene glycol (GLYCOLAX) 17 gram PwPk Take 17 g by mouth as needed (constipation).      potassium chloride (KLOR-CON) 10 MEQ TbSR Take 10 mEq by mouth 2 (two) times daily.      potassium chloride SA (K-DUR,KLOR-CON M) 10 MEQ tablet Take 1 tablet (10 mEq total) by mouth 2 (two) times daily. 180 tablet 3    rivastigmine tartrate (EXELON) 1.5 MG capsule Take 1  "capsule by mouth twice daily 90 capsule 0    simvastatin (ZOCOR) 10 MG tablet Take 10 mg by mouth every evening.      blood-glucose meter kit To check BG 2 times daily, to use with insurance preferred meter 1 each 0    blood-glucose meter,continuous (DEXCOM ) Misc 1 each by Misc.(Non-Drug; Combo Route) route once daily. 1 each 0    blood-glucose transmitter (DEXCOM G5 TRANSMITTER) Petty 1 each by Misc.(Non-Drug; Combo Route) route Daily. 1 each 0    melatonin 10 mg Tab Take 1 tablet by mouth nightly as needed.      metoprolol tartrate (LOPRESSOR) 50 MG tablet Take 1 tablet (50 mg total) by mouth 2 (two) times daily. 60 tablet 1    mupirocin (BACTROBAN) 2 % ointment Apply topically 3 (three) times daily. 30 g 0    pen needle, diabetic (COMFORT EZ PEN NEEDLES) 29 gauge x 1/2" Ndle 1 Units by Misc.(Non-Drug; Combo Route) route 3 (three) times daily. 300 each 11     No current facility-administered medications for this visit.       Review of patient's allergies indicates:   Allergen Reactions    Adhesive Itching    Mucinex [guaifenesin]      Says caused CHF     Latex, natural rubber Rash         Review of Systems   Constitutional: Negative for chills and fever.   Cardiovascular:  Negative for claudication and leg swelling.   Skin:  Negative for color change, nail changes and poor wound healing.   Musculoskeletal:  Negative for joint pain, joint swelling, muscle cramps and muscle weakness.   Gastrointestinal:  Negative for nausea and vomiting.   Neurological:  Positive for numbness.   Psychiatric/Behavioral:  Negative for altered mental status.            Objective:      Physical Exam  Constitutional:       Appearance: Normal appearance. She is not ill-appearing.   Cardiovascular:      Pulses:           Dorsalis pedis pulses are 2+ on the right side and 2+ on the left side.        Posterior tibial pulses are 2+ on the right side and 2+ on the left side.      Comments: CFT is < 3 seconds bilateral.  Pedal hair " growth is decreased bilateral.  No lower extremity edema noted bilateral.  Toes are warm to touch bilateral.    Musculoskeletal:         General: Deformity present. No tenderness.      Right lower leg: No edema.      Left lower leg: No edema.      Comments: Muscle strength 5/5 in all muscle groups bilateral.  No tenderness nor crepitation with ROM of foot/ankle joints bilateral.  (-) for pain with palpation of bilateral foot and ankle. Bilateral semi-reducible contracture of toes 2-5.  Bilateral HAV.   Skin:     General: Skin is warm and dry.      Capillary Refill: Capillary refill takes 2 to 3 seconds.      Findings: No bruising, ecchymosis, erythema, signs of injury, laceration, lesion, petechiae, rash or wound.      Comments: Pedal skin appears thin bilateral. Toenails x 10 appear thickened by 2 mm, elongated by 6 mm, and discolored with subungual debris.  Stable shearing noted to the dorsum of the Rt. 2nd PIP joint.  Partial lysis of the lateral border of the Rt. Hallux toenail with underlying epithelialized nail bed.            Neurological:      Mental Status: She is alert.      Sensory: Sensory deficit present.      Motor: No weakness or atrophy.      Comments: Decreased protective sensation noted bilateral.  Light touch is intact bilateral.                 Assessment:       Encounter Diagnoses   Name Primary?    Diabetic polyneuropathy associated with type 2 diabetes mellitus Yes    Hammer toes of both feet     Onychomycosis due to dermatophyte          Plan:       Denice was seen today for diabetes mellitus and nail care.    Diagnoses and all orders for this visit:    Diabetic polyneuropathy associated with type 2 diabetes mellitus    Hammer toes of both feet    Onychomycosis due to dermatophyte      I counseled the patient on her conditions, their implications and medical management.    Advised to continue wearing supportive shoe gear that limits pressure to the digits.      Recommend continued use of a  corn pad to offload the Rt. Dorsal 2nd toe.    Shoe inspection. Diabetic Foot Education. Patient reminded of the importance of good nutrition and blood sugar control to help prevent podiatric complications of diabetes. Patient instructed on proper foot hygeine. We discussed wearing proper shoe gear, daily foot inspections, never walking without protective shoe gear, never putting sharp instruments to feet    With patient's permission, nails were aggressively reduced and debrided x 10 to their soft tissue attachment mechanically and with electric , removing all offending nail and debris. Patient relates relief following the procedure. She will continue to monitor the areas daily, inspect her feet, wear protective shoe gear when ambulatory, moisturizer to maintain skin integrity and follow in this office in approximately 3 months, sooner p.r.n.    RTC in 3 months for routine nail care.    Pankaj Julian DPM

## 2025-02-09 DIAGNOSIS — E03.8 OTHER SPECIFIED HYPOTHYROIDISM: ICD-10-CM

## 2025-02-09 NOTE — TELEPHONE ENCOUNTER
Care Due:                  Date            Visit Type   Department     Provider  --------------------------------------------------------------------------------                                EP -                              PRIMARY      [facility] FAMILY  Last Visit: 08-      CARE ([facility])   MEDICINE       Allison VALENTINE                              FOLLOWUP/OF  [facility] FAMILY  Next Visit: 02-      [facility]ICE VISIT   MEDICINE       Allison Woods                                                            Last  Test          Frequency    Reason                     Performed    Due Date  --------------------------------------------------------------------------------    HBA1C.......  6 months...  insulin..................  08-   02-    [facility] Embedded Care Due Messages. Reference number: [id number].   2/09/2025 1:08:52 PM CST

## 2025-02-10 ENCOUNTER — OFFICE VISIT (OUTPATIENT)
Dept: FAMILY MEDICINE | Facility: CLINIC | Age: OVER 89
End: 2025-02-10
Payer: MEDICARE

## 2025-02-10 ENCOUNTER — LAB VISIT (OUTPATIENT)
Dept: LAB | Facility: HOSPITAL | Age: OVER 89
End: 2025-02-10
Attending: INTERNAL MEDICINE
Payer: MEDICARE

## 2025-02-10 VITALS
HEART RATE: 64 BPM | BODY MASS INDEX: 27.75 KG/M2 | OXYGEN SATURATION: 99 % | DIASTOLIC BLOOD PRESSURE: 68 MMHG | WEIGHT: 150.81 LBS | SYSTOLIC BLOOD PRESSURE: 128 MMHG | HEIGHT: 62 IN

## 2025-02-10 DIAGNOSIS — E11.22 TYPE 2 DIABETES MELLITUS WITH STAGE 4 CHRONIC KIDNEY DISEASE, WITH LONG-TERM CURRENT USE OF INSULIN: ICD-10-CM

## 2025-02-10 DIAGNOSIS — Z79.4 TYPE 2 DIABETES MELLITUS WITH STAGE 4 CHRONIC KIDNEY DISEASE, WITH LONG-TERM CURRENT USE OF INSULIN: ICD-10-CM

## 2025-02-10 DIAGNOSIS — E78.1 HYPERTRIGLYCERIDEMIA: ICD-10-CM

## 2025-02-10 DIAGNOSIS — L03.319 CELLULITIS AND ABSCESS OF TRUNK: Primary | ICD-10-CM

## 2025-02-10 DIAGNOSIS — E03.8 OTHER SPECIFIED HYPOTHYROIDISM: Chronic | ICD-10-CM

## 2025-02-10 DIAGNOSIS — L02.219 CELLULITIS AND ABSCESS OF TRUNK: Primary | ICD-10-CM

## 2025-02-10 DIAGNOSIS — I50.30 DIASTOLIC CONGESTIVE HEART FAILURE, NYHA CLASS 1, UNSPECIFIED CONGESTIVE HEART FAILURE CHRONICITY: ICD-10-CM

## 2025-02-10 DIAGNOSIS — N18.4 CKD (CHRONIC KIDNEY DISEASE) STAGE 4, GFR 15-29 ML/MIN: ICD-10-CM

## 2025-02-10 DIAGNOSIS — N18.4 TYPE 2 DIABETES MELLITUS WITH STAGE 4 CHRONIC KIDNEY DISEASE, WITH LONG-TERM CURRENT USE OF INSULIN: ICD-10-CM

## 2025-02-10 DIAGNOSIS — I69.354 HEMIPARESIS AFFECTING LEFT SIDE AS LATE EFFECT OF CEREBROVASCULAR ACCIDENT (CVA): ICD-10-CM

## 2025-02-10 DIAGNOSIS — I48.20 CHRONIC ATRIAL FIBRILLATION: ICD-10-CM

## 2025-02-10 DIAGNOSIS — H35.3211 EXUDATIVE AGE-RELATED MACULAR DEGENERATION OF RIGHT EYE WITH ACTIVE CHOROIDAL NEOVASCULARIZATION: ICD-10-CM

## 2025-02-10 DIAGNOSIS — F03.90 DEMENTIA WITHOUT BEHAVIORAL DISTURBANCE, PSYCHOTIC DISTURBANCE, MOOD DISTURBANCE, OR ANXIETY, UNSPECIFIED DEMENTIA SEVERITY, UNSPECIFIED DEMENTIA TYPE: ICD-10-CM

## 2025-02-10 LAB
ALBUMIN SERPL BCP-MCNC: 3.4 G/DL (ref 3.5–5.2)
ANION GAP SERPL CALC-SCNC: 9 MMOL/L (ref 8–16)
BUN SERPL-MCNC: 67 MG/DL (ref 8–23)
CALCIUM SERPL-MCNC: 10.7 MG/DL (ref 8.7–10.5)
CHLORIDE SERPL-SCNC: 102 MMOL/L (ref 95–110)
CO2 SERPL-SCNC: 30 MMOL/L (ref 23–29)
CREAT SERPL-MCNC: 3.1 MG/DL (ref 0.5–1.4)
EST. GFR  (NO RACE VARIABLE): 13.8 ML/MIN/1.73 M^2
ESTIMATED AVG GLUCOSE: 126 MG/DL (ref 68–131)
GLUCOSE SERPL-MCNC: 50 MG/DL (ref 70–110)
HBA1C MFR BLD: 6 % (ref 4–5.6)
PHOSPHATE SERPL-MCNC: 3.3 MG/DL (ref 2.7–4.5)
POTASSIUM SERPL-SCNC: 4.2 MMOL/L (ref 3.5–5.1)
PTH-INTACT SERPL-MCNC: 20.4 PG/ML (ref 9–77)
SODIUM SERPL-SCNC: 141 MMOL/L (ref 136–145)
TSH SERPL DL<=0.005 MIU/L-ACNC: 6.27 UIU/ML (ref 0.4–4)

## 2025-02-10 PROCEDURE — 99214 OFFICE O/P EST MOD 30 MIN: CPT | Mod: S$GLB,,, | Performed by: STUDENT IN AN ORGANIZED HEALTH CARE EDUCATION/TRAINING PROGRAM

## 2025-02-10 PROCEDURE — 84443 ASSAY THYROID STIM HORMONE: CPT | Performed by: STUDENT IN AN ORGANIZED HEALTH CARE EDUCATION/TRAINING PROGRAM

## 2025-02-10 PROCEDURE — 99999 PR PBB SHADOW E&M-EST. PATIENT-LVL V: CPT | Mod: PBBFAC,,, | Performed by: STUDENT IN AN ORGANIZED HEALTH CARE EDUCATION/TRAINING PROGRAM

## 2025-02-10 PROCEDURE — G2211 COMPLEX E/M VISIT ADD ON: HCPCS | Mod: S$GLB,,, | Performed by: STUDENT IN AN ORGANIZED HEALTH CARE EDUCATION/TRAINING PROGRAM

## 2025-02-10 PROCEDURE — 1159F MED LIST DOCD IN RCRD: CPT | Mod: CPTII,S$GLB,, | Performed by: STUDENT IN AN ORGANIZED HEALTH CARE EDUCATION/TRAINING PROGRAM

## 2025-02-10 PROCEDURE — 36415 COLL VENOUS BLD VENIPUNCTURE: CPT | Mod: PN | Performed by: STUDENT IN AN ORGANIZED HEALTH CARE EDUCATION/TRAINING PROGRAM

## 2025-02-10 PROCEDURE — 84439 ASSAY OF FREE THYROXINE: CPT | Performed by: STUDENT IN AN ORGANIZED HEALTH CARE EDUCATION/TRAINING PROGRAM

## 2025-02-10 PROCEDURE — 80069 RENAL FUNCTION PANEL: CPT | Performed by: INTERNAL MEDICINE

## 2025-02-10 PROCEDURE — 3288F FALL RISK ASSESSMENT DOCD: CPT | Mod: CPTII,S$GLB,, | Performed by: STUDENT IN AN ORGANIZED HEALTH CARE EDUCATION/TRAINING PROGRAM

## 2025-02-10 PROCEDURE — 1160F RVW MEDS BY RX/DR IN RCRD: CPT | Mod: CPTII,S$GLB,, | Performed by: STUDENT IN AN ORGANIZED HEALTH CARE EDUCATION/TRAINING PROGRAM

## 2025-02-10 PROCEDURE — 83970 ASSAY OF PARATHORMONE: CPT | Performed by: INTERNAL MEDICINE

## 2025-02-10 PROCEDURE — 1101F PT FALLS ASSESS-DOCD LE1/YR: CPT | Mod: CPTII,S$GLB,, | Performed by: STUDENT IN AN ORGANIZED HEALTH CARE EDUCATION/TRAINING PROGRAM

## 2025-02-10 PROCEDURE — 83036 HEMOGLOBIN GLYCOSYLATED A1C: CPT | Performed by: STUDENT IN AN ORGANIZED HEALTH CARE EDUCATION/TRAINING PROGRAM

## 2025-02-10 PROCEDURE — 1157F ADVNC CARE PLAN IN RCRD: CPT | Mod: CPTII,S$GLB,, | Performed by: STUDENT IN AN ORGANIZED HEALTH CARE EDUCATION/TRAINING PROGRAM

## 2025-02-10 RX ORDER — MUPIROCIN 20 MG/G
OINTMENT TOPICAL 3 TIMES DAILY
Qty: 30 G | Refills: 1 | Status: SHIPPED | OUTPATIENT
Start: 2025-02-10 | End: 2025-02-17

## 2025-02-10 RX ORDER — LEVOTHYROXINE SODIUM 100 UG/1
100 TABLET ORAL
Qty: 90 TABLET | Refills: 1 | Status: SHIPPED | OUTPATIENT
Start: 2025-02-10

## 2025-02-10 RX ORDER — DOXYCYCLINE 100 MG/1
100 CAPSULE ORAL EVERY 12 HOURS
Qty: 14 CAPSULE | Refills: 0 | Status: SHIPPED | OUTPATIENT
Start: 2025-02-10 | End: 2025-02-17

## 2025-02-10 NOTE — TELEPHONE ENCOUNTER
Provider Staff:  Action required for this patient    Requires labs      Please see care gap opportunities below in Care Due Message.    Thanks!  [facility]     Appointments      Date Provider   Last Visit   8/13/2024 Allison Woods MD   Next Visit   2/10/2025 Allison Woods MD     Refill Decision Note   Denice Sheth  is requesting a refill authorization.  Brief Assessment and Rationale for Refill:  Approve     Medication Therapy Plan:         Comments:     Note composed:8:27 AM 02/10/2025

## 2025-02-10 NOTE — PROGRESS NOTES
Plan:      Denice was seen today for diabetes and follow-up.    Diagnoses and all orders for this visit:    Cellulitis and abscess of trunk  -     doxycycline (VIBRAMYCIN) 100 MG Cap; Take 1 capsule (100 mg total) by mouth every 12 (twelve) hours. MUST TAKE WITH FULL MEAL AND GLASS OF WATER. for 7 days  -     mupirocin (BACTROBAN) 2 % ointment; Apply topically 3 (three) times daily. for 7 days    Hemiparesis affecting left side as late effect of cerebrovascular accident (CVA)  - ASA 81 mg daily, Zocor 10 mg qhs    Dementia without behavioral disturbance, psychotic disturbance, mood disturbance, or anxiety, unspecified dementia severity, unspecified dementia type  - Rivastigmine tartrate 1.5 mg BID, memantine 10 mg daily    Chronic atrial fibrillation  - Lopressor 50 mg daily BID, digoxin 125 mg every other day, amiodarone 200 mg daily    Diastolic congestive heart failure, NYHA class 1, unspecified congestive heart failure chronicity  - Lasix 40 mg daily   - KCL 10 mEq BID    Hypertriglyceridemia  -     Lipid Panel; Future    Exudative age-related macular degeneration of right eye with active choroidal neovascularization    Type 2 diabetes mellitus with stage 4 chronic kidney disease, with long-term current use of insulin  -     Hemoglobin A1C; Future  -     Microalbumin/Creatinine Ratio, Urine; Future    Other specified hypothyroidism  -     TSH; Future      Follow up in about 1 week (around 2025), or if symptoms worsen or fail to improve.    Allison Woods MD  02/10/2025    Subjective:      Patient ID: Denice Sheth is a 89 y.o. female    Chief Complaint   Patient presents with    Diabetes    Follow-up     Cyst on the back of the left shoulder     HPI  89 y.o. female with a PMHx as documented below presents to clinic today for the followin week history of 'cyst' to the left upper back/shoulder region. Pt reports lesion started as a 'pimple-like' lesion and has become progressively larger, more  erythematous, and tender. Pt reports spontaneous drainage of lesion occurring yesterday and today. No fever, chills.    DMT2:   - Hgb A1c 6.3 (8/13/24)   - Insulin glardine 28 u qhs, lispro 16 u qhs  - Zocor 10 mg qhs  - UTD on diabetic eye exam and foot exam  -------------------  Hx of CVA, Hx of TIA; left-sided hemiparesis 2/2 CVA:   - ASA 81 mg daily, Zocor 10 mg qhs     Dementia:   - Rivastigmine tartrate 1.5 mg BID, memantine 10 mg daily  -------------------  **Follows w/ Cardiology ([facility])     HTN:   - Lopressor 50 mg daily BID     Hypertriglyceridemia:   - Zocor 10 mg qhs     CAD:   - ASA 81 mg daily, Plavix 75 mg daily, Zocor 10 mg qhs     Carotid artery stenosis, left:   - ASA 81 mg daily, Plavix 75 mg daily, Zocor 10 mg qhs     HFpEF:   - Lasix 40 mg daily   - KCL 10 mEq BID     TTE (6/2/23, outside reports, see scanned media):  - Mild concentric LVH  - EF 55-60%  - Pseudo-normal LV filling pattern, consistent with elevated LA pressure  - Left atrium is markedy dilated   - mild aortic valve sclerosis without stenosis  - mild-moderate aortic regurgitation  - Mild mitral annular calcification  mild mitral regurgitation     Afib:   - Lopressor 50 mg daily BID, digoxin 125 mg every other day, amiodarone 200 mg daily  - Previous Rx: Eliquis 2.5 mg BID (discontinued per Cards 2/2 expense and lack of afib episodes)    -------------------  CKD stage 4:  - BUN/Cr 55/2.6, eGFR 17.1 (12/4/23)       GERD:   - Pepcid 20 mg BID     Hypothyroidism:   - TSH 7.103, free T4 wnl (12/4/23)   - Synthroid 100 mcg daily     Insomnia:   - Trazodone PRN    ROS  Constitutional:  Negative for chills and fever.   Respiratory:  Negative for shortness of breath.    Cardiovascular:  Negative for chest pain.   Gastrointestinal:  Negative for abdominal pain, constipation, diarrhea, nausea and vomiting.     Current Outpatient Medications   Medication Instructions    amiodarone (PACERONE) 200 mg, Daily     ascorbic acid (vitamin C) (VITAMIN C) 500 mg, Daily    b complex vitamins capsule 1 capsule, Daily    blood sugar diagnostic Strp 1 strip 2 times daily, to use with insurance preferred meter    blood-glucose meter kit To check BG 2 times daily, to use with insurance preferred meter    blood-glucose meter,continuous (DEXCOM ) Misc 1 each, Misc.(Non-Drug; Combo Route), Daily    blood-glucose sensor (DEXCOM G7 SENSOR) Petty USE TO CHECK BLOOD GLUCOSE AS DIRECTED. CHANGE SENSOR EVERY 10 DAYS    blood-glucose transmitter (DEXCOM G5 TRANSMITTER) Petty 1 each, Misc.(Non-Drug; Combo Route), Daily    cholecalciferol (vitamin D3) 5,000 Units, Oral, Daily    clopidogrel (PLAVIX) 75 mg tablet TAKE ONE TABLET BY MOUTH ONCE DAILY    diclofenac sodium (VOLTAREN ARTHRITIS PAIN) 2 g, Topical (Top), 2 times daily PRN    digoxin (LANOXIN) 125 mcg tablet 1 tablet, Every other day    diphenoxylate-atropine 2.5-0.025 mg (LOMOTIL) 2.5-0.025 mg per tablet 1 tablet, Oral, 4 times daily PRN    doxycycline (VIBRAMYCIN) 100 mg, Oral, Every 12 hours, MUST TAKE WITH FULL MEAL AND GLASS OF WATER.    fish oil-omega-3 fatty acids 300-1,000 mg capsule 1 capsule, 2 times daily    furosemide (LASIX) 40 MG tablet TAKE 1 TABLET BY MOUTH ONCE DAILY (TAKE  AN  EXTRA  TABLET  FOR  ANY  WEIGHT  GAIN  OF  3  POUNDS  OR  MORE  ON  ANY  GIVEN  DAY)    insulin lispro 100 unit/mL pen INJECT 16 UNITS THREE TIMES DAILY WITH MEALS    lancets 31 gauge Misc 1 strip 2 times daily, to use with insurance preferred meter    LANTUS SOLOSTAR U-100 INSULIN 28 Units, Subcutaneous, Nightly    levothyroxine (SYNTHROID) 100 mcg, Oral    magnesium 200 mg, Daily    memantine (NAMENDA) 10 MG Tab TAKE 1 TABLET BY MOUTH ONCE DAILY IN THE EVENING    metoprolol tartrate (LOPRESSOR) 50 mg, Oral, 2 times daily    multivitamin (THERAGRAN) per tablet 1 tablet, Daily    mupirocin (BACTROBAN) 2 % ointment Topical (Top), 3 times daily    ondansetron (ZOFRAN-ODT) 4 mg, Oral, Every 8  "hours PRN    polyethylene glycol (GLYCOLAX) 17 g, As needed (PRN)    potassium chloride (KLOR-CON) 10 MEQ TbSR 10 mEq, 2 times daily    rivastigmine tartrate (EXELON) 1.5 mg, Oral, 2 times daily    simvastatin (ZOCOR) 10 mg, Nightly      Past Medical History:   Diagnosis Date    Anticoagulant long-term use     Aortic calcification 01/02/2022    CXR 1/2/22    Aortic valve regurgitation 10/13/2021    CAD (coronary artery disease) 01/06/2022    Cecum mass     Chronic atrial fibrillation 05/02/2017    Chronic diastolic heart failure     CKD (chronic kidney disease) stage 4, GFR 15-29 ml/min 02/22/2016    Dementia 08/31/2022    Diabetic retinopathy 03/24/2021    Disorder of kidney and ureter     follows w/ Dr. 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 09/12/2022    Gastroesophageal reflux disease without esophagitis 9/17/2023    H/O: CVA (cerebrovascular accident) 07/30/2019    Hemiparesis affecting left side as late effect of cerebrovascular accident (CVA) 06/05/2017    History of TIA (transient ischemic attack) 09/17/2023    Hypertension     KRISTEN (iron deficiency anemia) 01/12/2018    Insomnia     Irregular heart rhythm     Mitral valve regurgitation 10/13/2021    Other specified hypothyroidism 06/24/2019    Pacemaker 01/02/2023    Primary hypertension 02/09/2016    Proteinuria due to type 2 diabetes mellitus 03/12/2019    Senile purpura 12/21/2022    Sick sinus syndrome 2018    s/p pacemaker placement    Stenosis of left carotid artery 12/06/2016    TIA (transient ischemic attack) 03/2017    Type 2 diabetes mellitus with stage 4 chronic kidney disease, with long-term current use of insulin       Objective:      Vitals:    02/10/25 0903   BP: 128/68   Patient Position: Sitting   Pulse: 64   SpO2: 99%   Weight: 68.4 kg (150 lb 12.7 oz)   Height: 5' 2" (1.575 m)     Body mass index is 27.58 kg/m².    Physical Exam "   Constitutional:       General: No acute distress.  HENT:      Head: Normocephalic and atraumatic.   Pulmonary:      Effort: Pulmonary effort is normal. No respiratory distress.   Neurological:      General: No focal deficit present.      Mental Status: Alert and oriented to person, place, and time. Mental status is at baseline.        Assessment:       1. Cellulitis and abscess of trunk    2. Hemiparesis affecting left side as late effect of cerebrovascular accident (CVA)    3. Dementia without behavioral disturbance, psychotic disturbance, mood disturbance, or anxiety, unspecified dementia severity, unspecified dementia type    4. Chronic atrial fibrillation    5. Diastolic congestive heart failure, NYHA class 1, unspecified congestive heart failure chronicity    6. Hypertriglyceridemia    7. Exudative age-related macular degeneration of right eye with active choroidal neovascularization    8. Type 2 diabetes mellitus with stage 4 chronic kidney disease, with long-term current use of insulin    9. Other specified hypothyroidism        Allison Woods MD  [facility]  Office: [phone]   Fax: [phone]  02/10/2025      Disclaimer: This note was partly generated using dictation software which may occasionally result in transcription errors.    Total time spent on this encounter includes face to face time and non-face to face time preparing to see the patient (eg, review of tests), obtaining and/or reviewing separately obtained history, documenting clinical information in the electronic or other health record, independently interpreting results, and communicating results to the patient/family/caregiver, or care coordinator.      Visit today included increased complexity associated with the care of the episodic problem (see above) addressed and managing the longitudinal care of the patient due to the serious and/or complex managed problem(s) (see above).

## 2025-02-11 LAB — T4 FREE SERPL-MCNC: 1.19 NG/DL (ref 0.71–1.51)

## 2025-02-17 ENCOUNTER — OFFICE VISIT (OUTPATIENT)
Dept: FAMILY MEDICINE | Facility: CLINIC | Age: OVER 89
End: 2025-02-17
Payer: MEDICARE

## 2025-02-17 ENCOUNTER — LAB VISIT (OUTPATIENT)
Dept: LAB | Facility: HOSPITAL | Age: OVER 89
End: 2025-02-17
Attending: STUDENT IN AN ORGANIZED HEALTH CARE EDUCATION/TRAINING PROGRAM
Payer: MEDICARE

## 2025-02-17 ENCOUNTER — OFFICE VISIT (OUTPATIENT)
Dept: NEPHROLOGY | Facility: CLINIC | Age: OVER 89
End: 2025-02-17
Payer: MEDICARE

## 2025-02-17 ENCOUNTER — TELEPHONE (OUTPATIENT)
Dept: NEPHROLOGY | Facility: CLINIC | Age: OVER 89
End: 2025-02-17

## 2025-02-17 VITALS
BODY MASS INDEX: 27.75 KG/M2 | HEART RATE: 60 BPM | DIASTOLIC BLOOD PRESSURE: 72 MMHG | SYSTOLIC BLOOD PRESSURE: 122 MMHG | HEIGHT: 62 IN | WEIGHT: 150.81 LBS

## 2025-02-17 DIAGNOSIS — R80.9 PROTEINURIA DUE TO TYPE 2 DIABETES MELLITUS: ICD-10-CM

## 2025-02-17 DIAGNOSIS — E11.22 TYPE 2 DIABETES MELLITUS WITH STAGE 4 CHRONIC KIDNEY DISEASE, WITH LONG-TERM CURRENT USE OF INSULIN: ICD-10-CM

## 2025-02-17 DIAGNOSIS — E78.1 HYPERTRIGLYCERIDEMIA: ICD-10-CM

## 2025-02-17 DIAGNOSIS — N18.4 CKD (CHRONIC KIDNEY DISEASE) STAGE 4, GFR 15-29 ML/MIN: Primary | ICD-10-CM

## 2025-02-17 DIAGNOSIS — Z79.4 TYPE 2 DIABETES MELLITUS WITH STAGE 4 CHRONIC KIDNEY DISEASE, WITH LONG-TERM CURRENT USE OF INSULIN: ICD-10-CM

## 2025-02-17 DIAGNOSIS — E87.1 HYPONATREMIA: ICD-10-CM

## 2025-02-17 DIAGNOSIS — L02.219 CELLULITIS AND ABSCESS OF TRUNK: ICD-10-CM

## 2025-02-17 DIAGNOSIS — N18.4 TYPE 2 DIABETES MELLITUS WITH STAGE 4 CHRONIC KIDNEY DISEASE, WITH LONG-TERM CURRENT USE OF INSULIN: ICD-10-CM

## 2025-02-17 DIAGNOSIS — I10 PRIMARY HYPERTENSION: ICD-10-CM

## 2025-02-17 DIAGNOSIS — L03.319 CELLULITIS AND ABSCESS OF TRUNK: ICD-10-CM

## 2025-02-17 DIAGNOSIS — N18.5 CHRONIC KIDNEY DISEASE (CKD), STAGE V: Primary | ICD-10-CM

## 2025-02-17 DIAGNOSIS — E11.29 PROTEINURIA DUE TO TYPE 2 DIABETES MELLITUS: ICD-10-CM

## 2025-02-17 LAB
CHOLEST SERPL-MCNC: 169 MG/DL (ref 120–199)
CHOLEST/HDLC SERPL: 3.7 {RATIO} (ref 2–5)
HDLC SERPL-MCNC: 46 MG/DL (ref 40–75)
HDLC SERPL: 27.2 % (ref 20–50)
LDLC SERPL CALC-MCNC: 92.8 MG/DL (ref 63–159)
NONHDLC SERPL-MCNC: 123 MG/DL
TRIGL SERPL-MCNC: 151 MG/DL (ref 30–150)

## 2025-02-17 PROCEDURE — 36415 COLL VENOUS BLD VENIPUNCTURE: CPT | Mod: PN | Performed by: STUDENT IN AN ORGANIZED HEALTH CARE EDUCATION/TRAINING PROGRAM

## 2025-02-17 PROCEDURE — 80061 LIPID PANEL: CPT | Performed by: STUDENT IN AN ORGANIZED HEALTH CARE EDUCATION/TRAINING PROGRAM

## 2025-02-17 RX ORDER — DOXYCYCLINE 100 MG/1
100 CAPSULE ORAL EVERY 12 HOURS
Qty: 14 CAPSULE | Refills: 0 | Status: SHIPPED | OUTPATIENT
Start: 2025-02-17 | End: 2025-02-24

## 2025-02-17 NOTE — PROGRESS NOTES
Plan:      Denice was seen today for follow-up.    Diagnoses and all orders for this visit:    Chronic kidney disease (CKD), stage V: Nephrology appt later today.    Cellulitis and abscess of trunk: Additional 7 days antibiotics to ensure resolution. Overall, healing well.   -     doxycycline (VIBRAMYCIN) 100 MG Cap; Take 1 capsule (100 mg total) by mouth every 12 (twelve) hours. MUST TAKE WITH FULL MEAL AND GLASS OF WATER. for 7 days    Follow up in about 3 months (around 5/17/2025), or if symptoms worsen or fail to improve.    Allison Woods MD  02/17/2025    Subjective:      Patient ID: Denice Sheth is a 89 y.o. female    Chief Complaint   Patient presents with    Follow-up     Abscess is looking much improved     HPI  89 y.o. female with a PMHx as documented below presents to clinic today for the following:    F/u cellulitis vs abscess noted at last clinic visit. Pt reports lesion healing well. No systemic symptoms. Completed antibiotics without side effects.       Clinic visit 2/10/25:   1 week history of 'cyst' to the left upper back/shoulder region. Pt reports lesion started as a 'pimple-like' lesion and has become progressively larger, more erythematous, and tender. Pt reports spontaneous drainage of lesion occurring yesterday and today. No fever, chills.    -------------------  DMT2:   - Hgb A1c 6.3 (8/13/24)   - Insulin glardine 28 u qhs, lispro 16 u qhs  - Zocor 10 mg qhs  - UTD on diabetic eye exam and foot exam  -------------------  Hx of CVA, Hx of TIA; left-sided hemiparesis 2/2 CVA:   - ASA 81 mg daily, Zocor 10 mg qhs     Dementia:   - Rivastigmine tartrate 1.5 mg BID, memantine 10 mg daily  -------------------  **Follows w/ Cardiology ([facility])     HTN:   - Lopressor 50 mg daily BID     Hypertriglyceridemia:   - Zocor 10 mg qhs     CAD:   - ASA 81 mg daily, Plavix 75 mg daily, Zocor 10 mg qhs     Carotid artery stenosis, left:   - ASA 81 mg daily, Plavix 75 mg  daily, Zocor 10 mg qhs     HFpEF:   - Lasix 40 mg daily   - KCL 10 mEq BID     TTE (6/2/23, outside reports, see scanned media):  - Mild concentric LVH  - EF 55-60%  - Pseudo-normal LV filling pattern, consistent with elevated LA pressure  - Left atrium is markedy dilated   - mild aortic valve sclerosis without stenosis  - mild-moderate aortic regurgitation  - Mild mitral annular calcification  mild mitral regurgitation     Afib:   - Lopressor 50 mg daily BID, digoxin 125 mg every other day, amiodarone 200 mg daily  - Previous Rx: Eliquis 2.5 mg BID (discontinued per Cards 2/2 expense and lack of afib episodes)     -------------------  CKD stage 4:  - BUN/Cr 55/2.6, eGFR 17.1 (12/4/23)       GERD:   - Pepcid 20 mg BID     Hypothyroidism:   - TSH 7.103, free T4 wnl (12/4/23)   - Synthroid 100 mcg daily     Insomnia:   - Trazodone PRN    ROS  Constitutional:  Negative for chills and fever.   Respiratory:  Negative for shortness of breath.    Cardiovascular:  Negative for chest pain.   Gastrointestinal:  Negative for abdominal pain, constipation, diarrhea, nausea and vomiting.     Current Outpatient Medications   Medication Instructions    amiodarone (PACERONE) 200 mg, Daily    ascorbic acid (vitamin C) (VITAMIN C) 500 mg, Daily    b complex vitamins capsule 1 capsule, Daily    blood sugar diagnostic Strp 1 strip 2 times daily, to use with insurance preferred meter    blood-glucose meter kit To check BG 2 times daily, to use with insurance preferred meter    blood-glucose meter,continuous (DEXCOM ) Misc 1 each, Misc.(Non-Drug; Combo Route), Daily    blood-glucose sensor (DEXCOM G7 SENSOR) Petty USE TO CHECK BLOOD GLUCOSE AS DIRECTED. CHANGE SENSOR EVERY 10 DAYS    blood-glucose transmitter (DEXCOM G5 TRANSMITTER) Petty 1 each, Misc.(Non-Drug; Combo Route), Daily    cholecalciferol (vitamin D3) 5,000 Units, Oral, Daily    clopidogrel (PLAVIX) 75 mg tablet TAKE ONE TABLET BY MOUTH ONCE DAILY    diclofenac sodium  (VOLTAREN ARTHRITIS PAIN) 2 g, Topical (Top), 2 times daily PRN    digoxin (LANOXIN) 125 mcg tablet 1 tablet, Every other day    diphenoxylate-atropine 2.5-0.025 mg (LOMOTIL) 2.5-0.025 mg per tablet 1 tablet, Oral, 4 times daily PRN    doxycycline (VIBRAMYCIN) 100 mg, Oral, Every 12 hours, MUST TAKE WITH FULL MEAL AND GLASS OF WATER.    fish oil-omega-3 fatty acids 300-1,000 mg capsule 1 capsule, 2 times daily    furosemide (LASIX) 40 MG tablet TAKE 1 TABLET BY MOUTH ONCE DAILY (TAKE  AN  EXTRA  TABLET  FOR  ANY  WEIGHT  GAIN  OF  3  POUNDS  OR  MORE  ON  ANY  GIVEN  DAY)    insulin lispro 100 unit/mL pen INJECT 16 UNITS THREE TIMES DAILY WITH MEALS    lancets 31 gauge Misc 1 strip 2 times daily, to use with insurance preferred meter    LANTUS SOLOSTAR U-100 INSULIN 28 Units, Subcutaneous, Nightly    levothyroxine (SYNTHROID) 100 mcg, Oral    magnesium 200 mg, Daily    memantine (NAMENDA) 10 MG Tab TAKE 1 TABLET BY MOUTH ONCE DAILY IN THE EVENING    metoprolol tartrate (LOPRESSOR) 50 mg, Oral, 2 times daily    multivitamin (THERAGRAN) per tablet 1 tablet, Daily    mupirocin (BACTROBAN) 2 % ointment Topical (Top), 3 times daily    ondansetron (ZOFRAN-ODT) 4 mg, Oral, Every 8 hours PRN    polyethylene glycol (GLYCOLAX) 17 g, As needed (PRN)    potassium chloride (KLOR-CON) 10 MEQ TbSR 10 mEq, 2 times daily    rivastigmine tartrate (EXELON) 1.5 mg, Oral, 2 times daily    simvastatin (ZOCOR) 10 mg, Nightly      Past Medical History:   Diagnosis Date    Anticoagulant long-term use     Aortic calcification 01/02/2022    CXR 1/2/22    Aortic valve regurgitation 10/13/2021    CAD (coronary artery disease) 01/06/2022    Cecum mass     Chronic atrial fibrillation 05/02/2017    Chronic diastolic heart failure     CKD (chronic kidney disease) stage 4, GFR 15-29 ml/min 02/22/2016    Dementia 08/31/2022    Diabetic retinopathy 03/24/2021    Disorder of kidney and ureter     follows w/ Dr. 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 09/12/2022    Gastroesophageal reflux disease without esophagitis 9/17/2023    H/O: CVA (cerebrovascular accident) 07/30/2019    Hemiparesis affecting left side as late effect of cerebrovascular accident (CVA) 06/05/2017    History of TIA (transient ischemic attack) 09/17/2023    Hypertension     KRISTEN (iron deficiency anemia) 01/12/2018    Insomnia     Irregular heart rhythm     Mitral valve regurgitation 10/13/2021    Other specified hypothyroidism 06/24/2019    Pacemaker 01/02/2023    Primary hypertension 02/09/2016    Proteinuria due to type 2 diabetes mellitus 03/12/2019    Senile purpura 12/21/2022    Sick sinus syndrome 2018    s/p pacemaker placement    Stenosis of left carotid artery 12/06/2016    TIA (transient ischemic attack) 03/2017    Type 2 diabetes mellitus with stage 4 chronic kidney disease, with long-term current use of insulin       Objective:      Vitals:    02/17/25 1140   BP: 122/72   BP Location: Left arm   Patient Position: Sitting   Pulse: 60   Weight: 68.4 kg (150 lb 12.7 oz)   Height: 5' 2" (1.575 m)     Body mass index is 27.58 kg/m².    Physical Exam   Constitutional:       General: No acute distress.  HENT:      Head: Normocephalic and atraumatic.   Pulmonary:      Effort: Pulmonary effort is normal. No respiratory distress.   Neurological:      General: No focal deficit present.      Mental Status: Alert and oriented to person, place, and time. Mental status is at baseline.    Assessment:       1. Chronic kidney disease (CKD), stage V    2. Cellulitis and abscess of trunk        Allison Woods MD  [facility]  Office: [phone]   Fax: [phone]  02/17/2025      Disclaimer: This note was partly generated using dictation software which may occasionally result in transcription errors.    Total time spent on this encounter includes face to " face time and non-face to face time preparing to see the patient (eg, review of tests), obtaining and/or reviewing separately obtained history, documenting clinical information in the electronic or other health record, independently interpreting results, and communicating results to the patient/family/caregiver, or care coordinator.

## 2025-02-17 NOTE — PROGRESS NOTES
Subjective:       Patient ID: Denice Sheth is a 89 y.o. White female who presents for return patient evaluation for chronic renal failure.    The patient location is:  Patient Home   The chief complaint leading to consultation is: CKD  Visit type: Virtual visit with synchronous audio and video  Total time spent with patient: 12 minutes  Each patient to whom he or she provides medical services by telemedicine is:  (1) informed of the relationship between the physician and patient and the respective role of any other health care provider with respect to management of the patient; and (2) notified that he or she may decline to receive medical services by telemedicine and may withdraw from such care at any time.        She has no uremic or urinary symptoms.  She had COVID in Dec 2022 and July 2024.  She had a recent infection on her shoulder and is on doxycycline.      Review of Systems   Constitutional:  Negative for appetite change, chills and fever.   Eyes:  Negative for visual disturbance.   Respiratory:  Positive for shortness of breath (with exertion). Negative for cough.    Cardiovascular:  Negative for chest pain and leg swelling.   Gastrointestinal:  Negative for abdominal pain, diarrhea, nausea and vomiting.   Endocrine: Positive for cold intolerance.   Genitourinary:  Negative for difficulty urinating, dysuria and hematuria.   Musculoskeletal:  Positive for arthralgias (right knee) and gait problem. Negative for myalgias.   Skin:  Negative for rash.   Neurological:  Positive for weakness. Negative for headaches.   Hematological:  Bruises/bleeds easily.   Psychiatric/Behavioral:  Negative for sleep disturbance.        The past medical, family and social histories were reviewed for this encounter.     LMP  (LMP Unknown)     Objective:      Physical Exam  Vitals reviewed.   Constitutional:       General: She is not in acute distress.     Appearance: She is well-developed.   HENT:      Head: Normocephalic  and atraumatic.   Eyes:      General: No scleral icterus.  Pulmonary:      Effort: Pulmonary effort is normal.   Neurological:      Mental Status: She is alert and oriented to person, place, and time.   Psychiatric:         Mood and Affect: Mood normal.         Behavior: Behavior normal.        Assessment:       1. CKD (chronic kidney disease) stage 4, GFR 15-29 ml/min    2. Hyponatremia    3. Primary hypertension    4. 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 Type 2 diabetes mellitus with stage 4 chronic kidney disease, with long-term current use of insulin       Lab Results   Component Value Date    CREATININE 3.1 (H) 02/10/2025    BUN 67 (H) 02/10/2025     02/10/2025    K 4.2 02/10/2025     02/10/2025    CO2 30 (H) 02/10/2025     Lab Results   Component Value Date    PTH 20.4 02/10/2025    CALCIUM 10.7 (H) 02/10/2025    PHOS 3.3 02/10/2025     Lab Results   Component Value Date    HCT 41.2 07/24/2023     Prot/Creat Ratio, Urine   Date Value Ref Range Status   11/15/2024 0.31 (H) 0.00 - 0.20 Final   08/15/2024 0.20 0.00 - 0.20 Final   08/06/2024 0.25 (H) 0.00 - 0.20 Final      Plan:   Return to clinic in 6 months.  Labs for next visit include rp pth upc per standing orders q 3 months.   Baseline creatinine is 1.5-1.9 since 2015. Lately she has been 2.0-2.5.  PTH is 20 with a calcium of 10.7.  Hold D3.  UPC is 0.31.  Renal US shows R 9.7 cm L 10.9 cm with no cysts/stones/masses.  Blood pressure is controlled on the current regimen.  Continue current medications as prescribed and reviewed.   She does not wish to do dialysis.

## 2025-02-20 ENCOUNTER — TELEPHONE (OUTPATIENT)
Dept: FAMILY MEDICINE | Facility: CLINIC | Age: OVER 89
End: 2025-02-20
Payer: MEDICARE

## 2025-02-20 NOTE — TELEPHONE ENCOUNTER
----- Message from Licha sent at 2/20/2025  2:07 PM CST -----  Contact: Parisa Lange  Type:  Appointment RequestCaller is requesting a sooner appointment.  Caller declined first available appointment listed below.  Caller will not accept being placed on the waitlist and is requesting a message be sent to doctor.Name of Caller:  Jorge Lange is the first available appointment?  N/AWould the patient rather a call back or a response via [facility]?  Call [facility] Call Back Number:    [phone] - SuzanneAdditional Information:   States she would like to speak with someone to see if she can be seen soon for an evaluation of her mental status - please call - thank you

## 2025-02-20 NOTE — TELEPHONE ENCOUNTER
Rt pt daughter call, pt daughter wants her to get mental status evaluation. Got her scheduled for Monday.

## 2025-02-24 ENCOUNTER — OFFICE VISIT (OUTPATIENT)
Dept: FAMILY MEDICINE | Facility: CLINIC | Age: OVER 89
End: 2025-02-24
Payer: MEDICARE

## 2025-02-24 ENCOUNTER — LAB VISIT (OUTPATIENT)
Dept: LAB | Facility: HOSPITAL | Age: OVER 89
End: 2025-02-24
Payer: MEDICARE

## 2025-02-24 VITALS
HEART RATE: 60 BPM | BODY MASS INDEX: 26.98 KG/M2 | DIASTOLIC BLOOD PRESSURE: 62 MMHG | WEIGHT: 146.63 LBS | SYSTOLIC BLOOD PRESSURE: 122 MMHG | HEIGHT: 62 IN

## 2025-02-24 DIAGNOSIS — F03.90 DEMENTIA WITHOUT BEHAVIORAL DISTURBANCE, PSYCHOTIC DISTURBANCE, MOOD DISTURBANCE, OR ANXIETY, UNSPECIFIED DEMENTIA SEVERITY, UNSPECIFIED DEMENTIA TYPE: Chronic | ICD-10-CM

## 2025-02-24 DIAGNOSIS — R41.82 ALTERED MENTAL STATUS, UNSPECIFIED ALTERED MENTAL STATUS TYPE: Primary | ICD-10-CM

## 2025-02-24 DIAGNOSIS — R41.82 ALTERED MENTAL STATUS, UNSPECIFIED ALTERED MENTAL STATUS TYPE: ICD-10-CM

## 2025-02-24 LAB
ALBUMIN SERPL BCP-MCNC: 3.5 G/DL (ref 3.5–5.2)
ALP SERPL-CCNC: 68 U/L (ref 40–150)
ALT SERPL W/O P-5'-P-CCNC: 24 U/L (ref 10–44)
ANION GAP SERPL CALC-SCNC: 14 MMOL/L (ref 8–16)
AST SERPL-CCNC: 35 U/L (ref 10–40)
BILIRUB SERPL-MCNC: 0.4 MG/DL (ref 0.1–1)
BILIRUBIN, UA POC OHS: NEGATIVE
BLOOD, UA POC OHS: NEGATIVE
BUN SERPL-MCNC: 72 MG/DL (ref 8–23)
CALCIUM SERPL-MCNC: 10.7 MG/DL (ref 8.7–10.5)
CHLORIDE SERPL-SCNC: 103 MMOL/L (ref 95–110)
CLARITY, UA POC OHS: CLEAR
CO2 SERPL-SCNC: 27 MMOL/L (ref 23–29)
COLOR, UA POC OHS: YELLOW
CREAT SERPL-MCNC: 2.7 MG/DL (ref 0.5–1.4)
ERYTHROCYTE [DISTWIDTH] IN BLOOD BY AUTOMATED COUNT: 13.4 % (ref 11.5–14.5)
EST. GFR  (NO RACE VARIABLE): 16.3 ML/MIN/1.73 M^2
GLUCOSE SERPL-MCNC: 145 MG/DL (ref 70–110)
GLUCOSE, UA POC OHS: NEGATIVE
HCT VFR BLD AUTO: 48.1 % (ref 37–48.5)
HGB BLD-MCNC: 14.6 G/DL (ref 12–16)
KETONES, UA POC OHS: NEGATIVE
LEUKOCYTES, UA POC OHS: NEGATIVE
MCH RBC QN AUTO: 30.4 PG (ref 27–31)
MCHC RBC AUTO-ENTMCNC: 30.4 G/DL (ref 32–36)
MCV RBC AUTO: 100 FL (ref 82–98)
NITRITE, UA POC OHS: NEGATIVE
PH, UA POC OHS: 5.5
PLATELET # BLD AUTO: 207 K/UL (ref 150–450)
PMV BLD AUTO: 11.8 FL (ref 9.2–12.9)
POTASSIUM SERPL-SCNC: 4.1 MMOL/L (ref 3.5–5.1)
PROT SERPL-MCNC: 7 G/DL (ref 6–8.4)
PROTEIN, UA POC OHS: 30
RBC # BLD AUTO: 4.81 M/UL (ref 4–5.4)
SODIUM SERPL-SCNC: 144 MMOL/L (ref 136–145)
SPECIFIC GRAVITY, UA POC OHS: 1.01
T4 FREE SERPL-MCNC: 1.51 NG/DL (ref 0.71–1.51)
TSH SERPL DL<=0.005 MIU/L-ACNC: 4.63 UIU/ML (ref 0.4–4)
UROBILINOGEN, UA POC OHS: 0.2
WBC # BLD AUTO: 8.46 K/UL (ref 3.9–12.7)

## 2025-02-24 PROCEDURE — 99999 PR PBB SHADOW E&M-EST. PATIENT-LVL V: CPT | Mod: PBBFAC,,,

## 2025-02-24 PROCEDURE — 80053 COMPREHEN METABOLIC PANEL: CPT

## 2025-02-24 PROCEDURE — 85027 COMPLETE CBC AUTOMATED: CPT

## 2025-02-24 PROCEDURE — 81003 URINALYSIS AUTO W/O SCOPE: CPT | Mod: QW,S$GLB,,

## 2025-02-24 PROCEDURE — 1159F MED LIST DOCD IN RCRD: CPT | Mod: CPTII,S$GLB,,

## 2025-02-24 PROCEDURE — 84439 ASSAY OF FREE THYROXINE: CPT

## 2025-02-24 PROCEDURE — 36415 COLL VENOUS BLD VENIPUNCTURE: CPT | Mod: PN

## 2025-02-24 PROCEDURE — 1126F AMNT PAIN NOTED NONE PRSNT: CPT | Mod: CPTII,S$GLB,,

## 2025-02-24 PROCEDURE — 3288F FALL RISK ASSESSMENT DOCD: CPT | Mod: CPTII,S$GLB,,

## 2025-02-24 PROCEDURE — 1157F ADVNC CARE PLAN IN RCRD: CPT | Mod: CPTII,S$GLB,,

## 2025-02-24 PROCEDURE — 1101F PT FALLS ASSESS-DOCD LE1/YR: CPT | Mod: CPTII,S$GLB,,

## 2025-02-24 PROCEDURE — 84443 ASSAY THYROID STIM HORMONE: CPT

## 2025-02-24 PROCEDURE — 87086 URINE CULTURE/COLONY COUNT: CPT

## 2025-02-24 PROCEDURE — 99214 OFFICE O/P EST MOD 30 MIN: CPT | Mod: S$GLB,,,

## 2025-02-24 NOTE — PROGRESS NOTES
[facility]  [address]  [place] [address]    Subjective    Chief Complaint:   Chief Complaint   Patient presents with    Altered Mental Status     Being more forgetful, also wants to get her cellulitis on her back looked at.        History of Present Illness:     Deince Sheth is a(n) 89 y.o. female with past medical history as noted below who presents to the clinic today for mental status change. She is present with her daughter.     1 week onset worsening forgetfulness and confusion. Her daughter reports she will sometimes forget where she is. No fever, urinary symptoms, abdominal pain or other symptoms. She has a history of dementia and CVA. The patient denies new vision problems, weakness, speech difficulty, or other symptoms. She is currently taking rivastigmine 1.5 mg BID. No falls recently. She lives with her daughter and is supervised at all times.     She was recently treated for cellulitis on her back. Has one more dose of doxycycline left. See media below         Problem List: Problem List[1]    Current Outpatient Medications:   Current Outpatient Medications   Medication Instructions    amiodarone (PACERONE) 200 mg, Daily    ascorbic acid (vitamin C) (VITAMIN C) 500 mg, Daily    b complex vitamins capsule 1 capsule, Daily    blood sugar diagnostic Strp 1 strip 2 times daily, to use with insurance preferred meter    blood-glucose meter kit To check BG 2 times daily, to use with insurance preferred meter    blood-glucose meter,continuous (DEXCOM ) Misc 1 each, Misc.(Non-Drug; Combo Route), Daily    blood-glucose sensor (DEXCOM G7 SENSOR) Petty USE TO CHECK BLOOD GLUCOSE AS DIRECTED. CHANGE SENSOR EVERY 10 DAYS    blood-glucose transmitter (DEXCOM G5 TRANSMITTER) Petty 1 each, Misc.(Non-Drug; Combo Route), Daily    clopidogrel (PLAVIX) 75 mg tablet TAKE ONE TABLET BY MOUTH ONCE DAILY    diclofenac sodium (VOLTAREN ARTHRITIS PAIN) 2 g, Topical  (Top), 2 times daily PRN    digoxin (LANOXIN) 125 mcg tablet 1 tablet, Every other day    diphenoxylate-atropine 2.5-0.025 mg (LOMOTIL) 2.5-0.025 mg per tablet 1 tablet, Oral, 4 times daily PRN    doxycycline (VIBRAMYCIN) 100 mg, Oral, Every 12 hours, MUST TAKE WITH FULL MEAL AND GLASS OF WATER.    fish oil-omega-3 fatty acids 300-1,000 mg capsule 1 capsule, 2 times daily    furosemide (LASIX) 40 MG tablet TAKE 1 TABLET BY MOUTH ONCE DAILY (TAKE  AN  EXTRA  TABLET  FOR  ANY  WEIGHT  GAIN  OF  3  POUNDS  OR  MORE  ON  ANY  GIVEN  DAY)    insulin lispro 100 unit/mL pen INJECT 16 UNITS THREE TIMES DAILY WITH MEALS    lancets 31 gauge Misc 1 strip 2 times daily, to use with insurance preferred meter    LANTUS SOLOSTAR U-100 INSULIN 28 Units, Subcutaneous, Nightly    levothyroxine (SYNTHROID) 100 mcg, Oral    magnesium 200 mg, Daily    memantine (NAMENDA) 10 MG Tab TAKE 1 TABLET BY MOUTH ONCE DAILY IN THE EVENING    metoprolol tartrate (LOPRESSOR) 50 mg, Oral, 2 times daily    multivitamin (THERAGRAN) per tablet 1 tablet, Daily    ondansetron (ZOFRAN-ODT) 4 mg, Oral, Every 8 hours PRN    polyethylene glycol (GLYCOLAX) 17 g, As needed (PRN)    potassium chloride (KLOR-CON) 10 MEQ TbSR 10 mEq, Daily    rivastigmine tartrate (EXELON) 1.5 mg, Oral, 2 times daily    simvastatin (ZOCOR) 10 mg, Nightly       Surgical History:   Past Surgical History:   Procedure Laterality Date    ADENOIDECTOMY  8/18    BILATERAL SALPINGO-OOPHORECTOMY (BSO) Bilateral 08/19/2019    Procedure: SALPINGO-OOPHORECTOMY, BILATERAL;  Surgeon: Blayne Wallace MD;  Location: [facility] OR;  Service: OB/GYN;  Laterality: Bilateral;    COLON SURGERY  8/18    HYSTERECTOMY      partial, benign causes by reported hx    INSERTION OF PACEMAKER  08/2017    LEFT HEART CATHETERIZATION Left 01/06/2022    Procedure: Left heart cath;  Surgeon: Bhaskar Hanson III, MD;  Location: [facility] CATH;  Service: Cardiology;  Laterality: Left;  Right radial    ROBOT-ASSISTED  "LAPAROSCOPIC COLECTOMY USING DA MICHELE XI Right 08/19/2019    Procedure: XI ROBOTIC COLECTOMY;  Surgeon: Kourtney Dodson MD;  Location: [facility] OR;  Service: General;  Laterality: Right;       Family History:   Family History   Problem Relation Name Age of Onset    Diabetes Mother Blake     Heart disease Mother Blake     Cancer Father Julian         type unknown (nonsmoker, no etoh)    Heart disease Brother Julian     Dementia Sister      Cancer Daughter Luz         fallopian ca    Cancer Son Lukas         esophageal ca    Dementia Sister      Dementia Sister         Allergies:   Review of patient's allergies indicates:   Allergen Reactions    Adhesive Itching    Mucinex [guaifenesin]      Says caused CHF     Latex, natural rubber Rash       Tobacco Status:   Tobacco Use: Low Risk  (2/24/2025)    Patient History     Smoking Tobacco Use: Never     Smokeless Tobacco Use: Never     Passive Exposure: Never       Sexual Activity:   Social History     Substance and Sexual Activity   Sexual Activity Never    Birth control/protection: Post-menopausal       Alcohol Use:   Social History     Substance and Sexual Activity   Alcohol Use No         Objective       Vitals:    02/24/25 0948   BP: 122/62   Pulse: 60   Weight: 66.5 kg (146 lb 9.7 oz)   Height: 5' 2" (1.575 m)       Review of Systems   Constitutional:  Negative for chills and fever.   Eyes: Negative.    Respiratory:  Negative for cough and shortness of breath.    Cardiovascular: Negative.  Negative for chest pain.   Gastrointestinal:  Negative for abdominal pain, diarrhea, nausea and vomiting.   Genitourinary:  Negative for dysuria, flank pain, frequency, hematuria and urgency.   Skin:  Negative for rash.   Neurological:  Negative for dizziness, tingling, tremors, sensory change, speech change, focal weakness, seizures, loss of consciousness, weakness and headaches.   Psychiatric/Behavioral:  Positive for memory loss.        Physical Exam  Constitutional:       " General: She is not in acute distress.     Appearance: Normal appearance.   HENT:      Head: Normocephalic and atraumatic.   Cardiovascular:      Rate and Rhythm: Normal rate and regular rhythm.      Heart sounds: Normal heart sounds. No murmur heard.  Pulmonary:      Effort: Pulmonary effort is normal. No respiratory distress.      Breath sounds: Normal breath sounds. No wheezing.   Skin:     General: Skin is warm.   Neurological:      General: No focal deficit present.      Mental Status: She is alert and oriented to person, place, and time. Mental status is at baseline.      Cranial Nerves: No cranial nerve deficit.      Sensory: No sensory deficit.      Motor: No weakness.      Coordination: Coordination normal.   Psychiatric:         Behavior: Behavior normal.           Assessment and Plan:    1. Altered mental status, unspecified altered mental status type  -     POCT Urinalysis(Instrument)  -     CBC Without Differential; Future; Expected date: 02/24/2025  -     Comprehensive Metabolic Panel; Future; Expected date: 02/24/2025  -     TSH; Future; Expected date: 02/24/2025  -     T4, Free; Future; Expected date: 02/24/2025  -     Ambulatory referral/consult to Neurology; Future; Expected date: 03/03/2025  -     Urine Culture High Risk    2. Dementia without behavioral disturbance, psychotic disturbance, mood disturbance, or anxiety, unspecified dementia severity, unspecified dementia type  -     Ambulatory referral/consult to Neurology; Future; Expected date: 03/03/2025        Visit summary:    Denice Sheth presented today for altered mental status.    Patient is oriented x 3. MMSE score today 16/30. Neurologic exam without deficit. Check labs as above, including urine culture. Refer to neurology as she has a chronic history dementia.     Patient is stable for outpatient management but was instructed to report to ER if symptoms become severe. I have a very low threshold for her to report to ER with any new  or worsening symptoms.    This patient discussed with ROSIE Woods MD.    Follow up: pending results      Yesenia Ennis PA-C    This note was created partially with voice dictation software and is prone to errors. This note has been reviewed by me but some errors are inevitable.          [1]   Patient Active Problem List  Diagnosis    Gait disorder    Insomnia    Primary hypertension    Diastolic congestive heart failure, NYHA class 1    Hyponatremia    CKD (chronic kidney disease) stage 4, GFR 15-29 ml/min    Stenosis of left carotid artery    Type 2 diabetes mellitus with stage 4 chronic kidney disease, with long-term current use of insulin    Chronic atrial fibrillation    Hemiparesis affecting left side as late effect of cerebrovascular accident (CVA)    Proteinuria due to type 2 diabetes mellitus    Other specified hypothyroidism    H/O: CVA (cerebrovascular accident)    Chronic diastolic heart failure    CAD (coronary artery disease)    Dementia    Aortic calcification    Senile purpura    Exudative age-related macular degeneration of right eye with active choroidal neovascularization    Diabetic retinopathy    Pacemaker    Aortic valve regurgitation    Mitral valve regurgitation    History of TIA (transient ischemic attack)    Gastroesophageal reflux disease without esophagitis    Hypertriglyceridemia    Chronic kidney disease (CKD), stage V

## 2025-02-25 LAB — BACTERIA UR CULT: NORMAL

## 2025-02-26 ENCOUNTER — RESULTS FOLLOW-UP (OUTPATIENT)
Dept: FAMILY MEDICINE | Facility: CLINIC | Age: OVER 89
End: 2025-02-26
Payer: MEDICARE

## 2025-02-26 NOTE — TELEPHONE ENCOUNTER
----- Message from Yseenia Ennis PA-C sent at 2/26/2025  7:56 AM CST -----  Please contact  Pressclaire's office to determine if pacemaker is MRI compatible  ----- Message -----  From: Abril Fu MA  Sent: 2/24/2025  10:22 AM CST  To: Yesenia Ennis PA-C

## 2025-03-08 DIAGNOSIS — G31.84 MCI (MILD COGNITIVE IMPAIRMENT) WITH MEMORY LOSS: ICD-10-CM

## 2025-03-10 RX ORDER — RIVASTIGMINE TARTRATE 1.5 MG/1
1.5 CAPSULE ORAL 2 TIMES DAILY
Qty: 90 CAPSULE | Refills: 1 | Status: SHIPPED | OUTPATIENT
Start: 2025-03-10

## 2025-03-13 DIAGNOSIS — Z79.4 TYPE 2 DIABETES MELLITUS WITH STAGE 4 CHRONIC KIDNEY DISEASE, WITH LONG-TERM CURRENT USE OF INSULIN: Chronic | ICD-10-CM

## 2025-03-13 DIAGNOSIS — N18.4 TYPE 2 DIABETES MELLITUS WITH STAGE 4 CHRONIC KIDNEY DISEASE, WITH LONG-TERM CURRENT USE OF INSULIN: Chronic | ICD-10-CM

## 2025-03-13 DIAGNOSIS — E11.22 TYPE 2 DIABETES MELLITUS WITH STAGE 4 CHRONIC KIDNEY DISEASE, WITH LONG-TERM CURRENT USE OF INSULIN: Chronic | ICD-10-CM

## 2025-03-13 NOTE — TELEPHONE ENCOUNTER
No care due was identified.  [facility] Embedded Care Due Messages. Reference number: [id number].   3/13/2025 8:54:13 AM CDT

## 2025-03-13 NOTE — TELEPHONE ENCOUNTER
Refill Routing Note   Medication(s) are not appropriate for processing by [facility] for the following reason(s):        Required labs abnormal    [facility] action(s):  Approve             Pharmacist review requested: Yes     Appointments  past 12m or future 3m with PCP    Date Provider   Last Visit   2/17/2025 Allison Woods MD   Next Visit   Visit date not found Allison Woods MD   ED visits in past 90 days: 0        Note composed:1:05 PM 03/13/2025

## 2025-03-14 RX ORDER — INSULIN LISPRO 100 [IU]/ML
INJECTION, SOLUTION INTRAVENOUS; SUBCUTANEOUS
Qty: 45 ML | Refills: 1 | Status: SHIPPED | OUTPATIENT
Start: 2025-03-14

## 2025-03-24 DIAGNOSIS — Z00.00 ENCOUNTER FOR MEDICARE ANNUAL WELLNESS EXAM: ICD-10-CM

## 2025-03-27 ENCOUNTER — TELEPHONE (OUTPATIENT)
Dept: NEUROLOGY | Facility: CLINIC | Age: OVER 89
End: 2025-03-27
Payer: MEDICARE

## 2025-03-27 NOTE — TELEPHONE ENCOUNTER
Spoke with patients daughter and scheduled appointment from referral for dementia. Appointment scheduled for 2 pm on 3/31/25 with Akua Rodriguez. Time/date/location provided.

## 2025-03-29 DIAGNOSIS — N18.4 TYPE 2 DIABETES MELLITUS WITH STAGE 4 CHRONIC KIDNEY DISEASE, WITH LONG-TERM CURRENT USE OF INSULIN: Chronic | ICD-10-CM

## 2025-03-29 DIAGNOSIS — E11.22 TYPE 2 DIABETES MELLITUS WITH STAGE 4 CHRONIC KIDNEY DISEASE, WITH LONG-TERM CURRENT USE OF INSULIN: Chronic | ICD-10-CM

## 2025-03-29 DIAGNOSIS — Z79.4 TYPE 2 DIABETES MELLITUS WITH STAGE 4 CHRONIC KIDNEY DISEASE, WITH LONG-TERM CURRENT USE OF INSULIN: Chronic | ICD-10-CM

## 2025-03-29 NOTE — TELEPHONE ENCOUNTER
No care due was identified.  [facility] Embedded Care Due Messages. Reference number: [id number].   3/29/2025 8:05:33 AM CDT

## 2025-03-30 RX ORDER — INSULIN GLARGINE 100 [IU]/ML
INJECTION, SOLUTION SUBCUTANEOUS
Qty: 30 ML | Refills: 1 | Status: SHIPPED | OUTPATIENT
Start: 2025-03-30

## 2025-03-30 NOTE — TELEPHONE ENCOUNTER
Refill Authorization Note     Refill Decision Note   Denice Sheth  is requesting a refill authorization.  Brief Assessment and Rationale for Refill:  Approve     Medication Therapy Plan:       Medication Reconciliation Completed: No   Comments:     No Care Gaps recommended.     Note composed:2:05 PM 03/30/2025

## 2025-03-30 NOTE — TELEPHONE ENCOUNTER
No care due was identified.  [facility] Embedded Care Due Messages. Reference number: [id number].   3/30/2025 5:50:12 PM CDT

## 2025-03-31 RX ORDER — POTASSIUM CHLORIDE 750 MG/1
10 TABLET, EXTENDED RELEASE ORAL 2 TIMES DAILY
Qty: 180 TABLET | Refills: 3 | Status: SHIPPED | OUTPATIENT
Start: 2025-03-31

## 2025-03-31 NOTE — TELEPHONE ENCOUNTER
Refill Routing Note   Medication(s) are not appropriate for processing by [facility] for the following reason(s):        No active prescription written by provider  Required labs abnormal    [facility] action(s):  Defer        Medication Therapy Plan: previous order was discontinued      Appointments  past 12m or future 3m with PCP    Date Provider   Last Visit   2/17/2025 Allison Woods MD   Next Visit   Visit date not found Allison Woods MD   ED visits in past 90 days: 0        Note composed:1:13 PM 03/31/2025

## 2025-04-04 ENCOUNTER — OFFICE VISIT (OUTPATIENT)
Dept: NEUROLOGY | Facility: CLINIC | Age: OVER 89
End: 2025-04-04
Payer: MEDICARE

## 2025-04-04 VITALS — DIASTOLIC BLOOD PRESSURE: 61 MMHG | RESPIRATION RATE: 16 BRPM | SYSTOLIC BLOOD PRESSURE: 138 MMHG | HEART RATE: 60 BPM

## 2025-04-04 DIAGNOSIS — F03.90 DEMENTIA WITHOUT BEHAVIORAL DISTURBANCE, PSYCHOTIC DISTURBANCE, MOOD DISTURBANCE, OR ANXIETY, UNSPECIFIED DEMENTIA SEVERITY, UNSPECIFIED DEMENTIA TYPE: Chronic | ICD-10-CM

## 2025-04-04 DIAGNOSIS — F03.90 MAJOR NEUROCOGNITIVE DISORDER: Primary | ICD-10-CM

## 2025-04-04 DIAGNOSIS — R41.82 ALTERED MENTAL STATUS, UNSPECIFIED ALTERED MENTAL STATUS TYPE: ICD-10-CM

## 2025-04-04 PROCEDURE — 99999 PR PBB SHADOW E&M-EST. PATIENT-LVL III: CPT | Mod: PBBFAC,,,

## 2025-04-04 NOTE — ASSESSMENT & PLAN NOTE
Discussed normal aging / MCI / dementia with pt and daughter   Maintained on Rivastigmine and Namenda since 2018 for MCI. Ok to continue given PPM.   Suspect MCI vs moderate to major neurocognitive disorder given MMSE and decline in ADLs however age is playing a role too.   -No safety concerns.   -Discussed vascular RF and importance of continued efforts to maximize vascular health. Discussed risk of stroke given Afib, will defer to cardiology for need of DOAC vs continuing plavix.   -Medication list reviewed and discussed   -Unable to perform MRI due incompatible PPM.   -Caregiver support provided.   -Encouraged patient to remain socially active.   -NP testing deferred.

## 2025-04-14 ENCOUNTER — OFFICE VISIT (OUTPATIENT)
Dept: PODIATRY | Facility: CLINIC | Age: OVER 89
End: 2025-04-14
Payer: MEDICARE

## 2025-04-14 ENCOUNTER — TELEPHONE (OUTPATIENT)
Dept: FAMILY MEDICINE | Facility: CLINIC | Age: OVER 89
End: 2025-04-14
Payer: MEDICARE

## 2025-04-14 VITALS — WEIGHT: 146.63 LBS | HEIGHT: 62 IN | BODY MASS INDEX: 26.98 KG/M2

## 2025-04-14 DIAGNOSIS — E11.42 DIABETIC POLYNEUROPATHY ASSOCIATED WITH TYPE 2 DIABETES MELLITUS: Primary | ICD-10-CM

## 2025-04-14 DIAGNOSIS — M20.41 HAMMER TOES OF BOTH FEET: ICD-10-CM

## 2025-04-14 DIAGNOSIS — B35.1 ONYCHOMYCOSIS DUE TO DERMATOPHYTE: ICD-10-CM

## 2025-04-14 DIAGNOSIS — M20.42 HAMMER TOES OF BOTH FEET: ICD-10-CM

## 2025-04-14 PROCEDURE — 99213 OFFICE O/P EST LOW 20 MIN: CPT | Mod: 25,S$GLB,, | Performed by: PODIATRIST

## 2025-04-14 PROCEDURE — 1126F AMNT PAIN NOTED NONE PRSNT: CPT | Mod: CPTII,S$GLB,, | Performed by: PODIATRIST

## 2025-04-14 PROCEDURE — 11721 DEBRIDE NAIL 6 OR MORE: CPT | Mod: Q9,S$GLB,, | Performed by: PODIATRIST

## 2025-04-14 PROCEDURE — 1159F MED LIST DOCD IN RCRD: CPT | Mod: CPTII,S$GLB,, | Performed by: PODIATRIST

## 2025-04-14 PROCEDURE — 99999 PR PBB SHADOW E&M-EST. PATIENT-LVL II: CPT | Mod: PBBFAC,,, | Performed by: PODIATRIST

## 2025-04-14 PROCEDURE — 1157F ADVNC CARE PLAN IN RCRD: CPT | Mod: CPTII,S$GLB,, | Performed by: PODIATRIST

## 2025-04-14 NOTE — TELEPHONE ENCOUNTER
Called and asked daughter could she please upload the patient current insurance card front and back so that her PA can be completed on her glucose device sensor

## 2025-04-14 NOTE — PROGRESS NOTES
Subjective:      Patient ID: Denice Sheth is a 89 y.o. female.    Chief Complaint: Diabetic Foot Exam and Diabetes Mellitus    Denice is a 89 y.o. female who presents to the clinic for evaluation and treatment of diabetic feet. Denice has a past medical history of Anticoagulant long-term use, Aortic calcification (01/02/2022), Aortic valve regurgitation (10/13/2021), CAD (coronary artery disease) (01/06/2022), Cecum mass, Chronic atrial fibrillation (05/02/2017), Chronic diastolic heart failure, CKD (chronic kidney disease) stage 4, GFR 15-29 ml/min (02/22/2016), Dementia (08/31/2022), Diabetic retinopathy (03/24/2021), Disorder of kidney and ureter, Encounter for blood transfusion, Exudative age-related macular degeneration of right eye with active choroidal neovascularization (09/12/2022), Gastroesophageal reflux disease without esophagitis (9/17/2023), H/O: CVA (cerebrovascular accident) (07/30/2019), Hemiparesis affecting left side as late effect of cerebrovascular accident (CVA) (06/05/2017), History of TIA (transient ischemic attack) (09/17/2023), Hypertension, KRISTEN (iron deficiency anemia) (01/12/2018), Insomnia, Irregular heart rhythm, Mitral valve regurgitation (10/13/2021), Other specified hypothyroidism (06/24/2019), Pacemaker (01/02/2023), Primary hypertension (02/09/2016), Proteinuria due to type 2 diabetes mellitus (03/12/2019), Senile purpura (12/21/2022), Sick sinus syndrome (2018), Stenosis of left carotid artery (12/06/2016), TIA (transient ischemic attack) (03/2017), and Type 2 diabetes mellitus with stage 4 chronic kidney disease, with long-term current use of insulin. Patient relates needing her toenails trimmed.  Denies this being a source of pain.  Has not attempted to self trim.   Continues with excellent control over her blood glucose.  Lastly, notes needing new diabetic shoes.  Denies any additional pedal complaints.     PCP: Allison Woods MD    Date Last Seen by PCP:  1/25    Hemoglobin A1C   Date Value Ref Range Status   02/10/2025 6.0 (H) 4.0 - 5.6 % Final     Comment:     ADA Screening Guidelines:  5.7-6.4%  Consistent with prediabetes  >or=6.5%  Consistent with diabetes    High levels of fetal hemoglobin interfere with the HbA1C  assay. Heterozygous hemoglobin variants (HbS, HgC, etc)do  not significantly interfere with this assay.   However, presence of multiple variants may affect accuracy.     08/13/2024 6.3 (H) 4.0 - 5.6 % Final     Comment:     ADA Screening Guidelines:  5.7-6.4%  Consistent with prediabetes  >or=6.5%  Consistent with diabetes    High levels of fetal hemoglobin interfere with the HbA1C  assay. Heterozygous hemoglobin variants (HbS, HgC, etc)do  not significantly interfere with this assay.   However, presence of multiple variants may affect accuracy.     12/04/2023 6.4 (H) 4.0 - 5.6 % Final     Comment:     ADA Screening Guidelines:  5.7-6.4%  Consistent with prediabetes  >or=6.5%  Consistent with diabetes    High levels of fetal hemoglobin interfere with the HbA1C  assay. Heterozygous hemoglobin variants (HbS, HgC, etc)do  not significantly interfere with this assay.   However, presence of multiple variants may affect accuracy.           Past Medical History:   Diagnosis Date    Anticoagulant long-term use     Aortic calcification 01/02/2022    CXR 1/2/22    Aortic valve regurgitation 10/13/2021    CAD (coronary artery disease) 01/06/2022    Cecum mass     Chronic atrial fibrillation 05/02/2017    Chronic diastolic heart failure     CKD (chronic kidney disease) stage 4, GFR 15-29 ml/min 02/22/2016    Dementia 08/31/2022    Diabetic retinopathy 03/24/2021    Disorder of kidney and ureter     follows w/ Dr. 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 09/12/2022    Gastroesophageal reflux disease without esophagitis 9/17/2023    H/O: CVA (cerebrovascular  accident) 07/30/2019    Hemiparesis affecting left side as late effect of cerebrovascular accident (CVA) 06/05/2017    History of TIA (transient ischemic attack) 09/17/2023    Hypertension     KRISTEN (iron deficiency anemia) 01/12/2018    Insomnia     Irregular heart rhythm     Mitral valve regurgitation 10/13/2021    Other specified hypothyroidism 06/24/2019    Pacemaker 01/02/2023    Primary hypertension 02/09/2016    Proteinuria due to type 2 diabetes mellitus 03/12/2019    Senile purpura 12/21/2022    Sick sinus syndrome 2018    s/p pacemaker placement    Stenosis of left carotid artery 12/06/2016    TIA (transient ischemic attack) 03/2017    Type 2 diabetes mellitus with stage 4 chronic kidney disease, with long-term current use of insulin        Past Surgical History:   Procedure Laterality Date    ADENOIDECTOMY  8/18    BILATERAL SALPINGO-OOPHORECTOMY (BSO) Bilateral 08/19/2019    Procedure: SALPINGO-OOPHORECTOMY, BILATERAL;  Surgeon: Blayne Wlalace MD;  Location: [facility] OR;  Service: OB/GYN;  Laterality: Bilateral;    COLON SURGERY  8/18    HYSTERECTOMY      partial, benign causes by reported hx    INSERTION OF PACEMAKER  08/2017    LEFT HEART CATHETERIZATION Left 01/06/2022    Procedure: Left heart cath;  Surgeon: Bhaskar Hanson III, MD;  Location: [facility] CATH;  Service: Cardiology;  Laterality: Left;  Right radial    ROBOT-ASSISTED LAPAROSCOPIC COLECTOMY USING DA MICHELE XI Right 08/19/2019    Procedure: XI ROBOTIC COLECTOMY;  Surgeon: Kourtney Dodson MD;  Location: [facility] OR;  Service: General;  Laterality: Right;       Family History   Problem Relation Name Age of Onset    Diabetes Mother Blake     Heart disease Mother Blake     Cancer Father Julian         type unknown (nonsmoker, no etoh)    Heart disease Brother Julian     Dementia Sister      Cancer Daughter Luz         fallopian ca    Cancer Son Lukas         esophageal ca    Dementia Sister      Dementia Sister         Social History      Socioeconomic History    Marital status:    Tobacco Use    Smoking status: Never     Passive exposure: Never    Smokeless tobacco: Never   Substance and Sexual Activity    Alcohol use: No    Drug use: No    Sexual activity: Never     Birth control/protection: Post-menopausal     Social Drivers of Health     Financial Resource Strain: Low Risk  (2/27/2024)    Overall Financial Resource Strain (CARDIA)     Difficulty of Paying Living Expenses: Not hard at all   Food Insecurity: No Food Insecurity (2/27/2024)    Hunger Vital Sign     Worried About Running Out of Food in the Last Year: Never true     Ran Out of Food in the Last Year: Never true   Transportation Needs: No Transportation Needs (2/27/2024)    PRAPARE - Transportation     Lack of Transportation (Medical): No     Lack of Transportation (Non-Medical): No   Physical Activity: Inactive (2/27/2024)    Exercise Vital Sign     Days of Exercise per Week: 0 days     Minutes of Exercise per Session: 0 min   Stress: No Stress Concern Present (2/27/2024)    [employer] - Occupational Stress Questionnaire     Feeling of Stress : Not at all   Housing Stability: Unknown (2/27/2024)    Housing Stability Vital Sign     Unable to Pay for Housing in the Last Year: No     Unstable Housing in the Last Year: No       Current Outpatient Medications   Medication Sig Dispense Refill    amiodarone (PACERONE) 200 MG Tab Take 200 mg by mouth once daily.      ascorbic acid (VITAMIN C) 500 MG tablet Take 500 mg by mouth once daily.      b complex vitamins capsule Take 1 capsule by mouth once daily.      blood sugar diagnostic Strp 1 strip 2 times daily, to use with insurance preferred meter 100 each 2    blood-glucose meter kit To check BG 2 times daily, to use with insurance preferred meter 1 each 0    blood-glucose meter,continuous (DEXCOM ) Misc 1 each by Misc.(Non-Drug; Combo Route) route once daily. 1 each 0    blood-glucose sensor  (DEXCOM G7 SENSOR) Petty USE TO CHECK BLOOD GLUCOSE AS DIRECTED. CHANGE SENSOR EVERY 10 DAYS 9 each 3    blood-glucose transmitter (DEXCOM G5 TRANSMITTER) Petty 1 each by Misc.(Non-Drug; Combo Route) route Daily. 1 each 0    clopidogrel (PLAVIX) 75 mg tablet TAKE ONE TABLET BY MOUTH ONCE DAILY 30 tablet 11    diclofenac sodium (VOLTAREN ARTHRITIS PAIN) 1 % Gel Apply 2 g topically 2 (two) times daily as needed (muscle pain). 100 g 1    digoxin (LANOXIN) 125 mcg tablet Take 1 tablet by mouth every other day.      diphenoxylate-atropine 2.5-0.025 mg (LOMOTIL) 2.5-0.025 mg per tablet Take 1 tablet by mouth 4 (four) times daily as needed for Diarrhea. 30 tablet 1    fish oil-omega-3 fatty acids 300-1,000 mg capsule Take 1 capsule by mouth 2 (two) times a day.      furosemide (LASIX) 40 MG tablet TAKE 1 TABLET BY MOUTH ONCE DAILY (TAKE  AN  EXTRA  TABLET  FOR  ANY  WEIGHT  GAIN  OF  3  POUNDS  OR  MORE  ON  ANY  GIVEN  DAY) 90 tablet 2    insulin lispro 100 unit/mL pen INJECT 16 UNITS THREE TIMES DAILY WITH MEALS 45 mL 1    lancets 31 gauge Misc 1 strip 2 times daily, to use with insurance preferred meter 100 each 2    LANTUS SOLOSTAR U-100 INSULIN 100 unit/mL (3 mL) InPn pen INJECT 28 UNITS UNDER THE SKIN EVERY EVENING 30 mL 1    levothyroxine (SYNTHROID) 100 MCG tablet TAKE 1 TABLET BY MOUTH ONCE DAILY BEFORE BREAKFAST 90 tablet 1    magnesium 200 mg Tab Take 200 mg by mouth once daily.      memantine (NAMENDA) 10 MG Tab TAKE 1 TABLET BY MOUTH ONCE DAILY IN THE EVENING 90 tablet 3    metoprolol tartrate (LOPRESSOR) 50 MG tablet Take 1 tablet (50 mg total) by mouth 2 (two) times daily. 60 tablet 1    multivitamin (THERAGRAN) per tablet Take 1 tablet by mouth once daily.      ondansetron (ZOFRAN-ODT) 4 MG TbDL Take 1 tablet (4 mg total) by mouth every 8 (eight) hours as needed (Nausea). 20 tablet 0    polyethylene glycol (GLYCOLAX) 17 gram PwPk Take 17 g by mouth as needed (constipation).      potassium chloride (KLOR-CON)  10 MEQ TbSR Take 1 tablet by mouth twice daily 180 tablet 3    rivastigmine tartrate (EXELON) 1.5 MG capsule Take 1 capsule by mouth twice daily 90 capsule 1    simvastatin (ZOCOR) 10 MG tablet Take 10 mg by mouth every evening.       No current facility-administered medications for this visit.       Review of patient's allergies indicates:   Allergen Reactions    Adhesive Itching    Mucinex [guaifenesin]      Says caused CHF     Latex, natural rubber Rash         Review of Systems   Constitutional: Negative for chills and fever.   Cardiovascular:  Negative for claudication and leg swelling.   Skin:  Negative for color change, nail changes and poor wound healing.   Musculoskeletal:  Negative for joint pain, joint swelling, muscle cramps and muscle weakness.   Gastrointestinal:  Negative for nausea and vomiting.   Neurological:  Positive for numbness.   Psychiatric/Behavioral:  Negative for altered mental status.            Objective:      Physical Exam  Constitutional:       Appearance: Normal appearance. She is not ill-appearing.   Cardiovascular:      Pulses:           Dorsalis pedis pulses are 2+ on the right side and 2+ on the left side.        Posterior tibial pulses are 2+ on the right side and 2+ on the left side.      Comments: CFT is < 3 seconds bilateral.  Pedal hair growth is decreased bilateral.  No lower extremity edema noted bilateral.  Toes are warm to touch bilateral.    Musculoskeletal:         General: Deformity present. No tenderness.      Right lower leg: No edema.      Left lower leg: No edema.      Comments: Muscle strength 5/5 in all muscle groups bilateral.  No tenderness nor crepitation with ROM of foot/ankle joints bilateral.  (-) for pain with palpation of bilateral foot and ankle. Bilateral semi-reducible contracture of toes 2-5.  Bilateral HAV.   Skin:     General: Skin is warm and dry.      Capillary Refill: Capillary refill takes 2 to 3 seconds.      Findings: No bruising, ecchymosis,  erythema, signs of injury, laceration, lesion, petechiae, rash or wound.      Comments: Pedal skin appears thin bilateral. Toenails x 10 appear thickened by 2 mm, elongated by 6 mm, and discolored with subungual debris.  Stable shearing noted to the dorsum of the Rt. 2nd PIP joint and Lt. Lateral 5th mtp joint.            Neurological:      Mental Status: She is alert.      Sensory: Sensory deficit present.      Motor: No weakness or atrophy.      Comments: Decreased protective sensation noted bilateral.  Light touch is intact bilateral.                 Assessment:       Encounter Diagnoses   Name Primary?    Diabetic polyneuropathy associated with type 2 diabetes mellitus Yes    Hammer toes of both feet     Onychomycosis due to dermatophyte          Plan:       Denice was seen today for diabetic foot exam and diabetes mellitus.    Diagnoses and all orders for this visit:    Diabetic polyneuropathy associated with type 2 diabetes mellitus  -     DIABETIC SHOES FOR HOME USE    Hammer toes of both feet    Onychomycosis due to dermatophyte      I counseled the patient on her conditions, their implications and medical management.    Advised to continue wearing supportive shoe gear that limits pressure to the digits.  New Rx written for diabetic shoes.    Recommend wearing egg crate foam on the Lt. Foot to minimize pressure to the 5th mtp joint while sleeping.      Shoe inspection. Diabetic Foot Education. Patient reminded of the importance of good nutrition and blood sugar control to help prevent podiatric complications of diabetes. Patient instructed on proper foot hygeine. We discussed wearing proper shoe gear, daily foot inspections, never walking without protective shoe gear, never putting sharp instruments to feet    With patient's permission, nails were aggressively reduced and debrided x 10 to their soft tissue attachment mechanically and with electric , removing all offending nail and debris. Patient  relates relief following the procedure. She will continue to monitor the areas daily, inspect her feet, wear protective shoe gear when ambulatory, moisturizer to maintain skin integrity and follow in this office in approximately 3 months, sooner p.r.n.    RTC in 3 months for routine nail care.    Pankaj Julian DPM

## 2025-05-14 ENCOUNTER — OFFICE VISIT (OUTPATIENT)
Dept: FAMILY MEDICINE | Facility: CLINIC | Age: OVER 89
End: 2025-05-14
Payer: MEDICARE

## 2025-05-14 VITALS
DIASTOLIC BLOOD PRESSURE: 64 MMHG | HEIGHT: 62 IN | OXYGEN SATURATION: 99 % | WEIGHT: 150.38 LBS | HEART RATE: 62 BPM | BODY MASS INDEX: 27.67 KG/M2 | SYSTOLIC BLOOD PRESSURE: 130 MMHG

## 2025-05-14 DIAGNOSIS — J30.1 SEASONAL ALLERGIC RHINITIS DUE TO POLLEN: ICD-10-CM

## 2025-05-14 DIAGNOSIS — R05.1 ACUTE COUGH: Primary | ICD-10-CM

## 2025-05-14 PROCEDURE — 1126F AMNT PAIN NOTED NONE PRSNT: CPT | Mod: CPTII,S$GLB,,

## 2025-05-14 PROCEDURE — 99999 PR PBB SHADOW E&M-EST. PATIENT-LVL III: CPT | Mod: PBBFAC,,,

## 2025-05-14 PROCEDURE — 1157F ADVNC CARE PLAN IN RCRD: CPT | Mod: CPTII,S$GLB,,

## 2025-05-14 PROCEDURE — 1159F MED LIST DOCD IN RCRD: CPT | Mod: CPTII,S$GLB,,

## 2025-05-14 PROCEDURE — 3288F FALL RISK ASSESSMENT DOCD: CPT | Mod: CPTII,S$GLB,,

## 2025-05-14 PROCEDURE — 1101F PT FALLS ASSESS-DOCD LE1/YR: CPT | Mod: CPTII,S$GLB,,

## 2025-05-14 PROCEDURE — 99213 OFFICE O/P EST LOW 20 MIN: CPT | Mod: S$GLB,,,

## 2025-05-14 NOTE — PROGRESS NOTES
[facility]  [address]  [place] [address]    Subjective    Chief Complaint:   Chief Complaint   Patient presents with    Cough     Pt states it has been going on since Sunday.     Nasal Congestion     Pt states it has been going on since Sunday.        History of Present Illness:     Denice Sheth is a(n) 89 y.o. female with past medical history as noted below who presents to the clinic today for cough and nasal congestion. She is present with her daughter.    Symptoms have been going on about 4 days. She reports sneezing, clear nasal drainage, and dry cough. She denies fever, shortness of breath, wheezing, body aches, sore throat, ear pain, purulent sputum or other symptoms.          Problem List: Problem List[1]    Current Outpatient Medications:   Current Outpatient Medications   Medication Instructions    amiodarone (PACERONE) 200 mg, Daily    ascorbic acid (vitamin C) (VITAMIN C) 500 mg, Daily    b complex vitamins capsule 1 capsule, Daily    blood sugar diagnostic Strp 1 strip 2 times daily, to use with insurance preferred meter    blood-glucose meter kit To check BG 2 times daily, to use with insurance preferred meter    blood-glucose meter,continuous (DEXCOM ) Misc 1 each, Misc.(Non-Drug; Combo Route), Daily    blood-glucose sensor (DEXCOM G7 SENSOR) Petty USE TO CHECK BLOOD GLUCOSE AS DIRECTED. CHANGE SENSOR EVERY 10 DAYS    blood-glucose transmitter (DEXCOM G5 TRANSMITTER) Petty 1 each, Misc.(Non-Drug; Combo Route), Daily    clopidogrel (PLAVIX) 75 mg tablet TAKE ONE TABLET BY MOUTH ONCE DAILY    diclofenac sodium (VOLTAREN ARTHRITIS PAIN) 2 g, Topical (Top), 2 times daily PRN    digoxin (LANOXIN) 125 mcg tablet 1 tablet, Every other day    diphenoxylate-atropine 2.5-0.025 mg (LOMOTIL) 2.5-0.025 mg per tablet 1 tablet, Oral, 4 times daily PRN    fish oil-omega-3 fatty acids 300-1,000 mg capsule 1 capsule, 2 times daily    furosemide (LASIX) 40  MG tablet TAKE 1 TABLET BY MOUTH ONCE DAILY (TAKE  AN  EXTRA  TABLET  FOR  ANY  WEIGHT  GAIN  OF  3  POUNDS  OR  MORE  ON  ANY  GIVEN  DAY)    insulin lispro 100 unit/mL pen INJECT 16 UNITS THREE TIMES DAILY WITH MEALS    lancets 31 gauge Misc 1 strip 2 times daily, to use with insurance preferred meter    LANTUS SOLOSTAR U-100 INSULIN 100 unit/mL (3 mL) InPn pen INJECT 28 UNITS UNDER THE SKIN EVERY EVENING    levothyroxine (SYNTHROID) 100 mcg, Oral    magnesium 200 mg, Daily    memantine (NAMENDA) 10 MG Tab TAKE 1 TABLET BY MOUTH ONCE DAILY IN THE EVENING    metoprolol tartrate (LOPRESSOR) 50 mg, Oral, 2 times daily    multivitamin (THERAGRAN) per tablet 1 tablet, Daily    ondansetron (ZOFRAN-ODT) 4 mg, Oral, Every 8 hours PRN    polyethylene glycol (GLYCOLAX) 17 g, As needed (PRN)    potassium chloride (KLOR-CON) 10 MEQ TbSR 10 mEq, Oral, 2 times daily    rivastigmine tartrate (EXELON) 1.5 mg, Oral, 2 times daily    simvastatin (ZOCOR) 10 mg, Nightly       Surgical History:   Past Surgical History:   Procedure Laterality Date    ADENOIDECTOMY  8/18    BILATERAL SALPINGO-OOPHORECTOMY (BSO) Bilateral 08/19/2019    Procedure: SALPINGO-OOPHORECTOMY, BILATERAL;  Surgeon: Blayne Wallace MD;  Location: [facility];  Service: OB/GYN;  Laterality: Bilateral;    COLON SURGERY  8/18    HYSTERECTOMY      partial, benign causes by reported hx    INSERTION OF PACEMAKER  08/2017    LEFT HEART CATHETERIZATION Left 01/06/2022    Procedure: Left heart cath;  Surgeon: Bhaskar Hanson III, MD;  Location: [facility] CATH;  Service: Cardiology;  Laterality: Left;  Right radial    ROBOT-ASSISTED LAPAROSCOPIC COLECTOMY USING DA MICHELE XI Right 08/19/2019    Procedure: XI ROBOTIC COLECTOMY;  Surgeon: Kourtney Dodson MD;  Location: [facility] OR;  Service: General;  Laterality: Right;       Family History:   Family History   Problem Relation Name Age of Onset    Diabetes Mother Blake     Heart disease Mother Blake     Cancer Father Julian          "type unknown (nonsmoker, no etoh)    Heart disease Brother Julian     Dementia Sister      Cancer Daughter Luz         fallopian ca    Cancer Son Lukas         esophageal ca    Dementia Sister      Dementia Sister         Allergies:   Review of patient's allergies indicates:   Allergen Reactions    Adhesive Itching    Mucinex [guaifenesin]      Says caused CHF     Latex, natural rubber Rash       Tobacco Status:   Tobacco Use: Low Risk  (4/4/2025)    Patient History     Smoking Tobacco Use: Never     Smokeless Tobacco Use: Never     Passive Exposure: Never       Sexual Activity:   Social History     Substance and Sexual Activity   Sexual Activity Never    Birth control/protection: Post-menopausal       Alcohol Use:   Social History     Substance and Sexual Activity   Alcohol Use No         Objective       Vitals:    05/14/25 0959   BP: 130/64   Patient Position: Sitting   Pulse: 62   SpO2: 99%   Weight: 68.2 kg (150 lb 5.7 oz)   Height: 5' 2" (1.575 m)       Review of Systems   Constitutional:  Negative for chills and fever.   HENT:  Positive for congestion. Negative for sore throat.    Respiratory:  Positive for cough. Negative for sputum production, shortness of breath and wheezing.    Cardiovascular:  Negative for chest pain and leg swelling.   Endo/Heme/Allergies:  Positive for environmental allergies.       Physical Exam  Constitutional:       General: She is not in acute distress.     Appearance: Normal appearance.   HENT:      Head: Normocephalic and atraumatic.      Nose: Nose normal.      Mouth/Throat:      Mouth: Mucous membranes are moist.      Pharynx: Oropharynx is clear.   Cardiovascular:      Rate and Rhythm: Normal rate and regular rhythm.      Heart sounds: Normal heart sounds. No murmur heard.  Pulmonary:      Effort: Pulmonary effort is normal. No respiratory distress.      Breath sounds: Normal breath sounds. No wheezing, rhonchi or rales.   Skin:     General: Skin is warm.   Neurological: "      Mental Status: She is alert and oriented to person, place, and time.   Psychiatric:         Behavior: Behavior normal.           Assessment and Plan:    1. Acute cough    2. Seasonal allergic rhinitis due to pollen        Visit summary:    Denice Sheth presented today for cough and congestion.      Presentation consistent with viral upper respiratory illness. Covid test(s) negative today.     Continue to rest and stay well hydrated. Recommended Flonase, nasal saline, and nasal and/or systemic antihistamine.     I do not suspect a bacterial cause of symptoms today. Patient was instructed to follow up if symptoms do not improve or worsen and to report to ER with severe symptoms.     Yesenia Ennis PA-C             [1]   Patient Active Problem List  Diagnosis    Gait disorder    Insomnia    Primary hypertension    Diastolic congestive heart failure, NYHA class 1    Hyponatremia    CKD (chronic kidney disease) stage 4, GFR 15-29 ml/min    Stenosis of left carotid artery    Type 2 diabetes mellitus with stage 4 chronic kidney disease, with long-term current use of insulin    Chronic atrial fibrillation    Hemiparesis affecting left side as late effect of cerebrovascular accident (CVA)    Proteinuria due to type 2 diabetes mellitus    Other specified hypothyroidism    H/O: CVA (cerebrovascular accident)    Chronic diastolic heart failure    CAD (coronary artery disease)    Major neurocognitive disorder    Aortic calcification    Senile purpura    Exudative age-related macular degeneration of right eye with active choroidal neovascularization    Diabetic retinopathy    Pacemaker    Aortic valve regurgitation    Mitral valve regurgitation    History of TIA (transient ischemic attack)    Gastroesophageal reflux disease without esophagitis    Hypertriglyceridemia    Chronic kidney disease (CKD), stage V

## 2025-05-16 ENCOUNTER — TELEPHONE (OUTPATIENT)
Dept: PODIATRY | Facility: CLINIC | Age: OVER 89
End: 2025-05-16
Payer: MEDICARE

## 2025-05-16 NOTE — TELEPHONE ENCOUNTER
----- Message from [employer] sent at 5/16/2025  8:33 AM CDT -----  Type:  Needs Medical AdviceWho Called: Daughter Phani (please be specific): big toe ulcerHow long has patient had these symptoms:  Pharmacy name and phone #:  Would the patient rather a call back or a response via [facility]? call back/[facility] Call [facility] Number: [phone] Additional Information: Daughter states it looks like an ulcer or scrape on big toe please adviseThanks   English

## 2025-05-19 ENCOUNTER — LAB VISIT (OUTPATIENT)
Dept: LAB | Facility: HOSPITAL | Age: OVER 89
End: 2025-05-19
Attending: INTERNAL MEDICINE
Payer: MEDICARE

## 2025-05-19 DIAGNOSIS — N18.4 CKD (CHRONIC KIDNEY DISEASE) STAGE 4, GFR 15-29 ML/MIN: ICD-10-CM

## 2025-05-19 LAB
ALBUMIN SERPL BCP-MCNC: 3.5 G/DL (ref 3.5–5.2)
ANION GAP (OHS): 10 MMOL/L (ref 8–16)
BUN SERPL-MCNC: 62 MG/DL (ref 8–23)
CALCIUM SERPL-MCNC: 10.7 MG/DL (ref 8.7–10.5)
CHLORIDE SERPL-SCNC: 101 MMOL/L (ref 95–110)
CO2 SERPL-SCNC: 29 MMOL/L (ref 23–29)
CREAT SERPL-MCNC: 2.7 MG/DL (ref 0.5–1.4)
GFR SERPLBLD CREATININE-BSD FMLA CKD-EPI: 16 ML/MIN/1.73/M2
GLUCOSE SERPL-MCNC: 153 MG/DL (ref 70–110)
PHOSPHATE SERPL-MCNC: 3.6 MG/DL (ref 2.7–4.5)
POTASSIUM SERPL-SCNC: 4.3 MMOL/L (ref 3.5–5.1)
PTH-INTACT SERPL-MCNC: 27 PG/ML (ref 9–77)
SODIUM SERPL-SCNC: 140 MMOL/L (ref 136–145)

## 2025-05-19 PROCEDURE — 83970 ASSAY OF PARATHORMONE: CPT

## 2025-05-19 PROCEDURE — 80069 RENAL FUNCTION PANEL: CPT

## 2025-05-19 PROCEDURE — 36415 COLL VENOUS BLD VENIPUNCTURE: CPT | Mod: PN

## 2025-05-21 ENCOUNTER — RESULTS FOLLOW-UP (OUTPATIENT)
Dept: NEPHROLOGY | Facility: CLINIC | Age: OVER 89
End: 2025-05-21

## 2025-05-21 ENCOUNTER — OFFICE VISIT (OUTPATIENT)
Dept: PODIATRY | Facility: CLINIC | Age: OVER 89
End: 2025-05-21
Payer: MEDICARE

## 2025-05-21 VITALS — WEIGHT: 150.38 LBS | BODY MASS INDEX: 27.67 KG/M2 | HEIGHT: 62 IN

## 2025-05-21 DIAGNOSIS — E11.42 DIABETIC POLYNEUROPATHY ASSOCIATED WITH TYPE 2 DIABETES MELLITUS: ICD-10-CM

## 2025-05-21 DIAGNOSIS — T14.8XXA BLOOD BLISTER: Primary | ICD-10-CM

## 2025-05-21 DIAGNOSIS — M20.42 HAMMER TOES OF BOTH FEET: ICD-10-CM

## 2025-05-21 DIAGNOSIS — M20.41 HAMMER TOES OF BOTH FEET: ICD-10-CM

## 2025-05-21 PROCEDURE — 1157F ADVNC CARE PLAN IN RCRD: CPT | Mod: CPTII,S$GLB,, | Performed by: PODIATRIST

## 2025-05-21 PROCEDURE — 99213 OFFICE O/P EST LOW 20 MIN: CPT | Mod: S$GLB,,, | Performed by: PODIATRIST

## 2025-05-21 PROCEDURE — 3288F FALL RISK ASSESSMENT DOCD: CPT | Mod: CPTII,S$GLB,, | Performed by: PODIATRIST

## 2025-05-21 PROCEDURE — 1101F PT FALLS ASSESS-DOCD LE1/YR: CPT | Mod: CPTII,S$GLB,, | Performed by: PODIATRIST

## 2025-05-21 PROCEDURE — 99999 PR PBB SHADOW E&M-EST. PATIENT-LVL II: CPT | Mod: PBBFAC,,, | Performed by: PODIATRIST

## 2025-05-21 PROCEDURE — 1159F MED LIST DOCD IN RCRD: CPT | Mod: CPTII,S$GLB,, | Performed by: PODIATRIST

## 2025-05-21 PROCEDURE — 1126F AMNT PAIN NOTED NONE PRSNT: CPT | Mod: CPTII,S$GLB,, | Performed by: PODIATRIST

## 2025-05-21 NOTE — PROGRESS NOTES
Subjective:      Patient ID: Denice Sheth is a 89 y.o. female.    Chief Complaint: Diabetes Mellitus and Wound Care (2nd right toe blister, no drainage, right foot)    Denice is a 89 y.o. female with a past medical history of Anticoagulant long-term use, Aortic calcification (01/02/2022), Aortic valve regurgitation (10/13/2021), CAD (coronary artery disease) (01/06/2022), Cecum mass, Chronic atrial fibrillation (05/02/2017), Chronic diastolic heart failure, CKD (chronic kidney disease) stage 4, GFR 15-29 ml/min (02/22/2016), Dementia (08/31/2022), Diabetic retinopathy (03/24/2021), Disorder of kidney and ureter, Encounter for blood transfusion, Exudative age-related macular degeneration of right eye with active choroidal neovascularization (09/12/2022), Gastroesophageal reflux disease without esophagitis (9/17/2023), H/O: CVA (cerebrovascular accident) (07/30/2019), Hemiparesis affecting left side as late effect of cerebrovascular accident (CVA) (06/05/2017), History of TIA (transient ischemic attack) (09/17/2023), Hypertension, KRISTEN (iron deficiency anemia) (01/12/2018), Insomnia, Irregular heart rhythm, Mitral valve regurgitation (10/13/2021), Other specified hypothyroidism (06/24/2019), Pacemaker (01/02/2023), Primary hypertension (02/09/2016), Proteinuria due to type 2 diabetes mellitus (03/12/2019), Senile purpura (12/21/2022), Sick sinus syndrome (2018), Stenosis of left carotid artery (12/06/2016), TIA (transient ischemic attack) (03/2017), and Type 2 diabetes mellitus with stage 4 chronic kidney disease, with long-term current use of insulin. Patient relates developing a blood blister to the dorsum of the Rt. 2nd toe late last week.  She is unable to recall how the lesion developed.  Denies this currently being a source of pain.  Her daughter has been applying betadine, per my instruction, to the site BID.  Denies noticing drainage or localized signs of infection to the digit.  She remains mostly in her  closed toe diabetic shoes throughout the day.  Denies any remaining issues.      Hemoglobin A1C   Date Value Ref Range Status   02/10/2025 6.0 (H) 4.0 - 5.6 % Final     Comment:     ADA Screening Guidelines:  5.7-6.4%  Consistent with prediabetes  >or=6.5%  Consistent with diabetes    High levels of fetal hemoglobin interfere with the HbA1C  assay. Heterozygous hemoglobin variants (HbS, HgC, etc)do  not significantly interfere with this assay.   However, presence of multiple variants may affect accuracy.     08/13/2024 6.3 (H) 4.0 - 5.6 % Final     Comment:     ADA Screening Guidelines:  5.7-6.4%  Consistent with prediabetes  >or=6.5%  Consistent with diabetes    High levels of fetal hemoglobin interfere with the HbA1C  assay. Heterozygous hemoglobin variants (HbS, HgC, etc)do  not significantly interfere with this assay.   However, presence of multiple variants may affect accuracy.     12/04/2023 6.4 (H) 4.0 - 5.6 % Final     Comment:     ADA Screening Guidelines:  5.7-6.4%  Consistent with prediabetes  >or=6.5%  Consistent with diabetes    High levels of fetal hemoglobin interfere with the HbA1C  assay. Heterozygous hemoglobin variants (HbS, HgC, etc)do  not significantly interfere with this assay.   However, presence of multiple variants may affect accuracy.           Past Medical History:   Diagnosis Date    Anticoagulant long-term use     Aortic calcification 01/02/2022    CXR 1/2/22    Aortic valve regurgitation 10/13/2021    CAD (coronary artery disease) 01/06/2022    Cecum mass     Chronic atrial fibrillation 05/02/2017    Chronic diastolic heart failure     CKD (chronic kidney disease) stage 4, GFR 15-29 ml/min 02/22/2016    Dementia 08/31/2022    Diabetic retinopathy 03/24/2021    Disorder of kidney and ureter     follows w/ Dr. 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 09/12/2022    Gastroesophageal  reflux disease without esophagitis 9/17/2023    H/O: CVA (cerebrovascular accident) 07/30/2019    Hemiparesis affecting left side as late effect of cerebrovascular accident (CVA) 06/05/2017    History of TIA (transient ischemic attack) 09/17/2023    Hypertension     KRISTEN (iron deficiency anemia) 01/12/2018    Insomnia     Irregular heart rhythm     Mitral valve regurgitation 10/13/2021    Other specified hypothyroidism 06/24/2019    Pacemaker 01/02/2023    Primary hypertension 02/09/2016    Proteinuria due to type 2 diabetes mellitus 03/12/2019    Senile purpura 12/21/2022    Sick sinus syndrome 2018    s/p pacemaker placement    Stenosis of left carotid artery 12/06/2016    TIA (transient ischemic attack) 03/2017    Type 2 diabetes mellitus with stage 4 chronic kidney disease, with long-term current use of insulin        Past Surgical History:   Procedure Laterality Date    ADENOIDECTOMY  8/18    BILATERAL SALPINGO-OOPHORECTOMY (BSO) Bilateral 08/19/2019    Procedure: SALPINGO-OOPHORECTOMY, BILATERAL;  Surgeon: Blayne Wallace MD;  Location: [facility] OR;  Service: OB/GYN;  Laterality: Bilateral;    COLON SURGERY  8/18    HYSTERECTOMY      partial, benign causes by reported hx    INSERTION OF PACEMAKER  08/2017    LEFT HEART CATHETERIZATION Left 01/06/2022    Procedure: Left heart cath;  Surgeon: Bhaskar Hanson III, MD;  Location: [facility] CATH;  Service: Cardiology;  Laterality: Left;  Right radial    ROBOT-ASSISTED LAPAROSCOPIC COLECTOMY USING DA MICHELE XI Right 08/19/2019    Procedure: XI ROBOTIC COLECTOMY;  Surgeon: Kuortney Dodson MD;  Location: [facility] OR;  Service: General;  Laterality: Right;       Family History   Problem Relation Name Age of Onset    Diabetes Mother Blake     Heart disease Mother Blake     Cancer Father Julian         type unknown (nonsmoker, no etoh)    Heart disease Brother Julian     Dementia Sister      Cancer Daughter Luz         fallopian ca    Cancer Son Lukas         esophageal ca     Dementia Sister      Dementia Sister         Social History     Socioeconomic History    Marital status:    Tobacco Use    Smoking status: Never     Passive exposure: Never    Smokeless tobacco: Never   Substance and Sexual Activity    Alcohol use: No    Drug use: No    Sexual activity: Never     Birth control/protection: Post-menopausal     Social Drivers of Health     Financial Resource Strain: Low Risk  (5/12/2025)    Overall Financial Resource Strain (CARDIA)     Difficulty of Paying Living Expenses: Not hard at all   Food Insecurity: No Food Insecurity (5/12/2025)    Hunger Vital Sign     Worried About Running Out of Food in the Last Year: Never true     Ran Out of Food in the Last Year: Never true   Transportation Needs: No Transportation Needs (5/12/2025)    PRAPARE - Transportation     Lack of Transportation (Medical): No     Lack of Transportation (Non-Medical): No   Physical Activity: Inactive (5/12/2025)    Exercise Vital Sign     Days of Exercise per Week: 0 days     Minutes of Exercise per Session: 0 min   Stress: No Stress Concern Present (5/12/2025)    [employer] - Occupational Stress Questionnaire     Feeling of Stress : Not at all   Housing Stability: Low Risk  (5/12/2025)    Housing Stability Vital Sign     Unable to Pay for Housing in the Last Year: No     Homeless in the Last Year: No       Current Outpatient Medications   Medication Sig Dispense Refill    amiodarone (PACERONE) 200 MG Tab Take 200 mg by mouth once daily.      ascorbic acid (VITAMIN C) 500 MG tablet Take 500 mg by mouth once daily.      b complex vitamins capsule Take 1 capsule by mouth once daily.      blood sugar diagnostic Strp 1 strip 2 times daily, to use with insurance preferred meter 100 each 2    blood-glucose sensor (DEXCOM G7 SENSOR) Petty USE TO CHECK BLOOD GLUCOSE AS DIRECTED. CHANGE SENSOR EVERY 10 DAYS 9 each 3    clopidogrel (PLAVIX) 75 mg tablet TAKE ONE TABLET BY MOUTH ONCE  DAILY 30 tablet 11    diclofenac sodium (VOLTAREN ARTHRITIS PAIN) 1 % Gel Apply 2 g topically 2 (two) times daily as needed (muscle pain). 100 g 1    digoxin (LANOXIN) 125 mcg tablet Take 1 tablet by mouth every other day.      diphenoxylate-atropine 2.5-0.025 mg (LOMOTIL) 2.5-0.025 mg per tablet Take 1 tablet by mouth 4 (four) times daily as needed for Diarrhea. 30 tablet 1    fish oil-omega-3 fatty acids 300-1,000 mg capsule Take 1 capsule by mouth 2 (two) times a day.      furosemide (LASIX) 40 MG tablet TAKE 1 TABLET BY MOUTH ONCE DAILY (TAKE  AN  EXTRA  TABLET  FOR  ANY  WEIGHT  GAIN  OF  3  POUNDS  OR  MORE  ON  ANY  GIVEN  DAY) 90 tablet 2    insulin lispro 100 unit/mL pen INJECT 16 UNITS THREE TIMES DAILY WITH MEALS 45 mL 1    lancets 31 gauge Misc 1 strip 2 times daily, to use with insurance preferred meter 100 each 2    LANTUS SOLOSTAR U-100 INSULIN 100 unit/mL (3 mL) InPn pen INJECT 28 UNITS UNDER THE SKIN EVERY EVENING 30 mL 1    levothyroxine (SYNTHROID) 100 MCG tablet TAKE 1 TABLET BY MOUTH ONCE DAILY BEFORE BREAKFAST 90 tablet 1    magnesium 200 mg Tab Take 200 mg by mouth once daily.      memantine (NAMENDA) 10 MG Tab TAKE 1 TABLET BY MOUTH ONCE DAILY IN THE EVENING 90 tablet 3    multivitamin (THERAGRAN) per tablet Take 1 tablet by mouth once daily.      ondansetron (ZOFRAN-ODT) 4 MG TbDL Take 1 tablet (4 mg total) by mouth every 8 (eight) hours as needed (Nausea). 20 tablet 0    polyethylene glycol (GLYCOLAX) 17 gram PwPk Take 17 g by mouth as needed (constipation).      potassium chloride (KLOR-CON) 10 MEQ TbSR Take 1 tablet by mouth twice daily 180 tablet 3    rivastigmine tartrate (EXELON) 1.5 MG capsule Take 1 capsule by mouth twice daily 90 capsule 1    simvastatin (ZOCOR) 10 MG tablet Take 10 mg by mouth every evening.      blood-glucose meter kit To check BG 2 times daily, to use with insurance preferred meter 1 each 0    blood-glucose meter,continuous (DEXCOM ) Misc 1 each by  Misc.(Non-Drug; Combo Route) route once daily. 1 each 0    blood-glucose transmitter (DEXCOM G5 TRANSMITTER) Petty 1 each by Misc.(Non-Drug; Combo Route) route Daily. 1 each 0    metoprolol tartrate (LOPRESSOR) 50 MG tablet Take 1 tablet (50 mg total) by mouth 2 (two) times daily. 60 tablet 1     No current facility-administered medications for this visit.       Review of patient's allergies indicates:   Allergen Reactions    Adhesive Itching    Mucinex [guaifenesin]      Says caused CHF     Latex, natural rubber Rash         Review of Systems   Constitutional: Negative for chills and fever.   Cardiovascular:  Negative for claudication and leg swelling.   Skin:  Positive for suspicious lesions. Negative for color change, nail changes and poor wound healing.   Musculoskeletal:  Negative for joint pain, joint swelling, muscle cramps and muscle weakness.   Gastrointestinal:  Negative for nausea and vomiting.   Neurological:  Positive for numbness.   Psychiatric/Behavioral:  Negative for altered mental status.            Objective:      Physical Exam  Constitutional:       Appearance: Normal appearance. She is not ill-appearing.   Cardiovascular:      Pulses:           Dorsalis pedis pulses are 2+ on the right side and 2+ on the left side.        Posterior tibial pulses are 2+ on the right side and 2+ on the left side.      Comments: CFT is < 3 seconds bilateral.  Pedal hair growth is decreased bilateral.  No lower extremity edema noted bilateral.  Toes are warm to touch bilateral.    Musculoskeletal:         General: Deformity present. No tenderness.      Right lower leg: No edema.      Left lower leg: No edema.      Comments: Muscle strength 5/5 in all muscle groups bilateral.  No tenderness nor crepitation with ROM of foot/ankle joints bilateral.  (-) for pain with palpation of bilateral foot and ankle. Bilateral semi-reducible contracture of toes 2-5.  Bilateral HAV.   Skin:     General: Skin is warm and dry.       Capillary Refill: Capillary refill takes 2 to 3 seconds.      Findings: Lesion present. No bruising, ecchymosis, erythema, signs of injury, laceration, petechiae, rash or wound.      Comments: Shearing noted to the dorsum of the Rt. 2nd PIP joint with formation of a dried blood blister.  Blister remains intact with no adjacent signs of infection noted.            Neurological:      Mental Status: She is alert.      Sensory: Sensory deficit present.      Motor: No weakness or atrophy.      Comments: Decreased protective sensation noted bilateral.  Light touch is intact bilateral.                 Assessment:       Encounter Diagnoses   Name Primary?    Blood blister Yes    Hammer toes of both feet     Diabetic polyneuropathy associated with type 2 diabetes mellitus          Plan:       Denice was seen today for diabetes mellitus and wound care.    Diagnoses and all orders for this visit:    Blood blister    Hammer toes of both feet    Diabetic polyneuropathy associated with type 2 diabetes mellitus      I counseled the patient on her conditions, their implications and medical management.    Based on today's exam, the patient has a stable dried blood blister overlying the Rt. 2nd PIP joint.    Advised to continue wearing supportive shoe gear that limits pressure to the digits.      A #15 scalpel was used to crosshatch the top cover of the Rt. Shoe overlying the 2nd hammertoe.  This should more aggressively offload pressure to the dorsal PIP joint.    Advised to apply betadine to the Rt. 2nd dorsal PIP joint BID x 2 weeks.    RTC in 2 months for routine nail care.    Pankaj Julian DPM

## 2025-06-04 ENCOUNTER — E-VISIT (OUTPATIENT)
Dept: FAMILY MEDICINE | Facility: CLINIC | Age: OVER 89
End: 2025-06-04
Payer: MEDICARE

## 2025-06-04 DIAGNOSIS — N30.00 ACUTE CYSTITIS WITHOUT HEMATURIA: Primary | ICD-10-CM

## 2025-06-04 RX ORDER — SULFAMETHOXAZOLE AND TRIMETHOPRIM 400; 80 MG/1; MG/1
1 TABLET ORAL 2 TIMES DAILY
Qty: 6 TABLET | Refills: 0 | Status: SHIPPED | OUTPATIENT
Start: 2025-06-04 | End: 2025-06-07

## 2025-06-05 ENCOUNTER — LAB VISIT (OUTPATIENT)
Dept: LAB | Facility: HOSPITAL | Age: OVER 89
End: 2025-06-05
Payer: MEDICARE

## 2025-06-05 DIAGNOSIS — N30.00 ACUTE CYSTITIS WITHOUT HEMATURIA: ICD-10-CM

## 2025-06-05 DIAGNOSIS — N18.4 CKD (CHRONIC KIDNEY DISEASE) STAGE 4, GFR 15-29 ML/MIN: ICD-10-CM

## 2025-06-05 LAB
CREAT UR-MCNC: 61 MG/DL (ref 15–325)
PROT UR-MCNC: 77 MG/DL
PROT/CREAT UR: 1.26 MG/G{CREAT}

## 2025-06-05 PROCEDURE — 84156 ASSAY OF PROTEIN URINE: CPT

## 2025-06-05 PROCEDURE — 87086 URINE CULTURE/COLONY COUNT: CPT

## 2025-06-06 LAB — BACTERIA UR CULT: ABNORMAL

## 2025-06-08 DIAGNOSIS — G31.84 MCI (MILD COGNITIVE IMPAIRMENT) WITH MEMORY LOSS: ICD-10-CM

## 2025-06-09 ENCOUNTER — RESULTS FOLLOW-UP (OUTPATIENT)
Dept: FAMILY MEDICINE | Facility: CLINIC | Age: OVER 89
End: 2025-06-09

## 2025-06-09 DIAGNOSIS — N30.00 ACUTE CYSTITIS WITHOUT HEMATURIA: Primary | ICD-10-CM

## 2025-06-09 RX ORDER — CEPHALEXIN 250 MG/1
250 CAPSULE ORAL DAILY
Qty: 5 CAPSULE | Refills: 0 | Status: SHIPPED | OUTPATIENT
Start: 2025-06-09 | End: 2025-06-14

## 2025-06-09 RX ORDER — RIVASTIGMINE TARTRATE 1.5 MG/1
1.5 CAPSULE ORAL 2 TIMES DAILY
Qty: 90 CAPSULE | Refills: 0 | Status: SHIPPED | OUTPATIENT
Start: 2025-06-09

## 2025-06-09 NOTE — TELEPHONE ENCOUNTER
Refill Routing Note   Medication(s) are not appropriate for processing by [facility] for the following reason(s):        Outside of protocol    [facility] action(s):  Route               Appointments  past 12m or future 3m with PCP    Date Provider   Last Visit   6/4/2025 Allison Woods MD   Next Visit   Visit date not found Allison Woods MD   ED visits in past 90 days: 0        Note composed:9:28 AM 06/09/2025

## 2025-06-10 ENCOUNTER — PATIENT MESSAGE (OUTPATIENT)
Dept: FAMILY MEDICINE | Facility: CLINIC | Age: OVER 89
End: 2025-06-10
Payer: MEDICARE

## 2025-06-10 DIAGNOSIS — N18.4 TYPE 2 DIABETES MELLITUS WITH STAGE 4 CHRONIC KIDNEY DISEASE, WITH LONG-TERM CURRENT USE OF INSULIN: Chronic | ICD-10-CM

## 2025-06-10 DIAGNOSIS — E11.22 TYPE 2 DIABETES MELLITUS WITH STAGE 4 CHRONIC KIDNEY DISEASE, WITH LONG-TERM CURRENT USE OF INSULIN: Chronic | ICD-10-CM

## 2025-06-10 DIAGNOSIS — Z79.4 TYPE 2 DIABETES MELLITUS WITH STAGE 4 CHRONIC KIDNEY DISEASE, WITH LONG-TERM CURRENT USE OF INSULIN: Chronic | ICD-10-CM

## 2025-06-10 RX ORDER — BLOOD-GLUCOSE SENSOR
EACH MISCELLANEOUS
Qty: 9 EACH | Refills: 2 | Status: SHIPPED | OUTPATIENT
Start: 2025-06-10

## 2025-06-10 NOTE — TELEPHONE ENCOUNTER
Care Due:                  Date            Visit Type   Department     Provider  --------------------------------------------------------------------------------                                EP -                              PRIMARY      [facility] FAMILY  Last Visit: 02-      CARE ([facility])   MEDICINE       Allison Woods  Next Visit: None Scheduled  None         None Found                                                            Last  Test          Frequency    Reason                     Performed    Due Date  --------------------------------------------------------------------------------    HBA1C.......  6 months...  LANTUS, insulin..........  02-   08-    [facility] Embedded Care Due Messages. Reference number: [id number].   6/10/2025 11:27:28 AM CDT   Statement Selected

## 2025-06-15 NOTE — TELEPHONE ENCOUNTER
No care due was identified.  [facility] Embedded Care Due Messages. Reference number: [id number].   6/15/2025 6:37:03 PM CDT

## 2025-06-16 ENCOUNTER — PATIENT MESSAGE (OUTPATIENT)
Dept: PODIATRY | Facility: CLINIC | Age: OVER 89
End: 2025-06-16
Payer: MEDICARE

## 2025-06-16 RX ORDER — FUROSEMIDE 40 MG/1
TABLET ORAL
Qty: 90 TABLET | Refills: 0 | Status: SHIPPED | OUTPATIENT
Start: 2025-06-16

## 2025-06-16 NOTE — TELEPHONE ENCOUNTER
Refill Routing Note   Medication(s) are not appropriate for processing by [facility] for the following reason(s):        Outside of protocol    [facility] action(s):  Route        Medication Therapy Plan: Self-adjusting directions outside [facility] protocol      Appointments  past 12m or future 3m with PCP    Date Provider   Last Visit   6/4/2025 Allison Woods MD   Next Visit   Visit date not found Allison Woods MD   ED visits in past 90 days: 0        Note composed:2:36 AM 06/16/2025

## 2025-06-24 ENCOUNTER — PATIENT MESSAGE (OUTPATIENT)
Dept: PODIATRY | Facility: CLINIC | Age: OVER 89
End: 2025-06-24
Payer: MEDICARE

## 2025-06-30 ENCOUNTER — PATIENT MESSAGE (OUTPATIENT)
Dept: PODIATRY | Facility: CLINIC | Age: OVER 89
End: 2025-06-30
Payer: MEDICARE

## 2025-07-17 ENCOUNTER — OFFICE VISIT (OUTPATIENT)
Dept: PODIATRY | Facility: CLINIC | Age: OVER 89
End: 2025-07-17
Payer: MEDICARE

## 2025-07-17 VITALS — BODY MASS INDEX: 27.67 KG/M2 | WEIGHT: 150.38 LBS | HEIGHT: 62 IN

## 2025-07-17 DIAGNOSIS — M20.41 HAMMER TOES OF BOTH FEET: ICD-10-CM

## 2025-07-17 DIAGNOSIS — E11.42 DIABETIC POLYNEUROPATHY ASSOCIATED WITH TYPE 2 DIABETES MELLITUS: ICD-10-CM

## 2025-07-17 DIAGNOSIS — M20.42 HAMMER TOES OF BOTH FEET: ICD-10-CM

## 2025-07-17 DIAGNOSIS — B35.1 ONYCHOMYCOSIS DUE TO DERMATOPHYTE: ICD-10-CM

## 2025-07-17 DIAGNOSIS — L89.892 PRESSURE ULCER OF TOE OF RIGHT FOOT, STAGE 2: Primary | ICD-10-CM

## 2025-07-17 PROCEDURE — 1157F ADVNC CARE PLAN IN RCRD: CPT | Mod: CPTII,S$GLB,, | Performed by: PODIATRIST

## 2025-07-17 PROCEDURE — 1159F MED LIST DOCD IN RCRD: CPT | Mod: CPTII,S$GLB,, | Performed by: PODIATRIST

## 2025-07-17 PROCEDURE — 99999 PR PBB SHADOW E&M-EST. PATIENT-LVL II: CPT | Mod: PBBFAC,,, | Performed by: PODIATRIST

## 2025-07-17 PROCEDURE — 11721 DEBRIDE NAIL 6 OR MORE: CPT | Mod: Q9,S$GLB,, | Performed by: PODIATRIST

## 2025-07-17 PROCEDURE — 1125F AMNT PAIN NOTED PAIN PRSNT: CPT | Mod: CPTII,S$GLB,, | Performed by: PODIATRIST

## 2025-07-17 PROCEDURE — 1101F PT FALLS ASSESS-DOCD LE1/YR: CPT | Mod: CPTII,S$GLB,, | Performed by: PODIATRIST

## 2025-07-17 PROCEDURE — 3288F FALL RISK ASSESSMENT DOCD: CPT | Mod: CPTII,S$GLB,, | Performed by: PODIATRIST

## 2025-07-17 PROCEDURE — 99213 OFFICE O/P EST LOW 20 MIN: CPT | Mod: 25,S$GLB,, | Performed by: PODIATRIST

## 2025-07-17 NOTE — PROGRESS NOTES
Subjective:      Patient ID: Denice Sheth is a 89 y.o. female.    Chief Complaint: Nail Care (3 month nail care, ulcer 2nd digit right foot, using betadine 2x day, pain 10/10 when touched. 3/10 pain in toe today.)    Denice is a 89 y.o. female who presents to the clinic for evaluation and treatment of diabetic feet. Denice has a past medical history of Anticoagulant long-term use, Aortic calcification (01/02/2022), Aortic valve regurgitation (10/13/2021), CAD (coronary artery disease) (01/06/2022), Cecum mass, Chronic atrial fibrillation (05/02/2017), Chronic diastolic heart failure, CKD (chronic kidney disease) stage 4, GFR 15-29 ml/min (02/22/2016), Dementia (08/31/2022), Diabetic retinopathy (03/24/2021), Disorder of kidney and ureter, Encounter for blood transfusion, Exudative age-related macular degeneration of right eye with active choroidal neovascularization (09/12/2022), Gastroesophageal reflux disease without esophagitis (9/17/2023), H/O: CVA (cerebrovascular accident) (07/30/2019), Hemiparesis affecting left side as late effect of cerebrovascular accident (CVA) (06/05/2017), History of TIA (transient ischemic attack) (09/17/2023), Hypertension, KRISTEN (iron deficiency anemia) (01/12/2018), Insomnia, Irregular heart rhythm, Mitral valve regurgitation (10/13/2021), Other specified hypothyroidism (06/24/2019), Pacemaker (01/02/2023), Primary hypertension (02/09/2016), Proteinuria due to type 2 diabetes mellitus (03/12/2019), Senile purpura (12/21/2022), Sick sinus syndrome (2018), Stenosis of left carotid artery (12/06/2016), TIA (transient ischemic attack) (03/2017), and Type 2 diabetes mellitus with stage 4 chronic kidney disease, with long-term current use of insulin. Patient relates needing her toenails trimmed.  Denies this being a source of pain.  Has not attempted to self trim.   States she continues to have a wound of the Rt. Dorsal 2nd toe.  Her daughter has been applying betadine to the site BID.   She has also switched back to use of a pair of shoes with a lycra upper.  Notes control over her blood glucose. Denies any additional pedal complaints.     PCP: Allison Woods MD    Date Last Seen by PCP: 6/25    Hemoglobin A1C   Date Value Ref Range Status   02/10/2025 6.0 (H) 4.0 - 5.6 % Final     Comment:     ADA Screening Guidelines:  5.7-6.4%  Consistent with prediabetes  >or=6.5%  Consistent with diabetes    High levels of fetal hemoglobin interfere with the HbA1C  assay. Heterozygous hemoglobin variants (HbS, HgC, etc)do  not significantly interfere with this assay.   However, presence of multiple variants may affect accuracy.     08/13/2024 6.3 (H) 4.0 - 5.6 % Final     Comment:     ADA Screening Guidelines:  5.7-6.4%  Consistent with prediabetes  >or=6.5%  Consistent with diabetes    High levels of fetal hemoglobin interfere with the HbA1C  assay. Heterozygous hemoglobin variants (HbS, HgC, etc)do  not significantly interfere with this assay.   However, presence of multiple variants may affect accuracy.     12/04/2023 6.4 (H) 4.0 - 5.6 % Final     Comment:     ADA Screening Guidelines:  5.7-6.4%  Consistent with prediabetes  >or=6.5%  Consistent with diabetes    High levels of fetal hemoglobin interfere with the HbA1C  assay. Heterozygous hemoglobin variants (HbS, HgC, etc)do  not significantly interfere with this assay.   However, presence of multiple variants may affect accuracy.           Past Medical History:   Diagnosis Date    Anticoagulant long-term use     Aortic calcification 01/02/2022    CXR 1/2/22    Aortic valve regurgitation 10/13/2021    CAD (coronary artery disease) 01/06/2022    Cecum mass     Chronic atrial fibrillation 05/02/2017    Chronic diastolic heart failure     CKD (chronic kidney disease) stage 4, GFR 15-29 ml/min 02/22/2016    Dementia 08/31/2022    Diabetic retinopathy 03/24/2021    Disorder of kidney and ureter     follows w/ Dr. Jonathan Pace    Encounter for blood  transfusion     Exudative age-related macular degeneration of right eye with active choroidal neovascularization 09/12/2022    Gastroesophageal reflux disease without esophagitis 9/17/2023    H/O: CVA (cerebrovascular accident) 07/30/2019    Hemiparesis affecting left side as late effect of cerebrovascular accident (CVA) 06/05/2017    History of TIA (transient ischemic attack) 09/17/2023    Hypertension     KRISTEN (iron deficiency anemia) 01/12/2018    Insomnia     Irregular heart rhythm     Mitral valve regurgitation 10/13/2021    Other specified hypothyroidism 06/24/2019    Pacemaker 01/02/2023    Primary hypertension 02/09/2016    Proteinuria due to type 2 diabetes mellitus 03/12/2019    Senile purpura 12/21/2022    Sick sinus syndrome 2018    s/p pacemaker placement    Stenosis of left carotid artery 12/06/2016    TIA (transient ischemic attack) 03/2017    Type 2 diabetes mellitus with stage 4 chronic kidney disease, with long-term current use of insulin        Past Surgical History:   Procedure Laterality Date    ADENOIDECTOMY  8/18    BILATERAL SALPINGO-OOPHORECTOMY (BSO) Bilateral 08/19/2019    Procedure: SALPINGO-OOPHORECTOMY, BILATERAL;  Surgeon: Blayne Wallace MD;  Location: [facility] OR;  Service: OB/GYN;  Laterality: Bilateral;    COLON SURGERY  8/18    HYSTERECTOMY      partial, benign causes by reported hx    INSERTION OF PACEMAKER  08/2017    LEFT HEART CATHETERIZATION Left 01/06/2022    Procedure: Left heart cath;  Surgeon: Bahskar Hanson III, MD;  Location: [facility] CATH;  Service: Cardiology;  Laterality: Left;  Right radial    ROBOT-ASSISTED LAPAROSCOPIC COLECTOMY USING DA MICHELE XI Right 08/19/2019    Procedure: XI ROBOTIC COLECTOMY;  Surgeon: Kourtney Dodson MD;  Location: [facility] OR;  Service: General;  Laterality: Right;       Family History   Problem Relation Name Age of Onset    Diabetes Mother Blake     Heart disease Mother Blake     Cancer Father Julian         type unknown (nonsmoker, no etoh)     Heart disease Brother Julian     Dementia Sister      Cancer Daughter Luz         fallopian ca    Cancer Son Lukas         esophageal ca    Dementia Sister      Dementia Sister         Social History     Socioeconomic History    Marital status:    Tobacco Use    Smoking status: Never     Passive exposure: Never    Smokeless tobacco: Never   Substance and Sexual Activity    Alcohol use: No    Drug use: No    Sexual activity: Never     Birth control/protection: Post-menopausal     Social Drivers of Health     Financial Resource Strain: Low Risk  (5/12/2025)    Overall Financial Resource Strain (CARDIA)     Difficulty of Paying Living Expenses: Not hard at all   Food Insecurity: No Food Insecurity (5/12/2025)    Hunger Vital Sign     Worried About Running Out of Food in the Last Year: Never true     Ran Out of Food in the Last Year: Never true   Transportation Needs: No Transportation Needs (5/12/2025)    PRAPARE - Transportation     Lack of Transportation (Medical): No     Lack of Transportation (Non-Medical): No   Physical Activity: Inactive (5/12/2025)    Exercise Vital Sign     Days of Exercise per Week: 0 days     Minutes of Exercise per Session: 0 min   Stress: No Stress Concern Present (5/12/2025)    [employer] - Occupational Stress Questionnaire     Feeling of Stress : Not at all   Housing Stability: Low Risk  (5/12/2025)    Housing Stability Vital Sign     Unable to Pay for Housing in the Last Year: No     Homeless in the Last Year: No       Current Outpatient Medications   Medication Sig Dispense Refill    amiodarone (PACERONE) 200 MG Tab Take 200 mg by mouth once daily.      ascorbic acid (VITAMIN C) 500 MG tablet Take 500 mg by mouth once daily.      b complex vitamins capsule Take 1 capsule by mouth once daily.      blood sugar diagnostic Strp 1 strip 2 times daily, to use with insurance preferred meter 100 each 2    blood-glucose sensor (DEXCOM G7 SENSOR) Petty  USE TO CHECK BLOOD GLUCOSE AS DIRECTED. CHANGE SENSOR EVERY 10 DAYS 9 each 2    clopidogrel (PLAVIX) 75 mg tablet TAKE ONE TABLET BY MOUTH ONCE DAILY 30 tablet 11    diclofenac sodium (VOLTAREN ARTHRITIS PAIN) 1 % Gel Apply 2 g topically 2 (two) times daily as needed (muscle pain). 100 g 1    digoxin (LANOXIN) 125 mcg tablet Take 1 tablet by mouth every other day.      fish oil-omega-3 fatty acids 300-1,000 mg capsule Take 1 capsule by mouth 2 (two) times a day.      furosemide (LASIX) 40 MG tablet TAKE 1 TABLET BY MOUTH ONCE DAILY (TAKE AN EXTRA TABLET FOR ANY WEIGHT GAIN OF 3 POUNDS OR MORE ON ANY GIVEN DAY) 90 tablet 0    insulin lispro 100 unit/mL pen INJECT 16 UNITS THREE TIMES DAILY WITH MEALS 45 mL 1    lancets 31 gauge Misc 1 strip 2 times daily, to use with insurance preferred meter 100 each 2    LANTUS SOLOSTAR U-100 INSULIN 100 unit/mL (3 mL) InPn pen INJECT 28 UNITS UNDER THE SKIN EVERY EVENING 30 mL 1    levothyroxine (SYNTHROID) 100 MCG tablet TAKE 1 TABLET BY MOUTH ONCE DAILY BEFORE BREAKFAST 90 tablet 1    magnesium 200 mg Tab Take 200 mg by mouth once daily.      memantine (NAMENDA) 10 MG Tab TAKE 1 TABLET BY MOUTH ONCE DAILY IN THE EVENING 90 tablet 3    multivitamin (THERAGRAN) per tablet Take 1 tablet by mouth once daily.      ondansetron (ZOFRAN-ODT) 4 MG TbDL Take 1 tablet (4 mg total) by mouth every 8 (eight) hours as needed (Nausea). 20 tablet 0    polyethylene glycol (GLYCOLAX) 17 gram PwPk Take 17 g by mouth as needed (constipation).      potassium chloride (KLOR-CON) 10 MEQ TbSR Take 1 tablet by mouth twice daily 180 tablet 3    rivastigmine tartrate (EXELON) 1.5 MG capsule Take 1 capsule by mouth twice daily 90 capsule 0    simvastatin (ZOCOR) 10 MG tablet Take 10 mg by mouth every evening.      blood-glucose meter kit To check BG 2 times daily, to use with insurance preferred meter 1 each 0    blood-glucose meter,continuous (DEXCOM ) Misc 1 each by Misc.(Non-Drug; Combo Route)  route once daily. 1 each 0    blood-glucose transmitter (DEXCOM G5 TRANSMITTER) Petty 1 each by Misc.(Non-Drug; Combo Route) route Daily. 1 each 0    diphenoxylate-atropine 2.5-0.025 mg (LOMOTIL) 2.5-0.025 mg per tablet Take 1 tablet by mouth 4 (four) times daily as needed for Diarrhea. 30 tablet 1    metoprolol tartrate (LOPRESSOR) 50 MG tablet Take 1 tablet (50 mg total) by mouth 2 (two) times daily. 60 tablet 1     No current facility-administered medications for this visit.       Review of patient's allergies indicates:   Allergen Reactions    Adhesive Itching    Mucinex [guaifenesin]      Says caused CHF     Latex, natural rubber Rash         Review of Systems   Constitutional: Negative for chills and fever.   Cardiovascular:  Negative for claudication and leg swelling.   Skin:  Negative for color change, nail changes and poor wound healing.   Musculoskeletal:  Negative for joint pain, joint swelling, muscle cramps and muscle weakness.   Gastrointestinal:  Negative for nausea and vomiting.   Neurological:  Positive for numbness.   Psychiatric/Behavioral:  Negative for altered mental status.            Objective:      Physical Exam  Constitutional:       Appearance: Normal appearance. She is not ill-appearing.   Cardiovascular:      Pulses:           Dorsalis pedis pulses are 2+ on the right side and 2+ on the left side.        Posterior tibial pulses are 2+ on the right side and 2+ on the left side.      Comments: CFT is < 3 seconds bilateral.  Pedal hair growth is decreased bilateral.  No lower extremity edema noted bilateral.  Toes are warm to touch bilateral.    Musculoskeletal:         General: Deformity present. No tenderness.      Right lower leg: No edema.      Left lower leg: No edema.      Comments: Muscle strength 5/5 in all muscle groups bilateral.  No tenderness nor crepitation with ROM of foot/ankle joints bilateral.  (-) for pain with palpation of bilateral foot and ankle. Bilateral semi-reducible  contracture of toes 2-5.  Bilateral HAV.   Skin:     General: Skin is warm and dry.      Capillary Refill: Capillary refill takes 2 to 3 seconds.      Findings: No bruising, ecchymosis, erythema, signs of injury, laceration, lesion, petechiae, rash or wound.      Comments: Pedal skin appears thin bilateral. Toenails x 10 appear thickened by 2 mm, elongated by 4 mm, and discolored with subungual debris.  Superficial wound noted to the dorsum of the Rt. 2nd PIP joint.  Wound base is granular and down to dermis.  Lata wound is devoid of erythema, edema, fluctuance, purulence, and malodor.  Measures 0.6 x 0.3 x 0.1cm          Neurological:      Mental Status: She is alert.      Sensory: Sensory deficit present.      Motor: No weakness or atrophy.      Comments: Decreased protective sensation noted bilateral.  Light touch is intact bilateral.                 Assessment:       Encounter Diagnoses   Name Primary?    Pressure ulcer of toe of right foot, stage 2 Yes    Diabetic polyneuropathy associated with type 2 diabetes mellitus     Hammer toes of both feet     Onychomycosis due to dermatophyte          Plan:       Denice was seen today for nail care.    Diagnoses and all orders for this visit:    Pressure ulcer of toe of right foot, stage 2    Diabetic polyneuropathy associated with type 2 diabetes mellitus    Hammer toes of both feet    Onychomycosis due to dermatophyte      I counseled the patient on her conditions, their implications and medical management.    Advised to continue wearing supportive shoe gear that limits pressure to the digits.      The wound to the dorsum of the Rt. 2nd PIP joint was covered with franklin and a band aid.  To repeat this dressing QD x 1-2 weeks.    Advised to call if the site has failed to heal within this timeframe.    Shoe inspection. Diabetic Foot Education. Patient reminded of the importance of good nutrition and blood sugar control to help prevent podiatric complications of  diabetes. Patient instructed on proper foot hygeine. We discussed wearing proper shoe gear, daily foot inspections, never walking without protective shoe gear, never putting sharp instruments to feet    With patient's permission, nails were aggressively reduced and debrided x 10 to their soft tissue attachment mechanically and with electric , removing all offending nail and debris. Patient relates relief following the procedure. She will continue to monitor the areas daily, inspect her feet, wear protective shoe gear when ambulatory, moisturizer to maintain skin integrity and follow in this office in approximately 3 months, sooner p.r.n.    RTC in 3 months for routine nail care.    Pankaj Julian DPM

## 2025-07-20 DIAGNOSIS — G31.84 MCI (MILD COGNITIVE IMPAIRMENT) WITH MEMORY LOSS: ICD-10-CM

## 2025-07-21 RX ORDER — RIVASTIGMINE TARTRATE 1.5 MG/1
1.5 CAPSULE ORAL 2 TIMES DAILY
Qty: 180 CAPSULE | Refills: 3 | Status: SHIPPED | OUTPATIENT
Start: 2025-07-21 | End: 2026-07-16

## 2025-07-21 NOTE — TELEPHONE ENCOUNTER
Refill Routing Note   Medication(s) are not appropriate for processing by [facility] for the following reason(s):        Outside of protocol    [facility] action(s):  Route             Appointments  past 12m or future 3m with PCP    Date Provider   Last Visit   6/4/2025 Allison Woods MD   Next Visit   Visit date not found Allison Woods MD   ED visits in past 90 days: 0        Note composed:10:37 AM 07/21/2025

## 2025-07-25 ENCOUNTER — PATIENT MESSAGE (OUTPATIENT)
Dept: PODIATRY | Facility: CLINIC | Age: OVER 89
End: 2025-07-25
Payer: MEDICARE

## 2025-07-25 ENCOUNTER — OFFICE VISIT (OUTPATIENT)
Dept: FAMILY MEDICINE | Facility: CLINIC | Age: OVER 89
End: 2025-07-25
Payer: MEDICARE

## 2025-07-25 ENCOUNTER — TELEPHONE (OUTPATIENT)
Dept: FAMILY MEDICINE | Facility: CLINIC | Age: OVER 89
End: 2025-07-25

## 2025-07-25 VITALS
BODY MASS INDEX: 27.25 KG/M2 | WEIGHT: 148.06 LBS | HEART RATE: 62 BPM | OXYGEN SATURATION: 95 % | HEIGHT: 62 IN | SYSTOLIC BLOOD PRESSURE: 138 MMHG | DIASTOLIC BLOOD PRESSURE: 48 MMHG

## 2025-07-25 DIAGNOSIS — N30.00 ACUTE CYSTITIS WITHOUT HEMATURIA: Primary | ICD-10-CM

## 2025-07-25 DIAGNOSIS — R41.82 ALTERED MENTAL STATUS, UNSPECIFIED ALTERED MENTAL STATUS TYPE: Primary | ICD-10-CM

## 2025-07-25 DIAGNOSIS — R39.15 URINARY URGENCY: ICD-10-CM

## 2025-07-25 DIAGNOSIS — L89.892 PRESSURE ULCER OF TOE OF RIGHT FOOT, STAGE 2: ICD-10-CM

## 2025-07-25 LAB
BILIRUBIN, UA POC OHS: NEGATIVE
BLOOD, UA POC OHS: NEGATIVE
CLARITY, UA POC OHS: ABNORMAL
COLOR, UA POC OHS: YELLOW
GLUCOSE, UA POC OHS: NEGATIVE
KETONES, UA POC OHS: NEGATIVE
LEUKOCYTES, UA POC OHS: ABNORMAL
NITRITE, UA POC OHS: NEGATIVE
PH, UA POC OHS: 5.5
PROTEIN, UA POC OHS: 30
SPECIFIC GRAVITY, UA POC OHS: 1.01
UROBILINOGEN, UA POC OHS: 0.2

## 2025-07-25 PROCEDURE — 99999 PR PBB SHADOW E&M-EST. PATIENT-LVL III: CPT | Mod: PBBFAC,,, | Performed by: INTERNAL MEDICINE

## 2025-07-25 RX ORDER — CEPHALEXIN 500 MG/1
500 CAPSULE ORAL EVERY 12 HOURS
Qty: 10 CAPSULE | Refills: 0 | Status: SHIPPED | OUTPATIENT
Start: 2025-07-25 | End: 2025-07-30

## 2025-07-25 NOTE — PROGRESS NOTES
"Assessment and Plan:    1. Altered mental status, unspecified altered mental status type (Primary)  Discussed with patient and her daughter in great detail that altered mental status is very difficult to workup in the outpatient setting and that if at any point she is getting worse, especially if it is you develops fevers, chills, nausea, vomiting, headache, dizziness, lightheadedness, or any other concerning symptoms she should present to the emergency room.  Sent patient for CBC and CMP ASAP  Workup for possible UTI as below.  Discussed that as of now the toe does not look obviously infected, but if we can not find an alternate source for her altered mental status it would be reasonable to try treating with antibiotics to see if that improves.  Also awaiting response from podiatrist on his opinion as to whether or not this looks infected.  - Comprehensive Metabolic Panel; Future  - CBC Without Differential; Future    2. Urinary urgency  Patient tried 3 times while in clinic to produce urine sample but was not able to produce any urine.  Discussed with daughter that if she is able to bring us a urine sample by this afternoon, we can test it.  If she is not able to urinate at all before the end of the day, she would need to go to the ER as she may need a catheter for urinary retention and so that this can be tested and treated if she does have a UTI.  - POCT Urinalysis(Instrument)    3. Pressure ulcer of toe of right foot, stage 2  Discussed that as of now the toe does not look obviously infected, but if we can not find an alternate source for her altered mental status it would be reasonable to try treating with antibiotics to see if that improves.  Also awaiting response from podiatrist on his opinion as to whether or not this looks infected.      ______________________________________________________________________  Subjective:    Chief Complaint:  "possible UTI"    HPI:  Denice is a 89 y.o. year old female here " for evaluation of possible UTI.     She is new to me she is a patient of Allison Woods MD. Medical history notable for diabetes, dementia, h/o stroke, CAD, HTN, CKD4, history of hyponatremia.     She presents today accompanied by her daughter with complaint of increased somnolence for the past day.  Last night had two episodes of saying she had felt like she needed to go to the bathroom but then was unable to urinate.  After the 3rd attempt to urinate she urinated a large volume.  Daughter states that this was yellow and did not have any unusual odors.  Patient denies any pain or burning with urination.    Daughter states that she has with this patient 24 hours a day and that she is not at all concerned that there was any trauma.  Patient denies any trauma.  She denies anything hurting other than her toe.    She has been seeing Podiatry related to a wound of her right 2nd toe.  This was most recently evaluated on July 17th, not thought to be infected, diagnosed with pressure ulcer and given wound dressings for home.  Daughter reports that she thinks her toe is slightly more red than it used to be.  Certainly has not been improving.    With regards to diabetes, daughter has been monitoring her sugar on Dexcom and it has not been out of range.  She has been checking her blood pressure and heart rate and either have been out of normal range.    Patient personally denies any symptoms today.  Patient is oriented to her name and the fact that we are at a doctor's office, was not able to verbalize where we were or what year it is (patient states this is usual for her).      Medications:  Medications Ordered Prior to Encounter[1]    Review of Systems:  Review of Systems   Constitutional:  Negative for chills and fever.   Gastrointestinal:  Negative for nausea and vomiting.   Genitourinary:  Positive for urgency. Negative for dysuria, frequency and pelvic pain.       Past Medical History:  Past Medical History:  "  Diagnosis Date    Anticoagulant long-term use     Aortic calcification 01/02/2022    CXR 1/2/22    Aortic valve regurgitation 10/13/2021    CAD (coronary artery disease) 01/06/2022    Cecum mass     Chronic atrial fibrillation 05/02/2017    Chronic diastolic heart failure     CKD (chronic kidney disease) stage 4, GFR 15-29 ml/min 02/22/2016    Dementia 08/31/2022    Diabetic retinopathy 03/24/2021    Disorder of kidney and ureter     follows w/ Dr. 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 09/12/2022    Gastroesophageal reflux disease without esophagitis 9/17/2023    H/O: CVA (cerebrovascular accident) 07/30/2019    Hemiparesis affecting left side as late effect of cerebrovascular accident (CVA) 06/05/2017    History of TIA (transient ischemic attack) 09/17/2023    Hypertension     KRISTEN (iron deficiency anemia) 01/12/2018    Insomnia     Irregular heart rhythm     Mitral valve regurgitation 10/13/2021    Other specified hypothyroidism 06/24/2019    Pacemaker 01/02/2023    Primary hypertension 02/09/2016    Proteinuria due to type 2 diabetes mellitus 03/12/2019    Senile purpura 12/21/2022    Sick sinus syndrome 2018    s/p pacemaker placement    Stenosis of left carotid artery 12/06/2016    TIA (transient ischemic attack) 03/2017    Type 2 diabetes mellitus with stage 4 chronic kidney disease, with long-term current use of insulin        Objective:    Vitals:  Vitals:    07/25/25 1010   BP: (!) 138/48   Pulse: 62   SpO2: 95%   Weight: 67.1 kg (148 lb 0.6 oz)   Height: 5' 2" (1.575 m)   PainSc: 0-No pain       Physical Exam  Vitals reviewed.   Constitutional:       General: She is not in acute distress.     Appearance: She is well-developed. She is not ill-appearing, toxic-appearing or diaphoretic.   Eyes:      General:         Right eye: No discharge.         Left eye: No discharge.      Conjunctiva/sclera: " Conjunctivae normal.   Cardiovascular:      Rate and Rhythm: Normal rate and regular rhythm.      Heart sounds: Murmur heard.   Pulmonary:      Effort: Pulmonary effort is normal. No respiratory distress.      Breath sounds: No wheezing or rales.   Musculoskeletal:      Right lower leg: No edema.      Left lower leg: No edema.      Comments: Right 2nd toe is slightly swollen and erythematous with an open wound that does not appear to go beyond dermis, there is no erythema spreading beyond the toe.  The toe is tender to palpation.   Skin:     General: Skin is warm and dry.   Neurological:      Mental Status: She is alert.      Comments: Oriented to name but not city or year   Psychiatric:         Behavior: Behavior normal.         Thought Content: Thought content normal.         Judgment: Judgment normal.             Data:  In May, sodium 140 creatinine 2.7    Scarlet Carcamo MD  Internal Medicine         [1]   Current Outpatient Medications on File Prior to Visit   Medication Sig Dispense Refill    amiodarone (PACERONE) 200 MG Tab Take 200 mg by mouth once daily.      ascorbic acid (VITAMIN C) 500 MG tablet Take 500 mg by mouth once daily.      b complex vitamins capsule Take 1 capsule by mouth once daily.      blood sugar diagnostic Strp 1 strip 2 times daily, to use with insurance preferred meter 100 each 2    blood-glucose meter kit To check BG 2 times daily, to use with insurance preferred meter 1 each 0    blood-glucose meter,continuous (DEXCOM ) Misc 1 each by Misc.(Non-Drug; Combo Route) route once daily. 1 each 0    blood-glucose sensor (DEXCOM G7 SENSOR) Petty USE TO CHECK BLOOD GLUCOSE AS DIRECTED. CHANGE SENSOR EVERY 10 DAYS 9 each 2    blood-glucose transmitter (DEXCOM G5 TRANSMITTER) Petty 1 each by Misc.(Non-Drug; Combo Route) route Daily. 1 each 0    clopidogrel (PLAVIX) 75 mg tablet TAKE ONE TABLET BY MOUTH ONCE DAILY 30 tablet 11    diclofenac sodium (VOLTAREN ARTHRITIS PAIN) 1 % Gel Apply 2 g  topically 2 (two) times daily as needed (muscle pain). 100 g 1    digoxin (LANOXIN) 125 mcg tablet Take 1 tablet by mouth every other day.      fish oil-omega-3 fatty acids 300-1,000 mg capsule Take 1 capsule by mouth 2 (two) times a day.      furosemide (LASIX) 40 MG tablet TAKE 1 TABLET BY MOUTH ONCE DAILY (TAKE AN EXTRA TABLET FOR ANY WEIGHT GAIN OF 3 POUNDS OR MORE ON ANY GIVEN DAY) 90 tablet 0    insulin lispro 100 unit/mL pen INJECT 16 UNITS THREE TIMES DAILY WITH MEALS 45 mL 1    lancets 31 gauge Misc 1 strip 2 times daily, to use with insurance preferred meter 100 each 2    LANTUS SOLOSTAR U-100 INSULIN 100 unit/mL (3 mL) InPn pen INJECT 28 UNITS UNDER THE SKIN EVERY EVENING 30 mL 1    levothyroxine (SYNTHROID) 100 MCG tablet TAKE 1 TABLET BY MOUTH ONCE DAILY BEFORE BREAKFAST 90 tablet 1    magnesium 200 mg Tab Take 200 mg by mouth once daily.      memantine (NAMENDA) 10 MG Tab TAKE 1 TABLET BY MOUTH ONCE DAILY IN THE EVENING 90 tablet 3    metoprolol tartrate (LOPRESSOR) 50 MG tablet Take 1 tablet (50 mg total) by mouth 2 (two) times daily. 60 tablet 1    multivitamin (THERAGRAN) per tablet Take 1 tablet by mouth once daily.      ondansetron (ZOFRAN-ODT) 4 MG TbDL Take 1 tablet (4 mg total) by mouth every 8 (eight) hours as needed (Nausea). 20 tablet 0    polyethylene glycol (GLYCOLAX) 17 gram PwPk Take 17 g by mouth as needed (constipation).      potassium chloride (KLOR-CON) 10 MEQ TbSR Take 1 tablet by mouth twice daily 180 tablet 3    rivastigmine tartrate (EXELON) 1.5 MG capsule Take 1 capsule (1.5 mg total) by mouth 2 (two) times daily. 180 capsule 3    diphenoxylate-atropine 2.5-0.025 mg (LOMOTIL) 2.5-0.025 mg per tablet Take 1 tablet by mouth 4 (four) times daily as needed for Diarrhea. 30 tablet 1    simvastatin (ZOCOR) 10 MG tablet Take 10 mg by mouth every evening.      [DISCONTINUED] escitalopram oxalate (LEXAPRO) 10 MG tablet TAKE 1 TABLET BY MOUTH ONCE DAILY IN THE EVENING 90 tablet 1     [DISCONTINUED] nitroGLYCERIN (NITROSTAT) 0.4 MG SL tablet Place 1 tablet (0.4 mg total) under the tongue every 5 (five) minutes as needed for Chest pain. (Patient not taking: No sig reported) 30 tablet 0     No current facility-administered medications on file prior to visit.

## 2025-07-25 NOTE — TELEPHONE ENCOUNTER
Copied from CRM #[id number]. Topic: General Inquiry - Test Results  >> Jul 25, 2025  3:26 PM Preethi wrote:  Type:  Needs Medical Advice    Who Called:pt daughter moises   Would the patient rather a call back or a response via [facility]? Call back  Best Call Back Number: [phone]    Additional Information: moises is saying she is brigning over her mom urine sample now

## 2025-07-29 ENCOUNTER — OFFICE VISIT (OUTPATIENT)
Dept: PODIATRY | Facility: CLINIC | Age: OVER 89
End: 2025-07-29
Payer: MEDICARE

## 2025-07-29 VITALS — HEIGHT: 62 IN | BODY MASS INDEX: 27.22 KG/M2 | WEIGHT: 147.94 LBS

## 2025-07-29 DIAGNOSIS — E11.42 DIABETIC POLYNEUROPATHY ASSOCIATED WITH TYPE 2 DIABETES MELLITUS: ICD-10-CM

## 2025-07-29 DIAGNOSIS — L89.893 PRESSURE ULCER OF TOE OF RIGHT FOOT, STAGE 3: Primary | ICD-10-CM

## 2025-07-29 PROCEDURE — 87075 CULTR BACTERIA EXCEPT BLOOD: CPT | Performed by: PODIATRIST

## 2025-07-29 PROCEDURE — 99499 UNLISTED E&M SERVICE: CPT | Mod: S$GLB,,, | Performed by: PODIATRIST

## 2025-07-29 PROCEDURE — 87077 CULTURE AEROBIC IDENTIFY: CPT | Performed by: PODIATRIST

## 2025-07-29 PROCEDURE — 99999 PR PBB SHADOW E&M-EST. PATIENT-LVL IV: CPT | Mod: PBBFAC,,, | Performed by: PODIATRIST

## 2025-07-29 PROCEDURE — 11042 DBRDMT SUBQ TIS 1ST 20SQCM/<: CPT | Mod: S$GLB,,, | Performed by: PODIATRIST

## 2025-07-29 NOTE — PROGRESS NOTES
Subjective:      Patient ID: Denice Sheth is a 89 y.o. female.    Chief Complaint: Wound Care (Right 2nd toe, wound care, purulent drainage)  Patient presents to clinic for a follow up regarding the prior wound of the Rt. 2nd toe.  Her daughter has been applying franklin, 2x2 gauze, and an adhesive bandage to the site QD as instructed.  Relates being concerned by an increase in swelling of the ankle and lower leg, late last week.  States her PCP placed her on Keflex as a precaution.  With today's exam, she is most concerned by continued discoloration and swelling of the digit.  She currently denies pain from the site.  Denies noticing purulent drainage.  Denies experiencing N/V/F/C/D.  Denies any additional pedal complaints.       Hemoglobin A1C   Date Value Ref Range Status   02/10/2025 6.0 (H) 4.0 - 5.6 % Final     Comment:     ADA Screening Guidelines:  5.7-6.4%  Consistent with prediabetes  >or=6.5%  Consistent with diabetes    High levels of fetal hemoglobin interfere with the HbA1C  assay. Heterozygous hemoglobin variants (HbS, HgC, etc)do  not significantly interfere with this assay.   However, presence of multiple variants may affect accuracy.     08/13/2024 6.3 (H) 4.0 - 5.6 % Final     Comment:     ADA Screening Guidelines:  5.7-6.4%  Consistent with prediabetes  >or=6.5%  Consistent with diabetes    High levels of fetal hemoglobin interfere with the HbA1C  assay. Heterozygous hemoglobin variants (HbS, HgC, etc)do  not significantly interfere with this assay.   However, presence of multiple variants may affect accuracy.     12/04/2023 6.4 (H) 4.0 - 5.6 % Final     Comment:     ADA Screening Guidelines:  5.7-6.4%  Consistent with prediabetes  >or=6.5%  Consistent with diabetes    High levels of fetal hemoglobin interfere with the HbA1C  assay. Heterozygous hemoglobin variants (HbS, HgC, etc)do  not significantly interfere with this assay.   However, presence of multiple variants may affect accuracy.            Past Medical History:   Diagnosis Date    Anticoagulant long-term use     Aortic calcification 01/02/2022    CXR 1/2/22    Aortic valve regurgitation 10/13/2021    CAD (coronary artery disease) 01/06/2022    Cecum mass     Chronic atrial fibrillation 05/02/2017    Chronic diastolic heart failure     CKD (chronic kidney disease) stage 4, GFR 15-29 ml/min 02/22/2016    Dementia 08/31/2022    Diabetic retinopathy 03/24/2021    Disorder of kidney and ureter     follows w/ Dr. 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 09/12/2022    Gastroesophageal reflux disease without esophagitis 9/17/2023    H/O: CVA (cerebrovascular accident) 07/30/2019    Hemiparesis affecting left side as late effect of cerebrovascular accident (CVA) 06/05/2017    History of TIA (transient ischemic attack) 09/17/2023    Hypertension     KRISTEN (iron deficiency anemia) 01/12/2018    Insomnia     Irregular heart rhythm     Mitral valve regurgitation 10/13/2021    Other specified hypothyroidism 06/24/2019    Pacemaker 01/02/2023    Primary hypertension 02/09/2016    Proteinuria due to type 2 diabetes mellitus 03/12/2019    Senile purpura 12/21/2022    Sick sinus syndrome 2018    s/p pacemaker placement    Stenosis of left carotid artery 12/06/2016    TIA (transient ischemic attack) 03/2017    Type 2 diabetes mellitus with stage 4 chronic kidney disease, with long-term current use of insulin        Past Surgical History:   Procedure Laterality Date    ADENOIDECTOMY  8/18    BILATERAL SALPINGO-OOPHORECTOMY (BSO) Bilateral 08/19/2019    Procedure: SALPINGO-OOPHORECTOMY, BILATERAL;  Surgeon: Blayne Wallace MD;  Location: [facility];  Service: OB/GYN;  Laterality: Bilateral;    COLON SURGERY  8/18    HYSTERECTOMY      partial, benign causes by reported hx    INSERTION OF PACEMAKER  08/2017    LEFT HEART CATHETERIZATION Left 01/06/2022     Procedure: Left heart cath;  Surgeon: Bhaskar Hanson III, MD;  Location: [facility] CATH;  Service: Cardiology;  Laterality: Left;  Right radial    ROBOT-ASSISTED LAPAROSCOPIC COLECTOMY USING DA MICHELE XI Right 08/19/2019    Procedure: XI ROBOTIC COLECTOMY;  Surgeon: Kourtney Dodson MD;  Location: [facility] OR;  Service: General;  Laterality: Right;       Family History   Problem Relation Name Age of Onset    Diabetes Mother Blake     Heart disease Mother Blake     Cancer Father Julian         type unknown (nonsmoker, no etoh)    Heart disease Brother Julian     Dementia Sister      Cancer Daughter Luz         fallopian ca    Cancer Son Lukas         esophageal ca    Dementia Sister      Dementia Sister         Social History     Socioeconomic History    Marital status:    Tobacco Use    Smoking status: Never     Passive exposure: Never    Smokeless tobacco: Never   Substance and Sexual Activity    Alcohol use: No    Drug use: No    Sexual activity: Never     Birth control/protection: Post-menopausal     Social Drivers of Health     Financial Resource Strain: Low Risk  (5/12/2025)    Overall Financial Resource Strain (CARDIA)     Difficulty of Paying Living Expenses: Not hard at all   Food Insecurity: No Food Insecurity (5/12/2025)    Hunger Vital Sign     Worried About Running Out of Food in the Last Year: Never true     Ran Out of Food in the Last Year: Never true   Transportation Needs: No Transportation Needs (5/12/2025)    PRAPARE - Transportation     Lack of Transportation (Medical): No     Lack of Transportation (Non-Medical): No   Physical Activity: Inactive (5/12/2025)    Exercise Vital Sign     Days of Exercise per Week: 0 days     Minutes of Exercise per Session: 0 min   Stress: No Stress Concern Present (5/12/2025)    [place] [employer] - Occupational Stress Questionnaire     Feeling of Stress : Not at all   Housing Stability: Low Risk  (5/12/2025)    Housing Stability Vital  Sign     Unable to Pay for Housing in the Last Year: No     Homeless in the Last Year: No       Current Outpatient Medications   Medication Sig Dispense Refill    amiodarone (PACERONE) 200 MG Tab Take 200 mg by mouth once daily.      ascorbic acid (VITAMIN C) 500 MG tablet Take 500 mg by mouth once daily.      b complex vitamins capsule Take 1 capsule by mouth once daily.      blood sugar diagnostic Strp 1 strip 2 times daily, to use with insurance preferred meter 100 each 2    blood-glucose sensor (DEXCOM G7 SENSOR) Petty USE TO CHECK BLOOD GLUCOSE AS DIRECTED. CHANGE SENSOR EVERY 10 DAYS 9 each 2    cephALEXin (KEFLEX) 500 MG capsule Take 1 capsule (500 mg total) by mouth every 12 (twelve) hours. for 5 days 10 capsule 0    clopidogrel (PLAVIX) 75 mg tablet TAKE ONE TABLET BY MOUTH ONCE DAILY 30 tablet 11    diclofenac sodium (VOLTAREN ARTHRITIS PAIN) 1 % Gel Apply 2 g topically 2 (two) times daily as needed (muscle pain). 100 g 1    digoxin (LANOXIN) 125 mcg tablet Take 1 tablet by mouth every other day.      fish oil-omega-3 fatty acids 300-1,000 mg capsule Take 1 capsule by mouth 2 (two) times a day.      furosemide (LASIX) 40 MG tablet TAKE 1 TABLET BY MOUTH ONCE DAILY (TAKE AN EXTRA TABLET FOR ANY WEIGHT GAIN OF 3 POUNDS OR MORE ON ANY GIVEN DAY) 90 tablet 0    insulin lispro 100 unit/mL pen INJECT 16 UNITS THREE TIMES DAILY WITH MEALS 45 mL 1    lancets 31 gauge Misc 1 strip 2 times daily, to use with insurance preferred meter 100 each 2    LANTUS SOLOSTAR U-100 INSULIN 100 unit/mL (3 mL) InPn pen INJECT 28 UNITS UNDER THE SKIN EVERY EVENING 30 mL 1    levothyroxine (SYNTHROID) 100 MCG tablet TAKE 1 TABLET BY MOUTH ONCE DAILY BEFORE BREAKFAST 90 tablet 1    magnesium 200 mg Tab Take 200 mg by mouth once daily.      memantine (NAMENDA) 10 MG Tab TAKE 1 TABLET BY MOUTH ONCE DAILY IN THE EVENING 90 tablet 3    multivitamin (THERAGRAN) per tablet Take 1 tablet by mouth once daily.      ondansetron (ZOFRAN-ODT) 4  MG TbDL Take 1 tablet (4 mg total) by mouth every 8 (eight) hours as needed (Nausea). 20 tablet 0    polyethylene glycol (GLYCOLAX) 17 gram PwPk Take 17 g by mouth as needed (constipation).      potassium chloride (KLOR-CON) 10 MEQ TbSR Take 1 tablet by mouth twice daily 180 tablet 3    rivastigmine tartrate (EXELON) 1.5 MG capsule Take 1 capsule (1.5 mg total) by mouth 2 (two) times daily. 180 capsule 3    simvastatin (ZOCOR) 10 MG tablet Take 10 mg by mouth every evening.      blood-glucose meter kit To check BG 2 times daily, to use with insurance preferred meter 1 each 0    blood-glucose meter,continuous (DEXCOM ) Misc 1 each by Misc.(Non-Drug; Combo Route) route once daily. 1 each 0    blood-glucose transmitter (DEXCOM G5 TRANSMITTER) Petty 1 each by Misc.(Non-Drug; Combo Route) route Daily. 1 each 0    diphenoxylate-atropine 2.5-0.025 mg (LOMOTIL) 2.5-0.025 mg per tablet Take 1 tablet by mouth 4 (four) times daily as needed for Diarrhea. 30 tablet 1    metoprolol tartrate (LOPRESSOR) 50 MG tablet Take 1 tablet (50 mg total) by mouth 2 (two) times daily. 60 tablet 1     No current facility-administered medications for this visit.       Review of patient's allergies indicates:   Allergen Reactions    Adhesive Itching    Mucinex [guaifenesin]      Says caused CHF     Latex, natural rubber Rash         Review of Systems   Constitutional: Negative for chills and fever.   Cardiovascular:  Negative for claudication and leg swelling.   Skin:  Negative for color change, nail changes and poor wound healing.   Musculoskeletal:  Negative for joint pain, joint swelling, muscle cramps and muscle weakness.   Gastrointestinal:  Negative for nausea and vomiting.   Neurological:  Positive for numbness.   Psychiatric/Behavioral:  Negative for altered mental status.            Objective:      Physical Exam  Constitutional:       Appearance: Normal appearance. She is not ill-appearing.   Cardiovascular:      Pulses:            Dorsalis pedis pulses are 2+ on the right side and 2+ on the left side.        Posterior tibial pulses are 2+ on the right side and 2+ on the left side.      Comments: CFT is < 3 seconds bilateral.  Pedal hair growth is decreased bilateral.  Mild localized edema noted to the Rt. 2nd toe.  Toes are warm to touch bilateral.    Musculoskeletal:         General: Deformity present. No tenderness.      Right lower leg: No edema.      Left lower leg: No edema.      Comments: Muscle strength 5/5 in all muscle groups bilateral.  No tenderness nor crepitation with ROM of foot/ankle joints bilateral.  (-) for pain with palpation of bilateral foot and ankle. Bilateral semi-reducible contracture of toes 2-5.  Bilateral HAV.   Skin:     General: Skin is warm and dry.      Capillary Refill: Capillary refill takes 2 to 3 seconds.      Findings: Wound present. No bruising, ecchymosis, erythema, signs of injury, laceration, lesion, petechiae or rash.      Comments: Stage 3 pressure ulcer noted to the dorsum of the Rt. 2nd PIP joint.  Wound base is fibrinous and down to non necrotic exposed proximal phalanx.  Curt wound is devoid of fluctuance, purulence, and malodor.  Mild curt wound edema and erythema is present.  Pre-debridement measurement: 1 x 0.5 x 0.2cm  Post-debridement measurement: 1.2 x 0.7 x 0.2cm          Neurological:      Mental Status: She is alert.      Sensory: Sensory deficit present.      Motor: No weakness or atrophy.      Comments: Decreased protective sensation noted bilateral.  Light touch is intact bilateral.                 Assessment:       Encounter Diagnoses   Name Primary?    Pressure ulcer of toe of right foot, stage 3 Yes    Diabetic polyneuropathy associated with type 2 diabetes mellitus            Plan:       Denice was seen today for wound care.    Diagnoses and all orders for this visit:    Pressure ulcer of toe of right foot, stage 3  -     Aerobic culture  -     Culture, Anaerobic  -     Wound  Debridement    Diabetic polyneuropathy associated with type 2 diabetes mellitus        I counseled the patient on her conditions, their implications and medical management.    With the patient's verbal consent, performed a selective excisional debridement of the Rt. 2nd toe.  See attached procedure note.      Wound cultures were obtained.    The wound base was covered with iodosorb ointment, and a football dressing was applied.    Advised to keep the dressing CDI x 1 week.    Advised to rest and elevate the affected extremity.    Instructed to minimize weight bearing to facilitate wound healing.    Advised to ambulate only in the postoperative shoe/boot.    RTC in 1 week.    Pankaj Julian DPM

## 2025-07-29 NOTE — PROCEDURES
"Wound Debridement    Date/Time: 7/29/2025 10:40 AM    Performed by: Pankaj Julian DPM  Authorized by: Pankaj Julian DPM    Time out: Immediately prior to procedure a "time out" was called to verify the correct patient, procedure, equipment, support staff and site/side marked as required.    Local anesthesia used?: No      Wound Details:    Location:  Right foot    Location:  Right 2nd Toe    Type of Debridement:  Excisional       Length (cm):  1       Width (cm):  0.5       Depth (cm):  0.2       Area (sq cm):  0.39       Percent Debrided (%):  100       Total Area Debrided (sq cm):  0.39    Depth of debridement:  Subcutaneous tissue    Tissue debrided:  Subcutaneous    Devitalized tissue debrided:  Fibrin    Instruments:  Curette  Bleeding:  Minimal  Hemostasis Achieved: Yes  Method Used:  Pressure  Patient tolerance:  Patient tolerated the procedure well with no immediate complications    "

## 2025-08-01 LAB
BACTERIA SPEC AEROBE CULT: ABNORMAL
BACTERIA SPEC ANAEROBE CULT: NORMAL

## 2025-08-01 RX ORDER — CLINDAMYCIN HYDROCHLORIDE 300 MG/1
300 CAPSULE ORAL EVERY 8 HOURS
Qty: 21 CAPSULE | Refills: 0 | Status: SHIPPED | OUTPATIENT
Start: 2025-08-01

## 2025-08-04 ENCOUNTER — TELEPHONE (OUTPATIENT)
Dept: PODIATRY | Facility: CLINIC | Age: OVER 89
End: 2025-08-04
Payer: MEDICARE

## 2025-08-04 NOTE — TELEPHONE ENCOUNTER
Spoke with the patient's daughter Parisa, stated that she has gotten the message and already given the RX to her mother.   
I will SWITCH the dose or number of times a day I take the medications listed below when I get home from the hospital:  None

## 2025-08-11 ENCOUNTER — OFFICE VISIT (OUTPATIENT)
Dept: PODIATRY | Facility: CLINIC | Age: OVER 89
End: 2025-08-11
Payer: MEDICARE

## 2025-08-11 DIAGNOSIS — E11.42 DIABETIC POLYNEUROPATHY ASSOCIATED WITH TYPE 2 DIABETES MELLITUS: ICD-10-CM

## 2025-08-11 DIAGNOSIS — L89.893 PRESSURE ULCER OF TOE OF RIGHT FOOT, STAGE 3: Primary | ICD-10-CM

## 2025-08-11 PROCEDURE — 99499 UNLISTED E&M SERVICE: CPT | Mod: S$GLB,,, | Performed by: PODIATRIST

## 2025-08-11 PROCEDURE — 99999 PR PBB SHADOW E&M-EST. PATIENT-LVL I: CPT | Mod: PBBFAC,,, | Performed by: PODIATRIST

## 2025-08-11 PROCEDURE — 11042 DBRDMT SUBQ TIS 1ST 20SQCM/<: CPT | Mod: S$GLB,,, | Performed by: PODIATRIST

## 2025-08-18 ENCOUNTER — OFFICE VISIT (OUTPATIENT)
Dept: FAMILY MEDICINE | Facility: CLINIC | Age: OVER 89
End: 2025-08-18
Payer: MEDICARE

## 2025-08-18 ENCOUNTER — LAB VISIT (OUTPATIENT)
Dept: LAB | Facility: HOSPITAL | Age: OVER 89
End: 2025-08-18
Attending: INTERNAL MEDICINE
Payer: MEDICARE

## 2025-08-18 VITALS
HEIGHT: 62 IN | HEART RATE: 62 BPM | WEIGHT: 147.94 LBS | SYSTOLIC BLOOD PRESSURE: 132 MMHG | OXYGEN SATURATION: 98 % | BODY MASS INDEX: 27.22 KG/M2 | DIASTOLIC BLOOD PRESSURE: 60 MMHG

## 2025-08-18 DIAGNOSIS — N18.4 TYPE 2 DIABETES MELLITUS WITH STAGE 4 CHRONIC KIDNEY DISEASE, WITH LONG-TERM CURRENT USE OF INSULIN: Chronic | ICD-10-CM

## 2025-08-18 DIAGNOSIS — E78.1 HYPERTRIGLYCERIDEMIA: ICD-10-CM

## 2025-08-18 DIAGNOSIS — Z79.4 TYPE 2 DIABETES MELLITUS WITH STAGE 4 CHRONIC KIDNEY DISEASE, WITH LONG-TERM CURRENT USE OF INSULIN: Chronic | ICD-10-CM

## 2025-08-18 DIAGNOSIS — R41.9 UNSPECIFIED SYMPTOMS AND SIGNS INVOLVING COGNITIVE FUNCTIONS AND AWARENESS: ICD-10-CM

## 2025-08-18 DIAGNOSIS — R68.89 FORGETFULNESS: ICD-10-CM

## 2025-08-18 DIAGNOSIS — N18.4 CKD (CHRONIC KIDNEY DISEASE) STAGE 4, GFR 15-29 ML/MIN: ICD-10-CM

## 2025-08-18 DIAGNOSIS — N18.4 CKD (CHRONIC KIDNEY DISEASE) STAGE 4, GFR 15-29 ML/MIN: Chronic | ICD-10-CM

## 2025-08-18 DIAGNOSIS — I25.10 CORONARY ARTERY DISEASE INVOLVING NATIVE CORONARY ARTERY OF NATIVE HEART WITHOUT ANGINA PECTORIS: Primary | Chronic | ICD-10-CM

## 2025-08-18 DIAGNOSIS — E11.22 TYPE 2 DIABETES MELLITUS WITH STAGE 4 CHRONIC KIDNEY DISEASE, WITH LONG-TERM CURRENT USE OF INSULIN: Chronic | ICD-10-CM

## 2025-08-18 DIAGNOSIS — I10 PRIMARY HYPERTENSION: Chronic | ICD-10-CM

## 2025-08-18 DIAGNOSIS — E03.8 OTHER SPECIFIED HYPOTHYROIDISM: Chronic | ICD-10-CM

## 2025-08-18 DIAGNOSIS — F41.9 ANXIETY: ICD-10-CM

## 2025-08-18 PROCEDURE — 84156 ASSAY OF PROTEIN URINE: CPT

## 2025-08-18 PROCEDURE — 99999 PR PBB SHADOW E&M-EST. PATIENT-LVL V: CPT | Mod: PBBFAC,,, | Performed by: STUDENT IN AN ORGANIZED HEALTH CARE EDUCATION/TRAINING PROGRAM

## 2025-08-18 RX ORDER — BUSPIRONE HYDROCHLORIDE 5 MG/1
5 TABLET ORAL 3 TIMES DAILY PRN
Qty: 60 TABLET | Refills: 11 | Status: SHIPPED | OUTPATIENT
Start: 2025-08-18 | End: 2026-08-18

## 2025-08-19 PROBLEM — N17.9 AKI (ACUTE KIDNEY INJURY): Status: ACTIVE | Noted: 2025-08-19

## 2025-08-19 PROBLEM — E83.52 HYPERCALCEMIA: Status: ACTIVE | Noted: 2025-08-19

## 2025-08-19 LAB
CREAT UR-MCNC: 61.5 MG/DL (ref 15–325)
PROT UR-MCNC: 44 MG/DL
PROT/CREAT UR: 0.72 MG/G{CREAT}

## 2025-08-20 ENCOUNTER — TELEPHONE (OUTPATIENT)
Dept: FAMILY MEDICINE | Facility: CLINIC | Age: OVER 89
End: 2025-08-20
Payer: MEDICARE

## 2025-08-20 ENCOUNTER — TELEPHONE (OUTPATIENT)
Dept: PODIATRY | Facility: CLINIC | Age: OVER 89
End: 2025-08-20
Payer: MEDICARE

## 2025-08-20 PROBLEM — G93.41 ENCEPHALOPATHY, METABOLIC: Status: ACTIVE | Noted: 2025-08-20

## 2025-08-25 ENCOUNTER — OFFICE VISIT (OUTPATIENT)
Dept: NEPHROLOGY | Facility: CLINIC | Age: OVER 89
End: 2025-08-25
Payer: MEDICARE

## 2025-08-25 DIAGNOSIS — E87.1 HYPONATREMIA: ICD-10-CM

## 2025-08-25 DIAGNOSIS — E11.29 PROTEINURIA DUE TO TYPE 2 DIABETES MELLITUS: ICD-10-CM

## 2025-08-25 DIAGNOSIS — E11.22 TYPE 2 DIABETES MELLITUS WITH STAGE 4 CHRONIC KIDNEY DISEASE, WITH LONG-TERM CURRENT USE OF INSULIN: ICD-10-CM

## 2025-08-25 DIAGNOSIS — N18.4 CKD (CHRONIC KIDNEY DISEASE) STAGE 4, GFR 15-29 ML/MIN: Primary | ICD-10-CM

## 2025-08-25 DIAGNOSIS — I50.32 CHRONIC DIASTOLIC HEART FAILURE: Chronic | ICD-10-CM

## 2025-08-25 DIAGNOSIS — N18.4 TYPE 2 DIABETES MELLITUS WITH STAGE 4 CHRONIC KIDNEY DISEASE, WITH LONG-TERM CURRENT USE OF INSULIN: ICD-10-CM

## 2025-08-25 DIAGNOSIS — G93.41 ENCEPHALOPATHY, METABOLIC: ICD-10-CM

## 2025-08-25 DIAGNOSIS — N18.5 CHRONIC KIDNEY DISEASE (CKD), STAGE V: Primary | ICD-10-CM

## 2025-08-25 DIAGNOSIS — Z79.4 TYPE 2 DIABETES MELLITUS WITH STAGE 4 CHRONIC KIDNEY DISEASE, WITH LONG-TERM CURRENT USE OF INSULIN: ICD-10-CM

## 2025-08-25 DIAGNOSIS — R80.9 PROTEINURIA DUE TO TYPE 2 DIABETES MELLITUS: ICD-10-CM

## 2025-08-25 DIAGNOSIS — I10 PRIMARY HYPERTENSION: ICD-10-CM

## 2025-08-25 PROCEDURE — 1157F ADVNC CARE PLAN IN RCRD: CPT | Mod: CPTII,95,, | Performed by: INTERNAL MEDICINE

## 2025-08-25 PROCEDURE — 1159F MED LIST DOCD IN RCRD: CPT | Mod: CPTII,95,, | Performed by: INTERNAL MEDICINE

## 2025-08-25 PROCEDURE — 98006 SYNCH AUDIO-VIDEO EST MOD 30: CPT | Mod: 95,,, | Performed by: INTERNAL MEDICINE

## 2025-08-25 PROCEDURE — 1160F RVW MEDS BY RX/DR IN RCRD: CPT | Mod: CPTII,95,, | Performed by: INTERNAL MEDICINE

## 2025-08-27 ENCOUNTER — TELEPHONE (OUTPATIENT)
Dept: NEUROLOGY | Facility: CLINIC | Age: OVER 89
End: 2025-08-27
Payer: MEDICARE

## 2025-08-27 ENCOUNTER — TELEPHONE (OUTPATIENT)
Dept: NEPHROLOGY | Facility: CLINIC | Age: OVER 89
End: 2025-08-27
Payer: MEDICARE

## 2025-08-28 ENCOUNTER — TELEPHONE (OUTPATIENT)
Dept: FAMILY MEDICINE | Facility: CLINIC | Age: OVER 89
End: 2025-08-28
Payer: MEDICARE

## 2025-08-29 ENCOUNTER — OFFICE VISIT (OUTPATIENT)
Dept: PODIATRY | Facility: CLINIC | Age: OVER 89
End: 2025-08-29
Payer: MEDICARE

## 2025-08-29 DIAGNOSIS — E11.42 DIABETIC POLYNEUROPATHY ASSOCIATED WITH TYPE 2 DIABETES MELLITUS: ICD-10-CM

## 2025-08-29 DIAGNOSIS — L89.893 PRESSURE ULCER OF TOE OF RIGHT FOOT, STAGE 3: Primary | ICD-10-CM

## 2025-09-05 ENCOUNTER — OFFICE VISIT (OUTPATIENT)
Dept: PODIATRY | Facility: CLINIC | Age: OVER 89
End: 2025-09-05
Payer: MEDICARE

## 2025-09-05 VITALS — BODY MASS INDEX: 27.22 KG/M2 | WEIGHT: 147.94 LBS | HEIGHT: 62 IN

## 2025-09-05 DIAGNOSIS — E11.42 DIABETIC POLYNEUROPATHY ASSOCIATED WITH TYPE 2 DIABETES MELLITUS: ICD-10-CM

## 2025-09-05 DIAGNOSIS — Z87.2 HEALED ULCER OF RIGHT FOOT: Primary | ICD-10-CM

## 2025-09-05 PROCEDURE — 99999 PR PBB SHADOW E&M-EST. PATIENT-LVL IV: CPT | Mod: PBBFAC,,, | Performed by: PODIATRIST
